# Patient Record
Sex: FEMALE | Race: WHITE | NOT HISPANIC OR LATINO | Employment: OTHER | URBAN - METROPOLITAN AREA
[De-identification: names, ages, dates, MRNs, and addresses within clinical notes are randomized per-mention and may not be internally consistent; named-entity substitution may affect disease eponyms.]

---

## 2017-01-04 ENCOUNTER — CARE COORDINATION (OUTPATIENT)
Dept: ANESTHESIOLOGY | Facility: CLINIC | Age: 47
End: 2017-01-04

## 2017-01-04 NOTE — PROGRESS NOTES
Called to notify pt that her insurance approves the procedure for tomorrow and reminded her of her procedure time of 3:00 and to arrive at 2:00. Pt verbalized understanding of instructions.

## 2017-01-05 ENCOUNTER — HOSPITAL ENCOUNTER (OUTPATIENT)
Facility: AMBULATORY SURGERY CENTER | Age: 47
End: 2017-01-05
Attending: ANESTHESIOLOGY

## 2017-01-05 VITALS
TEMPERATURE: 98 F | DIASTOLIC BLOOD PRESSURE: 97 MMHG | OXYGEN SATURATION: 98 % | RESPIRATION RATE: 16 BRPM | SYSTOLIC BLOOD PRESSURE: 143 MMHG

## 2017-01-05 RX ORDER — TRIAMCINOLONE ACETONIDE 40 MG/ML
INJECTION, SUSPENSION INTRA-ARTICULAR; INTRAMUSCULAR PRN
Status: DISCONTINUED | OUTPATIENT
Start: 2017-01-05 | End: 2017-01-05 | Stop reason: HOSPADM

## 2017-01-05 RX ORDER — IOPAMIDOL 408 MG/ML
INJECTION, SOLUTION INTRATHECAL PRN
Status: DISCONTINUED | OUTPATIENT
Start: 2017-01-05 | End: 2017-01-05 | Stop reason: HOSPADM

## 2017-01-05 RX ORDER — LIDOCAINE HYDROCHLORIDE 10 MG/ML
INJECTION, SOLUTION EPIDURAL; INFILTRATION; INTRACAUDAL; PERINEURAL PRN
Status: DISCONTINUED | OUTPATIENT
Start: 2017-01-05 | End: 2017-01-05 | Stop reason: HOSPADM

## 2017-01-05 RX ORDER — BUPIVACAINE HYDROCHLORIDE AND EPINEPHRINE 2.5; 5 MG/ML; UG/ML
INJECTION, SOLUTION INFILTRATION; PERINEURAL PRN
Status: DISCONTINUED | OUTPATIENT
Start: 2017-01-05 | End: 2017-01-05 | Stop reason: HOSPADM

## 2017-01-05 NOTE — DISCHARGE INSTRUCTIONS
Home Care Instructions after an Epidural Steroid Pain Injection    A epidural steroid injection delivers steroid medication directly into the area that may be causing yourleg pain.     Activity  -Rest today  -Do not work today  -Resume normal activity tomorrow  -DO NOT shower for 24 hours  -DO NOT remove bandaid for 24 hours    Pain  -You may experience soreness at the injection site for one or two days  -You may use an ice pack for 20 minutes every 2 hours for the first 24 hours  -You may use a heating pad after the first 24 hours  -You may use Tylenol (acetaminophen) every 4 hours or other pain medicines as     directed by your physician    You may experience numbness radiating into your legs or arms (depending on the procedure location). This numbness may last several hours. Until sensation returns to normal; please use caution in walking, climbing stairs, and stepping out of your vehicle, etc.    DID YOU RECEIVE SEDATION TODAY?  No    Safety  Sedation medicine, if given, may remain active for many hours. It is important for the next 24 hours that you do not:  -Drive a car  -Operate machines or power tools  -Consume alcohol, including beer  -Sign any important papers or legal documents      Common side effects of steroids:  Not everyone will experience corticosteroid side effects. If side effects are experienced, they will gradually subside in the 7-10 day period following an injection. Most common side effects include:  -Flushed face and/or chest  -Feeling of warmth, particularly in the face but could be an overall feeling of warmth  -Increased blood sugar in diabetic patients  -Menstrual irregularities my occur. If taking hormone-based birth control an alternate method of birth control is recommended  -Sleep disturbances and/or mood swings are possible  -Leg cramps    Please contact us if you have:  -Severe pain  -Fever more than 101.5 degrees Fahrenheit  -Signs of infection at the injection site (redness,  swelling, or drainage)    If you have questions, please contact our office at [phone] between the hours of 7:00 am and 3:00 pm Monday through Friday. After office hours you can contact the on call provider by dialing [phone]. If you need immediate attention, we recommend that you go to a hospital emergency room or dial [phone].

## 2017-01-05 NOTE — IP AVS SNAPSHOT
MRN:[id number]                      After Visit Summary   1/5/2017    Kalyn Rivero    MRN: [id number]           Thank you!     Thank you for choosing [facility] for your care. Our goal is always to provide you with excellent care. Hearing back from our patients is one way we can continue to improve our services. Please take a few minutes to complete the written survey that you may receive in the mail after you visit with us. Thank you!        Patient Information     Date Of Birth          1970        About your hospital stay     You were admitted on:  January 5, 2017 You last received care in the:  [facility]    You were discharged on:  January 5, 2017       Who to Call     For medical emergencies, please call 911.  For non-urgent questions about your medical care, please call your primary care provider or clinic, [phone]  For questions related to your surgery, please call your surgery clinic        Attending Provider     Provider    Fredy Patel DO       Primary Care Provider Office Phone # Fax #    Mika Zamora [phone]       [facility] [address] [facility] [address]        Your next 10 appointments already scheduled     Ozzie 10, 2017  1:00 PM   (Arrive by 12:45 PM)   Return Visit with Aspen Randle,    [facility] ([facility])    [address] [place] [address]   [phone]            Feb 02, 2017 11:10 AM   (Arrive by 10:55 AM)   RETURN HAND with Carrol Gaspar MD   [facility] ([facility])    [address] [place] [address]   [phone]            Feb 15, 2017 11:30 AM   (Arrive by 11:15 AM)   Return Visit with Fredy Patel DO   [facility] ([facility])    [address] [facility]  [address] [facility] [address] [phone]              Further instructions from your care team       Home Care Instructions after an Epidural Steroid Pain Injection    A epidural steroid injection delivers steroid medication directly into the area that may be causing yourleg pain.     Activity  -Rest today  -Do not work today  -Resume normal activity tomorrow  -DO NOT shower for 24 hours  -DO NOT remove bandaid for 24 hours    Pain  -You may experience soreness at the injection site for one or two days  -You may use an ice pack for 20 minutes every 2 hours for the first 24 hours  -You may use a heating pad after the first 24 hours  -You may use Tylenol (acetaminophen) every 4 hours or other pain medicines as     directed by your physician    You may experience numbness radiating into your legs or arms (depending on the procedure location). This numbness may last several hours. Until sensation returns to normal; please use caution in walking, climbing stairs, and stepping out of your vehicle, etc.    DID YOU RECEIVE SEDATION TODAY?  No    Safety  Sedation medicine, if given, may remain active for many hours. It is important for the next 24 hours that you do not:  -Drive a car  -Operate machines or power tools  -Consume alcohol, including beer  -Sign any important papers or legal documents      Common side effects of steroids:  Not everyone will experience corticosteroid side effects. If side effects are experienced, they will gradually subside in the 7-10 day period following an injection. Most common side effects include:  -Flushed face and/or chest  -Feeling of warmth, particularly in the face but could be an overall feeling of warmth  -Increased blood sugar in diabetic patients  -Menstrual irregularities my occur. If taking hormone-based birth control an alternate method of birth control is recommended  -Sleep disturbances and/or mood swings are possible  -Leg  cramps    Please contact us if you have:  -Severe pain  -Fever more than 101.5 degrees Fahrenheit  -Signs of infection at the injection site (redness, swelling, or drainage)    If you have questions, please contact our office at [phone] between the hours of 7:00 am and 3:00 pm Monday through Friday. After office hours you can contact the on call provider by dialing [phone]. If you need immediate attention, we recommend that you go to a hospital emergency room or dial 911.      Pending Results     Date and Time Order Name Status Description    2017 1459 XR SURGERY TAB FLUORO LESS THAN 5 MIN W STILLS In process             Admission Information        Provider Department Dept Phone    2017 Fredy Patel DO  [place] [phone]      Your Vitals Were     Blood Pressure Temperature Respirations Pulse Oximetry          120/91 mmHg 97.8  F (36.6  C) (Oral) 16 95%        MyChart Information     [employer] is an electronic gateway that provides easy, online access to your medical records. With [employer], you can request a clinic appointment, read your test results, renew a prescription or communicate with your care team.     To sign up for Mod[employer] visit the website at [email or web address]   You will be asked to enter the access code listed below, as well as some personal information. Please follow the directions to create your username and password.     Your access code is: [email or web address]  Expires: 2017 12:05 PM     Your access code will  in 90 days. If you need help or a new code, please contact your [facility] or call [phone] for assistance.        Care EveryWhere ID     This is your Care EveryWhere ID. This could be used by other organizations to access your [facility] medical records  [id number]           Review of your medicines      UNREVIEWED medicines. Ask your doctor about these medicines        Dose / Directions    Botulinum Toxin Type A 200  UNITS Solr   Commonly known as:  BOTOX   Used for:  Intractable chronic migraine without aura and without status migrainosus        Dose:  200 Units   Inject 200 Units as directed every 3 months   Quantity:  200 Units   Refills:  3       CELEBREX PO        Dose:  200 mg   Take 200 mg by mouth 3 times daily   Refills:  0       ENBREL 50 MG/ML injection   Generic drug:  etanercept        Dose:  50 mg   Inject 50 mg Subcutaneous twice a week   Refills:  0       ketamine (KETALAR) 5%-gabapentin (NEURONTIN) 8% 5%-8% Gel topical gel   Used for:  Chronic pain of left heel        Dose:  30 g   Apply 30 g topically 3 times daily   Quantity:  30 g   Refills:  3       medroxyPROGESTERone 150 MG/ML injection   Commonly known as:  DEPO-PROVERA   Used for:  Encounter for initial prescription of other contraceptives        Dose:  150 mg   Inject 1 mL (150 mg) into the muscle every 3 months   Quantity:  3 mL   Refills:  3       omeprazole 40 MG capsule   Commonly known as:  priLOSEC   Used for:  PUD (peptic ulcer disease)        Dose:  40 mg   Take 1 capsule (40 mg) by mouth daily Take 30-60 minutes before a meal.   Quantity:  90 capsule   Refills:  2       oxyCODONE-acetaminophen 5-325 MG per tablet   Commonly known as:  PERCOCET        Take by mouth every 4 hours as needed for moderate to severe pain   Refills:  0                Protect others around you: Learn how to safely use, store and throw away your medicines at www.disposemymeds.org.             Medication List: This is a list of all your medications and when to take them. Check marks below indicate your daily home schedule. Keep this list as a reference.      Medications           Morning Afternoon Evening Bedtime As Needed    Botulinum Toxin Type A 200 UNITS Solr   Commonly known as:  BOTOX   Inject 200 Units as directed every 3 months                                CELEBREX PO   Take 200 mg by mouth 3 times daily                                ENBREL 50 MG/ML injection    Inject 50 mg Subcutaneous twice a week   Generic drug:  etanercept                                ketamine (KETALAR) 5%-gabapentin (NEURONTIN) 8% 5%-8% Gel topical gel   Apply 30 g topically 3 times daily                                medroxyPROGESTERone 150 MG/ML injection   Commonly known as:  DEPO-PROVERA   Inject 1 mL (150 mg) into the muscle every 3 months                                omeprazole 40 MG capsule   Commonly known as:  priLOSEC   Take 1 capsule (40 mg) by mouth daily Take 30-60 minutes before a meal.                                oxyCODONE-acetaminophen 5-325 MG per tablet   Commonly known as:  PERCOCET   Take by mouth every 4 hours as needed for moderate to severe pain

## 2017-01-05 NOTE — IP AVS SNAPSHOT
[facility]    [address] [place] [address]    Phone:  [phone]    Fax:  [phone]                                       After Visit Summary   1/5/2017    Kalyn Rivero    MRN: [id number]           After Visit Summary Signature Page     I have received my discharge instructions, and my questions have been answered. I have discussed any challenges I see with this plan with the nurse or doctor.    ..........................................................................................................................................  Patient/Patient Representative Signature      ..........................................................................................................................................  Patient Representative Print Name and Relationship to Patient    ..................................................               ................................................  Date                                            Time    ..........................................................................................................................................  Reviewed by Signature/Title    ...................................................              ..............................................  Date                                                            Time

## 2017-01-05 NOTE — H&P
Abbreviated History and Physical    Patient Name: Kalyn Rivero  YOB: 1970    Reason for Procedure:Left hip pain    History: 47yo female with chronic left hip pain is here today for a left hip intra-articular steroid injection.    Past Medical History   Diagnosis Date     Endometriosis      Renal stone      Rheumatoid arthritis(714.0)      Hypothyroidism      Nephrolithiasis      Chronic pain 3/24/2011     Acetaminophen overdose 3/24/2011     Malignant neoplasm (H)      Scoliosis      Migraine      History of blood transfusion      Stomach ulcer      history     Fibromyalgia      Heart murmur      Gastro-oesophageal reflux disease      Other chronic pain      PONV (postoperative nausea and vomiting)      Anesthesia complication      pt states has a histor of heart stopping during anesthesia,     Anemia      RA (rheumatoid arthritis) (H)      Immune disorder (H)      Learning disability      Neck injuries        NPO status: appropriat    Recent illness or antibiotic use?No    Current use of anticoagulant medication?No    Allergy to contrast, iodine or shellfish? No    History of Diabetes?NO    History of obstructive sleep apnea? no    History of problems with sedation? No    Physical exam:  /78 mmHg  Temp(Src) 97.8  F (36.6  C) (Oral)  Resp 14  SpO2 95%  LMP   Breastfeeding? No  General: in no apparent distress   Neuro: At least antigravity strength noted in all 4 extremities  Respiratory: Clear to ausculation bilaterally   Cardiac: Regular rate and rhythm  Skin: No rashes or lesions on exposed areas of skin    Medications:   Current Outpatient Prescriptions   Medication     etanercept (ENBREL) 50 MG/ML prefilled syringe     Celecoxib (CELEBREX PO)     ketamine, KETALAR, 5%-gabapentin, NEURONTIN, 8% 5%-8% GEL topical gel     Botulinum Toxin Type A (BOTOX) 200 UNITS SOLR     oxyCODONE-acetaminophen (PERCOCET) 5-325 MG per tablet     omeprazole (PRILOSEC) 40 MG capsule      medroxyPROGESTERone (DEPO-PROVERA) 150 MG/ML injection     No current facility-administered medications for this encounter.     Facility-Administered Medications Ordered in Other Encounters   Medication     bupivacaine 0.5 % -  EPINEPHrine 1:200,000 injection     mepivacaine (PF) (CARBOCAINE) 2 % injection       Allergies:     Allergies   Allergen Reactions     Morphine Swelling     Codeine Nausea and Nausea and Vomiting     Swelling, itching. GI Intolerance.      Gabapentin Other (See Comments)     Pins,needles, stabbing aching at once  Numbness and Tingling     Sulfa Drugs      unknown     Erythromycin Nausea     Vomiting      Penicillins Rash     Prednisone Palpitations     Heart racing       ASA Classification: 2    INFORMED CONSENT OBTAINED: I went over the planned procedure with Ms. Klayn Lucas Mat. I went over the possible outcomes. I also went over the risks of the procedure including the risk of bleeding, infection, nerve damage, increased pain, and joint damage. The patient understood the risks and signed the consent form.     Assessment:  1.)Left Hip Pain  2.)Left Hip Arthropathy    Plan:  1.)OK for local anesthetic use and mild to moderate sedation if indicated.  2.)Proceed with planned injection as noted above in HPI.  3.)The patient will be contacted within 1 week by our office to follow up on the results of our procedure.            Fredy Patel DO    [facility]  Pain Management  Department of Anesthesiology

## 2017-01-16 ENCOUNTER — CARE COORDINATION (OUTPATIENT)
Dept: ANESTHESIOLOGY | Facility: CLINIC | Age: 47
End: 2017-01-16

## 2017-01-16 NOTE — PROGRESS NOTES
"Purpose of call: Follow up after Left Hip Joint Injection done on 1/5/17    Overall relief: Pt stated \"I didn't get any relief, it feels the same.\"  Follow up: Pt has a f/u appointment scheduled for next month.    "

## 2017-02-02 ENCOUNTER — OFFICE VISIT (OUTPATIENT)
Dept: ORTHOPEDICS | Facility: CLINIC | Age: 47
End: 2017-02-02

## 2017-02-02 DIAGNOSIS — M24.021 LOOSE BODY IN RIGHT ELBOW: Primary | ICD-10-CM

## 2017-02-02 NOTE — NURSING NOTE
Reason For Visit:   Chief Complaint   Patient presents with     RECHECK     Follow up, right medial elbow pain, hand numbness, EMG results     Date of surgery: 9/30/15 right elbow arthroplasty    Pain Assessment  Patient Currently in Pain: Yes (Small, ring, and thumb fingers)  Primary Pain Location: Hand  Pain Orientation: Right  Pain Descriptors: Numbness, Heaviness    Hand Dominance Evaluation  Hand Dominance: Right      force  R hand  level 2 force: 9.072 kg (20 lb)  L hand  level  2 force: 8.165 kg (18 lb)

## 2017-02-02 NOTE — MR AVS SNAPSHOT
After Visit Summary   2/2/2017    Kalyn Rivero    MRN: [id number]           Patient Information     Date Of Birth          1970        Visit Information        Provider Department      2/2/2017 11:10 AM Carrol Gaspar MD [facility]        Today's Diagnoses     Loose body in right elbow    -  1       Follow-ups after your visit        Your next 10 appointments already scheduled     Mar 09, 2017  2:00 PM CST   (Arrive by 1:45 PM)   Post-Op with KARMA U ORTHO HAND POP   [facility] ([facility])    [address] [place] [address]   [phone]            Mar 09, 2017  2:30 PM CST   (Arrive by 2:15 PM)   ASYA Hand with Blanca Mon OT   [facility] ([facility])    [address] [place] [address] [phone]            Mar 27, 2017 10:30 AM CDT   (Arrive by 10:15 AM)   Return Visit with Fredy Patel DO   [facility] ([facility])    [address] [place] [address]   [phone]            Apr 13, 2017 11:40 AM CDT   (Arrive by 11:25 AM)   POST-OP HAND with Carrol Gaspar MD   [facility])    [address] [place] [address] [phone]              Who to contact     Please call your clinic at [phone] to:    Ask questions about your health    Make or cancel appointments    Discuss your medicines    Learn about your test results    Speak to your doctor   If you have compliments or concerns about an experience at your clinic, or if you wish to file a complaint, please contact [facility] Patient Relations at [phone] or email us at [email or web address]         Additional Information About Your Visit         [employer] Information     [employer] is an electronic gateway that provides easy, online access to your medical records. With [employer], you can request a clinic appointment, read your test results, renew a prescription or communicate with your care team.     To sign up for [employer] visit the website at [email or web address]   You will be asked to enter the access code listed below, as well as some personal information. Please follow the directions to create your username and password.     Your access code is: [email or web address]  Expires: 2017  6:30 AM     Your access code will  in 90 days. If you need help or a new code, please contact your [facility] or call [phone] for assistance.        Care EveryWhere ID     This is your Care EveryWhere ID. This could be used by other organizations to access your [place] medical records  [id number]         Blood Pressure from Last 3 Encounters:   17 (!) 138/93   17 134/90   17 (!) 142/95    Weight from Last 3 Encounters:   17 73.5 kg (162 lb)   17 73.6 kg (162 lb 3.2 oz)   17 73.6 kg (162 lb 3.2 oz)              We Performed the Following     [facility]-Operative Worksheet (Hand)          Today's Medication Changes          These changes are accurate as of: 17 11:59 PM.  If you have any questions, ask your nurse or doctor.               Stop taking these medicines if you haven't already. Please contact your care team if you have questions.     oxyCODONE-acetaminophen 5-325 MG per tablet   Commonly known as:  PERCOCET                    Primary Care Provider Office Phone # Fax #    Mika Zamora [phone]       [facility] [address] [facility] [address]        Thank you!     Thank you for choosing [facility]  for your care. Our goal is always to provide you with excellent care. Hearing back from our patients is one way we can continue to  improve our services. Please take a few minutes to complete the written survey that you may receive in the mail after your visit with us. Thank you!             Your Updated Medication List - Protect others around you: Learn how to safely use, store and throw away your medicines at www.disposemymeds.org.          This list is accurate as of: 2/2/17 11:59 PM.  Always use your most recent med list.                   Brand Name Dispense Instructions for use    Botulinum Toxin Type A 200 UNITS Solr    BOTOX    200 Units    Inject 200 Units as directed every 3 months       CELEBREX PO      Take 200 mg by mouth 3 times daily       ENBREL 50 MG/ML injection   Generic drug:  etanercept      Inject 50 mg Subcutaneous twice a week Reported on 2/28/2017       ketamine (KETALAR) 5%-gabapentin (NEURONTIN) 8% 5%-8% Gel topical PLO cream     30 g    Apply 30 g topically 3 times daily       medroxyPROGESTERone 150 MG/ML injection    DEPO-PROVERA    3 mL    Inject 1 mL (150 mg) into the muscle every 3 months       omeprazole 40 MG capsule    priLOSEC    90 capsule    Take 1 capsule (40 mg) by mouth daily Take 30-60 minutes before a meal.

## 2017-02-02 NOTE — LETTER
2/2/2017       RE: Kalyn Rivero  [address] [facility] [address]     Dear Colleague,    Thank you for referring your patient, Kalyn Rivero, to the [facility] at [facility]. Please see a copy of my visit note below.    HISTORY OF PRESENT ILLNESS:  Kalyn is a 46-year-old female with multiple medical issues including juvenile rheumatoid arthritis, severe frontal headaches, abdominal pain.  She has had a right total elbow arthroplasty and has had other total joint surgeries as well.  She reports her main problem has been mechanical type symptoms in her right hand.  She has had tingling and numbness in the small and ring fingers.      She is here for followup of her EMG nerve conduction velocity testing.  Her EMG nerve conduction testing shows that she has a right C5-C8 nerve root and the C6 and C7 have some recent active denervation.  There is no evidence of an ulnar neuropathy superimposed upon the cervical radiculopathy.      PHYSICAL EXAMINATION:  Today, she does have mechanical symptoms in the right elbow consistent with a creaking crepitus type sound.      IMPRESSION:     1.  Right elbow pain.   2.  Right hand numbness.      I recommended that she see one of our cervical surgeons because the EMG has localized this as radiculopathy.  She reports that she would like to have the bump in her elbow removed because it is clicking and catching.  I discussed with her that I could do this, but I would not recommend an ulnar nerve exploration at the same time.  She wishes to proceed.  A surgery date was recommended for loose body removal of the right elbow.  A consultation to our spine surgeon colleagues was made.   Carrol Gaspar MD

## 2017-02-02 NOTE — NURSING NOTE
Teaching Flowsheet   Relevant Diagnosis: Right elbow loose body    Teaching Topic: Pre-op right elbow loose body excision      Person(s) involved in teaching:   Patient     Motivation Level:  Asks Questions: Yes  Eager to Learn: Yes  Cooperative: Yes  Receptive (willing/able to accept information): Yes  Any cultural factors/Yazdanism beliefs that may influence understanding or compliance? No       Patient demonstrates understanding of the following:  Reason for the appointment, diagnosis and treatment plan: Yes  Knowledge of proper use of medications and conditions for which they are ordered (with special attention to potential side effects or drug interactions): Yes  Which situations necessitate calling provider and whom to contact: Yes       Teaching Concerns Addressed:   Comments: patient has RA she will be discussing medications with rheumatologist, she also see's pain here at [facility]-she has an appointment with them on 2/25 and will discuss pain management following surgery.      Proper use and care of post op dressing (medical equip, care aids, etc.): Yes  Nutritional needs and diet plan: Yes  Pain management techniques: Yes  Wound Care: Yes  How and/when to access community resources: Yes     Instructional Materials Used/Given: pre-op packet, antiseptic soap      Time spent with patient: 12 minutes.

## 2017-02-22 ENCOUNTER — OFFICE VISIT (OUTPATIENT)
Dept: FAMILY MEDICINE | Facility: CLINIC | Age: 47
End: 2017-02-22
Payer: COMMERCIAL

## 2017-02-22 ENCOUNTER — RESULT FOLLOW UP (OUTPATIENT)
Dept: FAMILY MEDICINE | Facility: CLINIC | Age: 47
End: 2017-02-22

## 2017-02-22 VITALS
HEIGHT: 66 IN | SYSTOLIC BLOOD PRESSURE: 120 MMHG | DIASTOLIC BLOOD PRESSURE: 86 MMHG | HEART RATE: 73 BPM | BODY MASS INDEX: 26.1 KG/M2 | WEIGHT: 162.4 LBS | TEMPERATURE: 98.1 F | OXYGEN SATURATION: 98 %

## 2017-02-22 DIAGNOSIS — Z30.9 ENCOUNTER FOR CONTRACEPTIVE MANAGEMENT, UNSPECIFIED CONTRACEPTIVE ENCOUNTER TYPE: ICD-10-CM

## 2017-02-22 DIAGNOSIS — N89.8 VAGINAL DISCHARGE: ICD-10-CM

## 2017-02-22 DIAGNOSIS — Z12.31 ENCOUNTER FOR SCREENING MAMMOGRAM FOR BREAST CANCER: ICD-10-CM

## 2017-02-22 DIAGNOSIS — Z12.4 SCREENING FOR MALIGNANT NEOPLASM OF CERVIX: ICD-10-CM

## 2017-02-22 DIAGNOSIS — Z01.818 PREOP GENERAL PHYSICAL EXAM: Primary | ICD-10-CM

## 2017-02-22 DIAGNOSIS — M05.711 RHEUMATOID ARTHRITIS INVOLVING BOTH SHOULDERS WITH POSITIVE RHEUMATOID FACTOR (H): ICD-10-CM

## 2017-02-22 DIAGNOSIS — M05.712 RHEUMATOID ARTHRITIS INVOLVING BOTH SHOULDERS WITH POSITIVE RHEUMATOID FACTOR (H): ICD-10-CM

## 2017-02-22 DIAGNOSIS — R87.810 CERVICAL HIGH RISK HPV (HUMAN PAPILLOMAVIRUS) TEST POSITIVE: ICD-10-CM

## 2017-02-22 DIAGNOSIS — E03.9 HYPOTHYROIDISM, UNSPECIFIED TYPE: ICD-10-CM

## 2017-02-22 DIAGNOSIS — Z11.3 SCREEN FOR STD (SEXUALLY TRANSMITTED DISEASE): ICD-10-CM

## 2017-02-22 LAB
ANION GAP SERPL CALCULATED.3IONS-SCNC: 11 MMOL/L (ref 3–14)
BASOPHILS # BLD AUTO: 0.1 10E9/L (ref 0–0.2)
BASOPHILS NFR BLD AUTO: 0.6 %
BETA HCG QUAL IFA URINE: NEGATIVE
BUN SERPL-MCNC: 13 MG/DL (ref 7–30)
CALCIUM SERPL-MCNC: 9.3 MG/DL (ref 8.5–10.1)
CHLORIDE SERPL-SCNC: 105 MMOL/L (ref 94–109)
CO2 SERPL-SCNC: 23 MMOL/L (ref 20–32)
CREAT SERPL-MCNC: 0.8 MG/DL (ref 0.52–1.04)
DIFFERENTIAL METHOD BLD: ABNORMAL
EOSINOPHIL # BLD AUTO: 0.3 10E9/L (ref 0–0.7)
EOSINOPHIL NFR BLD AUTO: 3.2 %
ERYTHROCYTE [DISTWIDTH] IN BLOOD BY AUTOMATED COUNT: 15.6 % (ref 10–15)
GFR SERPL CREATININE-BSD FRML MDRD: 77 ML/MIN/1.7M2
GLUCOSE SERPL-MCNC: 88 MG/DL (ref 70–99)
HCT VFR BLD AUTO: 40.9 % (ref 35–47)
HGB BLD-MCNC: 12.9 G/DL (ref 11.7–15.7)
LYMPHOCYTES # BLD AUTO: 1.5 10E9/L (ref 0.8–5.3)
LYMPHOCYTES NFR BLD AUTO: 17.4 %
MCH RBC QN AUTO: 28.4 PG (ref 26.5–33)
MCHC RBC AUTO-ENTMCNC: 31.5 G/DL (ref 31.5–36.5)
MCV RBC AUTO: 90 FL (ref 78–100)
MICRO REPORT STATUS: NORMAL
MONOCYTES # BLD AUTO: 0.8 10E9/L (ref 0–1.3)
MONOCYTES NFR BLD AUTO: 9 %
NEUTROPHILS # BLD AUTO: 6 10E9/L (ref 1.6–8.3)
NEUTROPHILS NFR BLD AUTO: 69.8 %
PLATELET # BLD AUTO: 214 10E9/L (ref 150–450)
POTASSIUM SERPL-SCNC: 4.3 MMOL/L (ref 3.4–5.3)
RBC # BLD AUTO: 4.54 10E12/L (ref 3.8–5.2)
SODIUM SERPL-SCNC: 139 MMOL/L (ref 133–144)
SPECIMEN SOURCE: NORMAL
TSH SERPL DL<=0.005 MIU/L-ACNC: 2.12 MU/L (ref 0.4–4)
WBC # BLD AUTO: 8.7 10E9/L (ref 4–11)
WET PREP SPEC: NORMAL

## 2017-02-22 PROCEDURE — 85025 COMPLETE CBC W/AUTO DIFF WBC: CPT | Performed by: FAMILY MEDICINE

## 2017-02-22 PROCEDURE — 96372 THER/PROPH/DIAG INJ SC/IM: CPT | Performed by: FAMILY MEDICINE

## 2017-02-22 PROCEDURE — 84703 CHORIONIC GONADOTROPIN ASSAY: CPT | Performed by: FAMILY MEDICINE

## 2017-02-22 PROCEDURE — 99214 OFFICE O/P EST MOD 30 MIN: CPT | Mod: 25 | Performed by: FAMILY MEDICINE

## 2017-02-22 PROCEDURE — G0476 HPV COMBO ASSAY CA SCREEN: HCPCS | Performed by: FAMILY MEDICINE

## 2017-02-22 PROCEDURE — 80048 BASIC METABOLIC PNL TOTAL CA: CPT | Performed by: FAMILY MEDICINE

## 2017-02-22 PROCEDURE — 88175 CYTOPATH C/V AUTO FLUID REDO: CPT | Performed by: FAMILY MEDICINE

## 2017-02-22 PROCEDURE — 87210 SMEAR WET MOUNT SALINE/INK: CPT | Performed by: FAMILY MEDICINE

## 2017-02-22 PROCEDURE — 87491 CHLMYD TRACH DNA AMP PROBE: CPT | Performed by: FAMILY MEDICINE

## 2017-02-22 PROCEDURE — 84443 ASSAY THYROID STIM HORMONE: CPT | Performed by: FAMILY MEDICINE

## 2017-02-22 PROCEDURE — 87591 N.GONORRHOEAE DNA AMP PROB: CPT | Performed by: FAMILY MEDICINE

## 2017-02-22 PROCEDURE — 36415 COLL VENOUS BLD VENIPUNCTURE: CPT | Performed by: FAMILY MEDICINE

## 2017-02-22 RX ORDER — MEDROXYPROGESTERONE ACETATE 150 MG/ML
150 INJECTION, SUSPENSION INTRAMUSCULAR
Qty: 1 ML | Refills: 0 | OUTPATIENT
Start: 2017-02-22 | End: 2017-02-28

## 2017-02-22 NOTE — MR AVS SNAPSHOT
After Visit Summary   2/22/2017    Kalyn Rivero    MRN: [id number]           Patient Information     Date Of Birth          1970        Visit Information        Provider Department      2/22/2017 11:30 AM Mika Zamora MD [facility]        Today's Diagnoses     Preop general physical exam    -  1    Rheumatoid arthritis involving both shoulders with positive rheumatoid factor (H)        Hypothyroidism, unspecified type        Screen for STD (sexually transmitted disease)        Vaginal discharge        Screening for malignant neoplasm of cervix        Encounter for screening mammogram for breast cancer        Encounter for contraceptive management, unspecified contraceptive encounter type          Care Instructions      Before Your Surgery      Call your surgeon if there is any change in your health. This includes signs of a cold or flu (such as a sore throat, runny nose, cough, rash or fever).    Do not smoke, drink alcohol or take over the counter medicine (unless your surgeon or primary care doctor tells you to) for the 24 hours before and after surgery.    If you take prescribed drugs: Follow your doctor s orders about which medicines to take and which to stop until after surgery.    Eating and drinking prior to surgery: follow the instructions from your surgeon    Take a shower or bath the night before surgery. Use the soap your surgeon gave you to gently clean your skin. If you do not have soap from your surgeon, use your regular soap. Do not shave or scrub the surgery site.  Wear clean pajamas and have clean sheets on your bed.         Follow-ups after your visit        Your next 10 appointments already scheduled     Feb 28, 2017  8:30 AM CST   (Arrive by 8:15 AM)   Return Visit with Fredy Patel DO   [facility] ([facility])    [address] [place] [id number]    [phone]            Mar 02, 2017   Procedure with Carrol Gaspar MD   [facility] ([facility])    [address] [place] [address]  [address]th [facility] [id number] [phone]           Located in the Clinics and Surgery Center at [address] [place] [address].   parking is very convenient and highly recommended.  is a $6 flat rate fee; no tips accepted.  Both  and self parkers should enter the main arrival plaza from [facility]; parking attendants will direct you based on your parking preference.            Mar 09, 2017  2:00 PM CST   (Arrive by 1:45 PM)   Post-Op with KARMA U ORTHO HAND POP   [facility] ([facility])    [address] [place] [address]  [address] [facility] [id number] [phone]            Mar 09, 2017  2:30 PM CST   (Arrive by 2:15 PM)   ASYA Hand with Blanca Mon OT   [facility] ([facility])    [address] [place] [id number] [phone]              Future tests that were ordered for you today     Open Future Orders        Priority Expected Expires Ordered    MA Screening Digital Bilateral Routine  2/22/2018 2/22/2017            Who to contact     If you have questions or need follow up information about today's clinic visit or your schedule please contact [facility] directly at [phone].  Normal or non-critical lab and imaging results will be communicated to you by MyChart, letter or phone within 4 business days after the clinic has received the results. If you do not hear from us within 7 days, please contact the clinic through MyChart or phone. If you have a critical or abnormal lab result, we will notify you by phone as soon as possible.  Submit refill requests through [employer] or call your pharmacy and they will forward the refill request to us. Please allow 3 business days for  "your refill to be completed.          Additional Information About Your Visit        [employer] Information     MMIT lets you send messages to your doctor, view your test results, renew your prescriptions, schedule appointments and more. To sign up, go to [email or web address] . Click on \"Log in\" on the left side of the screen, which will take you to the Welcome page. Then click on \"Sign up Now\" on the right side of the page.     You will be asked to enter the access code listed below, as well as some personal information. Please follow the directions to create your username and password.     Your access code is: [email or web address]  Expires: 2017  6:30 AM     Your access code will  in 90 days. If you need help or a new code, please call your [facility] clinic or [phone].        Care EveryWhere ID     This is your Care EveryWhere ID. This could be used by other organizations to access your [facility] medical records  [id number]        Your Vitals Were     Pulse Temperature Height Pulse Oximetry BMI (Body Mass Index)       73 98.1  F (36.7  C) (Oral) 5' 6\" (1.676 m) 98% 26.21 kg/m2        Blood Pressure from Last 3 Encounters:   17 120/86   17 (!) 143/97   16 (!) 144/98    Weight from Last 3 Encounters:   17 162 lb 6.4 oz (73.7 kg)   16 160 lb (72.6 kg)   10/11/16 146 lb 9.6 oz (66.5 kg)              We Performed the Following     Basic metabolic panel  (Ca, Cl, CO2, Creat, Gluc, K, Na, BUN)     Beta HCG qual IFA urine     C Medroxyprogesterone inj/1mg     CBC with platelets and differential     Chlamydia trachomatis PCR     HPV High Risk Types DNA Cervical     INJECTION INTRAMUSCULAR OR SUB-Q     Neisseria gonorrhoeae PCR     Pap imaged thin layer diagnostic with HPV (select HPV order below)     TSH with free T4 reflex     Wet prep          Today's Medication Changes          These changes are accurate as of: 17  4:52 PM.  If you have any questions, ask your nurse or " doctor.               These medicines have changed or have updated prescriptions.        Dose/Directions    * medroxyPROGESTERone 150 MG/ML injection   Commonly known as:  DEPO-PROVERA   This may have changed:  Another medication with the same name was added. Make sure you understand how and when to take each.   Used for:  Encounter for initial prescription of other contraceptives   Changed by:  Mika Zamora MD        Dose:  150 mg   Inject 1 mL (150 mg) into the muscle every 3 months   Quantity:  3 mL   Refills:  3       * medroxyPROGESTERone 150 MG/ML injection   Commonly known as:  DEPO-PROVERA   This may have changed:  You were already taking a medication with the same name, and this prescription was added. Make sure you understand how and when to take each.   Used for:  Encounter for contraceptive management, unspecified contraceptive encounter type   Changed by:  Mika Zamora MD        Dose:  150 mg   Inject 1 mL (150 mg) into the muscle every 3 months   Quantity:  1 mL   Refills:  0       * Notice:  This list has 2 medication(s) that are the same as other medications prescribed for you. Read the directions carefully, and ask your doctor or other care provider to review them with you.         Where to get your medicines      Some of these will need a paper prescription and others can be bought over the counter.  Ask your nurse if you have questions.     You don't need a prescription for these medications     medroxyPROGESTERone 150 MG/ML injection                Primary Care Provider Office Phone # Fax #    Mika Zamora [phone]       [facility] [address] [facility] [address]        Thank you!     Thank you for choosing [facility]  for your care. Our goal is always to provide you with excellent care. Hearing back from our patients is one way we can continue to improve our services. Please take a few minutes to complete the  written survey that you may receive in the mail after your visit with us. Thank you!             Your Updated Medication List - Protect others around you: Learn how to safely use, store and throw away your medicines at www.disposemymeds.org.          This list is accurate as of: 2/22/17  4:52 PM.  Always use your most recent med list.                   Brand Name Dispense Instructions for use    Botulinum Toxin Type A 200 UNITS Solr    BOTOX    200 Units    Inject 200 Units as directed every 3 months       CELEBREX PO      Take 200 mg by mouth 3 times daily       ENBREL 50 MG/ML injection   Generic drug:  etanercept      Inject 50 mg Subcutaneous twice a week       ketamine (KETALAR) 5%-gabapentin (NEURONTIN) 8% 5%-8% Gel topical PLO cream     30 g    Apply 30 g topically 3 times daily       * medroxyPROGESTERone 150 MG/ML injection    DEPO-PROVERA    3 mL    Inject 1 mL (150 mg) into the muscle every 3 months       * medroxyPROGESTERone 150 MG/ML injection    DEPO-PROVERA    1 mL    Inject 1 mL (150 mg) into the muscle every 3 months       omeprazole 40 MG capsule    priLOSEC    90 capsule    Take 1 capsule (40 mg) by mouth daily Take 30-60 minutes before a meal.       * Notice:  This list has 2 medication(s) that are the same as other medications prescribed for you. Read the directions carefully, and ask your doctor or other care provider to review them with you.

## 2017-02-22 NOTE — LETTER
March 14, 2018      Kalyn Rivero  [address] [facility] [address]    Dear MsTone,      At [facility], your health and wellness is our primary concern. That is why we are following up on a colposcopy from 3/28/17. Your provider had recommended that you have a Pap smear and HPV test completed by 3/28/18. Our records do not show that this has been scheduled.    It is important to complete the follow up that your provider has suggested for you to ensure that there are no worsening changes which may, over time, develop into cancer.      Please contact our office at  [phone] to schedule an appointment for a Pap smear and HPV test at your earliest convenience. If you have questions or concerns, please call the clinic and we will be happy to assist you.    If you have completed the tests outside of [facility], please have the results forwarded to our office. We will update the chart for your primary Physician to review before your next annual physical.     Thank you for choosing [facility]!    Sincerely,      Mkia Zamora MD/rafael

## 2017-02-22 NOTE — NURSING NOTE
BLOOD PRESSURE: 120/86  DATE OF LAST PAP or ANNUAL EXAM: Lab Results     URINE HCG:negative  The following medication was given:   MEDICATION: Depo Provera 150mg  ROUTE: IM  SITE: Arm - Right  : [employer]  LOT #: [id number]  EXPIRATION:06/2019  NEXT INJECTION DUE: May 10-24  Provider: Dr. Sera Rudd MA  NDC:[address]-9

## 2017-02-22 NOTE — NURSING NOTE
"Chief Complaint   Patient presents with     Pre-Op Exam       Initial /86  Pulse 73  Temp 98.1  F (36.7  C) (Oral)  Ht 5' 6\" (1.676 m)  Wt 162 lb 6.4 oz (73.7 kg)  SpO2 98%  BMI 26.21 kg/m2 Estimated body mass index is 26.21 kg/(m^2) as calculated from the following:    Height as of this encounter: 5' 6\" (1.676 m).    Weight as of this encounter: 162 lb 6.4 oz (73.7 kg).  Medication Reconciliation: complete  Raymond Schulte CMA    "

## 2017-02-22 NOTE — LETTER
[facility]  [address] [facility] [address] [phone]      February 28, 2017    Kalyn Rivero  [address] [place] [phone]    Dear Kalyn,    We are contacting you in writing because we have been unable to reach you by phone.    The PAP smear you had done on 2/22/17 is normal.    An HPV test was also ordered at the time of your pap smear. Your HPV test result showed evidence of high risk type of the HPV virus. HPV is a common virus found in sexually active men and women. Some high risk strains of the HPV virus can be risk factors for later development of cervical cancer. I am recommending that you schedule a Colposcopy which is a test that allows us to take a closer look at the cells of the cervix. During this test, a biopsy of the cervix may be taken for further lab testing. The exam and test will help determine if any other treatment needs to be done at this time.    Please try to schedule this appointment when you will not be menstruating. If you have some light spotting the day of the procedure that's ok. If you have any bleeding more than that you will need to reschedule your appointment. You may take 400 mg Ibuprofen or Tylenol 1 hour before the colposcopy if you are not allergic or have any adverse reactions to these medicines. You may experience some mild cramping if a biopsy is taken. Nothing vaginally 24 hours prior to the exam.    Please call [phone] to schedule a Colposcopy. If you have additional questions regarding this result, please call Marcial Rios RN at [phone].      Sincerely,      Mika Zamora MD/rolanda

## 2017-02-22 NOTE — LETTER
April 18, 2019      Kalyn Lucas Mat  [address] [facility] [address]    Dear ,      At [facility], your health and wellness is our primary concern. That is why we are following up on a colposcopy from 05/03/18. Your provider had recommended that you have a Pap smear and HPV test completed by 05/03/19. Our records do not show that this has been scheduled.    It is important to complete the follow up that your provider has suggested for you to ensure that there are no worsening changes which may, over time, develop into cancer.      Please contact our office at  [phone] to schedule an appointment for a Pap smear and HPV test at your earliest convenience. If you have questions or concerns, please call the clinic and we will be happy to assist you.    If you have completed the tests outside of [facility], please have the results forwarded to our office. We will update the chart for your primary Physician to review before your next annual physical.     Thank you for choosing [facility]!    Sincerely,      Your [facility] Care Team/[facility]

## 2017-02-22 NOTE — LETTER
May 10, 2019      Kalyn Lucas Mat  [address] [place] [phone]    Dear ,      At [facility], your health and wellness is our primary concern. That is why we are following up on a colposcopy from 05/03/18. Your provider had recommended that you have a Pap smear and HPV test completed by 05/03/19. Our records do not show that this has been scheduled.    It is important to complete the follow up that your provider has suggested for you to ensure that there are no worsening changes which may, over time, develop into cancer.      Please contact our office at  [phone] to schedule an appointment for a Pap smear and HPV test at your earliest convenience. If you have questions or concerns, please call the clinic and we will be happy to assist you.    If you have completed the tests outside of [facility], please have the results forwarded to our office. We will update the chart for your primary Physician to review before your next annual physical.     Thank you for choosing [facility]!    Sincerely,      Your [facility] Care Team/rafael

## 2017-02-22 NOTE — PROGRESS NOTES
[facility]  [address] [facility] [address]  [phone]  Dept: [phone]    PRE-OP EVALUATION:  Today's date: 2017    Kalyn Rivero (: 1970) presents for pre-operative evaluation assessment as requested by Dr. Gaspar.  She requires evaluation and anesthesia risk assessment prior to undergoing surgery/procedure for treatment of right elbow .  Proposed procedure: Right elbow loose body excision    Date of Surgery/ Procedure: 3/2/17  Time of Surgery/ Procedure: 2:00  Hospital/Surgical Facility: [facility]   Fax number for surgical facility: [phone]  Primary Physician: Mika Zamora  Type of Anesthesia Anticipated: to be determined    Patient has a Health Care Directive or Living Will:  NO    1. NO - Do you have a history of heart attack, stroke, stent, bypass or surgery on an artery in the head, neck, heart or legs?  2. NO - Do you ever have any pain or discomfort in your chest?  3. NO - Do you have a history of  Heart Failure?  4. NO - Are you troubled by shortness of breath when: walking on the level, up a slight hill or at night?  5. NO - Do you currently have a cold, bronchitis or other respiratory infection?  6. NO - Do you have a cough, shortness of breath or wheezing?  7. NO - Do you sometimes get pains in the calves of your legs when you walk?  8. NO - Do you or anyone in your family have previous history of blood clots?  9. NO - Do you or does anyone in your family have a serious bleeding problem such as prolonged bleeding following surgeries or cuts?  10. NO - Have you ever had problems with anemia or been told to take iron pills?  11. NO - Have you had any abnormal blood loss such as black, tarry or bloody stools, or abnormal vaginal bleeding?  12. YES - HAVE YOU EVER HAD A BLOOD TRANSFUSION?    13. YES - HAVE YOU OR ANY OF YOUR RELATIVES EVER HAD PROBLEMS WITH ANESTHESIA?    14. NO - Do you have sleep apnea, excessive snoring or daytime  drowsiness?  15. NO - Do you have any prosthetic heart valves?  16. NO - Do you have prosthetic joints?  17. NO - Is there any chance that you may be pregnant?      HPI:                                                      Brief HPI related to upcoming procedure: .theumatoid arthritis and elbow pain           MEDICAL HISTORY:                                                      Patient Active Problem List    Diagnosis Date Noted     Health Care Home 05/27/2011     Priority: High     Not in Active Care Coordination                                                                          DX V65.8 REPLACED WITH 88876 HEALTH CARE HOME (04/08/2013)         Pain of toe of left foot 11/02/2016     Priority: Medium     Other drug-induced neutropenia (H) 04/14/2016     Priority: Medium     Rheumatoid arthritis with positive rheumatoid factor, involving unspecified site (H) 04/14/2016     Priority: Medium     Primary osteoarthritis of right elbow 10/08/2015     Priority: Medium     Aftercare following surgery of the musculoskeletal system 10/08/2015     Priority: Medium     Degenerative joint disease of elbow, right 09/30/2015     Priority: Medium     Elbow pain, right 07/06/2015     Priority: Medium     Other postprocedural status(V45.89) 07/06/2015     Priority: Medium     Rheumatoid arthritis of foot (H) 04/13/2015     Priority: Medium     Encounter for long-term opiate analgesic use 07/15/2014     Priority: Medium     TMJ (temporomandibular joint syndrome) 07/10/2014     Priority: Medium     Intractable chronic migraine without aura 07/10/2014     Priority: Medium     Problem list name updated by automated process. Provider to review       Lesion of ulnar nerve 06/03/2014     Priority: Medium     Right arm numbness 05/21/2014     Priority: Medium     Right upper extremity numbness 04/02/2014     Priority: Medium     Esophageal reflux 03/25/2014     Priority: Medium     Postoperative pain 11/20/2013     Priority: Medium      S/P cervical spinal fusion 11/20/2013     Priority: Medium     Drug-seeking behavior 11/20/2013     Priority: Medium     Opioid type dependence (H) 11/20/2013     Priority: Medium     Problem list name updated by automated process. Provider to review       Severe frontal headaches 11/20/2013     Priority: Medium     Abdominal pain, unspecified abdominal location 11/20/2013     Priority: Medium     Problem list name updated by automated process. Provider to review       Atlantoaxial instability 11/18/2013     Priority: Medium     Trochanteric bursitis - left 10/04/2012     Priority: Medium     Knee joint replacement - left 01/26/2012     Priority: Medium     Patient is followed by [facility] for ongoing prescription of narcotic pain medicine.  Med: oxycodone 5 mg.  Maximum use per month: 120  Expected duration: 8 weeks postoperative.  Narcotic agreement on file: YES  Clinic visit recommended: Q 4 weeks    Patient is followed by [facility] for ongoing prescription of narcotic pain medicine.  Med: oxycontin 20 mg.   Maximum use per month: 60  Expected duration: 1 month  Narcotic agreement on file: YES  Clinic visit recommended: Q 4 weeks       TOTAL KNEE ARTHROPLASTY - bilateral 12/12/2011     Priority: Medium     Liver failure, acute 04/22/2011     Priority: Medium     CARDIOVASCULAR SCREENING; LDL GOAL LESS THAN 160 04/22/2011     Priority: Medium     Renal stone      Priority: Medium     Suicide risk 04/17/2011     Priority: Medium     Grief reaction 04/17/2011     Priority: Medium     3       Acetaminophen overdose 03/24/2011     Priority: Medium     Hepatic failure (H) 03/24/2011     Priority: Medium     Pancreatitis 03/24/2011     Priority: Medium     Anemia 03/24/2011     Priority: Medium     Chronic pain 03/24/2011     Priority: Medium     Pneumonia 03/24/2011     Priority: Medium     Advanced directives, counseling/discussion 03/07/2011     Priority: Medium     Planning, information  given.       Rheumatoid arthritis (H)      Priority: Medium     Hypothyroidism      Priority: Medium      Past Medical History   Diagnosis Date     Acetaminophen overdose 3/24/2011     Anemia      Anemia      Anesthesia complication      pt states has a histor of heart stopping during anesthesia,     Chronic pain 3/24/2011     Endometriosis      Fibromyalgia      Gastro-oesophageal reflux disease      Grief reaction      Heart murmur      Hepatic failure (H)      History of blood transfusion      Hypothyroidism      Immune disorder (H)      Learning disability      Liver failure, acute      Malignant neoplasm (H)      Migraine      Neck injuries      Nephrolithiasis      Opioid type dependence (H)      Other chronic pain      Other drug-induced neutropenia (H)      PONV (postoperative nausea and vomiting)      RA (rheumatoid arthritis) (H)      Renal stone      Rheumatoid arthritis(714.0)      Scoliosis      Stomach ulcer      history     Past Surgical History   Procedure Laterality Date     Cystoscopy  02/06/2009     1.cystoscopy.2.bilateral ureteroscopy.3.basketing of stone fragments from the right kidney.4.bilateral double-J ureteral stent placement.5.ann catheter placement.6.fluoroscopy and intraoperative interpretation of images.     C anesth,dx arthroscopic proc knee joint  10/24/2007     right total knee arthroplasty     Cystoscopy  01/08/2007     1. cystoscopy and left stent removal.2.left ureteroscopy     Endoscopy  03/31/2005     upper GI endoscopy-[facility] [place]     Gyn surgery       Fusion cervical posterior one level  11/18/2013     Procedure: FUSION CERVICAL POSTERIOR ONE LEVEL;  Cervical 1-2 Posterior Cervical Fusion ([facility] Procedure);  Surgeon: Carrol Gunn MD;  Location: [facility] OR     Esophagoscopy, gastroscopy, duodenoscopy (egd), combined  3/17/2014     Procedure: COMBINED ESOPHAGOSCOPY, GASTROSCOPY, DUODENOSCOPY (EGD), BIOPSY SINGLE OR MULTIPLE;;  Surgeon: Duane, William  MD Estuardo;  Location: [facility] OR     Decompression cubital tunnel  6/6/2014     Procedure: DECOMPRESSION CUBITAL TUNNEL;  Surgeon: Janelle Coates MD;  Location: [facility] OR     C shldr arthroscop,diagnostic  12/17/2008     left total shoulder arthroplasty by Dr. Law     Rotator cuff repair rt/lt  10/19/2005     1.left distal clavicle excision.2.acromioplasty.3.coracoacromial ligament resection.4.rotator cuff exploration     C total knee arthroplasty  1/18/12     Left     Arthrodesis foot  5/23/2012     Procedure:ARTHRODESIS FOOT; Right Subtalar andTaloNavicular  Fusion  ; Surgeon:BRIGHT HENDRICKS; Location:[facility] OR     Arthroplasty wrist       x2 Lt wrist replacement.     Arthroplasty knee Right      Arthrodesis foot Left 4/22/2015     Procedure: ARTHRODESIS FOOT;  Surgeon: Bright Hendricks MD;  [facility]     Arthrotomy elbow Right 6/18/2015     Procedure: ARTHROTOMY ELBOW;  Surgeon: Carrol Gaspar MD;  [facility]     Inject steroid (location) Left 6/18/2015     Procedure: INJECT STEROID (LOCATION);  Surgeon: Carrol Gaspar MD;  [facility]     Arthroplasty elbow Right 9/30/2015     Procedure: ARTHROPLASTY ELBOW;  Surgeon: Carrol Gaspar MD;  [facility]: [facility]     C shoulder surg proc unlisted       Neck surgery       Inject major joint / bursa Left 1/5/2017     Procedure: INJECT MAJOR JOINT / BURSA;  Surgeon: Fredy Patel DO;  Location: [facility] OR     Current Outpatient Prescriptions   Medication Sig Dispense Refill     ketamine, KETALAR, 5%-gabapentin, NEURONTIN, 8% 5%-8% GEL topical gel Apply 30 g topically 3 times daily 30 g 3     Botulinum Toxin Type A (BOTOX) 200 UNITS SOLR Inject 200 Units as directed every 3 months 200 Units 3     omeprazole (PRILOSEC) 40 MG capsule Take 1 capsule (40 mg) by mouth daily Take 30-60 minutes before a meal. 90 capsule 2     medroxyPROGESTERone (DEPO-PROVERA) 150 MG/ML injection Inject 1 mL (150 mg) into the muscle every  "3 months 3 mL 3     etanercept (ENBREL) 50 MG/ML prefilled syringe Inject 50 mg Subcutaneous twice a week       Celecoxib (CELEBREX PO) Take 200 mg by mouth 3 times daily       OTC products: None, except as noted above    Allergies   Allergen Reactions     Morphine Swelling     Codeine Nausea and Nausea and Vomiting     Swelling, itching. GI Intolerance.      Gabapentin Other (See Comments)     Pins,needles, stabbing aching at once  Numbness and Tingling     Sulfa Drugs      unknown     Erythromycin Nausea     Vomiting      Penicillins Rash     Prednisone Palpitations     Heart racing      Latex Allergy: NO    Social History   Substance Use Topics     Smoking status: Passive Smoke Exposure - Never Smoker     Packs/day: 0.10     Years: 10.00     Types: Cigarettes     Last attempt to quit: 7/31/2013     Smokeless tobacco: Former User      Comment: Quit 9/1/14     Alcohol use No     History   Drug Use No       REVIEW OF SYSTEMS:                                                    C: NEGATIVE for fever, chills, change in weight  I: NEGATIVE for worrisome rashes, moles or lesions  E: NEGATIVE for vision changes or irritation  E/M: NEGATIVE for ear, mouth and throat problems  R: NEGATIVE for significant cough or SOB  CV: NEGATIVE for chest pain, palpitations or peripheral edema  GI: NEGATIVE for nausea, abdominal pain, heartburn, or change in bowel habits  : NEGATIVE for frequency, dysuria, or hematuria  M: NEGATIVE for significant arthralgias or myalgia  N: NEGATIVE for weakness, dizziness or paresthesias  E: NEGATIVE for temperature intolerance, skin/hair changes  H: NEGATIVE for bleeding problems  P: NEGATIVE for changes in mood or affect    EXAM:                                                    /86  Pulse 73  Temp 98.1  F (36.7  C) (Oral)  Ht 5' 6\" (1.676 m)  Wt 162 lb 6.4 oz (73.7 kg)  SpO2 98%  BMI 26.21 kg/m2    GENERAL APPEARANCE: healthy, alert and no distress     EYES: EOMI,- PERRL     HENT: ear canals " and TM's normal and nose and mouth without ulcers or lesions     NECK: no adenopathy, no asymmetry, masses, or scars and thyroid normal to palpation     RESP: lungs clear to auscultation - no rales, rhonchi or wheezes     CV: regular rates and rhythm, normal S1 S2, no S3 or S4 and no murmur, click or rub -     ABDOMEN:  soft, nontender, no HSM or masses and bowel sounds normal     : normal cervix, adnexae, and uterus without masses or discharge and rectal exam normal without masses-guaiac negative stool     MS: extremities normal- no gross deformities noted, no evidence of inflammation in joints, FROM in all extremities.     SKIN: no suspicious lesions or rashes     NEURO: Normal strength and tone, sensory exam grossly normal, mentation intact and speech normal     PSYCH: mentation appears normal. and affect normal/bright     LYMPHATICS: No axillary, cervical, inguinal, or supraclavicular nodes    DIAGNOSTICS:                                                    No labs or EKG required for low risk surgery (cataract, skin procedure, breast biopsy, etc)    Recent Labs   Lab Test  04/14/16   1243  04/11/16   1421   09/08/14   2303   11/19/13   0731   HGB  13.7  13.8   < >  11.8   < >  9.2*   PLT  128*  126*   < >  172   < >  141*   INR   --    --    --   1.00   --   1.28*   NA  143  142   < >  140   < >  135   POTASSIUM  3.8  4.1   < >  4.1   < >  3.6   CR  0.82  0.93   < >  0.90   < >  0.79    < > = values in this interval not displayed.        IMPRESSION:                                                    Reason for surgery/procedure: elbow pain  Diagnosis/reason for consult: Anesthesia    The proposed surgical procedure is considered LOW risk.    REVISED CARDIAC RISK INDEX  The patient has the following serious cardiovascular risks for perioperative complications such as (MI, PE, VFib and 3  AV Block):  No serious cardiac risks  INTERPRETATION: 0 risks: Class I (very low risk - 0.4% complication rate)    The  patient has the following additional risks for perioperative complications:  No identified additional risks      ICD-10-CM    1. Preop general physical exam Z01.818    2. Rheumatoid arthritis involving both shoulders with positive rheumatoid factor (H) M05.711     M05.712    3. Hypothyroidism, unspecified type E03.9 TSH with free T4 reflex   4. Screen for STD (sexually transmitted disease) Z11.3 Chlamydia trachomatis PCR     Neisseria gonorrhoeae PCR   5. Vaginal discharge N89.8 Wet prep   6. Screening for malignant neoplasm of cervix Z12.4 HPV High Risk Types DNA Cervical     Pap imaged thin layer diagnostic with HPV (select HPV order below)   7. Encounter for screening mammogram for breast cancer Z12.31 MA Screening Digital Bilateral       RECOMMENDATIONS:                                                        --Patient is to take all scheduled medications on the day of surgery EXCEPT for modifications listed below.    APPROVAL GIVEN to proceed with proposed procedure, without further diagnostic evaluation       Signed Electronically by: Mika Zamora MD    Copy of this evaluation report is provided to requesting physician.    [place] Preop Guidelines

## 2017-02-23 LAB
C TRACH DNA SPEC QL NAA+PROBE: NORMAL
N GONORRHOEA DNA SPEC QL NAA+PROBE: NORMAL
SPECIMEN SOURCE: NORMAL
SPECIMEN SOURCE: NORMAL

## 2017-02-24 LAB
COPATH REPORT: NORMAL
PAP: NORMAL

## 2017-02-28 ENCOUNTER — OFFICE VISIT (OUTPATIENT)
Dept: ANESTHESIOLOGY | Facility: CLINIC | Age: 47
End: 2017-02-28

## 2017-02-28 VITALS
HEIGHT: 67 IN | WEIGHT: 162.2 LBS | SYSTOLIC BLOOD PRESSURE: 142 MMHG | HEART RATE: 90 BPM | DIASTOLIC BLOOD PRESSURE: 95 MMHG | BODY MASS INDEX: 25.46 KG/M2 | OXYGEN SATURATION: 97 %

## 2017-02-28 DIAGNOSIS — M79.2 NEUROPATHIC PAIN: ICD-10-CM

## 2017-02-28 DIAGNOSIS — M06.9 RHEUMATOID ARTHRITIS INVOLVING MULTIPLE SITES, UNSPECIFIED RHEUMATOID FACTOR PRESENCE: Primary | ICD-10-CM

## 2017-02-28 DIAGNOSIS — M79.7 FIBROMYALGIA: ICD-10-CM

## 2017-02-28 DIAGNOSIS — M25.521 ELBOW PAIN, RIGHT: ICD-10-CM

## 2017-02-28 DIAGNOSIS — G43.719 INTRACTABLE CHRONIC MIGRAINE WITHOUT AURA AND WITHOUT STATUS MIGRAINOSUS: ICD-10-CM

## 2017-02-28 LAB
FINAL DIAGNOSIS: ABNORMAL
HPV HR 12 DNA CVX QL NAA+PROBE: POSITIVE
HPV16 DNA SPEC QL NAA+PROBE: NEGATIVE
HPV18 DNA SPEC QL NAA+PROBE: POSITIVE
SPECIMEN DESCRIPTION: ABNORMAL

## 2017-02-28 RX ORDER — LEFLUNOMIDE 20 MG/1
20 TABLET ORAL EVERY MORNING
COMMUNITY
End: 2023-07-19

## 2017-02-28 ASSESSMENT — PAIN SCALES - GENERAL: PAINLEVEL: EXTREME PAIN (9)

## 2017-02-28 NOTE — PROGRESS NOTES
2/22/17: NIL Pap, + HR HPV 18 & other HR HPV. Plan colp  3/14/17: Pt notified of result and f/u plan of a colposcopy. Pt told to expect a call from her care team to schedule.   3/28/17 [place] [facility] - negative for dysplasia. Plan cotest in 1 year.   4/19/17 Clinic RN informed pt of results.   3/14/18 Cotest reminder letter sent ([facility])  4/5/18 Reminder call placed, scheduled pt for 4/30/18 as it was the first available for the pt and Dr Zamora (rl)  5/3/18 [place] Bx & ECC - Negative. Plan cotest in 1 year.   5/7/18 Pt notified by phone.   04/18/19 Cotest reminder letter sent. ([facility])  5/10/19 Reminder call placed, does not accept calls from this number, additional reminder letter sent (rl)  6/6/19 Patient is lost to follow-up. Routed to provider as ANAHI. (rl)

## 2017-02-28 NOTE — NURSING NOTE
Pt stated that they are having surgery on 3/2/17.  Pt is having to hold certain medications before hand    Blanca Atkinson LPN

## 2017-02-28 NOTE — PATIENT INSTRUCTIONS
1. Pain control plan is in your chart and will be copied to your surgeon.     2. We will get you scheduled for Botox once we know you are approved     3. Follow up at the end of March     To speak with a nurse, schedule/reschedule/cancel a clinic appointment, or request a medication refill call: [phone]     *You can also reach us by [employer]: [email or web address]    For refills, please call on Monday, 1 week before your medication runs out. The doctors are not always in clinic, so this gives us time to get your prescriptions ready.  Please let us know the name of the medication you are requesting a refill of.         Pain Clinic Scheduler: Luba- you can call this number to speak with our  directly. She is not in clinic every day, if you get her voicemail state your full name and date of birth and she will get back to you: [phone]

## 2017-02-28 NOTE — MR AVS SNAPSHOT
After Visit Summary   2/28/2017    Kalyn Rivero    MRN: [id number]           Patient Information     Date Of Birth          1970        Visit Information        Provider Department      2/28/2017 8:30 AM Fredy Patel DO [facility]        Care Instructions    1. Pain control plan is in your chart and will be copied to your surgeon.     2. We will get you scheduled for Botox once we know you are approved     3. Follow up at the end of March     To speak with a nurse, schedule/reschedule/cancel a clinic appointment, or request a medication refill call: [phone]     *You can also reach us by [employer]: [email or web address]    For refills, please call on Monday, 1 week before your medication runs out. The doctors are not always in clinic, so this gives us time to get your prescriptions ready.  Please let us know the name of the medication you are requesting a refill of.         Pain Clinic Scheduler: Luba- you can call this number to speak with our  directly. She is not in clinic every day, if you get her voicemail state your full name and date of birth and she will get back to you: [phone]              Follow-ups after your visit        Your next 10 appointments already scheduled     Mar 02, 2017   Procedure with Carrol Gaspar MD   [facility] ([facility])    [address] [place] [address]   [phone]           Located in the Clinics and Surgery Center at [address] [place] [address].   parking is very convenient and highly recommended.  is a $6 flat rate fee; no tips accepted.  Both  and self parkers should enter the main arrival plaza from [facility]; parking attendants will direct you based on your parking preference.            Mar 09, 2017  2:00 PM CST   (Arrive by 1:45 PM)   Post-Op  "with KARMA U ORTHO HAND POP   [facility] ([facility])    [address]  4th Floor  [facility] [address] [phone]            Mar 09, 2017  2:30 PM CST   (Arrive by 2:15 PM)   ASYA Hand with Blanca Mon OT   [facility] ([facility])    [address]  4th [facility] [address] [phone]              Who to contact     Please call your clinic at [phone] to:    Ask questions about your health    Make or cancel appointments    Discuss your medicines    Learn about your test results    Speak to your doctor   If you have compliments or concerns about an experience at your clinic, or if you wish to file a complaint, please contact [facility] Patient Relations at [phone] or email us at [email or web address]         Additional Information About Your Visit        InMyShow Information     [employer] is an electronic gateway that provides easy, online access to your medical records. With InMyShow, you can request a clinic appointment, read your test results, renew a prescription or communicate with your care team.     To sign up for [employer] visit the website at [email or web address]   You will be asked to enter the access code listed below, as well as some personal information. Please follow the directions to create your username and password.     Your access code is: [email or web address]  Expires: 2017  6:30 AM     Your access code will  in 90 days. If you need help or a new code, please contact your [facility] or call [phone] for assistance.        Care EveryWhere ID     This is your Care EveryWhere ID. This could be used by other organizations to access your [place] medical records  [id number]        Your Vitals Were     Pulse Height Pulse Oximetry BMI (Body Mass Index)          90 1.689 m (5' 6.5\") 97% 25.79 kg/m2         " Blood Pressure from Last 3 Encounters:   02/28/17 (!) 142/95   02/22/17 120/86   01/05/17 (!) 143/97    Weight from Last 3 Encounters:   02/28/17 73.6 kg (162 lb 3.2 oz)   02/22/17 73.7 kg (162 lb 6.4 oz)   12/27/16 72.6 kg (160 lb)              Today, you had the following     No orders found for display       Primary Care Provider Office Phone # Fax #    Mika Zamora [phone]       [facility] [address] [address]        Thank you!     Thank you for choosing [facility]  for your care. Our goal is always to provide you with excellent care. Hearing back from our patients is one way we can continue to improve our services. Please take a few minutes to complete the written survey that you may receive in the mail after your visit with us. Thank you!             Your Updated Medication List - Protect others around you: Learn how to safely use, store and throw away your medicines at www.disposemymeds.org.          This list is accurate as of: 2/28/17  9:29 AM.  Always use your most recent med list.                   Brand Name Dispense Instructions for use    Botulinum Toxin Type A 200 UNITS Solr    BOTOX    200 Units    Inject 200 Units as directed every 3 months       CELEBREX PO      Take 200 mg by mouth 3 times daily       ENBREL 50 MG/ML injection   Generic drug:  etanercept      Inject 50 mg Subcutaneous twice a week Reported on 2/28/2017       ketamine (KETALAR) 5%-gabapentin (NEURONTIN) 8% 5%-8% Gel topical PLO cream     30 g    Apply 30 g topically 3 times daily       leflunomide 20 MG tablet    ARAVA     Take 20 mg by mouth       medroxyPROGESTERone 150 MG/ML injection    DEPO-PROVERA    3 mL    Inject 1 mL (150 mg) into the muscle every 3 months       omeprazole 40 MG capsule    priLOSEC    90 capsule    Take 1 capsule (40 mg) by mouth daily Take 30-60 minutes before a meal.

## 2017-02-28 NOTE — NURSING NOTE
Reviewed AVS with patient includin. Pain control plan is in your chart and will be copied to your surgeon.     2. We will get you scheduled for Botox once we know you are approved     3. Follow up at the end of March     Pt verbalized understanding of instructions.      Follow up was made for 3/27

## 2017-02-28 NOTE — LETTER
2/28/2017       RE: Kalyn Rivero  [address] [facility] [address]     Dear Colleague,    Thank you for referring your patient, Kalyn Rivero, to the [facility] at [facility]. Please see a copy of my visit note below.    Date of visit: 2/28/2017    Chief complaint:   Chief Complaint   Patient presents with     Pain Management     Return visit- Follow up appointment related to Left hip pain.        Interval history:  Kalyn Rivero is a 46 year old female last seen by me on 12/27/17.   She was a no show for a follow up appointment on 2/15/17.    Recommendations/plan at the last visit included:  1. Interventions: Schedule for Left hip IA injection with possible GTB injection at the same time. Given her EMG results she may benefit from a cervical RASHMI after a new MRI is taken to eval the epidural space and survery for worsening disease. Her neck pain in minimal right now and given her resistance to injections, she may not elect to go this route. I will have her see my partner, Dr. Burnham for BOTOX for her chronic migraines until I am credentialed (they have worked well in the past).   2. Medication Management: I will have her wean off her Elavil. She can get the compound cream for her sore joints and elbow. I told her to space out her celebrex to [id number] to give her better coverage at night. She has had side effects to multiple medications in the past and generally does not tolerate medical therapy well.  3. Physical Therapy: Continue HEP   4. Need for Clinical Health Psychologist. She will continue to follow with Dr. Randle  5. Diagnostic Studies: Hip XR prior to injection.  6. Follow up: 2 months - will call to schedule botox injections. We can discuss cervical interventions at her next visit. I encouraged her to follow up with Dr. Gaspar regarding her surgical options.       Since her last visit, Kalyn  Shayy Rivero reports:  The hip injection done on 1/5/17 did not give her any relief.  She has completely stopped the Elavil and felt her weight has stabilized.  She is scheduled for surgery on her right elbow this Thursday at our [facility].  She is primarily here today for recommendations for mauri-operative pain control given her multiple intolerance of medications and history of opioid over-use.   She has had to stop her Enbrel infsions for 6 weeks prior to the surgery and 6 weeks postoperatively.  She is currently only taking celebrex 200mg q8hrs prn for pain.   She would also like to schedule botox injections for he migraines, which she has been approved for.      Pain scores:  Pain intensity on average is 6 on a scale of 0-10.     Current pain treatments:   Celebrex 200mg q8hrs prn - very helpful  Enbrel - twice a week infusions - currently on hold.    Past pain treatments:  Percocet- helpful  Hydrocodone - helpful (hx of opioid dependence however)  Gabapentin - caused paresthesias, poorly tolerated  Topamax  Depakote  Acetaminophen - she reports elevated LFT's while on chronic acetaminophen.   Last labs LFT's on 4/14/16 showed very mildly elevated ALT/AST.    Side Effects: no side effect    Medications:  Current Outpatient Prescriptions   Medication Sig Dispense Refill     leflunomide (ARAVA) 20 MG tablet Take 20 mg by mouth       omeprazole (PRILOSEC) 40 MG capsule Take 1 capsule (40 mg) by mouth daily Take 30-60 minutes before a meal. 90 capsule 2     medroxyPROGESTERone (DEPO-PROVERA) 150 MG/ML injection Inject 1 mL (150 mg) into the muscle every 3 months 3 mL 3     Celecoxib (CELEBREX PO) Take 200 mg by mouth 3 times daily       [DISCONTINUED] medroxyPROGESTERone (DEPO-PROVERA) 150 MG/ML injection Inject 1 mL (150 mg) into the muscle every 3 months 1 mL 0     ketamine, KETALAR, 5%-gabapentin, NEURONTIN, 8% 5%-8% GEL topical gel Apply 30 g topically 3 times daily (Patient not taking: Reported on 2/28/2017) 30 g 3  "    Botulinum Toxin Type A (BOTOX) 200 UNITS SOLR Inject 200 Units as directed every 3 months (Patient not taking: Reported on 2/28/2017) 200 Units 3     etanercept (ENBREL) 50 MG/ML prefilled syringe Inject 50 mg Subcutaneous twice a week Reported on 2/28/2017         Medical History: any changes in medical history since they were last seen? No    Review of Systems:  The 14 system ROS was reviewed from the intake questionnaire, and is positive for: polyarthritic pain, headaches  Any bowel or bladder problems: no  Mood: stable    Physical Exam:  Blood pressure (!) 142/95, pulse 90, height 1.689 m (5' 6.5\"), weight 73.6 kg (162 lb 3.2 oz), SpO2 97 %, not currently breastfeeding.  General: NAD - she has some bruising around her right orbit that she says was from getting hit in the face with a dodgeball.  Gait: Antalgic  MSK exam: no change    Assessment:   1. Polyarticular rheumatoid arthritis  2. Migraine headaches   3. Chronic cervicalgia now with EMG evidence of right polyradiculopathies  4. Chronic Right elbow pain - surgery pending  5. Chronic right shoulder pain 2/2 rotator cuff tear - possible surgery this summer.  6. Chronic left heel pain s/p foot surgery - resolved  7. Fibromyalgia  8. Hx of Opioid dependence  9. Coping with chronic pain    Kalyn Rivero is a 46 year old female who is seen at the pain clinic for the above complaints.   We discussed multiple modalities for pain control and I explained to her that a short course of opioid medications for post operative pain control is appropriate for her surgery and that her chances of becoming dependent again were low provided these medications were not continued on a long term basis.  I also discussed the possibility of regional anesthesia with her but explained that this would depend on the nature of her surgery and the preference of Dr. Gaspar.  I will try to see her back as soon as I can after surgery to re-assess her.    Plan:  1. Interventions: " None at this time - will refer for botox injections with Dr. Burnham after her recovery from surgery - likely April 2. Perioperative Pain Management Recommendations: Limited options given her sensitivities and dependence history.  I would recommend continuing celebrex if possible, consider regional anesthesia (especially a block with exparel) if at all possible to help with post operative pain control.  Otherwise I would recommend a one week course of Oxycodone 5-10mg q4hrs prn for one week and then Oxycodone 5mg q6hrs prn for week two and then off provided adequate pain control has been obtained.   I would avoid tylenol as it has caused liver dysfunction in the past and would avoid gabapentin as it has caused worsening peripheral paresthesias with prior trials.  3. Follow up: next available after her surgery.    Total time spent was 30 minutes, and more than 50% of face to face time was spent in counseling and/or coordination of care regarding the above plan.    Fredy Patel DO    [facility]  Pain Management  Department of Anesthesiology

## 2017-03-01 ENCOUNTER — CARE COORDINATION (OUTPATIENT)
Dept: ANESTHESIOLOGY | Facility: CLINIC | Age: 47
End: 2017-03-01

## 2017-03-01 ENCOUNTER — ANESTHESIA EVENT (OUTPATIENT)
Dept: SURGERY | Facility: AMBULATORY SURGERY CENTER | Age: 47
End: 2017-03-01

## 2017-03-01 NOTE — PROGRESS NOTES
Date of visit: 2/28/2017    Chief complaint:   Chief Complaint   Patient presents with     Pain Management     Return visit- Follow up appointment related to Left hip pain.        Interval history:  Kalyn Rivero is a 46 year old female last seen by me on 12/27/17.   She was a no show for a follow up appointment on 2/15/17.    Recommendations/plan at the last visit included:  1. Interventions: Schedule for Left hip IA injection with possible GTB injection at the same time. Given her EMG results she may benefit from a cervical RASHMI after a new MRI is taken to eval the epidural space and survery for worsening disease. Her neck pain in minimal right now and given her resistance to injections, she may not elect to go this route. I will have her see my partner, Dr. Burnham for BOTOX for her chronic migraines until I am credentialed (they have worked well in the past).   2. Medication Management: I will have her wean off her Elavil. She can get the compound cream for her sore joints and elbow. I told her to space out her celebrex to [id number] to give her better coverage at night. She has had side effects to multiple medications in the past and generally does not tolerate medical therapy well.  3. Physical Therapy: Continue HEP   4. Need for Clinical Health Psychologist. She will continue to follow with Dr. Randle  5. Diagnostic Studies: Hip XR prior to injection.  6. Follow up: 2 months - will call to schedule botox injections. We can discuss cervical interventions at her next visit. I encouraged her to follow up with Dr. Gaspar regarding her surgical options.       Since her last visit, Kalyn Rivero reports:  The hip injection done on 1/5/17 did not give her any relief.  She has completely stopped the Elavil and felt her weight has stabilized.  She is scheduled for surgery on her right elbow this Thursday at our [facility].  She is primarily here today for recommendations for mauri-operative pain control given her  multiple intolerance of medications and history of opioid over-use.   She has had to stop her Enbrel infsions for 6 weeks prior to the surgery and 6 weeks postoperatively.  She is currently only taking celebrex 200mg q8hrs prn for pain.   She would also like to schedule botox injections for he migraines, which she has been approved for.      Pain scores:  Pain intensity on average is 6 on a scale of 0-10.     Current pain treatments:   Celebrex 200mg q8hrs prn - very helpful  Enbrel - twice a week infusions - currently on hold.    Past pain treatments:  Percocet- helpful  Hydrocodone - helpful (hx of opioid dependence however)  Gabapentin - caused paresthesias, poorly tolerated  Topamax  Depakote  Acetaminophen - she reports elevated LFT's while on chronic acetaminophen.   Last labs LFT's on 4/14/16 showed very mildly elevated ALT/AST.    Side Effects: no side effect    Medications:  Current Outpatient Prescriptions   Medication Sig Dispense Refill     leflunomide (ARAVA) 20 MG tablet Take 20 mg by mouth       omeprazole (PRILOSEC) 40 MG capsule Take 1 capsule (40 mg) by mouth daily Take 30-60 minutes before a meal. 90 capsule 2     medroxyPROGESTERone (DEPO-PROVERA) 150 MG/ML injection Inject 1 mL (150 mg) into the muscle every 3 months 3 mL 3     Celecoxib (CELEBREX PO) Take 200 mg by mouth 3 times daily       [DISCONTINUED] medroxyPROGESTERone (DEPO-PROVERA) 150 MG/ML injection Inject 1 mL (150 mg) into the muscle every 3 months 1 mL 0     ketamine, KETALAR, 5%-gabapentin, NEURONTIN, 8% 5%-8% GEL topical gel Apply 30 g topically 3 times daily (Patient not taking: Reported on 2/28/2017) 30 g 3     Botulinum Toxin Type A (BOTOX) 200 UNITS SOLR Inject 200 Units as directed every 3 months (Patient not taking: Reported on 2/28/2017) 200 Units 3     etanercept (ENBREL) 50 MG/ML prefilled syringe Inject 50 mg Subcutaneous twice a week Reported on 2/28/2017         Medical History: any changes in medical history since  "they were last seen? No    Review of Systems:  The 14 system ROS was reviewed from the intake questionnaire, and is positive for: polyarthritic pain, headaches  Any bowel or bladder problems: no  Mood: stable    Physical Exam:  Blood pressure (!) 142/95, pulse 90, height 1.689 m (5' 6.5\"), weight 73.6 kg (162 lb 3.2 oz), SpO2 97 %, not currently breastfeeding.  General: NAD - she has some bruising around her right orbit that she says was from getting hit in the face with a dodgeball.  Gait: Antalgic  MSK exam: no change    Assessment:   1. Polyarticular rheumatoid arthritis  2. Migraine headaches   3. Chronic cervicalgia now with EMG evidence of right polyradiculopathies  4. Chronic Right elbow pain - surgery pending  5. Chronic right shoulder pain 2/2 rotator cuff tear - possible surgery this summer.  6. Chronic left heel pain s/p foot surgery - resolved  7. Fibromyalgia  8. Hx of Opioid dependence  9. Coping with chronic pain    Kalyn Rivero is a 46 year old female who is seen at the pain clinic for the above complaints.   We discussed multiple modalities for pain control and I explained to her that a short course of opioid medications for post operative pain control is appropriate for her surgery and that her chances of becoming dependent again were low provided these medications were not continued on a long term basis.  I also discussed the possibility of regional anesthesia with her but explained that this would depend on the nature of her surgery and the preference of Dr. Gaspar.  I will try to see her back as soon as I can after surgery to re-assess her.    Plan:  1. Interventions: None at this time - will refer for botox injections with Dr. Burnham after her recovery from surgery - likely April  2. Perioperative Pain Management Recommendations: Limited options given her sensitivities and dependence history.  I would recommend continuing celebrex if possible, consider regional anesthesia " (especially a block with exparel) if at all possible to help with post operative pain control.  Otherwise I would recommend a one week course of Oxycodone 5-10mg q4hrs prn for one week and then Oxycodone 5mg q6hrs prn for week two and then off provided adequate pain control has been obtained.   I would avoid tylenol as it has caused liver dysfunction in the past and would avoid gabapentin as it has caused worsening peripheral paresthesias with prior trials.  3. Follow up: next available after her surgery.    Total time spent was 30 minutes, and more than 50% of face to face time was spent in counseling and/or coordination of care regarding the above plan.    Fredy Patel DO    [facility]  Pain Management  Department of Anesthesiology

## 2017-03-01 NOTE — ANESTHESIA PREPROCEDURE EVALUATION
Anesthesia Evaluation     . Pt has had prior anesthetic.     History of anesthetic complications  - PONV    ROS/MED HX    ENT/Pulmonary:  - neg pulmonary ROS     Neurologic:     (+)migraines,     Cardiovascular:  - neg cardiovascular ROS       METS/Exercise Tolerance:     Hematologic:  - neg hematologic  ROS       Musculoskeletal:   (+) , , other musculoskeletal- RA; atlantoaxial instability s/p fusion      GI/Hepatic:     (+) GERD liver disease (h/o acute liver failure in 2012 2/2 acetaminophen OD),       Renal/Genitourinary:     (+) Nephrolithiasis ,       Endo:     (+) thyroid problem hypothyroidism, .      Psychiatric:     (+) psychiatric history depression      Infectious Disease:  - neg infectious disease ROS       Malignancy:         Other:    (+) H/O Chronic Pain,H/O chronic opiod use ,                             Anesthesia Plan      History & Physical Review      ASA Status:  2 .    NPO Status:  > 8 hours    Plan for MAC and Peripheral Nerve Block with Intravenous induction. Maintenance will be TIVA.    PONV prophylaxis:  Ondansetron (or other 5HT-3)       Postoperative Care  Postoperative pain management:  Multi-modal analgesia.      Consents                          .

## 2017-03-01 NOTE — PROGRESS NOTES
I called pt to relay that the Botox has been approved and a nurse will be calling her on Friday to get her scheduled with Dr. Burnham for the beginning of April.

## 2017-03-02 ENCOUNTER — HOSPITAL ENCOUNTER (OUTPATIENT)
Facility: AMBULATORY SURGERY CENTER | Age: 47
End: 2017-03-02
Attending: ORTHOPAEDIC SURGERY

## 2017-03-02 ENCOUNTER — ANESTHESIA (OUTPATIENT)
Dept: SURGERY | Facility: AMBULATORY SURGERY CENTER | Age: 47
End: 2017-03-02

## 2017-03-02 VITALS
WEIGHT: 162.2 LBS | BODY MASS INDEX: 26.07 KG/M2 | DIASTOLIC BLOOD PRESSURE: 90 MMHG | RESPIRATION RATE: 14 BRPM | TEMPERATURE: 98.2 F | SYSTOLIC BLOOD PRESSURE: 134 MMHG | HEIGHT: 66 IN | OXYGEN SATURATION: 98 %

## 2017-03-02 DIAGNOSIS — M24.029: Primary | ICD-10-CM

## 2017-03-02 LAB
APPEARANCE FLD: NORMAL
COLOR FLD: NORMAL
HCG UR QL: NEGATIVE
INTERNAL QC OK POCT: YES
LYMPHOCYTES NFR FLD MANUAL: 35 %
MONOS+MACROS NFR FLD MANUAL: 52 %
NEUTS BAND NFR FLD MANUAL: 13 %
RBC # FLD: NORMAL /UL
SPECIMEN SOURCE FLD: NORMAL
WBC # FLD AUTO: 157 /UL

## 2017-03-02 RX ORDER — KETAMINE HYDROCHLORIDE 10 MG/ML
INJECTION, SOLUTION INTRAMUSCULAR; INTRAVENOUS PRN
Status: DISCONTINUED | OUTPATIENT
Start: 2017-03-02 | End: 2017-03-02

## 2017-03-02 RX ORDER — FENTANYL CITRATE 50 UG/ML
25-50 INJECTION, SOLUTION INTRAMUSCULAR; INTRAVENOUS
Status: DISCONTINUED | OUTPATIENT
Start: 2017-03-02 | End: 2017-03-02 | Stop reason: HOSPADM

## 2017-03-02 RX ORDER — CLINDAMYCIN PHOSPHATE 900 MG/50ML
900 INJECTION, SOLUTION INTRAVENOUS
Status: COMPLETED | OUTPATIENT
Start: 2017-03-02 | End: 2017-03-02

## 2017-03-02 RX ORDER — DEXAMETHASONE SODIUM PHOSPHATE 4 MG/ML
INJECTION, SOLUTION INTRA-ARTICULAR; INTRALESIONAL; INTRAMUSCULAR; INTRAVENOUS; SOFT TISSUE PRN
Status: DISCONTINUED | OUTPATIENT
Start: 2017-03-02 | End: 2017-03-02

## 2017-03-02 RX ORDER — PROPOFOL 10 MG/ML
INJECTION, EMULSION INTRAVENOUS PRN
Status: DISCONTINUED | OUTPATIENT
Start: 2017-03-02 | End: 2017-03-02

## 2017-03-02 RX ORDER — LIDOCAINE HYDROCHLORIDE 20 MG/ML
INJECTION, SOLUTION INFILTRATION; PERINEURAL PRN
Status: DISCONTINUED | OUTPATIENT
Start: 2017-03-02 | End: 2017-03-02

## 2017-03-02 RX ORDER — ONDANSETRON 4 MG/1
4 TABLET, ORALLY DISINTEGRATING ORAL EVERY 30 MIN PRN
Status: DISCONTINUED | OUTPATIENT
Start: 2017-03-02 | End: 2017-03-03 | Stop reason: HOSPADM

## 2017-03-02 RX ORDER — FLUMAZENIL 0.1 MG/ML
0.2 INJECTION, SOLUTION INTRAVENOUS
Status: DISCONTINUED | OUTPATIENT
Start: 2017-03-02 | End: 2017-03-02 | Stop reason: HOSPADM

## 2017-03-02 RX ORDER — SODIUM CHLORIDE, SODIUM LACTATE, POTASSIUM CHLORIDE, CALCIUM CHLORIDE 600; 310; 30; 20 MG/100ML; MG/100ML; MG/100ML; MG/100ML
INJECTION, SOLUTION INTRAVENOUS CONTINUOUS
Status: DISCONTINUED | OUTPATIENT
Start: 2017-03-02 | End: 2017-03-03 | Stop reason: HOSPADM

## 2017-03-02 RX ORDER — OXYCODONE HYDROCHLORIDE 5 MG/1
5-10 TABLET ORAL
Status: DISCONTINUED | OUTPATIENT
Start: 2017-03-02 | End: 2017-03-03 | Stop reason: HOSPADM

## 2017-03-02 RX ORDER — ONDANSETRON 2 MG/ML
INJECTION INTRAMUSCULAR; INTRAVENOUS PRN
Status: DISCONTINUED | OUTPATIENT
Start: 2017-03-02 | End: 2017-03-02

## 2017-03-02 RX ORDER — NALOXONE HYDROCHLORIDE 0.4 MG/ML
.1-.4 INJECTION, SOLUTION INTRAMUSCULAR; INTRAVENOUS; SUBCUTANEOUS
Status: DISCONTINUED | OUTPATIENT
Start: 2017-03-02 | End: 2017-03-02 | Stop reason: HOSPADM

## 2017-03-02 RX ORDER — OXYCODONE HYDROCHLORIDE 5 MG/1
TABLET ORAL
Qty: 100 TABLET | Refills: 0 | Status: SHIPPED | OUTPATIENT
Start: 2017-03-02 | End: 2017-03-16

## 2017-03-02 RX ORDER — BACITRACIN ZINC 500 [USP'U]/G
OINTMENT TOPICAL PRN
Status: DISCONTINUED | OUTPATIENT
Start: 2017-03-02 | End: 2017-03-02 | Stop reason: HOSPADM

## 2017-03-02 RX ORDER — MEPERIDINE HYDROCHLORIDE 25 MG/ML
12.5 INJECTION INTRAMUSCULAR; INTRAVENOUS; SUBCUTANEOUS
Status: DISCONTINUED | OUTPATIENT
Start: 2017-03-02 | End: 2017-03-03 | Stop reason: HOSPADM

## 2017-03-02 RX ORDER — LIDOCAINE 40 MG/G
CREAM TOPICAL
Status: DISCONTINUED | OUTPATIENT
Start: 2017-03-02 | End: 2017-03-02 | Stop reason: HOSPADM

## 2017-03-02 RX ORDER — PROPOFOL 10 MG/ML
INJECTION, EMULSION INTRAVENOUS CONTINUOUS PRN
Status: DISCONTINUED | OUTPATIENT
Start: 2017-03-02 | End: 2017-03-02

## 2017-03-02 RX ORDER — CLINDAMYCIN PHOSPHATE 900 MG/50ML
900 INJECTION, SOLUTION INTRAVENOUS SEE ADMIN INSTRUCTIONS
Status: DISCONTINUED | OUTPATIENT
Start: 2017-03-02 | End: 2017-03-02 | Stop reason: HOSPADM

## 2017-03-02 RX ORDER — ACETAMINOPHEN 325 MG/1
975 TABLET ORAL ONCE
Status: DISCONTINUED | OUTPATIENT
Start: 2017-03-02 | End: 2017-03-02 | Stop reason: HOSPADM

## 2017-03-02 RX ORDER — SODIUM CHLORIDE, SODIUM LACTATE, POTASSIUM CHLORIDE, CALCIUM CHLORIDE 600; 310; 30; 20 MG/100ML; MG/100ML; MG/100ML; MG/100ML
INJECTION, SOLUTION INTRAVENOUS CONTINUOUS
Status: DISCONTINUED | OUTPATIENT
Start: 2017-03-02 | End: 2017-03-02 | Stop reason: HOSPADM

## 2017-03-02 RX ORDER — KETOROLAC TROMETHAMINE 30 MG/ML
30 INJECTION, SOLUTION INTRAMUSCULAR; INTRAVENOUS EVERY 6 HOURS PRN
Status: DISCONTINUED | OUTPATIENT
Start: 2017-03-02 | End: 2017-03-03 | Stop reason: HOSPADM

## 2017-03-02 RX ORDER — NALOXONE HYDROCHLORIDE 0.4 MG/ML
.1-.4 INJECTION, SOLUTION INTRAMUSCULAR; INTRAVENOUS; SUBCUTANEOUS
Status: DISCONTINUED | OUTPATIENT
Start: 2017-03-02 | End: 2017-03-03 | Stop reason: HOSPADM

## 2017-03-02 RX ORDER — ONDANSETRON 2 MG/ML
4 INJECTION INTRAMUSCULAR; INTRAVENOUS EVERY 30 MIN PRN
Status: DISCONTINUED | OUTPATIENT
Start: 2017-03-02 | End: 2017-03-03 | Stop reason: HOSPADM

## 2017-03-02 RX ADMIN — PROPOFOL: 10 INJECTION, EMULSION INTRAVENOUS at 18:14

## 2017-03-02 RX ADMIN — SODIUM CHLORIDE, SODIUM LACTATE, POTASSIUM CHLORIDE, CALCIUM CHLORIDE: 600; 310; 30; 20 INJECTION, SOLUTION INTRAVENOUS at 17:29

## 2017-03-02 RX ADMIN — PROPOFOL 70 MG: 10 INJECTION, EMULSION INTRAVENOUS at 17:49

## 2017-03-02 RX ADMIN — LIDOCAINE HYDROCHLORIDE 100 MG: 20 INJECTION, SOLUTION INFILTRATION; PERINEURAL at 17:34

## 2017-03-02 RX ADMIN — DEXAMETHASONE SODIUM PHOSPHATE 4 MG: 4 INJECTION, SOLUTION INTRA-ARTICULAR; INTRALESIONAL; INTRAMUSCULAR; INTRAVENOUS; SOFT TISSUE at 17:34

## 2017-03-02 RX ADMIN — CLINDAMYCIN PHOSPHATE 900 MG: 900 INJECTION, SOLUTION INTRAVENOUS at 17:36

## 2017-03-02 RX ADMIN — PROPOFOL 230 MG: 10 INJECTION, EMULSION INTRAVENOUS at 17:34

## 2017-03-02 RX ADMIN — PROPOFOL 200 MCG/KG/MIN: 10 INJECTION, EMULSION INTRAVENOUS at 17:34

## 2017-03-02 RX ADMIN — ONDANSETRON 4 MG: 2 INJECTION INTRAMUSCULAR; INTRAVENOUS at 17:34

## 2017-03-02 RX ADMIN — KETAMINE HYDROCHLORIDE 20 MG: 10 INJECTION, SOLUTION INTRAMUSCULAR; INTRAVENOUS at 17:51

## 2017-03-02 RX ADMIN — FENTANYL CITRATE 50 MCG: 50 INJECTION, SOLUTION INTRAMUSCULAR; INTRAVENOUS at 15:26

## 2017-03-02 RX ADMIN — BUPIVACAINE HYDROCHLORIDE AND EPINEPHRINE BITARTRATE 10 ML: 5; .005 INJECTION, SOLUTION PERINEURAL at 15:30

## 2017-03-02 NOTE — IP AVS SNAPSHOT
[facility]    [address] [place] [address]    Phone:  [phone]    Fax:  [phone]                                       After Visit Summary   3/2/2017    Kalyn Rivero    MRN: [id number]           After Visit Summary Signature Page     I have received my discharge instructions, and my questions have been answered. I have discussed any challenges I see with this plan with the nurse or doctor.    ..........................................................................................................................................  Patient/Patient Representative Signature      ..........................................................................................................................................  Patient Representative Print Name and Relationship to Patient    ..................................................               ................................................  Date                                            Time    ..........................................................................................................................................  Reviewed by Signature/Title    ...................................................              ..............................................  Date                                                            Time

## 2017-03-02 NOTE — IP AVS SNAPSHOT
MRN:[id number]                      After Visit Summary   3/2/2017    Kalyn Rivero    MRN: [id number]           Thank you!     Thank you for choosing [facility] for your care. Our goal is always to provide you with excellent care. Hearing back from our patients is one way we can continue to improve our services. Please take a few minutes to complete the written survey that you may receive in the mail after you visit with us. Thank you!        Patient Information     Date Of Birth          1970        About your hospital stay     You were admitted on:  March 2, 2017 [facility]    You were discharged on:  March 2, 2017       Who to Call     For medical emergencies, please call 911.  For non-urgent questions about your medical care, please call your primary care provider or clinic, [phone]  For questions related to your surgery, please call your surgery clinic        Attending Provider     Provider Specialty    Carrol Gaspar MD Orthopedics       Primary Care Provider Office Phone # Fax #    Mika Yunior Zamora [phone]       [facility] [address] [facility] [address]        After Care Instructions      Diet as Tolerated       Return to diet before surgery, unless instructed otherwise.            Discharge Instructions       Review outpatient procedure discharge instructions with patient as directed by Provider            Ice to affected area       Ice pack to surgical site every 15 minutes per hour for 24 hours            No Dressing Change       No dressing change until follow up appointment.    Keep your splint clean and dry.            No weight bearing           Notify Provider       For signs and symptoms of infection: Fever greater than 101, redness, swelling, heat at site, drainage, pus.            Return to clinic       Return to clinic in 1 week            Shower      "   Cover dressing if dressing is not going to be changed today                  Your next 10 appointments already scheduled     Mar 09, 2017  2:00 PM CST   (Arrive by 1:45 PM)   Post-Op with [facility] ORTHO HAND POP   [facility] Orthopaedic [facility] ([facility])    [address] [place] [id number]   [phone]            Mar 09, 2017  2:30 PM CST   (Arrive by 2:15 PM)   ASYA Hand with Blanca Mon OT   [facility] Hand Therapy ([facility])    [address] [place] [address]   [phone]            Mar 27, 2017 10:30 AM CDT   (Arrive by 10:15 AM)   Return Visit with Fredy Patel DO   [facility] ([facility])    [address] [place] [address]   [phone]            Apr 13, 2017 11:40 AM CDT   (Arrive by 11:25 AM)   POST-OP HAND with Carrol Gaspar MD   [facility]opa[facility] ([facility])    [address] [place] [address]   [phone]              Further instructions from your care team       Information about liposomal bupivacaine (Exparel)    What is Liposomal Bupivacaine?    Liposomal Bupivacaine is a numbing medication that can help you manage your pain after surgery.  This medication is similar to \"novacaine,\" which is often used by the dentist.  Liposomal bupivacaine is released slowly and can help control pain for up to 72 hours.    What is the purpose of Liposomal Bupivacaine?    To manage your pain after surgery    To help you sleep better, take deep breaths, walk more comfortable, and feel up to visiting with others    How is the procedure done?    Liposomal bupivacaine is a medication given by an injection.    It is usually given right before your surgery.  If this is the case, you will be awake or sedated, but you should experience " minimal pain during the procedure.    For some people, the injection may be given at the very end of your surgery.  It all depends on the type of surgery and your situation.    The procedure usually takes about 5-15 minutes.  An ultrasound machine will help the anesthesiologist insert it in the right place or the surgeon will inject it under direct vision.     A needle is used to place the numbing medication under your skin.  It provides pain relief by numbing the tissue in the area where your surgeon will make the incision.    What can I expect?    You may experience numbness, tingling, or a feeling of heaviness around the area that was injected.    If you experience any of the follow symptoms IMMEDIATELY CALL THE REGIONAL ANESTHESIA PAIN SERVICE:    Numbness or tingling occurs in areas other than around the injection site    Blurry vision    Ringing in your ears    A metallic taste in your mouth    PAGE: Dial [phone].  When prompted, enter the following 4-digit ID number:  [id number].  You will be prompted to enter your phone number; and then enter the # sign.  The clinician on call will call you back.    OR    CALL: Dial [phone].  Let the hospital  know that you are having a problem with a nerve block and that you would like to speak to the regional anesthesia pain service right away.    You should not receive any other type of numbing medication within 4 days after receiving liposomal bupivacaine unless your anesthesiologist approves.      Post Operative Instructions: Regional Anesthetic for Upper Extremity with Liposomal Bupivacaine  General Information:   Regional anesthesia is when local anesthetic or  numbing  medication is injected around the nerves to anesthetize or  numb  the area supplied by that set of nerves. It is a type of analgesia used to control pain and decreases the need for narcotics following surgery.    Types of Regional Blocks:  Interscalene: A block injected into the neck on the  operative shoulder/arm of a patient having shoulder surgery  Supraclavicular: A block injected near the clavicle on the operative shoulder of a patient having elbow, forearm, or hand surgery    Procedure:  The type of anesthesia your doctor used to numb your shoulder or arm will usually not start to wear off for 24-48 hours, but may last as long as 72 hours. You should be careful during that period, since it is possible to injure your arm without being aware of the injury. While your arm is numb, you should:    Avoid striking or bumping your arm    Avoid extreme hot or cold    Diet:  There are no restrictions on your diet. You should drink plenty of fluids.     Discomfort:  You will have a tingling and prickly sensation in your arm as the feeling begins to return. You can also expect some discomfort. The amount of discomfort is unpredictable, but if you have more pain than can be controlled with pain medication you should notify your physician.     Pain Medications:  Begin taking your oral pain pills before bedtime and during the night to avoid a sudden onset of pain as part of the block wears off.  Do not engage in drinking, driving, or hazardous occupations while taking pain medication.     Stitches:   You may have stitches or special skin closures. You doctor will inform you when to return to the office to have them removed.     Activity:  On the day of surgery you should try to stay in bed with your hand elevated on pillows. You may resume your normal activity after that, wearing a sling for comfort. Contact your physician if you have any of the problems:     Continued numbness or tingling in the arm or hand after 72 hours    Swelling of the fingers or fingers that are cold to the touch    Excessive bleeding or drainage    Severe pain          [facility]  Home Care Following Anesthesia  For 24 hours after surgery:  1. Get plenty of rest.  A responsible adult must stay with  "you for at least 24 hours after you leave the surgery center.  2. Do not drive or use heavy equipment.  If you have weakness or tingling, don't drive or use heavy equipment until this feeling goes away.   3. Do not drink alcohol.   4. Avoid strenuous or risky activities.  Ask for help when climbing stairs.  5. You may feel lightheaded.  IF so, sit for a few minutes before standing.  Have someone help you get up.   6. If you have nausea (feel sick to your stomach): Drink only clear liquids such as apple juice, ginger ale, broth or 7-Up.  Rest may also help.  Be sure to drink enough fluids.  Move to a regular diet as you feel able.   7. You may have a slight fever.  Call the doctor if your fever is over 100 F (37.7 C) (taken under the tongue) or lasts longer than 24 hours.  8. You may have a dry mouth, a sore throat, muscle aches or trouble sleeping. These should go away after 24 hours.  9. Do not make important or legal decisions.        Today you received an Exparel block to numb the nerves near your surgery site.  This is a block using local anesthetic or \"numbing\" medication injected around the nerves to anesthetize or \"numb\" the area supplied by those nerves.  This block is injected into the muscle layer near your surgical site.  This medication may numb the location where you had surgery up to 72 hours.  If your surgical site is an arm or leg you should be careful with your affected limb, since it is possible to injure your limb without being aware of it due to the numbing.  Until full feeling returns, you should guard against bumping or hitting your limb, and avoid extreme hot or cold temperatures on the skin.  As the block wears off, the feeling will return as a tingling or prickly sensation near your surgical site.  You will experince more discomfort from your incision as the feeling returns.  You may want to take a pain pill (a narcotic or Tylenol if this was prescribed by your surgeon) when you start to " experience mild pain before the pain beomes more severe.  If your pain medications do not control your pain, you should notify your surgeon.    Tips for taking pain medications  To get the best pain relief possible, remember these points:    Take pain medications as directed, before pain becomes severe.    Pain medication can upset your stomach: taking it with food may help.    Constipation is a common side effect of pain medication. Drink plenty of  fluids.    Eat foods high in fiber. Take a stool softener if recommended by your doctor or pharmacist.    Do not drink alcohol, drive or operate machinery while taking pain medications.    Ask about other ways to control pain, such as with heat, ice or relaxation.    Call a doctor for any of the followin. Signs of infection (fever, growing tenderness at the surgery site, a large amount of drainage or bleeding, severe pain, foul-smelling drainage, redness, swelling).  2. It has been over 8 to 10 hours since surgery and you are still not able to urinate (pass water).  3. Headache for over 24 hours.  4. Numbness, tingling or weakness the day after surgery (if you had spinal anesthesia).  Your doctor is:       Dr. Carrol Gaspar, Orthopaedics: [phone]               Or dial [phone] and ask for the resident on call for:  Orthopaedics  For emergency care, call the:  [facility] Emergency Department: [phone] (TTY for hearing impaired: [phone])        Pending Results     Date and Time Order Name Status Description    3/2/2017 1813 Cell count with differential fluid In process     3/2/2017 1811 Tissue Culture Aerobic Bacterial In process     3/2/2017 1811 Anaerobic bacterial culture In process     3/2/2017 1804 Wound Culture Aerobic Bacterial In process     3/2/2017 1804 Fungus Culture, non-blood In process     3/2/2017 1804 Anaerobic bacterial culture In process     3/2/2017 1804 AFB Culture Non Blood In process     3/2/2017 1550 XR SURGERY TAB FLUORO LESS  "THAN 5 MIN W STILLS In process             Admission Information     Date & Time Provider Department Dept. Phone    3/2/2017 Carrol Gaspar MD [facility] [phone]      Your Vitals Were     Blood Pressure Temperature Respirations Height Weight Pulse Oximetry    132/93 97.7  F (36.5  C) (Oral) 18 1.689 m (5' 6.5\") 73.6 kg (162 lb 3.2 oz) 97%    BMI (Body Mass Index)                   25.79 kg/m2           MyChart Information     [employer] is an electronic gateway that provides easy, online access to your medical records. With [employer], you can request a clinic appointment, read your test results, renew a prescription or communicate with your care team.     To sign up for [employer] visit the website at [email or web address]   You will be asked to enter the access code listed below, as well as some personal information. Please follow the directions to create your username and password.     Your access code is: [email or web address]  Expires: 2017  6:30 AM     Your access code will  in 90 days. If you need help or a new code, please contact your [facility] or call [phone] for assistance.        Care EveryWhere ID     This is your Care EveryWhere ID. This could be used by other organizations to access your [facility] medical records  [id number]           Review of your medicines      START taking        Dose / Directions    oxyCODONE 5 MG IR tablet   Commonly known as:  ROXICODONE   Used for:  Loose body of elbow, unspecified laterality        Take 5-10mg by mouth every 4 hours as needed for pain for the first week after surgery. Then switch to 5mg by mouth every 6 hours as needed for pain for the second week after surgery.   Quantity:  100 tablet   Refills:  0         CONTINUE these medicines which have NOT CHANGED        Dose / Directions    Botulinum Toxin Type A 200 UNITS Solr   Commonly known as:  BOTOX   Used for:  Intractable chronic " migraine without aura and without status migrainosus        Dose:  200 Units   Inject 200 Units as directed every 3 months   Quantity:  200 Units   Refills:  3       CELEBREX PO        Dose:  200 mg   Take 200 mg by mouth 3 times daily   Refills:  0       ENBREL 50 MG/ML injection   Generic drug:  etanercept        Dose:  50 mg   Inject 50 mg Subcutaneous twice a week Reported on 2/28/2017   Refills:  0       ketamine (KETALAR) 5%-gabapentin (NEURONTIN) 8% 5%-8% Gel topical PLO cream   Used for:  Chronic pain of left heel        Dose:  30 g   Apply 30 g topically 3 times daily   Quantity:  30 g   Refills:  3       leflunomide 20 MG tablet   Commonly known as:  ARAVA        Dose:  20 mg   Take 20 mg by mouth   Refills:  0       medroxyPROGESTERone 150 MG/ML injection   Commonly known as:  DEPO-PROVERA   Used for:  Encounter for initial prescription of other contraceptives        Dose:  150 mg   Inject 1 mL (150 mg) into the muscle every 3 months   Quantity:  3 mL   Refills:  3       omeprazole 40 MG capsule   Commonly known as:  priLOSEC   Used for:  PUD (peptic ulcer disease)        Dose:  40 mg   Take 1 capsule (40 mg) by mouth daily Take 30-60 minutes before a meal.   Quantity:  90 capsule   Refills:  2            Where to get your medicines      Some of these will need a paper prescription and others can be bought over the counter. Ask your nurse if you have questions.     Bring a paper prescription for each of these medications     oxyCODONE 5 MG IR tablet                Protect others around you: Learn how to safely use, store and throw away your medicines at www.disposemymeds.org.             Medication List: This is a list of all your medications and when to take them. Check marks below indicate your daily home schedule. Keep this list as a reference.      Medications           Morning Afternoon Evening Bedtime As Needed    Botulinum Toxin Type A 200 UNITS Solr   Commonly known as:  BOTOX   Inject 200 Units  as directed every 3 months                                CELEBREX PO   Take 200 mg by mouth 3 times daily                                ENBREL 50 MG/ML injection   Inject 50 mg Subcutaneous twice a week Reported on 2/28/2017   Generic drug:  etanercept                                ketamine (KETALAR) 5%-gabapentin (NEURONTIN) 8% 5%-8% Gel topical PLO cream   Apply 30 g topically 3 times daily                                leflunomide 20 MG tablet   Commonly known as:  ARAVA   Take 20 mg by mouth                                medroxyPROGESTERone 150 MG/ML injection   Commonly known as:  DEPO-PROVERA   Inject 1 mL (150 mg) into the muscle every 3 months                                omeprazole 40 MG capsule   Commonly known as:  priLOSEC   Take 1 capsule (40 mg) by mouth daily Take 30-60 minutes before a meal.                                oxyCODONE 5 MG IR tablet   Commonly known as:  ROXICODONE   Take 5-10mg by mouth every 4 hours as needed for pain for the first week after surgery. Then switch to 5mg by mouth every 6 hours as needed for pain for the second week after surgery.

## 2017-03-03 ENCOUNTER — APPOINTMENT (OUTPATIENT)
Dept: MRI IMAGING | Facility: CLINIC | Age: 47
End: 2017-03-03
Attending: EMERGENCY MEDICINE
Payer: COMMERCIAL

## 2017-03-03 ENCOUNTER — HOSPITAL ENCOUNTER (EMERGENCY)
Facility: CLINIC | Age: 47
Discharge: HOME | End: 2017-03-03
Attending: EMERGENCY MEDICINE | Admitting: EMERGENCY MEDICINE
Payer: COMMERCIAL

## 2017-03-03 VITALS
OXYGEN SATURATION: 100 % | DIASTOLIC BLOOD PRESSURE: 93 MMHG | WEIGHT: 162 LBS | HEART RATE: 89 BPM | BODY MASS INDEX: 26.03 KG/M2 | RESPIRATION RATE: 16 BRPM | SYSTOLIC BLOOD PRESSURE: 138 MMHG | TEMPERATURE: 98.1 F | HEIGHT: 66 IN

## 2017-03-03 DIAGNOSIS — Z98.890 S/P SHOULDER SURGERY: Primary | ICD-10-CM

## 2017-03-03 DIAGNOSIS — R20.0 NUMBNESS: ICD-10-CM

## 2017-03-03 LAB
ALBUMIN SERPL-MCNC: 3.6 G/DL (ref 3.4–5)
ALBUMIN UR-MCNC: NEGATIVE MG/DL
ALP SERPL-CCNC: 102 U/L (ref 40–150)
ALT SERPL W P-5'-P-CCNC: 35 U/L (ref 0–50)
ANION GAP SERPL CALCULATED.3IONS-SCNC: 12 MMOL/L (ref 3–14)
APPEARANCE UR: CLEAR
AST SERPL W P-5'-P-CCNC: 22 U/L (ref 0–45)
BASOPHILS # BLD AUTO: 0 10E9/L (ref 0–0.2)
BASOPHILS # BLD AUTO: 0 10E9/L (ref 0–0.2)
BASOPHILS NFR BLD AUTO: 0 %
BASOPHILS NFR BLD AUTO: 0.7 %
BILIRUB SERPL-MCNC: 0.3 MG/DL (ref 0.2–1.3)
BILIRUB UR QL STRIP: NEGATIVE
BUN SERPL-MCNC: 10 MG/DL (ref 7–30)
CALCIUM SERPL-MCNC: 9 MG/DL (ref 8.5–10.1)
CHLORIDE SERPL-SCNC: 106 MMOL/L (ref 94–109)
CO2 SERPL-SCNC: 23 MMOL/L (ref 20–32)
COLOR UR AUTO: YELLOW
CREAT SERPL-MCNC: 0.8 MG/DL (ref 0.52–1.04)
DIFFERENTIAL METHOD BLD: ABNORMAL
DIFFERENTIAL METHOD BLD: ABNORMAL
EOSINOPHIL # BLD AUTO: 0 10E9/L (ref 0–0.7)
EOSINOPHIL # BLD AUTO: 0.2 10E9/L (ref 0–0.7)
EOSINOPHIL NFR BLD AUTO: 0.1 %
EOSINOPHIL NFR BLD AUTO: 3.1 %
ERYTHROCYTE [DISTWIDTH] IN BLOOD BY AUTOMATED COUNT: 15.3 % (ref 10–15)
ERYTHROCYTE [DISTWIDTH] IN BLOOD BY AUTOMATED COUNT: 15.4 % (ref 10–15)
ERYTHROCYTE [SEDIMENTATION RATE] IN BLOOD BY WESTERGREN METHOD: NORMAL MM/H (ref 0–20)
GFR SERPL CREATININE-BSD FRML MDRD: 78 ML/MIN/1.7M2
GLUCOSE SERPL-MCNC: 118 MG/DL (ref 70–99)
GLUCOSE UR STRIP-MCNC: NEGATIVE MG/DL
HCG SERPL QL: NEGATIVE
HCT VFR BLD AUTO: 30.7 % (ref 35–47)
HCT VFR BLD AUTO: 37.3 % (ref 35–47)
HGB BLD-MCNC: 10.8 G/DL (ref 11.7–15.7)
HGB BLD-MCNC: 12.1 G/DL (ref 11.7–15.7)
HGB UR QL STRIP: NEGATIVE
IMM GRANULOCYTES # BLD: 0 10E9/L (ref 0–0.4)
IMM GRANULOCYTES # BLD: 0 10E9/L (ref 0–0.4)
IMM GRANULOCYTES NFR BLD: 0.3 %
IMM GRANULOCYTES NFR BLD: 0.4 %
INTERPRETATION ECG - MUSE: NORMAL
KETONES UR STRIP-MCNC: NEGATIVE MG/DL
LEUKOCYTE ESTERASE UR QL STRIP: ABNORMAL
LYMPHOCYTES # BLD AUTO: 1.4 10E9/L (ref 0.8–5.3)
LYMPHOCYTES # BLD AUTO: 1.4 10E9/L (ref 0.8–5.3)
LYMPHOCYTES NFR BLD AUTO: 13.7 %
LYMPHOCYTES NFR BLD AUTO: 26.2 %
MAGNESIUM SERPL-MCNC: 2.1 MG/DL (ref 1.6–2.3)
MCH RBC QN AUTO: 28.5 PG (ref 26.5–33)
MCH RBC QN AUTO: 31.6 PG (ref 26.5–33)
MCHC RBC AUTO-ENTMCNC: 32.4 G/DL (ref 31.5–36.5)
MCHC RBC AUTO-ENTMCNC: 35.2 G/DL (ref 31.5–36.5)
MCV RBC AUTO: 88 FL (ref 78–100)
MCV RBC AUTO: 90 FL (ref 78–100)
MONOCYTES # BLD AUTO: 0.3 10E9/L (ref 0–1.3)
MONOCYTES # BLD AUTO: 0.4 10E9/L (ref 0–1.3)
MONOCYTES NFR BLD AUTO: 3.4 %
MONOCYTES NFR BLD AUTO: 5.7 %
MUCOUS THREADS #/AREA URNS LPF: PRESENT /LPF
NEUTROPHILS # BLD AUTO: 3.5 10E9/L (ref 1.6–8.3)
NEUTROPHILS # BLD AUTO: 8.6 10E9/L (ref 1.6–8.3)
NEUTROPHILS NFR BLD AUTO: 63.9 %
NEUTROPHILS NFR BLD AUTO: 82.5 %
NITRATE UR QL: NEGATIVE
NRBC # BLD AUTO: 0 10*3/UL
NRBC # BLD AUTO: 0 10*3/UL
NRBC BLD AUTO-RTO: 0 /100
NRBC BLD AUTO-RTO: 0 /100
PH UR STRIP: 6.5 PH (ref 5–7)
PLATELET # BLD AUTO: 225 10E9/L (ref 150–450)
PLATELET # BLD AUTO: 254 10E9/L (ref 150–450)
POTASSIUM SERPL-SCNC: 4.1 MMOL/L (ref 3.4–5.3)
PROT SERPL-MCNC: 8.2 G/DL (ref 6.8–8.8)
RBC # BLD AUTO: 3.42 10E12/L (ref 3.8–5.2)
RBC # BLD AUTO: 4.25 10E12/L (ref 3.8–5.2)
RBC #/AREA URNS AUTO: <1 /HPF (ref 0–2)
SODIUM SERPL-SCNC: 141 MMOL/L (ref 133–144)
SP GR UR STRIP: 1.01 (ref 1–1.03)
SQUAMOUS #/AREA URNS AUTO: 2 /HPF (ref 0–1)
T4 FREE SERPL-MCNC: 0.94 NG/DL (ref 0.76–1.46)
TSH SERPL DL<=0.005 MIU/L-ACNC: 0.28 MU/L (ref 0.4–4)
URN SPEC COLLECT METH UR: ABNORMAL
UROBILINOGEN UR STRIP-MCNC: NORMAL MG/DL (ref 0–2)
WBC # BLD AUTO: 10.4 10E9/L (ref 4–11)
WBC # BLD AUTO: 5.4 10E9/L (ref 4–11)
WBC #/AREA URNS AUTO: 1 /HPF (ref 0–2)

## 2017-03-03 PROCEDURE — 84443 ASSAY THYROID STIM HORMONE: CPT | Performed by: EMERGENCY MEDICINE

## 2017-03-03 PROCEDURE — 36415 COLL VENOUS BLD VENIPUNCTURE: CPT

## 2017-03-03 PROCEDURE — 40000141 ZZH STATISTIC PERIPHERAL IV START W/O US GUIDANCE

## 2017-03-03 PROCEDURE — 36415 COLL VENOUS BLD VENIPUNCTURE: CPT | Performed by: EMERGENCY MEDICINE

## 2017-03-03 PROCEDURE — 70551 MRI BRAIN STEM W/O DYE: CPT

## 2017-03-03 PROCEDURE — 93010 ELECTROCARDIOGRAM REPORT: CPT | Mod: Z6 | Performed by: EMERGENCY MEDICINE

## 2017-03-03 PROCEDURE — 81001 URINALYSIS AUTO W/SCOPE: CPT | Performed by: EMERGENCY MEDICINE

## 2017-03-03 PROCEDURE — 99283 EMERGENCY DEPT VISIT LOW MDM: CPT | Mod: Z6 | Performed by: EMERGENCY MEDICINE

## 2017-03-03 PROCEDURE — 25000132 ZZH RX MED GY IP 250 OP 250 PS 637: Performed by: EMERGENCY MEDICINE

## 2017-03-03 PROCEDURE — 85025 COMPLETE CBC W/AUTO DIFF WBC: CPT | Performed by: EMERGENCY MEDICINE

## 2017-03-03 PROCEDURE — 84703 CHORIONIC GONADOTROPIN ASSAY: CPT | Performed by: EMERGENCY MEDICINE

## 2017-03-03 PROCEDURE — 93005 ELECTROCARDIOGRAM TRACING: CPT

## 2017-03-03 PROCEDURE — 83735 ASSAY OF MAGNESIUM: CPT | Performed by: EMERGENCY MEDICINE

## 2017-03-03 PROCEDURE — 80053 COMPREHEN METABOLIC PANEL: CPT | Performed by: EMERGENCY MEDICINE

## 2017-03-03 PROCEDURE — 99285 EMERGENCY DEPT VISIT HI MDM: CPT | Mod: 25

## 2017-03-03 PROCEDURE — 84439 ASSAY OF FREE THYROXINE: CPT | Performed by: EMERGENCY MEDICINE

## 2017-03-03 RX ORDER — HYDROXYZINE HYDROCHLORIDE 25 MG/1
25 TABLET, FILM COATED ORAL 3 TIMES DAILY PRN
Qty: 80 TABLET | Refills: 0 | Status: SHIPPED | OUTPATIENT
Start: 2017-03-03 | End: 2017-03-03

## 2017-03-03 RX ORDER — PROPARACAINE HYDROCHLORIDE 5 MG/ML
1 SOLUTION/ DROPS OPHTHALMIC ONCE
Status: COMPLETED | OUTPATIENT
Start: 2017-03-03 | End: 2017-03-03

## 2017-03-03 RX ADMIN — PROPARACAINE HYDROCHLORIDE 1 DROP: 5 SOLUTION/ DROPS OPHTHALMIC at 12:59

## 2017-03-03 ASSESSMENT — ENCOUNTER SYMPTOMS
NUMBNESS: 1
FEVER: 0
EYE REDNESS: 0
ABDOMINAL PAIN: 0
DIZZINESS: 1
WEAKNESS: 0
COLOR CHANGE: 0
SHORTNESS OF BREATH: 0
ARTHRALGIAS: 0
DIFFICULTY URINATING: 0
CONFUSION: 0
HEADACHES: 0
NECK STIFFNESS: 0

## 2017-03-03 ASSESSMENT — VISUAL ACUITY
OD: 20/50
OS: 20/30

## 2017-03-03 NOTE — ED AVS SNAPSHOT
[facility], Emergency Department    [address] [facility] [address]    Phone:  [phone]                                       Kalyn Rivero   MRN: [id number]    Department:  [facility], Emergency Department   Date of Visit:  3/3/2017           Patient Information     Date Of Birth          1970        Your diagnoses for this visit were:     Numbness        You were seen by David Gutiérrez MD.        Discharge Instructions       Follow-up with your providers as planned.    Return if fever, weakness, wound drainage, or other concerns.    Future Appointments        Provider Department Dept Phone Center    3/9/2017 2:00 PM Ortho Hand Post Op [facility] Orthopaedic [facility] [phone] [facility]    3/9/2017 2:30 PM Blanca Mon OT [facility] Hand Therapy [phone] [facility]    3/27/2017 10:30 AM Fredy Patel DO [facility] [phone] [facility]    4/13/2017 11:40 AM Carrol Gaspar MD [facility] Orthopa[facility] [phone] [facility]      24 Hour Appointment Hotline       To make an appointment at any [facility], call [phone] ([phone]). If you don't have a family doctor or clinic, we will help you find one. [facility] clinics are conveniently located to serve the needs of you and your family.             Review of your medicines      Our records show that you are taking the medicines listed below. If these are incorrect, please call your family doctor or clinic.        Dose / Directions Last dose taken    Botulinum Toxin Type A 200 UNITS Solr   Commonly known as:  BOTOX   Dose:  200 Units   Quantity:  200 Units        Inject 200 Units as directed every 3 months   Refills:  3        CELEBREX PO   Dose:  200 mg        Take 200 mg by mouth 3 times daily   Refills:  0        ENBREL 50 MG/ML injection   Dose:  50 mg   Generic drug:  etanercept        Inject 50 mg Subcutaneous twice a week Reported on 2/28/2017   Refills:  0         ketamine (KETALAR) 5%-gabapentin (NEURONTIN) 8% 5%-8% Gel topical PLO cream   Dose:  30 g   Quantity:  30 g        Apply 30 g topically 3 times daily   Refills:  3        leflunomide 20 MG tablet   Commonly known as:  ARAVA   Dose:  20 mg        Take 20 mg by mouth   Refills:  0        medroxyPROGESTERone 150 MG/ML injection   Commonly known as:  DEPO-PROVERA   Dose:  150 mg   Quantity:  3 mL        Inject 1 mL (150 mg) into the muscle every 3 months   Refills:  3        omeprazole 40 MG capsule   Commonly known as:  priLOSEC   Dose:  40 mg   Quantity:  90 capsule        Take 1 capsule (40 mg) by mouth daily Take 30-60 minutes before a meal.   Refills:  2        oxyCODONE 5 MG IR tablet   Commonly known as:  ROXICODONE   Quantity:  100 tablet        Take 5-10mg by mouth every 4 hours as needed for pain for the first week after surgery. Then switch to 5mg by mouth every 6 hours as needed for pain for the second week after surgery.   Refills:  0                Procedures and tests performed during your visit     CBC with platelets differential    Comprehensive metabolic panel    EKG 12 lead    HCG qualitative pregnancy (blood)    MR Brain for Stroke Limited    Magnesium    Peripheral IV catheter    T4 free    TSH with free T4 reflex    UA with Microscopic reflex to Culture    Vascular Access Care Adult IP Consult      Orders Needing Specimen Collection     None      Pending Results     Date and Time Order Name Status Description    3/3/2017 1245 EKG 12 lead Preliminary     3/2/2017 1813 AFB Stain Non Blood In process     3/2/2017 1811 Tissue Culture Aerobic Bacterial Preliminary     3/2/2017 1811 Anaerobic bacterial culture Preliminary     3/2/2017 1804 Surgical pathology exam In process     3/2/2017 1804 Wound Culture Aerobic Bacterial Preliminary     3/2/2017 1804 Fungus Culture, non-blood Preliminary     3/2/2017 1804 Anaerobic bacterial culture Preliminary     3/2/2017 1804 AFB Culture Non Blood Preliminary         "     Pending Culture Results     Date and Time Order Name Status Description    3/2/2017 1813 AFB Stain Non Blood In process     3/2/2017 1811 Tissue Culture Aerobic Bacterial Preliminary     3/2/2017 1811 Anaerobic bacterial culture Preliminary     3/2/2017 1804 Surgical pathology exam In process     3/2/2017 1804 Wound Culture Aerobic Bacterial Preliminary     3/2/2017 1804 Fungus Culture, non-blood Preliminary     3/2/2017 1804 Anaerobic bacterial culture Preliminary     3/2/2017 1804 AFB Culture Non Blood Preliminary             Thank you for choosing [facility]       Thank you for choosing [facility] for your care. Our goal is always to provide you with excellent care. Hearing back from our patients is one way we can continue to improve our services. Please take a few minutes to complete the written survey that you may receive in the mail after you visit with us. Thank you!        [employer] Information     [employer] lets you send messages to your doctor, view your test results, renew your prescriptions, schedule appointments and more. To sign up, go to [email or web address] . Click on \"Log in\" on the left side of the screen, which will take you to the Welcome page. Then click on \"Sign up Now\" on the right side of the page.     You will be asked to enter the access code listed below, as well as some personal information. Please follow the directions to create your username and password.     Your access code is: [email or web address]  Expires: 2017  6:30 AM     Your access code will  in 90 days. If you need help or a new code, please call your [facility] clinic or [phone].        Care EveryWhere ID     This is your Care EveryWhere ID. This could be used by other organizations to access your [facility] medical records  [id number]        After Visit Summary       This is your record. Keep this with you and show to your community pharmacist(s) and doctor(s) at your next visit.                  "

## 2017-03-03 NOTE — ANESTHESIA PROCEDURE NOTES
Peripheral Nerve Block Procedure Note    Staff:     Anesthesiologist:  NA ROMERO    Resident/CRNA:  FREDRICK PEREZ    Block performed by resident/CRNA in the presence of a teaching physician    Location: Pre-op  Procedure Start/Stop TImes:      3/3/2017 3:20 PM     3/3/2017 3:30 PM    patient identified, IV checked, site marked, risks and benefits discussed, informed consent, monitors and equipment checked, pre-op evaluation, at physician/surgeon's request and post-op pain management      Correct Patient: Yes      Correct Position: Yes      Correct Site: Yes      Correct Procedure: Yes      Correct Laterality:  Yes    Site Marked:  Yes  Procedure details:     Procedure:  Supraclavicular    ASA:  2    Laterality:  Right    Position:  Sitting and Supine    Sterile Prep: chloraprep, mask and sterile gloves      Needle:  Insulated    Needle gauge:  21    Ultrasound: Yes      Ultrasound used to identify targeted nerve, plexus, or vascular structure and placed a needle adjacent to it      Permanent Image entered into patiient's record      Abnormal pain on injection: No      Blood Aspirated: No      Paresthesias:  No    Bleeding at site: No      Bolus via:  Needle    Infusion Method:  Single Shot    Complications:  None  Assessment/Narrative:      10mL 0.5% bupivacaine with 1:200k epi and 10mL Exparel injected into site

## 2017-03-03 NOTE — PROGRESS NOTES
HISTORY OF PRESENT ILLNESS:  Kalyn is a 46-year-old female with multiple medical issues including juvenile rheumatoid arthritis, severe frontal headaches, abdominal pain.  She has had a right total elbow arthroplasty and has had other total joint surgeries as well.  She reports her main problem has been mechanical type symptoms in her right hand.  She has had tingling and numbness in the small and ring fingers.      She is here for followup of her EMG nerve conduction velocity testing.  Her EMG nerve conduction testing shows that she has a right C5-C8 nerve root and the C6 and C7 have some recent active denervation.  There is no evidence of an ulnar neuropathy superimposed upon the cervical radiculopathy.      PHYSICAL EXAMINATION:  Today, she does have mechanical symptoms in the right elbow consistent with a creaking crepitus type sound.      IMPRESSION:     1.  Right elbow pain.   2.  Right hand numbness.      I recommended that she see one of our cervical surgeons because the EMG has localized this as radiculopathy.  She reports that she would like to have the bump in her elbow removed because it is clicking and catching.  I discussed with her that I could do this, but I would not recommend an ulnar nerve exploration at the same time.  She wishes to proceed.  A surgery date was recommended for loose body removal of the right elbow.  A consultation to our spine surgeon colleagues was made.

## 2017-03-03 NOTE — OP NOTE
PREOPERATIVE DIAGNOSIS:  Mechanical symptoms, right elbow, status post total elbow arthroplasty.      POSTOPERATIVE DIAGNOSIS:  Mechanical symptoms, right elbow, status post total elbow arthroplasty.      PROCEDURE:  Right total elbow loose body excision arthrotomy with irrigation drainage cultures.      SURGEON:  Carrol Gaspar MD      FIRST ASSISTANT:  Dr. Tessie Soria and Dr. Salud Plummer.      ANESTHESIA:  General.      ESTIMATED BLOOD LOSS:  Minimal.      INDICATIONS:  This patient presents with mechanical symptoms in her right elbow.  She also has intermittent ulnar nerve symptoms.  Her EMG nerve conduction study was normal.  She had almost creaking type sounds on the medial side of the elbow which she said were painful and intermittently would be more painful.  Risks, benefits and alternatives of loose body excision were discussed with the patient, questions answered and consent obtained.  Site and side were signed and verified.      DESCRIPTION OF PROCEDURE:  The patient was brought to the operating room suite after regional anesthesia had been administered.  The right arm was sterilely prepped and draped in the usual manner.  After timeout verifying site and side, the limb was elevated, exsanguinated and the tourniquet inflated to 250 mmHg.  A radiograph was taken and there was evidence of a medial-sided calcification.  There was medial deficiency secondary to the previous total elbow arthroplasty.  Dissection was carried down through the top half of the previous incision.  The patient had had a previous anterior transposition and the nerve was left in the front.  Dissection was carried down posteriorly onto the triceps.  Just anterior to the triceps on the medial side, there was a large pocket of tissue.  This was opened and it was a large seroma.  Inside the seroma was 3-4 large chunks of bone cement.  The bone cement was then removed.  The seroma was removed, cultures were taken and cell count was  taken.  Tissue was removed for culture, tissue was removed for pathology.  The metal was directly visualized.  The Pulsavac was used with 3 liters of antibiotic solution.  The patient had already been given Cleocin.  There was no evidence of mara infection, but it was a thick murky-type fluid without mara pus.  It was decided that it may be an aseptic seroma secondary to mechanical symptoms on the medial side of the elbow.  The elbow joint itself had full range of motion.  There was no gross loosening of the prostheses.  It was decided to leave the ulnar nerve anteriorly and this was not explored.  The wound was thoroughly irrigated and closed using 2-0 Vicryl and 4-0 PDS.  The capsular layer was specifically closed so there was no exposed joint or communication.  The patient was then placed in a posterior splint and taken to PACU in satisfactory condition with no apparent intraoperative complications.      Sed rate, CBC, C-reactive protein, intraoperative cultures and intraoperative pathology were all taken and postoperative treatment will depend on these results.         SOFYA BRIDGES MD             D: 2017 18:25   T: 2017 18:53   MT: marilou      Name:     SANDEE ALLEN   MRN:      [id number]        Account:        [id number]   :      1970           Procedure Date: 2017      Document: [id number]

## 2017-03-03 NOTE — ED AVS SNAPSHOT
[facility], [place], Emergency Department    [address] [place] [id number]    Phone:  [phone]                                       Kalyn Rivero   MRN: [id number]    Department:  [facility], Emergency Department   Date of Visit:  3/3/2017           After Visit Summary Signature Page     I have received my discharge instructions, and my questions have been answered. I have discussed any challenges I see with this plan with the nurse or doctor.    ..........................................................................................................................................  Patient/Patient Representative Signature      ..........................................................................................................................................  Patient Representative Print Name and Relationship to Patient    ..................................................               ................................................  Date                                            Time    ..........................................................................................................................................  Reviewed by Signature/Title    ...................................................              ..............................................  Date                                                            Time

## 2017-03-03 NOTE — ANESTHESIA CARE TRANSFER NOTE
Patient: Kalyn Rivero    Procedure(s):  Right Elbow Loose Body Excision  (Regional Block) - Wound Class: I-Clean    Diagnosis: Right Elbow Loose Body  Diagnosis Additional Information: No value filed.    Anesthesia Type:   Peripheral Nerve Block, General     Note:  Airway :Room Air  Patient transferred to:PACU  Comments: Patient to PACU on RA/native airway and awakes to voice commands. Report to RN and transfer of care. BP:113/89 HR: 99 SpO2:  97% in RA RR: 16 Temp: 98.6      Vitals: (Last set prior to Anesthesia Care Transfer)    CRNA VITALS  3/2/2017 1818 - 3/2/2017 1848      3/2/2017             Resp Rate (observed): (!)  1    Resp Rate (set): 10                Electronically Signed By: JULIETTE Mendes CRNA  March 2, 2017  6:48 PM
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
## 2017-03-03 NOTE — ED NOTES
Helped pt into gown.  Pt able to stand and put gown on with minimal assistance.     Gave ice pack to pt    Vision screening done: L 20/30 and R 20/50. Pt reported off and on blurred vision on Right eye during screening.

## 2017-03-03 NOTE — LETTER
[facility], [place], EMERGENCY DEPARTMENT  [address] [address]  [phone]      March 3, 2017      To Whom it may concern:    _______David Hernandez_______ was in our Emergency Department today, March 3, 2017, with a patient who needed their assistance.  Please excuse them from work/school.      Sincerely,        ESTHER ALCARAZ MD, MD

## 2017-03-03 NOTE — ED NOTES
"Triage Assessment & Note:    BP (!) 148/102  Pulse 105  Temp 98.1  F (36.7  C) (Oral)  Resp 16  Ht 1.676 m (5' 6\")  Wt 73.5 kg (162 lb)  SpO2 98%  Breastfeeding? No  BMI 26.15 kg/m2      Patient presents with: Pt with extensive ortho hx, comes to triage after having surgery on your right elbow 3/2/17.  Pt reports having some degree of numbness in all extremities.  Pt reports getting a nerve block yesterday in her neck.  In addition, pt reports some double vision and metallic taste. Pt was instructed to come to ED for evaluation. No reports of fever or cough.     Home Treatments/Remedies: None    Febrile / Afebrile: Afebrile    Duration of C/o: < 24 hours    Tahira Stokes RN  March 3, 2017        "

## 2017-03-03 NOTE — ANESTHESIA POSTPROCEDURE EVALUATION
Patient: Kalyn Rivero    Procedure(s):  Right Elbow Loose Body Excision  (Regional Block) - Wound Class: I-Clean    Diagnosis:Right Elbow Loose Body  Diagnosis Additional Information: No value filed.    Anesthesia Type:  Peripheral Nerve Block, General    Note:  Anesthesia Post Evaluation    Patient location during evaluation: bedside  Patient participation: Able to participate in evaluation but full recovery from regional anesthesia has not yet occurred and is not anticipated to occur within 48 hours  Level of consciousness: awake  Pain management: adequate  Airway patency: patent  Cardiovascular status: acceptable  Respiratory status: acceptable  Hydration status: acceptable  PONV: controlled     Anesthetic complications: None    Comments:           Last vitals:  Vitals:    03/02/17 1848 03/02/17 1854 03/02/17 1900   BP: (!) 107/94 (!) 107/94 95/83   Resp: 16 16    Temp:      SpO2:            Electronically Signed By: Chavo Carlson MD, MD  March 2, 2017  7:21 PM

## 2017-03-03 NOTE — DISCHARGE INSTRUCTIONS
Follow-up with your providers as planned.    Return if fever, weakness, wound drainage, or other concerns.

## 2017-03-03 NOTE — DISCHARGE INSTRUCTIONS
"Information about liposomal bupivacaine (Exparel)    What is Liposomal Bupivacaine?    Liposomal Bupivacaine is a numbing medication that can help you manage your pain after surgery.  This medication is similar to \"novacaine,\" which is often used by the dentist.  Liposomal bupivacaine is released slowly and can help control pain for up to 72 hours.    What is the purpose of Liposomal Bupivacaine?    To manage your pain after surgery    To help you sleep better, take deep breaths, walk more comfortable, and feel up to visiting with others    How is the procedure done?    Liposomal bupivacaine is a medication given by an injection.    It is usually given right before your surgery.  If this is the case, you will be awake or sedated, but you should experience minimal pain during the procedure.    For some people, the injection may be given at the very end of your surgery.  It all depends on the type of surgery and your situation.    The procedure usually takes about 5-15 minutes.  An ultrasound machine will help the anesthesiologist insert it in the right place or the surgeon will inject it under direct vision.     A needle is used to place the numbing medication under your skin.  It provides pain relief by numbing the tissue in the area where your surgeon will make the incision.    What can I expect?    You may experience numbness, tingling, or a feeling of heaviness around the area that was injected.    If you experience any of the follow symptoms IMMEDIATELY CALL THE REGIONAL ANESTHESIA PAIN SERVICE:    Numbness or tingling occurs in areas other than around the injection site    Blurry vision    Ringing in your ears    A metallic taste in your mouth    PAGE: Dial [phone].  When prompted, enter the following 4-digit ID number:  [id number].  You will be prompted to enter your phone number; and then enter the # sign.  The clinician on call will call you back.    OR    CALL: Dial [phone].  Let the hospital  " know that you are having a problem with a nerve block and that you would like to speak to the regional anesthesia pain service right away.    You should not receive any other type of numbing medication within 4 days after receiving liposomal bupivacaine unless your anesthesiologist approves.      Post Operative Instructions: Regional Anesthetic for Upper Extremity with Liposomal Bupivacaine  General Information:   Regional anesthesia is when local anesthetic or  numbing  medication is injected around the nerves to anesthetize or  numb  the area supplied by that set of nerves. It is a type of analgesia used to control pain and decreases the need for narcotics following surgery.    Types of Regional Blocks:  Interscalene: A block injected into the neck on the operative shoulder/arm of a patient having shoulder surgery  Supraclavicular: A block injected near the clavicle on the operative shoulder of a patient having elbow, forearm, or hand surgery    Procedure:  The type of anesthesia your doctor used to numb your shoulder or arm will usually not start to wear off for 24-48 hours, but may last as long as 72 hours. You should be careful during that period, since it is possible to injure your arm without being aware of the injury. While your arm is numb, you should:    Avoid striking or bumping your arm    Avoid extreme hot or cold    Diet:  There are no restrictions on your diet. You should drink plenty of fluids.     Discomfort:  You will have a tingling and prickly sensation in your arm as the feeling begins to return. You can also expect some discomfort. The amount of discomfort is unpredictable, but if you have more pain than can be controlled with pain medication you should notify your physician.     Pain Medications:  Begin taking your oral pain pills before bedtime and during the night to avoid a sudden onset of pain as part of the block wears off.  Do not engage in drinking, driving, or hazardous occupations  "while taking pain medication.     Stitches:   You may have stitches or special skin closures. You doctor will inform you when to return to the office to have them removed.     Activity:  On the day of surgery you should try to stay in bed with your hand elevated on pillows. You may resume your normal activity after that, wearing a sling for comfort. Contact your physician if you have any of the problems:     Continued numbness or tingling in the arm or hand after 72 hours    Swelling of the fingers or fingers that are cold to the touch    Excessive bleeding or drainage    Severe pain          [facility]  Home Care Following Anesthesia  For 24 hours after surgery:  1. Get plenty of rest.  A responsible adult must stay with you for at least 24 hours after you leave the surgery center.  2. Do not drive or use heavy equipment.  If you have weakness or tingling, don't drive or use heavy equipment until this feeling goes away.   3. Do not drink alcohol.   4. Avoid strenuous or risky activities.  Ask for help when climbing stairs.  5. You may feel lightheaded.  IF so, sit for a few minutes before standing.  Have someone help you get up.   6. If you have nausea (feel sick to your stomach): Drink only clear liquids such as apple juice, ginger ale, broth or 7-Up.  Rest may also help.  Be sure to drink enough fluids.  Move to a regular diet as you feel able.   7. You may have a slight fever.  Call the doctor if your fever is over 100 F (37.7 C) (taken under the tongue) or lasts longer than 24 hours.  8. You may have a dry mouth, a sore throat, muscle aches or trouble sleeping. These should go away after 24 hours.  9. Do not make important or legal decisions.        Today you received an Exparel block to numb the nerves near your surgery site.  This is a block using local anesthetic or \"numbing\" medication injected around the nerves to anesthetize or \"numb\" the area supplied by those " nerves.  This block is injected into the muscle layer near your surgical site.  This medication may numb the location where you had surgery up to 72 hours.  If your surgical site is an arm or leg you should be careful with your affected limb, since it is possible to injure your limb without being aware of it due to the numbing.  Until full feeling returns, you should guard against bumping or hitting your limb, and avoid extreme hot or cold temperatures on the skin.  As the block wears off, the feeling will return as a tingling or prickly sensation near your surgical site.  You will experince more discomfort from your incision as the feeling returns.  You may want to take a pain pill (a narcotic or Tylenol if this was prescribed by your surgeon) when you start to experience mild pain before the pain beomes more severe.  If your pain medications do not control your pain, you should notify your surgeon.    Tips for taking pain medications  To get the best pain relief possible, remember these points:    Take pain medications as directed, before pain becomes severe.    Pain medication can upset your stomach: taking it with food may help.    Constipation is a common side effect of pain medication. Drink plenty of  fluids.    Eat foods high in fiber. Take a stool softener if recommended by your doctor or pharmacist.    Do not drink alcohol, drive or operate machinery while taking pain medications.    Ask about other ways to control pain, such as with heat, ice or relaxation.    Call a doctor for any of the followin. Signs of infection (fever, growing tenderness at the surgery site, a large amount of drainage or bleeding, severe pain, foul-smelling drainage, redness, swelling).  2. It has been over 8 to 10 hours since surgery and you are still not able to urinate (pass water).  3. Headache for over 24 hours.  4. Numbness, tingling or weakness the day after surgery (if you had spinal anesthesia).  Your doctor is:        Dr. Carrol Gaspar, Orthopaedics: [phone]               Or dial [phone] and ask for the resident on call for:  Orthopaedics  For emergency care, call the:  [facility] Emergency Department: [phone] (TTY for hearing impaired: [phone])

## 2017-03-04 LAB
BACTERIA SPEC CULT: NO GROWTH
MICRO REPORT STATUS: NORMAL
SPECIMEN SOURCE: NORMAL

## 2017-03-05 LAB
ACID FAST STN SPEC QL: NORMAL
MICRO REPORT STATUS: NORMAL
SPECIMEN SOURCE: NORMAL

## 2017-03-07 LAB
BACTERIA SPEC CULT: NO GROWTH
MICRO REPORT STATUS: NORMAL
SPECIMEN SOURCE: NORMAL

## 2017-03-08 ENCOUNTER — TELEPHONE (OUTPATIENT)
Dept: ORTHOPEDICS | Facility: CLINIC | Age: 47
End: 2017-03-08

## 2017-03-08 LAB — COPATH REPORT: NORMAL

## 2017-03-08 NOTE — TELEPHONE ENCOUNTER
Patient notified that her cultures from surgery came back negative. She does not have an infection.  Patient verbalized understanding.

## 2017-03-09 LAB
BACTERIA SPEC CULT: NORMAL
BACTERIA SPEC CULT: NORMAL
Lab: NORMAL
Lab: NORMAL
MICRO REPORT STATUS: NORMAL
MICRO REPORT STATUS: NORMAL
SPECIMEN SOURCE: NORMAL
SPECIMEN SOURCE: NORMAL

## 2017-03-10 ENCOUNTER — TELEPHONE (OUTPATIENT)
Dept: ORTHOPEDICS | Facility: CLINIC | Age: 47
End: 2017-03-10

## 2017-03-10 NOTE — TELEPHONE ENCOUNTER
Called patient in regards to her not showing for her scheduled post op appointment yesterday. She stated that she had car trouble and was going to call today.  She was rescheduled for next week and will have hand therapy to follow. Patient verbalized understanding of appointment date and time.

## 2017-03-15 ENCOUNTER — OFFICE VISIT (OUTPATIENT)
Dept: ORTHOPEDICS | Facility: CLINIC | Age: 47
End: 2017-03-15

## 2017-03-15 DIAGNOSIS — Z09 SURGICAL FOLLOW-UP CARE: Primary | ICD-10-CM

## 2017-03-15 ASSESSMENT — ENCOUNTER SYMPTOMS
ARTHRALGIAS: 1
SINUS PAIN: 0
BRUISES/BLEEDS EASILY: 1
SMELL DISTURBANCE: 0
TROUBLE SWALLOWING: 0
DIZZINESS: 1
NIGHT SWEATS: 0
HALLUCINATIONS: 0
MEMORY LOSS: 1
DISTURBANCES IN COORDINATION: 1
ALTERED TEMPERATURE REGULATION: 0
JOINT SWELLING: 1
TREMORS: 0
MUSCLE CRAMPS: 1
POLYPHAGIA: 0
DOUBLE VISION: 1
MUSCLE WEAKNESS: 1
CHILLS: 0
SORE THROAT: 0
TINGLING: 0
EYE IRRITATION: 1
EYE REDNESS: 1
POLYDIPSIA: 0
HEADACHES: 1
STIFFNESS: 1
NECK MASS: 0
NUMBNESS: 0
TASTE DISTURBANCE: 0
MYALGIAS: 1
SPEECH CHANGE: 0
BACK PAIN: 1
SWOLLEN GLANDS: 0
WEIGHT GAIN: 1
HOARSE VOICE: 0
INCREASED ENERGY: 1
WEIGHT LOSS: 0
LOSS OF CONSCIOUSNESS: 0
EYE PAIN: 0
EYE WATERING: 0
WEAKNESS: 1
DECREASED APPETITE: 0
NECK PAIN: 1
FEVER: 0
SEIZURES: 0
FATIGUE: 1
SINUS CONGESTION: 0
PARALYSIS: 0

## 2017-03-15 NOTE — PROGRESS NOTES
Reason for visit:    Kalyn Rivero came in to the clinic for a two week post op check.    Her surgery was done 3/2/17 by Dr Gaspar.  She had Right elbow loose body excision.      Assessment:    Kalyn came into the clinic in without surgical dressing Non-WB    The Surgical wounds were exposed and found to be well-healed and without evidence of infection; so the suture ends were clipped.    Plan:     She was given tubi-.  She was told to keep her incision clean and to not submerge in any water until incision is completely healed. She had an appointment for hand therapy today but declined stating she felt good and was not stiff. Dr. Gaspar is recommending that Kalyn follow up with spine regarding her tingling in her hand and positive EMG. She would like her to get an MRI for C8 radiculopathy and be referred to spine.  Patient is agreeable with this but stated she would like to get her left elbow replacement done prior to being referred.  Patient has already discussed a surgical plan for her elbow with Dr. Gaspar and does not feel she needs a clinic appointment to further discuss. Our surgery scheduler Lacy was notified of this.         She has an appointment to see Dr. Gaspar at that time Dr. Gaspar will determine further restrictions.    She has our phone number and will call with questions or problems.        Answers for HPI/ROS submitted by the patient on 3/15/2017   General Symptoms: Yes  Skin Symptoms: No  HENT Symptoms: Yes  EYE SYMPTOMS: Yes  HEART SYMPTOMS: No  LUNG SYMPTOMS: No  INTESTINAL SYMPTOMS: No  URINARY SYMPTOMS: No  GYNECOLOGIC SYMPTOMS: No  BREAST SYMPTOMS: No  SKELETAL SYMPTOMS: Yes  BLOOD SYMPTOMS: Yes  NERVOUS SYSTEM SYMPTOMS: Yes  MENTAL HEALTH SYMPTOMS: No  Fever: No  Loss of appetite: No  Weight loss: No  Weight gain: Yes  Fatigue: Yes  Night sweats: No  Chills: No  Increased stress: No  Excessive hunger: No  Excessive thirst: No  Feeling hot or cold when others  believe the temperature is normal: No  Loss of height: No  Post-operative complications: No  Surgical site pain: No  Hallucinations: No  Change in or Loss of Energy: Yes  Hyperactivity: No  Confusion: No  Ear pain: No  Ear discharge: No  Hearing loss: No  Tinnitus: No  Nosebleeds: No  Congestion: No  Sinus pain: No  Trouble swallowing: No   Voice hoarseness: No  Mouth sores: No  Sore throat: No  Tooth pain: Yes  Gum tenderness: Yes  Bleeding gums: No  Change in taste: No  Change in sense of smell: No  Dry mouth: Yes  Hearing aid used: No  Neck lump: No  Eye pain: No  Vision loss: No  Dry eyes: Yes  Watery eyes: No  Eye bulging: No  Double vision: Yes  Flashing of lights: No  Spots: Yes  Floaters: Yes  Redness: Yes  Crossed eyes: Yes  Tunnel Vision: No  Yellowing of eyes: No  Eye irritation: Yes  Back pain: Yes  Muscle aches: Yes  Neck pain: Yes  Swollen joints: Yes  Joint pain: Yes  Bone pain: Yes  Muscle cramps: Yes  Muscle weakness: Yes  Joint stiffness: Yes  Bone fracture: No  Anemia: Yes  Swollen glands: No  Easy bleeding or bruising: Yes  Trouble with coordination: Yes  Dizziness or trouble with balance: Yes  Fainting or black-out spells: No  Memory loss: Yes  Headache: Yes  Seizures: No  Speech problems: No  Tingling: No  Tremor: No  Weakness: Yes  Difficulty walking: No  Paralysis: No  Numbness: No

## 2017-03-15 NOTE — MR AVS SNAPSHOT
After Visit Summary   3/15/2017    Kalyn Rivero    MRN: [id number]           Patient Information     Date Of Birth          1970        Visit Information        Provider Department      3/15/2017 1:30 PM [facility]        Today's Diagnoses     Surgical follow-up care    -  1       Follow-ups after your visit        Additional Services     HAND THERAPY       Hand Therapy Referral                  Your next 10 appointments already scheduled     Mar 15, 2017  1:30 PM CDT   (Arrive by 1:15 PM)   Post-Op with  U ORTHO HAND [facility] ([facility])    [address] [place] [id number]   [phone]            Mar 15, 2017  2:30 PM CDT   (Arrive by 2:15 PM)   ASYA Hand with Chary Nunn OT   [facility] ([facility])    [address] [place] [address]   [phone]            Mar 27, 2017 10:30 AM CDT   (Arrive by 10:15 AM)   Return Visit with Fredy Patel DO   [facility] ([facility])    [address] [place] [id number] [phone]            Apr 13, 2017 11:40 AM CDT   (Arrive by 11:25 AM)   POST-OP HAND with Carrol Gaspar MD   [facility] ([facility])    [address] [place] [id number] [phone]            Apr 17, 2017 12:30 PM CDT   (Arrive by 12:15 PM)   Return Visit with Nick Burnham MD   [facility] ([facility])    [address] [place] [id number] [phone]              Who to contact     Please call your clinic at [phone] to:    Ask questions about your health    Make or cancel appointments    Discuss your medicines    Learn  about your test results    Speak to your doctor   If you have compliments or concerns about an experience at your clinic, or if you wish to file a complaint, please contact [facility] Patient Relations at [phone] or email us at [email or web address]         Additional Information About Your Visit        [employer] Information     [employer] is an electronic gateway that provides easy, online access to your medical records. With [employer], you can request a clinic appointment, read your test results, renew a prescription or communicate with your care team.     To sign up for [employer] visit the website at [email or web address]   You will be asked to enter the access code listed below, as well as some personal information. Please follow the directions to create your username and password.     Your access code is: [email or web address]  Expires: 2017  7:30 AM     Your access code will  in 90 days. If you need help or a new code, please contact your [facility] or call [phone] for assistance.        Care EveryWhere ID     This is your Care EveryWhere ID. This could be used by other organizations to access your [place] medical records  [id number]         Blood Pressure from Last 3 Encounters:   17 (!) 138/93   17 134/90   17 (!) 142/95    Weight from Last 3 Encounters:   17 73.5 kg (162 lb)   17 73.6 kg (162 lb 3.2 oz)   17 73.6 kg (162 lb 3.2 oz)              We Performed the Following     HAND THERAPY        Primary Care Provider Office Phone # Fax #    Mika Zamora [phone]       [facility] [address] [facility] [address]        Thank you!     Thank you for choosing [facility]  for your care. Our goal is always to provide you with excellent care. Hearing back from our patients is one way we can continue to improve our services. Please take a  few minutes to complete the written survey that you may receive in the mail after your visit with us. Thank you!             Your Updated Medication List - Protect others around you: Learn how to safely use, store and throw away your medicines at www.disposemymeds.org.          This list is accurate as of: 3/15/17 12:11 PM.  Always use your most recent med list.                   Brand Name Dispense Instructions for use    Botulinum Toxin Type A 200 UNITS Solr    BOTOX    200 Units    Inject 200 Units as directed every 3 months       CELEBREX PO      Take 200 mg by mouth 3 times daily       ENBREL 50 MG/ML injection   Generic drug:  etanercept      Inject 50 mg Subcutaneous twice a week Reported on 2/28/2017       ketamine (KETALAR) 5%-gabapentin (NEURONTIN) 8% 5%-8% Gel topical PLO cream     30 g    Apply 30 g topically 3 times daily       leflunomide 20 MG tablet    ARAVA     Take 20 mg by mouth       medroxyPROGESTERone 150 MG/ML injection    DEPO-PROVERA    3 mL    Inject 1 mL (150 mg) into the muscle every 3 months       omeprazole 40 MG capsule    priLOSEC    90 capsule    Take 1 capsule (40 mg) by mouth daily Take 30-60 minutes before a meal.       oxyCODONE 5 MG IR tablet    ROXICODONE    100 tablet    Take 5-10mg by mouth every 4 hours as needed for pain for the first week after surgery. Then switch to 5mg by mouth every 6 hours as needed for pain for the second week after surgery.

## 2017-03-16 DIAGNOSIS — M24.029: ICD-10-CM

## 2017-03-16 RX ORDER — OXYCODONE HYDROCHLORIDE 5 MG/1
TABLET ORAL
Qty: 50 TABLET | Refills: 0 | Status: SHIPPED | OUTPATIENT
Start: 2017-03-16 | End: 2017-03-30

## 2017-03-23 ENCOUNTER — THERAPY VISIT (OUTPATIENT)
Dept: OCCUPATIONAL THERAPY | Facility: CLINIC | Age: 47
End: 2017-03-23
Payer: COMMERCIAL

## 2017-03-23 DIAGNOSIS — M08.00 JUVENILE RHEUMATOID ARTHRITIS (H): ICD-10-CM

## 2017-03-23 DIAGNOSIS — M25.521 RIGHT ELBOW PAIN: Primary | ICD-10-CM

## 2017-03-23 PROCEDURE — 97140 MANUAL THERAPY 1/> REGIONS: CPT | Mod: GO | Performed by: OCCUPATIONAL THERAPIST

## 2017-03-23 PROCEDURE — 97165 OT EVAL LOW COMPLEX 30 MIN: CPT | Mod: GO | Performed by: OCCUPATIONAL THERAPIST

## 2017-03-23 PROCEDURE — 97110 THERAPEUTIC EXERCISES: CPT | Mod: GO | Performed by: OCCUPATIONAL THERAPIST

## 2017-03-23 NOTE — MR AVS SNAPSHOT
After Visit Summary   3/23/2017    Kalyn Rivero    MRN: [id number]           Patient Information     Date Of Birth          1970        Visit Information        Provider Department      3/23/2017 1:00 PM [facility]        Today's Diagnoses     Right elbow pain    -  1    Juvenile rheumatoid arthritis (H)           Follow-ups after your visit        Your next 10 appointments already scheduled     Mar 27, 2017 10:30 AM CDT   (Arrive by 10:15 AM)   Return Visit with Fredy Patel DO   [facility] ([facility])    [address] [address]   [phone]            Mar 28, 2017 10:05 AM CDT   PROCEDURE with Mika Zamora MD, [place] PROCEDURE ROOM 2   [facility]    [address] [facility] [id number] [phone]            Apr 13, 2017 11:40 AM CDT   (Arrive by 11:25 AM)   POST-OP HAND with Carrol Gaspar MD   [facility] ([facility])    [address] [address]   [phone]            Apr 17, 2017 12:30 PM CDT   (Arrive by 12:15 PM)   Return Visit with Nick Burnham MD   [facility] ([facility])    [address] [place] [address] [phone]              Who to contact     If you have questions or need follow up information about today's clinic visit or your schedule please contact [facility] directly at [phone].  Normal or non-critical lab and imaging results will be communicated to you by MyChart, letter or phone within 4 business days after the clinic has received the results. If you do not hear from us within 7 days, please contact the clinic  "through [employer] or phone. If you have a critical or abnormal lab result, we will notify you by phone as soon as possible.  Submit refill requests through [employer] or call your pharmacy and they will forward the refill request to us. Please allow 3 business days for your refill to be completed.          Additional Information About Your Visit        [employer] Information     [employer] lets you send messages to your doctor, view your test results, renew your prescriptions, schedule appointments and more. To sign up, go to [email or web address] . Click on \"Log in\" on the left side of the screen, which will take you to the Welcome page. Then click on \"Sign up Now\" on the right side of the page.     You will be asked to enter the access code listed below, as well as some personal information. Please follow the directions to create your username and password.     Your access code is: [email or web address]  Expires: 2017  7:30 AM     Your access code will  in 90 days. If you need help or a new code, please call your [place] clinic or [phone].        Care EveryWhere ID     This is your Care EveryWhere ID. This could be used by other organizations to access your [place] medical records  [id number]         Blood Pressure from Last 3 Encounters:   17 (!) 138/93   17 134/90   17 (!) 142/95    Weight from Last 3 Encounters:   17 73.5 kg (162 lb)   17 73.6 kg (162 lb 3.2 oz)   17 73.6 kg (162 lb 3.2 oz)              We Performed the Following     HC OT EVAL, LOW COMPLEXITY     ASYA INITIAL EVAL REPORT     MANUAL THER TECH,1+REGIONS,EA 15 MIN     THERAPEUTIC EXERCISES        Primary Care Provider Office Phone # Fax #    Mika Zamora [phone]       [facility] [address] [address] [address]        Thank you!     Thank you for choosing [facility]  for your care. Our goal is always to provide you " with excellent care. Hearing back from our patients is one way we can continue to improve our services. Please take a few minutes to complete the written survey that you may receive in the mail after your visit with us. Thank you!             Your Updated Medication List - Protect others around you: Learn how to safely use, store and throw away your medicines at www.disposemymeds.org.          This list is accurate as of: 3/23/17  1:51 PM.  Always use your most recent med list.                   Brand Name Dispense Instructions for use    Botulinum Toxin Type A 200 UNITS Solr    BOTOX    200 Units    Inject 200 Units as directed every 3 months       CELEBREX PO      Take 200 mg by mouth 3 times daily       ENBREL 50 MG/ML injection   Generic drug:  etanercept      Inject 50 mg Subcutaneous twice a week Reported on 2/28/2017       ketamine (KETALAR) 5%-gabapentin (NEURONTIN) 8% 5%-8% Gel topical PLO cream     30 g    Apply 30 g topically 3 times daily       leflunomide 20 MG tablet    ARAVA     Take 20 mg by mouth       medroxyPROGESTERone 150 MG/ML injection    DEPO-PROVERA    3 mL    Inject 1 mL (150 mg) into the muscle every 3 months       omeprazole 40 MG capsule    priLOSEC    90 capsule    Take 1 capsule (40 mg) by mouth daily Take 30-60 minutes before a meal.       oxyCODONE 5 MG IR tablet    ROXICODONE    50 tablet    Take 1 tablet by mouth every 6 hours prn pain

## 2017-03-23 NOTE — PROGRESS NOTES
Hand Therapy Initial Evaluation    Current Date:  3/23/2017    Subjective:  Kalyn Rivero is a 46 year old ambidextrous hand dominant female.    Diagnosis:   Right elbow pain  DOS:  3/2/17  Procedure:  Loose body removal  Post:  3w 0d    Patient reports symptoms of pain, stiffness/loss of motion, weakness/loss of strength, edema and tingling of the right elbow, hand and shoulder which occurred due to JRA diagnosed at age 21. Since onset symptoms are gradually getting better.  Special tests:  x-ray.  Previous treatment: 6/2015 right radial head resection, 9/2015 TEA and ulnar nerve transposition.  General health as reported by patient is poor.  Pertinent medical history includes:rheumatoid arthritis, fibromyalgia, thyroid problems, anemia, sleep disorder/apnea, pain at rest/night, dizziness/concussions, weakness, calf pain, swelling, warmth  Medical allergies:See EMR.  Surgical history: 21 joints replacements throughout body; feet, knees, left wrist, left shoulder and right elbow.  Medication history: anti-inflammatory, pain.    Current occupation is Disabled   Barriers include:requires assistance with dressing, personal hygiene, transfers, mobility  Prior functional level:  personal care attendant    Additional Occupational Profile Information (patterns of daily living, interests, values and needs): See info above    Functional Outcome Measure:  Upper Extremity Functional Index  SCORE:   Column Totals: 3/80  (A lower score indicates greater disability.)    ROM:  Elbow  3/23/17   AROM(PROM) Left Right   Extension -18 -5   Flexion 150 155   Supination 80 70   Pronation 85 85     Strength:   (Measured in pounds)    3/23/17   Trials Left Right   1  2  3 18  16  15 20  22  20   Average: 16 21     Edema: Minimal of elbow, moderate local swelling dorsal hand along extensor tendons which patient reports occurred after last surgery due to tight cast    Scar:  Mild to moderately adherent, mildly sensitive      Sensation:  Decreased slightly in Ulnar Nerve distribution since surgery per patient report    Pain Report:  VAS(0-10) 3/23/17   With Use: 9/10   Location:  Right shoulder, minimal elbow pain  Pain Quality:  Aching, Shooting and Throbbing  Frequency: constant    Pain is worst:  nighttime  Exacerbated by:  Unable to get comfortable sleeping  Relieved by:  Heat, ice and NSAID's/pain meds  Progression:  Gradually worsening    Assessment:  Patient presents with symptoms consistent with diagnosis of elbow loose bodies, with surgical  intervention.     Patient's limitations or Problem List includes:  Pain, Decreased ROM/motion, Increased edema, Weakness, Sensory disturbance, Adherent scarring and Decreased  of the right elbow and hand which interferes with the patient's ability to perform Self Care Tasks (dressing, eating, bathing, hygiene/toileting), Work Tasks, Sleep Patterns, Recreational Activities, Household Chores and Driving  as compared to previous level of function.    Rehab Potential:  Fair to Good - Return to light activity, some limitations    Patient will benefit from skilled Occupational Therapy to increase ROM, flexibility, overall strength,  strength and sensation and decrease pain and edema to return to previous activity level and resume normal daily tasks and to reach their rehab potential.    Barriers to Learning:  No barrier    Communication Issues:  Patient appears to be able to clearly communicate and understand verbal and written communication and follow directions correctly.  Family member present for session to facilitate follow through of home program.    Assessment of Occupational Performance:  5 or more Performance Deficits  Identified Performance Deficits: bathing/showering, toileting, dressing, hygiene and grooming, communication management, driving and community mobility, home establishment and management, meal preparation and cleanup, shopping, sleep and leisure activities       Clinical Decision Making (Complexity): low complexity    Treatment Explanation:  The following has been discussed with the patient:  RX ordered/plan of care  Anticipated outcomes  Possible risks and side effects    Plan:  Frequency:  1 X week, once daily  Duration:  for 1 week (NA, one time visit)  Treatment Plan:    Therapeutic Exercise:  AROM and Isometrics  Manual Techniques:  Scar mobilization    Discharge Plan:  Achieve all LTG.  Independent in home treatment program.  Reach maximal therapeutic benefit.    Home Exercise Program:  Scar mobs 3 vector  Scar pad with tubigrip sleeve sleeping  Tubigrip sleeve to hand for local edema as needed  AROM elbow E/F  Elbow E/F isometrics

## 2017-03-27 ENCOUNTER — OFFICE VISIT (OUTPATIENT)
Dept: ANESTHESIOLOGY | Facility: CLINIC | Age: 47
End: 2017-03-27

## 2017-03-27 VITALS
HEIGHT: 67 IN | SYSTOLIC BLOOD PRESSURE: 144 MMHG | HEART RATE: 81 BPM | BODY MASS INDEX: 25.91 KG/M2 | DIASTOLIC BLOOD PRESSURE: 100 MMHG | WEIGHT: 165.1 LBS | OXYGEN SATURATION: 99 %

## 2017-03-27 DIAGNOSIS — M06.9 RHEUMATOID ARTHRITIS INVOLVING MULTIPLE SITES, UNSPECIFIED RHEUMATOID FACTOR PRESENCE: Primary | ICD-10-CM

## 2017-03-27 DIAGNOSIS — M54.12 CERVICAL RADICULOPATHY: ICD-10-CM

## 2017-03-27 DIAGNOSIS — G89.29 COPING WITH CHRONIC PAIN: ICD-10-CM

## 2017-03-27 DIAGNOSIS — M25.521 ELBOW PAIN, RIGHT: ICD-10-CM

## 2017-03-27 DIAGNOSIS — M79.7 FIBROMYALGIA: ICD-10-CM

## 2017-03-27 DIAGNOSIS — F11.21 OPIOID DEPENDENCE IN REMISSION (H): ICD-10-CM

## 2017-03-27 DIAGNOSIS — G43.719 INTRACTABLE CHRONIC MIGRAINE WITHOUT AURA AND WITHOUT STATUS MIGRAINOSUS: ICD-10-CM

## 2017-03-27 ASSESSMENT — PAIN SCALES - GENERAL: PAINLEVEL: EXTREME PAIN (9)

## 2017-03-27 NOTE — MR AVS SNAPSHOT
After Visit Summary   3/27/2017    Kalyn Rivero    MRN: [id number]           Patient Information     Date Of Birth          1970        Visit Information        Provider Department      3/27/2017 10:30 AM Fredy Patel DO [facility]        Care Instructions    1. Return to Clinic in 2 months to discuss Low dose Naltrexone    2. Follow up with your referral to the Spine Center regarding your Neck and Back pain.     3. Please call Clinic 2 days before you are out of your Oxycodone.     4. Dr. Patel will reach out to Dr.Van Webber for coordination on pain management regarding your Alesia Operative care.      5. The plan is to wean opioids when taking Enbrel again.     Follow up: 2 months.       To speak with a nurse, schedule/reschedule/cancel a clinic appointment, or request a medication refill call: [phone]     You can also reach us by C[employer]il[employer]: [email or web address]    For refills, please call on Monday, 1 week before your medication runs out. The doctors are not always in clinic, so this gives us time to get your prescriptions ready.  Please let us know the name of the medication you are requesting a refill of.                                   Follow-ups after your visit        Your next 10 appointments already scheduled     Mar 28, 2017 10:05 AM CDT   PROCEDURE with Mika Zamora MD, [place] [facility]   [facility]    [address] [address] [phone]            Apr 13, 2017 11:40 AM CDT   (Arrive by 11:25 AM)   POST-OP HAND with Carrol Gaspar MD   [facility] ([facility])    [address]  [address] [place] [facility] [address] [phone]            Apr 17, 2017 12:30 PM CDT   (Arrive by 12:15 PM)   Return Visit with Nick Burnham MD   [facility]  "Pain Management ([facility])    [address]  [address] [facility] [id number] [phone]              Who to contact     Please call your clinic at [phone] to:    Ask questions about your health    Make or cancel appointments    Discuss your medicines    Learn about your test results    Speak to your doctor   If you have compliments or concerns about an experience at your clinic, or if you wish to file a complaint, please contact [facility] Patient Relations at [phone] or email us at [email or web address]         Additional Information About Your Visit        [employer] Information     Endymedt is an electronic gateway that provides easy, online access to your medical records. With [employer], you can request a clinic appointment, read your test results, renew a prescription or communicate with your care team.     To sign up for [employer] visit the website at [email or web address]   You will be asked to enter the access code listed below, as well as some personal information. Please follow the directions to create your username and password.     Your access code is: [email or web address]  Expires: 2017  7:30 AM     Your access code will  in 90 days. If you need help or a new code, please contact your [facility] or call [phone] for assistance.        Care EveryWhere ID     This is your Care EveryWhere ID. This could be used by other organizations to access your [place] medical records  [id number]        Your Vitals Were     Pulse Height Pulse Oximetry BMI (Body Mass Index)          81 1.689 m (5' 6.5\") 99% 26.25 kg/m2         Blood Pressure from Last 3 Encounters:   17 (!) 144/100   17 (!) 138/93   17 134/90    Weight from Last 3 Encounters:   17 74.9 kg (165 lb 1.6 oz)   17 73.5 kg (162 lb)   17 73.6 kg (162 lb 3.2 oz)              Today, you had the " following     No orders found for display       Primary Care Provider Office Phone # Fax #    Mika Zamora [phone]       [facility] [address] [address] [address]        Thank you!     Thank you for choosing [facility]  for your care. Our goal is always to provide you with excellent care. Hearing back from our patients is one way we can continue to improve our services. Please take a few minutes to complete the written survey that you may receive in the mail after your visit with us. Thank you!             Your Updated Medication List - Protect others around you: Learn how to safely use, store and throw away your medicines at www.disposemymeds.org.          This list is accurate as of: 3/27/17 11:30 AM.  Always use your most recent med list.                   Brand Name Dispense Instructions for use    Botulinum Toxin Type A 200 UNITS Solr    BOTOX    200 Units    Inject 200 Units as directed every 3 months       CELEBREX PO      Take 200 mg by mouth 3 times daily       ENBREL 50 MG/ML injection   Generic drug:  etanercept      Inject 50 mg Subcutaneous twice a week Reported on 3/27/2017       ketamine (KETALAR) 5%-gabapentin (NEURONTIN) 8% 5%-8% Gel topical PLO cream     30 g    Apply 30 g topically 3 times daily       leflunomide 20 MG tablet    ARAVA     Take 20 mg by mouth Reported on 3/27/2017       medroxyPROGESTERone 150 MG/ML injection    DEPO-PROVERA    3 mL    Inject 1 mL (150 mg) into the muscle every 3 months       omeprazole 40 MG capsule    priLOSEC    90 capsule    Take 1 capsule (40 mg) by mouth daily Take 30-60 minutes before a meal.       oxyCODONE 5 MG IR tablet    ROXICODONE    50 tablet    Take 1 tablet by mouth every 6 hours prn pain

## 2017-03-27 NOTE — NURSING NOTE
LPN reviewed AVS with Pt includin. Return to Clinic in 2 months to discuss Low dose Naltrexone    2. Follow up with your referral to the Spine Center regarding your Neck and Back pain.     3. Please call Clinic 2 days before you are out of your Oxycodone.     4. Dr. Patel will reach out to Dr.Van Webber for coordination on pain management regarding your Alesia Operative care.      5. The plan is to wean opioids when taking Enbrel again.     Follow up: 2 months.     Pt verbalized an understanding of information, and was asked to contact clinic with questions.    Pt assisted to schedule an follow up appointment on 17 at 11 am.     Blanca Atkinson LPN

## 2017-03-27 NOTE — PROGRESS NOTES
Date of visit: 3/27/2017    Chief complaint:   Chief Complaint   Patient presents with     Pain Management     Return visit- Follow up appointment related to bilateral arms        Interval history:  Kalyn Rivero is a 46 year old female last seen by me on 2/28/17.      Recommendations/plan at the last visit included:  1. Interventions: None at this time - will refer for botox injections with Dr. Burnham after her recovery from surgery - likely April  2. Perioperative Pain Management Recommendations: Limited options given her sensitivities and dependence history. I would recommend continuing celebrex if possible, consider regional anesthesia (especially a block with exparel) if at all possible to help with post operative pain control. Otherwise I would recommend a one week course of Oxycodone 5-10mg q4hrs prn for one week and then Oxycodone 5mg q6hrs prn for week two and then off provided adequate pain control has been obtained. I would avoid tylenol as it has caused liver dysfunction in the past and would avoid gabapentin as it has caused worsening peripheral paresthesias with prior trials.  3. Follow up: next available after her surgery.    Since her last visit, Kalyn Rivero reports:  Her acute elbow pain in the post operative period is well controlled on oxycodone.  However her RA pain has worsened since she has been off Enbrel.  She also missed a labwork appointment and consequently her Arava was not refilled.   She is taking 4-5 Oxycodone 5mg a day, mostly for her chronic pain.   She is very anxious about stopping the oxycodone prior to starting her anti RA medications again.  She is following up with Dr. Gaspar again next month and may be having surgery on her other elbow and then her shoulder.   She states she has to hold her Enbrel for 6 weeks prior and 6 weeks following her surgeries.    She says she was referred to an outside spine center for her neck and back pain.  She is adamantly  against any injections for her neck and back.   She is schedule with Dr. Burnham early next month for botox.    Pain scores:  Pain intensity on average is 8 on a scale of 0-10.     Current pain treatments:   Celebrex 200mg q8hrs prn - very helpful  Enbrel - twice a week infusions - currently on hold.  Arava - on hold 2/2 noncompliance with labwork     Past pain treatments:  Percocet- helpful  Hydrocodone - helpful (hx of opioid dependence however)  Gabapentin - caused paresthesias, poorly tolerated  Topamax  Depakote  Acetaminophen - she reports elevated LFT's while on chronic acetaminophen. Last labs LFT's on 4/14/16 showed very mildly elevated ALT/AST.  Left hip IA injection 1/5/17 - no relief.    Side Effects: no side effect    Medications:  Current Outpatient Prescriptions   Medication Sig Dispense Refill     oxyCODONE (ROXICODONE) 5 MG IR tablet Take 1 tablet by mouth every 6 hours prn pain 50 tablet 0     omeprazole (PRILOSEC) 40 MG capsule Take 1 capsule (40 mg) by mouth daily Take 30-60 minutes before a meal. 90 capsule 2     medroxyPROGESTERone (DEPO-PROVERA) 150 MG/ML injection Inject 1 mL (150 mg) into the muscle every 3 months 3 mL 3     Celecoxib (CELEBREX PO) Take 200 mg by mouth 3 times daily       leflunomide (ARAVA) 20 MG tablet Take 20 mg by mouth Reported on 3/27/2017       ketamine, KETALAR, 5%-gabapentin, NEURONTIN, 8% 5%-8% GEL topical gel Apply 30 g topically 3 times daily (Patient not taking: Reported on 3/27/2017) 30 g 3     Botulinum Toxin Type A (BOTOX) 200 UNITS SOLR Inject 200 Units as directed every 3 months 200 Units 3     etanercept (ENBREL) 50 MG/ML prefilled syringe Inject 50 mg Subcutaneous twice a week Reported on 3/27/2017         Medical History: any changes in medical history since they were last seen? Yes - post op from right elbow surgery.    Review of Systems:  The 14 system ROS was reviewed from the intake questionnaire, and is positive for: joint pain, myofascial  "pain.  Any bowel or bladder problems: None  Mood: Tearful    Physical Exam:  Blood pressure (!) 144/100, pulse 81, height 1.689 m (5' 6.5\"), weight 74.9 kg (165 lb 1.6 oz), SpO2 99 %, not currently breastfeeding.  General: NAD  Gait: Antalgic    Assessment:   1. Polyarticular rheumatoid arthritis  2. Migraine headaches   3. Chronic cervicalgia now with EMG evidence of right polyradiculopathies  4. Chronic Right elbow pain - s/p surgery 3/2017  5. Chronic right shoulder pain 2/2 rotator cuff tear - possible surgery this summer.  7. Fibromyalgia  8. Hx of Opioid dependence  9. Coping with chronic pain    Kalyn Rivero is a 46 year old female who is seen at the pain clinic for the above noted complaints.  She is having a hard time accepting that our goal after her RA medications are started again is to wean off of her opioids/DC them.  I explained to her that she is not a great candidate for long term opioid therapies given her past history and that the evidence for long term efficacy in this condition is small.       I spent a significant amount of time outlining the limited role of opioid therapy for chronic non-caner pain.   The key points I expressed are summarized below.    1.) The concept of tolerance and dependence were reviewed and I highlighted the risks of dose escalation based on these phenomenon given that there is no defined end point to chronic non-cancer pain.  2.) The risks of chronic opioid administration including respiratory depression, constipation, osteoporosis, immunomodulation and hyperalgesia.  3.) The increased risk of an adverse respiratory event with co-administration of benzodiazepines and opioid medications (as well as with other medications that induce sleep).  4.) The difficulty of controlling acute pain (in surgery, for example) in a patient on chronic opioid therapy.      Plan:  1. Interventions: none at this time -she is adamantly opposed to any injections.  2. Medication " Management: Continue oxycodone at this time until she is restarted on Enbrel.  She is taking her dosages appropriately.   Continue celebrex.   Will consider low dose naltrexone for her Fibromyalgia once her opioids are DC'd after the perioperative period.  3. Physical Therapy:  Continue HEP  4. Need for Clinical Health Psychologist.she is to continue her behavioral therapy with Dr. Randle  5. Diagnostic Studies:  Recommend cervical MRI given EMG results.  She states that she is going to keep her spine center appointment and that they will likely order a full spine MRI.  6. Follow up: 2 months, she will report for botox injections for her headaches with Dr. Burnham.    Total time spent was 45 minutes, and more than 50% of face to face time was spent in counseling and/or coordination of care regarding the above plan.    Fredy Patel DO    [facility]  Pain Management  Department of Anesthesiology

## 2017-03-27 NOTE — LETTER
3/27/2017       RE: Kalyn Rivero  [address] [place] [address]     Dear Colleague,    Thank you for referring your patient, Kalyn Rivero, to the [facility] FOR [facility]HENSIVE [facility]IN MANAGEMENT at [facility]. Please see a copy of my visit note below.    Date of visit: 3/27/2017    Chief complaint:   Chief Complaint   Patient presents with     Pain Management     Return visit- Follow up appointment related to bilateral arms        Interval history:  Kalyn Rivero is a 46 year old female last seen by me on 2/28/17.      Recommendations/plan at the last visit included:  1. Interventions: None at this time - will refer for botox injections with Dr. Burnham after her recovery from surgery - likely April  2. Perioperative Pain Management Recommendations: Limited options given her sensitivities and dependence history. I would recommend continuing celebrex if possible, consider regional anesthesia (especially a block with exparel) if at all possible to help with post operative pain control. Otherwise I would recommend a one week course of Oxycodone 5-10mg q4hrs prn for one week and then Oxycodone 5mg q6hrs prn for week two and then off provided adequate pain control has been obtained. I would avoid tylenol as it has caused liver dysfunction in the past and would avoid gabapentin as it has caused worsening peripheral paresthesias with prior trials.  3. Follow up: next available after her surgery.    Since her last visit, Kalyn Rivero reports:  Her acute elbow pain in the post operative period is well controlled on oxycodone.  However her RA pain has worsened since she has been off Enbrel.  She also missed a labwork appointment and consequently her Arava was not refilled.   She is taking 4-5 Oxycodone 5mg a day, mostly for her chronic pain.   She is very anxious about stopping the oxycodone prior to starting her anti RA medications again.  She  is following up with Dr. Gaspar again next month and may be having surgery on her other elbow and then her shoulder.   She states she has to hold her Enbrel for 6 weeks prior and 6 weeks following her surgeries.    She says she was referred to an outside spine center for her neck and back pain.  She is adamantly against any injections for her neck and back.   She is schedule with Dr. Burnham early next month for botox.    Pain scores:  Pain intensity on average is 8 on a scale of 0-10.     Current pain treatments:   Celebrex 200mg q8hrs prn - very helpful  Enbrel - twice a week infusions - currently on hold.  Arava - on hold 2/2 noncompliance with labwork     Past pain treatments:  Percocet- helpful  Hydrocodone - helpful (hx of opioid dependence however)  Gabapentin - caused paresthesias, poorly tolerated  Topamax  Depakote  Acetaminophen - she reports elevated LFT's while on chronic acetaminophen. Last labs LFT's on 4/14/16 showed very mildly elevated ALT/AST.  Left hip IA injection 1/5/17 - no relief.    Side Effects: no side effect    Medications:  Current Outpatient Prescriptions   Medication Sig Dispense Refill     oxyCODONE (ROXICODONE) 5 MG IR tablet Take 1 tablet by mouth every 6 hours prn pain 50 tablet 0     omeprazole (PRILOSEC) 40 MG capsule Take 1 capsule (40 mg) by mouth daily Take 30-60 minutes before a meal. 90 capsule 2     medroxyPROGESTERone (DEPO-PROVERA) 150 MG/ML injection Inject 1 mL (150 mg) into the muscle every 3 months 3 mL 3     Celecoxib (CELEBREX PO) Take 200 mg by mouth 3 times daily       leflunomide (ARAVA) 20 MG tablet Take 20 mg by mouth Reported on 3/27/2017       ketamine, KETALAR, 5%-gabapentin, NEURONTIN, 8% 5%-8% GEL topical gel Apply 30 g topically 3 times daily (Patient not taking: Reported on 3/27/2017) 30 g 3     Botulinum Toxin Type A (BOTOX) 200 UNITS SOLR Inject 200 Units as directed every 3 months 200 Units 3     etanercept (ENBREL) 50 MG/ML prefilled syringe  "Inject 50 mg Subcutaneous twice a week Reported on 3/27/2017         Medical History: any changes in medical history since they were last seen? Yes - post op from right elbow surgery.    Review of Systems:  The 14 system ROS was reviewed from the intake questionnaire, and is positive for: joint pain, myofascial pain.  Any bowel or bladder problems: None  Mood: Tearful    Physical Exam:  Blood pressure (!) 144/100, pulse 81, height 1.689 m (5' 6.5\"), weight 74.9 kg (165 lb 1.6 oz), SpO2 99 %, not currently breastfeeding.  General: NAD  Gait: Antalgic    Assessment:   1. Polyarticular rheumatoid arthritis  2. Migraine headaches   3. Chronic cervicalgia now with EMG evidence of right polyradiculopathies  4. Chronic Right elbow pain - s/p surgery 3/2017  5. Chronic right shoulder pain 2/2 rotator cuff tear - possible surgery this summer.  7. Fibromyalgia  8. Hx of Opioid dependence  9. Coping with chronic pain    Kalyn Rivero is a 46 year old female who is seen at the pain clinic for the above noted complaints.  She is having a hard time accepting that our goal after her RA medications are started again is to wean off of her opioids/DC them.  I explained to her that she is not a great candidate for long term opioid therapies given her past history and that the evidence for long term efficacy in this condition is small.       I spent a significant amount of time outlining the limited role of opioid therapy for chronic non-caner pain.   The key points I expressed are summarized below.    1.) The concept of tolerance and dependence were reviewed and I highlighted the risks of dose escalation based on these phenomenon given that there is no defined end point to chronic non-cancer pain.  2.) The risks of chronic opioid administration including respiratory depression, constipation, osteoporosis, immunomodulation and hyperalgesia.  3.) The increased risk of an adverse respiratory event with co-administration of " benzodiazepines and opioid medications (as well as with other medications that induce sleep).  4.) The difficulty of controlling acute pain (in surgery, for example) in a patient on chronic opioid therapy.      Plan:  1. Interventions: none at this time -she is adamantly opposed to any injections.  2. Medication Management: Continue oxycodone at this time until she is restarted on Enbrel.  She is taking her dosages appropriately.   Continue celebrex.   Will consider low dose naltrexone for her Fibromyalgia once her opioids are DC'd after the perioperative period.  3. Physical Therapy:  Continue HEP  4. Need for Clinical Health Psychologist.she is to continue her behavioral therapy with Dr. Randle  5. Diagnostic Studies:  Recommend cervical MRI given EMG results.  She states that she is going to keep her spine center appointment and that they will likely order a full spine MRI.  6. Follow up: 2 months, she will report for botox injections for her headaches with Dr. Burnham.    Total time spent was 45 minutes, and more than 50% of face to face time was spent in counseling and/or coordination of care regarding the above plan.    Fredy Patel DO    [facility]  Pain Management  Department of Anesthesiology

## 2017-03-28 ENCOUNTER — OFFICE VISIT (OUTPATIENT)
Dept: FAMILY MEDICINE | Facility: CLINIC | Age: 47
End: 2017-03-28
Payer: COMMERCIAL

## 2017-03-28 VITALS
TEMPERATURE: 98.2 F | DIASTOLIC BLOOD PRESSURE: 91 MMHG | BODY MASS INDEX: 25.88 KG/M2 | HEART RATE: 94 BPM | WEIGHT: 162.8 LBS | SYSTOLIC BLOOD PRESSURE: 129 MMHG

## 2017-03-28 DIAGNOSIS — B97.7 HIGH RISK HPV INFECTION: Primary | ICD-10-CM

## 2017-03-28 PROCEDURE — 57456 ENDOCERV CURETTAGE W/SCOPE: CPT | Performed by: FAMILY MEDICINE

## 2017-03-28 PROCEDURE — 88305 TISSUE EXAM BY PATHOLOGIST: CPT | Performed by: FAMILY MEDICINE

## 2017-03-28 PROCEDURE — 99207 ZZC NO CHARGE LOS: CPT | Performed by: FAMILY MEDICINE

## 2017-03-28 NOTE — PROGRESS NOTES
46 year old female presents for colposcopy,  Pap smear   1 months ago showed: high risk hpv. The prior pap showed Normal.   No LMP recorded. Patient has had an injection.  Allergies: Morphine; Codeine; Gabapentin; Sulfa drugs; Erythromycin; Penicillins; and Prednisone  Reviewed health maintenance   Patient Active Problem List   Diagnosis     Rheumatoid arthritis (H)     Hypothyroidism     Advanced directives, counseling/discussion     Acetaminophen overdose     Hepatic failure (H)     Pancreatitis     Anemia     Chronic pain     Pneumonia     Suicide risk     Grief reaction     Liver failure, acute     CARDIOVASCULAR SCREENING; LDL GOAL LESS THAN 160     Renal stone     Health Care Home     TOTAL KNEE ARTHROPLASTY - bilateral     Knee joint replacement - left     Trochanteric bursitis - left     Atlantoaxial instability     Postoperative pain     S/P cervical spinal fusion     Drug-seeking behavior     Opioid type dependence (H)     Severe frontal headaches     Abdominal pain, unspecified abdominal location     Esophageal reflux     Right upper extremity numbness     Right arm numbness     Lesion of ulnar nerve     TMJ (temporomandibular joint syndrome)     Intractable chronic migraine without aura     Encounter for long-term opiate analgesic use     Rheumatoid arthritis of foot (H)     Elbow pain, right     Other postprocedural status(V45.89)     Degenerative joint disease of elbow, right     Primary osteoarthritis of right elbow     Aftercare following surgery of the musculoskeletal system     Other drug-induced neutropenia (H)     Rheumatoid arthritis with positive rheumatoid factor, involving unspecified site (H)     Pain of toe of left foot     Cervical high risk HPV (human papillomavirus) test positive         Prior cervical/vaginal disease: Normal exam without visible pathology.  Prior cervical treatment: cryosurgery.    Procedure for colposcopy and biopsy has been explained to the   patient and consent  obtained.    PROCEDURE:  Speculum placed in vagina and excellent visualization of cervix   acheived, cervix swabbed x 3 with acetic acid solution.    FINDINGS:  Cervix: no visible lesions; no biopsies taken. endometrial  currettings               Procedure Summary: Patient tolerated procedure well.    ASSESSMENT: Normal exam without visible pathology.    PLAN: Specimens labelled and sent to pathology..

## 2017-03-28 NOTE — NURSING NOTE
"Chief Complaint   Patient presents with     Colposcopy       Initial BP (!) 129/91  Pulse 94  Temp 98.2  F (36.8  C) (Oral)  Wt 162 lb 12.8 oz (73.8 kg)  BMI 25.88 kg/m2 Estimated body mass index is 25.88 kg/(m^2) as calculated from the following:    Height as of 3/27/17: 5' 6.5\" (1.689 m).    Weight as of this encounter: 162 lb 12.8 oz (73.8 kg).  Medication Reconciliation: complete  Raymond Schulte CMA    "

## 2017-03-28 NOTE — LETTER
"                                                                          Affirmation of Informed Consent for Surgery or Invasive Procedure    1.  I, (print patient's name) Kalyn Rivero,  1970,   a.  Agree that I will have (include both the medical term and patient words):           Chief Complaint   Patient presents with     Colposcopy      b.  At [facility].     c.  The reason for this procedure is (medical condition):  Colposcopy.   d.  This will be done or supervised by:  Mika Zamora MD.   e.  My doctor may have help from others.  Help could include opening or closing         the wound. Help might also include taking grafts, cutting out tissue,                           implanting devices.  I have been told who will help, if known.     2.  I have talked to my doctor or health care team about:   a.  What the procedure is and what will happen.   b   How it may help me (the benefits).   c.  How it may harm me (the most likely and most serious risks).   d.  The long-term effects the procedure might have.    e.  My other choices for treatment.  The risks and benefits of those choices.    f.   What will likely happen if I say \"no\" to this procedure.    g.  How I might feel right after and how quickly I might be expected to recover.      h.  What medicines will be used to manage pain or sedate me.     3.  I agree that:  (If I do not agree with a statement, I have crossed it out and              initialed next to it.)       a.  I will ask questions.     b.  No one has promised me definite results.    c.  If serious problems are found during the procedure, the treatment may                    change.   d.  If I have \"do not resuscitate\" (DNR) wishes, I have discussed this with my                              doctor and they will be put on hold during the procedure.   e. Students and other appropriate people, approved by the facility, may watch                      the procedure and help with " tasks they are qualified for.                                                    f.  Pictures or videos may be taken. They may be used for medical or                  educational reasons only.       g. Tissues or organs removed from my body as part of the normal course of the                    procedure may be used for research or teaching purposes. They will be                  disposed of with respect.                    h.  If a staff person is exposed to my blood or body fluids, my blood will be drawn                   and tested for HIV and hepatitis.  I understand that by law, the test results will                    go:         -  In my medical record.                         -  To the Employee Occupational Health Service and/or Infection Control                                  at this facility.    -  To the [place] [facility] officials.                 i.  I may have a blood transfusion: I have talked to my doctor or care team about:    -  Why I may need a blood transfusion.     - The risks, benefits, and side effects of transfusion - and the risks of not        Having one.     -  Blood safety and other options for treatment.        Consent for blood transfusion obtained during a hospital admission is valid for the entire hospital stay.  Consent  for blood transfusion obtained in the clinic setting is valid for a year from the signature date.                                4.  I understand that:   a.  I can change my mind.  If I do, I must tell my doctor or team as soon as                           possible.              b.  The team members may change during the procedure.                c.   The team will double-check who I am.  They will ask me what I am having                         done and the site of the site of the procedure.  This is done for my safety.    My questions have been answered.  I agree to the procedure.  I have made my special needs and instructions known.      Kalyn Lucas  Mat      3/28/2017 10:16 AM  Patient (or representative)/Relationship to patient             Date  Time             Date  Time  Print name of patient (or representative)/relationship to patient    Witness:  I have verified that the signature is that of the patient or patient's representative and that this has been signed before the procedure:    NAME:        3/28/2017  10:16 AM         Date  Time  Person verifying patient's name or patient representative's signature     Provider:  I have discussed the procedure and the information stated above with the patient (or the patient's representative) and answered their questions. The patient or their representative consented to the procedure:      Mika Zamora MD    3/28/2017  10:16 AM  Physician or Provider's Signature(s)   Date  Time       Intepreter (if used):       3/28/2017  10:16 AM                                   Name       Language/Organization Date  Time    Consent for procedure valid for 30 days after patient signature date     [facility]  AFFIRMATION OF INFORMED CONSENT FOR SURGERY OR INVASIVE PROCEDURE               Original - Medical Records

## 2017-03-28 NOTE — MR AVS SNAPSHOT
After Visit Summary   3/28/2017    Kalyn Rivero    MRN: [id number]           Patient Information     Date Of Birth          1970        Visit Information        Provider Department      3/28/2017 10:05 AM [facility]        Today's Diagnoses     High risk HPV infection    -  1       Follow-ups after your visit        Your next 10 appointments already scheduled     Apr 13, 2017 11:40 AM CDT   (Arrive by 11:25 AM)   POST-OP HAND with Carrol Gaspar MD   [facility] ([facility])    [address] [place] [address]   [phone]            Apr 17, 2017 12:30 PM CDT   (Arrive by 12:15 PM)   Return Visit with Nick Burnham MD   [facility] ([facility])    [address] [place] [address]   [phone]            May 23, 2017 11:00 AM CDT   (Arrive by 10:45 AM)   Return Visit with Fredy Patel DO   [facility] ([facility])    [address] [place] [address]   [phone]              Who to contact     If you have questions or need follow up information about today's clinic visit or your schedule please contact [facility] directly at [phone].  Normal or non-critical lab and imaging results will be communicated to you by MyChart, letter or phone within 4 business days after the clinic has received the results. If you do not hear from us within 7 days, please contact the clinic through MyChart or phone. If you have a critical or abnormal lab result, we will notify you by phone as soon as possible.  Submit refill requests through [employer] or call your pharmacy and they will forward the refill request to us. Please allow 3 business days for your  "refill to be completed.          Additional Information About Your Visit        [employer] Information     [employer] lets you send messages to your doctor, view your test results, renew your prescriptions, schedule appointments and more. To sign up, go to [email or web address] . Click on \"Log in\" on the left side of the screen, which will take you to the Welcome page. Then click on \"Sign up Now\" on the right side of the page.     You will be asked to enter the access code listed below, as well as some personal information. Please follow the directions to create your username and password.     Your access code is: [email or web address]  Expires: 2017  7:30 AM     Your access code will  in 90 days. If you need help or a new code, please call your [place] clinic or [phone].        Care EveryWhere ID     This is your Care EveryWhere ID. This could be used by other organizations to access your [place] medical records  [id number]        Your Vitals Were     Pulse Temperature BMI (Body Mass Index)             94 98.2  F (36.8  C) (Oral) 25.88 kg/m2          Blood Pressure from Last 3 Encounters:   17 (!) 129/91   17 (!) 144/100   17 (!) 138/93    Weight from Last 3 Encounters:   17 162 lb 12.8 oz (73.8 kg)   17 165 lb 1.6 oz (74.9 kg)   17 162 lb (73.5 kg)              We Performed the Following     COLP CERVIX/UPPER VAGINA W ENDOCERV CURETT     Surgical pathology exam        Primary Care Provider Office Phone # Fax #    Mika Zamora [phone]       [facility] [address] [facility] [address]        Thank you!     Thank you for choosing [facility]  for your care. Our goal is always to provide you with excellent care. Hearing back from our patients is one way we can continue to improve our services. Please take a few minutes to complete the written survey that you may receive in the mail after your visit with us. Thank " you!             Your Updated Medication List - Protect others around you: Learn how to safely use, store and throw away your medicines at www.disposemymeds.org.          This list is accurate as of: 3/28/17 11:07 AM.  Always use your most recent med list.                   Brand Name Dispense Instructions for use    Botulinum Toxin Type A 200 UNITS Solr    BOTOX    200 Units    Inject 200 Units as directed every 3 months       CELEBREX PO      Take 200 mg by mouth 3 times daily       ENBREL 50 MG/ML injection   Generic drug:  etanercept      Inject 50 mg Subcutaneous twice a week Reported on 3/28/2017       ketamine (KETALAR) 5%-gabapentin (NEURONTIN) 8% 5%-8% Gel topical PLO cream     30 g    Apply 30 g topically 3 times daily       leflunomide 20 MG tablet    ARAVA     Take 20 mg by mouth Reported on 3/28/2017       medroxyPROGESTERone 150 MG/ML injection    DEPO-PROVERA    3 mL    Inject 1 mL (150 mg) into the muscle every 3 months       omeprazole 40 MG capsule    priLOSEC    90 capsule    Take 1 capsule (40 mg) by mouth daily Take 30-60 minutes before a meal.       oxyCODONE 5 MG IR tablet    ROXICODONE    50 tablet    Take 1 tablet by mouth every 6 hours prn pain

## 2017-03-29 ENCOUNTER — TRANSFERRED RECORDS (OUTPATIENT)
Dept: HEALTH INFORMATION MANAGEMENT | Facility: CLINIC | Age: 47
End: 2017-03-29

## 2017-03-29 ENCOUNTER — TELEPHONE (OUTPATIENT)
Dept: ANESTHESIOLOGY | Facility: CLINIC | Age: 47
End: 2017-03-29

## 2017-03-29 LAB
ALT SERPL-CCNC: 12 IU/L (ref 5–35)
AST SERPL-CCNC: 17 U/L (ref 5–34)
CREAT SERPL-MCNC: 0.8 MG/DL (ref 0.5–1.3)
GFR SERPL CREATININE-BSD FRML MDRD: 82.4 ML/MIN/1.73M2

## 2017-03-29 NOTE — TELEPHONE ENCOUNTER
----- Message from Fredy Patel, DO sent at 3/29/2017  9:26 AM CDT -----  Sorry to bug you guys,    Can you talk to Kalyn and find out who her Rheumatologist is?  I don't see any recent visits or records.  She asked that I coordinate her care with all of her providers.    Cris Daniel

## 2017-03-30 DIAGNOSIS — M24.029: ICD-10-CM

## 2017-03-30 LAB
COPATH REPORT: NORMAL
FUNGUS SPEC CULT: NORMAL
Lab: NORMAL
MICRO REPORT STATUS: NORMAL
SPECIMEN SOURCE: NORMAL

## 2017-03-30 RX ORDER — OXYCODONE HYDROCHLORIDE 5 MG/1
TABLET ORAL
Qty: 120 TABLET | Refills: 0 | Status: SHIPPED | OUTPATIENT
Start: 2017-03-30 | End: 2017-04-27

## 2017-03-30 NOTE — TELEPHONE ENCOUNTER
Refill request    Medication: oxyCODONE (ROXICODONE) 5 MG IR tablet-   Take 1 tablet by mouth every 6 hours prn pain. MAX OF 4 PER DAY    MNPMP Checked:     Oxycodone - Last refilled 3/17/17 for 50 tablets (Usha Posadas)      Refilled: YES, dated to be refilled 30 days since last refill (3/30/17)    Last clinic appointment: 3/27/17  Next clinic appointment: -0- scheduled yet, it was recommended that she follow up in 2 months.     Patient requested to:      Prescription placed in locked box at the [facility].     Blanca Atkinson LPN

## 2017-03-31 NOTE — TELEPHONE ENCOUNTER
LPN called pt and was able to collect the information about their Rheumatologist and forwarded it on to  Dr. Patel as requested.   Blanca Atkinson LPN

## 2017-04-13 ENCOUNTER — OFFICE VISIT (OUTPATIENT)
Dept: ORTHOPEDICS | Facility: CLINIC | Age: 47
End: 2017-04-13

## 2017-04-13 DIAGNOSIS — M25.522 LEFT ELBOW PAIN: Primary | ICD-10-CM

## 2017-04-13 NOTE — NURSING NOTE
Reason For Visit:   Chief Complaint   Patient presents with     RECHECK     6 week post-op, right elbow loose body excision, DOS 3/2/17     Musculoskeletal Problem     Surgical discussion for left total elbow replacement     Age: 46 year old    Date of surgery: 3/2/17 right elbow    Pain Assessment  Patient Currently in Pain: No     force  R hand  level 2 force: 13.6 kg (30 lb)  L hand  level  2 force: 6.804 kg (15 lb)

## 2017-04-13 NOTE — LETTER
4/13/2017       RE: Kalyn Rivero  [address] [facility] [address]     Dear Colleague,    Thank you for referring your patient, Kalyn Rivero, to the [facility]IC at [facility]. Please see a copy of my visit note below.    CLINIC PROGRESS NOTE      CHIEF COMPLAINT:  Postop followup status post excision of loose body, right elbow.       DATE OF SURGERY:  03/02/2017.        HISTORY OF PRESENT ILLNESS:  Ms. Rivero is a 46-year-old right-hand-dominant female who is now 6 weeks status post excision of a loose body in her right elbow with arthrotomy and I&D performed on 03/02/2017 by Dr. Gaspar.  She has a history significant for total elbow replacement and multiple elbow surgeries.  The loose body excision was evaluated to be related to an arthroplasty cementation.  Since the surgery, she states that her symptoms have improved remarkably.  She also states that her prior complaints of numbness and tingling in her small and ring finger have now resolved.  She continues to have excellent range of motion in the elbow without complaints of pain.  She is doing so well, that she wishes to discuss surgical treatment for her left elbow.  She has a history of rheumatoid arthritis, and evaluation 1 year ago with x-ray demonstrated degenerative disease in the left elbow.  At that time, there was discussion for possible total elbow replacement on her left, but this was deferred since her right elbow continued to be symptomatic.  Currently, she denies any numbness and tingling in either of her upper extremities.  No other complaints.  She has personal care assistants at home to help her with activities of daily living, and she states she continues to comply with lifting restrictions for her right total elbow arthroplasty.      REVIEW OF SYSTEMS:  No fevers, chills, chest pain, shortness of breath, nausea, vomiting, diarrhea or constipation.      PHYSICAL  EXAMINATION:   GENERAL:  She is awake, alert, in no acute distress.  Respirations are nonlabored.   MUSCULOSKELETAL:  Examination of the right elbow shows a surgical incision that is clean, dry and intact.  No signs of infection or drainage.  She has good range of motion from approximately 5 degrees of extension to 130 degrees of active flexion.  No issues with pronation and supination, which are approximately 70 and 70 degrees, respectively.  Motor and sensory exams are normal distally in the wrist and hand.  She has good cap refill in all digits.  All compartments are soft and compressible, and there is minimal swelling about the elbow.  Examination of her left elbow shows symmetric range of motion in elbow flexion, extension, supination and pronation.  There is some pain at extremes, and some milder tenderness to palpation along the lateral elbow and radiocapitellar joint.  Mild crepitus is noted.  No locking.  Negative cubital tunnel, Tinel's and flexion compression test.  Neurovascularly, she is intact, and both motor and sensory exams are grossly normal in the hand distally on the left side.      IMAGING:  Review of imaging for her left elbow demonstrates joint space narrowing and degenerative change in the left radiocapitellar joint.      ASSESSMENT AND PLAN:  A 46-year-old right-hand-dominant female, 6 weeks status post removal of loose bodies for right total elbow arthroplasty.  Doing very well.  She has a left radiocapitellar arthritis and degenerative changes.  The patient discussed her wishes to proceed with a total elbow replacement.  After review of her x-rays, we feel that she may benefit symptomatically with a surgical left radial head excision.  We do not recommend a total elbow arthroplasty at this time.  The risks and benefits of the left radial head excision were discussed with the patient, and all questions were answered.  She will schedule surgery at a date at her convenience.  With regards to  her right elbow, she may follow up on an as-needed basis.   Dictated by Adi Mendes MD, Resident         SOFYA BRIDGES MD       As dictated by ADI MENDES MD         D: 2017 21:25   T: 2017 10:47   MT: RULA      Name:     SANDEE ALLEN   MRN:      [id number]        Account:      [id number]   :      1970           Service Date: 2017      Document: [id number]      I have personally examined this patient and have reviewed the clinical presentation and progress note with the resident. I agree with the treatment plan as outlined. The plan was formulated with the resident on the day of the resident's dictation.

## 2017-04-13 NOTE — MR AVS SNAPSHOT
After Visit Summary   4/13/2017    Kalyn Rivero    MRN: [id number]           Patient Information     Date Of Birth          1970        Visit Information        Provider Department      4/13/2017 11:40 AM Carrol Gaspar MD [facility]        Today's Diagnoses     Left elbow pain    -  1       Follow-ups after your visit        Your next 10 appointments already scheduled     Apr 27, 2017   Procedure with Carrol Gaspar MD   [facility] ([facility])    [address] [place] [address]  [address] [facility] [address]   [phone]           Located in the Clinics and Surgery Center at [address] [place] [address].   parking is very convenient and highly recommended.  is a $6 flat rate fee.  Both  and self parkers should enter the main arrival plaza from [facility]; parking attendants will direct you based on your parking preference.            May 04, 2017  3:00 PM CDT   (Arrive by 2:45 PM)   Post-Op with KARMA U ORTHO HAND POP   [facility] ([facility])    [address] [place] [address]  [address] [facility] [address] [phone]            May 04, 2017  3:30 PM CDT   (Arrive by 3:15 PM)   ASYA Hand with Teddy Ryder OT   [facility] ([facility])    [address] [place] [id number] [phone]            May 23, 2017 11:00 AM CDT   (Arrive by 10:45 AM)   Return Visit with Fredy Patel DO   [facility] ([facility])    [address] [place] [address]   [phone]            May 23, 2017 12:15 PM CDT   (Arrive by 12:00 PM)   MR UPPER EXTREMITY JOINT RIGHT W/O CONTRAST with [id number]   [facility] ([facility])    [address] [place] [address]  [facility] [address] [phone]           Take your medicines as usual, unless your doctor tells you not to. Bring a list of your current medicines to your exam (including vitamins, minerals and over-the-counter drugs). Also bring the results of similar scans you may have had.  Please remove any body piercings and hair extensions before you arrive.  Follow your doctor s orders. If you do not, we may have to postpone your exam.  You will not have contrast for this exam. You do not need to do anything special to prepare.  The MRI machine uses a strong magnet. Please wear clothes without metal (snaps, zippers). A sweatsuit works well, or we may give you a hospital gown.   **IMPORTANT** THE INSTRUCTIONS BELOW ARE ONLY FOR THOSE PATIENTS WHO HAVE BEEN TOLD THEY WILL RECEIVE SEDATION OR GENERAL ANESTHESIA DURING THEIR MRI PROCEDURE:  IF YOU WILL RECEIVE SEDATION (take medicine to help you relax during your exam):   You must get the medicine from your doctor before you arrive. Bring the medicine to the exam. Do not take it at home.   Arrive one hour early. Bring someone who can take you home after the test. Your medicine will make you sleepy. After the exam, you may not drive, take a bus or take a taxi by yourself.   No eating 8 hours before your exam. You may have clear liquids up until 4 hours before your exam. (Clear liquids include water, clear tea, black coffee and fruit juice without pulp.)  IF YOU WILL RECEIVE ANESTHESIA (be asleep for your exam):   Arrive 1 1/2 hours early. Bring someone who can take you home after the test. You may not drive, take a bus or take a taxi by yourself.   No eating 8 hours before your exam. You may have clear liquids up until 4 hours before your exam. (Clear liquids include water, clear tea, black coffee and fruit juice without pulp.)   You will spend four to five hours in the recovery room.  Please call the Imaging Department at your exam site with any questions.             Jun 01, 2017 12:00 PM CDT   (Arrive by 11:45 AM)   POST-OP HAND with Carrol Gaspar MD   [facility] ([facility])    [address] [place] [id number] [phone]            Jun 12, 2017 11:30 AM CDT   (Arrive by 11:15 AM)   PAC EVALUATION with Uc Pac Santiago 7   [facility] Preoperative [facility] [place] ([facility])    [address] [place] [address]   [phone]            Jun 12, 2017 12:20 PM CDT   (Arrive by 12:05 PM)   PAC Anesthesia Consult with  Pac Anesthesiologist   [facility] [place] ([facility])    [address] [place] [address]   [phone]            Jun 12, 2017 12:30 PM CDT   (Arrive by 12:15 PM)   PAC RN ASSESSMENT with Uc Pac Rn   [facility] [place] ([facility])    [address] [place] [address] [phone]            Jun 12, 2017  1:50 PM CDT   (Arrive by 1:35 PM)   RETURN SHOULDER with River Law MD   [facility] ([facility])    [address] [place] [address] [phone]              Who to contact     Please call your clinic at [phone] to:    Ask questions about your health    Make or cancel appointments    Discuss your medicines    Learn about your test results    Speak to your doctor   If you have compliments or concerns about an experience at your clinic, or if you wish to file a complaint, please contact [facility] Patient Relations at [phone] or email us at [email or web address]         Additional Information About Your Visit        "[employer]hart Information     [employer] is an electronic gateway that provides easy, online access to your medical records. With [employer], you can request a  clinic appointment, read your test results, renew a prescription or communicate with your care team.     To sign up for [employer] visit the website at [email or web address]   You will be asked to enter the access code listed below, as well as some personal information. Please follow the directions to create your username and password.     Your access code is: [email or web address]  Expires: 2017  6:31 AM     Your access code will  in 90 days. If you need help or a new code, please contact your [facility] or call [phone] for assistance.        Care EveryWhere ID     This is your Care EveryWhere ID. This could be used by other organizations to access your [facility] medical records  [id number]         Blood Pressure from Last 3 Encounters:   17 129/87   17 (!) 132/92   17 (!) 129/91    Weight from Last 3 Encounters:   17 75.2 kg (165 lb 12.8 oz)   17 74.8 kg (165 lb)   17 75 kg (165 lb 4.8 oz)              We Performed the Following     Alesia-Operative Worksheet          Today's Medication Changes          These changes are accurate as of: 17 11:59 PM.  If you have any questions, ask your nurse or doctor.               Stop taking these medicines if you haven't already. Please contact your care team if you have questions.     ENBREL 50 MG/ML injection   Generic drug:  etanercept   Stopped by:  Carrol Gaspar MD           ketamine (KETALAR) 5%-gabapentin (NEURONTIN) 8% 5%-8% Gel topical PLO cream   Stopped by:  Carrol Gaspar MD                    Primary Care Provider Office Phone # Fax #    Mika Zamora [phone]       [facility] [address] [facility] [address]        Thank you!     Thank you for choosing [facility]  for your care. Our goal is always to provide you with excellent care. Hearing back from our patients is one way we can continue to  improve our services. Please take a few minutes to complete the written survey that you may receive in the mail after your visit with us. Thank you!             Your Updated Medication List - Protect others around you: Learn how to safely use, store and throw away your medicines at www.disposemymeds.org.          This list is accurate as of: 4/13/17 11:59 PM.  Always use your most recent med list.                   Brand Name Dispense Instructions for use    Botulinum Toxin Type A 200 UNITS Solr    BOTOX    200 Units    Inject 200 Units as directed every 3 months       CELEBREX PO      Take 200 mg by mouth 3 times daily       leflunomide 20 MG tablet    ARAVA     Take 20 mg by mouth Reported on 3/28/2017       medroxyPROGESTERone 150 MG/ML injection    DEPO-PROVERA    3 mL    Inject 1 mL (150 mg) into the muscle every 3 months       omeprazole 40 MG capsule    priLOSEC    90 capsule    Take 1 capsule (40 mg) by mouth daily Take 30-60 minutes before a meal.       oxyCODONE 5 MG IR tablet    ROXICODONE    120 tablet    Take 1 tablet by mouth every 6 hours prn pain. MAX OF 4 PER DAY

## 2017-04-13 NOTE — NURSING NOTE
Teaching Flowsheet   Relevant Diagnosis: Left elbow arthritis   Teaching Topic: Pre-op left radial head resection      Person(s) involved in teaching:   Patient     Motivation Level:  Asks Questions: Yes  Eager to Learn: Yes  Cooperative: Yes  Receptive (willing/able to accept information): Yes  Any cultural factors/[employer]ar[employer] beliefs that may influence understanding or compliance? No       Patient demonstrates understanding of the following:  Reason for the appointment, diagnosis and treatment plan: Yes  Knowledge of proper use of medications and conditions for which they are ordered (with special attention to potential side effects or drug interactions): Yes  Which situations necessitate calling provider and whom to contact: Yes       Teaching Concerns Addressed:   Comments: Patient is aware that she needs melissa off of enbral for 4 weeks prior to surgery. She states that she has been off of it already and by 4/27 it will have been 4 weeks.  She sees Dr. Ramos for pain management, she would like us to discuss post op pain management with him prior to her surgery. She has her pre-op physical scheduled for 4/18. She see's her rheumatologist 4/26.        Proper use and care of  (medical equip, care aids, etc.): Yes  Nutritional needs and diet plan: Yes  Pain management techniques: Yes  Wound Care: Yes  How and/when to access community resources: NA     Instructional Materials Used/Given: pre-op packet, antiseptic soap      Time spent with patient: 15 minutes.

## 2017-04-14 NOTE — PROGRESS NOTES
CLINIC PROGRESS NOTE      CHIEF COMPLAINT:  Postop followup status post excision of loose body, right elbow.       DATE OF SURGERY:  03/02/2017.        HISTORY OF PRESENT ILLNESS:  Ms. Rivero is a 46-year-old right-hand-dominant female who is now 6 weeks status post excision of a loose body in her right elbow with arthrotomy and I&D performed on 03/02/2017 by Dr. Gaspar.  She has a history significant for total elbow replacement and multiple elbow surgeries.  The loose body excision was evaluated to be related to an arthroplasty cementation.  Since the surgery, she states that her symptoms have improved remarkably.  She also states that her prior complaints of numbness and tingling in her small and ring finger have now resolved.  She continues to have excellent range of motion in the elbow without complaints of pain.  She is doing so well, that she wishes to discuss surgical treatment for her left elbow.  She has a history of rheumatoid arthritis, and evaluation 1 year ago with x-ray demonstrated degenerative disease in the left elbow.  At that time, there was discussion for possible total elbow replacement on her left, but this was deferred since her right elbow continued to be symptomatic.  Currently, she denies any numbness and tingling in either of her upper extremities.  No other complaints.  She has personal care assistants at home to help her with activities of daily living, and she states she continues to comply with lifting restrictions for her right total elbow arthroplasty.      REVIEW OF SYSTEMS:  No fevers, chills, chest pain, shortness of breath, nausea, vomiting, diarrhea or constipation.      PHYSICAL EXAMINATION:   GENERAL:  She is awake, alert, in no acute distress.  Respirations are nonlabored.   MUSCULOSKELETAL:  Examination of the right elbow shows a surgical incision that is clean, dry and intact.  No signs of infection or drainage.  She has good range of motion from approximately 5 degrees of  extension to 130 degrees of active flexion.  No issues with pronation and supination, which are approximately 70 and 70 degrees, respectively.  Motor and sensory exams are normal distally in the wrist and hand.  She has good cap refill in all digits.  All compartments are soft and compressible, and there is minimal swelling about the elbow.  Examination of her left elbow shows symmetric range of motion in elbow flexion, extension, supination and pronation.  There is some pain at extremes, and some milder tenderness to palpation along the lateral elbow and radiocapitellar joint.  Mild crepitus is noted.  No locking.  Negative cubital tunnel, Tinel's and flexion compression test.  Neurovascularly, she is intact, and both motor and sensory exams are grossly normal in the hand distally on the left side.      IMAGING:  Review of imaging for her left elbow demonstrates joint space narrowing and degenerative change in the left radiocapitellar joint.      ASSESSMENT AND PLAN:  A 46-year-old right-hand-dominant female, 6 weeks status post removal of loose bodies for right total elbow arthroplasty.  Doing very well.  She has a left radiocapitellar arthritis and degenerative changes.  The patient discussed her wishes to proceed with a total elbow replacement.  After review of her x-rays, we feel that she may benefit symptomatically with a surgical left radial head excision.  We do not recommend a total elbow arthroplasty at this time.  The risks and benefits of the left radial head excision were discussed with the patient, and all questions were answered.  She will schedule surgery at a date at her convenience.  With regards to her right elbow, she may follow up on an as-needed basis.   Dictated by Adi Mendes MD, Resident         SOFYA BRIDGES MD       As dictated by ADI MENDES MD            D: 04/13/2017 21:25   T: 04/14/2017 10:47   MT: RULA      Name:     SANDEE ALLEN   MRN:      [id number]        Account:       [id number]   :      1970           Service Date: 2017      Document: [id number]      I have personally examined this patient and have reviewed the clinical presentation and progress note with the resident. I agree with the treatment plan as outlined. The plan was formulated with the resident on the day of the resident's dictation.

## 2017-04-17 ENCOUNTER — OFFICE VISIT (OUTPATIENT)
Dept: ANESTHESIOLOGY | Facility: CLINIC | Age: 47
End: 2017-04-17

## 2017-04-17 ENCOUNTER — OFFICE VISIT (OUTPATIENT)
Dept: ORTHOPEDICS | Facility: CLINIC | Age: 47
End: 2017-04-17

## 2017-04-17 VITALS — WEIGHT: 165.3 LBS | HEIGHT: 66 IN | BODY MASS INDEX: 26.57 KG/M2

## 2017-04-17 VITALS
WEIGHT: 165 LBS | SYSTOLIC BLOOD PRESSURE: 132 MMHG | BODY MASS INDEX: 26.63 KG/M2 | DIASTOLIC BLOOD PRESSURE: 92 MMHG | OXYGEN SATURATION: 100 %

## 2017-04-17 DIAGNOSIS — M05.711 RHEUMATOID ARTHRITIS INVOLVING BOTH SHOULDERS WITH POSITIVE RHEUMATOID FACTOR (H): Primary | ICD-10-CM

## 2017-04-17 DIAGNOSIS — G43.719 INTRACTABLE CHRONIC MIGRAINE WITHOUT AURA AND WITHOUT STATUS MIGRAINOSUS: Primary | ICD-10-CM

## 2017-04-17 DIAGNOSIS — M05.712 RHEUMATOID ARTHRITIS INVOLVING BOTH SHOULDERS WITH POSITIVE RHEUMATOID FACTOR (H): Primary | ICD-10-CM

## 2017-04-17 RX ORDER — MEDROXYPROGESTERONE ACETATE 150 MG/ML
INJECTION, SUSPENSION INTRAMUSCULAR
COMMUNITY
Start: 2017-03-29 | End: 2017-07-28

## 2017-04-17 ASSESSMENT — PAIN SCALES - GENERAL: PAINLEVEL: WORST PAIN (10)

## 2017-04-17 NOTE — MR AVS SNAPSHOT
After Visit Summary   4/17/2017    Kalyn Rivero    MRN: [id number]           Patient Information     Date Of Birth          1970        Visit Information        Provider Department      4/17/2017 9:50 AM River Law MD [facility]        Today's Diagnoses     Rheumatoid arthritis involving both shoulders with positive rheumatoid factor (H)    -  1       Follow-ups after your visit        Your next 10 appointments already scheduled     Apr 27, 2017   Procedure with Carrol Gaspar MD   [facility] ([facility])    [address] [place] [address]  5th [facility] [address]   [phone]           Located in the Clinics and Surgery Center at [address] [place] [address].   parking is very convenient and highly recommended.  is a $6 flat rate fee.  Both  and self parkers should enter the main arrival plaza from [facility]; parking attendants will direct you based on your parking preference.            May 04, 2017  3:00 PM CDT   (Arrive by 2:45 PM)   Post-Op with KARMA BENOIT ORTHO HAND POP   [facility] ([facility])    [address] [place] [address]  4th [facility] [address]   [phone]            May 04, 2017  3:30 PM CDT   (Arrive by 3:15 PM)   ASYA Hand with Teddy Ryder OT   [facility] ([facility])    [address] [place] [address]   [phone]            May 23, 2017 11:00 AM CDT   (Arrive by 10:45 AM)   Return Visit with Fredy Patel DO   [facility] ([facility])    [address] [place] [address]   [phone]            Jun 01, 2017 12:00 PM CDT   (Arrive by 11:45 AM)   POST-OP HAND with Carrol Gaspar MD   [facility] ([facility]  "[facility])    [address] [facility]  4th [facility] [id number] [phone]              Who to contact     Please call your clinic at [phone] to:    Ask questions about your health    Make or cancel appointments    Discuss your medicines    Learn about your test results    Speak to your doctor   If you have compliments or concerns about an experience at your clinic, or if you wish to file a complaint, please contact [facility] Patient Relations at [phone] or email us at [email or web address]         Additional Information About Your Visit        tracxharAutonet Mobile Information     TheTakes is an electronic gateway that provides easy, online access to your medical records. With TheTakes, you can request a clinic appointment, read your test results, renew a prescription or communicate with your care team.     To sign up for TheTakes visit the website at [email or web address]   You will be asked to enter the access code listed below, as well as some personal information. Please follow the directions to create your username and password.     Your access code is: [email or web address]  Expires: 2017  7:30 AM     Your access code will  in 90 days. If you need help or a new code, please contact your [facility] or call [phone] for assistance.        Care EveryWhere ID     This is your Care EveryWhere ID. This could be used by other organizations to access your [place] medical records  [id number]        Your Vitals Were     Height BMI (Body Mass Index)                1.676 m (5' 6\") 26.68 kg/m2           Blood Pressure from Last 3 Encounters:   17 129/87   17 (!) 132/92   17 (!) 129/91    Weight from Last 3 Encounters:   17 75.2 kg (165 lb 12.8 oz)   17 74.8 kg (165 lb)   17 75 kg (165 lb 4.8 oz)              We Performed the Following     [place]lesia-Operative Worksheet (Shoulder)        Primary Care " Provider Office Phone # Fax #    Mika Zamora [phone]       [facility] [address] [facility] [address]        Thank you!     Thank you for choosing [facility]  for your care. Our goal is always to provide you with excellent care. Hearing back from our patients is one way we can continue to improve our services. Please take a few minutes to complete the written survey that you may receive in the mail after your visit with us. Thank you!             Your Updated Medication List - Protect others around you: Learn how to safely use, store and throw away your medicines at www.disposemymeds.org.          This list is accurate as of: 4/17/17 11:59 PM.  Always use your most recent med list.                   Brand Name Dispense Instructions for use    Botulinum Toxin Type A 200 UNITS Solr    BOTOX    200 Units    Inject 200 Units as directed every 3 months       CELEBREX PO      Take 200 mg by mouth 3 times daily       ENBREL SURECLICK 50 MG/ML autoinjector   Generic drug:  Etanercept      Reported on 4/18/2017       leflunomide 20 MG tablet    ARAVA     Take 20 mg by mouth Reported on 3/28/2017       medroxyPROGESTERone 150 MG/ML injection    DEPO-PROVERA    3 mL    Inject 1 mL (150 mg) into the muscle every 3 months       omeprazole 40 MG capsule    priLOSEC    90 capsule    Take 1 capsule (40 mg) by mouth daily Take 30-60 minutes before a meal.       oxyCODONE 5 MG IR tablet    ROXICODONE    120 tablet    Take 1 tablet by mouth every 6 hours prn pain. MAX OF 4 PER DAY       VITAMIN B-12 PO

## 2017-04-17 NOTE — PROGRESS NOTES
Procedure:    Botulinum Toxin A injections    Diagnosis:    Chronic Intractable Migraine Headache    Consent:    Risks and benefits were discussed with the pt who agreed to the above procedure. The pt gave informed consent.    Subjective:    Patient sent by Dr Patel. She reports continued daily headaches. Previous injection with botox was very helpful. She would like to proceed with injection today.     Procedure Description:    Pause for the cause was done. Pt was placed in the supine position and the frontal region of the head, nasal region of the face, temporal region of the head was cleaned with alcohol. Using a 30 gauge needle, 155 units of Botulinum toxin A mixed with 0.9% preservative free normal saline in a 1:1 ratio was injected in the following regions:    Procerus- 5 units    - right 5 units    - left 5 units    Frontalis- right 10 units at 2 sites    Frontalis- left 10 units at 2 sites    Temporalis- right 20 units at 4 sites    Temporalis- left 20 units at 4 sites    Pt then was in sitting position and posterior neck cleaned with alcohol.    Trapezius- right 15 units at 3 sites    Trapezius- left 15 units at 3 sites    Semispinalis- right 5 units    Semispinalis- left 5 units    Splenius capitus- right 5 units    Splenius capitus- left 5 units    Occipitalis- right 15 units at 3 sites    Occipitalis- left 15 units at 3 sites    Total Botox A in 1:1 dilution- 155 units; there were 45 units of unavoidable medical waste  Lot No.: [id number]   Exp Date: Nov 2019    The pt tolerated the procedure well without complications. The pt was instructed not to compress the injected areas. The pt was instructed to call if there are any concerns including: increased head pain, fever, chills, sweats, facial weakness, swallowing difficulty, or breathing difficulty.    Nick Burnham MD  Pain Medicine  Physical Medicine and Rehabilitation  [facility] Department of  Anesthesia  4/17/2017, 3:49 PM

## 2017-04-17 NOTE — LETTER
4/17/2017       RE: Kalyn Rivero  [address] [place] [address]     Dear Colleague,    Thank you for referring your patient, Kalyn Rivero, to the [facility] FOR [facility]PREHENSIVE PAIN MANAGEMENT at [facility]. Please see a copy of my visit note below.    Procedure:    Botulinum Toxin A injections    Diagnosis:    Chronic Intractable Migraine Headache    Consent:    Risks and benefits were discussed with the pt who agreed to the above procedure. The pt gave informed consent.    Subjective:    Patient sent by Dr Patel. She reports continued daily headaches. Previous injection with botox was very helpful. She would like to proceed with injection today.     Procedure Description:    Pause for the cause was done. Pt was placed in the supine position and the frontal region of the head, nasal region of the face, temporal region of the head was cleaned with alcohol. Using a 30 gauge needle, 155 units of Botulinum toxin A mixed with 0.9% preservative free normal saline in a 1:1 ratio was injected in the following regions:    Procerus- 5 units    - right 5 units    - left 5 units    Frontalis- right 10 units at 2 sites    Frontalis- left 10 units at 2 sites    Temporalis- right 20 units at 4 sites    Temporalis- left 20 units at 4 sites    Pt then was in sitting position and posterior neck cleaned with alcohol.    Trapezius- right 15 units at 3 sites    Trapezius- left 15 units at 3 sites    Semispinalis- right 5 units    Semispinalis- left 5 units    Splenius capitus- right 5 units    Splenius capitus- left 5 units    Occipitalis- right 15 units at 3 sites    Occipitalis- left 15 units at 3 sites    Total Botox A in 1:1 dilution- 155 units; there were 45 units of unavoidable medical waste  Lot No.: [id number]   Exp Date: Nov 2019    The pt tolerated the procedure well without complications. The pt was instructed not to compress the  injected areas. The pt was instructed to call if there are any concerns including: increased head pain, fever, chills, sweats, facial weakness, swallowing difficulty, or breathing difficulty.    Nick Burnham MD  Pain Medicine  Physical Medicine and Rehabilitation  [facility] Department of Anesthesia  4/17/2017, 3:49 PM

## 2017-04-17 NOTE — PATIENT INSTRUCTIONS
1. Do not to compress the injected areas within the first 24 hours. Call if there are any concerns including: increased head pain, fever, chills, sweats, facial weakness, swallowing difficulty, or breathing difficulty    2. We will call you in 1 week to see if your headaches are improved        Follow up:  With Dr. Patel    To speak with a nurse, schedule/reschedule/cancel a clinic appointment, or request a medication refill call: [phone]     You can also reach us by [employer] Logic: [email or web address]    For refills, please call on Monday, 1 week before your medication runs out. The doctors are not always in clinic, so this gives us time to get your prescriptions ready.  Please let us know the name of the medication you are requesting a refill of.

## 2017-04-17 NOTE — MR AVS SNAPSHOT
After Visit Summary   4/17/2017    Kalyn Rivero    MRN: [id number]           Patient Information     Date Of Birth          1970        Visit Information        Provider Department      4/17/2017 12:30 PM Nick Burnham MD [facility]        Care Instructions    1. Do not to compress the injected areas within the first 24 hours. Call if there are any concerns including: increased head pain, fever, chills, sweats, facial weakness, swallowing difficulty, or breathing difficulty    2. We will call you in 1 week to see if your headaches are improved        Follow up:  With Dr. Patel    To speak with a nurse, schedule/reschedule/cancel a clinic appointment, or request a medication refill call: [phone]     You can also reach us by InComm: [email or web address]    For refills, please call on Monday, 1 week before your medication runs out. The doctors are not always in clinic, so this gives us time to get your prescriptions ready.  Please let us know the name of the medication you are requesting a refill of.                                   Follow-ups after your visit        Your next 10 appointments already scheduled     Apr 18, 2017 11:00 AM CDT   Pre-Op physical with Mika Zamora MD   [facility]    [address] [facility] [address] [phone]            Apr 27, 2017   Procedure with Carrol Gaspar MD   [facility] ([facility])    [address] [place] [address]  5th [facility] [address] [phone]           Located in the Clinics and Surgery Center at [address] [place] [address].   parking is very convenient and highly recommended.  is a $6 flat rate fee.  Both  and self parkers should enter the main arrival plaza from [facility]; parking attendants will direct you  based on your parking preference.            May 23, 2017 11:00 AM CDT   (Arrive by 10:45 AM)   Return Visit with Fredy Patel DO   [facility] ([facility])    [address] [address] [phone]              Who to contact     Please call your clinic at [phone] to:    Ask questions about your health    Make or cancel appointments    Discuss your medicines    Learn about your test results    Speak to your doctor   If you have compliments or concerns about an experience at your clinic, or if you wish to file a complaint, please contact [facility] Patient Relations at [phone] or email us at [email or web address]         Additional Information About Your Visit        GuideWallharP[employer]mont [employer]aceuticals Information     The Loose Leaf Tea is an electronic gateway that provides easy, online access to your medical records. With The Loose Leaf Tea, you can request a clinic appointment, read your test results, renew a prescription or communicate with your care team.     To sign up for The Loose Leaf Tea visit the website at [email or web address]   You will be asked to enter the access code listed below, as well as some personal information. Please follow the directions to create your username and password.     Your access code is: [email or web address]  Expires: 2017  7:30 AM     Your access code will  in 90 days. If you need help or a new code, please contact your [facility] or call [phone] for assistance.        Care EveryWhere ID     This is your Care EveryWhere ID. This could be used by other organizations to access your [place] medical records  [id number]        Your Vitals Were     Pulse Oximetry BMI (Body Mass Index)                100% 26.63 kg/m2           Blood Pressure from Last 3 Encounters:   17 (!) 132/92   17 (!) 129/91   17 (!) 144/100    Weight  from Last 3 Encounters:   04/17/17 74.8 kg (165 lb)   04/17/17 75 kg (165 lb 4.8 oz)   03/28/17 73.8 kg (162 lb 12.8 oz)              Today, you had the following     No orders found for display       Primary Care Provider Office Phone # Fax #    Mika Zamora [phone]       [facility] [address] [facility] [address]        Thank you!     Thank you for choosing [facility]  for your care. Our goal is always to provide you with excellent care. Hearing back from our patients is one way we can continue to improve our services. Please take a few minutes to complete the written survey that you may receive in the mail after your visit with us. Thank you!             Your Updated Medication List - Protect others around you: Learn how to safely use, store and throw away your medicines at www.disposemymeds.org.          This list is accurate as of: 4/17/17  1:17 PM.  Always use your most recent med list.                   Brand Name Dispense Instructions for use    Botulinum Toxin Type A 200 UNITS Solr    BOTOX    200 Units    Inject 200 Units as directed every 3 months       CELEBREX PO      Take 200 mg by mouth 3 times daily       ENBREL SURECLICK 50 MG/ML autoinjector   Generic drug:  Etanercept      Reported on 4/17/2017       leflunomide 20 MG tablet    ARAVA     Take 20 mg by mouth Reported on 3/28/2017       medroxyPROGESTERone 150 MG/ML injection    DEPO-PROVERA    3 mL    Inject 1 mL (150 mg) into the muscle every 3 months       omeprazole 40 MG capsule    priLOSEC    90 capsule    Take 1 capsule (40 mg) by mouth daily Take 30-60 minutes before a meal.       oxyCODONE 5 MG IR tablet    ROXICODONE    120 tablet    Take 1 tablet by mouth every 6 hours prn pain. MAX OF 4 PER DAY       VITAMIN B-12 PO

## 2017-04-17 NOTE — PROGRESS NOTES
"CHIEF COMPLAINT:  Right shoulder rheumatoid arthritis.      HISTORY OF PRESENT ILLNESS:  Ms. Rivero returns today for followup.  She has done pretty well on her left side.  She is very happy with it.  She did well with her elbow surgery by Dr. Gaspar and she is continuing to recover from her ankle surgery.      She had a re-episode a couple of weeks ago where she was reaching out and she \"heard it tear a little bit\" in her shoulder.      It hurts more now.  It bothers her at rest and at night.  She is seen today with her son who also has him as a home health aide, but she has also got another home health aide as well for about 10 hours a day.      PHYSICAL EXAMINATION:  On exam today, she has 125 degrees of forward elevation, 120 degrees of lateral elevation, 35 degrees of external rotation at the side and internal rotation of the back to L-3 on the right.  She has 3/5 forward elevator and external rotator strength.      ASSESSMENT:  Right shoulder pain with known rheumatoid arthritis and partial thickness rotator cuff tearing.      PLAN:  I had a nice talk with Ms. Rivero today.  We talked at length about surgical and nonsurgical treatment of her shoulder.  I told her there were no guarantees with surgery, she could be no better or even worse, she could get stiff, infected, need further surgery, have injury to the nerves and arteries that power the hand and arm, or reaction to anesthetic with damage to heart, lungs, brain and even death.      I spent a total of 15 minutes of face to face time, 12 of which was spent on coordination of care and counseling.      I told her that we should get an MRI scan before her surgery to determine whether or not she would be a candidate for a regular shoulder replacement with or without rotator cuff repair or whether she would need reverse total shoulder arthroplasty.  We talked about the limitations of reverse total shoulder arthroplasty especially in somebody as young as she is.    "   Consequently, I will see her back in June with a repeat MRI scan of her shoulder and repeat radiographs at that visit and then we will plan for surgical planning afterwards.  She demonstrated a good understanding of this and will see me back on the date of her surgery for total shoulder arthroplasty with or without rotator cuff repair versus reverse total shoulder arthroplasty.

## 2017-04-17 NOTE — NURSING NOTE
"Reason For Visit:   Chief Complaint   Patient presents with     RECHECK     F/U Right shoulder pain. Would like to discuss surgery.      PCP: Mika Zamora  Ref: Dr. Hendricks        Occupation none.  Currently working? No.  Work status? On disability.  Date of injury: none     Date of surgery: none on the right     Smoker: Yes, rarely        ambidextrous hand dominant      SANE score  Affected shoulder: Right   Right shoulder SANE: 5  Left shoulder SANE: 100    Ht 1.676 m (5' 6\")  Wt 75 kg (165 lb 4.8 oz)  BMI 26.68 kg/m2      Pain Assessment  Patient Currently in Pain: Yes  0-10 Pain Scale: 10  Primary Pain Location: Shoulder  Pain Orientation: Right  Pain Descriptors: Aching, Heaviness, Sharp  Aggravating Factors: Lying, Movement        "

## 2017-04-17 NOTE — LETTER
"4/17/2017       RE: Kalyn Rivero  [address] [facility] [address]     Dear Colleague,    Thank you for referring your patient, Kalyn Rivero, to the [facility] at [facility]. Please see a copy of my visit note below.    CHIEF COMPLAINT:  Right shoulder rheumatoid arthritis.      HISTORY OF PRESENT ILLNESS:  Ms. Rivero returns today for followup.  She has done pretty well on her left side.  She is very happy with it.  She did well with her elbow surgery by Dr. Gaspar and she is continuing to recover from her ankle surgery.      She had a re-episode a couple of weeks ago where she was reaching out and she \"heard it tear a little bit\" in her shoulder.      It hurts more now.  It bothers her at rest and at night.  She is seen today with her son who also has him as a home health aide, but she has also got another home health aide as well for about 10 hours a day.      PHYSICAL EXAMINATION:  On exam today, she has 125 degrees of forward elevation, 120 degrees of lateral elevation, 35 degrees of external rotation at the side and internal rotation of the back to L-3 on the right.  She has 3/5 forward elevator and external rotator strength.      ASSESSMENT:  Right shoulder pain with known rheumatoid arthritis and partial thickness rotator cuff tearing.      PLAN:  I had a nice talk with Ms. Rivero today.  We talked at length about surgical and nonsurgical treatment of her shoulder.  I told her there were no guarantees with surgery, she could be no better or even worse, she could get stiff, infected, need further surgery, have injury to the nerves and arteries that power the hand and arm, or reaction to anesthetic with damage to heart, lungs, brain and even death.      I spent a total of 15 minutes of face to face time, 12 of which was spent on coordination of care and counseling.      I told her that we should get an MRI scan before her surgery to " determine whether or not she would be a candidate for a regular shoulder replacement with or without rotator cuff repair or whether she would need reverse total shoulder arthroplasty.  We talked about the limitations of reverse total shoulder arthroplasty especially in somebody as young as she is.      Consequently, I will see her back in June with a repeat MRI scan of her shoulder and repeat radiographs at that visit and then we will plan for surgical planning afterwards.  She demonstrated a good understanding of this and will see me back on the date of her surgery for total shoulder arthroplasty with or without rotator cuff repair versus reverse total shoulder arthroplasty.         Again, thank you for allowing me to participate in the care of your patient.      Sincerely,    River Law MD

## 2017-04-18 ENCOUNTER — OFFICE VISIT (OUTPATIENT)
Dept: FAMILY MEDICINE | Facility: CLINIC | Age: 47
End: 2017-04-18
Payer: COMMERCIAL

## 2017-04-18 VITALS
BODY MASS INDEX: 26.76 KG/M2 | HEART RATE: 86 BPM | SYSTOLIC BLOOD PRESSURE: 129 MMHG | WEIGHT: 165.8 LBS | TEMPERATURE: 97.1 F | DIASTOLIC BLOOD PRESSURE: 87 MMHG

## 2017-04-18 DIAGNOSIS — M05.9 RHEUMATOID ARTHRITIS WITH POSITIVE RHEUMATOID FACTOR, INVOLVING UNSPECIFIED SITE (H): ICD-10-CM

## 2017-04-18 DIAGNOSIS — Z01.818 PREOP GENERAL PHYSICAL EXAM: Primary | ICD-10-CM

## 2017-04-18 LAB
ALBUMIN UR-MCNC: NEGATIVE MG/DL
APPEARANCE UR: CLEAR
BASOPHILS # BLD AUTO: 0.1 10E9/L (ref 0–0.2)
BASOPHILS NFR BLD AUTO: 1.6 %
BILIRUB UR QL STRIP: NEGATIVE
COLOR UR AUTO: YELLOW
DIFFERENTIAL METHOD BLD: NORMAL
EOSINOPHIL # BLD AUTO: 0.5 10E9/L (ref 0–0.7)
EOSINOPHIL NFR BLD AUTO: 8.5 %
ERYTHROCYTE [DISTWIDTH] IN BLOOD BY AUTOMATED COUNT: 14.6 % (ref 10–15)
GLUCOSE UR STRIP-MCNC: NEGATIVE MG/DL
HCT VFR BLD AUTO: 39.2 % (ref 35–47)
HGB BLD-MCNC: 12.4 G/DL (ref 11.7–15.7)
HGB UR QL STRIP: NEGATIVE
KETONES UR STRIP-MCNC: NEGATIVE MG/DL
LEUKOCYTE ESTERASE UR QL STRIP: ABNORMAL
LYMPHOCYTES # BLD AUTO: 2 10E9/L (ref 0.8–5.3)
LYMPHOCYTES NFR BLD AUTO: 32.7 %
MCH RBC QN AUTO: 27.4 PG (ref 26.5–33)
MCHC RBC AUTO-ENTMCNC: 31.6 G/DL (ref 31.5–36.5)
MCV RBC AUTO: 87 FL (ref 78–100)
MONOCYTES # BLD AUTO: 0.7 10E9/L (ref 0–1.3)
MONOCYTES NFR BLD AUTO: 11.9 %
MRSA DNA SPEC QL NAA+PROBE: ABNORMAL
NEUTROPHILS # BLD AUTO: 2.8 10E9/L (ref 1.6–8.3)
NEUTROPHILS NFR BLD AUTO: 45.3 %
NITRATE UR QL: NEGATIVE
NON-SQ EPI CELLS #/AREA URNS LPF: ABNORMAL /LPF
PH UR STRIP: 6 PH (ref 5–7)
PLATELET # BLD AUTO: 165 10E9/L (ref 150–450)
RBC # BLD AUTO: 4.53 10E12/L (ref 3.8–5.2)
RBC #/AREA URNS AUTO: ABNORMAL /HPF (ref 0–2)
SP GR UR STRIP: 1.02 (ref 1–1.03)
SPECIMEN SOURCE: ABNORMAL
URN SPEC COLLECT METH UR: ABNORMAL
UROBILINOGEN UR STRIP-ACNC: 0.2 EU/DL (ref 0.2–1)
WBC # BLD AUTO: 6.1 10E9/L (ref 4–11)
WBC #/AREA URNS AUTO: ABNORMAL /HPF (ref 0–2)

## 2017-04-18 PROCEDURE — 87640 STAPH A DNA AMP PROBE: CPT | Mod: 59 | Performed by: FAMILY MEDICINE

## 2017-04-18 PROCEDURE — 99214 OFFICE O/P EST MOD 30 MIN: CPT | Performed by: FAMILY MEDICINE

## 2017-04-18 PROCEDURE — 36415 COLL VENOUS BLD VENIPUNCTURE: CPT | Performed by: FAMILY MEDICINE

## 2017-04-18 PROCEDURE — 87641 MR-STAPH DNA AMP PROBE: CPT | Performed by: FAMILY MEDICINE

## 2017-04-18 PROCEDURE — 85025 COMPLETE CBC W/AUTO DIFF WBC: CPT | Performed by: FAMILY MEDICINE

## 2017-04-18 PROCEDURE — 81001 URINALYSIS AUTO W/SCOPE: CPT | Performed by: FAMILY MEDICINE

## 2017-04-18 RX ORDER — SENNOSIDES 8.6 MG
2 TABLET ORAL 2 TIMES DAILY
Qty: 120 TABLET | Refills: 1 | Status: SHIPPED | OUTPATIENT
Start: 2017-04-18 | End: 2017-07-28

## 2017-04-18 NOTE — NURSING NOTE
Teaching Flowsheet   Relevant Diagnosis: Rheumatoid arthritis, rotator cuff tear  Teaching Topic: Pre-op for right TSA versus reverse TSA with or without rotator cuff repair - surgery coordinator will call to schedule     Person(s) involved in teaching:   Patient  LINK - her sonÁngel     Motivation Level:  Asks Questions: Yes  Cooperative: Yes  Receptive (willing/able to accept information): Yes  Any cultural factors/Zoroastrian beliefs that may influence understanding or compliance? No     Patient demonstrates understanding of the following:  Reason for the appointment, diagnosis and treatment plan: Yes  Knowledge of proper use of medications and conditions for which they are ordered (with special attention to potential side effects or drug interactions): Yes  Which situations necessitate calling provider and whom to contact: Yes    MRI and follow-up appointment with Dr Law 2 weeks before surgery.  PAC appointment for post-op pain recommendations.  Currently sees Dr Patel in the [facility]  Clearance from her rheumatologist, Dr Brando Light.  Appointment 04/26/2017     Proper use and care of sling x 6 weeks (medical equip, care aids, etc.): Yes  Nutritional needs and diet plan: Yes  Pain management techniques: Yes  Wound Care: Yes  How and/when to access community resources: Yes     Instructional Materials Used/Given: Pre-op packet, surgical soap, guidelines for care after shoulder replacement     Time spent with patient: 15.

## 2017-04-18 NOTE — NURSING NOTE
"Chief Complaint   Patient presents with     Pre-Op Exam       Initial /87  Pulse 86  Temp 97.1  F (36.2  C) (Oral)  Wt 165 lb 12.8 oz (75.2 kg)  BMI 26.76 kg/m2 Estimated body mass index is 26.76 kg/(m^2) as calculated from the following:    Height as of 4/17/17: 5' 6\" (1.676 m).    Weight as of this encounter: 165 lb 12.8 oz (75.2 kg).  Medication Reconciliation: complete  Raymond Schulte CMA    "

## 2017-04-18 NOTE — MR AVS SNAPSHOT
After Visit Summary   4/18/2017    Kalyn Rivero    MRN: [id number]           Patient Information     Date Of Birth          1970        Visit Information        Provider Department      4/18/2017 11:00 AM Mika Zamora MD [facility]        Today's Diagnoses     Preop general physical exam    -  1    Rheumatoid arthritis with positive rheumatoid factor, involving unspecified site (H)          Care Instructions      Before Your Surgery      Call your surgeon if there is any change in your health. This includes signs of a cold or flu (such as a sore throat, runny nose, cough, rash or fever).    Do not smoke, drink alcohol or take over the counter medicine (unless your surgeon or primary care doctor tells you to) for the 24 hours before and after surgery.    If you take prescribed drugs: Follow your doctor s orders about which medicines to take and which to stop until after surgery.    Eating and drinking prior to surgery: follow the instructions from your surgeon    Take a shower or bath the night before surgery. Use the soap your surgeon gave you to gently clean your skin. If you do not have soap from your surgeon, use your regular soap. Do not shave or scrub the surgery site.  Wear clean pajamas and have clean sheets on your bed.         Follow-ups after your visit        Your next 10 appointments already scheduled     Apr 27, 2017   Procedure with Carrol Gaspar MD   [facility] ([facility])    [address] [place], [place] [id number] [phone]           Located in the Clinics and Surgery Center at [address] [place] [address].   parking is very convenient and highly recommended.  is a $6 flat rate fee.  Both  and self parkers should enter the main arrival plaza from [facility]; parking attendants will direct you based on your parking preference.             May 04, 2017  3:00 PM CDT   (Arrive by 2:45 PM)   Post-Op with KARMA BENOIT ORTHO HAND POP   [facility] ([facility])    [address] [place] [id number]   [phone]            May 04, 2017  3:30 PM CDT   (Arrive by 3:15 PM)   ASYA Hand with Teddy Ryder OT   [facility] ([facility])    [address] [place] [address]   [phone]            May 23, 2017 11:00 AM CDT   (Arrive by 10:45 AM)   Return Visit with Fredy Patel DO   [facility] ([facility])    [address] [place] [id number] [phone]            Jun 01, 2017 12:00 PM CDT   (Arrive by 11:45 AM)   POST-OP HAND with Carrol Gaspar MD   [facility] ([facility])    [address] [place] [id number] [phone]              Who to contact     If you have questions or need follow up information about today's clinic visit or your schedule please contact [facility] directly at [phone].  Normal or non-critical lab and imaging results will be communicated to you by MyChart, letter or phone within 4 business days after the clinic has received the results. If you do not hear from us within 7 days, please contact the clinic through MyChart or phone. If you have a critical or abnormal lab result, we will notify you by phone as soon as possible.  Submit refill requests through [employer] or call your pharmacy and they will forward the refill request to us. Please allow 3 business days for your refill to be completed.          Additional Information About Your Visit        [employer] Information     [employer] lets you send messages to your doctor, view your test results, renew your prescriptions, schedule appointments and more. To sign up, go to  [email or web address] . Click on \"Log in\" on the left side of the screen, which will take you to the Welcome page. Then click on \"Sign up Now\" on the right side of the page.     You will be asked to enter the access code listed below, as well as some personal information. Please follow the directions to create your username and password.     Your access code is: [email or web address]  Expires: 2017  7:30 AM     Your access code will  in 90 days. If you need help or a new code, please call your [facility] clinic or [phone].        Care EveryWhere ID     This is your Care EveryWhere ID. This could be used by other organizations to access your [facility] medical records  [id number]        Your Vitals Were     Pulse Temperature BMI (Body Mass Index)             86 97.1  F (36.2  C) (Oral) 26.76 kg/m2          Blood Pressure from Last 3 Encounters:   17 129/87   17 (!) 132/92   17 (!) 129/91    Weight from Last 3 Encounters:   17 165 lb 12.8 oz (75.2 kg)   17 165 lb (74.8 kg)   17 165 lb 4.8 oz (75 kg)              We Performed the Following     CBC with platelets and differential     Methicillin Resistant Staph Aureus PCR     UA with Microscopic          Today's Medication Changes          These changes are accurate as of: 17  5:48 PM.  If you have any questions, ask your nurse or doctor.               Start taking these medicines.        Dose/Directions    sennosides 8.6 MG tablet   Commonly known as:  SENOKOT   Used for:  Rheumatoid arthritis with positive rheumatoid factor, involving unspecified site (H)   Started by:  Mika Zamora MD        Dose:  2 tablet   Take 2 tablets by mouth 2 times daily   Quantity:  120 tablet   Refills:  1            Where to get your medicines      These medications were sent to [facility]pharm[facility] #[id number]  [place], [place] - [address] [address]     Phone:  [phone]     sennosides 8.6 MG tablet "                Primary Care Provider Office Phone # Fax #    Mika Zamora [phone]       [facility] [address] [facility] [address]        Thank you!     Thank you for choosing [facility]  for your care. Our goal is always to provide you with excellent care. Hearing back from our patients is one way we can continue to improve our services. Please take a few minutes to complete the written survey that you may receive in the mail after your visit with us. Thank you!             Your Updated Medication List - Protect others around you: Learn how to safely use, store and throw away your medicines at www.disposemymeds.org.          This list is accurate as of: 4/18/17  5:48 PM.  Always use your most recent med list.                   Brand Name Dispense Instructions for use    Botulinum Toxin Type A 200 UNITS Solr    BOTOX    200 Units    Inject 200 Units as directed every 3 months       CELEBREX PO      Take 200 mg by mouth 3 times daily       ENBREL SURECLICK 50 MG/ML autoinjector   Generic drug:  Etanercept      Reported on 4/18/2017       leflunomide 20 MG tablet    ARAVA     Take 20 mg by mouth Reported on 3/28/2017       medroxyPROGESTERone 150 MG/ML injection    DEPO-PROVERA    3 mL    Inject 1 mL (150 mg) into the muscle every 3 months       omeprazole 40 MG capsule    priLOSEC    90 capsule    Take 1 capsule (40 mg) by mouth daily Take 30-60 minutes before a meal.       oxyCODONE 5 MG IR tablet    ROXICODONE    120 tablet    Take 1 tablet by mouth every 6 hours prn pain. MAX OF 4 PER DAY       sennosides 8.6 MG tablet    SENOKOT    120 tablet    Take 2 tablets by mouth 2 times daily       VITAMIN B-12 PO

## 2017-04-20 DIAGNOSIS — M75.100 ROTATOR CUFF TEAR: Primary | ICD-10-CM

## 2017-04-20 LAB
BACTERIA SPEC CULT: NORMAL
MICRO REPORT STATUS: NORMAL
SPECIMEN SOURCE: NORMAL

## 2017-04-21 ENCOUNTER — TELEPHONE (OUTPATIENT)
Dept: ANESTHESIOLOGY | Facility: CLINIC | Age: 47
End: 2017-04-21

## 2017-04-21 NOTE — TELEPHONE ENCOUNTER
LPN updated pt that Dr. Patel and Dr. Gaspar have been in communication regarding upcoming surgery.     Blanca Atkinson LPN

## 2017-04-21 NOTE — TELEPHONE ENCOUNTER
"----- Message from Fredy Patel DO sent at 4/19/2017  7:03 AM CDT -----  Regarding: RE: Dr. Patel Pt - Surgery with Ortho  Contact: [phone]  Fernando Branham,    I see that Kalyn is going to have surgery after all.   I would keep my recommendations the same as from her previous procedure if you need them:    \"Perioperative Pain Management Recommendations: Limited options given her sensitivities and dependence history.  I would recommend continuing celebrex if possible, consider regional anesthesia (especially a block with exparel) if at all possible to help with post operative pain control.  Otherwise I would recommend a 1-2 week course of Oxycodone 5-10mg q4hrs prn for one week and then Oxycodone 5mg q6hrs prn for week two and then off provided adequate pain control has been obtained.   I would avoid tylenol as it has caused liver dysfunction in the past and would avoid gabapentin as it has caused worsening peripheral paresthesias with prior trials\".    Overall my goal is to wean her off opioids again once she has finished surgery and have her return to her RA treatments, which were working well in the past (i have contacted her rheumatologist who has not responded).  I gave her a one month script for oxy at our last visit which can serve to bridge her to surgery while she is off these meds.  I can see her again a few weeks after surgery to assess her if you like.    Jackie, can you let Kalyn know I have communicated with Dr. aGspar regarding her upcoming surgery?    Thanks again  Fredy Patel DO      ----- Message -----     From: Blanca Atkinson LPN     Sent: 4/14/2017   3:30 PM       To: Fredy Patel DO  Subject: FW: Dr. Patel Pt - Surgery with Ortho      FYI    Please let me know if you want me to relay anything to the patient.   ----- Message -----     From: Deandre Diego     Sent: 4/14/2017   3:20 PM       To: [facility]  Subject: Dr. Patel Pt " - Surgery with Ortho          Pt stated that she would like to let Dr. Patel know that she is having surgery on 4/27 with Dr. Gaspar in ortho for partial elbow joint removal.  Pt stated that she would like to let him know to be aware of her pain management after surgery.  Please call this Pt back for further Q's.    Thank you,  CV  Call Cntdiane

## 2017-04-24 ENCOUNTER — ANESTHESIA EVENT (OUTPATIENT)
Dept: SURGERY | Facility: AMBULATORY SURGERY CENTER | Age: 47
End: 2017-04-24

## 2017-04-25 NOTE — OR NURSING
Patient has had Enbrel on hold since January.  Patient may continue celebrex and Arava per Joleen Angulo RN, Dr. Pabon is aware.

## 2017-04-26 ENCOUNTER — TRANSFERRED RECORDS (OUTPATIENT)
Dept: HEALTH INFORMATION MANAGEMENT | Facility: CLINIC | Age: 47
End: 2017-04-26

## 2017-04-27 ENCOUNTER — ANESTHESIA (OUTPATIENT)
Dept: SURGERY | Facility: AMBULATORY SURGERY CENTER | Age: 47
End: 2017-04-27

## 2017-04-27 ENCOUNTER — HOSPITAL ENCOUNTER (OUTPATIENT)
Facility: AMBULATORY SURGERY CENTER | Age: 47
End: 2017-04-27
Attending: ORTHOPAEDIC SURGERY

## 2017-04-27 VITALS
OXYGEN SATURATION: 98 % | SYSTOLIC BLOOD PRESSURE: 125 MMHG | WEIGHT: 165 LBS | BODY MASS INDEX: 26.52 KG/M2 | RESPIRATION RATE: 16 BRPM | HEIGHT: 66 IN | DIASTOLIC BLOOD PRESSURE: 78 MMHG | TEMPERATURE: 99.8 F

## 2017-04-27 DIAGNOSIS — M24.029: ICD-10-CM

## 2017-04-27 DIAGNOSIS — M19.029 ELBOW ARTHRITIS: Primary | ICD-10-CM

## 2017-04-27 RX ORDER — KETOROLAC TROMETHAMINE 30 MG/ML
30 INJECTION, SOLUTION INTRAMUSCULAR; INTRAVENOUS EVERY 6 HOURS PRN
Status: DISCONTINUED | OUTPATIENT
Start: 2017-04-27 | End: 2017-04-28 | Stop reason: HOSPADM

## 2017-04-27 RX ORDER — OXYCODONE HYDROCHLORIDE 5 MG/1
5-10 TABLET ORAL
Status: COMPLETED | OUTPATIENT
Start: 2017-04-27 | End: 2017-04-27

## 2017-04-27 RX ORDER — ONDANSETRON 2 MG/ML
4 INJECTION INTRAMUSCULAR; INTRAVENOUS EVERY 30 MIN PRN
Status: DISCONTINUED | OUTPATIENT
Start: 2017-04-27 | End: 2017-04-28 | Stop reason: HOSPADM

## 2017-04-27 RX ORDER — FENTANYL CITRATE 50 UG/ML
25-50 INJECTION, SOLUTION INTRAMUSCULAR; INTRAVENOUS
Status: DISCONTINUED | OUTPATIENT
Start: 2017-04-27 | End: 2017-04-27 | Stop reason: HOSPADM

## 2017-04-27 RX ORDER — ONDANSETRON 2 MG/ML
INJECTION INTRAMUSCULAR; INTRAVENOUS PRN
Status: DISCONTINUED | OUTPATIENT
Start: 2017-04-27 | End: 2017-04-27

## 2017-04-27 RX ORDER — KETOROLAC TROMETHAMINE 30 MG/ML
INJECTION, SOLUTION INTRAMUSCULAR; INTRAVENOUS PRN
Status: DISCONTINUED | OUTPATIENT
Start: 2017-04-27 | End: 2017-04-27

## 2017-04-27 RX ORDER — ACETAMINOPHEN 325 MG/1
650 TABLET ORAL
Status: DISCONTINUED | OUTPATIENT
Start: 2017-04-27 | End: 2017-04-28 | Stop reason: HOSPADM

## 2017-04-27 RX ORDER — ONDANSETRON 4 MG/1
4 TABLET, ORALLY DISINTEGRATING ORAL EVERY 30 MIN PRN
Status: DISCONTINUED | OUTPATIENT
Start: 2017-04-27 | End: 2017-04-28 | Stop reason: HOSPADM

## 2017-04-27 RX ORDER — FLUMAZENIL 0.1 MG/ML
0.2 INJECTION, SOLUTION INTRAVENOUS
Status: DISCONTINUED | OUTPATIENT
Start: 2017-04-27 | End: 2017-04-27 | Stop reason: HOSPADM

## 2017-04-27 RX ORDER — SODIUM CHLORIDE, SODIUM LACTATE, POTASSIUM CHLORIDE, CALCIUM CHLORIDE 600; 310; 30; 20 MG/100ML; MG/100ML; MG/100ML; MG/100ML
INJECTION, SOLUTION INTRAVENOUS CONTINUOUS
Status: DISCONTINUED | OUTPATIENT
Start: 2017-04-27 | End: 2017-04-28 | Stop reason: HOSPADM

## 2017-04-27 RX ORDER — MEPERIDINE HYDROCHLORIDE 25 MG/ML
12.5 INJECTION INTRAMUSCULAR; INTRAVENOUS; SUBCUTANEOUS
Status: DISCONTINUED | OUTPATIENT
Start: 2017-04-27 | End: 2017-04-28 | Stop reason: HOSPADM

## 2017-04-27 RX ORDER — PROPOFOL 10 MG/ML
INJECTION, EMULSION INTRAVENOUS CONTINUOUS PRN
Status: DISCONTINUED | OUTPATIENT
Start: 2017-04-27 | End: 2017-04-27

## 2017-04-27 RX ORDER — FENTANYL CITRATE 50 UG/ML
INJECTION, SOLUTION INTRAMUSCULAR; INTRAVENOUS PRN
Status: DISCONTINUED | OUTPATIENT
Start: 2017-04-27 | End: 2017-04-27

## 2017-04-27 RX ORDER — LIDOCAINE 40 MG/G
CREAM TOPICAL
Status: DISCONTINUED | OUTPATIENT
Start: 2017-04-27 | End: 2017-04-27 | Stop reason: HOSPADM

## 2017-04-27 RX ORDER — DEXAMETHASONE SODIUM PHOSPHATE 4 MG/ML
INJECTION, SOLUTION INTRA-ARTICULAR; INTRALESIONAL; INTRAMUSCULAR; INTRAVENOUS; SOFT TISSUE PRN
Status: DISCONTINUED | OUTPATIENT
Start: 2017-04-27 | End: 2017-04-27

## 2017-04-27 RX ORDER — CLINDAMYCIN PHOSPHATE 900 MG/50ML
900 INJECTION, SOLUTION INTRAVENOUS
Status: DISCONTINUED | OUTPATIENT
Start: 2017-04-27 | End: 2017-04-27 | Stop reason: HOSPADM

## 2017-04-27 RX ORDER — OXYCODONE HYDROCHLORIDE 5 MG/1
5-10 TABLET ORAL EVERY 4 HOURS PRN
Qty: 60 TABLET | Refills: 0 | Status: SHIPPED | OUTPATIENT
Start: 2017-04-27 | End: 2017-05-04

## 2017-04-27 RX ORDER — NALOXONE HYDROCHLORIDE 0.4 MG/ML
.1-.4 INJECTION, SOLUTION INTRAMUSCULAR; INTRAVENOUS; SUBCUTANEOUS
Status: DISCONTINUED | OUTPATIENT
Start: 2017-04-27 | End: 2017-04-27 | Stop reason: HOSPADM

## 2017-04-27 RX ORDER — HYDROXYZINE PAMOATE 25 MG/1
25 CAPSULE ORAL 3 TIMES DAILY PRN
Qty: 30 CAPSULE | Refills: 0 | Status: SHIPPED | OUTPATIENT
Start: 2017-04-27 | End: 2017-05-18

## 2017-04-27 RX ORDER — CLINDAMYCIN PHOSPHATE 900 MG/50ML
900 INJECTION, SOLUTION INTRAVENOUS SEE ADMIN INSTRUCTIONS
Status: DISCONTINUED | OUTPATIENT
Start: 2017-04-27 | End: 2017-04-27 | Stop reason: HOSPADM

## 2017-04-27 RX ORDER — PROPOFOL 10 MG/ML
INJECTION, EMULSION INTRAVENOUS PRN
Status: DISCONTINUED | OUTPATIENT
Start: 2017-04-27 | End: 2017-04-27

## 2017-04-27 RX ORDER — SCOLOPAMINE TRANSDERMAL SYSTEM 1 MG/1
1 PATCH, EXTENDED RELEASE TRANSDERMAL
Status: DISCONTINUED | OUTPATIENT
Start: 2017-04-27 | End: 2017-04-27 | Stop reason: HOSPADM

## 2017-04-27 RX ORDER — SODIUM CHLORIDE, SODIUM LACTATE, POTASSIUM CHLORIDE, CALCIUM CHLORIDE 600; 310; 30; 20 MG/100ML; MG/100ML; MG/100ML; MG/100ML
INJECTION, SOLUTION INTRAVENOUS CONTINUOUS
Status: DISCONTINUED | OUTPATIENT
Start: 2017-04-27 | End: 2017-04-27 | Stop reason: HOSPADM

## 2017-04-27 RX ORDER — ACETAMINOPHEN 325 MG/1
975 TABLET ORAL ONCE
Status: DISCONTINUED | OUTPATIENT
Start: 2017-04-27 | End: 2017-04-27 | Stop reason: HOSPADM

## 2017-04-27 RX ORDER — NALOXONE HYDROCHLORIDE 0.4 MG/ML
.1-.4 INJECTION, SOLUTION INTRAMUSCULAR; INTRAVENOUS; SUBCUTANEOUS
Status: DISCONTINUED | OUTPATIENT
Start: 2017-04-27 | End: 2017-04-28 | Stop reason: HOSPADM

## 2017-04-27 RX ADMIN — PROPOFOL 200 MG: 10 INJECTION, EMULSION INTRAVENOUS at 15:22

## 2017-04-27 RX ADMIN — FENTANYL CITRATE 50 MCG: 50 INJECTION, SOLUTION INTRAMUSCULAR; INTRAVENOUS at 15:02

## 2017-04-27 RX ADMIN — SODIUM CHLORIDE, SODIUM LACTATE, POTASSIUM CHLORIDE, CALCIUM CHLORIDE: 600; 310; 30; 20 INJECTION, SOLUTION INTRAVENOUS at 14:38

## 2017-04-27 RX ADMIN — FENTANYL CITRATE 50 MCG: 50 INJECTION, SOLUTION INTRAMUSCULAR; INTRAVENOUS at 15:22

## 2017-04-27 RX ADMIN — Medication 200 MCG: at 15:38

## 2017-04-27 RX ADMIN — CLINDAMYCIN PHOSPHATE 900 MG: 900 INJECTION, SOLUTION INTRAVENOUS at 14:38

## 2017-04-27 RX ADMIN — DEXAMETHASONE SODIUM PHOSPHATE 4 MG: 4 INJECTION, SOLUTION INTRA-ARTICULAR; INTRALESIONAL; INTRAMUSCULAR; INTRAVENOUS; SOFT TISSUE at 15:34

## 2017-04-27 RX ADMIN — OXYCODONE HYDROCHLORIDE 10 MG: 5 TABLET ORAL at 17:19

## 2017-04-27 RX ADMIN — PROPOFOL 200 MCG/KG/MIN: 10 INJECTION, EMULSION INTRAVENOUS at 15:22

## 2017-04-27 RX ADMIN — BUPIVACAINE HYDROCHLORIDE AND EPINEPHRINE BITARTRATE 20 ML: 5; .005 INJECTION, SOLUTION PERINEURAL at 15:05

## 2017-04-27 RX ADMIN — Medication 200 MCG: at 15:33

## 2017-04-27 RX ADMIN — SCOLOPAMINE TRANSDERMAL SYSTEM 1 PATCH: 1 PATCH, EXTENDED RELEASE TRANSDERMAL at 14:49

## 2017-04-27 RX ADMIN — ONDANSETRON 4 MG: 2 INJECTION INTRAMUSCULAR; INTRAVENOUS at 15:34

## 2017-04-27 RX ADMIN — KETOROLAC TROMETHAMINE 30 MG: 30 INJECTION, SOLUTION INTRAMUSCULAR; INTRAVENOUS at 16:06

## 2017-04-27 RX ADMIN — ONDANSETRON 4 MG: 2 INJECTION INTRAMUSCULAR; INTRAVENOUS at 17:21

## 2017-04-27 NOTE — IP AVS SNAPSHOT
[facility]    [address] [place] [address]    Phone:  [phone]    Fax:  [phone]                                       After Visit Summary   4/27/2017    Kalyn Rivero    MRN: [id number]           After Visit Summary Signature Page     I have received my discharge instructions, and my questions have been answered. I have discussed any challenges I see with this plan with the nurse or doctor.    ..........................................................................................................................................  Patient/Patient Representative Signature      ..........................................................................................................................................  Patient Representative Print Name and Relationship to Patient    ..................................................               ................................................  Date                                            Time    ..........................................................................................................................................  Reviewed by Signature/Title    ...................................................              ..............................................  Date                                                            Time

## 2017-04-27 NOTE — OR NURSING
Left supraclavicular block with Exparel for postoperative pain control.  Tolerated procedure well.

## 2017-04-27 NOTE — ANESTHESIA PROCEDURE NOTES
Peripheral Nerve Block Procedure Note    Staff:     Anesthesiologist:  JOCELYN PAIZ  Location: Pre-op  Procedure Start/Stop TImes:      4/27/2017 3:00 PM     4/27/2017 3:05 PM    patient identified, IV checked, site marked, risks and benefits discussed, informed consent, monitors and equipment checked, pre-op evaluation, at physician/surgeon's request and post-op pain management      Correct Patient: Yes      Correct Position: Yes      Correct Site: Yes      Correct Procedure: Yes      Correct Laterality:  Yes    Site Marked:  Yes  Procedure details:     Procedure:  Supraclavicular    ASA:  3    Diagnosis:  RA    Laterality:  Left    Position:  Supine and Sitting    Sterile Prep: chloraprep, mask and sterile gloves      Local skin infiltration:  1% lidocaine    amount (mL):  2    Needle:  Short bevel    Needle length (mm):  110    Ultrasound: Yes      Ultrasound used to identify targeted nerve, plexus, or vascular structure and placed a needle adjacent to it      Permanent Image entered into patiient's record      Abnormal pain on injection: No      Blood Aspirated: No      Paresthesias:  No    Bleeding at site: No      Infusion Method:  Single Shot    Complications:  None  Assessment/Narrative:     Injection made incrementally with aspirations every (mL):  3     Discussed with Patient Off-Label use of Liposomal Bupivacaine (Exparel) for Nerve Block.    Relevant risks & benefits were discussed with patient.    All questions were answered and there was agreement to proceed.    Patient signed Off-Label Use of Exparel Consent Form.

## 2017-04-27 NOTE — ANESTHESIA CARE TRANSFER NOTE
Patient: Kalyn Rivero    Procedure(s):  Left Radial Head Resection - Wound Class: I-Clean    Diagnosis: Left Elbow Arthritis  Diagnosis Additional Information: No value filed.    Anesthesia Type:   General     Note:  Airway :Room Air  Patient transferred to:PACU  Comments: Arrive PACU, Stable, Airway Intact  92/75, 100,16,94,98  All questions answered.      Vitals: (Last set prior to Anesthesia Care Transfer)    CRNA VITALS  4/27/2017 1601 - 4/27/2017 1638      4/27/2017             Pulse: 83    SpO2: 98 %    Resp Rate (observed): 18                Electronically Signed By: JULIETTE Ferrara CRNA  April 27, 2017  4:38 PM

## 2017-04-27 NOTE — DISCHARGE INSTRUCTIONS
"[facility]  Home Care Following Anesthesia  For 24 hours after surgery:  1. Get plenty of rest.  A responsible adult must stay with you for at least 24 hours after you leave the surgery center.  2. Do not drive or use heavy equipment.  If you have weakness or tingling, don't drive or use heavy equipment until this feeling goes away.   3. Do not drink alcohol.   4. Avoid strenuous or risky activities.  Ask for help when climbing stairs.  5. You may feel lightheaded.  IF so, sit for a few minutes before standing.  Have someone help you get up.   6. If you have nausea (feel sick to your stomach): Drink only clear liquids such as apple juice, ginger ale, broth or 7-Up.  Rest may also help.  Be sure to drink enough fluids.  Move to a regular diet as you feel able.   7. You may have a slight fever.  Call the doctor if your fever is over 100 F (37.7 C) (taken under the tongue) or lasts longer than 24 hours.  8. You may have a dry mouth, a sore throat, muscle aches or trouble sleeping. These should go away after 24 hours.  9. Do not make important or legal decisions.        Today you received an Exparel block to numb the nerves near your surgery site.  This is a block using local anesthetic or \"numbing\" medication injected around the nerves to anesthetize or \"numb\" the area supplied by those nerves.  This block is injected into the muscle layer near your surgical site.  This medication may numb the location where you had surgery up to 72 hours.  If your surgical site is an arm or leg you should be careful with your affected limb, since it is possible to injure your limb without being aware of it due to the numbing.  Until full feeling returns, you should guard against bumping or hitting your limb, and avoid extreme hot or cold temperatures on the skin.  As the block wears off, the feeling will return as a tingling or prickly sensation near your surgical site.  You will experince more " "discomfort from your incision as the feeling returns.  You may want to take a pain pill (a narcotic or Tylenol if this was prescribed by your surgeon) when you start to experience mild pain before the pain beomes more severe.  If your pain medications do not control your pain, you should notify your surgeon.    Tips for taking pain medications  To get the best pain relief possible, remember these points:    Take pain medications as directed, before pain becomes severe.    Pain medication can upset your stomach: taking it with food may help.    Constipation is a common side effect of pain medication. Drink plenty of  fluids.    Eat foods high in fiber. Take a stool softener if recommended by your doctor or pharmacist.    Do not drink alcohol, drive or operate machinery while taking pain medications.    Ask about other ways to control pain, such as with heat, ice or relaxation.    Call a doctor for any of the followin. Signs of infection (fever, growing tenderness at the surgery site, a large amount of drainage or bleeding, severe pain, foul-smelling drainage, redness, swelling).  2. It has been over 8 to 10 hours since surgery and you are still not able to urinate (pass water).  3. Headache for over 24 hours.  4. Numbness, tingling or weakness the day after surgery (if you had spinal anesthesia).  Your doctor is:       Dr. Carrol Gaspar, Orthopaedics: [phone]               Or dial [phone] and ask for the resident on call for:  Orthopaedics  For emergency care, call the:  [facility] Emergency Department: [phone] (TTY for hearing impaired: [phone])      Information about liposomal bupivacaine (Exparel)    What is Liposomal Bupivacaine?    Liposomal Bupivacaine is a numbing medication that can help you manage your pain after surgery.  This medication is similar to \"novacaine,\" which is often used by the dentist.  Liposomal bupivacaine is released slowly and can help control pain for up to 72 " hours.    What is the purpose of Liposomal Bupivacaine?    To manage your pain after surgery    To help you sleep better, take deep breaths, walk more comfortable, and feel up to visiting with others    How is the procedure done?    Liposomal bupivacaine is a medication given by an injection.    It is usually given right before your surgery.  If this is the case, you will be awake or sedated, but you should experience minimal pain during the procedure.    For some people, the injection may be given at the very end of your surgery.  It all depends on the type of surgery and your situation.    The procedure usually takes about 5-15 minutes.  An ultrasound machine will help the anesthesiologist insert it in the right place or the surgeon will inject it under direct vision.     A needle is used to place the numbing medication under your skin.  It provides pain relief by numbing the tissue in the area where your surgeon will make the incision.    What can I expect?    You may experience numbness, tingling, or a feeling of heaviness around the area that was injected.    If you experience any of the follow symptoms IMMEDIATELY CALL THE REGIONAL ANESTHESIA PAIN SERVICE:    Numbness or tingling occurs in areas other than around the injection site    Blurry vision    Ringing in your ears    A metallic taste in your mouth    PAGE: Dial [phone].  When prompted, enter the following 4-digit ID number:  [id number].  You will be prompted to enter your phone number; and then enter the # sign.  The clinician on call will call you back.    OR    CALL: Dial [phone].  Let the hospital  know that you are having a problem with a nerve block and that you would like to speak to the regional anesthesia pain service right away.    You should not receive any other type of numbing medication within 4 days after receiving liposomal bupivacaine unless your anesthesiologist approves.      Post Operative Instructions: Regional  Anesthetic for Upper Extremity with Liposomal Bupivacaine  General Information:   Regional anesthesia is when local anesthetic or  numbing  medication is injected around the nerves to anesthetize or  numb  the area supplied by that set of nerves. It is a type of analgesia used to control pain and decreases the need for narcotics following surgery.    Types of Regional Blocks:  Interscalene: A block injected into the neck on the operative shoulder/arm of a patient having shoulder surgery  Supraclavicular: A block injected near the clavicle on the operative shoulder of a patient having elbow, forearm, or hand surgery    Procedure:  The type of anesthesia your doctor used to numb your shoulder or arm will usually not start to wear off for 24-48 hours, but may last as long as 72 hours. You should be careful during that period, since it is possible to injure your arm without being aware of the injury. While your arm is numb, you should:    Avoid striking or bumping your arm    Avoid extreme hot or cold    Diet:  There are no restrictions on your diet. You should drink plenty of fluids.     Discomfort:  You will have a tingling and prickly sensation in your arm as the feeling begins to return. You can also expect some discomfort. The amount of discomfort is unpredictable, but if you have more pain than can be controlled with pain medication you should notify your physician.     Pain Medications:  Begin taking your oral pain pills before bedtime and during the night to avoid a sudden onset of pain as part of the block wears off.  Do not engage in drinking, driving, or hazardous occupations while taking pain medication.     Stitches:   You may have stitches or special skin closures. You doctor will inform you when to return to the office to have them removed.     Activity:  On the day of surgery you should try to stay in bed with your hand elevated on pillows. You may resume your normal activity after that, wearing a sling  for comfort. Contact your physician if you have any of the problems:     Continued numbness or tingling in the arm or hand after 72 hours    Swelling of the fingers or fingers that are cold to the touch    Excessive bleeding or drainage    Severe pain        Scopolamine Patch- (Absorbed through the skin)    This medicine prevents nausea and vomiting caused by motion sickness or anesthesia.  The medicine is in a patch worn behind the ear.      Do NOT use the Scopolamine Patch if you have glaucoma or are allergic to scopolamine.    How to Use This Medicine:    The patch is applied behind the ear.    Keep the patch dry to prevent it from falling off.  Limit contact with water (no bathing or swimming).      If the patch is loose or falls off throw it away.  You do not need to apply a new patch.    After you take off the patch or if it falls off, wash your hands and the area behind your ear with soap and water.      You can remove the patch tomorrow, or leave on for up to 3 days.    Only one patch should be used at any time.    How to Dispose of This Medicine:    Fold the used patch in half with the sticky sides together. Throw any used patch away so that children or pets cannot get to it. You will also need to throw away old patches after the expiration date has passed.    Keep all medicine away from children and never share your medicine with anyone.    Warnings While Using This Medicine:    This medicine can make you sleepy.  Avoid taking sleeping pills and other medicines that can make you sleepy while the patch is on.    Do not drink alcohol while the patch is on.    This medicine can cause temporary blurring and other vision problems if it comes in contact with the eyes.  This is not serious unless accompanied by eye pain and redness.     This medicine may cause problems with urination. If you have problems with urinating, remove the patch.  If you are unable to urinate, call your doctor.      This medicine may  make you dizzy or drowsy. Avoid driving, using machines, or doing anything else that could be dangerous if the patch is on.    This medicine may make you sweat less and cause your body to get too hot. Be careful in hot weather or if you are exercising.    Make sure any doctor or dentist who treats you knows that you have the patch on. This medicine may affect the results of certain medical tests.    Skin burns have been reported at the patch site in several patients wearing an aluminized transdermal system during a magnetic resonance imaging scan (MRI).  Since this patch contains aluminum, it is recommended to remove the patch if you are having an MRI.    Possible Side Effects While Using This Medicine:    Dry mouth    Drowsiness    Temporary blurring of vision and widening of the pupils    Call your doctor right away if you notice any of these side effects:    Allergic reaction: Itching or hives, swelling in your face or hands, swelling or tingling in your mouth or throat, chest tightness, trouble breathing.    Blurred vision that does not go away after the patch is removed    Confusion or memory loss    Fast,slow, or uneven heartbeat    Lightheadedness, dizziness, drowsiness, or fainting    Seeing, hearing, or feeling things that are not there    Restlessness    Severe eye pain    Trouble urinating    If you notice other side effects that you think are caused by this medicine, call your doctor immediately.

## 2017-04-27 NOTE — OR NURSING
Patient expressed concern about pain control after her regional block wears off. States the Rx that was ordered is the same as she has been taking for her chronic baseline pain. Dr. Gaspar contacted. Per verbal order, Vistaril 25-50 mg, one to two capsules prn pain or muscle spasms, quantity 60 called to [facility] pharmacy in [place].  Addendum: Directions for current Vistaril order (take one capsule three times a day) were changed to: one to two every 4-6 hours prn. Patient was discharged with 30 capsules of 25 mg Vistaril from [facility].

## 2017-04-27 NOTE — IP AVS SNAPSHOT
MRN:[id number]                      After Visit Summary   4/27/2017    Kalyn Rivero    MRN: [id number]           Thank you!     Thank you for choosing [place] [facility] for your care. Our goal is always to provide you with excellent care. Hearing back from our patients is one way we can continue to improve our services. Please take a few minutes to complete the written survey that you may receive in the mail after you visit with us. Thank you!        Patient Information     Date Of Birth          1970        About your hospital stay     You were admitted on:  April 27, 2017 You last received care in the:  [facility]    You were discharged on:  April 27, 2017       Who to Call     For medical emergencies, please call 911.  For non-urgent questions about your medical care, please call your primary care provider or clinic, [phone]  For questions related to your surgery, please call your surgery clinic        Attending Provider     Provider Specialty    Carrol Gaspar MD Orthopedics       Primary Care Provider Office Phone # Fax #    Mika Yunior Zamora [phone]       [facility] [address] [address] [facility] [address]        After Care Instructions     Discharge Instructions       Review outpatient procedure discharge instructions with patient as directed by Provider            No Dressing Change       No dressing change until follow up appointment.            Return to clinic       Return to clinic in 1 weeks            Wound care       Do not immerse wound in water until sutures removed                  Your next 10 appointments already scheduled     May 04, 2017  3:00 PM CDT   (Arrive by 2:45 PM)   Post-Op with KARMA U ORTHO HAND [facility] ([facility])    [address]  4th [facility] [address] [phone]            May 04, 2017  3:30 PM CDT   (Arrive by  3:15 PM)   ASYA Hand with Teddy Ryder OT   [facility] ([facility])    [address]  4th Floor  [facility] [id number] [phone]            May 23, 2017 11:00 AM CDT   (Arrive by 10:45 AM)   Return Visit with Fredy Patel DO   [facility] ([facility])    [address]  4th [facility] [id number] [phone]            May 23, 2017 12:15 PM CDT   (Arrive by 12:00 PM)   MR UPPER EXTREMITY JOINT RIGHT W/O CONTRAST with [id number]   [facility] MRI ([facility])    [address]  1st Floor  [facility] [id number] [phone]           Take your medicines as usual, unless your doctor tells you not to. Bring a list of your current medicines to your exam (including vitamins, minerals and over-the-counter drugs). Also bring the results of similar scans you may have had.  Please remove any body piercings and hair extensions before you arrive.  Follow your doctor s orders. If you do not, we may have to postpone your exam.  You will not have contrast for this exam. You do not need to do anything special to prepare.  The MRI machine uses a strong magnet. Please wear clothes without metal (snaps, zippers). A sweatsuit works well, or we may give you a hospital gown.   **IMPORTANT** THE INSTRUCTIONS BELOW ARE ONLY FOR THOSE PATIENTS WHO HAVE BEEN TOLD THEY WILL RECEIVE SEDATION OR GENERAL ANESTHESIA DURING THEIR MRI PROCEDURE:  IF YOU WILL RECEIVE SEDATION (take medicine to help you relax during your exam):   You must get the medicine from your doctor before you arrive. Bring the medicine to the exam. Do not take it at home.   Arrive one hour early. Bring someone who can take you home after the test. Your medicine will make you sleepy. After the exam, you may not drive, take a bus or take a taxi by yourself.   No eating 8 hours before your  exam. You may have clear liquids up until 4 hours before your exam. (Clear liquids include water, clear tea, black coffee and fruit juice without pulp.)  IF YOU WILL RECEIVE ANESTHESIA (be asleep for your exam):   Arrive 1 1/2 hours early. Bring someone who can take you home after the test. You may not drive, take a bus or take a taxi by yourself.   No eating 8 hours before your exam. You may have clear liquids up until 4 hours before your exam. (Clear liquids include water, clear tea, black coffee and fruit juice without pulp.)   You will spend four to five hours in the recovery room.  Please call the Imaging Department at your exam site with any questions.            Jun 01, 2017 12:00 PM CDT   (Arrive by 11:45 AM)   POST-OP HAND with Carrol Gaspar MD   [facility] ([facility])    [address] [place] [address]   [phone]            Jun 12, 2017 11:30 AM CDT   (Arrive by 11:15 AM)   PAC EVALUATION with  Pac Santiago 7   [facility] [place] ([facility])    [address] [place] [address]   [phone]            Jun 12, 2017 12:20 PM CDT   (Arrive by 12:05 PM)   PAC Anesthesia Consult with  Pac Anesthesiologist   [facility] ([facility])    [address] [place] [address]   [phone]            Jun 12, 2017 12:30 PM CDT   (Arrive by 12:15 PM)   PAC RN ASSESSMENT with  Pac Rn   [facility] ([facility])    [address] [place] [address]   [phone]            Jun 12, 2017  1:50 PM CDT   (Arrive by 1:35 PM)   RETURN SHOULDER with River Law MD   [facility] ([facility])    [address] [place] [address]   [phone]  "           Jun 29, 2017   Procedure with River Law MD   [facility] [place], Same Day Surgery (--)    [address]  [place] [address] [phone]              Further instructions from your care team       [facility]  Home Care Following Anesthesia  For 24 hours after surgery:  1. Get plenty of rest.  A responsible adult must stay with you for at least 24 hours after you leave the surgery center.  2. Do not drive or use heavy equipment.  If you have weakness or tingling, don't drive or use heavy equipment until this feeling goes away.   3. Do not drink alcohol.   4. Avoid strenuous or risky activities.  Ask for help when climbing stairs.  5. You may feel lightheaded.  IF so, sit for a few minutes before standing.  Have someone help you get up.   6. If you have nausea (feel sick to your stomach): Drink only clear liquids such as apple juice, ginger ale, broth or 7-Up.  Rest may also help.  Be sure to drink enough fluids.  Move to a regular diet as you feel able.   7. You may have a slight fever.  Call the doctor if your fever is over 100 F (37.7 C) (taken under the tongue) or lasts longer than 24 hours.  8. You may have a dry mouth, a sore throat, muscle aches or trouble sleeping. These should go away after 24 hours.  9. Do not make important or legal decisions.        Today you received an Exparel block to numb the nerves near your surgery site.  This is a block using local anesthetic or \"numbing\" medication injected around the nerves to anesthetize or \"numb\" the area supplied by those nerves.  This block is injected into the muscle layer near your surgical site.  This medication may numb the location where you had surgery up to 72 hours.  If your surgical site is an arm or leg you should be careful with your affected limb, since it is possible to injure your limb without being aware of it due to the numbing.  Until full feeling returns, you should guard " against bumping or hitting your limb, and avoid extreme hot or cold temperatures on the skin.  As the block wears off, the feeling will return as a tingling or prickly sensation near your surgical site.  You will experince more discomfort from your incision as the feeling returns.  You may want to take a pain pill (a narcotic or Tylenol if this was prescribed by your surgeon) when you start to experience mild pain before the pain beomes more severe.  If your pain medications do not control your pain, you should notify your surgeon.    Tips for taking pain medications  To get the best pain relief possible, remember these points:    Take pain medications as directed, before pain becomes severe.    Pain medication can upset your stomach: taking it with food may help.    Constipation is a common side effect of pain medication. Drink plenty of  fluids.    Eat foods high in fiber. Take a stool softener if recommended by your doctor or pharmacist.    Do not drink alcohol, drive or operate machinery while taking pain medications.    Ask about other ways to control pain, such as with heat, ice or relaxation.    Call a doctor for any of the followin. Signs of infection (fever, growing tenderness at the surgery site, a large amount of drainage or bleeding, severe pain, foul-smelling drainage, redness, swelling).  2. It has been over 8 to 10 hours since surgery and you are still not able to urinate (pass water).  3. Headache for over 24 hours.  4. Numbness, tingling or weakness the day after surgery (if you had spinal anesthesia).  Your doctor is:       Dr. aCrrol Gaspar, Orthopaedics: [phone]               Or dial [phone] and ask for the resident on call for:  Orthopaedics  For emergency care, call the:  [facility] Emergency Department: [phone] (TTY for hearing impaired: [phone])      Information about liposomal bupivacaine (Exparel)    What is Liposomal Bupivacaine?    Liposomal Bupivacaine is a  "numbing medication that can help you manage your pain after surgery.  This medication is similar to \"novacaine,\" which is often used by the dentist.  Liposomal bupivacaine is released slowly and can help control pain for up to 72 hours.    What is the purpose of Liposomal Bupivacaine?    To manage your pain after surgery    To help you sleep better, take deep breaths, walk more comfortable, and feel up to visiting with others    How is the procedure done?    Liposomal bupivacaine is a medication given by an injection.    It is usually given right before your surgery.  If this is the case, you will be awake or sedated, but you should experience minimal pain during the procedure.    For some people, the injection may be given at the very end of your surgery.  It all depends on the type of surgery and your situation.    The procedure usually takes about 5-15 minutes.  An ultrasound machine will help the anesthesiologist insert it in the right place or the surgeon will inject it under direct vision.     A needle is used to place the numbing medication under your skin.  It provides pain relief by numbing the tissue in the area where your surgeon will make the incision.    What can I expect?    You may experience numbness, tingling, or a feeling of heaviness around the area that was injected.    If you experience any of the follow symptoms IMMEDIATELY CALL THE REGIONAL ANESTHESIA PAIN SERVICE:    Numbness or tingling occurs in areas other than around the injection site    Blurry vision    Ringing in your ears    A metallic taste in your mouth    PAGE: Dial [phone].  When prompted, enter the following 4-digit ID number:  [id number].  You will be prompted to enter your phone number; and then enter the # sign.  The clinician on call will call you back.    OR    CALL: Dial [phone].  Let the hospital  know that you are having a problem with a nerve block and that you would like to speak to the regional anesthesia " pain service right away.    You should not receive any other type of numbing medication within 4 days after receiving liposomal bupivacaine unless your anesthesiologist approves.      Post Operative Instructions: Regional Anesthetic for Upper Extremity with Liposomal Bupivacaine  General Information:   Regional anesthesia is when local anesthetic or  numbing  medication is injected around the nerves to anesthetize or  numb  the area supplied by that set of nerves. It is a type of analgesia used to control pain and decreases the need for narcotics following surgery.    Types of Regional Blocks:  Interscalene: A block injected into the neck on the operative shoulder/arm of a patient having shoulder surgery  Supraclavicular: A block injected near the clavicle on the operative shoulder of a patient having elbow, forearm, or hand surgery    Procedure:  The type of anesthesia your doctor used to numb your shoulder or arm will usually not start to wear off for 24-48 hours, but may last as long as 72 hours. You should be careful during that period, since it is possible to injure your arm without being aware of the injury. While your arm is numb, you should:    Avoid striking or bumping your arm    Avoid extreme hot or cold    Diet:  There are no restrictions on your diet. You should drink plenty of fluids.     Discomfort:  You will have a tingling and prickly sensation in your arm as the feeling begins to return. You can also expect some discomfort. The amount of discomfort is unpredictable, but if you have more pain than can be controlled with pain medication you should notify your physician.     Pain Medications:  Begin taking your oral pain pills before bedtime and during the night to avoid a sudden onset of pain as part of the block wears off.  Do not engage in drinking, driving, or hazardous occupations while taking pain medication.     Stitches:   You may have stitches or special skin closures. You doctor will inform  you when to return to the office to have them removed.     Activity:  On the day of surgery you should try to stay in bed with your hand elevated on pillows. You may resume your normal activity after that, wearing a sling for comfort. Contact your physician if you have any of the problems:     Continued numbness or tingling in the arm or hand after 72 hours    Swelling of the fingers or fingers that are cold to the touch    Excessive bleeding or drainage    Severe pain        Scopolamine Patch- (Absorbed through the skin)    This medicine prevents nausea and vomiting caused by motion sickness or anesthesia.  The medicine is in a patch worn behind the ear.      Do NOT use the Scopolamine Patch if you have glaucoma or are allergic to scopolamine.    How to Use This Medicine:    The patch is applied behind the ear.    Keep the patch dry to prevent it from falling off.  Limit contact with water (no bathing or swimming).      If the patch is loose or falls off throw it away.  You do not need to apply a new patch.    After you take off the patch or if it falls off, wash your hands and the area behind your ear with soap and water.      You can remove the patch tomorrow, or leave on for up to 3 days.    Only one patch should be used at any time.    How to Dispose of This Medicine:    Fold the used patch in half with the sticky sides together. Throw any used patch away so that children or pets cannot get to it. You will also need to throw away old patches after the expiration date has passed.    Keep all medicine away from children and never share your medicine with anyone.    Warnings While Using This Medicine:    This medicine can make you sleepy.  Avoid taking sleeping pills and other medicines that can make you sleepy while the patch is on.    Do not drink alcohol while the patch is on.    This medicine can cause temporary blurring and other vision problems if it comes in contact with the eyes.  This is not serious  unless accompanied by eye pain and redness.     This medicine may cause problems with urination. If you have problems with urinating, remove the patch.  If you are unable to urinate, call your doctor.      This medicine may make you dizzy or drowsy. Avoid driving, using machines, or doing anything else that could be dangerous if the patch is on.    This medicine may make you sweat less and cause your body to get too hot. Be careful in hot weather or if you are exercising.    Make sure any doctor or dentist who treats you knows that you have the patch on. This medicine may affect the results of certain medical tests.    Skin burns have been reported at the patch site in several patients wearing an aluminized transdermal system during a magnetic resonance imaging scan (MRI).  Since this patch contains aluminum, it is recommended to remove the patch if you are having an MRI.    Possible Side Effects While Using This Medicine:    Dry mouth    Drowsiness    Temporary blurring of vision and widening of the pupils    Call your doctor right away if you notice any of these side effects:    Allergic reaction: Itching or hives, swelling in your face or hands, swelling or tingling in your mouth or throat, chest tightness, trouble breathing.    Blurred vision that does not go away after the patch is removed    Confusion or memory loss    Fast,slow, or uneven heartbeat    Lightheadedness, dizziness, drowsiness, or fainting    Seeing, hearing, or feeling things that are not there    Restlessness    Severe eye pain    Trouble urinating    If you notice other side effects that you think are caused by this medicine, call your doctor immediately.                  Pending Results     Date and Time Order Name Status Description    4/27/2017 1500 XR SURGERY TAB FLUORO LESS THAN 5 MIN W STILLS In process             Admission Information     Date & Time Provider Department Dept. Phone    4/27/2017 Carrol Gaspar MD [facility]  "Surgery [facility] Procedure Center [phone]      Your Vitals Were     Blood Pressure Temperature Respirations Height Weight Pulse Oximetry    118/88 98.2  F (36.8  C) (Temporal) 16 1.676 m (5' 6\") 74.8 kg (165 lb) 96%    BMI (Body Mass Index)                   26.63 kg/m2           [email or web address] Information     [email or web address] is an electronic gateway that provides easy, online access to your medical records. With [email or web address], you can request a clinic appointment, read your test results, renew a prescription or communicate with your care team.     To sign up for [email or web address] visit the website at [email or web address]   You will be asked to enter the access code listed below, as well as some personal information. Please follow the directions to create your username and password.     Your access code is: [email or web address]  Expires: 2017  6:31 AM     Your access code will  in 90 days. If you need help or a new code, please contact your [facility] or call [phone] for assistance.        Care EveryWhere ID     This is your Care EveryWhere ID. This could be used by other organizations to access your [facility] medical records  [id number]           Review of your medicines      START taking        Dose / Directions    hydrOXYzine 25 MG capsule   Commonly known as:  VISTARIL   Used for:  Elbow arthritis        Dose:  25 mg   Take 1 capsule (25 mg) by mouth 3 times daily as needed for itching (adjuvant pain control)   Quantity:  30 capsule   Refills:  0         CONTINUE these medicines which may have CHANGED, or have new prescriptions. If we are uncertain of the size of tablets/capsules you have at home, strength may be listed as something that might have changed.        Dose / Directions    oxyCODONE 5 MG IR tablet   Commonly known as:  ROXICODONE   This may have changed:    - how much to take  - how to take this  - when to take this  - reasons to take this  - additional instructions   Used for:  Elbow " arthritis        Dose:  5-10 mg   Take 1-2 tablets (5-10 mg) by mouth every 4 hours as needed for breakthrough pain or severe pain (Moderate to Severe)   Quantity:  60 tablet   Refills:  0         CONTINUE these medicines which have NOT CHANGED        Dose / Directions    Botulinum Toxin Type A 200 UNITS Solr   Commonly known as:  BOTOX   Used for:  Intractable chronic migraine without aura and without status migrainosus        Dose:  200 Units   Inject 200 Units as directed every 3 months   Quantity:  200 Units   Refills:  3       CELEBREX PO        Dose:  200 mg   Take 200 mg by mouth 3 times daily   Refills:  0       ENBREL SURECLICK 50 MG/ML autoinjector   Generic drug:  Etanercept        Reported on 4/18/2017   Refills:  0       leflunomide 20 MG tablet   Commonly known as:  ARAVA        Dose:  20 mg   Take 20 mg by mouth every morning Reported on 3/28/2017   Refills:  0       medroxyPROGESTERone 150 MG/ML injection   Commonly known as:  DEPO-PROVERA   Used for:  Encounter for initial prescription of other contraceptives        Dose:  150 mg   Inject 1 mL (150 mg) into the muscle every 3 months   Quantity:  3 mL   Refills:  3       omeprazole 40 MG capsule   Commonly known as:  priLOSEC   Used for:  PUD (peptic ulcer disease)        Dose:  40 mg   Take 1 capsule (40 mg) by mouth daily Take 30-60 minutes before a meal.   Quantity:  90 capsule   Refills:  2       sennosides 8.6 MG tablet   Commonly known as:  SENOKOT   Used for:  Rheumatoid arthritis with positive rheumatoid factor, involving unspecified site (H)        Dose:  2 tablet   Take 2 tablets by mouth 2 times daily   Quantity:  120 tablet   Refills:  1       VITAMIN B-12 PO        Dose:  1 tablet   Take 1 tablet by mouth daily   Refills:  0            Where to get your medicines      These medications were sent to [facility] [place], [place] - [phone] [phone], [facility] [address]     Hours:  TRANSPLANT PHONE NUMBER [phone] Phone:  [phone]     hydrOXYzine 25 MG capsule         Some of these will need a paper prescription and others can be bought over the counter. Ask your nurse if you have questions.     Bring a paper prescription for each of these medications     oxyCODONE 5 MG IR tablet                Protect others around you: Learn how to safely use, store and throw away your medicines at www.disposemymeds.org.             Medication List: This is a list of all your medications and when to take them. Check marks below indicate your daily home schedule. Keep this list as a reference.      Medications           Morning Afternoon Evening Bedtime As Needed    Botulinum Toxin Type A 200 UNITS Solr   Commonly known as:  BOTOX   Inject 200 Units as directed every 3 months                                CELEBREX PO   Take 200 mg by mouth 3 times daily                                ENBREL SURECLICK 50 MG/ML autoinjector   Reported on 4/18/2017   Generic drug:  Etanercept                                hydrOXYzine 25 MG capsule   Commonly known as:  VISTARIL   Take 1 capsule (25 mg) by mouth 3 times daily as needed for itching (adjuvant pain control)                                leflunomide 20 MG tablet   Commonly known as:  ARAVA   Take 20 mg by mouth every morning Reported on 3/28/2017                                medroxyPROGESTERone 150 MG/ML injection   Commonly known as:  DEPO-PROVERA   Inject 1 mL (150 mg) into the muscle every 3 months                                omeprazole 40 MG capsule   Commonly known as:  priLOSEC   Take 1 capsule (40 mg) by mouth daily Take 30-60 minutes before a meal.                                oxyCODONE 5 MG IR tablet   Commonly known as:  ROXICODONE   Take 1-2 tablets (5-10 mg) by mouth every 4 hours as needed for breakthrough pain or severe pain (Moderate to Severe)   Last time this was given:  10 mg on 4/27/2017  5:19 PM                                 sennosides 8.6 MG tablet   Commonly known as:  SENOKOT   Take 2 tablets by mouth 2 times daily                                VITAMIN B-12 PO   Take 1 tablet by mouth daily

## 2017-04-27 NOTE — OR NURSING
Attempted to obtain Hcg from urine sample. Patient tried to urinate in a hat on the toilet, but was unable to catch any urine in the hat. Notified Dr. Echeverria who obtained a history and determined her to be at very low risk for pregnancy. Patient is on Depo-Provera birth control and denies recent sexual activity. She declined need for Hcg blood test.

## 2017-04-27 NOTE — ANESTHESIA PREPROCEDURE EVALUATION
Physical Exam  Normal systems: cardiovascular and pulmonary    Airway   Mallampati: II  TM distance: >3 FB  Neck ROM: full    Dental     Cardiovascular   Rhythm and rate: regular and normal      Pulmonary    breath sounds clear to auscultation                    Anesthesia Plan      History & Physical Review  History and physical reviewed and following examination; no interval change.    ASA Status:  3 .    NPO Status:  > 6 hours    Plan for General, LMA and Peripheral Nerve Block with Propofol and Intravenous induction. Maintenance will be Balanced.    PONV prophylaxis:  Ondansetron (or other 5HT-3), Dexamethasone or Solumedrol and Scopolamine patch  Supraclavicular block with Exparel.      Postoperative Care  Postoperative pain management:  IV analgesics, Oral pain medications and Multi-modal analgesia.      Consents  Anesthetic plan, risks, benefits and alternatives discussed with:  Patient..            ANESTHESIA PREOP EVALUATION    PROCEDURE: Procedure(s):  Left Radial Head Resection - Wound Class: I-Clean    HPI: Kalyn Rivero is a 46 year old female who presents for above procedure.    PAST MEDICAL HISTORY:    Past Medical History:   Diagnosis Date     Acetaminophen overdose 3/24/2011     Anemia      Anemia      Anesthesia complication     pt states has a histor of heart stopping during anesthesia,     Cervical high risk HPV (human papillomavirus) test positive 02/22/2017    type 18 & other HR HPV     Chronic infection     MRSA     Chronic pain 3/24/2011     Endometriosis      Fibromyalgia      Gastro-oesophageal reflux disease      Grief reaction      Heart murmur      Hepatic failure (H)      History of blood transfusion      Hypothyroidism      Immune disorder (H)      Learning disability      Liver failure, acute      Malignant neoplasm (H)      Migraine      Neck injuries      Nephrolithiasis      Opioid type dependence (H)      Other chronic pain      Other drug-induced neutropenia (H)       PONV (postoperative nausea and vomiting)      RA (rheumatoid arthritis) (H)      Renal stone      Rheumatoid arthritis(714.0)      Scoliosis      Stomach ulcer     history       PAST SURGICAL HISTORY:    Past Surgical History:   Procedure Laterality Date     ARTHRODESIS FOOT  5/23/2012    Procedure:ARTHRODESIS FOOT; Right Subtalar andTaloNavicular  Fusion  ; Surgeon:BRIGHT HENDRICKS; [facility]     ARTHRODESIS FOOT Left 4/22/2015    Procedure: ARTHRODESIS FOOT;  Surgeon: Bright Hendricks MD;  [facility]     ARTHROPLASTY ELBOW Right 9/30/2015    Procedure: ARTHROPLASTY ELBOW;  Surgeon: Carrol Gaspar MD;  [facility]     ARTHROPLASTY KNEE Right      ARTHROPLASTY WRIST      x2 Lt wrist replacement.     ARTHROTOMY ELBOW Right 6/18/2015    Procedure: ARTHROTOMY ELBOW;  Surgeon: Carrol Gaspar MD;  [facility]     C ANESTH,DX ARTHROSCOPIC PROC KNEE JOINT  10/24/2007    right total knee arthroplasty     C SHLDR ARTHROSCOP,DIAGNOSTIC  12/17/2008    left total shoulder arthroplasty by Dr. Law     C SHOULDER SURG PROC UNLISTED       C TOTAL KNEE ARTHROPLASTY  1/18/12    Left     CYSTOSCOPY  02/06/2009    1.cystoscopy.2.bilateral ureteroscopy.3.basketing of stone fragments from the right kidney.4.bilateral double-J ureteral stent placement.5.ann catheter placement.6.fluoroscopy and intraoperative interpretation of images.     CYSTOSCOPY  01/08/2007    1. cystoscopy and left stent removal.2.left ureteroscopy     DECOMPRESSION CUBITAL TUNNEL  6/6/2014    Procedure: DECOMPRESSION CUBITAL TUNNEL;  Surgeon: Janelle Coates MD;  [facility]     ENDOSCOPY  03/31/2005    [facility]     ESOPHAGOSCOPY, GASTROSCOPY, DUODENOSCOPY (EGD), COMBINED  3/17/2014    Procedure: COMBINED ESOPHAGOSCOPY, GASTROSCOPY, DUODENOSCOPY (EGD), BIOPSY SINGLE OR MULTIPLE;;  Surgeon: Duane, William Charles, MD;  Location:  OR     FUSION CERVICAL POSTERIOR ONE  LEVEL  11/18/2013    Procedure: FUSION CERVICAL POSTERIOR ONE LEVEL;  Cervical 1-2 Posterior Cervical Fusion (Harms Procedure);  Surgeon: Carrol Gunn MD;  [facility]     GYN SURGERY       INJECT MAJOR JOINT / BURSA Left 1/5/2017    Procedure: INJECT MAJOR JOINT / BURSA;  Surgeon: Fredy Patel DO;  [facility]     INJECT STEROID (LOCATION) Left 6/18/2015    Procedure: INJECT STEROID (LOCATION);  Surgeon: Carrol Gaspar MD;  [facility]     NECK SURGERY       REMOVE FOREIGN BODY UPPER EXTREMITY Right 3/2/2017    Procedure: REMOVE FOREIGN BODY UPPER EXTREMITY;  Surgeon: Carrol Gaspar MD;  [facility]     ROTATOR CUFF REPAIR RT/LT  10/19/2005    1.left distal clavicle excision.2.acromioplasty.3.coracoacromial ligament resection.4.rotator cuff exploration       PAST ANESTHESIA HISTORY:     No personal or family h/o anesthesia problems    SOCIAL HISTORY:       Social History   Substance Use Topics     Smoking status: Passive Smoke Exposure - Never Smoker     Packs/day: 0.10     Years: 10.00     Types: Cigarettes     Last attempt to quit: 7/31/2013     Smokeless tobacco: Former User      Comment: Quit 9/1/14     Alcohol use No       ALLERGIES:     Allergies   Allergen Reactions     Morphine Swelling     Codeine Nausea and Nausea and Vomiting     Swelling, itching. GI Intolerance.      Gabapentin Other (See Comments)     Pins,needles, stabbing aching at once  Numbness and Tingling     Sulfa Drugs      unknown     Erythromycin Nausea     Vomiting      Penicillins Rash     Prednisone Palpitations     Heart racing       MEDICATIONS:       (Not in a hospital admission)    Current Outpatient Prescriptions   Medication Sig Dispense Refill     oxyCODONE (ROXICODONE) 5 MG IR tablet Take 1-2 tablets (5-10 mg) by mouth every 4 hours as needed for breakthrough pain or severe pain (Moderate to Severe) 60 tablet 0     hydrOXYzine (VISTARIL) 25 MG capsule Take 1 capsule (25 mg)  by mouth 3 times daily as needed for itching (adjuvant pain control) 30 capsule 0     ENBREL SURECLICK 50 MG/ML autoinjector Reported on 4/18/2017       Cyanocobalamin (VITAMIN B-12 PO) Take 1 tablet by mouth daily        leflunomide (ARAVA) 20 MG tablet Take 20 mg by mouth every morning Reported on 3/28/2017       Botulinum Toxin Type A (BOTOX) 200 UNITS SOLR Inject 200 Units as directed every 3 months 200 Units 3     omeprazole (PRILOSEC) 40 MG capsule Take 1 capsule (40 mg) by mouth daily Take 30-60 minutes before a meal. 90 capsule 2     Celecoxib (CELEBREX PO) Take 200 mg by mouth 3 times daily       sennosides (SENOKOT) 8.6 MG tablet Take 2 tablets by mouth 2 times daily 120 tablet 1     medroxyPROGESTERone (DEPO-PROVERA) 150 MG/ML injection Inject 1 mL (150 mg) into the muscle every 3 months 3 mL 3       Current Outpatient Prescriptions Ordered in [facility]   Medication Sig Dispense Refill     oxyCODONE (ROXICODONE) 5 MG IR tablet Take 1-2 tablets (5-10 mg) by mouth every 4 hours as needed for breakthrough pain or severe pain (Moderate to Severe) 60 tablet 0     hydrOXYzine (VISTARIL) 25 MG capsule Take 1 capsule (25 mg) by mouth 3 times daily as needed for itching (adjuvant pain control) 30 capsule 0     ENBREL SURECLICK 50 MG/ML autoinjector Reported on 4/18/2017       Cyanocobalamin (VITAMIN B-12 PO) Take 1 tablet by mouth daily        leflunomide (ARAVA) 20 MG tablet Take 20 mg by mouth every morning Reported on 3/28/2017       Botulinum Toxin Type A (BOTOX) 200 UNITS SOLR Inject 200 Units as directed every 3 months 200 Units 3     omeprazole (PRILOSEC) 40 MG capsule Take 1 capsule (40 mg) by mouth daily Take 30-60 minutes before a meal. 90 capsule 2     Celecoxib (CELEBREX PO) Take 200 mg by mouth 3 times daily       sennosides (SENOKOT) 8.6 MG tablet Take 2 tablets by mouth 2 times daily 120 tablet 1     medroxyPROGESTERone (DEPO-PROVERA) 150 MG/ML injection Inject 1 mL (150 mg) into the muscle every 3  months 3 mL 3     Current Facility-Administered Medications Ordered in Epic   Medication Dose Route Frequency Provider Last Rate Last Dose     clindamycin (CLEOCIN) infusion 900 mg  900 mg Intravenous Pre-Op/Pre-procedure x 1 dose Carrol Gaspar MD         clindamycin (CLEOCIN) infusion 900 mg  900 mg Intravenous See Admin Instructions Carrol Gaspar MD 50 mL/hr at 04/27/17 1438 900 mg at 04/27/17 1438     acetaminophen (TYLENOL) tablet 975 mg  975 mg Oral Once Chavo Carlson MD         fentaNYL Citrate (PF) (SUBLIMAZE) injection 25-50 mcg  25-50 mcg Intravenous Q2 Min PRN Chavo Carlson MD   50 mcg at 04/27/17 1502     midazolam (VERSED) injection 1-2 mg  1-2 mg Intravenous Q4 Min PRN Chavo Carlson MD   2 mg at 04/27/17 1502     naloxone (NARCAN) injection 0.1-0.4 mg  0.1-0.4 mg Intravenous Q2 Min PRN Chavo Carlson MD         flumazenil (ROMAZICON) injection 0.2 mg  0.2 mg Intravenous q1 min prn Chavo Carlson MD         bupivacaine liposome (EXPAREL) 1.3 % LA inj susp 20 mL  20 mL Infiltration DURING SURGERY Francisco Javier Echeverria MD         lidocaine 1 % 1 mL  1 mL Other Q1H PRN Francisco Javier Echeverria MD         lidocaine (LMX4) kit   Topical Q1H PRN Francisco Javier Echeverria MD         sodium chloride (PF) 0.9% PF flush 3 mL  3 mL Intracatheter Q1H PRN Francisco Javier Echeverria MD         sodium chloride (PF) 0.9% PF flush 3 mL  3 mL Intracatheter Q8H Francisco Javier Echeverria MD         lactated ringers infusion   Intravenous Continuous Francisco Javier Echeverria MD 25 mL/hr at 04/27/17 1438       scopolamine (TRANSDERM) 1 MG/3 DAYS 72 hr patch 1 patch  1 patch Transdermal Q72H Francisco Javier Echeverria MD   1 patch at 04/27/17 1449    And     scopolamine (TRANSDERM-SCOP) Patch in Place   Transdermal Q8H Francisco Javier Echeverria MD        And     [START ON 4/30/2017] scopolamine (TRANSDERM-SCOP) patch REMOVAL   Transdermal Q72H Francisco Javier Echeverria MD         fentaNYL Citrate (PF) (SUBLIMAZE) injection   Intravenous PRN Yunior Abernathy, APRN  CRNA   50 mcg at 17 1522     propofol (DIPRIVAN) injection 10 mg/mL vial   Intravenous PRN Yunior Abernathy APRN CRNA   200 mg at 17 1522     dexamethasone (DECADRON) injection   Intravenous PRN Yunior Abernathy APRN CRNA   4 mg at 17 1534     ondansetron (ZOFRAN) injection   Intravenous PRN Yunior Abernathy APRN CRNA   4 mg at 17 1534     phenylephrine (JADEN-SYNEPHRINE) injection 1 mg  1 mg Intravenous Continuous PRN Yunior Abernathy APRN CRNA   200 mcg at 17 1538     propofol (DIPRIVAN) infusion   Intravenous Continuous PRN Yunior Abernathy APRN CRNA   Stopped at 17 1617     ketorolac (TORADOL) injection    PRN Yunior Abernathy APRN CRNA   30 mg at 17 1606     bupivacaine 0.5 % -  EPINEPHrine 1:200,000 injection    PRN Yvon Negrete MD   10 mL at 17 1530     mepivacaine (PF) (CARBOCAINE) 2 % injection    PRN Yvon Negrete MD   10 mL at 06/18/15 1250       PHYSICAL EXAM:    Vitals: T 97.2, P Data Unavailable, /76, R 16, SpO2 96%, Weight   Wt Readings from Last 2 Encounters:   17 74.8 kg (165 lb)   17 75.2 kg (165 lb 12.8 oz)       See doc flowsheet    Temp: 36.2  C (97.2  F) Temp  Min: 36.2  C (97.2  F)  Max: 36.2  C (97.2  F)  Resp: 16 Resp  Min: 16  Max: 16  SpO2: 96 % SpO2  Min: 94 %  Max: 98 %    No Data Recorded  Heart Rate: 98 Heart Rate  Min: 78  Max: 98  BP: 115/76 Systolic (24hrs), Av , Min:113 , Max:128   Diastolic (24hrs), Av, Min:76, Max:88      NPO STATUS: see doc flowsheet    LABS:    BMP:  Recent Labs   Lab Test 17   1351   NA   --   141   POTASSIUM   --   4.1   CHLORIDE   --   106   CO2   --   23   BUN   --   10   CR  0.800  0.80   GLC   --   118*   JERRI   --   9.0       LFTs:   Recent Labs   Lab Test 17   1351   PROTTOTAL   --   8.2   ALBUMIN   --   3.6   BILITOTAL   --   0.3   ALKPHOS   --   102   AST  17  22   ALT  12  35       CBC:   Recent Labs   Lab Test   04/18/17   1131   WBC  6.1   RBC  4.53   HGB  12.4   HCT  39.2   MCV  87   MCH  27.4   MCHC  31.6   RDW  14.6   PLT  165       Coags:  Recent Labs   Lab Test  09/08/14   2303  11/19/13   0731   03/20/11   0335   INR  1.00  1.28*   < >  4.53*   PTT   --   28   < >  38*   FIBR   --    --    --   225    < > = values in this interval not displayed.       Imaging:  US Bedside Non Radiology    (Results Pending)       Francisco Javier Echeverria MD  Anesthesiology Staff  Pager [phone]    4/27/2017  4:43 PM

## 2017-04-27 NOTE — BRIEF OP NOTE
[facility] Brief Operative Note    Pre-operative diagnosis: Left Elbow Arthritis   Post-operative diagnosis * No post-op diagnosis entered *   Procedure: Procedure(s):  Left Radial Head Resection - Wound Class: I-Clean   Surgeon: Oleg Webber   Assistants(s): Luis Miguel   Estimated blood loss: Minimal    Specimens: Radial head   Findings: Eburnation of radial head and capitellum. Degenerative chagnes in ulnohumeral joint but grossly preserved joint space     Pain management per pain team recommendations. Oxycodone 5-10mg Q4h prn for 1-2 weeks then q6h thereafter. Splint x1 week until f/u in clinic for wound check

## 2017-04-27 NOTE — ANESTHESIA POSTPROCEDURE EVALUATION
Patient: Kalyn Rivreo    Procedure(s):  Left Radial Head Resection - Wound Class: I-Clean    Diagnosis:Left Elbow Arthritis  Diagnosis Additional Information: No value filed.    Anesthesia Type:  General    Note:  Anesthesia Post Evaluation    Patient location during evaluation: PACU  Patient participation: Able to participate in evaluation but full recovery from regional anesthesia has not yet ocurrred but is anticipated to occur within 48 hours  Level of consciousness: awake and alert  Pain management: adequate  Cardiovascular status: acceptable  Respiratory status: acceptable  Hydration status: acceptable  PONV: none     Anesthetic complications: None    Comments: Excellent block. Complaining of pain in other arm due to increased use. She is trying to negotiate increased amount of pain meds from Dr. Gaspar and Pain clinic.         Last vitals:  Vitals:    04/27/17 1635 04/27/17 1650 04/27/17 1705   BP: 92/75 114/82 118/88   Resp: 20 16 16   Temp: 36.7  C (98  F) 36.9  C (98.4  F) 36.8  C (98.2  F)   SpO2: 94% 96% 96%         Electronically Signed By: Francisco Javier Echeverria MD  April 27, 2017

## 2017-04-28 ENCOUNTER — HOSPITAL ENCOUNTER (EMERGENCY)
Facility: CLINIC | Age: 47
Discharge: HOME | End: 2017-04-28
Attending: EMERGENCY MEDICINE | Admitting: EMERGENCY MEDICINE
Payer: COMMERCIAL

## 2017-04-28 ENCOUNTER — TELEPHONE (OUTPATIENT)
Dept: ORTHOPEDICS | Facility: CLINIC | Age: 47
End: 2017-04-28

## 2017-04-28 ENCOUNTER — TELEPHONE (OUTPATIENT)
Dept: ANESTHESIOLOGY | Facility: CLINIC | Age: 47
End: 2017-04-28

## 2017-04-28 VITALS
WEIGHT: 162 LBS | HEIGHT: 66 IN | RESPIRATION RATE: 14 BRPM | BODY MASS INDEX: 26.03 KG/M2 | HEART RATE: 97 BPM | SYSTOLIC BLOOD PRESSURE: 135 MMHG | DIASTOLIC BLOOD PRESSURE: 94 MMHG | TEMPERATURE: 99.6 F | OXYGEN SATURATION: 98 %

## 2017-04-28 DIAGNOSIS — G89.18 ACUTE POST-OPERATIVE PAIN: ICD-10-CM

## 2017-04-28 LAB
MICRO REPORT STATUS: NORMAL
MYCOBACTERIUM SPEC CULT: NORMAL
SPECIMEN SOURCE: NORMAL

## 2017-04-28 PROCEDURE — 25000128 H RX IP 250 OP 636: Performed by: EMERGENCY MEDICINE

## 2017-04-28 PROCEDURE — 25000132 ZZH RX MED GY IP 250 OP 250 PS 637: Performed by: EMERGENCY MEDICINE

## 2017-04-28 PROCEDURE — 96372 THER/PROPH/DIAG INJ SC/IM: CPT

## 2017-04-28 PROCEDURE — 99284 EMERGENCY DEPT VISIT MOD MDM: CPT | Mod: Z6 | Performed by: EMERGENCY MEDICINE

## 2017-04-28 PROCEDURE — 25000125 ZZHC RX 250: Performed by: EMERGENCY MEDICINE

## 2017-04-28 PROCEDURE — 99284 EMERGENCY DEPT VISIT MOD MDM: CPT | Mod: 25

## 2017-04-28 RX ORDER — ONDANSETRON 4 MG/1
8 TABLET, ORALLY DISINTEGRATING ORAL ONCE
Status: COMPLETED | OUTPATIENT
Start: 2017-04-28 | End: 2017-04-28

## 2017-04-28 RX ORDER — LORAZEPAM 0.5 MG/1
1 TABLET ORAL ONCE
Status: COMPLETED | OUTPATIENT
Start: 2017-04-28 | End: 2017-04-28

## 2017-04-28 RX ORDER — HYDROMORPHONE HYDROCHLORIDE 2 MG/1
4 TABLET ORAL
Qty: 30 TABLET | Refills: 0 | Status: SHIPPED | OUTPATIENT
Start: 2017-04-28 | End: 2017-05-04

## 2017-04-28 RX ORDER — OXYCODONE HYDROCHLORIDE 5 MG/1
10 TABLET ORAL ONCE
Status: COMPLETED | OUTPATIENT
Start: 2017-04-28 | End: 2017-04-28

## 2017-04-28 RX ORDER — HYDROXYZINE PAMOATE 50 MG/1
50 CAPSULE ORAL ONCE
Status: DISCONTINUED | OUTPATIENT
Start: 2017-04-28 | End: 2017-04-28 | Stop reason: CLARIF

## 2017-04-28 RX ORDER — HYDROMORPHONE HYDROCHLORIDE 2 MG/1
4 TABLET ORAL ONCE
Status: COMPLETED | OUTPATIENT
Start: 2017-04-28 | End: 2017-04-28

## 2017-04-28 RX ORDER — KETOROLAC TROMETHAMINE 30 MG/ML
30 INJECTION, SOLUTION INTRAMUSCULAR; INTRAVENOUS ONCE
Status: COMPLETED | OUTPATIENT
Start: 2017-04-28 | End: 2017-04-28

## 2017-04-28 RX ORDER — HYDROXYZINE HYDROCHLORIDE 25 MG/1
50 TABLET, FILM COATED ORAL ONCE
Status: COMPLETED | OUTPATIENT
Start: 2017-04-28 | End: 2017-04-28

## 2017-04-28 RX ORDER — HYDROMORPHONE HYDROCHLORIDE 2 MG/1
6 TABLET ORAL ONCE
Status: COMPLETED | OUTPATIENT
Start: 2017-04-28 | End: 2017-04-28

## 2017-04-28 RX ADMIN — HYDROMORPHONE HYDROCHLORIDE 4 MG: 2 TABLET ORAL at 21:10

## 2017-04-28 RX ADMIN — ONDANSETRON 8 MG: 4 TABLET, ORALLY DISINTEGRATING ORAL at 17:32

## 2017-04-28 RX ADMIN — LORAZEPAM 1 MG: 0.5 TABLET ORAL at 20:37

## 2017-04-28 RX ADMIN — OXYCODONE HYDROCHLORIDE 10 MG: 5 TABLET ORAL at 17:31

## 2017-04-28 RX ADMIN — KETOROLAC TROMETHAMINE 30 MG: 30 INJECTION, SOLUTION INTRAMUSCULAR at 22:36

## 2017-04-28 RX ADMIN — HYDROXYZINE HYDROCHLORIDE 50 MG: 25 TABLET ORAL at 19:12

## 2017-04-28 RX ADMIN — HYDROMORPHONE HYDROCHLORIDE 4 MG: 2 TABLET ORAL at 19:35

## 2017-04-28 RX ADMIN — HYDROMORPHONE HYDROCHLORIDE 6 MG: 2 TABLET ORAL at 22:37

## 2017-04-28 ASSESSMENT — ENCOUNTER SYMPTOMS
VOMITING: 0
SHORTNESS OF BREATH: 0
COUGH: 0
WEAKNESS: 0
FEVER: 0
ABDOMINAL PAIN: 0
NUMBNESS: 0
NAUSEA: 0

## 2017-04-28 NOTE — ED AVS SNAPSHOT
[facility], Emergency Department    [address] [address]    Phone:  [phone]                                       Kalyn Rivero   MRN: [id number]    Department:  [facility], Emergency Department   Date of Visit:  4/28/2017           Patient Information     Date Of Birth          1970        Your diagnoses for this visit were:     Acute post-operative pain        You were seen by Linda Figueroa MD.        Discharge Instructions         Pain Management After Surgery  Once you re home after surgery, you may have some pain, since even minor surgery causes swelling and breakdown of tissue. When it comes to effective pain management, the tips you may have learned in the hospital also work at home. To get the best pain relief possible, remember these points:  Special note: Be sure to follow any specific post-operative instructions from your surgeon or nurse.     Use your medicine only as directed    If your pain is not relieved or if it gets worse, call your health care provider.    If pain lessens, try taking your medicine less often or in smaller doses.  Remember that medicines need time to work    Most pain relievers taken by mouth need at least 20 to 30 minutes to take effect. They may not reach their maximum effect for close to an hour.     Take pain medicine at regular times as directed. Don t wait until the pain gets bad to take it.  Time your medicine    Try to time your medicine so that you take it before beginning an activity, such as dressing or sitting at the table for dinner.    Taking your medicine at night may help you get a good night s rest.  Eat lots of fruit and vegetables    Constipation is a common side effect with some pain medicines. Eating fruit, vegetables, and other foods high in fiber can help.    Drink lots of fluids.    Talk to your health care provider about taking a preventive bowel regimen.  Avoid drinking alcohol while taking pain  medicine    Mixing alcohol and pain medicine can cause dizziness and slow your respiratory system. It can even be fatal.  Avoid driving or operating machinery while taking pain medicines that can cause drowsiness.    2819-6379 [employer]. [address] [place] [address], [place] [address]. All rights reserved. This information is not intended as a substitute for professional medical care. Always follow your healthcare professional's instructions.      Please make an appointment to follow up with Orthopedics (phone: [phone]) in 2-3 days if not improving.      Future Appointments        Provider Department Dept Phone Center    5/4/2017 3:00 PM Ortho Hand Post Op [facility] [phone] [facility]    5/4/2017 3:30 PM Teddy Ryder OT [facility] Hand Therapy [phone] [facility]    5/23/2017 11:00 AM Fredy Patel DO [facility] [phone] [facility]    5/23/2017 12:15 PM [facility] MRI 3T ROOM 1 [facility] [phone] [facility]    6/1/2017 12:00 PM Carrol Gaspar MD [facility] Orthopaedic [facility] [phone] [facility]    6/12/2017 11:00 AM PAC Advanced Practice Provider [facility] [phone] [facility]    6/12/2017 12:00 PM PAC Nurse [facility] [phone] [facility]    6/12/2017 12:30 PM PAC Anesthesiologist [facility] [phone] [facility]    6/12/2017 1:50 PM River Law MD [facility] Orthopaedic [facility] [phone] [facility]    7/17/2017 12:30 PM River Law MD [facility] Orthopa[facility] [phone] [facility]    8/14/2017 10:10 AM River Law MD [facility]opa[facility] [phone] [facility]      24 Hour Appointment Hotline       To make an appointment at any [facility], call [phone] ([phone]). If you don't have a family doctor or clinic, we will help you find one. [facility] clinics are  conveniently located to serve the needs of you and your family.             Review of your medicines      START taking        Dose / Directions Last dose taken    HYDROmorphone 2 MG tablet   Commonly known as:  DILAUDID   Dose:  4 mg   Quantity:  30 tablet        Take 2 tablets (4 mg) by mouth every 3 hours as needed for moderate to severe pain (May take up to 3 tablets at once if needed to control pain)   Refills:  0          Our records show that you are taking the medicines listed below. If these are incorrect, please call your family doctor or clinic.        Dose / Directions Last dose taken    Botulinum Toxin Type A 200 UNITS Solr   Commonly known as:  BOTOX   Dose:  200 Units   Quantity:  200 Units        Inject 200 Units as directed every 3 months   Refills:  3        CELEBREX PO   Dose:  200 mg        Take 200 mg by mouth 3 times daily   Refills:  0        ENBREL SURECLICK 50 MG/ML autoinjector   Generic drug:  Etanercept        Reported on 4/18/2017   Refills:  0        hydrOXYzine 25 MG capsule   Commonly known as:  VISTARIL   Dose:  25 mg   Quantity:  30 capsule        Take 1 capsule (25 mg) by mouth 3 times daily as needed for itching (adjuvant pain control)   Refills:  0        leflunomide 20 MG tablet   Commonly known as:  ARAVA   Dose:  20 mg        Take 20 mg by mouth every morning Reported on 3/28/2017   Refills:  0        medroxyPROGESTERone 150 MG/ML injection   Commonly known as:  DEPO-PROVERA   Dose:  150 mg   Quantity:  3 mL        Inject 1 mL (150 mg) into the muscle every 3 months   Refills:  3        omeprazole 40 MG capsule   Commonly known as:  priLOSEC   Dose:  40 mg   Quantity:  90 capsule        Take 1 capsule (40 mg) by mouth daily Take 30-60 minutes before a meal.   Refills:  2        oxyCODONE 5 MG IR tablet   Commonly known as:  ROXICODONE   Dose:  5-10 mg   Quantity:  60 tablet        Take 1-2 tablets (5-10 mg) by mouth every 4 hours as needed for breakthrough pain or severe pain  "(Moderate to Severe)   Refills:  0        sennosides 8.6 MG tablet   Commonly known as:  SENOKOT   Dose:  2 tablet   Quantity:  120 tablet        Take 2 tablets by mouth 2 times daily   Refills:  1        VITAMIN B-12 PO   Dose:  1 tablet        Take 1 tablet by mouth daily   Refills:  0                Prescriptions were sent or printed at these locations (1 Prescription)                   Other Prescriptions                Printed at Department/Unit printer (1 of 1)         HYDROmorphone (DILAUDID) 2 MG tablet                Orders Needing Specimen Collection     None      Pending Results     Date and Time Order Name Status Description    2017 1500 XR SURGERY TAB FLUORO LESS THAN 5 MIN W STILLS In process             Pending Culture Results     No orders found from 2017 to 2017.            Thank you for choosing [facility]       Thank you for choosing [facility] for your care. Our goal is always to provide you with excellent care. Hearing back from our patients is one way we can continue to improve our services. Please take a few minutes to complete the written survey that you may receive in the mail after you visit with us. Thank you!        [employer] Information     [employer] lets you send messages to your doctor, view your test results, renew your prescriptions, schedule appointments and more. To sign up, go to [email or web address] . Click on \"Log in\" on the left side of the screen, which will take you to the Welcome page. Then click on \"Sign up Now\" on the right side of the page.     You will be asked to enter the access code listed below, as well as some personal information. Please follow the directions to create your username and password.     Your access code is: [email or web address]  Expires: 2017  6:31 AM     Your access code will  in 90 days. If you need help or a new code, please call your [facility] clinic or [phone].        Care EveryWhere ID     This is your Care EveryWhere ID. This " could be used by other organizations to access your [facility] medical records  [id number]        After Visit Summary       This is your record. Keep this with you and show to your community pharmacist(s) and doctor(s) at your next visit.

## 2017-04-28 NOTE — TELEPHONE ENCOUNTER
RAY spoke with Dr. Patel regarding pt's increased pain after surgery.   Dr. Patel and Dr. Gaspar had coordinated a perioperative pain plan regarding her surgery that was performed yesterday 4/27/17.    Dr. Patel gave the following recommendations.  -Pt should restart Celebrex if they haven't already done so.   -Pt could take Tylenol 1,000 mg every 8 hours for 1 week.   -Dr. Patel reviewed her recent labs and determined they were normal.    And was agreeable to write orders to re-check them in a 1 week if pt prefers.   - Advised patient to go to ER if they were having increased sever pain.    -Pt would be treated on her current physical presentation.    Dr. Patel and LPN do not recall pt having a pain contract with Oleg Wilson.   He was agreeable to the ER paging him if they are to have questions for him.    LPN informed pt of all of Dr. Stovall's recommendations.   Pt was not wanting to take Tylenol, and stated they had already restarted celebrex.  Pt stated they were using Ice and elevating extremity but the pain was so unbearable they couldn't do anything. Pt was advised to go to the ED.     Blanca Atkinson LPN

## 2017-04-28 NOTE — TELEPHONE ENCOUNTER
Patient called and is having pain rated 10/10.  She was told to go to the ER for evaluation.  Patient agreed with this plan.

## 2017-04-28 NOTE — ED NOTES
Pt reports had surgery to L elbow at [facility] yesterday, this am nerve block wore off and has been crying in pain since. Pt reports she attempted to follow up w/ surgeon, told to come to the ED. Pt accompanied by her PCA. Pt reports taking some of prescribed medications, but still having pain.

## 2017-04-28 NOTE — TELEPHONE ENCOUNTER
Patient called stating that she is having a lot of pain. She is s/p left elbow radial head excision yesterday 4/27/17. She stated that her block has worn off now and she is having increased pain. The oxycodone she was given yesterday is not helping. She stated that her non surgical arm/shoulder is giving her a lot of pain as well which she thinks might be from the positioning. She is scheduled for a shoulder replacement in June with Dr. Law. She stated that she has been off of her Enbrel as well which she thinks is contributing to her pain. Pain med plan was directed by Dr. Patel as she see's him in the pain clinic, for this reason his office was contacted regarding patients increased pain. This writer spoke with Jackie Mcelroy LPN who will page Dr. Patel for his recommendations and she will call the patient. Patient is aware of this, verbalized understanding and will await her call.

## 2017-04-28 NOTE — ED PROVIDER NOTES
[place] EMERGENCY DEPARTMENT ([facility])  April 28, 2017  [address]   History     Chief Complaint   Patient presents with     Post-op Problem     Pt reports had surgery to L elbow at [facility] yesterday, this am nerve block wore off and has been crying in pain since. Pt reports she attempted to follow up w/ surgeon, told to come to the ED. Pt accompanied by her PCA.     The history is provided by the patient and medical records.     Kalyn Rivero is a 46-year-old female who presents with left elbow pain.  Patient had surgery on her left elbow yesterday.  It was a left radial head resection done for chronic pain and arthritis. When she went home after the procedure she still had numbness from her peripheral nerve block but this morning at 7 AM the block had worn off and she had severe pain.  She was advised to take 5-10 mg of oxycodone for the pain however she was taking that prior to the surgery. She is stating that it is not helping with her pain now and she is having severe pain in the left elbow.  She has a splint on that is covered with an Ace wrap and she has not removed that.  She is using an ice pack on the elbow. She has no associated numbness or weakness in the left arm or hand. She denies other symptoms.  Specifically denies fever, cough, chest pain, shortness of breath, abdominal pain. She does have some nausea but denies vomiting. No dysuria. Denies new injury.     I have reviewed the Medications, Allergies, Past Medical and Surgical History, and Social History in the [employer] system.    Past Medical History:   Diagnosis Date     Acetaminophen overdose 3/24/2011     Anemia      Anemia      Anesthesia complication     pt states has a histor of heart stopping during anesthesia,     Cervical high risk HPV (human papillomavirus) test positive 02/22/2017    type 18 & other HR HPV     Chronic infection     MRSA     Chronic pain 3/24/2011     Endometriosis      Fibromyalgia      Gastro-oesophageal  reflux disease      Grief reaction      Heart murmur      Hepatic failure (H)      History of blood transfusion      Hypothyroidism      Immune disorder (H)      Learning disability      Liver failure, acute      Malignant neoplasm (H)      Migraine      Neck injuries      Nephrolithiasis      Opioid type dependence (H)      Other chronic pain      Other drug-induced neutropenia (H)      PONV (postoperative nausea and vomiting)      RA (rheumatoid arthritis) (H)      Renal stone      Rheumatoid arthritis(714.0)      Scoliosis      Stomach ulcer     history       Past Surgical History:   Procedure Laterality Date     ARTHRODESIS FOOT  5/23/2012    Procedure:ARTHRODESIS FOOT; Right Subtalar andTaloNavicular  Fusion  ; Surgeon:CHRIS HENDRICKS; [facility]     ARTHRODESIS FOOT Left 4/22/2015    Procedure: ARTHRODESIS FOOT;  Surgeon: Chris Hendricks MD;  [facility]     ARTHROPLASTY ELBOW Right 9/30/2015    Procedure: ARTHROPLASTY ELBOW;  Surgeon: Carrol Gaspar MD;  [facility]     ARTHROPLASTY KNEE Right      ARTHROPLASTY WRIST      x2 Lt wrist replacement.     ARTHROTOMY ELBOW Right 6/18/2015    Procedure: ARTHROTOMY ELBOW;  Surgeon: Carrol Gaspar MD;  [facility] OR     C ANESTH,DX ARTHROSCOPIC PROC KNEE JOINT  10/24/2007    right total knee arthroplasty     C SHLDR ARTHROSCOP,DIAGNOSTIC  12/17/2008    left total shoulder arthroplasty by Dr. Law     C SHOULDER SURG PROC UNLISTED       C TOTAL KNEE ARTHROPLASTY  1/18/12    Left     CYSTOSCOPY  02/06/2009    1.cystoscopy.2.bilateral ureteroscopy.3.basketing of stone fragments from the right kidney.4.bilateral double-J ureteral stent placement.5.ann catheter placement.6.fluoroscopy and intraoperative interpretation of images.     CYSTOSCOPY  01/08/2007    1. cystoscopy and left stent removal.2.left ureteroscopy     DECOMPRESSION CUBITAL TUNNEL  6/6/2014    Procedure: DECOMPRESSION CUBITAL TUNNEL;  Surgeon: Jaxon  Janelle LICONA MD;  [facility]     ENDOSCOPY  03/31/2005    [facility]oscopy[facility]     ESOPHAGOSCOPY, GASTROSCOPY, DUODENOSCOPY (EGD), COMBINED  3/17/2014    Procedure: COMBINED ESOPHAGOSCOPY, GASTROSCOPY, DUODENOSCOPY (EGD), BIOPSY SINGLE OR MULTIPLE;;  Surgeon: Duane, William Charles, MD;  Location: [facility] OR     FUSION CERVICAL POSTERIOR ONE LEVEL  11/18/2013    Procedure: FUSION CERVICAL POSTERIOR ONE LEVEL;  Cervical 1-2 Posterior Cervical Fusion (Harms Procedure);  Surgeon: Carrol Gunn MD;  [facility] OR     GYN SURGERY       INJECT MAJOR JOINT / BURSA Left 1/5/2017    Procedure: INJECT MAJOR JOINT / BURSA;  Surgeon: Fredy Patel DO;  [facility]     INJECT STEROID (LOCATION) Left 6/18/2015    Procedure: INJECT STEROID (LOCATION);  Surgeon: Carrol Gaspar MD;  [facility]     NECK SURGERY       REMOVE FOREIGN BODY UPPER EXTREMITY Right 3/2/2017    Procedure: REMOVE FOREIGN BODY UPPER EXTREMITY;  Surgeon: Carrol Gaspar MD;  [facility]     RESECT BONE UPPER EXTREMITY Left 4/27/2017    Procedure: RESECT BONE UPPER EXTREMITY;  Left Radial Head Resection;  Surgeon: Carrol Gaspar MD;  [facility]     ROTATOR CUFF REPAIR RT/LT  10/19/2005    1.left distal clavicle excision.2.acromioplasty.3.coracoacromial ligament resection.4.rotator cuff exploration       Family History   Problem Relation Age of Onset     DIABETES Mother      Hypertension Mother      Allergies Mother      Alcohol/Drug Mother      LIVER CIRROSIS     Blood Disease Mother      B12 DEF     Neurologic Disorder Mother      Epilepsy     OSTEOPOROSIS Mother      CEREBROVASCULAR DISEASE Maternal Grandmother      Arthritis Maternal Grandmother      RHEUMATOID     CANCER Maternal Grandmother      Thyroid Disease Maternal Grandmother      OSTEOPOROSIS Maternal Grandmother      HEART DISEASE Maternal Grandmother      Depression Maternal Grandmother      Neurologic  "Disorder Maternal Grandmother      MIGRAINES     CEREBROVASCULAR DISEASE Maternal Grandfather      CANCER Maternal Grandfather      CEREBROVASCULAR DISEASE Paternal Grandmother      Arthritis Paternal Grandmother      RHEUMATOID     CANCER Paternal Grandmother      OSTEOPOROSIS Paternal Grandmother      HEART DISEASE Paternal Grandmother      Depression Paternal Grandmother      Neurologic Disorder Paternal Grandmother      MIGRAINES     Thyroid Disease Paternal Grandmother      CEREBROVASCULAR DISEASE Paternal Grandfather      CANCER Paternal Grandfather      HEART DISEASE Brother      MURMUR     Asthma Son      Endocrine Disease Son      Neurologic Disorder Father      epilepsy     Seizure Disorder Father      Hypertension Father      Neurologic Disorder Daughter      MIGRAINES     Arthritis Daughter      JR     Hypertension Son      Glaucoma No family hx of      Macular Degeneration No family hx of        Social History   Substance Use Topics     Smoking status: Passive Smoke Exposure - Never Smoker     Packs/day: 0.10     Years: 10.00     Types: Cigarettes     Last attempt to quit: 7/31/2013     Smokeless tobacco: Former User      Comment: Quit 9/1/14     Alcohol use No         Review of Systems   Constitutional: Negative for fever.   Respiratory: Negative for cough and shortness of breath.    Cardiovascular: Negative for chest pain.   Gastrointestinal: Negative for abdominal pain, nausea and vomiting.   Musculoskeletal:        Left elbow pain, see HPI   Neurological: Negative for weakness and numbness.       Physical Exam   BP: (!) 133/92  Pulse: 115  Temp: 99.6  F (37.6  C)  Resp: 16  Height: 167.6 cm (5' 6\")  Weight: 73.5 kg (162 lb)  SpO2: 96 %  Physical Exam    GEN:  Alert, well developed, is crying with pain  HEENT:  PERRL, EOMI, Mucous membranes are moist.   Musculoskeletal:  There is a long arm splint on the left arm. The fingers of the left hand are normal color and temperature with normal range of " motion, sensation and strength.   Neuro:  Alert and oriented X3, Follows commands, moving all extremities spontaneously   Skin:  Warm, dry   ED Course     ED Course     Procedures         Patient was given oxycodone for pain as well as oral dissolving Zofran for her nausea.   This did not help with the pain. She was later given oral Vistaril and     Orthopedics did not feel that the splint needs to be removed as it is too early for an infection or abscess.   Orthopedics did advise that I could make a small window in the plaster splint so that the incision would be visible for an exam.  The cast cutter was used to make a square opening in the plaster on the lateral aspect of the elbow.  I was able to see gauze dressing that was covering the wound. This was pulled back slightly so that I could see part of the wound and this gauze dressing had no drainage on it, no blood. The small part of the wound that I could see does not appear swollen or to have any erythema or drainage.  This patient had a difficult time with pain control in the Emergency Department.  She was initially given 10 mg oxycodone and this did not help. She was later given oral Vistaril and this again was not unsuccessful and then given oral Dilaudid 4 mg followed by a milligram of oral Ativan.  She continued to have pain and was given a 2nd dose of 4 mg Dilaudid.  She continued to have severe pain and after this was given 30 mg of IM Toradol with 6 mg of PO Dilaudid and this was finally successful in controlling her pain. Patient was in the Emergency department for 5 hours while we were gradually escalating doses of pain medicine in order to get her pain under control.     This part of the document was transcribed by Nazanin Alamo Scribpat.      Critical Care time:  none       Labs Ordered and Resulted from Time of ED Arrival Up to the Time of Departure from the ED - No data to display    Assessments & Plan (with Medical Decision Making)     This  is a patient with left radial head resection done yesterday. She developed severe pain after the peripheral nerve block wore off. Patient had chronic pain prior to the surgery and the pain medicine doses that she was taking prior to surgery were not increased after the procedure and she is finding that her pain is not controlled at all.  Her pain has finally been controlled in the Emergency Department after the above medications.  The patient did not show any signs of CNS depression, no sleepiness, no decrease in her breathing.  Since her pain is now under control, patient was discharged to home.  There is no sign of acute hematoma or infection of the wound based on the partial view of the wound that I could see.  The patient was given a prescription for Dilaudid 2 mg tablets. She was advised to 4 mg every 3 hours as needed for pain, but can take up to 3 tablets at once if needed to control her pain.  She is on NSAIDs at home already as well as Vistaril.  She was advised to follow up with Orthopedics within the next 2 to 3 days if her pain is not controlled.  Also advised to return to the Emergency Department if she has new or worsening symptoms.      This part of the document was transcribed by Milena Moraes Medical Scribe.      I have reviewed the nursing notes.    I have reviewed the findings, diagnosis, plan and need for follow up with the patient.    Discharge Medication List as of 4/28/2017 11:44 PM      START taking these medications    Details   HYDROmorphone (DILAUDID) 2 MG tablet Take 2 tablets (4 mg) by mouth every 3 hours as needed for moderate to severe pain (May take up to 3 tablets at once if needed to control pain), Disp-30 tablet, R-0, Local Print             Final diagnoses:   Acute post-operative pain       4/28/2017   [facility] [place], EMERGENCY DEPARTMENT     Linda Figueroa MD  04/29/17 [id number]

## 2017-04-28 NOTE — ED AVS SNAPSHOT
[facility], [place], Emergency Department    [address] [place] [id number]    Phone:  [phone]                                       Kalyn Rivero   MRN: [id number]    Department:  [facility], Emergency Department   Date of Visit:  4/28/2017           After Visit Summary Signature Page     I have received my discharge instructions, and my questions have been answered. I have discussed any challenges I see with this plan with the nurse or doctor.    ..........................................................................................................................................  Patient/Patient Representative Signature      ..........................................................................................................................................  Patient Representative Print Name and Relationship to Patient    ..................................................               ................................................  Date                                            Time    ..........................................................................................................................................  Reviewed by Signature/Title    ...................................................              ..............................................  Date                                                            Time

## 2017-04-28 NOTE — OP NOTE
PREOPERATIVE DIAGNOSES:     1.  Rheumatoid arthritis.   2.  End-stage left radiocapitellar degenerative joint disease.      POSTOPERATIVE DIAGNOSES:   1.  Rheumatoid arthritis.   2.  End-stage left radiocapitellar degenerative joint disease.      PROCEDURE PERFORMED:  Left elbow radial head excision.      INDICATIONS FOR PROCEDURE:  Ms. Rivero is a pleasant, 46-year-old, right-hand-dominant female who is status post multiple procedures for severe arthritis in her upper extremities.  Of note, she has undergone a right total elbow replacement.  She has degenerative changes on the left side.  She has previously undergone a left total wrist replacement.  She has significant pain over the lateral aspect of her elbow with radiocapitellar arthritis degenerative changes.  After discussing total elbow replacement versus radial head excision, it was felt that since her ulnohumeral joint was relatively well preserved and bilateral total elbows would be quite limiting, especially for somebody of her age, that a radial head excision on the left side would be more appropriate, to which the patient agreed.      ESTIMATED BLOOD LOSS:  5 mL       COMPLICATIONS:  None.      FINDINGS:  Stage 4 osteoarthritis of the radiocapitellar joint of the left elbow.  Early degenerative changes in the ulnohumeral joint with relatively well-preserved joint space.      COMPLICATIONS:  None.      ANESTHESIA:  General with a supraclavicular block.      TOURNIQUET:  57 minutes at 250 mmHg      DESCRIPTION OF PROCEDURE:  The patient was met in the preoperative holding area, where the operative extremity was marked.  A written consent for the above-stated procedure was obtained.  The patient was then transported to the operating theater.  She was induced under general endotracheal anesthesia.  The left upper extremity was prepped and draped in the usual sterile fashion.  Tourniquet was inflated prior to incision.  A multidisciplinary time-out was conducted  in accordance with hospital policy, and preoperative antibiotics were infused.  An incision over the lateral radiocapitellar joint was made through a standard Kocher approach.  This was carried through skin and subcutaneous tissue to the level of the fascia.  The interval between the anconeus and ECU was identified and sharply incised.  The capsule over the radiocapitellar joint was identified and incised, making an arthrotomy into the joint.  There was synovitis in the joint and marked deformity of the radial head and capitellum with complete loss of cartilage surface.  The annular ligament was identified, and a posterior cuff was maintained for later repair.  This was incised to expose the radial neck.  The arm was fully pronated throughout the procedure to protect the PIN.  Cobra retractors were placed circumferentially around the radial neck to protect the surrounding structures.  These were placed subperiosteally.  Approximately 1.5 cm of the radial head and neck was removed with the reciprocating saw perpendicular to the radial shaft.  The ulnohumeral joint was then inspected.  The canal at the neck of the radius was filled with bone wax with tamponade bleeding.  There was smooth motion with pronation and supination without impingement or crepitus in the sigmoid notch.  There was no identifiable posterior rotary instability.  The wound was then copiously irrigated.  The annular ligament was closed with Ethibond, and the confluence of the capsule and lateral collateral ligament was reapproximated with 0 Vicryl suture.  Fascia was closed with 0 Vicryl suture, and the wound was closed with 3-0 Vicryl and 4-0 Monocryl.  The wound was covered with Steri-Strips.  The elbow was stable to varus and valgus stress at the end of the case, and the resection was confirmed on fluoroscopy.  A sterile dressing was applied, and the patient was placed in a splint with the arm in 90 degrees of flexion and neutral pronation and  supination.  She was awoken from anesthesia and transferred to the PACU in stable condition.  All counts were correct.      POSTOPERATIVE PLAN:  Splint x1 week, then remove at the time of return to clinic and wound check.  Script for oxycodone was provided in accordance with her pain clinic recommendation as well as a prescription for Vistaril.      Dr. Gaspar was present and scrubbed for the entirety of the case.      Dictated by Oh Bolanos MD   Resident         SOFYA GASPAR MD       As dictated by OH BOLANOS MD       Physician Attestation   I was present for the entire procedure between opening and closing.    Sofya Gaspar  Date of Service (when I saw the patient): 2017       D: 2017 17:07   T: 2017 08:55   MT: [email or web address]      Name:     SANDEE ALLEN   MRN:      [id number]        Account:        [id number]   :      1970           Procedure Date: 2017      Document: [id number]

## 2017-04-29 NOTE — DISCHARGE INSTRUCTIONS
Pain Management After Surgery  Once you re home after surgery, you may have some pain, since even minor surgery causes swelling and breakdown of tissue. When it comes to effective pain management, the tips you may have learned in the hospital also work at home. To get the best pain relief possible, remember these points:  Special note: Be sure to follow any specific post-operative instructions from your surgeon or nurse.     Use your medicine only as directed    If your pain is not relieved or if it gets worse, call your health care provider.    If pain lessens, try taking your medicine less often or in smaller doses.  Remember that medicines need time to work    Most pain relievers taken by mouth need at least 20 to 30 minutes to take effect. They may not reach their maximum effect for close to an hour.     Take pain medicine at regular times as directed. Don t wait until the pain gets bad to take it.  Time your medicine    Try to time your medicine so that you take it before beginning an activity, such as dressing or sitting at the table for dinner.    Taking your medicine at night may help you get a good night s rest.  Eat lots of fruit and vegetables    Constipation is a common side effect with some pain medicines. Eating fruit, vegetables, and other foods high in fiber can help.    Drink lots of fluids.    Talk to your health care provider about taking a preventive bowel regimen.  Avoid drinking alcohol while taking pain medicine    Mixing alcohol and pain medicine can cause dizziness and slow your respiratory system. It can even be fatal.  Avoid driving or operating machinery while taking pain medicines that can cause drowsiness.    2454-9354 [employer]. [address] [place] [address]. All rights reserved. This information is not intended as a substitute for professional medical care. Always follow your healthcare professional's instructions.      Please make an appointment to  follow up with Orthopedics (phone: [phone]) in 2-3 days if not improving.

## 2017-05-01 ENCOUNTER — TELEPHONE (OUTPATIENT)
Dept: ANESTHESIOLOGY | Facility: CLINIC | Age: 47
End: 2017-05-01

## 2017-05-01 NOTE — TELEPHONE ENCOUNTER
LPN called pt to follow up with them regarding their increased pain on Friday.   Pt stated that they were able to control their pain in the ED, and were doing better.   Pt appeared to be in better sprits.   Pt has the clinic's contact information if they have further questions for Dr. Barnes.    Blanca Atkinson LPN

## 2017-05-03 DIAGNOSIS — Z09 SURGICAL FOLLOW-UP CARE: Primary | ICD-10-CM

## 2017-05-04 ENCOUNTER — OFFICE VISIT (OUTPATIENT)
Dept: ORTHOPEDICS | Facility: CLINIC | Age: 47
End: 2017-05-04

## 2017-05-04 ENCOUNTER — THERAPY VISIT (OUTPATIENT)
Dept: OCCUPATIONAL THERAPY | Facility: CLINIC | Age: 47
End: 2017-05-04
Payer: COMMERCIAL

## 2017-05-04 DIAGNOSIS — M25.522 ELBOW PAIN, LEFT: Primary | ICD-10-CM

## 2017-05-04 DIAGNOSIS — Z47.89 AFTERCARE FOLLOWING SURGERY OF THE MUSCULOSKELETAL SYSTEM: ICD-10-CM

## 2017-05-04 DIAGNOSIS — Z09 SURGICAL FOLLOW-UP CARE: Primary | ICD-10-CM

## 2017-05-04 DIAGNOSIS — M25.622 STIFFNESS OF LEFT ELBOW JOINT: ICD-10-CM

## 2017-05-04 DIAGNOSIS — M19.029 ELBOW ARTHRITIS: ICD-10-CM

## 2017-05-04 PROCEDURE — 97110 THERAPEUTIC EXERCISES: CPT | Mod: GO | Performed by: OCCUPATIONAL THERAPIST

## 2017-05-04 PROCEDURE — 97165 OT EVAL LOW COMPLEX 30 MIN: CPT | Mod: GO | Performed by: OCCUPATIONAL THERAPIST

## 2017-05-04 PROCEDURE — 97140 MANUAL THERAPY 1/> REGIONS: CPT | Mod: GO | Performed by: OCCUPATIONAL THERAPIST

## 2017-05-04 RX ORDER — OXYCODONE HYDROCHLORIDE 5 MG/1
5-10 TABLET ORAL EVERY 4 HOURS PRN
Qty: 60 TABLET | Refills: 0 | Status: SHIPPED | OUTPATIENT
Start: 2017-05-04 | End: 2017-05-17

## 2017-05-04 RX ORDER — HYDROMORPHONE HYDROCHLORIDE 2 MG/1
2 TABLET ORAL
Qty: 40 TABLET | Refills: 0 | Status: SHIPPED | OUTPATIENT
Start: 2017-05-04 | End: 2017-06-07

## 2017-05-04 NOTE — TELEPHONE ENCOUNTER
Pain meds refilled per Dr. Welch direction. Patient is s/p left elbow radial head excision DOS 4/27/17.  Patient will be given prescriptions at her post op appointment today.

## 2017-05-04 NOTE — PROGRESS NOTES
Hand Therapy Initial Evaluation    Current Date:  5/4/2017       Diagnosis: RA, end-stage radiocapitellar DJD  Procedure: Left radial head excision  DOS: 4/27/17  MD: Oleg Cummings  Kalyn Rivero is a 46 year old Left hand dominant female.    Patient reports symptoms of pain, stiffness/loss of motion, weakness/loss of strength and edema of the left elbow and wrist which occurred due to surgery. Since onset symptoms are Gradually getting better.  Special tests:  x-ray.  Previous treatment: none.    General health as reported by patient is fair.  Pertinent medical history includes:rheumatoid arthritis, cancer-cervical, heart problems-murmur, fibromyalgia, thyroid problems, kidney disease, anemia, migraines/headaches, smoking, numbness/tingling, pain at rest/night, weakness  Medical allergies:medications.  Surgical history: cancer, orthopedic: B TKA, L shoulder replacement, L wrist replacement, R elbow replacement, B foot/ankle.  Medication history: anti-inflammatory, pain, celebrex.  Pt is scheduled to have R shoulder surgery 6/29.      Pt is on disability  Avocation: family  Barriers include:transportation, requires assistance with dressing, personal hygiene, transfers, mobility.  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  Red flags:  none    Occupational Performance Deficits as reported by patient: toileting, dressing, feeding, driving and community mobility, health management and maintenance and sleep    Additional Occupational Profile Information (patterns of daily living, interests, values and needs): ipad, family, dog     Functional Outcome Measure:   Upper Extremity Functional Index Score:  SCORE:   Column Totals: 2/80   (A lower score indicates greater disability.)    Objective  Pain Level Report  VAS(0-10) 5/4/2017   At Rest: 7/10   At Worst: 10     Report of Pain:  Location:  elbow and wrist  Pain Quality:   Aching and Dull  Frequency: constant    Pain is worst:  daytime or nighttime  Exacerbated by:  Moving arem  Relieved by:  cold, rest and pain meds  Progression:  improving  Edema:  MODERATE of elbow  Sensation:  WNL throughout all nerve distributions; per patient report    ROM:   Pt with active motion  degrees of L elbow in [place] brace.  Able to make a full fist and wrist WFL    Strength: Contraindicated    Assessment  Patient presents with symptoms consistent with above diagnosis,  with surgical  intervention.     Patient's limitations or Problem List includes:  Pain, Decreased ROM/motion, Increased edema, Weakness, Decreased , Decreased pinch and Tightness in musculature of the right elbow which interferes with the patient's ability to perform Self Care Tasks, Recreational Activities and Household Chores as compared to previous level of function.    Rehab Potential:  Good - Return to full activity, some limitations    Patient will benefit from skilled Occupational Therapy to increase ROM, flexibility, motion, overall strength, coordination and dexterity and decrease pain, edema and adherence of scarring to return to previous activity level and resume normal daily tasks and to reach their rehab potential.    Assessment of Occupational Performance:  5 or more Performance Deficits  Identified Performance Deficits: toileting, dressing, feeding, driving and community mobility, health management and maintenance and sleep      Clinical Decision Making (Complexity): Low complexity    Barriers to Learning:  No barrier    Communication Issues:  Patient appears to be able to clearly communicate and understand verbal and written communication and follow directions correctly. Son present during session.    Treatment Explanation:  The following has been discussed with the patient:  RX ordered/plan of care  Anticipated outcomes  Possible risks and side effects    Plan  Frequency:  1 X week, once daily every other  week  Duration:  for 6 weeks    Treatment Plan:  Modalities:  Fluidotherapy  Therapeutic Exercise:  AROM, AAROM, PROM, Isotonics, Isometrics and Stabilization  Neuromuscular re-education:  Sensory re-education, Desensitization, Posture and Kinesiotaping  Manual Techniques:  Joint mobilization, Scar mobilization, Friction massage, Myofascial release and Manual edema mobilization  Orthotic Fabrication:  Static orthosis and Static progressive orthosis  Self Care:  Self Care Tasks, Ergonomic Considerations and Edcuation    Discharge Plan:  Achieve all LTG.  Independent in home treatment program.  Reach maximal therapeutic benefit.    Home Exercise Program:  AROM of wrist and elbow ( in [place])  Gentle gripping  Scar massage    Next Visit:  Progress ROM/splint  Scar  MFR

## 2017-05-04 NOTE — MR AVS SNAPSHOT
After Visit Summary   5/4/2017    Kalyn Rivero    MRN: [id number]           Patient Information     Date Of Birth          1970        Visit Information        Provider Department      5/4/2017 3:00 PM  [facility] HAND [facility]        Today's Diagnoses     Surgical follow-up care    -  1       Follow-ups after your visit        Additional Services     HAND THERAPY       Hand Therapy Referral                  Your next 10 appointments already scheduled     May 04, 2017  3:30 PM CDT   (Arrive by 3:15 PM)   ASYA Hand with Teddy Ryder OT   [facility] ([facility])    [address] [place] [address]  4th [facility] [address]   [phone]            May 23, 2017 11:00 AM CDT   (Arrive by 10:45 AM)   Return Visit with Frdey Patel DO   [facility] ([facility])    [address] [place] [address]  4th [facility] [address]   [phone]            May 23, 2017 12:15 PM CDT   (Arrive by 12:00 PM)   MR UPPER EXTREMITY JOINT RIGHT W/O CONTRAST with [id number]   [facility] ([facility])    [address] [place] [address]  1st [facility] [address]   [phone]           Take your medicines as usual, unless your doctor tells you not to. Bring a list of your current medicines to your exam (including vitamins, minerals and over-the-counter drugs). Also bring the results of similar scans you may have had.  Please remove any body piercings and hair extensions before you arrive.  Follow your doctor s orders. If you do not, we may have to postpone your exam.  You will not have contrast for this exam. You do not need to do anything special to prepare.  The MRI machine uses a strong magnet. Please wear clothes without metal (snaps, zippers). A sweatsuit works well, or we may give you a hospital gown.   **IMPORTANT** THE INSTRUCTIONS BELOW  ARE ONLY FOR THOSE PATIENTS WHO HAVE BEEN TOLD THEY WILL RECEIVE SEDATION OR GENERAL ANESTHESIA DURING THEIR MRI PROCEDURE:  IF YOU WILL RECEIVE SEDATION (take medicine to help you relax during your exam):   You must get the medicine from your doctor before you arrive. Bring the medicine to the exam. Do not take it at home.   Arrive one hour early. Bring someone who can take you home after the test. Your medicine will make you sleepy. After the exam, you may not drive, take a bus or take a taxi by yourself.   No eating 8 hours before your exam. You may have clear liquids up until 4 hours before your exam. (Clear liquids include water, clear tea, black coffee and fruit juice without pulp.)  IF YOU WILL RECEIVE ANESTHESIA (be asleep for your exam):   Arrive 1 1/2 hours early. Bring someone who can take you home after the test. You may not drive, take a bus or take a taxi by yourself.   No eating 8 hours before your exam. You may have clear liquids up until 4 hours before your exam. (Clear liquids include water, clear tea, black coffee and fruit juice without pulp.)   You will spend four to five hours in the recovery room.  Please call the Imaging Department at your exam site with any questions.            Jun 01, 2017 12:00 PM CDT   (Arrive by 11:45 AM)   POST-OP HAND with Carrol Gaspar MD   [facility] ([place])    [address] [place] [address]   [phone]            Jun 12, 2017 12:00 PM CDT   (Arrive by 11:45 AM)   PAC EVALUATION with Nancy Pac Santiago [address] [place] [address] [facility] ([facility])    [address] [place] [address]   [phone]            Jun 12, 2017  1:00 PM CDT   (Arrive by 12:45 PM)   PAC RN ASSESSMENT with Nancy Pac Rn   [facility] ([place])    [address] [place] [address]  [address]   [phone]            Jun 12, 2017  1:30 PM CDT   (Arrive by 1:15 PM)   PAC Anesthesia Consult with  Pac Anesthesiologist   [facility] ([facility])    [address] [place] [address]   [phone]            Jun 12, 2017  1:50 PM CDT   (Arrive by 1:35 PM)   RETURN SHOULDER with River Law MD   [facility] ([facility])    [address] [place] [address]   [phone]            Jun 29, 2017   Procedure with River Law MD   [facility] [place] (--)    [address] [facility] [address] [phone]            Jul 17, 2017 12:30 PM CDT   (Arrive by 12:15 PM)   RETURN SHOULDER with River Law MD   [facility] ([facility])    [address] [place] [address] [phone]              Who to contact     Please call your clinic at [phone] to:    Ask questions about your health    Make or cancel appointments    Discuss your medicines    Learn about your test results    Speak to your doctor   If you have compliments or concerns about an experience at your clinic, or if you wish to file a complaint, please contact [facility] Patient Relations at [phone] or email us at [email or web address]         Additional Information About Your Visit        Youbetme Information     Youbetme is an electronic gateway that provides easy, online access to your medical records. With Youbetme, you can request a clinic appointment, read your test results, renew a prescription or communicate with your care team.     To sign up for Youbetme visit the website at [email or web address]   You will be asked to enter the access code listed below, as well as some personal information. Please follow the directions  to create your username and password.     Your access code is: [email or web address]  Expires: 2017  6:31 AM     Your access code will  in 90 days. If you need help or a new code, please contact your [facility] or call [phone] for assistance.        Care EveryWhere ID     This is your Care EveryWhere ID. This could be used by other organizations to access your [facility] medical records  [id number]         Blood Pressure from Last 3 Encounters:   17 (!) 135/94   17 125/78   17 129/87    Weight from Last 3 Encounters:   17 73.5 kg (162 lb)   17 74.8 kg (165 lb)   17 75.2 kg (165 lb 12.8 oz)              We Performed the Following     HAND THERAPY          Today's Medication Changes          These changes are accurate as of: 17  3:22 PM.  If you have any questions, ask your nurse or doctor.               These medicines have changed or have updated prescriptions.        Dose/Directions    HYDROmorphone 2 MG tablet   Commonly known as:  DILAUDID   This may have changed:    - how much to take  - reasons to take this   Used for:  Elbow arthritis   Changed by:  Carrol Gaspar MD        Dose:  2 mg   Take 1 tablet (2 mg) by mouth every 3 hours as needed for moderate to severe pain (May take up to 2 tablets at once if needed to control pain)   Quantity:  40 tablet   Refills:  0            Where to get your medicines      Some of these will need a paper prescription and others can be bought over the counter.  Ask your nurse if you have questions.     Bring a paper prescription for each of these medications     HYDROmorphone 2 MG tablet    oxyCODONE 5 MG IR tablet                Primary Care Provider Office Phone # Fax #    Mika Zamora [phone]       [facility] [address] [facility] [address]        Thank you!     Thank you for choosing [facility]  for your care. Our goal  is always to provide you with excellent care. Hearing back from our patients is one way we can continue to improve our services. Please take a few minutes to complete the written survey that you may receive in the mail after your visit with us. Thank you!             Your Updated Medication List - Protect others around you: Learn how to safely use, store and throw away your medicines at www.disposemymeds.org.          This list is accurate as of: 5/4/17  3:22 PM.  Always use your most recent med list.                   Brand Name Dispense Instructions for use    Botulinum Toxin Type A 200 UNITS Solr    BOTOX    200 Units    Inject 200 Units as directed every 3 months       CELEBREX PO      Take 200 mg by mouth 3 times daily       ENBREL SURECLICK 50 MG/ML autoinjector   Generic drug:  Etanercept      Reported on 4/18/2017       HYDROmorphone 2 MG tablet    DILAUDID    40 tablet    Take 1 tablet (2 mg) by mouth every 3 hours as needed for moderate to severe pain (May take up to 2 tablets at once if needed to control pain)       hydrOXYzine 25 MG capsule    VISTARIL    30 capsule    Take 1 capsule (25 mg) by mouth 3 times daily as needed for itching (adjuvant pain control)       leflunomide 20 MG tablet    ARAVA     Take 20 mg by mouth every morning Reported on 3/28/2017       medroxyPROGESTERone 150 MG/ML injection    DEPO-PROVERA    3 mL    Inject 1 mL (150 mg) into the muscle every 3 months       omeprazole 40 MG capsule    priLOSEC    90 capsule    Take 1 capsule (40 mg) by mouth daily Take 30-60 minutes before a meal.       oxyCODONE 5 MG IR tablet    ROXICODONE    60 tablet    Take 1-2 tablets (5-10 mg) by mouth every 4 hours as needed for breakthrough pain or severe pain (Moderate to Severe)       sennosides 8.6 MG tablet    SENOKOT    120 tablet    Take 2 tablets by mouth 2 times daily       VITAMIN B-12 PO      Take 1 tablet by mouth daily

## 2017-05-04 NOTE — MR AVS SNAPSHOT
After Visit Summary   5/4/2017    Kalyn Rivero    MRN: [id number]           Patient Information     Date Of Birth          1970        Visit Information        Provider Department      5/4/2017 3:30 PM Teddy Ryder OT M [facility]        Today's Diagnoses     Elbow pain, left    -  1    Stiffness of left elbow joint        Aftercare following surgery of the musculoskeletal system           Follow-ups after your visit        Your next 10 appointments already scheduled     May 18, 2017  1:00 PM CDT   ASYA Hand with ROSELIA Pantoja [facility] ([facility])    [address] [place] [address]  4th [facility] [address]   [phone]            May 23, 2017 11:00 AM CDT   (Arrive by 10:45 AM)   Return Visit with Fredy Patel DO   [facility] ([facility])    [address] [place] [address]   [phone]            May 23, 2017 12:15 PM CDT   (Arrive by 12:00 PM)   MR UPPER EXTREMITY JOINT RIGHT W/O CONTRAST with [id number]   [facility] MRI ([facility])    [address] [place] [address]   [phone]           Take your medicines as usual, unless your doctor tells you not to. Bring a list of your current medicines to your exam (including vitamins, minerals and over-the-counter drugs). Also bring the results of similar scans you may have had.  Please remove any body piercings and hair extensions before you arrive.  Follow your doctor s orders. If you do not, we may have to postpone your exam.  You will not have contrast for this exam. You do not need to do anything special to prepare.  The MRI machine uses a strong magnet. Please wear clothes without metal (snaps, zippers). A sweatsuit works well, or we may give you a hospital gown.   **IMPORTANT** THE INSTRUCTIONS BELOW ARE ONLY FOR  THOSE PATIENTS WHO HAVE BEEN TOLD THEY WILL RECEIVE SEDATION OR GENERAL ANESTHESIA DURING THEIR MRI PROCEDURE:  IF YOU WILL RECEIVE SEDATION (take medicine to help you relax during your exam):   You must get the medicine from your doctor before you arrive. Bring the medicine to the exam. Do not take it at home.   Arrive one hour early. Bring someone who can take you home after the test. Your medicine will make you sleepy. After the exam, you may not drive, take a bus or take a taxi by yourself.   No eating 8 hours before your exam. You may have clear liquids up until 4 hours before your exam. (Clear liquids include water, clear tea, black coffee and fruit juice without pulp.)  IF YOU WILL RECEIVE ANESTHESIA (be asleep for your exam):   Arrive 1 1/2 hours early. Bring someone who can take you home after the test. You may not drive, take a bus or take a taxi by yourself.   No eating 8 hours before your exam. You may have clear liquids up until 4 hours before your exam. (Clear liquids include water, clear tea, black coffee and fruit juice without pulp.)   You will spend four to five hours in the recovery room.  Please call the Imaging Department at your exam site with any questions.            Jun 01, 2017 12:00 PM CDT   (Arrive by 11:45 AM)   POST-OP HAND with Carrol Gaspar MD   [facility] ([facility])    [address] [place] [address]   [phone]            Jun 12, 2017 12:00 PM CDT   (Arrive by 11:45 AM)   PAC EVALUATION with Nnacy Pac Santiago [address] [place] [address] Pre[facility] [place] ([facility])    [address] [place] [address]   [phone]            Jun 12, 2017  1:00 PM CDT   (Arrive by 12:45 PM)   PAC RN ASSESSMENT with Nancy Pac Rn   [facility] [place] ([facility])    [address] [place] [address]    [phone]            Jun 12, 2017  1:30 PM CDT   (Arrive by 1:15 PM)   PAC Anesthesia Consult with  Pac Anesthesiologist   [facility] Preoperative Assessment Center ([facility])    [address] [place] [address] [phone]            Jun 12, 2017  1:50 PM CDT   (Arrive by 1:35 PM)   RETURN SHOULDER with River Law MD   [facility] ([facility])    [address] [place] [id number] [phone]            Jun 29, 2017   Procedure with River Law MD   [facility] [place], Same Day Surgery (--)    [address]  [facility] [address] [phone]            Jul 17, 2017 12:30 PM CDT   (Arrive by 12:15 PM)   RETURN SHOULDER with River Law MD   [facility] Orthopaedic [facility] ([facility])    [address] [place] [id number] [phone]              Who to contact     If you have questions or need follow up information about today's clinic visit or your schedule please contact [facility] directly at [phone].  Normal or non-critical lab and imaging results will be communicated to you by MyChart, letter or phone within 4 business days after the clinic has received the results. If you do not hear from us within 7 days, please contact the clinic through Knowledgestreemhart or phone. If you have a critical or abnormal lab result, we will notify you by phone as soon as possible.  Submit refill requests through BlisMedia or call your pharmacy and they will forward the refill request to us. Please allow 3 business days for your refill to be completed.          Additional Information About Your Visit        BlisMedia Information     [employer]is[employer] lets you send messages to your doctor, view your test results, renew your prescriptions, schedule appointments and more. To sign up, go to [email or web address] . Click on  "\"Log in\" on the left side of the screen, which will take you to the Welcome page. Then click on \"Sign up Now\" on the right side of the page.     You will be asked to enter the access code listed below, as well as some personal information. Please follow the directions to create your username and password.     Your access code is: [email or web address]  Expires: 2017  6:31 AM     Your access code will  in 90 days. If you need help or a new code, please call your [place] clinic or [phone].        Care EveryWhere ID     This is your Care EveryWhere ID. This could be used by other organizations to access your [place] medical records  [id number]         Blood Pressure from Last 3 Encounters:   17 (!) 135/94   17 125/78   17 129/87    Weight from Last 3 Encounters:   17 73.5 kg (162 lb)   17 74.8 kg (165 lb)   17 75.2 kg (165 lb 12.8 oz)              We Performed the Following     HC OT EVAL, LOW COMPLEXITY     MANUAL THER TECH,1+REGIONS,EA 15 MIN     THERAPEUTIC EXERCISES          Today's Medication Changes          These changes are accurate as of: 17  4:19 PM.  If you have any questions, ask your nurse or doctor.               These medicines have changed or have updated prescriptions.        Dose/Directions    HYDROmorphone 2 MG tablet   Commonly known as:  DILAUDID   This may have changed:    - how much to take  - reasons to take this   Used for:  Elbow arthritis   Changed by:  Carrol Gaspar MD        Dose:  2 mg   Take 1 tablet (2 mg) by mouth every 3 hours as needed for moderate to severe pain (May take up to 2 tablets at once if needed to control pain)   Quantity:  40 tablet   Refills:  0            Where to get your medicines      Some of these will need a paper prescription and others can be bought over the counter.  Ask your nurse if you have questions.     Bring a paper prescription for each of these medications     HYDROmorphone 2 MG tablet    " oxyCODONE 5 MG IR tablet                Primary Care Provider Office Phone # Fax #    Mika Yunior Zamora [phone]       [facility] [address] [facility] [address]        Thank you!     Thank you for choosing [facility]  for your care. Our goal is always to provide you with excellent care. Hearing back from our patients is one way we can continue to improve our services. Please take a few minutes to complete the written survey that you may receive in the mail after your visit with us. Thank you!             Your Updated Medication List - Protect others around you: Learn how to safely use, store and throw away your medicines at www.disposemymeds.org.          This list is accurate as of: 5/4/17  4:19 PM.  Always use your most recent med list.                   Brand Name Dispense Instructions for use    Botulinum Toxin Type A 200 UNITS Solr    BOTOX    200 Units    Inject 200 Units as directed every 3 months       CELEBREX PO      Take 200 mg by mouth 3 times daily       ENBREL SURECLICK 50 MG/ML autoinjector   Generic drug:  Etanercept      Reported on 4/18/2017       HYDROmorphone 2 MG tablet    DILAUDID    40 tablet    Take 1 tablet (2 mg) by mouth every 3 hours as needed for moderate to severe pain (May take up to 2 tablets at once if needed to control pain)       hydrOXYzine 25 MG capsule    VISTARIL    30 capsule    Take 1 capsule (25 mg) by mouth 3 times daily as needed for itching (adjuvant pain control)       leflunomide 20 MG tablet    ARAVA     Take 20 mg by mouth every morning Reported on 3/28/2017       medroxyPROGESTERone 150 MG/ML injection    DEPO-PROVERA    3 mL    Inject 1 mL (150 mg) into the muscle every 3 months       omeprazole 40 MG capsule    priLOSEC    90 capsule    Take 1 capsule (40 mg) by mouth daily Take 30-60 minutes before a meal.       oxyCODONE 5 MG IR tablet    ROXICODONE    60 tablet    Take 1-2 tablets (5-10 mg) by mouth every 4  hours as needed for breakthrough pain or severe pain (Moderate to Severe)       sennosides 8.6 MG tablet    SENOKOT    120 tablet    Take 2 tablets by mouth 2 times daily       VITAMIN B-12 PO      Take 1 tablet by mouth daily

## 2017-05-04 NOTE — PROGRESS NOTES
Reason for visit:    Kalyn Rivero came in to the clinic for a one week post op check.    Her surgery was done 4/17/17 by Dr Gaspar.  She had Left radial head excision.      Assessment:    Kalyn came into the clinic in post operative splint Non-WB    The Surgical wounds were exposed and found to be well-healed and without evidence of infection; Her sutures are dissolvable, so the suture ends were clipped. Moderate amount of swelling, patient rates her pain at a 7/10. She states that her pain is better but than last Friday but still very painful.  She is taking Dilaudid and Oxycodone, she stated she is now out of these. Dr. Gaspar was agreeable to re-filling her Dilaudid for 1 week and also giving her a refill of her oxycodone.  Patient aware that she needs to wean from her pain meds as able.      Plan:     She was placed in a rogers brace from orthotics.  She was told to keep it locked at  degrees and to increase as pain allows. She has an appointment with hand therapy to work on edema control.      She has an appointment to see Dr. Gaspar at that time Dr. Gaspar will determine further restrictions.    She has our phone number and will call with questions or problems.

## 2017-05-16 ENCOUNTER — TELEPHONE (OUTPATIENT)
Dept: ANESTHESIOLOGY | Facility: CLINIC | Age: 47
End: 2017-05-16

## 2017-05-16 NOTE — TELEPHONE ENCOUNTER
LPN called pt in response to her staff message.  Pt had called the clinic to notify Dr. Patel that their surgeon is not prescribing pain medication for them anymore.   RN forwarded the message to Dr. Patel.   Pt stated that she was instructed to update Dr. Patel before she ran out of medication.   Pt states they have 8 Oxycodone left. Pt states they take 4 oxycodone a day.    LPN informed pt that they would update the pt with the doctors response.     Pt was agreeable.  Blanca Atkinson LPN

## 2017-05-16 NOTE — NURSING NOTE
BLOOD PRESSURE: 126/86    DATE OF LAST PAP or ANNUAL EXAM:   Lab Results   Component Value Date    PAP NIL 02/22/2017     URINE HCG:not indicated    The following medication was given:     MEDICATION: Depo Provera 150mg  ROUTE: IM  SITE: Deltoid - Left  : viaCycle  LOT #: [id number]  EXPIRATION:11/30/19  NEXT INJECTION DUE: 8/2-16/17  Provider: DR. AMBER Mejia MA

## 2017-05-17 ENCOUNTER — ALLIED HEALTH/NURSE VISIT (OUTPATIENT)
Dept: NURSING | Facility: CLINIC | Age: 47
End: 2017-05-17
Payer: COMMERCIAL

## 2017-05-17 VITALS
BODY MASS INDEX: 25.7 KG/M2 | HEART RATE: 103 BPM | DIASTOLIC BLOOD PRESSURE: 86 MMHG | WEIGHT: 159.2 LBS | SYSTOLIC BLOOD PRESSURE: 126 MMHG

## 2017-05-17 DIAGNOSIS — M19.029 ELBOW ARTHRITIS: ICD-10-CM

## 2017-05-17 DIAGNOSIS — Z30.018 ENCOUNTER FOR INITIAL PRESCRIPTION OF OTHER CONTRACEPTIVES: ICD-10-CM

## 2017-05-17 PROCEDURE — 96372 THER/PROPH/DIAG INJ SC/IM: CPT

## 2017-05-17 PROCEDURE — 99207 ZZC NO CHARGE NURSE ONLY: CPT

## 2017-05-17 RX ORDER — MEDROXYPROGESTERONE ACETATE 150 MG/ML
150 INJECTION, SUSPENSION INTRAMUSCULAR
Qty: 1 ML | Refills: 3 | OUTPATIENT
Start: 2017-05-17 | End: 2017-08-29

## 2017-05-17 RX ORDER — OXYCODONE HYDROCHLORIDE 5 MG/1
5 TABLET ORAL EVERY 4 HOURS PRN
Qty: 60 TABLET | Refills: 0 | Status: SHIPPED | OUTPATIENT
Start: 2017-05-17 | End: 2017-06-12

## 2017-05-17 NOTE — MR AVS SNAPSHOT
After Visit Summary   5/17/2017    Kalyn Rivero    MRN: [id number]           Patient Information     Date Of Birth          1970        Visit Information        Provider Department      5/17/2017 11:00 AM CP ANCILLARY [facility]        Today's Diagnoses     Encounter for initial prescription of other contraceptives           Follow-ups after your visit        Your next 10 appointments already scheduled     May 17, 2017 11:00 AM CDT   Nurse Only with CP ANCILLARY   [facility] ([facility])    [address] [facility] [address]   [phone]            May 18, 2017  1:00 PM CDT   ASYA Hand with Teddy Ryder OT   [facility] ([facility])    [address] [place] [address] [phone]            May 23, 2017 11:00 AM CDT   (Arrive by 10:45 AM)   Return Visit with Fredy Patel DO   [facility] ([facility])    [address] [place] [address] [phone]            May 23, 2017 12:15 PM CDT   (Arrive by 12:00 PM)   MR UPPER EXTREMITY JOINT RIGHT W/O CONTRAST with [id number]   [facility] MRI ([facility])    [address] [place] [address] [phone]           Take your medicines as usual, unless your doctor tells you not to. Bring a list of your current medicines to your exam (including vitamins, minerals and over-the-counter drugs). Also bring the results of similar scans you may have had.  Please remove any body piercings and hair extensions before you arrive.  Follow your doctor s orders. If you do not, we may have to postpone your exam.  You will not have contrast for this exam. You do not need to do anything special to prepare.  The MRI machine uses a strong magnet.  Please wear clothes without metal (snaps, zippers). A sweatsuit works well, or we may give you a hospital gown.   **IMPORTANT** THE INSTRUCTIONS BELOW ARE ONLY FOR THOSE PATIENTS WHO HAVE BEEN TOLD THEY WILL RECEIVE SEDATION OR GENERAL ANESTHESIA DURING THEIR MRI PROCEDURE:  IF YOU WILL RECEIVE SEDATION (take medicine to help you relax during your exam):   You must get the medicine from your doctor before you arrive. Bring the medicine to the exam. Do not take it at home.   Arrive one hour early. Bring someone who can take you home after the test. Your medicine will make you sleepy. After the exam, you may not drive, take a bus or take a taxi by yourself.   No eating 8 hours before your exam. You may have clear liquids up until 4 hours before your exam. (Clear liquids include water, clear tea, black coffee and fruit juice without pulp.)  IF YOU WILL RECEIVE ANESTHESIA (be asleep for your exam):   Arrive 1 1/2 hours early. Bring someone who can take you home after the test. You may not drive, take a bus or take a taxi by yourself.   No eating 8 hours before your exam. You may have clear liquids up until 4 hours before your exam. (Clear liquids include water, clear tea, black coffee and fruit juice without pulp.)   You will spend four to five hours in the recovery room.  Please call the Imaging Department at your exam site with any questions.            Jun 01, 2017 12:00 PM CDT   (Arrive by 11:45 AM)   POST-OP HAND with Carrol Gaspar MD   [facility] ([facility])    [address] [place] [address]   [phone]            Jun 12, 2017 12:00 PM CDT   (Arrive by 11:45 AM)   PAC EVALUATION with  Pac Santiago [address] [place] [address] Preoperative Assessment Center ([facility])    [address] [place] [address]   [phone]            Jun 12, 2017  1:00 PM CDT   (Arrive by 12:45 PM)   PAC RN ASSESSMENT  "with Nancy Pac Rn   [facility] Pre[facility] ([facility])    [address] [facility]  4th Floor  [facility] [address]   [phone]            Jun 12, 2017  1:30 PM CDT   (Arrive by 1:15 PM)   PAC Anesthesia Consult with Nancy Pac Anesthesiologist   [facility] Preoperative Assessment [place] ([facility])    [address]  4th [facility] [address]   [phone]            Jun 12, 2017  1:50 PM CDT   (Arrive by 1:35 PM)   RETURN SHOULDER with River Law MD   [facility] Orthopaedic [facility] ([facility])    [address]  4th [facility] [address] [phone]            Jun 29, 2017   Procedure with River Law MD   [facility] [place], Same Day Surgery (--)    [address] [facility] [address] [phone]              Who to contact     If you have questions or need follow up information about today's clinic visit or your schedule please contact [facility] directly at [phone].  Normal or non-critical lab and imaging results will be communicated to you by Local.comhart, letter or phone within 4 business days after the clinic has received the results. If you do not hear from us within 7 days, please contact the clinic through Local.comhart or phone. If you have a critical or abnormal lab result, we will notify you by phone as soon as possible.  Submit refill requests through kwiry or call your pharmacy and they will forward the refill request to us. Please allow 3 business days for your refill to be completed.          Additional Information About Your Visit        kwiry Information     kwiry lets you send messages to your doctor, view your test results, renew your prescriptions, schedule appointments and more. To sign up, go to [email or web address] . Click on \"Log in\" on the left side of the screen, which will take you to the Welcome page. Then " "click on \"Sign up Now\" on the right side of the page.     You will be asked to enter the access code listed below, as well as some personal information. Please follow the directions to create your username and password.     Your access code is: [email or web address]  Expires: 2017  6:31 AM     Your access code will  in 90 days. If you need help or a new code, please call your [place] clinic or [phone].        Care EveryWhere ID     This is your Care EveryWhere ID. This could be used by other organizations to access your [place] medical records  [id number]         Blood Pressure from Last 3 Encounters:   17 (!) 135/94   17 125/78   17 129/87    Weight from Last 3 Encounters:   17 162 lb (73.5 kg)   17 165 lb (74.8 kg)   17 165 lb 12.8 oz (75.2 kg)              We Performed the Following     Medroxyprogesterone inj/1mg (Depo Provera J-Code)          Where to get your medicines      Some of these will need a paper prescription and others can be bought over the counter.  Ask your nurse if you have questions.     You don't need a prescription for these medications     medroxyPROGESTERone 150 MG/ML injection          Primary Care Provider Office Phone # Fax #    Mika Zamora [phone]       [facility] [address] [facility] [address]        Thank you!     Thank you for choosing [facility]  for your care. Our goal is always to provide you with excellent care. Hearing back from our patients is one way we can continue to improve our services. Please take a few minutes to complete the written survey that you may receive in the mail after your visit with us. Thank you!             Your Updated Medication List - Protect others around you: Learn how to safely use, store and throw away your medicines at www.disposemymeds.org.          This list is accurate as of: 17 10:55 AM.  Always use your most recent med " list.                   Brand Name Dispense Instructions for use    Botulinum Toxin Type A 200 UNITS Solr    BOTOX    200 Units    Inject 200 Units as directed every 3 months       CELEBREX PO      Take 200 mg by mouth 3 times daily       ENBREL SURECLICK 50 MG/ML autoinjector   Generic drug:  Etanercept      Reported on 4/18/2017       HYDROmorphone 2 MG tablet    DILAUDID    40 tablet    Take 1 tablet (2 mg) by mouth every 3 hours as needed for moderate to severe pain (May take up to 2 tablets at once if needed to control pain)       hydrOXYzine 25 MG capsule    VISTARIL    30 capsule    Take 1 capsule (25 mg) by mouth 3 times daily as needed for itching (adjuvant pain control)       leflunomide 20 MG tablet    ARAVA     Take 20 mg by mouth every morning Reported on 3/28/2017       medroxyPROGESTERone 150 MG/ML injection    DEPO-PROVERA    1 mL    Inject 1 mL (150 mg) into the muscle every 3 months       omeprazole 40 MG capsule    priLOSEC    90 capsule    Take 1 capsule (40 mg) by mouth daily Take 30-60 minutes before a meal.       oxyCODONE 5 MG IR tablet    ROXICODONE    60 tablet    Take 1-2 tablets (5-10 mg) by mouth every 4 hours as needed for breakthrough pain or severe pain (Moderate to Severe)       sennosides 8.6 MG tablet    SENOKOT    120 tablet    Take 2 tablets by mouth 2 times daily       VITAMIN B-12 PO      Take 1 tablet by mouth daily

## 2017-05-18 ENCOUNTER — THERAPY VISIT (OUTPATIENT)
Dept: OCCUPATIONAL THERAPY | Facility: CLINIC | Age: 47
End: 2017-05-18
Payer: COMMERCIAL

## 2017-05-18 DIAGNOSIS — M25.522 ELBOW PAIN, LEFT: Primary | ICD-10-CM

## 2017-05-18 DIAGNOSIS — Z47.89 AFTERCARE FOLLOWING SURGERY OF THE MUSCULOSKELETAL SYSTEM: ICD-10-CM

## 2017-05-18 DIAGNOSIS — M25.622 STIFFNESS OF LEFT ELBOW JOINT: ICD-10-CM

## 2017-05-18 DIAGNOSIS — M19.029 ELBOW ARTHRITIS: ICD-10-CM

## 2017-05-18 PROCEDURE — 97140 MANUAL THERAPY 1/> REGIONS: CPT | Mod: GO | Performed by: OCCUPATIONAL THERAPIST

## 2017-05-18 PROCEDURE — 97110 THERAPEUTIC EXERCISES: CPT | Mod: GO | Performed by: OCCUPATIONAL THERAPIST

## 2017-05-18 RX ORDER — HYDROXYZINE PAMOATE 25 MG/1
25 CAPSULE ORAL 3 TIMES DAILY PRN
Qty: 30 CAPSULE | Refills: 0 | Status: SHIPPED | OUTPATIENT
Start: 2017-05-18 | End: 2017-06-07

## 2017-05-18 NOTE — MR AVS SNAPSHOT
After Visit Summary   5/18/2017    Kalyn Rivero    MRN: [id number]           Patient Information     Date Of Birth          1970        Visit Information        Provider Department      5/18/2017 1:00 PM Teddy Ryder OT [facility]        Today's Diagnoses     Elbow pain, left    -  1    Aftercare following surgery of the musculoskeletal system        Stiffness of left elbow joint           Follow-ups after your visit        Your next 10 appointments already scheduled     May 23, 2017 11:00 AM CDT   (Arrive by 10:45 AM)   Return Visit with Fredy Patel DO   [facility] [place] [facility] Comprehensive [facility] ([facility])    [address] [place] [address]  4th [facility] [id number] [phone]            May 23, 2017 12:15 PM CDT   (Arrive by 12:00 PM)   MR UPPER EXTREMITY JOINT RIGHT W/O CONTRAST with [id number]   [facility] MRI ([facility])    [address] [place] [address]  1st [facility] [id number] [phone]           Take your medicines as usual, unless your doctor tells you not to. Bring a list of your current medicines to your exam (including vitamins, minerals and over-the-counter drugs). Also bring the results of similar scans you may have had.  Please remove any body piercings and hair extensions before you arrive.  Follow your doctor s orders. If you do not, we may have to postpone your exam.  You will not have contrast for this exam. You do not need to do anything special to prepare.  The MRI machine uses a strong magnet. Please wear clothes without metal (snaps, zippers). A sweatsuit works well, or we may give you a hospital gown.   **IMPORTANT** THE INSTRUCTIONS BELOW ARE ONLY FOR THOSE PATIENTS WHO HAVE BEEN TOLD THEY WILL RECEIVE SEDATION OR GENERAL ANESTHESIA DURING THEIR MRI PROCEDURE:  IF YOU WILL RECEIVE SEDATION (take medicine to help you relax during your exam):   You must get the  medicine from your doctor before you arrive. Bring the medicine to the exam. Do not take it at home.   Arrive one hour early. Bring someone who can take you home after the test. Your medicine will make you sleepy. After the exam, you may not drive, take a bus or take a taxi by yourself.   No eating 8 hours before your exam. You may have clear liquids up until 4 hours before your exam. (Clear liquids include water, clear tea, black coffee and fruit juice without pulp.)  IF YOU WILL RECEIVE ANESTHESIA (be asleep for your exam):   Arrive 1 1/2 hours early. Bring someone who can take you home after the test. You may not drive, take a bus or take a taxi by yourself.   No eating 8 hours before your exam. You may have clear liquids up until 4 hours before your exam. (Clear liquids include water, clear tea, black coffee and fruit juice without pulp.)   You will spend four to five hours in the recovery room.  Please call the Imaging Department at your exam site with any questions.            Jun 01, 2017 12:00 PM CDT   (Arrive by 11:45 AM)   POST-OP HAND with Carrol Gaspar MD   [facility] ([facility])    [address] [place] [address]   [phone]            Jun 12, 2017 12:00 PM CDT   (Arrive by 11:45 AM)   PAC EVALUATION with  Pac Santiago [address] [place] [address] Preoperative Assessment [place] ([facility])    [address] [place] [address]   [phone]            Jun 12, 2017  1:00 PM CDT   (Arrive by 12:45 PM)   PAC RN ASSESSMENT with Nancy Pac Rn   [facility] Preoperative Assessment [place] ([facility])    [address] [place] [address]   [phone]            Jun 12, 2017  1:30 PM CDT   (Arrive by 1:15 PM)   PAC Anesthesia Consult with Nancy Pac Anesthesiologist   [facility] Preoperative Assessment [place] ([facility])    [facility]  [address] [address]   [phone]            Jun 12, 2017  1:50 PM CDT   (Arrive by 1:35 PM)   RETURN SHOULDER with River Law MD   [facility] [place] ([facility] [place])    [address] [place] [address]   [phone]            Jun 29, 2017   Procedure with River Law MD   [facility] [place], Same Day Surgery (--)    [address]  [place] [address]   [phone]            Jul 17, 2017 12:30 PM CDT   (Arrive by 12:15 PM)   RETURN SHOULDER with River Law MD   [facility] [place] ([facility] [place])    [address] [place] [address]   [phone]            Aug 14, 2017 10:10 AM CDT   (Arrive by 9:55 AM)   RETURN SHOULDER with River Law MD   [facility] [place] ([facility] [place])    [address] [place] [id number] [phone]              Who to contact     If you have questions or need follow up information about today's clinic visit or your schedule please contact [facility] directly at [phone].  Normal or non-critical lab and imaging results will be communicated to you by [employer], letter or phone within 4 business days after the clinic has received the results. If you do not hear from us within 7 days, please contact the clinic through [employer] or phone. If you have a critical or abnormal lab result, we will notify you by phone as soon as possible.  Submit refill requests through [employer] or call your pharmacy and they will forward the refill request to us. Please allow 3 business days for your refill to be completed.          Additional Information About Your Visit        [employer] Information     [employer] lets you send messages to your doctor, view your test results, renew your prescriptions, schedule appointments and more. To sign up, go  "to [email or web address] . Click on \"Log in\" on the left side of the screen, which will take you to the Welcome page. Then click on \"Sign up Now\" on the right side of the page.     You will be asked to enter the access code listed below, as well as some personal information. Please follow the directions to create your username and password.     Your access code is: [email or web address]  Expires: 2017  6:31 AM     Your access code will  in 90 days. If you need help or a new code, please call your [facility] clinic or [phone].        Care EveryWhere ID     This is your Care EveryWhere ID. This could be used by other organizations to access your [facility] medical records  [id number]         Blood Pressure from Last 3 Encounters:   17 126/86   17 (!) 135/94   17 125/78    Weight from Last 3 Encounters:   17 72.2 kg (159 lb 3.2 oz)   17 73.5 kg (162 lb)   17 74.8 kg (165 lb)              We Performed the Following     MANUAL THER TECH,1+REGIONS,EA 15 MIN     THERAPEUTIC EXERCISES          Where to get your medicines      These medications were sent to [facility]/pharmacy #[id number] - [place], [place] - [address] [address]     Phone:  [phone]     hydrOXYzine 25 MG capsule          Primary Care Provider Office Phone # Fax #    Mika Zamora [phone]       [facility] [address] [facility] [address]        Thank you!     Thank you for choosing [facility]  for your care. Our goal is always to provide you with excellent care. Hearing back from our patients is one way we can continue to improve our services. Please take a few minutes to complete the written survey that you may receive in the mail after your visit with us. Thank you!             Your Updated Medication List - Protect others around you: Learn how to safely use, store and throw away your medicines at www.disposemymeds.org.        "   This list is accurate as of: 5/18/17  3:55 PM.  Always use your most recent med list.                   Brand Name Dispense Instructions for use    Botulinum Toxin Type A 200 UNITS Solr    BOTOX    200 Units    Inject 200 Units as directed every 3 months       CELEBREX PO      Take 200 mg by mouth 3 times daily       ENBREL SURECLICK 50 MG/ML autoinjector   Generic drug:  Etanercept      Reported on 4/18/2017       HYDROmorphone 2 MG tablet    DILAUDID    40 tablet    Take 1 tablet (2 mg) by mouth every 3 hours as needed for moderate to severe pain (May take up to 2 tablets at once if needed to control pain)       hydrOXYzine 25 MG capsule    VISTARIL    30 capsule    Take 1 capsule (25 mg) by mouth 3 times daily as needed for itching (adjuvant pain control)       leflunomide 20 MG tablet    ARAVA     Take 20 mg by mouth every morning Reported on 3/28/2017       medroxyPROGESTERone 150 MG/ML injection    DEPO-PROVERA    1 mL    Inject 1 mL (150 mg) into the muscle every 3 months       omeprazole 40 MG capsule    priLOSEC    90 capsule    Take 1 capsule (40 mg) by mouth daily Take 30-60 minutes before a meal.       oxyCODONE 5 MG IR tablet    ROXICODONE    60 tablet    Take 1 tablet (5 mg) by mouth every 4 hours as needed for breakthrough pain or severe pain (Moderate to Severe (MAX OF 2 PER DAY))       sennosides 8.6 MG tablet    SENOKOT    120 tablet    Take 2 tablets by mouth 2 times daily       VITAMIN B-12 PO      Take 1 tablet by mouth daily

## 2017-06-01 ENCOUNTER — OFFICE VISIT (OUTPATIENT)
Dept: ORTHOPEDICS | Facility: CLINIC | Age: 47
End: 2017-06-01

## 2017-06-01 DIAGNOSIS — Z09 SURGICAL FOLLOW-UP CARE: Primary | ICD-10-CM

## 2017-06-01 NOTE — LETTER
6/1/2017       RE: Kalyn Rivero  [address] [facility] [address]     Dear Colleague,    Thank you for referring your patient, Kalyn Rivero, to the [facility] at [facility]. Please see a copy of my visit note below.    HISTORY OF PRESENT ILLNESS:  Kalyn is a 46-year-old right-hand dominant female who had a left radial head resection on 04/24/2017.  She has been in a hinged elbow brace.  She has been working on range of motion.  She has been favoring her left arm quite a bit so her right arm symptoms have been increased with a combination of a total elbow on the right, right shoulder pathology and right neck pathology.      She reports that her left arm actually feels pretty good.  It hurts a lot less than the right arm hurts.  Everything hurts, though.  She reports that her left arm hurts less than it did prior to the surgery.  She is more concerned because she cannot straighten the left elbow anymore and she would like to use her wax machine to help loosen it up.  She does a lot of her therapy with her PCAs, who are with her here today.  She has only gone to see the therapist once formally.      PHYSICAL EXAMINATION:  Today, she has her hinged elbow brace with her that is locked at 30.  This is opened to zero and she is told she only has to wear it for protection.  Otherwise, she will be brace free.  We will start her on passive range of motion.  Her range of motion is from 20 to full flexion.  She has full pronation and full supination, neither of them are painful, but she does have a distal clunk in her ulna with rotation.      IMAGING:  X-rays are taken today and show excellent position of her radial head resection with the proximal radial neck sitting in the proximal radioulnar joint.      IMPRESSION:  Satisfactory postoperative course.      PLAN:  At this time, she will advance to passive range of motion, strengthening and  discontinuing her brace.  I will see her back should further questions or problems arise.  No further surgery is scheduled at this time for her upper extremities.   Carrol Gaspar MD

## 2017-06-01 NOTE — PROGRESS NOTES
HISTORY OF PRESENT ILLNESS:  Kalyn is a 46-year-old right-hand dominant female who had a left radial head resection on 04/24/2017.  She has been in a hinged elbow brace.  She has been working on range of motion.  She has been favoring her left arm quite a bit so her right arm symptoms have been increased with a combination of a total elbow on the right, right shoulder pathology and right neck pathology.      She reports that her left arm actually feels pretty good.  It hurts a lot less than the right arm hurts.  Everything hurts, though.  She reports that her left arm hurts less than it did prior to the surgery.  She is more concerned because she cannot straighten the left elbow anymore and she would like to use her wax machine to help loosen it up.  She does a lot of her therapy with her PCAs, who are with her here today.  She has only gone to see the therapist once formally.      PHYSICAL EXAMINATION:  Today, she has her hinged elbow brace with her that is locked at 30.  This is opened to zero and she is told she only has to wear it for protection.  Otherwise, she will be brace free.  We will start her on passive range of motion.  Her range of motion is from 20 to full flexion.  She has full pronation and full supination, neither of them are painful, but she does have a distal clunk in her ulna with rotation.      IMAGING:  X-rays are taken today and show excellent position of her radial head resection with the proximal radial neck sitting in the proximal radioulnar joint.      IMPRESSION:  Satisfactory postoperative course.      PLAN:  At this time, she will advance to passive range of motion, strengthening and discontinuing her brace.  I will see her back should further questions or problems arise.  No further surgery is scheduled at this time for her upper extremities.

## 2017-06-01 NOTE — NURSING NOTE
Reason For Visit:   Chief Complaint   Patient presents with     Surgical Followup     Post-op, left elbow radial head excision, DOS 4/24/17     Age: 46 year old    Date of surgery: 4/27/17    Pain Assessment  Patient Currently in Pain: Yes  Primary Pain Location: Elbow  Pain Orientation: Left  Pain Descriptors: Sore, Discomfort, Constant - has improved slightly since surgery  Alleviating Factors: Pain medication (ran out today)  Aggravating Factors: Bending, Movement    Current Outpatient Prescriptions   Medication     hydrOXYzine (VISTARIL) 25 MG capsule     medroxyPROGESTERone (DEPO-PROVERA) 150 MG/ML injection     oxyCODONE (ROXICODONE) 5 MG IR tablet     HYDROmorphone (DILAUDID) 2 MG tablet     sennosides (SENOKOT) 8.6 MG tablet     ENBREL SURECLICK 50 MG/ML autoinjector     Cyanocobalamin (VITAMIN B-12 PO)     leflunomide (ARAVA) 20 MG tablet     Botulinum Toxin Type A (BOTOX) 200 UNITS SOLR     omeprazole (PRILOSEC) 40 MG capsule     Celecoxib (CELEBREX PO)     No current facility-administered medications for this visit.      Facility-Administered Medications Ordered in Other Visits   Medication     bupivacaine 0.5 % -  EPINEPHrine 1:200,000 injection     mepivacaine (PF) (CARBOCAINE) 2 % injection

## 2017-06-06 ENCOUNTER — TELEPHONE (OUTPATIENT)
Dept: ANESTHESIOLOGY | Facility: CLINIC | Age: 47
End: 2017-06-06

## 2017-06-06 NOTE — TELEPHONE ENCOUNTER
Reminder call placed to pt.   Pt reminded of Provider, date and time of the appointment.   Pt was asked to arrive 15 minutes early to fill out the questionnaires.   Clinic phone number provided if pt needed to reschedule.     Paz Walton, CMA

## 2017-06-07 ENCOUNTER — OFFICE VISIT (OUTPATIENT)
Dept: ANESTHESIOLOGY | Facility: CLINIC | Age: 47
End: 2017-06-07

## 2017-06-07 VITALS
BODY MASS INDEX: 26.39 KG/M2 | DIASTOLIC BLOOD PRESSURE: 102 MMHG | HEIGHT: 66 IN | HEART RATE: 89 BPM | SYSTOLIC BLOOD PRESSURE: 147 MMHG | WEIGHT: 164.2 LBS | OXYGEN SATURATION: 98 %

## 2017-06-07 DIAGNOSIS — R51.9 SEVERE FRONTAL HEADACHES: ICD-10-CM

## 2017-06-07 DIAGNOSIS — F11.21 OPIOID DEPENDENCE IN REMISSION (H): ICD-10-CM

## 2017-06-07 DIAGNOSIS — G47.9 SLEEP DISTURBANCE: ICD-10-CM

## 2017-06-07 DIAGNOSIS — M79.7 FIBROMYALGIA: ICD-10-CM

## 2017-06-07 DIAGNOSIS — M75.101 TEAR OF RIGHT ROTATOR CUFF, UNSPECIFIED TEAR EXTENT: ICD-10-CM

## 2017-06-07 DIAGNOSIS — G89.29 COPING WITH CHRONIC PAIN: ICD-10-CM

## 2017-06-07 DIAGNOSIS — M06.9 RHEUMATOID ARTHRITIS INVOLVING MULTIPLE SITES, UNSPECIFIED RHEUMATOID FACTOR PRESENCE: Primary | ICD-10-CM

## 2017-06-07 RX ORDER — ACETAMINOPHEN 500 MG
500-1000 TABLET ORAL EVERY 8 HOURS PRN
COMMUNITY
Start: 2017-06-07 | End: 2017-06-07

## 2017-06-07 RX ORDER — OXYCODONE HYDROCHLORIDE 5 MG/1
5 TABLET ORAL EVERY 6 HOURS PRN
Qty: 120 TABLET | Refills: 0 | Status: SHIPPED | OUTPATIENT
Start: 2017-06-07 | End: 2017-06-29

## 2017-06-07 RX ORDER — ACETAMINOPHEN 500 MG
500-1000 TABLET ORAL EVERY 8 HOURS PRN
Qty: 60 TABLET | Refills: 3 | Status: SHIPPED | OUTPATIENT
Start: 2017-06-07 | End: 2017-08-29

## 2017-06-07 RX ORDER — PREGABALIN 25 MG/1
25 CAPSULE ORAL 2 TIMES DAILY
Qty: 60 CAPSULE | Refills: 1 | Status: SHIPPED | OUTPATIENT
Start: 2017-06-07 | End: 2017-07-05

## 2017-06-07 ASSESSMENT — PAIN SCALES - GENERAL: PAINLEVEL: WORST PAIN (10)

## 2017-06-07 NOTE — LETTER
6/7/2017       RE: Kalyn Rivero  [address] [facility] [address]     Dear Colleague,    Thank you for referring your patient, Kalyn Rivero, to the [facility] FOR COMPREHENSIVE [facility]IN MANAGEMENT at [facility]. Please see a copy of my visit note below.    Date of visit: 6/7/2017    Chief complaint: No chief complaint on file.      Interval history:  Kalyn Rivero is a 46 year old female last seen by me on 3/27/17.   She no-showed her May appointment.    Recommendations/plan at the last visit included:  1. Interventions: none at this time -she is adamantly opposed to any injections.  2. Medication Management: Continue oxycodone at this time until she is restarted on Enbrel.  She is taking her dosages appropriately.   Continue celebrex.   Will consider low dose naltrexone for her Fibromyalgia once her opioids are DC'd after the perioperative period.  3. Physical Therapy:  Continue HEP  4. Need for Clinical Health Psychologist.she is to continue her behavioral therapy with Dr. Randle  5. Diagnostic Studies:  Recommend cervical MRI given EMG results.  She states that she is going to keep her spine center appointment and that they will likely order a full spine MRI.  6. Follow up: 2 months, she will report for     Since her last visit, Kalyn Rivero reports:  She is now s/p a left radial head resection on 4/24/17.  This is following an excision of a loose body in her right elbow in march. Per Dr. Sawyer's note she has had a satisfactory postoperative course and has participated in PT and there are no further surgeries scheduled for her upper extremities at this time.  However, upon chart review she is scheduled for a right shoulder arthroplasty on 6/29/17 with Dr. Law.   The downside to all of her procedures is that she has been unable to resume her RA therapy with Dr. Light (Enbrel) which she can resume 6 weeks after her last surgery.    She has been following with his clinic for almost 20 years - a recent in-basket message sent to him had no response to confirm the plan for her RA meds.  In the meantime her RA pain has flared greatly, causing loss of sleep, depressed mood and finger/joint swelling to the point she cannot remove her rings.   I have allowed a temporary bridge with opiate therapy until she can resume her enbrel and Arava- the end point being 6 weeks after her last surgery.   This has consisted of oxycodone 5mg q6hrs prn + non opioid adjuvants for her chronic pain.   The surgery team has then managed her acute postoperative pain control.    Despite this she has had severe pain after both her previous surgeries requiring ED admission - which she says is normal after every surgery she has had - even with regional anesthesia in the perioperative period.     This time she does have a PAC clinic appointment in the effort to assist in her mauri-operative plan.    Her barriers to successful long term therapy with opioid medications include previous history of dependence on high doses and depression with previous SA years ago and significant psychosocial stressors.   She has a history of tylenol overdose and fatty liver but her enzymes have returned to normal a while back.   A review of her  shows a recent prescription from Dr. Light for 60 Oxycodone 5mg tablets on the 17th that was filled on the 17th.   A review of my records show that Dr. López wrote the same prescription for her at the same time.  She is adamant she filled Dr. López's prescription and that she never received a script from Dr. Light.   I am confused as to what exactly happened but either way she states she is completely out of Oxycodone at this time.        She did undergoe botox injections with Dr. Burnham for her migraines which has helped quite a bit and would like to repeat them in July.  She will need a referral to another provider as Dr. Burnham is no longer  with the clinic.    Pain scores:  Pain intensity on average is 10 on a scale of 0-10.     Current pain treatments:   Celebrex 200mg q8hrs prn - very helpful  Enbrel - twice a week infusions - currently on hold.  Arava - on hold 2/2 noncompliance with labwork      Past pain treatments:  Percocet- helpful  Hydrocodone - helpful (hx of opioid dependence however)  Gabapentin - caused paresthesias, poorly tolerated  Topamax  Depakote  Acetaminophen - she reports elevated LFT's while on chronic acetaminophen. Last labs LFT's on 4/14/16 showed very mildly elevated ALT/AST.  Left hip IA injection 1/5/17 - no relief.  Melatonin - ho help      Side Effects: no side effect    Medications:  Current Outpatient Prescriptions   Medication Sig Dispense Refill     hydrOXYzine (VISTARIL) 25 MG capsule Take 1 capsule (25 mg) by mouth 3 times daily as needed for itching (adjuvant pain control) 30 capsule 0     medroxyPROGESTERone (DEPO-PROVERA) 150 MG/ML injection Inject 1 mL (150 mg) into the muscle every 3 months 1 mL 3     oxyCODONE (ROXICODONE) 5 MG IR tablet Take 1 tablet (5 mg) by mouth every 4 hours as needed for breakthrough pain or severe pain (Moderate to Severe (MAX OF 2 PER DAY)) 60 tablet 0     HYDROmorphone (DILAUDID) 2 MG tablet Take 1 tablet (2 mg) by mouth every 3 hours as needed for moderate to severe pain (May take up to 2 tablets at once if needed to control pain) 40 tablet 0     sennosides (SENOKOT) 8.6 MG tablet Take 2 tablets by mouth 2 times daily 120 tablet 1     ENBREL SURECLICK 50 MG/ML autoinjector Reported on 4/18/2017       Cyanocobalamin (VITAMIN B-12 PO) Take 1 tablet by mouth daily        leflunomide (ARAVA) 20 MG tablet Take 20 mg by mouth every morning Reported on 3/28/2017       Botulinum Toxin Type A (BOTOX) 200 UNITS SOLR Inject 200 Units as directed every 3 months 200 Units 3     omeprazole (PRILOSEC) 40 MG capsule Take 1 capsule (40 mg) by mouth daily Take 30-60 minutes before a meal. 90 capsule  "2     Celecoxib (CELEBREX PO) Take 200 mg by mouth 3 times daily         Medical History: any changes in medical history since they were last seen? as in interval history    Review of Systems:  The 14 system ROS was reviewed from the intake questionnaire, and is positive for: swollen fingers and joints, back and neck pain, depression.  Any bowel or bladder problems: no  Mood: tearful, in pain    Physical Exam:  not currently breastfeeding.  General: Mild distress due to pain  Gait: Antalgic  MSK exam: +TTP in almost all joints with discrete swelling of all digits in both hands.  limted ROM of motion in extension at both elbows.  Limited ROM in right shoulder with extension and abduction, +imingement signs for rotator cuff involvement.      Assessment:   1. Polyarticular rheumatoid arthritis - treatement \"suspended\" for surgery currently  2. Migraine headaches - responding to Botox  3. Chronic cervicalgia now with EMG evidence of right polyradiculopathies  4. Chronic bilateral elbow pain - s/p surgery 3/2017 and 4/2017  5. Chronic right shoulder pain 2/2 rotator cuff tear - surgery scheduled for 6/29/17  7. Fibromyalgia  8. Hx of Opioid dependence  9. Coping with chronic pain    Kalyn Rivero is a 46 year old female who is seen at the pain clinic for the above noted complaints.    Plan:  1. Interventions: Referal to PM&R placed for continued botox injections.  2. Medication Management: After another long discussion about therapies I explained that the oxycodone I prescribed for her chronic pain has a discrete end point 6 weeks after her surgery, and that we are using it temporarily to replace her normal DMARD therapy.  She voiced understanding.  I also emphasized the importance of non-opioid adjuvans which she reluctantly agreed to try.  1. Oxycodone 5mg q6hrs prn sever pain - 1 mo supply given  2. Continue Celebrex at current dose  3. Tyelnol 1000mg q8hrs - recent LFT's are normal -confirmatory tests for hep C " were negative  4. Lyrica 25mg BID - will increase as tolerated  3. Physical Therapy:  She is to engage in recommended post op PT for her shoulder.  4. Need for Clinical Health Psychologist.SHe is following with Dr. Randle - I asked Kalyn to contact her for another appointment before her surgery as she is dealing with a lot of stress lately and her pain is increasing.  5. Diagnostic Studies:  none  6. Follow up: 6 weeks after surgery - she is to keep her PAC appoointment.    Total time spent was 40 minutes, and more than 50% of face to face time was spent in counseling and/or coordination of care regarding the above plan.    Fredy Patel DO    [facility]  Pain Management  Department of Anesthesiology

## 2017-06-07 NOTE — PROGRESS NOTES
Date of visit: 6/7/2017    Chief complaint: No chief complaint on file.      Interval history:  Kalyn Rivero is a 46 year old female last seen by me on 3/27/17.   She no-showed her May appointment.    Recommendations/plan at the last visit included:  1. Interventions: none at this time -she is adamantly opposed to any injections.  2. Medication Management: Continue oxycodone at this time until she is restarted on Enbrel.  She is taking her dosages appropriately.   Continue celebrex.   Will consider low dose naltrexone for her Fibromyalgia once her opioids are DC'd after the perioperative period.  3. Physical Therapy:  Continue HEP  4. Need for Clinical Health Psychologist.she is to continue her behavioral therapy with Dr. Randle  5. Diagnostic Studies:  Recommend cervical MRI given EMG results.  She states that she is going to keep her spine center appointment and that they will likely order a full spine MRI.  6. Follow up: 2 months, she will report for     Since her last visit, Kalyn Rivero reports:  She is now s/p a left radial head resection on 4/24/17.  This is following an excision of a loose body in her right elbow in march. Per Dr. Sawyer's note she has had a satisfactory postoperative course and has participated in PT and there are no further surgeries scheduled for her upper extremities at this time.  However, upon chart review she is scheduled for a right shoulder arthroplasty on 6/29/17 with Dr. Law.   The downside to all of her procedures is that she has been unable to resume her RA therapy with Dr. Light (Enbrel) which she can resume 6 weeks after her last surgery.   She has been following with his clinic for almost 20 years - a recent in-basket message sent to him had no response to confirm the plan for her RA meds.  In the meantime her RA pain has flared greatly, causing loss of sleep, depressed mood and finger/joint swelling to the point she cannot remove her rings.   I have  allowed a temporary bridge with opiate therapy until she can resume her enbrel and Arava- the end point being 6 weeks after her last surgery.   This has consisted of oxycodone 5mg q6hrs prn + non opioid adjuvants for her chronic pain.   The surgery team has then managed her acute postoperative pain control.    Despite this she has had severe pain after both her previous surgeries requiring ED admission - which she says is normal after every surgery she has had - even with regional anesthesia in the perioperative period.     This time she does have a PAC clinic appointment in the effort to assist in her mauri-operative plan.    Her barriers to successful long term therapy with opioid medications include previous history of dependence on high doses and depression with previous SA years ago and significant psychosocial stressors.   She has a history of tylenol overdose and fatty liver but her enzymes have returned to normal a while back.   A review of her  shows a recent prescription from Dr. Light for 60 Oxycodone 5mg tablets on the 17th that was filled on the 17th.   A review of my records show that Dr. López wrote the same prescription for her at the same time.  She is adamant she filled Dr. López's prescription and that she never received a script from Dr. Light.   I am confused as to what exactly happened but either way she states she is completely out of Oxycodone at this time.        She did undergoe botox injections with Dr. Burnham for her migraines which has helped quite a bit and would like to repeat them in July.  She will need a referral to another provider as Dr. Burnham is no longer with the clinic.    Pain scores:  Pain intensity on average is 10 on a scale of 0-10.     Current pain treatments:   Celebrex 200mg q8hrs prn - very helpful  Enbrel - twice a week infusions - currently on hold.  Arava - on hold 2/2 noncompliance with labwork      Past pain treatments:  Percocet- helpful  Hydrocodone  - helpful (hx of opioid dependence however)  Gabapentin - caused paresthesias, poorly tolerated  Topamax  Depakote  Acetaminophen - she reports elevated LFT's while on chronic acetaminophen. Last labs LFT's on 4/14/16 showed very mildly elevated ALT/AST.  Left hip IA injection 1/5/17 - no relief.  Melatonin - ho help      Side Effects: no side effect    Medications:  Current Outpatient Prescriptions   Medication Sig Dispense Refill     hydrOXYzine (VISTARIL) 25 MG capsule Take 1 capsule (25 mg) by mouth 3 times daily as needed for itching (adjuvant pain control) 30 capsule 0     medroxyPROGESTERone (DEPO-PROVERA) 150 MG/ML injection Inject 1 mL (150 mg) into the muscle every 3 months 1 mL 3     oxyCODONE (ROXICODONE) 5 MG IR tablet Take 1 tablet (5 mg) by mouth every 4 hours as needed for breakthrough pain or severe pain (Moderate to Severe (MAX OF 2 PER DAY)) 60 tablet 0     HYDROmorphone (DILAUDID) 2 MG tablet Take 1 tablet (2 mg) by mouth every 3 hours as needed for moderate to severe pain (May take up to 2 tablets at once if needed to control pain) 40 tablet 0     sennosides (SENOKOT) 8.6 MG tablet Take 2 tablets by mouth 2 times daily 120 tablet 1     ENBREL SURECLICK 50 MG/ML autoinjector Reported on 4/18/2017       Cyanocobalamin (VITAMIN B-12 PO) Take 1 tablet by mouth daily        leflunomide (ARAVA) 20 MG tablet Take 20 mg by mouth every morning Reported on 3/28/2017       Botulinum Toxin Type A (BOTOX) 200 UNITS SOLR Inject 200 Units as directed every 3 months 200 Units 3     omeprazole (PRILOSEC) 40 MG capsule Take 1 capsule (40 mg) by mouth daily Take 30-60 minutes before a meal. 90 capsule 2     Celecoxib (CELEBREX PO) Take 200 mg by mouth 3 times daily         Medical History: any changes in medical history since they were last seen? as in interval history    Review of Systems:  The 14 system ROS was reviewed from the intake questionnaire, and is positive for: swollen fingers and joints, back and neck  "pain, depression.  Any bowel or bladder problems: no  Mood: tearful, in pain    Physical Exam:  not currently breastfeeding.  General: Mild distress due to pain  Gait: Antalgic  MSK exam: +TTP in almost all joints with discrete swelling of all digits in both hands.  limted ROM of motion in extension at both elbows.  Limited ROM in right shoulder with extension and abduction, +imingement signs for rotator cuff involvement.      Assessment:   1. Polyarticular rheumatoid arthritis - treatement \"suspended\" for surgery currently  2. Migraine headaches - responding to Botox  3. Chronic cervicalgia now with EMG evidence of right polyradiculopathies  4. Chronic bilateral elbow pain - s/p surgery 3/2017 and 4/2017  5. Chronic right shoulder pain 2/2 rotator cuff tear - surgery scheduled for 6/29/17  7. Fibromyalgia  8. Hx of Opioid dependence  9. Coping with chronic pain    Kalyn Rivero is a 46 year old female who is seen at the pain clinic for the above noted complaints.    Plan:  1. Interventions: Referal to PM&R placed for continued botox injections.  2. Medication Management: After another long discussion about therapies I explained that the oxycodone I prescribed for her chronic pain has a discrete end point 6 weeks after her surgery, and that we are using it temporarily to replace her normal DMARD therapy.  She voiced understanding.  I also emphasized the importance of non-opioid adjuvans which she reluctantly agreed to try.  1. Oxycodone 5mg q6hrs prn sever pain - 1 mo supply given  2. Continue Celebrex at current dose  3. Tyelnol 1000mg q8hrs - recent LFT's are normal -confirmatory tests for hep C were negative  4. Lyrica 25mg BID - will increase as tolerated  3. Physical Therapy:  She is to engage in recommended post op PT for her shoulder.  4. Need for Clinical Health Psychologist.SHe is following with Dr. Randle - I asked Kalyn to contact her for another appointment before her surgery as she is dealing " with a lot of stress lately and her pain is increasing.  5. Diagnostic Studies:  none  6. Follow up: 6 weeks after surgery - she is to keep her PAC appoointment.    Total time spent was 40 minutes, and more than 50% of face to face time was spent in counseling and/or coordination of care regarding the above plan.    Fredy Patel DO    [facility]  Pain Management  Department of Anesthesiology

## 2017-06-07 NOTE — PATIENT INSTRUCTIONS
1.Start taking Lyrica.  Lyrica 1 tab= 25mg   The prescription will not give the full details as outlined below.  Lyrica 1 tab=25mg  Day Morning Afternoon Bedtime   1-3 0 0 1   4-6 1 0 1   7-9 1 1 1     Don't drive on this medication until you know how it effects you.  Common side effects are drowsiness and dizziness  You can go slower if any side effects  Once on higher doses, you cannot stop this medication abruptly.  Tapering instructions would need to be provided by a medical professional.  Please call the clinic if your insurance denies the prescription and a prior authorization will be place      2. Start taking extra strenth tylenol  1000 mg every 8 hours for pain or discomfort    3. Continue Celebrex    4. Continue oxycodone    5. Referral to PMR physicians      Follow up:    In clinic in August      To speak with a nurse, schedule/reschedule/cancel a clinic appointment, or request a medication refill call: [phone]     You can also reach us by cookdinner: [email or web address]    For refills, please call on Monday, 1 week before your medication runs out. The doctors are not always in clinic, so this gives us time to get your prescriptions ready.  Please let us know the name of the medication you are requesting a refill of.

## 2017-06-07 NOTE — MR AVS SNAPSHOT
After Visit Summary   6/7/2017    Kalyn Rivero    MRN: [id number]           Patient Information     Date Of Birth          1970        Visit Information        Provider Department      6/7/2017 8:00 AM Fredy Patel DO [facility]        Today's Diagnoses     Rheumatoid arthritis involving multiple sites, unspecified rheumatoid factor presence (H)    -  1    Fibromyalgia        Coping with chronic pain        Opioid dependence in remission (H)        Sleep disturbance        Tear of right rotator cuff, unspecified tear extent        Severe frontal headaches          Care Instructions    1.Start taking Lyrica.  Lyrica 1 tab= 25mg   The prescription will not give the full details as outlined below.  Lyrica 1 tab=25mg  Day Morning Afternoon Bedtime   1-3 0 0 1   4-6 1 0 1   7-9 1 1 1     Don't drive on this medication until you know how it effects you.  Common side effects are drowsiness and dizziness  You can go slower if any side effects  Once on higher doses, you cannot stop this medication abruptly.  Tapering instructions would need to be provided by a medical professional.  Please call the clinic if your insurance denies the prescription and a prior authorization will be place      2. Start taking extra strenth tylenol  1000 mg every 8 hours for pain or discomfort    3. Continue Celebrex    4. Continue oxycodone    5. Referral to PMR physicians      Follow up:    In clinic in August      To speak with a nurse, schedule/reschedule/cancel a clinic appointment, or request a medication refill call: [phone]     You can also reach us by Shogether: [email or web address]    For refills, please call on Monday, 1 week before your medication runs out. The doctors are not always in clinic, so this gives us time to get your prescriptions ready.  Please let us know the name of the medication you are requesting a refill of.                                    Follow-ups after your visit        Additional Services     PHYSIATRY REFERRAL       Your provider has referred you to: [facility]: Physical Medicine and Rehabilitation Clinic [facility] [phone]   [email or web address]  - DR. HERNÁNDEZ - BOTOX INJECTIONS FOR MIGRAINES    Please be aware that coverage of these services is subject to the terms and limitations of your health insurance plan.  Call member services at your health plan with any benefit or coverage questions.      Please bring the following to your appointment:  >>   Any x-rays, CTs or MRIs which have been performed.  Contact the facility where they were done to arrange for  prior to your scheduled appointment.    >>   List of current medications   >>   This referral request   >>   Any documents/labs given to you for this referral                  Your next 10 appointments already scheduled     Jun 12, 2017 12:00 PM CDT   (Arrive by 11:45 AM)   PAC EVALUATION with  Pac Santiago 8   [facility] ([facility] [place])    [address] [place] [id number] [phone]            Jun 12, 2017  1:00 PM CDT   (Arrive by 12:45 PM)   PAC RN ASSESSMENT with  Pac Rn   [facility] ([facility] [place])    [address] [id number] [phone]            Jun 12, 2017  1:30 PM CDT   (Arrive by 1:15 PM)   PAC Anesthesia Consult with  Pac Anesthesiologist   [facility] ([facility])    [address] [place] [id number] [phone]            Jun 12, 2017  1:50 PM CDT   (Arrive by 1:35 PM)   RETURN SHOULDER with River Law MD   [facility] ([facility] [place])    [address] [place] [address] [id number] [phone]            Jun 29, 2017    Procedure with River Law MD   [facility] [place], Same Day Surgery (--)    [address]  [facility] [place] [address]   [phone]            2017 12:30 PM CDT   (Arrive by 12:15 PM)   RETURN SHOULDER with River Law MD   [facility] ([facility] [place])    [address]  [address] [facility] [id number] [phone]            Aug 14, 2017 10:10 AM CDT   (Arrive by 9:55 AM)   RETURN SHOULDER with River Law MD   [facility] ([facility])    [address]  [address] [facility] [id number] [phone]              Who to contact     Please call your clinic at [phone] to:    Ask questions about your health    Make or cancel appointments    Discuss your medicines    Learn about your test results    Speak to your doctor   If you have compliments or concerns about an experience at your clinic, or if you wish to file a complaint, please contact [facility] Patient Relations at [phone] or email us at [email or web address]         Additional Information About Your Visit        Railpod Information     [employer] is an electronic gateway that provides easy, online access to your medical records. With Railpod, you can request a clinic appointment, read your test results, renew a prescription or communicate with your care team.     To sign up for [employer] visit the website at [email or web address]   You will be asked to enter the access code listed below, as well as some personal information. Please follow the directions to create your username and password.     Your access code is: [email or web address]  Expires: 2017  6:31 AM     Your access code will  in 90 days. If you need help or a new code, please contact your [facility] or call [phone] for assistance.        Care EveryWhere ID     This is  "your Care EveryWhere ID. This could be used by other organizations to access your [facility] medical records  [id number]        Your Vitals Were     Pulse Height Pulse Oximetry BMI (Body Mass Index)          89 1.676 m (5' 6\") 98% 26.5 kg/m2         Blood Pressure from Last 3 Encounters:   06/07/17 (!) 147/102   05/17/17 126/86   04/28/17 (!) 135/94    Weight from Last 3 Encounters:   06/07/17 74.5 kg (164 lb 3.2 oz)   05/17/17 72.2 kg (159 lb 3.2 oz)   04/28/17 73.5 kg (162 lb)              We Performed the Following     PHYSIATRY REFERRAL          Today's Medication Changes          These changes are accurate as of: 6/7/17  8:43 AM.  If you have any questions, ask your nurse or doctor.               Start taking these medicines.        Dose/Directions    acetaminophen 500 MG tablet   Commonly known as:  TYLENOL   Used for:  Rheumatoid arthritis involving multiple sites, unspecified rheumatoid factor presence (H), Fibromyalgia, Tear of right rotator cuff, unspecified tear extent        Dose:  500-1000 mg   Take 1-2 tablets (500-1,000 mg) by mouth every 8 hours as needed for mild pain   Refills:  0       pregabalin 25 MG capsule   Commonly known as:  LYRICA   Used for:  Fibromyalgia        Dose:  25 mg   Take 1 capsule (25 mg) by mouth 2 times daily   Quantity:  60 capsule   Refills:  1         These medicines have changed or have updated prescriptions.        Dose/Directions    * oxyCODONE 5 MG IR tablet   Commonly known as:  ROXICODONE   This may have changed:  Another medication with the same name was added. Make sure you understand how and when to take each.   Used for:  Elbow arthritis        Dose:  5 mg   Take 1 tablet (5 mg) by mouth every 4 hours as needed for breakthrough pain or severe pain (Moderate to Severe (MAX OF 2 PER DAY))   Quantity:  60 tablet   Refills:  0       * oxyCODONE 5 MG IR tablet   Commonly known as:  ROXICODONE   This may have changed:  You were already taking a medication with the same " name, and this prescription was added. Make sure you understand how and when to take each.   Used for:  Rheumatoid arthritis involving multiple sites, unspecified rheumatoid factor presence (H), Coping with chronic pain, Tear of right rotator cuff, unspecified tear extent        Dose:  5 mg   Take 1 tablet (5 mg) by mouth every 6 hours as needed for pain maximum 4 tablet(s) per day   Quantity:  120 tablet   Refills:  0       * Notice:  This list has 2 medication(s) that are the same as other medications prescribed for you. Read the directions carefully, and ask your doctor or other care provider to review them with you.      Stop taking these medicines if you haven't already. Please contact your care team if you have questions.     hydrOXYzine 25 MG capsule   Commonly known as:  VISTARIL                Where to get your medicines      Some of these will need a paper prescription and others can be bought over the counter.  Ask your nurse if you have questions.     Bring a paper prescription for each of these medications     oxyCODONE 5 MG IR tablet    pregabalin 25 MG capsule       You don't need a prescription for these medications     acetaminophen 500 MG tablet                Primary Care Provider Office Phone # Fax #    Mika Zamora [phone]       [facility] [address] [facility] [address]        Thank you!     Thank you for choosing [facility]  for your care. Our goal is always to provide you with excellent care. Hearing back from our patients is one way we can continue to improve our services. Please take a few minutes to complete the written survey that you may receive in the mail after your visit with us. Thank you!             Your Updated Medication List - Protect others around you: Learn how to safely use, store and throw away your medicines at www.disposemymeds.org.          This list is accurate as of: 6/7/17  8:43  AM.  Always use your most recent med list.                   Brand Name Dispense Instructions for use    acetaminophen 500 MG tablet    TYLENOL     Take 1-2 tablets (500-1,000 mg) by mouth every 8 hours as needed for mild pain       Botulinum Toxin Type A 200 UNITS injection    BOTOX    200 Units    Inject 200 Units as directed every 3 months       CELEBREX PO      Take 200 mg by mouth 3 times daily       ENBREL SURECLICK 50 MG/ML autoinjector   Generic drug:  Etanercept      Reported on 4/18/2017       leflunomide 20 MG tablet    ARAVA     Take 20 mg by mouth every morning Reported on 3/28/2017       medroxyPROGESTERone 150 MG/ML injection    DEPO-PROVERA    1 mL    Inject 1 mL (150 mg) into the muscle every 3 months       omeprazole 40 MG capsule    priLOSEC    90 capsule    Take 1 capsule (40 mg) by mouth daily Take 30-60 minutes before a meal.       * oxyCODONE 5 MG IR tablet    ROXICODONE    60 tablet    Take 1 tablet (5 mg) by mouth every 4 hours as needed for breakthrough pain or severe pain (Moderate to Severe (MAX OF 2 PER DAY))       * oxyCODONE 5 MG IR tablet    ROXICODONE    120 tablet    Take 1 tablet (5 mg) by mouth every 6 hours as needed for pain maximum 4 tablet(s) per day       pregabalin 25 MG capsule    LYRICA    60 capsule    Take 1 capsule (25 mg) by mouth 2 times daily       sennosides 8.6 MG tablet    SENOKOT    120 tablet    Take 2 tablets by mouth 2 times daily       VITAMIN B-12 PO      Take 1 tablet by mouth daily       * Notice:  This list has 2 medication(s) that are the same as other medications prescribed for you. Read the directions carefully, and ask your doctor or other care provider to review them with you.

## 2017-06-08 PROBLEM — M25.622 STIFFNESS OF LEFT ELBOW JOINT: Status: RESOLVED | Noted: 2017-05-04 | Resolved: 2017-06-08

## 2017-06-08 PROBLEM — Z47.89 AFTERCARE FOLLOWING SURGERY OF THE MUSCULOSKELETAL SYSTEM: Status: RESOLVED | Noted: 2017-05-04 | Resolved: 2017-06-08

## 2017-06-08 PROBLEM — M25.522 ELBOW PAIN, LEFT: Status: RESOLVED | Noted: 2017-05-04 | Resolved: 2017-06-08

## 2017-06-12 ENCOUNTER — ANESTHESIA EVENT (OUTPATIENT)
Dept: SURGERY | Facility: CLINIC | Age: 47
End: 2017-06-12

## 2017-06-12 ENCOUNTER — OFFICE VISIT (OUTPATIENT)
Dept: SURGERY | Facility: CLINIC | Age: 47
End: 2017-06-12

## 2017-06-12 ENCOUNTER — ALLIED HEALTH/NURSE VISIT (OUTPATIENT)
Dept: SURGERY | Facility: CLINIC | Age: 47
End: 2017-06-12

## 2017-06-12 DIAGNOSIS — Z01.818 PREOP EXAMINATION: Primary | ICD-10-CM

## 2017-06-12 LAB
ABO + RH BLD: NORMAL
ABO + RH BLD: NORMAL
ALBUMIN SERPL-MCNC: 3.4 G/DL (ref 3.4–5)
ALBUMIN UR-MCNC: NEGATIVE MG/DL
ALP SERPL-CCNC: 97 U/L (ref 40–150)
ALT SERPL W P-5'-P-CCNC: 18 U/L (ref 0–50)
ANION GAP SERPL CALCULATED.3IONS-SCNC: 9 MMOL/L (ref 3–14)
APPEARANCE UR: CLEAR
AST SERPL W P-5'-P-CCNC: 19 U/L (ref 0–45)
BILIRUB SERPL-MCNC: 0.6 MG/DL (ref 0.2–1.3)
BILIRUB UR QL STRIP: NEGATIVE
BLD GP AB SCN SERPL QL: NORMAL
BLOOD BANK CMNT PATIENT-IMP: NORMAL
BLOOD BANK CMNT PATIENT-IMP: NORMAL
BUN SERPL-MCNC: 11 MG/DL (ref 7–30)
CALCIUM SERPL-MCNC: 8.9 MG/DL (ref 8.5–10.1)
CHLORIDE SERPL-SCNC: 109 MMOL/L (ref 94–109)
CO2 SERPL-SCNC: 23 MMOL/L (ref 20–32)
COLOR UR AUTO: YELLOW
CREAT SERPL-MCNC: 0.91 MG/DL (ref 0.52–1.04)
GFR SERPL CREATININE-BSD FRML MDRD: 67 ML/MIN/1.7M2
GLUCOSE SERPL-MCNC: 97 MG/DL (ref 70–99)
GLUCOSE UR STRIP-MCNC: NEGATIVE MG/DL
HGB UR QL STRIP: NEGATIVE
KETONES UR STRIP-MCNC: NEGATIVE MG/DL
LEUKOCYTE ESTERASE UR QL STRIP: NEGATIVE
MUCOUS THREADS #/AREA URNS LPF: PRESENT /LPF
NITRATE UR QL: NEGATIVE
NT-PROBNP SERPL-MCNC: 204 PG/ML (ref 0–125)
PH UR STRIP: 6 PH (ref 5–7)
POTASSIUM SERPL-SCNC: 4.1 MMOL/L (ref 3.4–5.3)
PROT SERPL-MCNC: 8.2 G/DL (ref 6.8–8.8)
RBC #/AREA URNS AUTO: 1 /HPF (ref 0–2)
SODIUM SERPL-SCNC: 141 MMOL/L (ref 133–144)
SP GR UR STRIP: 1.03 (ref 1–1.03)
SPECIMEN EXP DATE BLD: NORMAL
SQUAMOUS #/AREA URNS AUTO: 3 /HPF (ref 0–1)
URN SPEC COLLECT METH UR: ABNORMAL
UROBILINOGEN UR STRIP-MCNC: 0 MG/DL (ref 0–2)
WBC #/AREA URNS AUTO: 2 /HPF (ref 0–2)

## 2017-06-12 NOTE — MR AVS SNAPSHOT
After Visit Summary   6/12/2017    Kalyn Rivero    MRN: [id number]           Patient Information     Date Of Birth          1970        Visit Information        Provider Department      6/12/2017 11:30 AM Pharmacist, Nancy August [facility]        Today's Diagnoses     Preop examination    -  1       Follow-ups after your visit        Your next 10 appointments already scheduled     Jun 29, 2017   Procedure with River Law MD   [facility], [place], Same Day Surgery (--)    [address] [facility] [address] [phone]            Jul 17, 2017 12:30 PM CDT   (Arrive by 12:15 PM)   RETURN SHOULDER with River Law MD   [facility] ([facility])    [address] [place] [id number] [phone]            Aug 07, 2017  4:20 PM CDT   (Arrive by 4:05 PM)   New Patient Visit with James López MD   [facility] ([facility])    [address] [place] [id number] [phone]            Aug 14, 2017 10:10 AM CDT   (Arrive by 9:55 AM)   RETURN SHOULDER with River Law MD   [facility] ([facility])    [address] [place] [id number] [phone]              Who to contact     Please call your clinic at [phone] to:    Ask questions about your health    Make or cancel appointments    Discuss your medicines    Learn about your test results    Speak to your doctor   If you have compliments or concerns about an experience at your clinic, or if you wish to file a complaint, please contact [facility] Patient Relations at [phone] or email us at [email or web address]         Additional Information About Your Visit        MyChart Information     [employer] is an electronic gateway  that provides easy, online access to your medical records. With Pro [employer] [facility], you can request a clinic appointment, read your test results, renew a prescription or communicate with your care team.     To sign up for Pro [employer]ath MD visit the website at [email or web address]   You will be asked to enter the access code listed below, as well as some personal information. Please follow the directions to create your username and password.     Your access code is: [email or web address]  Expires: 2017  6:31 AM     Your access code will  in 90 days. If you need help or a new code, please contact your [facility] or call [phone] for assistance.        Care EveryWhere ID     This is your Care EveryWhere ID. This could be used by other organizations to access your [facility] medical records  [id number]         Blood Pressure from Last 3 Encounters:   17 (!) 147/102   17 126/86   17 (!) 135/94    Weight from Last 3 Encounters:   17 74.5 kg (164 lb 3.2 oz)   17 72.2 kg (159 lb 3.2 oz)   17 73.5 kg (162 lb)              Today, you had the following     No orders found for display         Today's Medication Changes          These changes are accurate as of: 17  3:48 PM.  If you have any questions, ask your nurse or doctor.               These medicines have changed or have updated prescriptions.        Dose/Directions    omeprazole 40 MG capsule   Commonly known as:  priLOSEC   This may have changed:    - when to take this  - additional instructions   Used for:  PUD (peptic ulcer disease)        Dose:  40 mg   Take 1 capsule (40 mg) by mouth daily Take 30-60 minutes before a meal.   Quantity:  90 capsule   Refills:  2       oxyCODONE 5 MG IR tablet   Commonly known as:  ROXICODONE   This may have changed:  Another medication with the same name was removed. Continue taking this medication, and follow the directions you see here.   Used for:  Rheumatoid  arthritis involving multiple sites, unspecified rheumatoid factor presence (H), Coping with chronic pain, Tear of right rotator cuff, unspecified tear extent   Changed by:  Fredy Patel DO        Dose:  5 mg   Take 1 tablet (5 mg) by mouth every 6 hours as needed for pain maximum 4 tablet(s) per day   Quantity:  120 tablet   Refills:  0                Primary Care Provider Office Phone # Fax #    Mika Yunior Zamora [phone]       [facility] [address] [facility] [address]        Thank you!     Thank you for choosing [facility]OPERAT[facility] [place]  for your care. Our goal is always to provide you with excellent care. Hearing back from our patients is one way we can continue to improve our services. Please take a few minutes to complete the written survey that you may receive in the mail after your visit with us. Thank you!             Your Updated Medication List - Protect others around you: Learn how to safely use, store and throw away your medicines at www.disposemymeds.org.          This list is accurate as of: 6/12/17  3:48 PM.  Always use your most recent med list.                   Brand Name Dispense Instructions for use    acetaminophen 500 MG tablet    TYLENOL    60 tablet    Take 1-2 tablets (500-1,000 mg) by mouth every 8 hours as needed for mild pain       Botulinum Toxin Type A 200 UNITS injection    BOTOX    200 Units    Inject 200 Units as directed every 3 months       CELEBREX PO      Take 200 mg by mouth 3 times daily       ENBREL SURECLICK 50 MG/ML autoinjector   Generic drug:  Etanercept      Reported on 4/18/2017       leflunomide 20 MG tablet    ARAVA     Take 20 mg by mouth every morning Reported on 3/28/2017       medroxyPROGESTERone 150 MG/ML injection    DEPO-PROVERA    1 mL    Inject 1 mL (150 mg) into the muscle every 3 months       omeprazole 40 MG capsule    priLOSEC    90 capsule    Take 1 capsule (40 mg) by mouth daily  Take 30-60 minutes before a meal.       oxyCODONE 5 MG IR tablet    ROXICODONE    120 tablet    Take 1 tablet (5 mg) by mouth every 6 hours as needed for pain maximum 4 tablet(s) per day       pregabalin 25 MG capsule    LYRICA    60 capsule    Take 1 capsule (25 mg) by mouth 2 times daily       sennosides 8.6 MG tablet    SENOKOT    120 tablet    Take 2 tablets by mouth 2 times daily       VITAMIN B-12 PO      Take 4 tablets by mouth daily

## 2017-06-12 NOTE — PROGRESS NOTES
Preoperative Assessment Center medication history for June 12, 2017 is complete.    See Epic admission navigator for allergy information, pharmacy and prior to admission medications.    Operating room staff will still need to confirm medications and last dose information on day of surgery.     Medication history interview sources:  patient,     Changes made to PTA medication list (reason)  Added: artificial tears   Deleted: duplicate of oxycodone  Changed: sig/dose on cyanocobalamin, Enbrel (sig added, note this is currently on hold), omeprazole sig    Note: has not yet started lyrica (PA pending), does not like taking apap, not due for depo provera til august and botox in July.   Oxycodone 5 tablets/day (25 mg/day OME)  Prior to Admission medications    Medication Sig Last Dose Taking? Auth Provider   oxyCODONE (ROXICODONE) 5 MG IR tablet Take 1 tablet (5 mg) by mouth every 6 hours as needed for pain maximum 4 tablet(s) per day Taking Yes Fredy Patel, DO   acetaminophen (TYLENOL) 500 MG tablet Take 1-2 tablets (500-1,000 mg) by mouth every 8 hours as needed for mild pain Taking Yes Fredy Patel DO   medroxyPROGESTERone (DEPO-PROVERA) 150 MG/ML injection Inject 1 mL (150 mg) into the muscle every 3 months Taking Yes Naveen Romero MD   sennosides (SENOKOT) 8.6 MG tablet Take 2 tablets by mouth 2 times daily Taking Yes Mika Zamora MD   Cyanocobalamin (VITAMIN B-12 PO) Take 4 tablets by mouth daily  Taking Yes Reported, Patient   leflunomide (ARAVA) 20 MG tablet Take 20 mg by mouth every morning Reported on 3/28/2017 Taking Yes Reported, Patient   Botulinum Toxin Type A (BOTOX) 200 UNITS SOLR Inject 200 Units as directed every 3 months Taking Yes Fredy Patel, DO   omeprazole (PRILOSEC) 40 MG capsule Take 1 capsule (40 mg) by mouth daily Take 30-60 minutes before a meal.  Patient taking differently: Take 40 mg by mouth 2 times daily Take 30-60 minutes before a  meal. Taking Yes Mika Zamora MD   pregabalin (LYRICA) 25 MG capsule Take 1 capsule (25 mg) by mouth 2 times daily  Patient not taking: Reported on 6/12/2017 Not Taking  Fredy Patel DO   ENBREL SURECLICK 50 MG/ML autoinjector Reported on 4/18/2017 Not Taking  Reported, Patient   Celecoxib (CELEBREX PO) Take 200 mg by mouth 3 times daily   Reported, Patient          Medication history completed by: Valeriy Clifton [facility]

## 2017-06-12 NOTE — ANESTHESIA PREPROCEDURE EVALUATION
Anesthesia Evaluation     . Pt has had prior anesthetic.     History of anesthetic complications   - PONV  reported h/o cardiac arrest during anesthesia      ROS/MED HX    ENT/Pulmonary:  - neg pulmonary ROS     Neurologic:     (+)migraines,     Cardiovascular:  - neg cardiovascular ROS       METS/Exercise Tolerance:     Hematologic:  - neg hematologic  ROS       Musculoskeletal:   (+) , , other musculoskeletal- RA; atlantoaxial instability s/p fusion      GI/Hepatic:     (+) GERD liver disease (h/o acute liver failure in 2012/ acetaminophen OD),       Renal/Genitourinary:     (+) Nephrolithiasis ,       Endo:     (+) thyroid problem hypothyroidism, .      Psychiatric:     (+) psychiatric history depression      Infectious Disease:  - neg infectious disease ROS       Malignancy:         Other:    (+) H/O Chronic Pain,H/O chronic opiod use ,                              PAC Discussion and Assessment    ASA Classification:   Case is suitable for:   Anesthetic techniques and relevant risks discussed:   Invasive monitoring and risk discussed:   Types:   Possibility and Risk of blood transfusion discussed:   NPO instructions given:   Additional anesthetic preparation and risks discussed:   Needs early admission to pre-op area:   Other:     PAC Resident/NP Anesthesia Assessment:        Mid-Level Provider/Resident:   Date:   Time:     Attending Anesthesiologist Anesthesia Assessment:  46 year old for shoulder arthroplasty in the setting of RA. Chart reviewed, patient seen and evaluated; agree with above assessment. Patient is rather proud of the fact that she has  5 times during anesthesia, but can provide little detail. She does note that they didn't have to do anything about it. She has had 8 orthopedic surgeries here without difficulty, so not sure what these episodes were, but it seems to have resolved. She will be a pain issue postop, as she is already anticipating severe pain, and has made multiple trips to  ER after prior surgeries for uncontrolled pain. She will likely benefit from an interscalene block, and should consider catheter for prolonged relief so as to prevent uncontrollable pain.     Patient is appropriate for the planned procedure without further workup or medical management change. The final anesthesia plan will be determined by the physician anesthesiologist caring for the patient on the day of surgery.      Reviewed and Signed by PAC Anesthesiologist  Anesthesiologist: [email or web address]  Date: 6/12/2017  Time:   Pass/Fail: Pass  Disposition:     PAC Pharmacist Assessment:  PREOPERATIVE PAIN CONSULT FOR POSTOPERATIVE PAIN MANAGEMENT  Kalyn Rivero was seen and interviewed during time of PAC Clinic appointment June 12, 2017. The following recommendations ONLY apply for the planned surgical procedure with Dr. Law on 6/29/17 at the [facility] for total shoulder arthroplasty.  These postoperative recommendations are intended for patients admitted to the hospital after surgery. These recommendations are only valid for 30 days from the date of service. If there are significant changes in opioid dosing between today and day of surgery the below recommendations may have to be adjusted.   Verbal consent was given by patient to access pharmacy records and [place] Prescription Monitoring Profile: Yes     Based on [place] Prescription Monitoring Profile and patient interview:      - OUTPATIENT MEDICATIONS (related to pain management):  -- Long-acting opioid: NONE  -- Short-acting opioid: oxycodone 5 mg PO q6 hr PRN (patient taking differently - reports taking 10 mg in AM, 10 mg in PM and 5 mg PRN at night (reports using 5 tablets of 5 mg/day).    -- Oral adjuvant(s): allergy to gabapentin listed, was started on lyrica at last visit with Dr. Patel, but has not started it yet because there is a prior authorization at her pharmacy pending. She plans to start this if she can get the medication.  "  Acetaminophen 500-1000 mg PO q8 hr PRN (uses about 1 dose/day)  -- Topicals: none, has tried and none work  -- Bowel Regimen: senna 2 tab BID     ASSESSMENT:   Kalyn Rivero has a history of polyarticular arthritis, migraines, cervicalgia, chronic bilateral elbow pain, Right shoulder pain with known rheumatoid arthritis and partial thickness rotator cuff tearing, fibromyalgia with \"all over body pain\" and history of opioid dependence.  She is seen for perioperative pain recommendations today because she has reported poorly controlled postoperative pain in the past.    She is now s/p a left radial head resection on 4/24/17 and is scheduled for right shoulder arthroplasty on 6/29/17 with Dr. Law.  Kalyn has not been able to be on her Enbrel for RA during these procedures and has been bridging with some oxycodone PRN.  She reports her RA pain has flared greatly, causing loss of sleep, depressed mood and finger/joint swelling.  She has previously had difficulty with postoperative pain control (per her report) and eventually required ED admission after procedures in the past.  She has a history of acetaminophen overdose (LFTs now normal) but is resistant to using acetaminophen for this reason.  She has been seen in our [facility] as recently as 6/7/17 with Dr Patel.  She has also previously had botox injections which are due to repeat in July.  She denies alcohol use, current substance abuse, marijuana use and caffeine use. She endorses cigarette smoking (about 3 cigarettes/day).   Today she reports her pain is all over and that she cannot localize it to any one area or state that any one specific area hurts more than another.  This is chronic pain she has been dealing with since age 20. It is worse with rain and cold and varies widely day to day.  She was supposed to start pregabalin last week but is awaiting insurance prior authorization to start this.  I encouraged " her to follow up with her pharmacy on this and to start it as prescribed.  She finds that the oxycodone helps some, she has tried many different approaches to pain management including a wax machine, TENS unit, ice, heat and compression. She reports issues with constipation and has BM every 2-3 days, senna helps.  She reports the oxycodone and celecoxib help for her pain and she has done well with fentanyl and hydromorphone in the past.  She endorses ongoing anxiety and self-reports as a high-strung individual, but does not have any medication therapy for anxiety at this time.       Outpatient opioid oral morphine equivalent (OME): ~38mg OME/day        RECOMMENDATIONS:   The following pain management recommendations are made based on information from today's visit and should not replace medical decision-making based on patient condition at the time of surgery or postoperatively.       - PREOPERATIVE:  -- Continue short acting opioid - oxycodone 5 mg PO q6 hr PRN - minimize use as able and avoid dose escalation leading into surgery  -- Continue adjuvants/nonopioid analgesics per pain specialist.   -- Before surgery consider acetaminophen 1000 mg PO in pre-op (Defer to surgery - will need to be written on day of surgery in pre-op area)  -- Before surgery consider celecoxib 200 mg PO in pre-op (Defer to surgery - will need to be written on day of surgery in pre-op area)  -- Before surgery - pregabalin 75 mg PO x1 in pre-op area      - INTRAOPERATIVE (Anesthesiologist/CRNA to consider):   -- Regional anesthesia: Patient would benefit from interventional approach and is open to this.  Defer to RAPS team and surgery team for final plan.   -- Avoid remifentanil - to reduce risk of developing hyperalgesia     - POSTOPERATIVE MANAGEMENT:  Place pain management consult to assist with acute postoperative pain management.    Opioid analgesic:  If able to take PO mediations (preferred):   + Start oxycodone 10-15 mg PO q3 hr PRN  (start with 10 mg dose)  + Start hydromorphone 0.3-0.5 mg IV every 2 hr PRN BREAKTHROUGH pain only.      If Unable to take PO medications (or team elects to do PCA for POD #0):   + Hydromorphone PCA 0.2-0.4 mg q10 min lockout with NO continuous rate.  No other oral or IV opioids while on PCA.      Nonopioid analgesics:   + Continue home dose of celecoxib 200 mg PO q8 hr  (if OK with surgery)  + Start acetaminophen 1,000 mg PO every 8 hr scheduled, Check LFTs  + Start pregabalin 25 mg PO BID   + Postoperative interventional pain management per RAPS team.   + Ice/Heat as appropriate, TENS unit (pt to bring from home)      Muscle Relaxant:   + Consider methocarbamol 500 mg PO q6hr PRN muscle spasms.        Stool softeners/Laxatives:   + When appropriate start senna-docusate 2 tabs PO BID and Miralax 17 g daily PRN to prevent opioid induced constipation.      Other:  + Recommend close monitoring of respiratory status postoperatively with capnography and SpO2 monitoring.         The inpatient pain service will follow up on patient after consult is placed and offer further recommendations for pain management.  If immediate assistance is needed please contact the pain service at the number below.      Valeriy Clifton, [facility]  June 12, 2017     If questions or concerns, please contact the Inpatient Pain Management Service:  Call [phone] after hours, weekends and holidays.   Page [phone] from 8 AM - 3 PM Mon - Fri.    Reviewed and Signed by PAC Pharmacist  Pharmacist: Valeriy Clifton  Date: 6/12/17  Time:1600                           .

## 2017-06-12 NOTE — H&P
Pre-Operative H & P     CC:  Preoperative exam to assess for increased cardiopulmonary risk while undergoing surgery and anesthesia.    Date of Encounter: 6/12/2017  Primary Care Physician:  Mika Zamora      **EPIC downtime today.  Please see scanned H&P.**    JULIETTE Walker CNP  Preoperative Assessment Center  [facility]  Clinic and Surgery Center  Phone: [phone]  Fax: [phone]

## 2017-06-13 LAB
BASOPHILS # BLD AUTO: 0.1 10E9/L (ref 0–0.2)
BASOPHILS NFR BLD AUTO: 1 %
DIFFERENTIAL METHOD BLD: ABNORMAL
EOSINOPHIL # BLD AUTO: 0.3 10E9/L (ref 0–0.7)
EOSINOPHIL NFR BLD AUTO: 4.9 %
ERYTHROCYTE [DISTWIDTH] IN BLOOD BY AUTOMATED COUNT: 16.4 % (ref 10–15)
HCT VFR BLD AUTO: 35.7 % (ref 35–47)
HGB BLD-MCNC: 11.2 G/DL (ref 11.7–15.7)
IMM GRANULOCYTES # BLD: 0 10E9/L (ref 0–0.4)
IMM GRANULOCYTES NFR BLD: 0.3 %
LYMPHOCYTES # BLD AUTO: 1.7 10E9/L (ref 0.8–5.3)
LYMPHOCYTES NFR BLD AUTO: 26.2 %
MCH RBC QN AUTO: 27.1 PG (ref 26.5–33)
MCHC RBC AUTO-ENTMCNC: 31.4 G/DL (ref 31.5–36.5)
MCV RBC AUTO: 86 FL (ref 78–100)
MONOCYTES # BLD AUTO: 0.6 10E9/L (ref 0–1.3)
MONOCYTES NFR BLD AUTO: 10.2 %
NEUTROPHILS # BLD AUTO: 3.6 10E9/L (ref 1.6–8.3)
NEUTROPHILS NFR BLD AUTO: 57.4 %
NRBC # BLD AUTO: 0 10*3/UL
NRBC BLD AUTO-RTO: 0 /100
PLATELET # BLD AUTO: 227 10E9/L (ref 150–450)
RBC # BLD AUTO: 4.14 10E12/L (ref 3.8–5.2)
WBC # BLD AUTO: 6.3 10E9/L (ref 4–11)

## 2017-06-25 ENCOUNTER — HOSPITAL ENCOUNTER (EMERGENCY)
Facility: CLINIC | Age: 47
Discharge: HOME | End: 2017-06-25
Attending: EMERGENCY MEDICINE | Admitting: EMERGENCY MEDICINE
Payer: COMMERCIAL

## 2017-06-25 ENCOUNTER — APPOINTMENT (OUTPATIENT)
Dept: GENERAL RADIOLOGY | Facility: CLINIC | Age: 47
End: 2017-06-25
Attending: EMERGENCY MEDICINE
Payer: COMMERCIAL

## 2017-06-25 ENCOUNTER — APPOINTMENT (OUTPATIENT)
Dept: CT IMAGING | Facility: CLINIC | Age: 47
End: 2017-06-25
Attending: EMERGENCY MEDICINE
Payer: COMMERCIAL

## 2017-06-25 VITALS
DIASTOLIC BLOOD PRESSURE: 98 MMHG | HEART RATE: 91 BPM | WEIGHT: 162 LBS | HEIGHT: 66 IN | SYSTOLIC BLOOD PRESSURE: 133 MMHG | OXYGEN SATURATION: 99 % | TEMPERATURE: 98 F | BODY MASS INDEX: 26.03 KG/M2

## 2017-06-25 DIAGNOSIS — M25.521 RIGHT ELBOW PAIN: ICD-10-CM

## 2017-06-25 PROCEDURE — 99284 EMERGENCY DEPT VISIT MOD MDM: CPT | Mod: Z6 | Performed by: EMERGENCY MEDICINE

## 2017-06-25 PROCEDURE — 73080 X-RAY EXAM OF ELBOW: CPT | Mod: RT

## 2017-06-25 PROCEDURE — 25000132 ZZH RX MED GY IP 250 OP 250 PS 637: Performed by: EMERGENCY MEDICINE

## 2017-06-25 PROCEDURE — 73200 CT UPPER EXTREMITY W/O DYE: CPT | Mod: RT

## 2017-06-25 PROCEDURE — 99284 EMERGENCY DEPT VISIT MOD MDM: CPT | Mod: 25 | Performed by: EMERGENCY MEDICINE

## 2017-06-25 RX ORDER — OXYCODONE HYDROCHLORIDE 5 MG/1
5 TABLET ORAL ONCE
Status: COMPLETED | OUTPATIENT
Start: 2017-06-25 | End: 2017-06-25

## 2017-06-25 RX ADMIN — OXYCODONE HYDROCHLORIDE 5 MG: 5 TABLET ORAL at 03:16

## 2017-06-25 RX ADMIN — OXYCODONE HYDROCHLORIDE 5 MG: 5 TABLET ORAL at 02:32

## 2017-06-25 NOTE — ED AVS SNAPSHOT
[facility], [place], Emergency Department    [address] [place] [address]    Phone:  [phone]                                       Kalyn Rivero   MRN: [id number]    Department:  [facility], Emergency Department   Date of Visit:  6/25/2017           After Visit Summary Signature Page     I have received my discharge instructions, and my questions have been answered. I have discussed any challenges I see with this plan with the nurse or doctor.    ..........................................................................................................................................  Patient/Patient Representative Signature      ..........................................................................................................................................  Patient Representative Print Name and Relationship to Patient    ..................................................               ................................................  Date                                            Time    ..........................................................................................................................................  Reviewed by Signature/Title    ...................................................              ..............................................  Date                                                            Time

## 2017-06-25 NOTE — DISCHARGE INSTRUCTIONS
Please make an appointment to follow up with Orthopedics (phone: [phone]) as soon as possible.  You should have oxicodone at home until July 7th. Continue taking that medication and follow up in the pain clinic to reestablish care

## 2017-06-25 NOTE — ED PROVIDER NOTES
"  History     Chief Complaint   Patient presents with     Elbow Pain     HPI  Kalyn Rivero is a 46 year old female who presents to emergency prompt with complaints of right elbow pain that she noticed 2 days ago.  She has a history of a elbow replacement that's approximately 2 years old.  2 days ago as she was reaching for something she heard a snap in her arm and since then has been having discomfort.  She admits to some sensation changes that are transient.  Currently she is asymptomatic other than the right elbow discomfort.    I have reviewed the Medications, Allergies, Past Medical and Surgical History, and Social History in the Epic system.    Review of Systems   All other systems reviewed and are negative.      Physical Exam   BP: (!) 133/98  Pulse: 91  Temp: 98  F (36.7  C)  Height: 167.6 cm (5' 6\")  Weight: 73.5 kg (162 lb)  SpO2: 99 %  Physical Exam   Constitutional: She is oriented to person, place, and time. She appears well-developed and well-nourished. No distress.   HENT:   Head: Normocephalic and atraumatic.   Eyes: No scleral icterus.   Neck: Normal range of motion. Neck supple.   Musculoskeletal:        Right elbow: She exhibits decreased range of motion, swelling and deformity. She exhibits no laceration. Tenderness (throughout the entire elbow) found.        Arms:  Neurological: She is alert and oriented to person, place, and time.   Skin: Skin is warm and dry. No rash noted. She is not diaphoretic. No erythema. No pallor.       ED Course     ED Course     Procedures             Critical Care time:  none               Labs Ordered and Resulted from Time of ED Arrival Up to the Time of Departure from the ED - No data to display        Results for orders placed or performed during the hospital encounter of 06/25/17   Elbow XR, G/E 3 views, right    Narrative    XR ELBOW RT G/E 3 VW 6/25/2017 2:40 AM    History: previous elbow surgery, pain to right elbow    Comparison: " 10/29/2015    Findings: Lateral, AP, and oblique views of the elbow. There is a  elbow arthroplasty with acute appearing fracture deformity of the  diaphysis of the distal humerus, significant perihardware lucency,  which is substantially increased compared to the prior exam extensive  heterotopic ossification. Questionable displacement of the hardware  from the lateral condyle.      Impression    Impression:  1. Perihardware fracture of the distal humerus with mild lateral and  anterior displacement of bony fragments..  2. Extensive there hardware lucency concerning for chronic loosening.  Questionable distraction of the lateral epicondyles from the  arthroplasty components.     Discussed with Dr. Buchanan.   CT Elbow Right w/o Contrast    Narrative    CT upper extremity without contrast 6/25/2017 3:58 AM    History: Right elbow    Comparison: Radiograph from the same day    Technique: Dual-energy CT of the right elbow without contrast.    Findings: There is significant perihardware lucency throughout the  majority of the hardware components, more prominent in the humeral  component compared to the ulnar component. There is an oblique  fracture beginning at the mid humerus, with minimal displacement. The  proximal end of the hardware appears to be chronically dislocated with  a thin rim of bone formation beyond the anatomic cortex. There is a  small bony fragment that is displaced anteriorly suggestive of an  acute on chronic fracture dislocation. There is metaphyseal periosteal  reaction, and significant lucency surrounding the hinged portion.  Postoperative changes of a radial head resection.  Moderate joint  effusion. Heterotopic ossification of the distal humerus.      Impression    Impression:   1. Significant perihardware lucencies suggestive of chronic loosening,  with an oblique fracture of the humeral diaphysis, and a smaller and  minimally displaced fracture laterally near the proximal end of the  humeral  component.  2. Chronic displacement of the proximal humeral component with a thin  bony rim extending beyond the anatomic cortex.    3. Significant loosening surrounding the hinged component the distal  humerus without fracture.   4. Moderate joint effusion.     *Note: Due to a large number of results and/or encounters for the requested time period, some results have not been displayed. A complete set of results can be found in Results Review.         Assessments & Plan (with Medical Decision Making)   This is a 46-year-old female patient coming into concern with complaints of right elbow pain for the past 2 days.  She has a prosthetic right elbow she states while she was attempting to reach for something she heard a snap in her arm 2 days ago and has been having some discomfort in that arm.  She presents to emergency room with discomfort in the right arm with some decreased range of motion.  X-ray confirms that there is a fracture around the hardware in the right elbow.  She has good pulse motor and sensory distally.  I discussed the case with orthopedic surgery and they would like a CT for surgical planning and for the patient replaced and a posterior splint.  I did review the patient's medical chart as well as her [place] prescription monitoring.  She received a large quantity of oxycodone that should have lasted her until July 7.  She still has these pain medications at home and can be discharged with those medications and can also follow-up with her pain management clinic as well as orthopedic surgery which showed he has appointment with on Monday.  The patient is very upset that I was not writing her prescription for additional pain medications however I explained to her that she has appropriate opiate pain management already prescribed.  I do see that she has a history of opiate abuse which further makes me believe that the opiates that she started prescribed should be sufficient at this time.  I do not  believe that it is safe to add on additional opiate prescriptions.    I have reviewed the nursing notes.    I have reviewed the findings, diagnosis, plan and need for follow up with the patient.    New Prescriptions    No medications on file       Final diagnoses:   Right elbow pain       6/25/2017   [facility], [place], EMERGENCY DEPARTMENT     Dakota Buchanan MD  06/25/17 0520       Dakota Buchanan MD  06/25/17 0521

## 2017-06-25 NOTE — ED AVS SNAPSHOT
[facility], Emergency Department    [address] [place] [address]    Phone:  [phone]                                       Kalyn Rivero   MRN: [id number]    Department:  [facility], Emergency Department   Date of Visit:  6/25/2017           Patient Information     Date Of Birth          1970        Your diagnoses for this visit were:     Right elbow pain        You were seen by Dakota Buchanan MD.        Discharge Instructions       Please make an appointment to follow up with Orthopedics (phone: [phone]) as soon as possible.  You should have oxicodone at home until July 7th. Continue taking that medication and follow up in the pain clinic to reestablish care         Future Appointments        Provider Department Dept Phone Center    6/26/2017 4:00 PM River Law MD [facility] [phone] [facility]    7/17/2017 12:30 PM River Law MD [facility] [phone] [facility]    8/7/2017 4:20 PM James López MD [facility] [phone] [facility]    8/14/2017 10:10 AM River Law MD [facility] [phone] [facility]      24 Hour Appointment Hotline       To make an appointment at any [facility], call [phone] ([phone]). If you don't have a family doctor or clinic, we will help you find one. [facility] are conveniently located to serve the needs of you and your family.             Review of your medicines      Our records show that you are taking the medicines listed below. If these are incorrect, please call your family doctor or clinic.        Dose / Directions Last dose taken    acetaminophen 500 MG tablet   Commonly known as:  TYLENOL   Dose:  500-1000 mg   Quantity:  60 tablet        Take 1-2 tablets (500-1,000 mg) by mouth every 8 hours as needed for mild pain   Refills:  3        Botulinum Toxin Type A 200 UNITS injection   Commonly  known as:  BOTOX   Dose:  200 Units   Quantity:  200 Units        Inject 200 Units as directed every 3 months   Refills:  3        CELEBREX PO   Dose:  200 mg        Take 200 mg by mouth 3 times daily   Refills:  0        ENBREL SURECLICK 50 MG/ML autoinjector   Generic drug:  Etanercept        Reported on 4/18/2017   Refills:  0        leflunomide 20 MG tablet   Commonly known as:  ARAVA   Dose:  20 mg        Take 20 mg by mouth every morning Reported on 3/28/2017   Refills:  0        medroxyPROGESTERone 150 MG/ML injection   Commonly known as:  DEPO-PROVERA   Dose:  150 mg   Quantity:  1 mL        Inject 1 mL (150 mg) into the muscle every 3 months   Refills:  3        omeprazole 40 MG capsule   Commonly known as:  priLOSEC   Dose:  40 mg   Quantity:  90 capsule        Take 1 capsule (40 mg) by mouth daily Take 30-60 minutes before a meal.   Refills:  2        oxyCODONE 5 MG IR tablet   Commonly known as:  ROXICODONE   Dose:  5 mg   Quantity:  120 tablet        Take 1 tablet (5 mg) by mouth every 6 hours as needed for pain maximum 4 tablet(s) per day   Refills:  0        pregabalin 25 MG capsule   Commonly known as:  LYRICA   Dose:  25 mg   Quantity:  60 capsule        Take 1 capsule (25 mg) by mouth 2 times daily   Refills:  1        sennosides 8.6 MG tablet   Commonly known as:  SENOKOT   Dose:  2 tablet   Quantity:  120 tablet        Take 2 tablets by mouth 2 times daily   Refills:  1        VITAMIN B-12 PO   Dose:  4 tablet        Take 4 tablets by mouth daily   Refills:  0                Procedures and tests performed during your visit     CT Elbow Right w/o Contrast    Elbow XR, G/E 3 views, right      Orders Needing Specimen Collection     None      Pending Results     Date and Time Order Name Status Description    6/25/2017 0244 CT Elbow Right w/o Contrast Preliminary     6/25/2017 0158 Elbow XR, G/E 3 views, right Preliminary             Pending Culture Results     No orders found from 6/23/2017 to  "2017.            Pending Results Instructions     If you had any lab results that were not finalized at the time of your Discharge, you can call the ED Lab Result RN at [phone]. You will be contacted by this team for any positive Lab results or changes in treatment. The nurses are available 7 days a week from 10A to 6:30P.  You can leave a message 24 hours per day and they will return your call.        Thank you for choosing [facility]       Thank you for choosing [facility] for your care. Our goal is always to provide you with excellent care. Hearing back from our patients is one way we can continue to improve our services. Please take a few minutes to complete the written survey that you may receive in the mail after you visit with us. Thank you!        [employer] Information     [employer] lets you send messages to your doctor, view your test results, renew your prescriptions, schedule appointments and more. To sign up, go to [email or web address] . Click on \"Log in\" on the left side of the screen, which will take you to the Welcome page. Then click on \"Sign up Now\" on the right side of the page.     You will be asked to enter the access code listed below, as well as some personal information. Please follow the directions to create your username and password.     Your access code is: [email or web address]  Expires: 2017  6:31 AM     Your access code will  in 90 days. If you need help or a new code, please call your [facility] clinic or [phone].        Care EveryWhere ID     This is your Care EveryWhere ID. This could be used by other organizations to access your [facility] medical records  [id number]        Equal Access to Services     [facility] : Hadcodie Morrell, francine gilbert, alexis whiteside. So [facility] [phone].    ATENCIÓN: Si habla español, tiene a holcomb disposición servicios gratuitos de asistencia lingüística. Llame al " [phone].    We comply with applicable federal civil rights laws and [place] laws. We do not discriminate on the basis of race, color, national origin, age, disability sex, sexual orientation or gender identity.            After Visit Summary       This is your record. Keep this with you and show to your community pharmacist(s) and doctor(s) at your next visit.

## 2017-06-25 NOTE — ED NOTES
Patient arrived for right elbow pain she has been experiencing for two days. She had a recent surgery to the area and heard something snap two days ago. She is alert, oriented, ambulatory. She reports occasional sensation loss and temperature loss in her right hand.

## 2017-06-26 ENCOUNTER — TELEPHONE (OUTPATIENT)
Dept: ORTHOPEDICS | Facility: CLINIC | Age: 47
End: 2017-06-26

## 2017-06-26 ENCOUNTER — TELEPHONE (OUTPATIENT)
Dept: ANESTHESIOLOGY | Facility: CLINIC | Age: 47
End: 2017-06-26

## 2017-06-26 ENCOUNTER — PRE VISIT (OUTPATIENT)
Dept: ORTHOPEDICS | Facility: CLINIC | Age: 47
End: 2017-06-26

## 2017-06-26 NOTE — TELEPHONE ENCOUNTER
1.  Date/reason for appt: 6/29/17 7:50AM A periprosthetic R elbow fracture.  2.  Referring provider: ED F/u   3.  Call to patient (Yes / No - short description): No, referred to f/u.  4.  Previous care at / records requested from:  [facility] ED Dewayne SCHMIDT 6/25/17  Arthroplasty of Rt shoulder [facility] 6/29/17

## 2017-06-26 NOTE — TELEPHONE ENCOUNTER
Call back to pt regarding oxycodone refill request.  Pt stated that she broke right elbow over the weekend and will be out of oxycodone tomorrow.  Clinic notes reviewed from last visit on 6/7, at which time pt given prescription for oxycodone 5mg 1 tab q6h prn, max of 4 tabs per day.  Pt advised that prescription given in clinic was for chronic pain and not due for refill until 7/6.  Pt instructed to contact PCP for medication to treat acute pain episode.     Rachel Pelaez, PONCHO, BSN

## 2017-06-26 NOTE — TELEPHONE ENCOUNTER
Right shoulder MRI reviewed by Dr Law.   Patient informed surgery plan for shoulder replacement is unchanged.   She verbalized understanding.  Appointment today with Dr Law cancelled.  She will have elbow surgery with Dr Gaspar before the shoulder replacement.

## 2017-06-27 DIAGNOSIS — M25.521 RIGHT ELBOW PAIN: Primary | ICD-10-CM

## 2017-06-27 NOTE — PROGRESS NOTES
Patient notified that Dr. Gaspar would like her to get a CRP, ESR and CBC drawn prior to her appointment on Thursday morning with us. She verbalized understanding.

## 2017-06-29 ENCOUNTER — OFFICE VISIT (OUTPATIENT)
Dept: ORTHOPEDICS | Facility: CLINIC | Age: 47
End: 2017-06-29

## 2017-06-29 ENCOUNTER — PRE VISIT (OUTPATIENT)
Dept: ANESTHESIOLOGY | Facility: CLINIC | Age: 47
End: 2017-06-29

## 2017-06-29 ENCOUNTER — THERAPY VISIT (OUTPATIENT)
Dept: OCCUPATIONAL THERAPY | Facility: CLINIC | Age: 47
End: 2017-06-29
Payer: COMMERCIAL

## 2017-06-29 DIAGNOSIS — S42.401A ELBOW FRACTURE, RIGHT, CLOSED, INITIAL ENCOUNTER: Primary | ICD-10-CM

## 2017-06-29 DIAGNOSIS — M06.9 RHEUMATOID ARTHRITIS INVOLVING MULTIPLE SITES, UNSPECIFIED RHEUMATOID FACTOR PRESENCE: ICD-10-CM

## 2017-06-29 DIAGNOSIS — S42.409A ELBOW FRACTURE: Primary | ICD-10-CM

## 2017-06-29 DIAGNOSIS — M75.101 TEAR OF RIGHT ROTATOR CUFF, UNSPECIFIED TEAR EXTENT: ICD-10-CM

## 2017-06-29 DIAGNOSIS — G89.29 COPING WITH CHRONIC PAIN: ICD-10-CM

## 2017-06-29 DIAGNOSIS — M25.521 RIGHT ELBOW PAIN: ICD-10-CM

## 2017-06-29 LAB
APPEARANCE FLD: NORMAL
BACTERIA SPEC CULT: NORMAL
BASOPHILS # BLD AUTO: 0.1 10E9/L (ref 0–0.2)
BASOPHILS NFR BLD AUTO: 1.1 %
COLOR FLD: NORMAL
CRP SERPL-MCNC: 3.1 MG/L (ref 0–8)
DIFFERENTIAL METHOD BLD: ABNORMAL
EOSINOPHIL # BLD AUTO: 0.3 10E9/L (ref 0–0.7)
EOSINOPHIL NFR BLD AUTO: 6.8 %
EOSINOPHIL NFR FLD MANUAL: 1 %
ERYTHROCYTE [DISTWIDTH] IN BLOOD BY AUTOMATED COUNT: 15.7 % (ref 10–15)
ERYTHROCYTE [SEDIMENTATION RATE] IN BLOOD BY WESTERGREN METHOD: 40 MM/H (ref 0–20)
HCT VFR BLD AUTO: 38.1 % (ref 35–47)
HGB BLD-MCNC: 11.7 G/DL (ref 11.7–15.7)
IMM GRANULOCYTES # BLD: 0 10E9/L (ref 0–0.4)
IMM GRANULOCYTES NFR BLD: 0.2 %
LYMPHOCYTES # BLD AUTO: 1.8 10E9/L (ref 0.8–5.3)
LYMPHOCYTES NFR BLD AUTO: 39.5 %
LYMPHOCYTES NFR FLD MANUAL: 6 %
MCH RBC QN AUTO: 26.2 PG (ref 26.5–33)
MCHC RBC AUTO-ENTMCNC: 30.7 G/DL (ref 31.5–36.5)
MCV RBC AUTO: 85 FL (ref 78–100)
MICRO REPORT STATUS: NORMAL
MONOCYTES # BLD AUTO: 0.5 10E9/L (ref 0–1.3)
MONOCYTES NFR BLD AUTO: 10.4 %
MONOS+MACROS NFR FLD MANUAL: 44 %
NEUTROPHILS # BLD AUTO: 1.9 10E9/L (ref 1.6–8.3)
NEUTROPHILS NFR BLD AUTO: 42 %
NEUTS BAND NFR FLD MANUAL: 49 %
NRBC # BLD AUTO: 0 10*3/UL
NRBC BLD AUTO-RTO: 0 /100
PLATELET # BLD AUTO: 223 10E9/L (ref 150–450)
RBC # BLD AUTO: 4.47 10E12/L (ref 3.8–5.2)
RBC # FLD: NORMAL /UL
SPECIMEN SOURCE FLD: NORMAL
SPECIMEN SOURCE: NORMAL
WBC # BLD AUTO: 4.4 10E9/L (ref 4–11)
WBC # FLD AUTO: 1668 /UL

## 2017-06-29 PROCEDURE — 97165 OT EVAL LOW COMPLEX 30 MIN: CPT | Mod: GO | Performed by: OCCUPATIONAL THERAPIST

## 2017-06-29 PROCEDURE — 29105 APPLICATION LONG ARM SPLINT: CPT | Mod: GO | Performed by: OCCUPATIONAL THERAPIST

## 2017-06-29 RX ORDER — LIDOCAINE HYDROCHLORIDE 10 MG/ML
5 INJECTION, SOLUTION INFILTRATION; PERINEURAL ONCE
Status: DISCONTINUED | OUTPATIENT
Start: 2017-06-29 | End: 2018-10-23

## 2017-06-29 RX ORDER — OXYCODONE HYDROCHLORIDE 5 MG/1
5-10 TABLET ORAL EVERY 4 HOURS PRN
Qty: 90 TABLET | Refills: 0 | Status: SHIPPED | OUTPATIENT
Start: 2017-06-29 | End: 2017-08-29

## 2017-06-29 NOTE — LETTER
6/29/2017       RE: Kalyn Rivero  [address] [place] [facility] [address]     Dear Colleague,    Thank you for referring your patient, Kalyn Rivero, to the [facility] at [facility]. Please see a copy of my visit note below.    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     She  was last seen 6/1/17. In the interim she had an episode 6/23 where she was reaching for something and had a snap and increased pain. Seen in [facility]. Noted to have periprosthetic humeral shaft fracture, non-displaced. Placed in splint and is here for follow up.     She had inflammatory markers drawn. She self removed her splint as it was uncomfortable. Is having increased pain in elbow. No numbness, tingling. No issues on the left. No F/C.         PHYSICAL EXAMINATION:   NAD, Aox3  Resp: breathing w/o difficulty on RA.  CV: Radial pulse palp  RUE: swelling about elbow. Tenderness throughout. Skin intact. No erythema. Fires EPL, FPL, [employer]ins[employer]. SILT in R/M/U nerves.       IMAGING:  X-rays  of right elbow show significant Osteolysis about humeral stem. There is lateral breach of the humeral cortex with tip of stem. There is a non-diplaced spiral fracture about humeral stem. There is significant medial bone loss which is unchanged.     CT of elbow reviewed more clearly delineating osteolysis and fracture.       Procedure:  After written consent verifying site and side, an aspiration of the right elbow through a lateral portal was performed.  Fluid was bloody in appearance, and sent for aerobic and anearobic culture.    Labs:  ESR: 40  CRP: 3.1  Synovial fluid elbow cell count: 1668  Cultures pending    IMPRESSION:    46F with right total elbow loosening and  periprosthetic fracture.        PLAN:    Elbow brace for support and protection.   F/u labs/cultures  Plan for revision right total elbow pending cultures.       Seen and discussed with Dr. Gaspar.       I have personally examined this patient and have reviewed the clinical presentation and progress note with the resident. I agree with the treatment plan as outlined. The plan was formulated with the resident on the day of the resident's dictation.       Again, thank you for allowing me to participate in the care of your patient.      Sincerely,    Carrol Gaspar MD

## 2017-06-29 NOTE — MR AVS SNAPSHOT
"              After Visit Summary   6/29/2017    Kalyn Rivero    MRN: [id number]           Patient Information     Date Of Birth          1970        Visit Information        Provider Department      6/29/2017 9:00 AM Marisel Greer OT [facility]        Today's Diagnoses     Elbow fracture    -  1       Follow-ups after your visit        Your next 10 appointments already scheduled     Jun 30, 2017  7:00 AM CDT   (Arrive by 6:45 AM)   New Patient Visit with Karl Kang MD   [facility] ([facility])    [address] [place] [address] [phone]              Who to contact     If you have questions or need follow up information about today's clinic visit or your schedule please contact [facility] directly at [phone].  Normal or non-critical lab and imaging results will be communicated to you by [employer], letter or phone within 4 business days after the clinic has received the results. If you do not hear from us within 7 days, please contact the clinic through MyChart or phone. If you have a critical or abnormal lab result, we will notify you by phone as soon as possible.  Submit refill requests through [email or web address] or call your pharmacy and they will forward the refill request to us. Please allow 3 business days for your refill to be completed.          Additional Information About Your Visit        MyChart Information     [email or web address] lets you send messages to your doctor, view your test results, renew your prescriptions, schedule appointments and more. To sign up, go to [email or web address] . Click on \"Log in\" on the left side of the screen, which will take you to the Welcome page. Then click on \"Sign up Now\" on the right side of the page.     You will be asked to enter the access code listed below, as well as some personal information. Please follow the directions to create your " username and password.     Your access code is: [email or web address]  Expires: 2017  6:31 AM     Your access code will  in 90 days. If you need help or a new code, please call your [facility] or [phone].        Care EveryWhere ID     This is your Care EveryWhere ID. This could be used by other organizations to access your [place] medical records  [id number]         Blood Pressure from Last 3 Encounters:   17 (!) 133/98   17 (!) 147/102   17 126/86    Weight from Last 3 Encounters:   17 73.5 kg (162 lb)   17 74.5 kg (164 lb 3.2 oz)   17 72.2 kg (159 lb 3.2 oz)              We Performed the Following     APPLY LONG ARM SPLINT     HC OT EVAL, LOW COMPLEXITY          Today's Medication Changes          These changes are accurate as of: 17 11:52 AM.  If you have any questions, ask your nurse or doctor.               These medicines have changed or have updated prescriptions.        Dose/Directions    omeprazole 40 MG capsule   Commonly known as:  priLOSEC   This may have changed:    - when to take this  - additional instructions   Used for:  PUD (peptic ulcer disease)        Dose:  40 mg   Take 1 capsule (40 mg) by mouth daily Take 30-60 minutes before a meal.   Quantity:  90 capsule   Refills:  2       oxyCODONE 5 MG IR tablet   Commonly known as:  ROXICODONE   This may have changed:    - how much to take  - when to take this   Used for:  Elbow fracture   Changed by:  Carrol Gaspar MD        Dose:  5-10 mg   Take 1-2 tablets (5-10 mg) by mouth every 4 hours as needed for pain maximum 4 tablet(s) per day   Quantity:  90 tablet   Refills:  0            Where to get your medicines      Some of these will need a paper prescription and others can be bought over the counter.  Ask your nurse if you have questions.     Bring a paper prescription for each of these medications     oxyCODONE 5 MG IR tablet                Primary Care Provider Office Phone # Fax #     Mika Zamora [phone]       [facility] [address] [facility] [address]        Equal Access to Services     MARY BURTON : Makeda shelton dave Morrell, wasarahda luqadaha, qaybta kaalmada rachid, alexis gaston ranvladislav gillespie laKeishasandra hollins. So [facility] [phone].    ATENCIÓN: Si habla español, tiene a holcomb disposición servicios gratuitos de asistencia lingüística. Llame al [phone].    We comply with applicable federal civil rights laws and [place] laws. We do not discriminate on the basis of race, color, national origin, age, disability sex, sexual orientation or gender identity.            Thank you!     Thank you for choosing [facility]  for your care. Our goal is always to provide you with excellent care. Hearing back from our patients is one way we can continue to improve our services. Please take a few minutes to complete the written survey that you may receive in the mail after your visit with us. Thank you!             Your Updated Medication List - Protect others around you: Learn how to safely use, store and throw away your medicines at www.disposemymeds.org.          This list is accurate as of: 6/29/17 11:52 AM.  Always use your most recent med list.                   Brand Name Dispense Instructions for use Diagnosis    acetaminophen 500 MG tablet    TYLENOL    60 tablet    Take 1-2 tablets (500-1,000 mg) by mouth every 8 hours as needed for mild pain    Rheumatoid arthritis involving multiple sites, unspecified rheumatoid factor presence (H), Fibromyalgia, Tear of right rotator cuff, unspecified tear extent       Botulinum Toxin Type A 200 UNITS injection    BOTOX    200 Units    Inject 200 Units as directed every 3 months    Intractable chronic migraine without aura and without status migrainosus       CELEBREX PO      Take 200 mg by mouth 3 times daily        ENBREL SURECLICK 50 MG/ML autoinjector   Generic drug:  Etanercept       Reported on 4/18/2017        leflunomide 20 MG tablet    ARAVA     Take 20 mg by mouth every morning Reported on 3/28/2017        medroxyPROGESTERone 150 MG/ML injection    DEPO-PROVERA    1 mL    Inject 1 mL (150 mg) into the muscle every 3 months    Encounter for initial prescription of other contraceptives       omeprazole 40 MG capsule    priLOSEC    90 capsule    Take 1 capsule (40 mg) by mouth daily Take 30-60 minutes before a meal.    PUD (peptic ulcer disease)       oxyCODONE 5 MG IR tablet    ROXICODONE    90 tablet    Take 1-2 tablets (5-10 mg) by mouth every 4 hours as needed for pain maximum 4 tablet(s) per day    Elbow fracture       pregabalin 25 MG capsule    LYRICA    60 capsule    Take 1 capsule (25 mg) by mouth 2 times daily    Fibromyalgia       sennosides 8.6 MG tablet    SENOKOT    120 tablet    Take 2 tablets by mouth 2 times daily    Rheumatoid arthritis with positive rheumatoid factor, involving unspecified site (H)       VITAMIN B-12 PO      Take 4 tablets by mouth daily

## 2017-06-29 NOTE — PROGRESS NOTES
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     She  was last seen 6/1/17. In the interim she had an episode 6/23 where she was reaching for something and had a snap and increased pain. Seen in [facility] ED. Noted to have periprosthetic humeral shaft fracture, non-displaced. Placed in splint and is here for follow up.     She had inflammatory markers drawn. She self removed her splint as it was uncomfortable. Is having increased pain in elbow. No numbness, tingling. No issues on the left. No F/C.         PHYSICAL EXAMINATION:   NAD, Aox3  Resp: breathing w/o difficulty on RA.  CV: Radial pulse palp  RUE: swelling about elbow. Tenderness throughout. Skin intact. No erythema. Fires EPL, FPL, Intrinsics. SILT in R/M/U nerves.       IMAGING:  X-rays of right elbow show significant Osteolysis about humeral stem. There is lateral breach of the humeral cortex with tip of stem. There is a non-diplaced spiral fracture about humeral stem. There is significant medial bone loss which is unchanged.     CT of elbow reviewed more clearly delineating osteolysis and fracture.       Procedure:  After written consent verifying site and side, an aspiration of the right elbow through a lateral portal was performed.  Fluid was bloody in appearance, and sent for aerobic and anearobic culture.    Labs:  ESR: 40  CRP: 3.1  Synovial fluid elbow cell count: 1668  Cultures pending    IMPRESSION:    46F with right total elbow loosening and periprosthetic fracture.        PLAN:    Elbow brace for support and protection.   F/u labs/cultures  Plan for revision right total elbow pending cultures.       Seen and discussed with Dr. Gaspar.       I have personally examined this patient and have reviewed the clinical presentation and progress note  with the resident. I agree with the treatment plan as outlined. The plan was formulated with the resident on the day of the resident's dictation.

## 2017-06-29 NOTE — NURSING NOTE
[address] [address]  Dept: [phone]  ______________________________________________________________________________    Patient: Kalyn Rivero   : 1970   MRN: [id number]   2017    INVASIVE PROCEDURE SAFETY CHECKLIST    Date: 2017   Procedure: Right elbow lidocaine injection, aspiration  Patient Name: Kalyn Rivero  MRN: [id number]  YOB: 1970    Action: Complete sections as appropriate. Any discrepancy results in a HARD COPY until resolved.     PRE PROCEDURE:  Patient ID verified with 2 identifiers (name and  or MRN): Yes  Procedure and site verified with patient/designee (when able): Yes  Accurate consent documentation in medical record: Yes  H&P (or appropriate assessment) documented in medical record: Yes  H&P must be up to 20 days prior to procedure and updates within 24 hours of procedure as applicable: Yes  Relevant diagnostic and radiology test results appropriately labeled and displayed as applicable: Yes  Procedure site(s) marked with provider initials: NA    TIMEOUT:  Time-Out performed immediately prior to starting procedure, including verbal and active participation of all team members addressing the following:Yes  * Correct patient identify  * Confirmed that the correct side and site are marked  * An accurate procedure consent form  * Agreement on the procedure to be done  * Correct patient position  * Relevant images and results are properly labeled and appropriately displayed  * The need to administer antibiotics or fluids for irrigation purposes during the procedure as applicable   * Safety precautions based on patient history or medication use    DURING PROCEDURE: Verification of correct person, site, and procedures any time the responsibility for care of the patient is transferred to another member of the care team.     The following medication was given:     MEDICATION:  Lidocaine  without epinephrine  ROUTE: IA  SITE: Right elbow  DOSE: 5 mL   LOT #: [id number]  : [email or web address]  EXPIRATION DATE: 3/2021  NDC#: 44271-121-00   Was there drug waste? Yes  Amount of drug waste (mL): 15.  Reason for waste:  Single use vial      Yolanda Villagomez ATC  June 29, 2017

## 2017-06-29 NOTE — NURSING NOTE
Teaching Flowsheet   Relevant Diagnosis: Right elbow mauri-prosthetic fracture   Teaching Topic: Surgery for right elbow fracture, surgery type to be determined.     Person(s) involved in teaching:   Patient     Motivation Level:  Asks Questions: Yes  Eager to Learn: No, explain: Patient distracted by pain.  Cooperative: Yes  Receptive (willing/able to accept information): Yes  Any cultural factors/[employer] beliefs that may influence understanding or compliance? No  Comments:      Patient demonstrates understanding of the following:  Reason for the appointment, diagnosis and treatment plan: Yes  Knowledge of proper use of medications and conditions for which they are ordered (with special attention to potential side effects or drug interactions): Yes  Which situations necessitate calling provider and whom to contact: Yes       Teaching Concerns Addressed:   Comments: Patient has an appointment at the pain clinic on 6/30/17 at 0700 for a post-op pain management plan. Patient was given prescription for oxycodone today (90 tabs). OT fabricated a long arm posterior splint today. Patient had pre-op physical done for a previously planned surgery with Dr. Law. This will be sufficient for upcoming surgery. Patient has been off the RA medication due to planned shoulder surgery.      Proper use and care of  (medical equip, care aids, etc.): NA  Nutritional needs and diet plan: Yes  Pain management techniques: Yes  Wound Care: Yes  How and/when to access community resources: Yes     Instructional Materials Used/Given: Surgery packet, antiseptic soap.     Time spent with patient: 15 minutes.

## 2017-06-29 NOTE — NURSING NOTE
Reason For Visit:   Chief Complaint   Patient presents with     RECHECK     Right elbow fracture, DOI: 6/23/17, labs done today     Age: 46 year old    Date of injury: 6/23/17, reaching for outlet, felt a pop    Date of surgery: radial head resection, 4/24/17    Pain Assessment  Patient Currently in Pain: Yes  Primary Pain Location: Elbow  Pain Orientation: Right  Pain Descriptors: Constant  Alleviating Factors:  (Sling)  Aggravating Factors: Bending, Movement    Hand Dominance Evaluation  Hand Dominance: Right    CT and XR in PACs

## 2017-06-29 NOTE — MR AVS SNAPSHOT
After Visit Summary   6/29/2017    Kalyn Rivero    MRN: [id number]           Patient Information     Date Of Birth          1970        Visit Information        Provider Department      6/29/2017 7:50 AM Carrol Gaspar MD [facility]        Today's Diagnoses     Elbow fracture, right, closed, initial encounter    -  1    Rheumatoid arthritis involving multiple sites, unspecified rheumatoid factor presence (H)        Coping with chronic pain        Tear of right rotator cuff, unspecified tear extent           Follow-ups after your visit        Additional Services     HAND THERAPY       Hand Therapy Referral                  Your next 10 appointments already scheduled     Jul 06, 2017  8:30 AM CDT   ASYA Hand with Marisel Greer OT   [facility] ([facility])    [address] [place] [id number] [phone]            Jul 10, 2017   Procedure with Carrol Gaspar MD   [facility] [place], [facility] Day Surgery (--)    [address] [facility] [address] [phone]            Jul 20, 2017  1:00 PM CDT   (Arrive by 12:45 PM)   Post-Op with  U ORTHO HAND POP   [facility] ([facility])    [address] [place] [id number] [phone]            Aug 10, 2017  8:40 AM CDT   (Arrive by 8:25 AM)   POST-OP HAND with Carrol Gaspar MD   [facility] ([facility])    [address] [place] [id number] [phone]              Who to contact     Please call your clinic at [phone] to:    Ask questions about your health    Make or cancel appointments    Discuss your medicines    Learn about your test results    Speak to your doctor   If you have compliments or concerns about an experience at your clinic, or if you wish to file a complaint, please contact  [facility] Patient Relations at [phone] or email us at [email or web address]         Additional Information About Your Visit        [employer] Information     Site Lock is an electronic gateway that provides easy, online access to your medical records. With Site Lock, you can request a clinic appointment, read your test results, renew a prescription or communicate with your care team.     To sign up for [employer] Lock visit the website at [email or web address]   You will be asked to enter the access code listed below, as well as some personal information. Please follow the directions to create your username and password.     Your access code is: [email or web address]  Expires: 2017  6:31 AM     Your access code will  in 90 days. If you need help or a new code, please contact your [facility] or call [phone] for assistance.        Care EveryWhere ID     This is your Care EveryWhere ID. This could be used by other organizations to access your [place] medical records  [id number]         Blood Pressure from Last 3 Encounters:   17 (!) 134/94   17 (!) 133/98   17 (!) 147/102    Weight from Last 3 Encounters:   17 74 kg (163 lb 3.2 oz)   17 73.5 kg (162 lb)   17 74.5 kg (164 lb 3.2 oz)              We Performed the Following     Cell count with differential fluid     HAND THERAPY     Medium Joint/Bursa injection and/or drainage - Unilateral (Elbow, TM, Wrist, Ankle) [[address]]          Today's Medication Changes          These changes are accurate as of: 17 11:59 PM.  If you have any questions, ask your nurse or doctor.               These medicines have changed or have updated prescriptions.        Dose/Directions    omeprazole 40 MG capsule   Commonly known as:  priLOSEC   This may have changed:    - when to take this  - additional instructions   Used for:  PUD (peptic ulcer disease)        Dose:  40 mg   Take  1 capsule (40 mg) by mouth daily Take 30-60 minutes before a meal.   Quantity:  90 capsule   Refills:  2       oxyCODONE 5 MG IR tablet   Commonly known as:  ROXICODONE   This may have changed:    - how much to take  - when to take this   Used for:  Elbow fracture, right, closed, initial encounter   Changed by:  Carrol Gaspar MD        Dose:  5-10 mg   Take 1-2 tablets (5-10 mg) by mouth every 4 hours as needed for pain maximum 4 tablet(s) per day   Quantity:  90 tablet   Refills:  0            Where to get your medicines      Some of these will need a paper prescription and others can be bought over the counter.  Ask your nurse if you have questions.     Bring a paper prescription for each of these medications     oxyCODONE 5 MG IR tablet                Primary Care Provider Office Phone # Fax #    Mika Yunior Zamora [phone]       [facility] [address] [place] [address]        Equal Access to Services     MARY BURTON : Hadii ashlee ku hadasho Soomaali, waaxda luqadaha, qaybta kaalmada adeegyada, waxay lynnin hayherneston uriel sosa . So [facility] [phone].    ATENCIÓN: Si habla español, tiene a holcomb disposición servicios gratuitos de asistencia lingüística. Ll[facility] [phone].    We comply with applicable federal civil rights laws and [place] laws. We do not discriminate on the basis of race, color, national origin, age, disability sex, sexual orientation or gender identity.            Thank you!     Thank you for choosing [facility]  for your care. Our goal is always to provide you with excellent care. Hearing back from our patients is one way we can continue to improve our services. Please take a few minutes to complete the written survey that you may receive in the mail after your visit with us. Thank you!             Your Updated Medication List - Protect others around you: Learn how to safely use, store and throw away your  medicines at www.disposemymeds.org.          This list is accurate as of: 6/29/17 11:59 PM.  Always use your most recent med list.                   Brand Name Dispense Instructions for use Diagnosis    acetaminophen 500 MG tablet    TYLENOL    60 tablet    Take 1-2 tablets (500-1,000 mg) by mouth every 8 hours as needed for mild pain    Rheumatoid arthritis involving multiple sites, unspecified rheumatoid factor presence (H), Fibromyalgia, Tear of right rotator cuff, unspecified tear extent       Botulinum Toxin Type A 200 UNITS injection    BOTOX    200 Units    Inject 200 Units as directed every 3 months    Intractable chronic migraine without aura and without status migrainosus       CELEBREX PO      Take 200 mg by mouth 3 times daily        ENBREL SURECLICK 50 MG/ML autoinjector   Generic drug:  Etanercept      Reported on 4/18/2017        leflunomide 20 MG tablet    ARAVA     Take 20 mg by mouth every morning Reported on 3/28/2017        medroxyPROGESTERone 150 MG/ML injection    DEPO-PROVERA    1 mL    Inject 1 mL (150 mg) into the muscle every 3 months    Encounter for initial prescription of other contraceptives       omeprazole 40 MG capsule    priLOSEC    90 capsule    Take 1 capsule (40 mg) by mouth daily Take 30-60 minutes before a meal.    PUD (peptic ulcer disease)       oxyCODONE 5 MG IR tablet    ROXICODONE    90 tablet    Take 1-2 tablets (5-10 mg) by mouth every 4 hours as needed for pain maximum 4 tablet(s) per day    Elbow fracture, right, closed, initial encounter       pregabalin 25 MG capsule    LYRICA    60 capsule    Take 1 capsule (25 mg) by mouth 2 times daily    Fibromyalgia       sennosides 8.6 MG tablet    SENOKOT    120 tablet    Take 2 tablets by mouth 2 times daily    Rheumatoid arthritis with positive rheumatoid factor, involving unspecified site (H)       VITAMIN B-12 PO      Take 4 tablets by mouth daily

## 2017-06-30 ENCOUNTER — OFFICE VISIT (OUTPATIENT)
Dept: ANESTHESIOLOGY | Facility: CLINIC | Age: 47
End: 2017-06-30

## 2017-06-30 VITALS
WEIGHT: 163.2 LBS | HEIGHT: 67 IN | BODY MASS INDEX: 25.62 KG/M2 | DIASTOLIC BLOOD PRESSURE: 94 MMHG | OXYGEN SATURATION: 98 % | SYSTOLIC BLOOD PRESSURE: 134 MMHG | HEART RATE: 95 BPM

## 2017-06-30 DIAGNOSIS — M25.512 LEFT SHOULDER PAIN, UNSPECIFIED CHRONICITY: Primary | ICD-10-CM

## 2017-06-30 ASSESSMENT — PAIN SCALES - GENERAL: PAINLEVEL: WORST PAIN (10)

## 2017-06-30 NOTE — MR AVS SNAPSHOT
After Visit Summary   6/30/2017    Kalyn Rivero    MRN: [id number]           Patient Information     Date Of Birth          1970        Visit Information        Provider Department      6/30/2017 7:00 AM Karl Kang MD [facility]        Care Instructions    1. Follow PACs recommendations for perioperative pain.     Follow up: As needed      To speak with a nurse, schedule/reschedule/cancel a clinic appointment, or request a medication refill call: [phone]     You can also reach us by [employer]: [email or web address]    For refills, please call on Monday, 1 week before your medication runs out. The doctors are not always in clinic, so this gives us time to get your prescriptions ready.  Please let us know the name of the medication you are requesting a refill of.                                     Follow-ups after your visit        Who to contact     Please call your clinic at [phone] to:    Ask questions about your health    Make or cancel appointments    Discuss your medicines    Learn about your test results    Speak to your doctor   If you have compliments or concerns about an experience at your clinic, or if you wish to file a complaint, please contact [facility] Patient Relations at [phone] or email us at [email or web address]         Additional Information About Your Visit        [employer] Information     [employer] is an electronic gateway that provides easy, online access to your medical records. With [employer], you can request a clinic appointment, read your test results, renew a prescription or communicate with your care team.     To sign up for [employer] visit the website at [email or web address]   You will be asked to enter the access code listed below, as well as some personal information. Please follow the directions to create your username and password.     Your  "access code is: [email or web address]  Expires: 2017  6:31 AM     Your access code will  in 90 days. If you need help or a new code, please contact your [facility] or call [phone] for assistance.        Care EveryWhere ID     This is your Care EveryWhere ID. This could be used by other organizations to access your [facility] medical records  [id number]        Your Vitals Were     Pulse Height Pulse Oximetry BMI (Body Mass Index)          95 1.689 m (5' 6.5\") 98% 25.95 kg/m2         Blood Pressure from Last 3 Encounters:   17 (!) 134/94   17 (!) 133/98   17 (!) 147/102    Weight from Last 3 Encounters:   17 74 kg (163 lb 3.2 oz)   17 73.5 kg (162 lb)   17 74.5 kg (164 lb 3.2 oz)              Today, you had the following     No orders found for display         Today's Medication Changes          These changes are accurate as of: 17  8:03 AM.  If you have any questions, ask your nurse or doctor.               These medicines have changed or have updated prescriptions.        Dose/Directions    omeprazole 40 MG capsule   Commonly known as:  priLOSEC   This may have changed:    - when to take this  - additional instructions   Used for:  PUD (peptic ulcer disease)        Dose:  40 mg   Take 1 capsule (40 mg) by mouth daily Take 30-60 minutes before a meal.   Quantity:  90 capsule   Refills:  2                Primary Care Provider Office Phone # Fax #    Mika Zamora [phone]       [facility] [address] [facility] [address]        Equal Access to Services     MARY BURTON AH: Makeda Morrell, wadanielle gilbert, lila kaalmada rachid, alexis hollins. So [facility] [phone].    ATENCIÓN: Si habla español, tiene a holcomb disposición servicios gratuitos de asistencia lingüística. Llame al [phone].    We comply with applicable federal civil rights laws and " [place] laws. We do not discriminate on the basis of race, color, national origin, age, disability sex, sexual orientation or gender identity.            Thank you!     Thank you for choosing [facility]  for your care. Our goal is always to provide you with excellent care. Hearing back from our patients is one way we can continue to improve our services. Please take a few minutes to complete the written survey that you may receive in the mail after your visit with us. Thank you!             Your Updated Medication List - Protect others around you: Learn how to safely use, store and throw away your medicines at www.disposemymeds.org.          This list is accurate as of: 6/30/17  8:03 AM.  Always use your most recent med list.                   Brand Name Dispense Instructions for use Diagnosis    acetaminophen 500 MG tablet    TYLENOL    60 tablet    Take 1-2 tablets (500-1,000 mg) by mouth every 8 hours as needed for mild pain    Rheumatoid arthritis involving multiple sites, unspecified rheumatoid factor presence (H), Fibromyalgia, Tear of right rotator cuff, unspecified tear extent       Botulinum Toxin Type A 200 UNITS injection    BOTOX    200 Units    Inject 200 Units as directed every 3 months    Intractable chronic migraine without aura and without status migrainosus       CELEBREX PO      Take 200 mg by mouth 3 times daily        ENBREL SURECLICK 50 MG/ML autoinjector   Generic drug:  Etanercept      Reported on 4/18/2017        leflunomide 20 MG tablet    ARAVA     Take 20 mg by mouth every morning Reported on 3/28/2017        medroxyPROGESTERone 150 MG/ML injection    DEPO-PROVERA    1 mL    Inject 1 mL (150 mg) into the muscle every 3 months    Encounter for initial prescription of other contraceptives       omeprazole 40 MG capsule    priLOSEC    90 capsule    Take 1 capsule (40 mg) by mouth daily Take 30-60 minutes before a meal.    PUD (peptic ulcer disease)        oxyCODONE 5 MG IR tablet    ROXICODONE    90 tablet    Take 1-2 tablets (5-10 mg) by mouth every 4 hours as needed for pain maximum 4 tablet(s) per day    Elbow fracture       pregabalin 25 MG capsule    LYRICA    60 capsule    Take 1 capsule (25 mg) by mouth 2 times daily    Fibromyalgia       sennosides 8.6 MG tablet    SENOKOT    120 tablet    Take 2 tablets by mouth 2 times daily    Rheumatoid arthritis with positive rheumatoid factor, involving unspecified site (H)       VITAMIN B-12 PO      Take 4 tablets by mouth daily

## 2017-06-30 NOTE — NURSING NOTE
AVS given and reviewed with pt.  Pt verbalized understanding and declined any questions.     Rachel Pelaez, RN, BSN

## 2017-06-30 NOTE — LETTER
6/30/2017     RE: Kalyn Rivero  [address] [facility] [address]     Dear Colleague,    Thank you for referring your patient, Kalyn Rivero, to the [facility] at [facility]. Please see a copy of my visit note below.    Kalyn Rivero has a history of polyarticular arthritis, migraines, cervicalgia, chronic bilateral elbow pain, Right shoulder pain with known rheumatoid arthritis and partial thickness rotator cuff tearing, fibromyalgia with  all over body pain  and history of opioid dependence.  She is seen for perioperative pain recommendations today because she has reported poorly controlled postoperative pain in the past.    She is now s/p a left radial head resection on 4/24/17.  This is following an excision of a loose body in her right elbow in march. Per Dr. Sawyer's note she has had a satisfactory postoperative course and has participated in PT and there are no further surgeries scheduled for her upper extremities at this time.  However, upon chart review she is scheduled for a right shoulder arthroplasty on 6/29/17 with Dr. Law.  She is referred today for post operative pain regimen.  She has been seen in PAC.  Current Outpatient Prescriptions   Medication     Cyanocobalamin (VITAMIN B-12 PO)     leflunomide (ARAVA) 20 MG tablet     Botulinum Toxin Type A (BOTOX) 200 UNITS SOLR     Celecoxib (CELEBREX PO)     DOXYCYCLINE HYCLATE PO     ENBREL SURECLICK 50 MG/ML autoinjector     cephALEXin (KEFLEX) 500 MG capsule     omeprazole (PRILOSEC) 40 MG capsule     Current Facility-Administered Medications   Medication     lidocaine 1 % injection 5 mL     Facility-Administered Medications Ordered in Other Visits   Medication     bupivacaine 0.5 % -  EPINEPHrine 1:200,000 injection     mepivacaine (PF) (CARBOCAINE) 2 % injection     Allergies   Allergen Reactions     Morphine Swelling     Codeine Nausea and Nausea and  "Vomiting     Swelling, itching. GI Intolerance.      Gabapentin Other (See Comments)     Pins,needles, stabbing aching at once  Numbness and Tingling     Sulfa Drugs      unknown     Erythromycin Nausea     Vomiting      Penicillins Rash     Prednisone Palpitations     Heart racing     Past Medical History:   Diagnosis Date     Acetaminophen overdose 3/24/2011     Anemia      Anesthesia complication     pt states has a histor of heart stopping during anesthesia,     Cervical high risk HPV (human papillomavirus) test positive 02/22/2017    type 18 & other HR HPV     Chronic infection     MRSA     Chronic pain 3/24/2011     Endometriosis      Fibromyalgia      Gastro-oesophageal reflux disease      Heart murmur      History of blood transfusion      Hypothyroidism      Immune disorder (H)      Learning disability      Malignant neoplasm (H)      Migraine      MRSA (methicillin resistant staph aureus) culture positive      Neck injuries      Opioid type dependence (H)      Other chronic pain      PONV (postoperative nausea and vomiting)     states \"flatlines\"during anesthesia     RA (rheumatoid arthritis) (H)      Renal stone      Scoliosis      Stomach ulcer     history     Past Surgical History:   Procedure Laterality Date     ARTHRODESIS FOOT  5/23/2012    Procedure:ARTHRODESIS FOOT; Right Subtalar andTaloNavicular  Fusion  ; Surgeon:CHRIS ZHOU; [facility]     ARTHRODESIS FOOT Left 4/22/2015    Procedure: ARTHRODESIS FOOT;  Surgeon: Chris Zhou MD;  [facility]     ARTHROPLASTY ELBOW Right 9/30/2015    Procedure: ARTHROPLASTY ELBOW;  Surgeon: Carrol Gaspar MD;  [facility]     ARTHROPLASTY KNEE Right      ARTHROPLASTY WRIST      x2 Lt wrist replacement.     ARTHROTOMY ELBOW Right 6/18/2015    Procedure: ARTHROTOMY ELBOW;  Surgeon: Carrol Gaspar MD;  [facility]     C ANESTH,DX ARTHROSCOPIC PROC KNEE JOINT  10/24/2007    right total knee arthroplasty     " DIONNE JACKSON ARTHROSCOP,DIAGNOSTIC  12/17/2008    left total shoulder arthroplasty by Dr. Thanh TRUONG SHOULDER SURG PROC UNLISTED       C TOTAL KNEE ARTHROPLASTY  1/18/12    Left     CYSTOSCOPY  02/06/2009    1.cystoscopy.2.bilateral ureteroscopy.3.basketing of stone fragments from the right kidney.4.bilateral double-J ureteral stent placement.5.ann catheter placement.6.fluoroscopy and intraoperative interpretation of images.     CYSTOSCOPY  01/08/2007    1. cystoscopy and left stent removal.2.left ureteroscopy     DECOMPRESSION CUBITAL TUNNEL  6/6/2014    Procedure: DECOMPRESSION CUBITAL TUNNEL;  Surgeon: Janelle Coates MD;  [facility]     ENDOSCOPY  03/31/2005    upper GI endoscopy-[facility]     ESOPHAGOSCOPY, GASTROSCOPY, DUODENOSCOPY (EGD), COMBINED  3/17/2014    Procedure: COMBINED ESOPHAGOSCOPY, GASTROSCOPY, DUODENOSCOPY (EGD), BIOPSY SINGLE OR MULTIPLE;;  Surgeon: Duane, William Charles, MD;  Location: [facility]     FUSION CERVICAL POSTERIOR ONE LEVEL  11/18/2013    Procedure: FUSION CERVICAL POSTERIOR ONE LEVEL;  Cervical 1-2 Posterior Cervical Fusion (Harms Procedure);  Surgeon: Carrol Gunn MD;  [facility]: [facility]     GYN SURGERY       INJECT MAJOR JOINT / BURSA Left 1/5/2017    Procedure: INJECT MAJOR JOINT / BURSA;  Surgeon: Fredy Patel DO;  Location:  OR     INJECT STEROID (LOCATION) Left 6/18/2015    Procedure: INJECT STEROID (LOCATION);  Surgeon: Carrol Gaspar MD;  Location:  OR     NECK SURGERY       REMOVE FOREIGN BODY UPPER EXTREMITY Right 3/2/2017    Procedure: REMOVE FOREIGN BODY UPPER EXTREMITY;  Surgeon: Carrol Gaspar MD;  Location:  OR     RESECT BONE UPPER EXTREMITY Left 4/27/2017    Procedure: RESECT BONE UPPER EXTREMITY;  Left Radial Head Resection;  Surgeon: Carrol Gaspar MD;  Location:  OR     ROTATOR CUFF REPAIR RT/LT  10/19/2005    1.left distal clavicle excision.2.acromioplasty.3.coracoacromial ligament  resection.4.rotator cuff exploration     Social History     Social History     Marital status: Single     Spouse name: N/A     Number of children: N/A     Years of education: N/A     Occupational History     Not on file.     Social History Main Topics     Smoking status: Current Every Day Smoker     Packs/day: 0.10     Years: 10.00     Types: Cigarettes     Last attempt to quit: 7/31/2013     Smokeless tobacco: Former User      Comment: Quit 9/1/14     Alcohol use No     Drug use: No     Sexual activity: No     Other Topics Concern     Not on file     Social History Narrative      ROS: 10 point ROS neg other than the symptoms noted above in the HPI.  Physical Exam:    General: Mild distress due to pain  Gait:               Antalgic  MSK exam: +TTP in almost all joints with discrete swelling of all digits in both hands.  limted ROM of motion in extension at both elbows.  Limited ROM in right shoulder with extension and abduction, +imingement signs for rotator cuff involvement.    A/P:  Patient with shoulder pain referred to our clinic for postoperative pain regimen.  She has been seen in PAC.  Those recommendations were reviewd and we agree, namely,   RECOMMENDATIONS:   The following pain management recommendations are made based on information from today's visit and should not replace medical decision-making based on patient condition at the time of surgery or postoperatively.       - PREOPERATIVE:  -- Continue short acting opioid - oxycodone 5 mg PO q6 hr PRN - minimize use as able and avoid dose escalation leading into surgery  -- Continue adjuvants/nonopioid analgesics per pain specialist.   -- Before surgery consider acetaminophen 1000 mg PO in pre-op (Defer to surgery - will need to be written on day of surgery in pre-op area)  -- Before surgery consider celecoxib 200 mg PO in pre-op (Defer to surgery - will need to be written on day of surgery in pre-op area)  -- Before surgery - pregabalin 75 mg PO x1 in  pre-op area      - INTRAOPERATIVE (Anesthesiologist/CRNA to consider):   -- Regional anesthesia: Patient would benefit from interventional approach and is open to this.  Defer to RAPS team and surgery team for final plan.   -- Avoid remifentanil - to reduce risk of developing hyperalgesia     - POSTOPERATIVE MANAGEMENT:  Place pain management consult to assist with acute postoperative pain management.    Opioid analgesic:  If able to take PO mediations (preferred):   + Start oxycodone 10-15 mg PO q3 hr PRN (start with 10 mg dose)  + Start hydromorphone 0.3-0.5 mg IV every 2 hr PRN BREAKTHROUGH pain only.      If Unable to take PO medications (or team elects to do PCA for POD #0):   + Hydromorphone PCA 0.2-0.4 mg q10 min lockout with NO continuous rate.  No other oral or IV opioids while on PCA.      Nonopioid analgesics:   + Continue home dose of celecoxib 200 mg PO q8 hr  (if OK with surgery)  + Start acetaminophen 1,000 mg PO every 8 hr scheduled, Check LFTs  + Start pregabalin 25 mg PO BID   + Postoperative interventional pain management per RAPS team.   + Ice/Heat as appropriate, TENS unit (pt to bring from home)      Muscle Relaxant:   + Consider methocarbamol 500 mg PO q6hr PRN muscle spasms.        Stool softeners/Laxatives:   + When appropriate start senna-docusate 2 tabs PO BID and Miralax 17 g daily PRN to prevent opioid induced constipation.      Other:  + Recommend close monitoring of respiratory status postoperatively with capnography and SpO2 monitoring.     Again, thank you for allowing me to participate in the care of your patient.      Sincerely,    Karl Kang MD

## 2017-06-30 NOTE — PATIENT INSTRUCTIONS
1. Follow PACs recommendations for perioperative pain.     Follow up: As needed      To speak with a nurse, schedule/reschedule/cancel a clinic appointment, or request a medication refill call: [phone]     You can also reach us by [employer]: [email or web address]    For refills, please call on Monday, 1 week before your medication runs out. The doctors are not always in clinic, so this gives us time to get your prescriptions ready.  Please let us know the name of the medication you are requesting a refill of.

## 2017-07-04 LAB
BACTERIA SPEC CULT: NO GROWTH
MICRO REPORT STATUS: NORMAL
SPECIMEN SOURCE: NORMAL

## 2017-07-07 ENCOUNTER — TELEPHONE (OUTPATIENT)
Dept: ORTHOPEDICS | Facility: CLINIC | Age: 47
End: 2017-07-07

## 2017-07-07 NOTE — TELEPHONE ENCOUNTER
Clinic received call from call center stating that Kalyn called to cancel her surgery with Dr. Becerra and Dr. Gaspar scheduled for 7/10/17. The Ortho clinic was also notified by PAC of the same message, that Kalyn wishes to cancel surgery. Per phone call from call center, patient is seeking treatment elsewhere.

## 2017-07-07 NOTE — TELEPHONE ENCOUNTER
Per Dr. Gaspar patient will not be given any more pain meds from our clinic as she is seeking treatment elsewhere.

## 2017-07-13 LAB
BACTERIA SPEC CULT: NORMAL
Lab: NORMAL
MICRO REPORT STATUS: NORMAL
SPECIMEN SOURCE: NORMAL

## 2017-07-17 ENCOUNTER — OFFICE VISIT (OUTPATIENT)
Dept: FAMILY MEDICINE | Facility: CLINIC | Age: 47
End: 2017-07-17
Payer: COMMERCIAL

## 2017-07-17 VITALS
DIASTOLIC BLOOD PRESSURE: 83 MMHG | HEIGHT: 66 IN | WEIGHT: 163 LBS | RESPIRATION RATE: 15 BRPM | TEMPERATURE: 97.1 F | OXYGEN SATURATION: 99 % | BODY MASS INDEX: 26.2 KG/M2 | SYSTOLIC BLOOD PRESSURE: 134 MMHG | HEART RATE: 82 BPM

## 2017-07-17 DIAGNOSIS — M06.9 RHEUMATOID ARTHRITIS OF FOOT, UNSPECIFIED LATERALITY, UNSPECIFIED RHEUMATOID FACTOR PRESENCE: ICD-10-CM

## 2017-07-17 DIAGNOSIS — D70.2 OTHER DRUG-INDUCED NEUTROPENIA (H): ICD-10-CM

## 2017-07-17 DIAGNOSIS — F11.21 OPIOID DEPENDENCE IN REMISSION (H): ICD-10-CM

## 2017-07-17 DIAGNOSIS — K72.90 LIVER FAILURE WITHOUT HEPATIC COMA, UNSPECIFIED CHRONICITY (H): ICD-10-CM

## 2017-07-17 DIAGNOSIS — M05.721 RHEUMATOID ARTHRITIS INVOLVING RIGHT ELBOW WITH POSITIVE RHEUMATOID FACTOR (H): Primary | ICD-10-CM

## 2017-07-17 DIAGNOSIS — Z01.818 PREOP GENERAL PHYSICAL EXAM: ICD-10-CM

## 2017-07-17 DIAGNOSIS — M05.9 RHEUMATOID ARTHRITIS WITH POSITIVE RHEUMATOID FACTOR, INVOLVING UNSPECIFIED SITE (H): ICD-10-CM

## 2017-07-17 LAB
ALBUMIN UR-MCNC: NEGATIVE MG/DL
APPEARANCE UR: CLEAR
BASOPHILS # BLD AUTO: 0 10E9/L (ref 0–0.2)
BASOPHILS NFR BLD AUTO: 0.6 %
BILIRUB UR QL STRIP: NEGATIVE
COLOR UR AUTO: YELLOW
DIFFERENTIAL METHOD BLD: ABNORMAL
EOSINOPHIL # BLD AUTO: 0.4 10E9/L (ref 0–0.7)
EOSINOPHIL NFR BLD AUTO: 5.3 %
ERYTHROCYTE [DISTWIDTH] IN BLOOD BY AUTOMATED COUNT: 15.9 % (ref 10–15)
GLUCOSE UR STRIP-MCNC: NEGATIVE MG/DL
HCT VFR BLD AUTO: 36 % (ref 35–47)
HGB BLD-MCNC: 11.5 G/DL (ref 11.7–15.7)
HGB UR QL STRIP: NEGATIVE
KETONES UR STRIP-MCNC: NEGATIVE MG/DL
LEUKOCYTE ESTERASE UR QL STRIP: ABNORMAL
LYMPHOCYTES # BLD AUTO: 1.6 10E9/L (ref 0.8–5.3)
LYMPHOCYTES NFR BLD AUTO: 22.2 %
MCH RBC QN AUTO: 27.4 PG (ref 26.5–33)
MCHC RBC AUTO-ENTMCNC: 31.9 G/DL (ref 31.5–36.5)
MCV RBC AUTO: 86 FL (ref 78–100)
MONOCYTES # BLD AUTO: 0.7 10E9/L (ref 0–1.3)
MONOCYTES NFR BLD AUTO: 10.5 %
NEUTROPHILS # BLD AUTO: 4.3 10E9/L (ref 1.6–8.3)
NEUTROPHILS NFR BLD AUTO: 61.4 %
NITRATE UR QL: NEGATIVE
NON-SQ EPI CELLS #/AREA URNS LPF: ABNORMAL /LPF
PH UR STRIP: 5.5 PH (ref 5–7)
PLATELET # BLD AUTO: 212 10E9/L (ref 150–450)
RBC # BLD AUTO: 4.2 10E12/L (ref 3.8–5.2)
RBC #/AREA URNS AUTO: ABNORMAL /HPF (ref 0–2)
SP GR UR STRIP: 1.01 (ref 1–1.03)
URN SPEC COLLECT METH UR: ABNORMAL
UROBILINOGEN UR STRIP-ACNC: 0.2 EU/DL (ref 0.2–1)
WBC # BLD AUTO: 7 10E9/L (ref 4–11)
WBC #/AREA URNS AUTO: ABNORMAL /HPF (ref 0–2)

## 2017-07-17 PROCEDURE — 36415 COLL VENOUS BLD VENIPUNCTURE: CPT | Performed by: FAMILY MEDICINE

## 2017-07-17 PROCEDURE — 99214 OFFICE O/P EST MOD 30 MIN: CPT | Performed by: FAMILY MEDICINE

## 2017-07-17 PROCEDURE — 81001 URINALYSIS AUTO W/SCOPE: CPT | Performed by: FAMILY MEDICINE

## 2017-07-17 PROCEDURE — 85025 COMPLETE CBC W/AUTO DIFF WBC: CPT | Performed by: FAMILY MEDICINE

## 2017-07-17 PROCEDURE — 87086 URINE CULTURE/COLONY COUNT: CPT | Performed by: FAMILY MEDICINE

## 2017-07-17 NOTE — NURSING NOTE
"Chief Complaint   Patient presents with     Pre-Op Exam       Initial /83  Pulse 82  Temp 97.1  F (36.2  C) (Oral)  Resp 15  Ht 5' 5.75\" (1.67 m)  Wt 163 lb (73.9 kg)  LMP  (LMP Unknown)  SpO2 99%  Breastfeeding? No  BMI 26.51 kg/m2 Estimated body mass index is 26.51 kg/(m^2) as calculated from the following:    Height as of this encounter: 5' 5.75\" (1.67 m).    Weight as of this encounter: 163 lb (73.9 kg).  Medication Reconciliation: complete   Marry Guthrie MA      "

## 2017-07-17 NOTE — PROGRESS NOTES
[facility]  [address] [facility] [address]  [phone]  Dept: [phone]    PRE-OP EVALUATION:  Today's date: 2017    Kalyn Rivero (: 1970) presents for pre-operative evaluation assessment as requested by Dr. De Luna.  She requires evaluation and anesthesia risk assessment prior to undergoing surgery/procedure for treatment of right elbow .  Proposed procedure: Revise or Possible Removal of Total Elbow    Date of Surgery/ Procedure: 17  Time of Surgery/ Procedure:    [place] [facility] [place] for surgical facility: [phone]  Primary Physician: Mika Zamora  Type of Anesthesia Anticipated: General    Patient has a Health Care Directive or Living Will:  NO    1. NO - Do you have a history of heart attack, stroke, stent, bypass or surgery on an artery in the head, neck, heart or legs?  2. NO - Do you ever have any pain or discomfort in your chest?  3. NO - Do you have a history of  Heart Failure?  4. NO - Are you troubled by shortness of breath when: walking on the level, up a slight hill or at night?  5. NO - Do you currently have a cold, bronchitis or other respiratory infection?  6. NO - Do you have a cough, shortness of breath or wheezing?  7. YES - DO YOU SOMETIMES GET PAINS IN THE CALVES OF YOUR LEGS WHEN YOU WALK? no  8. YES - DO YOU OR ANYONE IN YOUR FAMILY HAVE PREVIOUS HISTORY OF BLOOD CLOTS? arm  9. NO - Do you or does anyone in your family have a serious bleeding problem such as prolonged bleeding following surgeries or cuts?  10. YES - HAVE YOU EVER HAD PROBLEMS WITH ANEMIA OR BEEN TOLD TO TAKE IRON PILLS? Iron anemia  11. NO - Have you had any abnormal blood loss such as black, tarry or bloody stools, or abnormal vaginal bleeding?  12. YES - HAVE YOU EVER HAD A BLOOD TRANSFUSION? Shoulder surgery  13. NO - Have you or any of your relatives ever had problems with anesthesia?  14. NO - Do you have sleep apnea, excessive  snoring or daytime drowsiness?  15. NO - Do you have any prosthetic heart valves?  16. YES - DO YOU HAVE PROSTHETIC JOINTS? Ankle knees shoulder and wrist  17. NO - Is there any chance that you may be pregnant?      HPI:                                                      Brief HPI related to upcoming procedure: elbow replacement came loose and right humerus           MEDICAL HISTORY:                                                      Patient Active Problem List    Diagnosis Date Noted     Health Care Home 05/27/2011     Priority: High     Not in Active Care Coordination                                                                          DX V65.8 REPLACED WITH 13071 [facility]ALTH CARE HOME (04/08/2013)         Elbow fracture 06/29/2017     Priority: Medium     Cervical high risk HPV (human papillomavirus) test positive 02/22/2017     Priority: Medium     2/22/17: NIL Pap, + HR HPV 18 & other HR HPV. Plan colp       Other drug-induced neutropenia (H) 04/14/2016     Priority: Medium     Rheumatoid arthritis with positive rheumatoid factor, involving unspecified site (H) 04/14/2016     Priority: Medium     Rheumatoid arthritis of foot (H) 04/13/2015     Priority: Medium     Encounter for long-term opiate analgesic use 07/15/2014     Priority: Medium     TMJ (temporomandibular joint syndrome) 07/10/2014     Priority: Medium     Intractable chronic migraine without aura 07/10/2014     Priority: Medium     Problem list name updated by automated process. Provider to review       Lesion of ulnar nerve 06/03/2014     Priority: Medium     Right arm numbness 05/21/2014     Priority: Medium     Right upper extremity numbness 04/02/2014     Priority: Medium     Esophageal reflux 03/25/2014     Priority: Medium     Postoperative pain 11/20/2013     Priority: Medium     S/P cervical spinal fusion 11/20/2013     Priority: Medium     Drug-seeking behavior 11/20/2013     Priority: Medium     Opioid type dependence (H) 11/20/2013      Priority: Medium     Problem list name updated by automated process. Provider to review       Severe frontal headaches 11/20/2013     Priority: Medium     Abdominal pain, unspecified abdominal location 11/20/2013     Priority: Medium     Problem list name updated by automated process. Provider to review       Atlantoaxial instability 11/18/2013     Priority: Medium     Trochanteric bursitis - left 10/04/2012     Priority: Medium     Knee joint replacement - left 01/26/2012     Priority: Medium     Patient is followed by KIZZ[facility] LUA for ongoing prescription of narcotic pain medicine.  Med: oxycodone 5 mg.  Maximum use per month: 120  Expected duration: 8 weeks postoperative.  Narcotic agreement on file: YES  Clinic visit recommended: Q 4 weeks    Patient is followed by [facility]IZ[facility] LUA for ongoing prescription of narcotic pain medicine.  Med: oxycontin 20 mg.   Maximum use per month: 60  Expected duration: 1 month  Narcotic agreement on file: YES  Clinic visit recommended: Q 4 weeks       TOTAL KNEE ARTHROPLASTY - bilateral 12/12/2011     Priority: Medium     Liver failure, acute 04/22/2011     Priority: Medium     CARDIOVASCULAR SCREENING; LDL GOAL LESS THAN 160 04/22/2011     Priority: Medium     Renal stone      Priority: Medium     Suicide risk 04/17/2011     Priority: Medium     Grief reaction 04/17/2011     Priority: Medium     3       Acetaminophen overdose 03/24/2011     Priority: Medium     Hepatic failure (H) 03/24/2011     Priority: Medium     Pancreatitis 03/24/2011     Priority: Medium     Anemia 03/24/2011     Priority: Medium     Chronic pain 03/24/2011     Priority: Medium     Pneumonia 03/24/2011     Priority: Medium     Advanced directives, counseling/discussion 03/07/2011     Priority: Medium     Planning, information given.       Hypothyroidism      Priority: Medium      Past Medical History:   Diagnosis Date     Acetaminophen overdose 3/24/2011     Anemia      Anesthesia complication  "    pt states has a histor of heart stopping during anesthesia,     Cervical high risk HPV (human papillomavirus) test positive 02/22/2017    type 18 & other HR HPV     Chronic infection     MRSA     Chronic pain 3/24/2011     Endometriosis      Fibromyalgia      Gastro-oesophageal reflux disease      Heart murmur      History of blood transfusion      Hypothyroidism      Immune disorder (H)      Learning disability      Malignant neoplasm (H)      Migraine      MRSA (methicillin resistant staph aureus) culture positive      Neck injuries      Opioid type dependence (H)      Other chronic pain      PONV (postoperative nausea and vomiting)     states \"flatlines\"during anesthesia     RA (rheumatoid arthritis) (H)      Renal stone      Scoliosis      Stomach ulcer     history     Past Surgical History:   Procedure Laterality Date     ARTHRODESIS FOOT  5/23/2012    Procedure:ARTHRODESIS FOOT; Right Subtalar andTaloNavicular  Fusion  ; Surgeon:CHRIS HENDRICKS; [facility]     ARTHRODESIS FOOT Left 4/22/2015    Procedure: ARTHRODESIS FOOT;  Surgeon: Chris Hendricks MD;  [facility]     ARTHROPLASTY ELBOW Right 9/30/2015    Procedure: ARTHROPLASTY ELBOW;  Surgeon: Carrlo Gaspar MD;  [facility]     ARTHROPLASTY KNEE Right      ARTHROPLASTY WRIST      x2 Lt wrist replacement.     ARTHROTOMY ELBOW Right 6/18/2015    Procedure: ARTHROTOMY ELBOW;  Surgeon: Carrol Gaspar MD;  [facility]     C ANESTH,DX ARTHROSCOPIC PROC KNEE JOINT  10/24/2007    right total knee arthroplasty     C SHLDR ARTHROSCOP,DIAGNOSTIC  12/17/2008    left total shoulder arthroplasty by Dr. Thanh TRUONG SHOULDER SURG PROC UNLISTED       C TOTAL KNEE ARTHROPLASTY  1/18/12    Left     CYSTOSCOPY  02/06/2009    1.cystoscopy.2.bilateral ureteroscopy.3.basketing of stone fragments from the right kidney.4.bilateral double-J ureteral stent placement.5.ann catheter placement.6.fluoroscopy and intraoperative " interpretation of images.     CYSTOSCOPY  01/08/2007    1. cystoscopy and left stent removal.2.left ureteroscopy     DECOMPRESSION CUBITAL TUNNEL  6/6/2014    Procedure: DECOMPRESSION CUBITAL TUNNEL;  Surgeon: Janelle Coates MD;  [facility]     ENDOSCOPY  03/31/2005    [facility] endoscopy[facility]     ESOPHAGOSCOPY, GASTROSCOPY, DUODENOSCOPY (EGD), COMBINED  3/17/2014    Procedure: COMBINED ESOPHAGOSCOPY, GASTROSCOPY, DUODENOSCOPY (EGD), BIOPSY SINGLE OR MULTIPLE;;  Surgeon: Duane, William Charles, MD;  Location: [facility] OR     FUSION CERVICAL POSTERIOR ONE LEVEL  11/18/2013    Procedure: FUSION CERVICAL POSTERIOR ONE LEVEL;  Cervical 1-2 Posterior Cervical Fusion (Harms Procedure);  Surgeon: aCrrol Gunn MD;  [facility]     GYN SURGERY       INJECT MAJOR JOINT / BURSA Left 1/5/2017    Procedure: INJECT MAJOR JOINT / BURSA;  Surgeon: Fredy Patel DO;  [facility]     INJECT STEROID (LOCATION) Left 6/18/2015    Procedure: INJECT STEROID (LOCATION);  Surgeon: Carrol Gaspar MD;  [facility]     NECK SURGERY       REMOVE FOREIGN BODY UPPER EXTREMITY Right 3/2/2017    Procedure: REMOVE FOREIGN BODY UPPER EXTREMITY;  Surgeon: Carrol Gaspar MD;  [facility]     RESECT BONE UPPER EXTREMITY Left 4/27/2017    Procedure: RESECT BONE UPPER EXTREMITY;  Left Radial Head Resection;  Surgeon: Carrol Gaspar MD;  [facility]     ROTATOR CUFF REPAIR RT/LT  10/19/2005    1.left distal clavicle excision.2.acromioplasty.3.coracoacromial ligament resection.4.rotator cuff exploration     Current Outpatient Prescriptions   Medication Sig Dispense Refill     oxyCODONE (ROXICODONE) 5 MG IR tablet Take 1-2 tablets (5-10 mg) by mouth every 4 hours as needed for pain maximum 4 tablet(s) per day 90 tablet 0     acetaminophen (TYLENOL) 500 MG tablet Take 1-2 tablets (500-1,000 mg) by mouth every 8 hours as needed for mild pain 60 tablet 3      medroxyPROGESTERone (DEPO-PROVERA) 150 MG/ML injection Inject 1 mL (150 mg) into the muscle every 3 months 1 mL 3     sennosides (SENOKOT) 8.6 MG tablet Take 2 tablets by mouth 2 times daily 120 tablet 1     Cyanocobalamin (VITAMIN B-12 PO) Take 4 tablets by mouth daily        leflunomide (ARAVA) 20 MG tablet Take 20 mg by mouth every morning Reported on 3/28/2017       Botulinum Toxin Type A (BOTOX) 200 UNITS SOLR Inject 200 Units as directed every 3 months 200 Units 3     omeprazole (PRILOSEC) 40 MG capsule Take 1 capsule (40 mg) by mouth daily Take 30-60 minutes before a meal. (Patient taking differently: Take 40 mg by mouth 2 times daily Take 30-60 minutes before a meal.) 90 capsule 2     Celecoxib (CELEBREX PO) Take 200 mg by mouth 3 times daily       ENBREL SURECLICK 50 MG/ML autoinjector Reported on 4/18/2017       OTC products: Aspirin    Allergies   Allergen Reactions     Morphine Swelling     Codeine Nausea and Nausea and Vomiting     Swelling, itching. GI Intolerance.      Gabapentin Other (See Comments)     Pins,needles, stabbing aching at once  Numbness and Tingling     Sulfa Drugs      unknown     Erythromycin Nausea     Vomiting      Penicillins Rash     Prednisone Palpitations     Heart racing      Latex Allergy: NO    Social History   Substance Use Topics     Smoking status: Current Every Day Smoker     Packs/day: 0.10     Years: 10.00     Types: Cigarettes     Last attempt to quit: 7/31/2013     Smokeless tobacco: Former User      Comment: Quit 9/1/14     Alcohol use No     History   Drug Use No       REVIEW OF SYSTEMS:                                                    C: NEGATIVE for fever, chills, change in weight  I: NEGATIVE for worrisome rashes, moles or lesions  E: NEGATIVE for vision changes or irritation  E/M: NEGATIVE for ear, mouth and throat problems  R: NEGATIVE for significant cough or SOB  B: NEGATIVE for masses, tenderness or discharge  CV: NEGATIVE for chest pain, palpitations or  "peripheral edema  GI: NEGATIVE for nausea, abdominal pain, heartburn, or change in bowel habits  : NEGATIVE for frequency, dysuria, or hematuria  M: NEGATIVE for significant arthralgias or myalgia  N: NEGATIVE for weakness, dizziness or paresthesias  E: NEGATIVE for temperature intolerance, skin/hair changes  H: NEGATIVE for bleeding problems  P: NEGATIVE for changes in mood or affect    EXAM:                                                    /83  Pulse 82  Temp 97.1  F (36.2  C) (Oral)  Resp 15  Ht 5' 5.75\" (1.67 m)  Wt 163 lb (73.9 kg)  LMP  (LMP Unknown)  SpO2 99%  Breastfeeding? No  BMI 26.51 kg/m2    GENERAL APPEARANCE: healthy, alert and no distress     EYES: EOMI, PERRL     HENT: ear canals and TM's normal and nose and mouth without ulcers or lesions     NECK: no adenopathy, no asymmetry, masses, or scars and thyroid normal to palpation     RESP: lungs clear to auscultation - no rales, rhonchi or wheezes     CV: regular rates and rhythm, normal S1 S2, no S3 or S4 and no murmur, click or rub     ABDOMEN:  soft, nontender, no HSM or masses and bowel sounds normal     MS: extremities normal- no gross deformities noted, no evidence of inflammation in joints, FROM in all extremities.     SKIN: no suspicious lesions or rashes     NEURO: Normal strength and tone, sensory exam grossly normal, mentation intact and speech normal     PSYCH: mentation appears normal. and affect normal/bright     LYMPHATICS: No axillary, cervical, or supraclavicular nodes    DIAGNOSTICS:                                                    No labs or EKG required for low risk surgery (cataract, skin procedure, breast biopsy, etc)  cbc    Recent Labs   Lab Test  06/29/17   0810  06/12/17   1310  03/29/17 03/03/17   1351   09/08/14   2303   11/19/13   0731   HGB  11.7  11.2*   < >   --   12.1   < >  11.8   < >  9.2*   PLT  223  227   < >   --   254   < >  172   < >  141*   INR   --    --    --    --    --    --   1.00   --  "  1.28*   NA   --   141   --    --   141   < >  140   < >  135   POTASSIUM   --   4.1   --    --   4.1   < >  4.1   < >  3.6   CR   --   0.91   --   0.800  0.80   < >  0.90   < >  0.79    < > = values in this interval not displayed.        IMPRESSION:                                                    Reason for surgery/procedure: humerus fracture  Diagnosis/reason for consult: Anesthesia     The proposed surgical procedure is considered INTERMEDIATE risk.    REVISED CARDIAC RISK INDEX  The patient has the following serious cardiovascular risks for perioperative complications such as (MI, PE, VFib and 3  AV Block):  No serious cardiac risks  INTERPRETATION: 0 risks: Class I (very low risk - 0.4% complication rate)    The patient has the following additional risks for perioperative complications:  No identified additional risks      ICD-10-CM    1. Rheumatoid arthritis involving right elbow with positive rheumatoid factor (H) M05.721    2. Rheumatoid arthritis with positive rheumatoid factor, involving unspecified site (H) M05.9    3. Rheumatoid arthritis of foot, unspecified laterality, unspecified rheumatoid factor presence (H) M06.9    4. Liver failure without hepatic coma, unspecified chronicity (H) K72.90    5. Opioid dependence in remission (H) F11.21    6. Other drug-induced neutropenia (H) D70.2    7. Preop general physical exam Z01.818        RECOMMENDATIONS:                                                      --Consult hospital rounder / IM to assist post-op medical management    --Patient is to take all scheduled medications on the day of surgery EXCEPT for modifications listed below.    APPROVAL GIVEN to proceed with proposed procedure, without further diagnostic evaluation       Signed Electronically by: Mika Zamora MD    Copy of this evaluation report is provided to requesting physician.    [facility] Preop Guidelines

## 2017-07-17 NOTE — MR AVS SNAPSHOT
After Visit Summary   7/17/2017    Kalyn Rivero    MRN: [id number]           Patient Information     Date Of Birth          1970        Visit Information        Provider Department      7/17/2017 11:15 AM Mika Zamora MD [facility]        Today's Diagnoses     Rheumatoid arthritis involving right elbow with positive rheumatoid factor (H)    -  1    Rheumatoid arthritis with positive rheumatoid factor, involving unspecified site (H)        Rheumatoid arthritis of foot, unspecified laterality, unspecified rheumatoid factor presence (H)        Liver failure without hepatic coma, unspecified chronicity (H)        Opioid dependence in remission (H)        Other drug-induced neutropenia (H)        Preop general physical exam          Care Instructions      Before Your Surgery      Call your surgeon if there is any change in your health. This includes signs of a cold or flu (such as a sore throat, runny nose, cough, rash or fever).    Do not smoke, drink alcohol or take over the counter medicine (unless your surgeon or primary care doctor tells you to) for the 24 hours before and after surgery.    If you take prescribed drugs: Follow your doctor s orders about which medicines to take and which to stop until after surgery.    Eating and drinking prior to surgery: follow the instructions from your surgeon    Take a shower or bath the night before surgery. Use the soap your surgeon gave you to gently clean your skin. If you do not have soap from your surgeon, use your regular soap. Do not shave or scrub the surgery site.  Wear clean pajamas and have clean sheets on your bed.           Follow-ups after your visit        Your next 10 appointments already scheduled     Jul 17, 2017 11:15 AM CDT   Pre-Op physical with Mika Zamora MD   [facility] ([facility])    [address] [facility] [address]   [phone]            Jul 28, 2017   "1:30 PM CDT   (Arrive by 1:15 PM)   New Botox with Ema Rodgers MD   [facility] ([facility])    [address] [facility] [address] [phone]              Future tests that were ordered for you today     Open Future Orders        Priority Expected Expires Ordered    Basic metabolic panel Routine  2018            Who to contact     If you have questions or need follow up information about today's clinic visit or your schedule please contact [facility] directly at [phone].  Normal or non-critical lab and imaging results will be communicated to you by Venustechhart, letter or phone within 4 business days after the clinic has received the results. If you do not hear from us within 7 days, please contact the clinic through Venustechhart or phone. If you have a critical or abnormal lab result, we will notify you by phone as soon as possible.  Submit refill requests through Eterniam or call your pharmacy and they will forward the refill request to us. Please allow 3 business days for your refill to be completed.          Additional Information About Your Visit        Venustechhart Information     Eterniam lets you send messages to your doctor, view your test results, renew your prescriptions, schedule appointments and more. To sign up, go to [email or web address] . Click on \"Log in\" on the left side of the screen, which will take you to the Welcome page. Then click on \"Sign up Now\" on the right side of the page.     You will be asked to enter the access code listed below, as well as some personal information. Please follow the directions to create your username and password.     Your access code is: [email or web address]  Expires: 2017  6:31 AM     Your access code will  in 90 days. If you need help or a new code, please call your [facility] or [phone].        Care EveryWhere ID     This is your Care " "EveryWhere ID. This could be used by other organizations to access your [facility] medical records  [id number]        Your Vitals Were     Pulse Temperature Respirations Height Last Period Pulse Oximetry    82 97.1  F (36.2  C) (Oral) 15 5' 5.75\" (1.67 m) (LMP Unknown) 99%    Breastfeeding? BMI (Body Mass Index)                No 26.51 kg/m2           Blood Pressure from Last 3 Encounters:   07/17/17 134/83   06/30/17 (!) 134/94   06/25/17 (!) 133/98    Weight from Last 3 Encounters:   07/17/17 163 lb (73.9 kg)   06/30/17 163 lb 3.2 oz (74 kg)   06/25/17 162 lb (73.5 kg)              We Performed the Following     CBC with platelets and differential     UA with Microscopic     Urine Culture Aerobic Bacterial          Today's Medication Changes          These changes are accurate as of: 7/17/17 10:26 AM.  If you have any questions, ask your nurse or doctor.               These medicines have changed or have updated prescriptions.        Dose/Directions    omeprazole 40 MG capsule   Commonly known as:  priLOSEC   This may have changed:    - when to take this  - additional instructions   Used for:  PUD (peptic ulcer disease)        Dose:  40 mg   Take 1 capsule (40 mg) by mouth daily Take 30-60 minutes before a meal.   Quantity:  90 capsule   Refills:  2                Primary Care Provider Office Phone # Fax #    Mika Yunior Zamora [phone]       [address] [address]        Equal Access to Services     MARY BURTON AH: Hadii ashlee lucioo Soarielleali, waaxda luqadaha, qaybta kaalmada urielyacooper, alexis hollins. So [facility] [phone].    ATENCIÓN: Si habla español, tiene a holcomb disposición servicios gratuitos de asistencia lingüística. Llame al [phone].    We comply with applicable federal civil rights laws and [place] laws. We do not discriminate on the basis of race, color, national origin, age, disability sex, sexual " orientation or gender identity.            Thank you!     Thank you for choosing [facility]  for your care. Our goal is always to provide you with excellent care. Hearing back from our patients is one way we can continue to improve our services. Please take a few minutes to complete the written survey that you may receive in the mail after your visit with us. Thank you!             Your Updated Medication List - Protect others around you: Learn how to safely use, store and throw away your medicines at www.disposemymeds.org.          This list is accurate as of: 7/17/17 10:26 AM.  Always use your most recent med list.                   Brand Name Dispense Instructions for use Diagnosis    acetaminophen 500 MG tablet    TYLENOL    60 tablet    Take 1-2 tablets (500-1,000 mg) by mouth every 8 hours as needed for mild pain    Rheumatoid arthritis involving multiple sites, unspecified rheumatoid factor presence (H), Fibromyalgia, Tear of right rotator cuff, unspecified tear extent       Botulinum Toxin Type A 200 UNITS injection    BOTOX    200 Units    Inject 200 Units as directed every 3 months    Intractable chronic migraine without aura and without status migrainosus       CELEBREX PO      Take 200 mg by mouth 3 times daily        ENBREL SURECLICK 50 MG/ML autoinjector   Generic drug:  Etanercept      Reported on 4/18/2017        leflunomide 20 MG tablet    ARAVA     Take 20 mg by mouth every morning Reported on 3/28/2017        medroxyPROGESTERone 150 MG/ML injection    DEPO-PROVERA    1 mL    Inject 1 mL (150 mg) into the muscle every 3 months    Encounter for initial prescription of other contraceptives       omeprazole 40 MG capsule    priLOSEC    90 capsule    Take 1 capsule (40 mg) by mouth daily Take 30-60 minutes before a meal.    PUD (peptic ulcer disease)       oxyCODONE 5 MG IR tablet    ROXICODONE    90 tablet    Take 1-2 tablets (5-10 mg) by mouth every 4 hours as needed for pain maximum 4  tablet(s) per day    Elbow fracture, right, closed, initial encounter       sennosides 8.6 MG tablet    SENOKOT    120 tablet    Take 2 tablets by mouth 2 times daily    Rheumatoid arthritis with positive rheumatoid factor, involving unspecified site (H)       VITAMIN B-12 PO      Take 4 tablets by mouth daily

## 2017-07-18 ENCOUNTER — TELEPHONE (OUTPATIENT)
Dept: FAMILY MEDICINE | Facility: CLINIC | Age: 47
End: 2017-07-18

## 2017-07-18 LAB
BACTERIA SPEC CULT: NORMAL
MICRO REPORT STATUS: NORMAL
SPECIMEN SOURCE: NORMAL

## 2017-07-18 NOTE — TELEPHONE ENCOUNTER
Reason for Call:  Other pre op physical with labs EKG    Detailed comments: please fax to [phone]    Phone Number Patient can be reached at: Other phone number:      Best Time:     Can we leave a detailed message on this number? NO    Call taken on 7/18/2017 at 9:40 AM by Vonda Ibrahim

## 2017-07-18 NOTE — TELEPHONE ENCOUNTER
Re-faxed pre-op notes. Had faxed over the Pre-op yesterday thought they would have it.    Iwona Mederos,

## 2017-07-18 NOTE — TELEPHONE ENCOUNTER
Patient is scheduled for surgery early tomorrow, requested a copy of patients pre op received EKG and lab but missing pre op. Please fax pre op to [phone] attn Tish.

## 2017-07-19 ENCOUNTER — TRANSFERRED RECORDS (OUTPATIENT)
Dept: HEALTH INFORMATION MANAGEMENT | Facility: CLINIC | Age: 47
End: 2017-07-19

## 2017-07-28 ENCOUNTER — OFFICE VISIT (OUTPATIENT)
Dept: PHYSICAL MEDICINE AND REHAB | Facility: CLINIC | Age: 47
End: 2017-07-28

## 2017-07-28 ENCOUNTER — TELEPHONE (OUTPATIENT)
Dept: FAMILY MEDICINE | Facility: CLINIC | Age: 47
End: 2017-07-28

## 2017-07-28 VITALS
HEIGHT: 66 IN | TEMPERATURE: 97.7 F | DIASTOLIC BLOOD PRESSURE: 90 MMHG | WEIGHT: 162.92 LBS | BODY MASS INDEX: 26.18 KG/M2 | SYSTOLIC BLOOD PRESSURE: 136 MMHG | HEART RATE: 98 BPM

## 2017-07-28 DIAGNOSIS — G43.719 INTRACTABLE CHRONIC MIGRAINE WITHOUT AURA AND WITHOUT STATUS MIGRAINOSUS: Primary | ICD-10-CM

## 2017-07-28 ASSESSMENT — PAIN SCALES - GENERAL: PAINLEVEL: WORST PAIN (10)

## 2017-07-28 NOTE — TELEPHONE ENCOUNTER
Dr. Zamora,  Pt had a colp 3/28/17 with just an ECC performed. What f/u do you recommend? Thanks.  Blanca Pozo  Pap Tracking RN    2/22/17: NIL Pap, + HR HPV 18 & other HR HPV. Plan colp  3/28/17 [facility] E[facility] - negative for dysplasia.     FINAL DIAGNOSIS:   Endocervix, curettings:   - Multiple fragments of normal endocervical glandular epithelium.   - Several fragments of normal mature squamous epithelium.

## 2017-07-28 NOTE — NURSING NOTE
Chief Complaint   Patient presents with     Consult     [facility] NEW - BOTOX Chronic Migraine Headache     Emma Arenas MA

## 2017-07-28 NOTE — MR AVS SNAPSHOT
After Visit Summary   2017    Kalyn Rivero    MRN: [id number]           Patient Information     Date Of Birth          1970        Visit Information        Provider Department      2017 1:30 PM Ema Rodgers MD [facility] Physical Medicine and Rehabilitation         Follow-ups after your visit        Your next 10 appointments already scheduled     Oct 19, 2017 11:00 AM CDT   (Arrive by 10:45 AM)   New Patient Visit with MD EFRAIN Hahn [facility] Physical Medicine and Rehabilitation ([facility])    [address] [place] [id number] [phone]              Who to contact     Please call your clinic at [phone] to:    Ask questions about your health    Make or cancel appointments    Discuss your medicines    Learn about your test results    Speak to your doctor   If you have compliments or concerns about an experience at your clinic, or if you wish to file a complaint, please contact [facility] Patient Relations at [phone] or email us at [email or web address]         Additional Information About Your Visit        MyChart Information     [employer] is an electronic gateway that provides easy, online access to your medical records. With [employer], you can request a clinic appointment, read your test results, renew a prescription or communicate with your care team.     To sign up for [employer] visit the website at [email or web address]   You will be asked to enter the access code listed below, as well as some personal information. Please follow the directions to create your username and password.     Your access code is: [email or web address]  Expires: 10/26/2017  2:00 PM     Your access code will  in 90 days. If you need help or a new code, please contact your [facility] or call [phone] for assistance.        Care EveryWhere ID     This is your  "Care EveryWhere ID. This could be used by other organizations to access your [place] medical records  [id number]        Your Vitals Were     Pulse Temperature Height Last Period BMI (Body Mass Index)       98 97.7  F (36.5  C) 1.67 m (5' 5.75\") (LMP Unknown) 26.5 kg/m2        Blood Pressure from Last 3 Encounters:   07/28/17 136/90   07/17/17 134/83   06/30/17 (!) 134/94    Weight from Last 3 Encounters:   07/28/17 73.9 kg (162 lb 14.7 oz)   07/17/17 73.9 kg (163 lb)   06/30/17 74 kg (163 lb 3.2 oz)              Today, you had the following     No orders found for display         Today's Medication Changes          These changes are accurate as of: 7/28/17  2:00 PM.  If you have any questions, ask your nurse or doctor.               These medicines have changed or have updated prescriptions.        Dose/Directions    omeprazole 40 MG capsule   Commonly known as:  priLOSEC   This may have changed:    - when to take this  - additional instructions   Used for:  PUD (peptic ulcer disease)        Dose:  40 mg   Take 1 capsule (40 mg) by mouth daily Take 30-60 minutes before a meal.   Quantity:  90 capsule   Refills:  2                Primary Care Provider Office Phone # Fax #    Mika Zamora [phone]       [facility] [id number] [place] [address]        Equal Access to Services     MARY BURTON AH: Hadii ashlee lucioo Sokayla, waaxda luqadaha, qaybta kaalmada ranegyada, alexis hollins. So [facility] [phone].    ATENCIÓN: Si habla español, tiene a holcomb disposición servicios gratuitos de asistencia lingüística. Llame al [phone].    We comply with applicable federal civil rights laws and [place] laws. We do not discriminate on the basis of race, color, national origin, age, disability sex, sexual orientation or gender identity.            Thank you!     Thank you for choosing [facility] PHYSICAL MEDICINE AND REHABILITATION  for your care. Our " goal is always to provide you with excellent care. Hearing back from our patients is one way we can continue to improve our services. Please take a few minutes to complete the written survey that you may receive in the mail after your visit with us. Thank you!             Your Updated Medication List - Protect others around you: Learn how to safely use, store and throw away your medicines at www.disposemymeds.org.          This list is accurate as of: 7/28/17  2:00 PM.  Always use your most recent med list.                   Brand Name Dispense Instructions for use Diagnosis    acetaminophen 500 MG tablet    TYLENOL    60 tablet    Take 1-2 tablets (500-1,000 mg) by mouth every 8 hours as needed for mild pain    Rheumatoid arthritis involving multiple sites, unspecified rheumatoid factor presence (H), Fibromyalgia, Tear of right rotator cuff, unspecified tear extent       Botulinum Toxin Type A 200 UNITS injection    BOTOX    200 Units    Inject 200 Units as directed every 3 months    Intractable chronic migraine without aura and without status migrainosus       CELEBREX PO      Take 200 mg by mouth 3 times daily        leflunomide 20 MG tablet    ARAVA     Take 20 mg by mouth every morning Reported on 3/28/2017        medroxyPROGESTERone 150 MG/ML injection    DEPO-PROVERA    1 mL    Inject 1 mL (150 mg) into the muscle every 3 months    Encounter for initial prescription of other contraceptives       omeprazole 40 MG capsule    priLOSEC    90 capsule    Take 1 capsule (40 mg) by mouth daily Take 30-60 minutes before a meal.    PUD (peptic ulcer disease)       oxyCODONE 5 MG IR tablet    ROXICODONE    90 tablet    Take 1-2 tablets (5-10 mg) by mouth every 4 hours as needed for pain maximum 4 tablet(s) per day    Elbow fracture, right, closed, initial encounter       VITAMIN B-12 PO      Take 4 tablets by mouth daily

## 2017-07-28 NOTE — LETTER
"7/28/2017       RE: Kalyn Rivero  [address] [facility] [address]     Dear Colleague,    Thank you for referring your patient, Kalyn Rivero, to the [facility] PHYSICAL MEDICINE AND REHABILITATION at [facility]. Please see a copy of my visit note below.    Chief Complaint   Patient presents with     Consult     [facility] NEW - BOTOX Chronic Migraine Headache     /90 (BP Location: Left arm, Patient Position: Sitting, Cuff Size: Adult Regular)  Pulse 98  Temp 97.7  F (36.5  C)  Ht 1.67 m (5' 5.75\")  Wt 73.9 kg (162 lb 14.7 oz)  LMP  (LMP Unknown)  BMI 26.5 kg/m2     Current Outpatient Prescriptions:      oxyCODONE (ROXICODONE) 5 MG IR tablet, Take 1-2 tablets (5-10 mg) by mouth every 4 hours as needed for pain maximum 4 tablet(s) per day, Disp: 90 tablet, Rfl: 0     acetaminophen (TYLENOL) 500 MG tablet, Take 1-2 tablets (500-1,000 mg) by mouth every 8 hours as needed for mild pain, Disp: 60 tablet, Rfl: 3     medroxyPROGESTERone (DEPO-PROVERA) 150 MG/ML injection, Inject 1 mL (150 mg) into the muscle every 3 months, Disp: 1 mL, Rfl: 3     Cyanocobalamin (VITAMIN B-12 PO), Take 4 tablets by mouth daily , Disp: , Rfl:      leflunomide (ARAVA) 20 MG tablet, Take 20 mg by mouth every morning Reported on 3/28/2017, Disp: , Rfl:      Botulinum Toxin Type A (BOTOX) 200 UNITS SOLR, Inject 200 Units as directed every 3 months, Disp: 200 Units, Rfl: 3     omeprazole (PRILOSEC) 40 MG capsule, Take 1 capsule (40 mg) by mouth daily Take 30-60 minutes before a meal. (Patient taking differently: Take 40 mg by mouth 2 times daily Take 30-60 minutes before a meal.), Disp: 90 capsule, Rfl: 2     Celecoxib (CELEBREX PO), Take 200 mg by mouth 3 times daily, Disp: , Rfl:   Current Facility-Administered Medications:      lidocaine 1 % injection 5 mL, 5 mL, INTRA-ARTICULAR, Once, Carrol Gaspar MD    Facility-Administered Medications Ordered in Other Visits:      " bupivacaine 0.5 % -  EPINEPHrine 1:200,000 injection, , , PRN, Yvon Negrete MD, 20 mL at 04/27/17 1505     mepivacaine (PF) (CARBOCAINE) 2 % injection, , , PRN, Yvon eNgrete MD, 10 mL at 06/18/15 1250        Allergies   Allergen Reactions     Morphine Swelling     Codeine Nausea and Nausea and Vomiting     Swelling, itching. GI Intolerance.      Gabapentin Other (See Comments)     Pins,needles, stabbing aching at once  Numbness and Tingling     Sulfa Drugs      unknown     Erythromycin Nausea     Vomiting      Penicillins Rash     Prednisone Palpitations     Heart racing      PHYSICAL EXAM  Pt states headache today rates 10/10 started 1 week ago.  Taking only medication for right arm surgery which helps headache somewhat.  Takes minimal Tylenol for headache pain.    HPI  Patient reports the following new medical problems since last visit: Right arm surgery July 19, 2017 at [facility] [place] [facility].    RESPONSE TO PREVIOUS TREATMENT:  Patient Kalyn Rivero received 155 units of Botox on 4/17/2017.    Problems following the previous series of neurotoxin injections included:  No problems reported    Benefits by Patient Report:  Change in headache pattern following last series of injections with 155 units of  Botox on 4/17/2017.     1.  Headache Frequency During this Injection Cycle:  0 headache since last injections on 4/17/2017 then has had one continuous headache for the last 2 weeks.     2.  Headache Duration During this Injection Cycle:  See above     3.  Headache Intensity During this Injection Cycle:  Headache intensity during this injection cycle:      A.  10/10  =  Typical pain level.  Headache rapidly accelerates to 10/10 when starts    B.  10/10  =  Worst pain level.    C.  0/10  =  Lowest pain level.     4.  Change in headache medication usage during this injection cycle:  (For Example:  Able to decrease use of oral pain medications.)  Able to decrease use of medication.       5.  ER Visits  During This Injection Cycle:  0       6.  Functional Performance:  Change in ADL's, social interaction, days lost from work, etc. Has had to cancel some activities due to headache.     BOTULINUM NEUROTOXIN INJECTION PROCEDURES:    VERIFICATION OF PATIENT IDENTIFICATION  Responsible Person:  joel  : verified  Full Name: verified    INDICATIONS FOR PROCEDURES:  Kalyn Rivero is a 46 year old patient with headache affecting the head, neck and shoulder girdle musculature secondary to a diagnosis of Chronic Migraine Headache with associated pain.    Her baseline symptoms have been recalcitrant to oral medications and conservative therapy.  She is here today for reinjection with Botox.    GOAL OF PROCEDURE:  The goal of this procedure is to improve decrease pain  associated with headache.    TOTAL DOSE ADMINISTERED:  Dose Administered:  150 units Botox (Botulinum Toxin Type A)       2:1 Dilution   Diluent Used:  Preservative Free Normal Saline  Total Volume of Diluent Used:  3.0ml  Lot # /C3 with Expiration Date:  2020    CONSENT:  The risks, benefits, and treatment options were discussed with Kalyn Rivero and she agreed to proceed.    EQUIPMENT USED:  Needle-25mm stimulating/recording  Needle-37mm stimulating/recording  EMG/NCS Machine    SKIN PREPARATION:  Skin preparation was performed using an alcohol wipe.    GUIDANCE DESCRIPTION:  Guidance was not utilized for this procedure     AREA/MUSCLE INJECTED:    HEAD & SCALP MUSCLES: 150 units Botox = Total Dose, 2:1 Dilution   Right occipitalis - 20 units of Botox at 4 site/s.    Right Frontalis - 25 units of Botox at 5 site/s.  Left Frontalis - 20 units of Botox at 4 site/s.    Right Temporalis - 35 units of Botox at 7 site/s.  Left Temporalis - 35 units of Botox at 7 site/s.    Right  - 5 units of Botox at 1 site/s.   Left  - 5 units of Botox at 1 site/s.    Procerus - 5 units of Botox at 1 site/s.    RESPONSE TO PROCEDURE:   Kalyn Rivero tolerated the procedure well and there were no immediate complications.   She was allowed to recover for an appropriate period of time and was discharged home in stable condition.     Follow Up  Kalyn Rivero was asked to follow up by phone in 7-14 days with Annamaria Lamb RN, Care Coordinator, to report her response to this series of injections.  Based on the patient's previous response to this therapy, Kalyn Rivero was rescheduled for the next series of injections in 12 weeks.    PLAN:   --patient refused any injections in her neck area  --Botox dose and pattern of injections were adjusted as tabulate above.     Again, thank you for allowing me to participate in the care of your patient.      Sincerely,  Ema Rodgers MD

## 2017-07-28 NOTE — PROGRESS NOTES
"Chief Complaint   Patient presents with     Consult     [facility] NEW - BOTOX Chronic Migraine Headache       /90 (BP Location: Left arm, Patient Position: Sitting, Cuff Size: Adult Regular)  Pulse 98  Temp 97.7  F (36.5  C)  Ht 1.67 m (5' 5.75\")  Wt 73.9 kg (162 lb 14.7 oz)  LMP  (LMP Unknown)  BMI 26.5 kg/m2       Current Outpatient Prescriptions:      oxyCODONE (ROXICODONE) 5 MG IR tablet, Take 1-2 tablets (5-10 mg) by mouth every 4 hours as needed for pain maximum 4 tablet(s) per day, Disp: 90 tablet, Rfl: 0     acetaminophen (TYLENOL) 500 MG tablet, Take 1-2 tablets (500-1,000 mg) by mouth every 8 hours as needed for mild pain, Disp: 60 tablet, Rfl: 3     medroxyPROGESTERone (DEPO-PROVERA) 150 MG/ML injection, Inject 1 mL (150 mg) into the muscle every 3 months, Disp: 1 mL, Rfl: 3     Cyanocobalamin (VITAMIN B-12 PO), Take 4 tablets by mouth daily , Disp: , Rfl:      leflunomide (ARAVA) 20 MG tablet, Take 20 mg by mouth every morning Reported on 3/28/2017, Disp: , Rfl:      Botulinum Toxin Type A (BOTOX) 200 UNITS SOLR, Inject 200 Units as directed every 3 months, Disp: 200 Units, Rfl: 3     omeprazole (PRILOSEC) 40 MG capsule, Take 1 capsule (40 mg) by mouth daily Take 30-60 minutes before a meal. (Patient taking differently: Take 40 mg by mouth 2 times daily Take 30-60 minutes before a meal.), Disp: 90 capsule, Rfl: 2     Celecoxib (CELEBREX PO), Take 200 mg by mouth 3 times daily, Disp: , Rfl:     Current Facility-Administered Medications:      lidocaine 1 % injection 5 mL, 5 mL, INTRA-ARTICULAR, Once, Carrol Gaspar MD    Facility-Administered Medications Ordered in Other Visits:      bupivacaine 0.5 % -  EPINEPHrine 1:200,000 injection, , , PRN, Yvon Negrete MD, 20 mL at 04/27/17 1505     mepivacaine (PF) (CARBOCAINE) 2 % injection, , , PRN, Yvon Negrete MD, 10 mL at 06/18/15 1250        Allergies   Allergen Reactions     Morphine Swelling     Codeine Nausea and " Nausea and Vomiting     Swelling, itching. GI Intolerance.      Gabapentin Other (See Comments)     Pins,needles, stabbing aching at once  Numbness and Tingling     Sulfa Drugs      unknown     Erythromycin Nausea     Vomiting      Penicillins Rash     Prednisone Palpitations     Heart racing        PHYSICAL EXAM  Pt states headache today rates 10/10 started 1 week ago.  Taking only medication for right arm surgery which helps headache somewhat.  Takes minimal Tylenol for headache pain.    HPI    Patient reports the following new medical problems since last visit: [place] [facility] [place]    RESPONSE TO PREVIOUS TREATMENT:    Patient Kalyn Rivero received 155 units of Botox on 4/17/2017.    Problems following the previous series of neurotoxin injections included:  No problems reported    Benefits by Patient Report:    Change in headache pattern following last series of injections with 155 units of  Botox on 4/17/2017.     1.  Headache Frequency During this Injection Cycle:  0 headache since last injections on 4/17/2017 then has had one continuous headache for the last 2 weeks.       2.  Headache Duration During this Injection Cycle:  See above       3.  Headache Intensity During this Injection Cycle:  Headache intensity during this injection cycle:      A.  10/10  =  Typical pain level.  Headache rapidly accelerates to 10/10 when starts    B.  10/10  =  Worst pain level.    C.  0/10  =  Lowest pain level.       4.  Change in headache medication usage during this injection cycle:  (For Example:  Able to decrease use of oral pain medications.)  Able to decrease use of medication.       5.  ER Visits During This Injection Cycle:  0       6.  Functional Performance:  Change in ADL's, social interaction, days lost from work, etc. Has had to cancel some activities due to headache.       BOTULINUM NEUROTOXIN INJECTION PROCEDURES:    VERIFICATION OF PATIENT IDENTIFICATION  Responsible Person:   lmd  : verified  Full Name: verified    INDICATIONS FOR PROCEDURES:  Kalyn Rivero is a 46 year old patient with headache affecting the head, neck and shoulder girdle musculature secondary to a diagnosis of Chronic Migraine Headache with associated pain.    Her baseline symptoms have been recalcitrant to oral medications and conservative therapy.  She is here today for reinjection with Botox.    GOAL OF PROCEDURE:  The goal of this procedure is to improve decrease pain  associated with headache.    TOTAL DOSE ADMINISTERED:  Dose Administered:  150 units Botox (Botulinum Toxin Type A)       2:1 Dilution   Diluent Used:  Preservative Free Normal Saline  Total Volume of Diluent Used:  3.0ml  Lot # /C3 with Expiration Date:  2020    CONSENT:  The risks, benefits, and treatment options were discussed with Kalyn Rivero and she agreed to proceed.    EQUIPMENT USED:  Needle-25mm stimulating/recording  Needle-37mm stimulating/recording  EMG/NCS Machine    SKIN PREPARATION:  Skin preparation was performed using an alcohol wipe.      GUIDANCE DESCRIPTION:  Guidance was not utilized for this procedure     AREA/MUSCLE INJECTED:    HEAD & SCALP MUSCLES: 150 units Botox = Total Dose, 2:1 Dilution   Right occipitalis - 20 units of Botox at 4 site/s.    Right Frontalis - 25 units of Botox at 5 site/s.  Left Frontalis - 20 units of Botox at 4 site/s.    Right Temporalis - 35 units of Botox at 7 site/s.  Left Temporalis - 35 units of Botox at 7 site/s.    Right  - 5 units of Botox at 1 site/s.   Left  - 5 units of Botox at 1 site/s.    Procerus - 5 units of Botox at 1 site/s.      RESPONSE TO PROCEDURE:  Kalyn Rivero tolerated the procedure well and there were no immediate complications.   She was allowed to recover for an appropriate period of time and was discharged home in stable condition.     Follow Up  Kalyn Rivero was asked to follow up by phone in 7-14 days with Annamaria  Adonis RN, Care Coordinator, to report her response to this series of injections.  Based on the patient's previous response to this therapy, Kalyn Rivero was rescheduled for the next series of injections in 12 weeks.    PLAN:   --patient refused any injections in her neck area  --Botox dose and pattern of injections were adjusted as tabulate above.       Ema Rodgers MD  Physical Medicine & Rehabilitation

## 2017-08-17 ENCOUNTER — TRANSFERRED RECORDS (OUTPATIENT)
Dept: HEALTH INFORMATION MANAGEMENT | Facility: CLINIC | Age: 47
End: 2017-08-17

## 2017-08-17 ENCOUNTER — MEDICAL CORRESPONDENCE (OUTPATIENT)
Dept: HEALTH INFORMATION MANAGEMENT | Facility: CLINIC | Age: 47
End: 2017-08-17

## 2017-08-24 DIAGNOSIS — K27.9 PUD (PEPTIC ULCER DISEASE): ICD-10-CM

## 2017-08-24 RX ORDER — OMEPRAZOLE 40 MG/1
CAPSULE, DELAYED RELEASE ORAL
Qty: 90 CAPSULE | Refills: 1 | Status: SHIPPED | OUTPATIENT
Start: 2017-08-24 | End: 2018-03-10

## 2017-08-29 ENCOUNTER — OFFICE VISIT (OUTPATIENT)
Dept: URGENT CARE | Facility: URGENT CARE | Age: 47
End: 2017-08-29
Payer: COMMERCIAL

## 2017-08-29 VITALS
WEIGHT: 154 LBS | HEART RATE: 97 BPM | DIASTOLIC BLOOD PRESSURE: 82 MMHG | TEMPERATURE: 98.7 F | BODY MASS INDEX: 25.05 KG/M2 | OXYGEN SATURATION: 99 % | SYSTOLIC BLOOD PRESSURE: 139 MMHG

## 2017-08-29 DIAGNOSIS — T14.8XXA WOUND DRAINAGE: Primary | ICD-10-CM

## 2017-08-29 LAB
BASOPHILS # BLD AUTO: 0 10E9/L (ref 0–0.2)
BASOPHILS NFR BLD AUTO: 0.7 %
DIFFERENTIAL METHOD BLD: ABNORMAL
EOSINOPHIL # BLD AUTO: 0.1 10E9/L (ref 0–0.7)
EOSINOPHIL NFR BLD AUTO: 2.4 %
ERYTHROCYTE [DISTWIDTH] IN BLOOD BY AUTOMATED COUNT: 16.7 % (ref 10–15)
HCT VFR BLD AUTO: 37.8 % (ref 35–47)
HGB BLD-MCNC: 11.9 G/DL (ref 11.7–15.7)
LYMPHOCYTES # BLD AUTO: 2.1 10E9/L (ref 0.8–5.3)
LYMPHOCYTES NFR BLD AUTO: 36.7 %
MCH RBC QN AUTO: 26.6 PG (ref 26.5–33)
MCHC RBC AUTO-ENTMCNC: 31.5 G/DL (ref 31.5–36.5)
MCV RBC AUTO: 85 FL (ref 78–100)
MONOCYTES # BLD AUTO: 0.5 10E9/L (ref 0–1.3)
MONOCYTES NFR BLD AUTO: 8.3 %
NEUTROPHILS # BLD AUTO: 3 10E9/L (ref 1.6–8.3)
NEUTROPHILS NFR BLD AUTO: 51.9 %
PLATELET # BLD AUTO: 181 10E9/L (ref 150–450)
RBC # BLD AUTO: 4.47 10E12/L (ref 3.8–5.2)
WBC # BLD AUTO: 5.8 10E9/L (ref 4–11)

## 2017-08-29 PROCEDURE — 99214 OFFICE O/P EST MOD 30 MIN: CPT | Performed by: PHYSICIAN ASSISTANT

## 2017-08-29 PROCEDURE — 87186 SC STD MICRODIL/AGAR DIL: CPT | Performed by: PHYSICIAN ASSISTANT

## 2017-08-29 PROCEDURE — 85025 COMPLETE CBC W/AUTO DIFF WBC: CPT | Performed by: PHYSICIAN ASSISTANT

## 2017-08-29 PROCEDURE — 87070 CULTURE OTHR SPECIMN AEROBIC: CPT | Performed by: PHYSICIAN ASSISTANT

## 2017-08-29 PROCEDURE — 36415 COLL VENOUS BLD VENIPUNCTURE: CPT | Performed by: PHYSICIAN ASSISTANT

## 2017-08-29 PROCEDURE — 87077 CULTURE AEROBIC IDENTIFY: CPT | Performed by: PHYSICIAN ASSISTANT

## 2017-08-29 RX ORDER — CEPHALEXIN 500 MG/1
500 CAPSULE ORAL 4 TIMES DAILY
Qty: 40 CAPSULE | Refills: 0 | Status: SHIPPED | OUTPATIENT
Start: 2017-08-29 | End: 2018-01-11

## 2017-08-29 RX ORDER — MEDROXYPROGESTERONE ACETATE 150 MG/ML
50 INJECTION, SUSPENSION INTRAMUSCULAR
Status: ON HOLD | COMMUNITY
Start: 2017-03-29 | End: 2018-11-30

## 2017-08-29 NOTE — PROGRESS NOTES
"    SUBJECTIVE:                                                    Kalyn Rivero is a 47 year old female who presents to clinic today for the following health issues:      Pt here for her right elbow, pt had surgery July 19th. Incision site is open. Pt has pain all around her elbow. Pt worried about a possible infection     [place] [facility]ver [place], plate also inserted. Yest saw surgeon. Xrays were normal per pt. Huge scab, said looked good. Last night took dressing off and scab came with it. Very tender. Worried about infection.            Allergies   Allergen Reactions     Morphine Swelling     Codeine Nausea and Nausea and Vomiting     Swelling, itching. GI Intolerance.      Gabapentin Other (See Comments)     Pins,needles, stabbing aching at once  Numbness and Tingling     Sulfa Drugs      unknown     Erythromycin Nausea     Vomiting      Penicillins Rash     Prednisone Palpitations     Heart racing       Past Medical History:   Diagnosis Date     Acetaminophen overdose 3/24/2011     Anemia      Anesthesia complication     pt states has a histor of heart stopping during anesthesia,     Cervical high risk HPV (human papillomavirus) test positive 02/22/2017    type 18 & other HR HPV     Chronic infection     MRSA     Chronic pain 3/24/2011     Endometriosis      Fibromyalgia      Gastro-oesophageal reflux disease      Heart murmur      History of blood transfusion      Hypothyroidism      Immune disorder (H)      Learning disability      Malignant neoplasm (H)      Migraine      MRSA (methicillin resistant staph aureus) culture positive      Neck injuries      Opioid type dependence (H)      Other chronic pain      PONV (postoperative nausea and vomiting)     states \"flatlines\"during anesthesia     RA (rheumatoid arthritis) (H)      Renal stone      Scoliosis      Stomach ulcer     history         Current Outpatient Prescriptions on File Prior to Visit:  omeprazole (PRILOSEC) 40 MG " capsule TAKE 1 CAPSULE (40 MG) BY MOUTH DAILY TAKE 30-60 MINUTES BEFORE A MEAL.   Cyanocobalamin (VITAMIN B-12 PO) Take 4 tablets by mouth daily    leflunomide (ARAVA) 20 MG tablet Take 20 mg by mouth every morning Reported on 3/28/2017   Botulinum Toxin Type A (BOTOX) 200 UNITS SOLR Inject 200 Units as directed every 3 months   Celecoxib (CELEBREX PO) Take 200 mg by mouth 3 times daily     Current Facility-Administered Medications on File Prior to Visit:  lidocaine 1 % injection 5 mL   bupivacaine 0.5 % -  EPINEPHrine 1:200,000 injection   mepivacaine (PF) (CARBOCAINE) 2 % injection       Social History   Substance Use Topics     Smoking status: Current Every Day Smoker     Packs/day: 0.10     Years: 10.00     Types: Cigarettes     Last attempt to quit: 7/31/2013     Smokeless tobacco: Former User      Comment: Quit 9/1/14     Alcohol use No       ROS:  General: negative for fever  SKIN: + as above    Physcial Exam:  /82  Pulse 97  Temp 98.7  F (37.1  C) (Oral)  Wt 154 lb (69.9 kg)  SpO2 99%  BMI 25.05 kg/m2    GENERAL: alert, no acute distress  EYES: conjunctival clear  RESP: Regular breathing rate  NEURO: awake .  SKIN: R posterior elbow with 1cm x 2 cm divot where scab came off. It is about 1/2 cm deep. Some serosanguinous drainage. Mild surrounding redness adjacent and above the open area. Wound cx obtained.    Results for orders placed or performed in visit on 08/29/17   CBC with platelets differential   Result Value Ref Range    WBC 5.8 4.0 - 11.0 10e9/L    RBC Count 4.47 3.8 - 5.2 10e12/L    Hemoglobin 11.9 11.7 - 15.7 g/dL    Hematocrit 37.8 35.0 - 47.0 %    MCV 85 78 - 100 fl    MCH 26.6 26.5 - 33.0 pg    MCHC 31.5 31.5 - 36.5 g/dL    RDW 16.7 (H) 10.0 - 15.0 %    Platelet Count 181 150 - 450 10e9/L    Diff Method Automated Method     % Neutrophils 51.9 %    % Lymphocytes 36.7 %    % Monocytes 8.3 %    % Eosinophils 2.4 %    % Basophils 0.7 %    Absolute Neutrophil 3.0 1.6 - 8.3 10e9/L     Absolute Lymphocytes 2.1 0.8 - 5.3 10e9/L    Absolute Monocytes 0.5 0.0 - 1.3 10e9/L    Absolute Eosinophils 0.1 0.0 - 0.7 10e9/L    Absolute Basophils 0.0 0.0 - 0.2 10e9/L     *Note: Due to a large number of results and/or encounters for the requested time period, some results have not been displayed. A complete set of results can be found in Results Review.       ASSESSMENT:    ICD-10-CM    1. Wound drainage T14.8 CBC with platelets differential     Wound Culture Aerobic Bacterial     cephALEXin (KEFLEX) 500 MG capsule       PLAN: She is to see her surgeon tomorrow. Start the antibiotic. Copies of labs given  See today's orders.  Follow-up with primary clinic if not improving.  Advised about symptoms which might herald more serious problems.    Ashley Haynes PA-C

## 2017-08-29 NOTE — NURSING NOTE
"Chief Complaint   Patient presents with     Wound Check       Initial /82  Pulse 97  Temp 98.7  F (37.1  C) (Oral)  Wt 154 lb (69.9 kg)  SpO2 99%  BMI 25.05 kg/m2 Estimated body mass index is 25.05 kg/(m^2) as calculated from the following:    Height as of 7/28/17: 5' 5.75\" (1.67 m).    Weight as of this encounter: 154 lb (69.9 kg).  Medication Reconciliation: complete  Betty Dye CMA    "

## 2017-08-29 NOTE — MR AVS SNAPSHOT
After Visit Summary   8/29/2017    Kalyn Rivero    MRN: [id number]           Patient Information     Date Of Birth          1970        Visit Information        Provider Department      8/29/2017 5:00 PM Ashley Haynes PA-C [facility]        Today's Diagnoses     Wound drainage    -  1       Follow-ups after your visit        Your next 10 appointments already scheduled     Oct 19, 2017 11:00 AM CDT   (Arrive by 10:45 AM)   New Patient Visit with Rosina Quinones MD   [facility] ([facility])    [address] [place] [address]  3rd [facility] [id number] [phone]            Nov 24, 2017  8:20 AM CST   (Arrive by 8:05 AM)   New Patient Visit with Ángel Killian MD   [facility] ([facility])    [address] [place] [address]  4th [facility] [id number] [phone]              Who to contact     If you have questions or need follow up information about today's clinic visit or your schedule please contact [facility] directly at [phone].  Normal or non-critical lab and imaging results will be communicated to you by MyChart, letter or phone within 4 business days after the clinic has received the results. If you do not hear from us within 7 days, please contact the clinic through MyChart or phone. If you have a critical or abnormal lab result, we will notify you by phone as soon as possible.  Submit refill requests through [employer] or call your pharmacy and they will forward the refill request to us. Please allow 3 business days for your refill to be completed.          Additional Information About Your Visit        MyChart Information     [employer] lets you send messages to your doctor, view your test results, renew your prescriptions, schedule appointments and more. To sign up, go to [email or web address]  [email or web address] Click on \"Log in\" on the left side of the screen, which will take you to the Welcome page. Then click on \"Sign up Now\" on the right side of the page.     You will be asked to enter the access code listed below, as well as some personal information. Please follow the directions to create your username and password.     Your access code is: [email or web address]  Expires: 10/26/2017  2:00 PM     Your access code will  in 90 days. If you need help or a new code, please call your [facility] or [phone].        Care EveryWhere ID     This is your Care EveryWhere ID. This could be used by other organizations to access your [place] medical records  [id number]        Your Vitals Were     Pulse Temperature Pulse Oximetry BMI (Body Mass Index)          97 98.7  F (37.1  C) (Oral) 99% 25.05 kg/m2         Blood Pressure from Last 3 Encounters:   17 139/82   17 136/90   17 134/83    Weight from Last 3 Encounters:   17 154 lb (69.9 kg)   17 162 lb 14.7 oz (73.9 kg)   17 163 lb (73.9 kg)              We Performed the Following     CBC with platelets differential     Wound Culture Aerobic Bacterial          Today's Medication Changes          These changes are accurate as of: 17  5:48 PM.  If you have any questions, ask your nurse or doctor.               Start taking these medicines.        Dose/Directions    cephALEXin 500 MG capsule   Commonly known as:  KEFLEX   Used for:  Wound drainage        Dose:  500 mg   Take 1 capsule (500 mg) by mouth 4 times daily   Quantity:  40 capsule   Refills:  0            Where to get your medicines      These medications were sent to [facility]/pharmacy #[id number] - [place] - [address] [address]     Phone:  [phone]     cephALEXin 500 MG capsule                Primary Care Provider Office Phone # Fax #    Mika Zamora [phone] [address] [facility] [address]      "   Equal Access to Services     [facility]: Hadii aad ku hadchalothomas Yungali, waaxda luqadaha, qaybta kamaria teresacooper rashid, alexis hollins. So [facility] [phone].    ATENCIÓN: Si habla español, tiene a holcomb disposición servicios gratuitos de asistencia lingüística. Llame al [phone].    We comply with applicable federal civil rights laws and [place] laws. We do not discriminate on the basis of race, color, national origin, age, disability sex, sexual orientation or gender identity.            Thank you!     Thank you for choosing [facility]  [facility] your [facility]. Our goal is always to provide you with excellent care. Hearing back from our patients is one way we can continue to improve our services. Please take a few minutes to complete the written survey that you may receive in the mail after your visit with us. Thank you!             Your Updated Medication List - Protect others around you: Learn how to safely use, store and throw away your medicines at www.disposemymeds.org.          This list is accurate as of: 8/29/17  5:48 PM.  Always use your most recent med list.                   Brand Name Dispense Instructions for use Diagnosis    Botulinum Toxin Type A 200 UNITS injection    BOTOX    200 Units    Inject 200 Units as directed every 3 months    Intractable chronic migraine without aura and without status migrainosus       CELEBREX PO      Take 200 mg by mouth 3 times daily        cephALEXin 500 MG capsule    KEFLEX    40 capsule    Take 1 capsule (500 mg) by mouth 4 times daily    Wound drainage       ENBREL SURECLICK 50 MG/ML autoinjector   Generic drug:  Etanercept           leflunomide 20 MG tablet    ARAVA     Take 20 mg by mouth every morning Reported on 3/28/2017        omeprazole 40 MG capsule    priLOSEC    90 capsule    TAKE 1 CAPSULE (40 MG) BY MOUTH DAILY TAKE 30-60 MINUTES BEFORE A MEAL.    PUD (peptic ulcer disease)       VITAMIN B-12 PO      Take 4 tablets by  mouth daily

## 2017-09-01 ENCOUNTER — TELEPHONE (OUTPATIENT)
Dept: FAMILY MEDICINE | Facility: CLINIC | Age: 47
End: 2017-09-01

## 2017-09-01 LAB
BACTERIA SPEC CULT: ABNORMAL
Lab: ABNORMAL
SPECIMEN SOURCE: ABNORMAL

## 2017-09-01 NOTE — TELEPHONE ENCOUNTER
[facility]  they need  Culture results faxed immediatly : [phone] so they can get her medication before the weekend and  is leaving soon.

## 2017-09-01 NOTE — TELEPHONE ENCOUNTER
See wound culture result note.      Pt asking for directions for dressing changes.  Advised to contact surgeon's office for instructions.  Pt agrees to this.    Pt has upcoming appointment on 9-11-17  With Dr. Zamora.  She is requesting home care order for wound care.      Pt advised to contact surgeon about this.  Pt states urgent care provider advised her Dr. Zamora could do this.    Can you order home care for wound.  Tahira Abbasi RN    I agree with the above plan  Mika Zamora MD

## 2017-09-01 NOTE — TELEPHONE ENCOUNTER
Faxed wound culture results to [facility] to the fax number in the message.Sharmin Gomez MA/[place]

## 2017-09-06 ENCOUNTER — OFFICE VISIT (OUTPATIENT)
Dept: FAMILY MEDICINE | Facility: CLINIC | Age: 47
End: 2017-09-06
Payer: MEDICAID

## 2017-09-06 VITALS
WEIGHT: 156.8 LBS | OXYGEN SATURATION: 99 % | DIASTOLIC BLOOD PRESSURE: 91 MMHG | HEART RATE: 120 BPM | BODY MASS INDEX: 25.5 KG/M2 | SYSTOLIC BLOOD PRESSURE: 126 MMHG | TEMPERATURE: 97.5 F

## 2017-09-06 DIAGNOSIS — Z79.899 ENCOUNTER FOR LONG-TERM (CURRENT) USE OF MEDICATIONS: ICD-10-CM

## 2017-09-06 DIAGNOSIS — M05.79 SEROPOSITIVE RHEUMATOID ARTHRITIS OF MULTIPLE SITES (H): Primary | ICD-10-CM

## 2017-09-06 DIAGNOSIS — T81.30XA WOUND DEHISCENCE: Primary | ICD-10-CM

## 2017-09-06 DIAGNOSIS — M05.79 SEROPOSITIVE RHEUMATOID ARTHRITIS OF MULTIPLE SITES (H): ICD-10-CM

## 2017-09-06 LAB
ALBUMIN SERPL-MCNC: 3.6 G/DL (ref 3.4–5)
ALT SERPL W P-5'-P-CCNC: 19 U/L (ref 0–50)
AST SERPL W P-5'-P-CCNC: 12 U/L (ref 0–45)
BASOPHILS # BLD AUTO: 0 10E9/L (ref 0–0.2)
BASOPHILS NFR BLD AUTO: 0.3 %
CREAT SERPL-MCNC: 0.89 MG/DL (ref 0.52–1.04)
DIFFERENTIAL METHOD BLD: ABNORMAL
EOSINOPHIL # BLD AUTO: 0.1 10E9/L (ref 0–0.7)
EOSINOPHIL NFR BLD AUTO: 1.8 %
ERYTHROCYTE [DISTWIDTH] IN BLOOD BY AUTOMATED COUNT: 16.5 % (ref 10–15)
GFR SERPL CREATININE-BSD FRML MDRD: 68 ML/MIN/1.7M2
HCT VFR BLD AUTO: 38.4 % (ref 35–47)
HGB BLD-MCNC: 12.2 G/DL (ref 11.7–15.7)
LYMPHOCYTES # BLD AUTO: 1.7 10E9/L (ref 0.8–5.3)
LYMPHOCYTES NFR BLD AUTO: 24.7 %
MCH RBC QN AUTO: 27 PG (ref 26.5–33)
MCHC RBC AUTO-ENTMCNC: 31.8 G/DL (ref 31.5–36.5)
MCV RBC AUTO: 85 FL (ref 78–100)
MONOCYTES # BLD AUTO: 0.5 10E9/L (ref 0–1.3)
MONOCYTES NFR BLD AUTO: 7.6 %
NEUTROPHILS # BLD AUTO: 4.5 10E9/L (ref 1.6–8.3)
NEUTROPHILS NFR BLD AUTO: 65.6 %
PLATELET # BLD AUTO: 211 10E9/L (ref 150–450)
RBC # BLD AUTO: 4.52 10E12/L (ref 3.8–5.2)
WBC # BLD AUTO: 6.8 10E9/L (ref 4–11)

## 2017-09-06 PROCEDURE — 82565 ASSAY OF CREATININE: CPT | Performed by: INTERNAL MEDICINE

## 2017-09-06 PROCEDURE — 82040 ASSAY OF SERUM ALBUMIN: CPT | Performed by: INTERNAL MEDICINE

## 2017-09-06 PROCEDURE — 85025 COMPLETE CBC W/AUTO DIFF WBC: CPT | Performed by: INTERNAL MEDICINE

## 2017-09-06 PROCEDURE — 84460 ALANINE AMINO (ALT) (SGPT): CPT | Performed by: INTERNAL MEDICINE

## 2017-09-06 PROCEDURE — 99212 OFFICE O/P EST SF 10 MIN: CPT | Performed by: FAMILY MEDICINE

## 2017-09-06 PROCEDURE — 84450 TRANSFERASE (AST) (SGOT): CPT | Performed by: INTERNAL MEDICINE

## 2017-09-06 PROCEDURE — 36415 COLL VENOUS BLD VENIPUNCTURE: CPT | Performed by: INTERNAL MEDICINE

## 2017-09-06 NOTE — MR AVS SNAPSHOT
After Visit Summary   9/6/2017    Kalyn Rivero    MRN: [id number]           Patient Information     Date Of Birth          1970        Visit Information        Provider Department      9/6/2017 3:00 PM Mika Zamora MD [facility]        Today's Diagnoses     Wound dehiscence    -  1       Follow-ups after your visit        Your next 10 appointments already scheduled     Oct 19, 2017 11:00 AM CDT   (Arrive by 10:45 AM)   New Patient Visit with Rosina Quinones MD   [facility] ([facility])    [address] [place] [address]  3rd [facility] [id number] [phone]            Nov 24, 2017  8:20 AM CST   (Arrive by 8:05 AM)   New Patient Visit with Ángel Killian MD   [facility] ([facility])    [address] [place] [address]  4th [facility] [id number] [phone]              Future tests that were ordered for you today     Open Standing Orders        Priority Remaining Interval Expires Ordered    CBC with platelets and differential Routine 3/4 EVERY 3 MONTHS 9/6/2018 9/6/2017    Albumin level Routine 3/4 EVERY 3 MONTHS 9/6/2018 9/6/2017    Creatinine Routine 3/4 EVERY 3 MONTHS 9/6/2018 9/6/2017    AST Routine 3/4 EVERY 3 MONTHS 9/6/2018 9/6/2017    ALT Routine 3/4 EVERY 3 MONTHS 9/6/2018 9/6/2017            Who to contact     If you have questions or need follow up information about today's clinic visit or your schedule please contact [facility] directly at [phone].  Normal or non-critical lab and imaging results will be communicated to you by MyChart, letter or phone within 4 business days after the clinic has received the results. If you do not hear from us within 7 days, please contact the clinic through MyChart or phone. If you have a critical or abnormal lab result, we will notify you by phone as  "soon as possible.  Submit refill requests through [employer] or call your pharmacy and they will forward the refill request to us. Please allow 3 business days for your refill to be completed.          Additional Information About Your Visit        [employer] Thing[employer] Information     [employer] lets you send messages to your doctor, view your test results, renew your prescriptions, schedule appointments and more. To sign up, go to [email or web address] . Click on \"Log in\" on the left side of the screen, which will take you to the Welcome page. Then click on \"Sign up Now\" on the right side of the page.     You will be asked to enter the access code listed below, as well as some personal information. Please follow the directions to create your username and password.     Your access code is: [email or web address]  Expires: 10/26/2017  2:00 PM     Your access code will  in 90 days. If you need help or a new code, please call your [place] clinic or [phone].        Care EveryWhere ID     This is your Care EveryWhere ID. This could be used by other organizations to access your [place] medical records  [id number]        Your Vitals Were     Pulse Temperature Pulse Oximetry BMI (Body Mass Index)          120 97.5  F (36.4  C) (Oral) 99% 25.5 kg/m2         Blood Pressure from Last 3 Encounters:   17 (!) 126/91   17 139/82   17 136/90    Weight from Last 3 Encounters:   17 156 lb 12.8 oz (71.1 kg)   17 154 lb (69.9 kg)   17 162 lb 14.7 oz (73.9 kg)              Today, you had the following     No orders found for display       Primary Care Provider Office Phone # Fax #    Mika Zamora [phone] [address] [facility] [address]        Equal Access to Services     [facility] [place] : Makeda acosta Sokayla, waaxda luqadaha, qaybta kaalmaalexis wood. [facility] [phone].    ATENCIÓN: Si laura villalobos " disposición servicios gratuitos de asistencia lingüística. Lis morse [phone].    We comply with applicable federal civil rights laws and [place] laws. We do not discriminate on the basis of race, color, national origin, age, disability sex, sexual orientation or gender identity.            Thank you!     Thank you for choosing [facility]  for your care. Our goal is always to provide you with excellent care. Hearing back from our patients is one way we can continue to improve our services. Please take a few minutes to complete the written survey that you may receive in the mail after your visit with us. Thank you!             Your Updated Medication List - Protect others around you: Learn how to safely use, store and throw away your medicines at www.disposemymeds.org.          This list is accurate as of: 9/6/17 11:59 PM.  Always use your most recent med list.                   Brand Name Dispense Instructions for use Diagnosis    Botulinum Toxin Type A 200 UNITS injection    BOTOX    200 Units    Inject 200 Units as directed every 3 months    Intractable chronic migraine without aura and without status migrainosus       CELEBREX PO      Take 200 mg by mouth 3 times daily        cephALEXin 500 MG capsule    KEFLEX    40 capsule    Take 1 capsule (500 mg) by mouth 4 times daily    Wound drainage       DOXYCYCLINE HYCLATE PO           ENBREL SURECLICK 50 MG/ML autoinjector   Generic drug:  Etanercept           leflunomide 20 MG tablet    ARAVA     Take 20 mg by mouth every morning Reported on 3/28/2017        omeprazole 40 MG capsule    priLOSEC    90 capsule    TAKE 1 CAPSULE (40 MG) BY MOUTH DAILY TAKE 30-60 MINUTES BEFORE A MEAL.    PUD (peptic ulcer disease)       VITAMIN B-12 PO      Take 4 tablets by mouth daily

## 2017-09-06 NOTE — PROGRESS NOTES
Patient at clinic for appointment to have her right elbow wound dressing changed.  She reports that she has an appointment with her surgeon at [facility] on Monday.  She states that she has been changing her dressing every other day by herself but wanting her doctor to look at it.  Patient reports that she has seen it in the mirror with dressing changes.  She says that the site looks much better than when she was seen at our urgent care last week.  Elbow site open incision area approx 8mm x 2cm open area.  approx  5mm in depth.  No appearance of active drainage/oozing.  Tissue at site appears healthy pink on edges inside with greenish color to center.  She states was told to cover area with 4x4 with bacitracin.  I did her dressing change.  Used sterile technique with 4x4's.  Wrapped with Kerlix to protect area.  Kalyn will change it again on Friday.  She states that the nurse from the [facility] that's in charge of her PCA hours did give her more hours for nursing services for her wound care management but her PCA agency does not provide SNV.  She states the [facility] nurse said would be able to find her an agency to do the wound care but need an order from her physician telling her how often she needs to be seen to have her dressing changes, and how these dressings are to be done.  I informed Kalyn (and her male PCA) that she needs to call her surgeon's office and get these orders and instructions through him.  He has to make the determination as to how often and what type of dressing.  She states will do this when she sees him on Monday.  I advised her to call him now to get the process started.  Sent home with left over kerlix and 4x4's.  Isamar Steven RN  SUBJECTIVE:  47 year old.The patient has a history of surgery with wound dihisence.  This started one week ago. Location right elbow  Associated symptoms are was infected and now on keflex and doxycyline.   .  Better with antibiotixs. ROS  No fever or chills      Reviewed  health maintenance  Patient Active Problem List   Diagnosis     Hypothyroidism     Advanced directives, counseling/discussion     Acetaminophen overdose     Hepatic failure (H)     Pancreatitis     Anemia     Chronic pain     Pneumonia     Suicide risk     Grief reaction     Liver failure, acute     CARDIOVASCULAR SCREENING; LDL GOAL LESS THAN 160     Renal stone     Health Care Home     TOTAL KNEE ARTHROPLASTY - bilateral     Knee joint replacement - left     Trochanteric bursitis - left     Atlantoaxial instability     Postoperative pain     S/P cervical spinal fusion     Drug-seeking behavior     Opioid type dependence (H)     Severe frontal headaches     Abdominal pain, unspecified abdominal location     Esophageal reflux     Right upper extremity numbness     Right arm numbness     Lesion of ulnar nerve     TMJ (temporomandibular joint syndrome)     Intractable chronic migraine without aura     Encounter for long-term opiate analgesic use     Rheumatoid arthritis of foot (H)     Other drug-induced neutropenia (H)     Rheumatoid arthritis with positive rheumatoid factor, involving unspecified site (H)     Cervical high risk HPV (human papillomavirus) test positive     Elbow fracture     Past Medical History:   Diagnosis Date     Acetaminophen overdose 3/24/2011     Anemia      Anesthesia complication     pt states has a histor of heart stopping during anesthesia,     Cervical high risk HPV (human papillomavirus) test positive 02/22/2017    type 18 & other HR HPV     Chronic infection     MRSA     Chronic pain 3/24/2011     Endometriosis      Fibromyalgia      Gastro-oesophageal reflux disease      Heart murmur      History of blood transfusion      Hypothyroidism      Immune disorder (H)      Learning disability      Malignant neoplasm (H)      Migraine      MRSA (methicillin resistant staph aureus) culture positive      Neck injuries      Opioid type dependence (H)      Other chronic pain      PONV  "(postoperative nausea and vomiting)     states \"flatlines\"during anesthesia     RA (rheumatoid arthritis) (H)      Renal stone      Scoliosis      Stomach ulcer     history       OBJECTIVE:  no apparent distress  BP (!) 126/91  Pulse 120  Temp 97.5  F (36.4  C) (Oral)  Wt 156 lb 12.8 oz (71.1 kg)  SpO2 99%  BMI 25.5 kg/m2  right elbow with wound dehisence as above      ICD-10-CM    1. Wound dehiscence T81.30XA     PLAN: daily dressing changes with follow up surgery in 5 dqys      "

## 2017-09-06 NOTE — NURSING NOTE
"Chief Complaint   Patient presents with     RECHECK     staph infection on elbow, needing note for nurse visit for wound care        Initial BP (!) 126/91  Pulse 120  Temp 97.5  F (36.4  C) (Oral)  Wt 156 lb 12.8 oz (71.1 kg)  SpO2 99%  BMI 25.5 kg/m2 Estimated body mass index is 25.5 kg/(m^2) as calculated from the following:    Height as of 7/28/17: 5' 5.75\" (1.67 m).    Weight as of this encounter: 156 lb 12.8 oz (71.1 kg).  Medication Reconciliation: complete  Raymond Schulte CMA    "

## 2017-09-07 NOTE — PROGRESS NOTES
Kalyn Rivero has a history of polyarticular arthritis, migraines, cervicalgia, chronic bilateral elbow pain, Right shoulder pain with known rheumatoid arthritis and partial thickness rotator cuff tearing, fibromyalgia with  all over body pain  and history of opioid dependence.  She is seen for perioperative pain recommendations today because she has reported poorly controlled postoperative pain in the past.    She is now s/p a left radial head resection on 4/24/17.  This is following an excision of a loose body in her right elbow in march. Per Dr. Sawyer's note she has had a satisfactory postoperative course and has participated in PT and there are no further surgeries scheduled for her upper extremities at this time.  However, upon chart review she is scheduled for a right shoulder arthroplasty on 6/29/17 with Dr. Law.  She is referred today for post operative pain regimen.  She has been seen in PAC.  Current Outpatient Prescriptions   Medication     Cyanocobalamin (VITAMIN B-12 PO)     leflunomide (ARAVA) 20 MG tablet     Botulinum Toxin Type A (BOTOX) 200 UNITS SOLR     Celecoxib (CELEBREX PO)     DOXYCYCLINE HYCLATE PO     ENBREL SURECLICK 50 MG/ML autoinjector     cephALEXin (KEFLEX) 500 MG capsule     omeprazole (PRILOSEC) 40 MG capsule     Current Facility-Administered Medications   Medication     lidocaine 1 % injection 5 mL     Facility-Administered Medications Ordered in Other Visits   Medication     bupivacaine 0.5 % -  EPINEPHrine 1:200,000 injection     mepivacaine (PF) (CARBOCAINE) 2 % injection     Allergies   Allergen Reactions     Morphine Swelling     Codeine Nausea and Nausea and Vomiting     Swelling, itching. GI Intolerance.      Gabapentin Other (See Comments)     Pins,needles, stabbing aching at once  Numbness and Tingling     Sulfa Drugs      unknown     Erythromycin Nausea     Vomiting      Penicillins Rash     Prednisone Palpitations     Heart racing     Past Medical History:  "  Diagnosis Date     Acetaminophen overdose 3/24/2011     Anemia      Anesthesia complication     pt states has a histor of heart stopping during anesthesia,     Cervical high risk HPV (human papillomavirus) test positive 02/22/2017    type 18 & other HR HPV     Chronic infection     MRSA     Chronic pain 3/24/2011     Endometriosis      Fibromyalgia      Gastro-oesophageal reflux disease      Heart murmur      History of blood transfusion      Hypothyroidism      Immune disorder (H)      Learning disability      Malignant neoplasm (H)      Migraine      MRSA (methicillin resistant staph aureus) culture positive      Neck injuries      Opioid type dependence (H)      Other chronic pain      PONV (postoperative nausea and vomiting)     states \"flatlines\"during anesthesia     RA (rheumatoid arthritis) (H)      Renal stone      Scoliosis      Stomach ulcer     history     Past Surgical History:   Procedure Laterality Date     ARTHRODESIS FOOT  5/23/2012    Procedure:ARTHRODESIS FOOT; Right Subtalar andTaloNavicular  Fusion  ; Surgeon:CHRIS ZHOU; [facility]     ARTHRODESIS FOOT Left 4/22/2015    Procedure: ARTHRODESIS FOOT;  Surgeon: Chris Zhou MD;  [facility]     ARTHROPLASTY ELBOW Right 9/30/2015    Procedure: ARTHROPLASTY ELBOW;  Surgeon: Carrol Gaspar MD;  [facility]     ARTHROPLASTY KNEE Right      ARTHROPLASTY WRIST      x2 Lt wrist replacement.     ARTHROTOMY ELBOW Right 6/18/2015    Procedure: ARTHROTOMY ELBOW;  Surgeon: Carrol Gaspar MD;  [facility]     C ANESTH,DX ARTHROSCOPIC PROC KNEE JOINT  10/24/2007    right total knee arthroplasty     C SHLDR ARTHROSCOP,DIAGNOSTIC  12/17/2008    left total shoulder arthroplasty by Dr. Law     C SHOULDER SURG PROC UNLISTED       C TOTAL KNEE ARTHROPLASTY  1/18/12    Left     CYSTOSCOPY  02/06/2009    1.cystoscopy.2.bilateral ureteroscopy.3.basketing of stone fragments from the right kidney.4.bilateral " double-J ureteral stent placement.5.ann catheter placement.6.fluoroscopy and intraoperative interpretation of images.     CYSTOSCOPY  01/08/2007    1. cystoscopy and left stent removal.2.left ureteroscopy     DECOMPRESSION CUBITAL TUNNEL  6/6/2014    Procedure: DECOMPRESSION CUBITAL TUNNEL;  Surgeon: Janelle Coates MD;  [facility]     ENDOSCOPY  03/31/2005    [facility]     ESOPHAGOSCOPY, GASTROSCOPY, DUODENOSCOPY (EGD), COMBINED  3/17/2014    Procedure: COMBINED ESOPHAGOSCOPY, GASTROSCOPY, DUODENOSCOPY (EGD), BIOPSY SINGLE OR MULTIPLE;;  Surgeon: Duane, William Charles, MD;  Location: [facility]     FUSION CERVICAL POSTERIOR ONE LEVEL  11/18/2013    Procedure: FUSION CERVICAL POSTERIOR ONE LEVEL;  Cervical 1-2 Posterior Cervical Fusion (Harms Procedure);  Surgeon: Carrol Gunn MD;  [facility]     GYN SURGERY       INJECT MAJOR JOINT / BURSA Left 1/5/2017    Procedure: INJECT MAJOR JOINT / BURSA;  Surgeon: Fredy Patel DO;  [facility]     INJECT STEROID (LOCATION) Left 6/18/2015    Procedure: INJECT STEROID (LOCATION);  Surgeon: Carrol Gaspar MD;  [facility]     NECK SURGERY       REMOVE FOREIGN BODY UPPER EXTREMITY Right 3/2/2017    Procedure: REMOVE FOREIGN BODY UPPER EXTREMITY;  Surgeon: Carrol Gaspar MD;  [facility]     RESECT BONE UPPER EXTREMITY Left 4/27/2017    Procedure: RESECT BONE UPPER EXTREMITY;  Left Radial Head Resection;  Surgeon: Carrol Gaspar MD;  [facility]     ROTATOR CUFF REPAIR RT/LT  10/19/2005    1.left distal clavicle excision.2.acromioplasty.3.coracoacromial ligament resection.4.rotator cuff exploration     Social History     Social History     Marital status: Single     Spouse name: N/A     Number of children: N/A     Years of education: N/A     Occupational History     Not on file.     Social History Main Topics     Smoking status: Current Every Day Smoker      Packs/day: 0.10     Years: 10.00     Types: Cigarettes     Last attempt to quit: 7/31/2013     Smokeless tobacco: Former User      Comment: Quit 9/1/14     Alcohol use No     Drug use: No     Sexual activity: No     Other Topics Concern     Not on file     Social History Narrative      ROS: 10 point ROS neg other than the symptoms noted above in the HPI.  Physical Exam:    General: Mild distress due to pain  Gait:               Antalgic  MSK exam: +TTP in almost all joints with discrete swelling of all digits in both hands.  limted ROM of motion in extension at both elbows.  Limited ROM in right shoulder with extension and abduction, +imingement signs for rotator cuff involvement.    A/P:  Patient with shoulder pain referred to our clinic for postoperative pain regimen.  She has been seen in PAC.  Those recommendations were reviewd and we agree, namely,   RECOMMENDATIONS:   The following pain management recommendations are made based on information from today's visit and should not replace medical decision-making based on patient condition at the time of surgery or postoperatively.       - PREOPERATIVE:  -- Continue short acting opioid - oxycodone 5 mg PO q6 hr PRN - minimize use as able and avoid dose escalation leading into surgery  -- Continue adjuvants/nonopioid analgesics per pain specialist.   -- Before surgery consider acetaminophen 1000 mg PO in pre-op (Defer to surgery - will need to be written on day of surgery in pre-op area)  -- Before surgery consider celecoxib 200 mg PO in pre-op (Defer to surgery - will need to be written on day of surgery in pre-op area)  -- Before surgery - pregabalin 75 mg PO x1 in pre-op area      - INTRAOPERATIVE (Anesthesiologist/CRNA to consider):   -- Regional anesthesia: Patient would benefit from interventional approach and is open to this.  Defer to RAPS team and surgery team for final plan.   -- Avoid remifentanil - to reduce risk of developing hyperalgesia     -  POSTOPERATIVE MANAGEMENT:  Place pain management consult to assist with acute postoperative pain management.    Opioid analgesic:  If able to take PO mediations (preferred):   + Start oxycodone 10-15 mg PO q3 hr PRN (start with 10 mg dose)  + Start hydromorphone 0.3-0.5 mg IV every 2 hr PRN BREAKTHROUGH pain only.      If Unable to take PO medications (or team elects to do PCA for POD #0):   + Hydromorphone PCA 0.2-0.4 mg q10 min lockout with NO continuous rate.  No other oral or IV opioids while on PCA.      Nonopioid analgesics:   + Continue home dose of celecoxib 200 mg PO q8 hr  (if OK with surgery)  + Start acetaminophen 1,000 mg PO every 8 hr scheduled, Check LFTs  + Start pregabalin 25 mg PO BID   + Postoperative interventional pain management per RAPS team.   + Ice/Heat as appropriate, TENS unit (pt to bring from home)      Muscle Relaxant:   + Consider methocarbamol 500 mg PO q6hr PRN muscle spasms.        Stool softeners/Laxatives:   + When appropriate start senna-docusate 2 tabs PO BID and Miralax 17 g daily PRN to prevent opioid induced constipation.      Other:  + Recommend close monitoring of respiratory status postoperatively with capnography and SpO2 monitoring.

## 2017-10-19 ENCOUNTER — OFFICE VISIT (OUTPATIENT)
Dept: PHYSICAL MEDICINE AND REHAB | Facility: CLINIC | Age: 47
End: 2017-10-19

## 2017-10-19 VITALS
WEIGHT: 155 LBS | SYSTOLIC BLOOD PRESSURE: 120 MMHG | HEIGHT: 66 IN | HEART RATE: 115 BPM | DIASTOLIC BLOOD PRESSURE: 80 MMHG | BODY MASS INDEX: 24.91 KG/M2

## 2017-10-19 DIAGNOSIS — G43.009 MIGRAINE WITHOUT AURA AND WITHOUT STATUS MIGRAINOSUS, NOT INTRACTABLE: Primary | ICD-10-CM

## 2017-10-19 ASSESSMENT — PAIN SCALES - GENERAL: PAINLEVEL: EXTREME PAIN (8)

## 2017-10-19 NOTE — NURSING NOTE
Chief Complaint   Patient presents with     Consult     Return botox for chronic migraines    Sydni Saravia CMA

## 2017-10-19 NOTE — MR AVS SNAPSHOT
After Visit Summary   10/19/2017    Kalyn Rivero    MRN: [id number]           Patient Information     Date Of Birth          1970        Visit Information        Provider Department      10/19/2017 11:00 AM Rosina Quinones MD [facility]         Follow-ups after your visit        Your next 10 appointments already scheduled     Nov 24, 2017  8:20 AM CST   (Arrive by 8:05 AM)   New Patient Visit with Ángel Killian MD   [facility] ([facility])    [address] [place] [id number]   [phone]            Jan 11, 2018 11:20 AM CST   (Arrive by 11:05 AM)   New Patient Visit with Rosina Quinones MD   [facility] ([facility])    [address] [place] [id number] [phone]            Apr 05, 2018 11:30 AM CDT   (Arrive by 11:15 AM)   New Patient Visit with Rosina Quinones MD   [facility] ([facility])    [address] [place] [id number] [phone]              Who to contact     Please call your clinic at [phone] to:    Ask questions about your health    Make or cancel appointments    Discuss your medicines    Learn about your test results    Speak to your doctor   If you have compliments or concerns about an experience at your clinic, or if you wish to file a complaint, please contact [facility] Patient Relations at [phone] or email us at [email or web address]         Additional Information About Your Visit        [facility] Information     Recorrido is an electronic gateway that provides easy, online access to your medical records. With Recorrido, you can request a clinic appointment, read your test results, renew a  "prescription or communicate with your care team.     To sign up for MyChart visit the website at [email or web address]   You will be asked to enter the access code listed below, as well as some personal information. Please follow the directions to create your username and password.     Your access code is: [email or web address]  Expires: 10/26/2017  2:00 PM     Your access code will  in 90 days. If you need help or a new code, please contact your [facility] or call [phone] for assistance.        Care EveryWhere ID     This is your Care EveryWhere ID. This could be used by other organizations to access your [place] medical records  [id number]        Your Vitals Were     Pulse Height BMI (Body Mass Index)             115 1.67 m (5' 5.75\") 25.21 kg/m2          Blood Pressure from Last 3 Encounters:   10/19/17 120/80   17 (!) 126/91   17 139/82    Weight from Last 3 Encounters:   10/19/17 70.3 kg (155 lb)   17 71.1 kg (156 lb 12.8 oz)   17 69.9 kg (154 lb)              Today, you had the following     No orders found for display       Primary Care Provider Office Phone # Fax #    Mika Zamora [phone] [address] [facility] [address]        Equal Access to Services     [facility] : Hadii aad ku hadasho Soomaali, waaxda luqadaha, qaybta kaalmada adeegyada, alexis hollins. So [facility] [phone].    ATENCIÓN: Si habla español, tiene a holcomb disposición servicios gratuitos de asistencia lingüística. Llame al [phone].    We comply with applicable federal civil rights laws and [place] laws. We do not discriminate on the basis of race, color, national origin, age, disability, sex, sexual orientation, or gender identity.            Thank you!     Thank you for choosing [facility]  for your care. Our goal is always to provide you with excellent care. Hearing " back from our patients is one way we can continue to improve our services. Please take a few minutes to complete the written survey that you may receive in the mail after your visit with us. Thank you!             Your Updated Medication List - Protect others around you: Learn how to safely use, store and throw away your medicines at www.disposemymeds.org.          This list is accurate as of: 10/19/17 12:21 PM.  Always use your most recent med list.                   Brand Name Dispense Instructions for use Diagnosis    Botulinum Toxin Type A 200 UNITS injection    BOTOX    200 Units    Inject 200 Units as directed every 3 months    Intractable chronic migraine without aura and without status migrainosus       CELEBREX PO      Take 200 mg by mouth 3 times daily        cephALEXin 500 MG capsule    KEFLEX    40 capsule    Take 1 capsule (500 mg) by mouth 4 times daily    Wound drainage       ENBREL SURECLICK 50 MG/ML autoinjector   Generic drug:  Etanercept           KLONOPIN PO           leflunomide 20 MG tablet    ARAVA     Take 20 mg by mouth every morning Reported on 3/28/2017        omeprazole 40 MG capsule    priLOSEC    90 capsule    TAKE 1 CAPSULE (40 MG) BY MOUTH DAILY TAKE 30-60 MINUTES BEFORE A MEAL.    PUD (peptic ulcer disease)       VITAMIN B-12 PO      Take 4 tablets by mouth daily

## 2017-10-19 NOTE — PROGRESS NOTES
"MIGRAINE BOTOX HEADACHE PROCEDURE NOTE     Ht 5' 5.75\" (1.67 m)       Current Outpatient Prescriptions:      DOXYCYCLINE HYCLATE PO, , Disp: , Rfl:      ENBREL SURECLICK 50 MG/ML autoinjector, , Disp: , Rfl:      cephALEXin (KEFLEX) 500 MG capsule, Take 1 capsule (500 mg) by mouth 4 times daily, Disp: 40 capsule, Rfl: 0     omeprazole (PRILOSEC) 40 MG capsule, TAKE 1 CAPSULE (40 MG) BY MOUTH DAILY TAKE 30-60 MINUTES BEFORE A MEAL., Disp: 90 capsule, Rfl: 1     Cyanocobalamin (VITAMIN B-12 PO), Take 4 tablets by mouth daily , Disp: , Rfl:      leflunomide (ARAVA) 20 MG tablet, Take 20 mg by mouth every morning Reported on 3/28/2017, Disp: , Rfl:      Botulinum Toxin Type A (BOTOX) 200 UNITS SOLR, Inject 200 Units as directed every 3 months, Disp: 200 Units, Rfl: 3     Celecoxib (CELEBREX PO), Take 200 mg by mouth 3 times daily, Disp: , Rfl:     Current Facility-Administered Medications:      lidocaine 1 % injection 5 mL, 5 mL, INTRA-ARTICULAR, Once, Carrol Gaspar MD    Facility-Administered Medications Ordered in Other Visits:      bupivacaine 0.5 % -  EPINEPHrine 1:200,000 injection, , , PRN, Yvon Negrete MD, 20 mL at 04/27/17 1505     mepivacaine (PF) (CARBOCAINE) 2 % injection, , , PRN, Yvon Negrete MD, 10 mL at 06/18/15 1250        Allergies   Allergen Reactions     Morphine Swelling     Codeine Nausea and Nausea and Vomiting     Swelling, itching. GI Intolerance.      Gabapentin Other (See Comments)     Pins,needles, stabbing aching at once  Numbness and Tingling     Sulfa Drugs      unknown     Erythromycin Nausea     Vomiting      Penicillins Rash     Prednisone Palpitations     Heart racing        PHYSICAL EXAM  Pt states headache today rates 10/10 started 1 week ago.  Taking only medication for right arm surgery which helps headache somewhat.   Takes minimal Tylenol for headache pain. Notes that her headaches came back in the last 2 weeks.     HPI  Shayy was diagnosed " with rheumatoid arthritis since age 21 years, and migraine. She was getting Botox since age 21 years, she was getting them at [facility] but the physician left the state.  She has now received injections here for the last 2 times.     She notes her headaches are in the temporal areas, and around the right eye.     Patient reports the following new medical problems since last visit[place] [facility] [place]    RESPONSE TO PREVIOUS TREATMENT:    Patient Kalyn Rivero received 150 units of Botox on 2017.    Problems following the previous series of neurotoxin injections included:  No problems reported    Benefits by Patient Report:    Change in headache pattern following last series of injections with 150 units of  Botox on 2017.     1.  Headache Frequency During this Injection Cycle:  0 headache since last injections on 2017 then has had one continuous headache for the last 2 weeks. She has increased pain around the right eye and right occipital area.   Her PCA is with her today.        2.  Headache Duration During this Injection Cycle:  See above       3.  Headache Intensity During this Injection Cycle:  Headache intensity during this injection cycle:      A.  10/10  =  Typical pain level.  Headache rapidly accelerates to 7/10 when starts    B.  10/10  =  Worst pain level.    C.  0/10  =  Lowest pain level.       4.  Change in headache medication usage during this injection cycle:  (For Example:  Able to decrease use of oral pain medications.)  Able to decrease use of medication.       5.  ER Visits During This Injection Cycle:  0       6.  Functional Performance:  Change in ADL's, social interaction, days lost from work, etc. Has had to cancel some activities due to headache.       BOTULINUM NEUROTOXIN INJECTION PROCEDURES:    VERIFICATION OF PATIENT IDENTIFICATION  Responsible Person:  wes KAISERB: verified  Full Name: verified    INDICATIONS FOR PROCEDURES:  Kalyn Rivero  is a 47 year old patient with headache affecting the head, neck and shoulder girdle musculature secondary to a diagnosis of Chronic Migraine Headache with associated pain.    Her baseline symptoms have been recalcitrant to oral medications and conservative therapy.  She is here today for reinjection with Botox.    GOAL OF PROCEDURE:  The goal of this procedure is to improve decrease pain  associated with headache.    TOTAL DOSE ADMINISTERED:  Dose Administered:  170 units Botox (Botulinum Toxin Type A)       2:1 Dilution   Diluent Used:  Preservative Free Normal Saline  Total Volume of Diluent Used:  3.4 ml  Lot # /C3 with Expiration Date:  05/2020    CONSENT:  The risks, benefits, and treatment options were discussed with Kalyn Lucas Mat and she agreed to proceed.    EQUIPMENT USED:  Needle-25mm stimulating/recording  Needle-37mm stimulating/recording  EMG/NCS Machine    SKIN PREPARATION:  Skin preparation was performed using an alcohol wipe.      GUIDANCE DESCRIPTION:  Guidance was not utilized for this procedure     AREA/MUSCLE INJECTED:    HEAD & SCALP MUSCLES:  units Botox = Total Dose, 2:1 Dilution  Right Trapezius - 15 units in 3 sites   Left Trapezius - 10 units in 3 sites     Right occipitalis - 20 units of Botox at 4 site/s.  Left Occipitalis - 15 units at 3 sites     Right Paracervical  15 units in 3 sites   Left paracervical 5 units in 2 sites       Right Frontalis - 15 units of Botox at 3 site/s.  Left Frontalis - 10 units of Botox at 2 site/s.    Right Temporalis - 25 units of Botox at 5 site/s.  Left Temporalis - 20 units of Botox at 4 site/s.    Mastication 1 site 5 units     Right  - 5 units of Botox at 1 site/s.   Left  - 5 units of Botox at 1 site/s.    Procerus - 5 units of Botox at 1 site/s.      RESPONSE TO PROCEDURE:  Kalyn Rivero tolerated the procedure well and there were no immediate complications.   She was allowed to recover for an appropriate period of  time and was discharged home in stable condition.     Follow Up  Kalyn Rivero was asked to follow up by phone in 7-14 days with Annamaria Lamb RN, Care Coordinator, to report her response to this series of injections.  Based on the patient's previous response to this therapy, Kalyn Rivero was rescheduled for the next series of injections in 12 weeks.    Reviewed with the patient her pattern of pain, she felt that her response to the l;ast injections could be improved. She had Botox injection by Dr Romero in April, and Dr Rodgers in July.   Changed the pattern to tailor to her needs.   Good relief of headaches as per patient.     Rosina Quinones MD, MHA   Physical Medicine & Rehabilitation

## 2017-10-19 NOTE — LETTER
"10/19/2017       RE: Kalyn Rivero  [address] [facility] [address]     Dear Colleague,    Thank you for referring your patient, Kalyn Rivero, to the [facility] PHYSICAL MEDICINE AND REHABILITATION at [facility]. Please see a copy of my visit note below.    MIGRAINE BOTOX HEADACHE PROCEDURE NOTE     Ht 5' 5.75\" (1.67 m)       Current Outpatient Prescriptions:      DOXYCYCLINE HYCLATE PO, , Disp: , Rfl:      ENBREL SURECLICK 50 MG/ML autoinjector, , Disp: , Rfl:      cephALEXin (KEFLEX) 500 MG capsule, Take 1 capsule (500 mg) by mouth 4 times daily, Disp: 40 capsule, Rfl: 0     omeprazole (PRILOSEC) 40 MG capsule, TAKE 1 CAPSULE (40 MG) BY MOUTH DAILY TAKE 30-60 MINUTES BEFORE A MEAL., Disp: 90 capsule, Rfl: 1     Cyanocobalamin (VITAMIN B-12 PO), Take 4 tablets by mouth daily , Disp: , Rfl:      leflunomide (ARAVA) 20 MG tablet, Take 20 mg by mouth every morning Reported on 3/28/2017, Disp: , Rfl:      Botulinum Toxin Type A (BOTOX) 200 UNITS SOLR, Inject 200 Units as directed every 3 months, Disp: 200 Units, Rfl: 3     Celecoxib (CELEBREX PO), Take 200 mg by mouth 3 times daily, Disp: , Rfl:     Current Facility-Administered Medications:      lidocaine 1 % injection 5 mL, 5 mL, INTRA-ARTICULAR, Once, Carrol Gaspar MD    Facility-Administered Medications Ordered in Other Visits:      bupivacaine 0.5 % -  EPINEPHrine 1:200,000 injection, , , PRN, Yvon Negrete MD, 20 mL at 04/27/17 1505     mepivacaine (PF) (CARBOCAINE) 2 % injection, , , PRN, Yvon Negrete MD, 10 mL at 06/18/15 1250        Allergies   Allergen Reactions     Morphine Swelling     Codeine Nausea and Nausea and Vomiting     Swelling, itching. GI Intolerance.      Gabapentin Other (See Comments)     Pins,needles, stabbing aching at once  Numbness and Tingling     Sulfa Drugs      unknown     Erythromycin Nausea     Vomiting      Penicillins Rash     " Prednisone Palpitations     Heart racing        PHYSICAL EXAM  Pt states headache today rates 10/10 started 1 week ago.  Taking only medication for right arm surgery which helps headache somewhat.   Takes minimal Tylenol for headache pain. Notes that her headaches came back in the last 2 weeks.     JESSICA Lucas was diagnosed with rheumatoid arthritis since age 21 years, and migraine. She was getting Botox since age 21 years, she was getting them at [facility] but the physician left the state.  She has now received injections here for the last 2 times.     She notes her headaches are in the temporal areas, and around the right eye.     Patient reports the following new medical problems since last visit: Right arm surgery July 19, 2017 at [facility].    RESPONSE TO PREVIOUS TREATMENT:    Patient Kalyn Lucas Mat received 150 units of Botox on 7/28/2017.    Problems following the previous series of neurotoxin injections included:  No problems reported    Benefits by Patient Report:    Change in headache pattern following last series of injections with 150 units of  Botox on 7/28/2017.     1.  Headache Frequency During this Injection Cycle:  0 headache since last injections on 7/28/2017 then has had one continuous headache for the last 2 weeks. She has increased pain around the right eye and right occipital area.   Her PCA is with her today.        2.  Headache Duration During this Injection Cycle:  See above       3.  Headache Intensity During this Injection Cycle:  Headache intensity during this injection cycle:      A.  10/10  =  Typical pain level.  Headache rapidly accelerates to 7/10 when starts    B.  10/10  =  Worst pain level.    C.  0/10  =  Lowest pain level.       4.  Change in headache medication usage during this injection cycle:  (For Example:  Able to decrease use of oral pain medications.)  Able to decrease use of medication.       5.  ER Visits During This Injection Cycle:  0       6.  Functional  Performance:  Change in ADL's, social interaction, days lost from work, etc. Has had to cancel some activities due to headache.       BOTULINUM NEUROTOXIN INJECTION PROCEDURES:    VERIFICATION OF PATIENT IDENTIFICATION  Responsible Person:  wes GRAHAM: verified  Full Name: verified    INDICATIONS FOR PROCEDURES:  Kalyn Rivero is a 47 year old patient with headache affecting the head, neck and shoulder girdle musculature secondary to a diagnosis of Chronic Migraine Headache with associated pain.    Her baseline symptoms have been recalcitrant to oral medications and conservative therapy.  She is here today for reinjection with Botox.    GOAL OF PROCEDURE:  The goal of this procedure is to improve decrease pain  associated with headache.    TOTAL DOSE ADMINISTERED:  Dose Administered:  170 units Botox (Botulinum Toxin Type A)       2:1 Dilution   Diluent Used:  Preservative Free Normal Saline  Total Volume of Diluent Used:  3.4 ml  Lot # /C3 with Expiration Date:  2020    CONSENT:  The risks, benefits, and treatment options were discussed with Kalyn Rivero and she agreed to proceed.    EQUIPMENT USED:  Needle-25mm stimulating/recording  Needle-37mm stimulating/recording  EMG/NCS Machine    SKIN PREPARATION:  Skin preparation was performed using an alcohol wipe.      GUIDANCE DESCRIPTION:  Guidance was not utilized for this procedure     AREA/MUSCLE INJECTED:    HEAD & SCALP MUSCLES:  units Botox = Total Dose, 2:1 Dilution  Right Trapezius - 15 units in 3 sites   Left Trapezius - 10 units in 3 sites     Right occipitalis - 20 units of Botox at 4 site/s.  Left Occipitalis - 15 units at 3 sites     Right Paracervical  15 units in 3 sites   Left paracervical 5 units in 2 sites       Right Frontalis - 15 units of Botox at 3 site/s.  Left Frontalis - 10 units of Botox at 2 site/s.    Right Temporalis - 25 units of Botox at 5 site/s.  Left Temporalis - 20 units of Botox at 4 site/s.    Mastication 1 site 5  units     Right  - 5 units of Botox at 1 site/s.   Left  - 5 units of Botox at 1 site/s.    Procerus - 5 units of Botox at 1 site/s.      RESPONSE TO PROCEDURE:  Kalyn Rivero tolerated the procedure well and there were no immediate complications.   She was allowed to recover for an appropriate period of time and was discharged home in stable condition.     Follow Up  Kalyn Rivero was asked to follow up by phone in 7-14 days with Annamaria Lamb RN, Care Coordinator, to report her response to this series of injections.  Based on the patient's previous response to this therapy, Kalyn Rivero was rescheduled for the next series of injections in 12 weeks.    Reviewed with the patient her pattern of pain, she felt that her response to the l;ast injections could be improved. She had Botox injection by Dr Romero in April, and Dr Rodgers in July.   Changed the pattern to tailor to her needs.   Good relief of headaches as per patient.     Again, thank you for allowing me to participate in the care of your patient.      Sincerely,    Rosina Quinones MD

## 2017-11-17 DIAGNOSIS — M05.9 RHEUMATOID ARTHRITIS WITH POSITIVE RHEUMATOID FACTOR, INVOLVING UNSPECIFIED SITE (H): ICD-10-CM

## 2017-11-20 RX ORDER — SENNOSIDES 8.6 MG
TABLET ORAL
Qty: 120 TABLET | Refills: 1 | Status: SHIPPED | OUTPATIENT
Start: 2017-11-20 | End: 2018-12-02

## 2017-12-08 PROBLEM — S42.409A ELBOW FRACTURE: Status: RESOLVED | Noted: 2017-06-29 | Resolved: 2017-12-08

## 2017-12-13 ENCOUNTER — TELEPHONE (OUTPATIENT)
Dept: FAMILY MEDICINE | Facility: CLINIC | Age: 47
End: 2017-12-13
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
## 2018-01-11 ENCOUNTER — OFFICE VISIT (OUTPATIENT)
Dept: PHYSICAL MEDICINE AND REHAB | Facility: CLINIC | Age: 48
End: 2018-01-11
Payer: COMMERCIAL

## 2018-01-11 VITALS
WEIGHT: 162 LBS | DIASTOLIC BLOOD PRESSURE: 86 MMHG | BODY MASS INDEX: 26.03 KG/M2 | HEART RATE: 64 BPM | SYSTOLIC BLOOD PRESSURE: 150 MMHG | HEIGHT: 66 IN

## 2018-01-11 DIAGNOSIS — G43.719 INTRACTABLE CHRONIC MIGRAINE WITHOUT AURA AND WITHOUT STATUS MIGRAINOSUS: Primary | ICD-10-CM

## 2018-01-11 ASSESSMENT — PAIN SCALES - GENERAL: PAINLEVEL: EXTREME PAIN (8)

## 2018-01-11 NOTE — LETTER
"1/11/2018       RE: Kalyn Rivero  [address] [facility] [address]     Dear Colleague,    Thank you for referring your patient, Kalyn Rivero, to the [facility] PHYSICAL MEDICINE AND REHABILITATION at [facility]. Please see a copy of my visit note below.    MIGRAINE BOTOX HEADACHE PROCEDURE NOTE     /86 (BP Location: Right arm, Patient Position: Sitting, Cuff Size: Adult Regular)  Pulse 64  Ht 5' 5.75\" (1.67 m)  Wt 162 lb (73.5 kg)  BMI 26.35 kg/m2       Current Outpatient Prescriptions:      [employer] SENNA 8.6 MG tablet, TAKE 2 TABLETS BY MOUTH 2 TIMES DAILY, Disp: 120 tablet, Rfl: 1     ClonazePAM (KLONOPIN PO), , Disp: , Rfl:      OnabotulinumtoxinA (BOTOX IJ), Inject 180 Units as directed Total Volume of Diluent Used:  3.6 ml Lot # /C3 with Expiration Date:  05/2020, Disp: , Rfl:      OnabotulinumtoxinA (BOTOX IJ), Inject 170 Units as directed Total Volume of Diluent Used:  3.6 ml Lot # /C3 with Expiration Date:  05/2020, Disp: , Rfl:      ENBREL SURECLICK 50 MG/ML autoinjector, , Disp: , Rfl:      omeprazole (PRILOSEC) 40 MG capsule, TAKE 1 CAPSULE (40 MG) BY MOUTH DAILY TAKE 30-60 MINUTES BEFORE A MEAL., Disp: 90 capsule, Rfl: 1     Cyanocobalamin (VITAMIN B-12 PO), Take 4 tablets by mouth daily , Disp: , Rfl:      leflunomide (ARAVA) 20 MG tablet, Take 20 mg by mouth every morning Reported on 3/28/2017, Disp: , Rfl:      Botulinum Toxin Type A (BOTOX) 200 UNITS SOLR, Inject 200 Units as directed every 3 months, Disp: 200 Units, Rfl: 3     Celecoxib (CELEBREX PO), Take 200 mg by mouth 3 times daily, Disp: , Rfl:     Current Facility-Administered Medications:      lidocaine 1 % injection 5 mL, 5 mL, INTRA-ARTICULAR, Once, Carrol Gaspar MD    Facility-Administered Medications Ordered in Other Visits:      bupivacaine 0.5 % -  EPINEPHrine 1:200,000 injection, , , PRN, Yvon Negrete MD, 20 mL at 04/27/17 1505     " mepivacaine (PF) (CARBOCAINE) 2 % injection, , , PRN, Yvon Negrete MD, 10 mL at 06/18/15 1250        Allergies   Allergen Reactions     Morphine Swelling     Codeine Nausea and Nausea and Vomiting     Swelling, itching. GI Intolerance.      Gabapentin Other (See Comments)     Pins,needles, stabbing aching at once  Numbness and Tingling     Sulfa Drugs      unknown     Erythromycin Nausea     Vomiting      Penicillins Rash     Prednisone Palpitations     Heart racing        PHYSICAL EXAM  Pt states headache today rates 10/10 started 3 weeks ago.  Taking only medication for right arm surgery which helps headache somewhat.   Takes minimal Tylenol for headache pain. Notes that her headaches came back in the last 2 weeks.     JESSICA Lucas was diagnosed with rheumatoid arthritis since age 21 years, and migraine. She was getting Botox since age 21 years, she was getting them at [facility] but the physician left the state.  She has now received injections here for the last 2 times.   Notes right neck pain.   Had very good response to last Botox injections on 10/19/2017.  Approx 3 weeks ago was seen by Neurology at [place] where neck adjustment ( right rotation) was done and Pt immediately experienced neck and head pain along with intermittent dizziness. This has persisted since.  Was seen at [place] ER to callie.     She notes her headaches are in the temporal areas, and around the right eye.     Patient reports the following new medical problems since last visit: Right arm surgery July 19, 2017 at [place].    RESPONSE TO PREVIOUS TREATMENT:    Patient Kalyn Rivero received 170 units of Botox on 10/19/2017.    Problems following the previous series of neurotoxin injections included:  No problems reported    Benefits by Patient Report:    Change in headache pattern following last series of injections with 170 units of  Botox on 10/19/2017.     1.  Headache Frequency During this Injection Cycle:  0 headache since  last injections up until 3 weeks ago when [place] [facility]  See above. Has had one continuous headache for the last 3 weeks. She has increased pain around right neck.  Pt states Botox injections help dizziness along with headaches.  Her PCA is with her today.        2.  Headache Duration During this Injection Cycle:  See above       3.  Headache Intensity During this Injection Cycle:  Headache intensity during this injection cycle:      A.  10/10  =  Typical pain level.  Headache rapidly accelerates to 7/10 when starts    B.  10/10  =  Worst pain level.    C.  0/10  =  Lowest pain level.       4.  Change in headache medication usage during this injection cycle:  (For Example:  Able to decrease use of oral pain medications.)  Needed to increase use of medication after neck adjustment.       5.  ER Visits During This Injection Cycle:  1 secondary to head and neck pain after neck adjustment.       6.  Functional Performance:  Change in ADL's, social interaction, days lost from work, etc. Has had to cancel some activities due to headache.       BOTULINUM NEUROTOXIN INJECTION PROCEDURES:    VERIFICATION OF PATIENT IDENTIFICATION  Responsible Person:  joel  : verified  Full Name: verified    INDICATIONS FOR PROCEDURES:  Kalyn Rivero is a 47 year old patient with headache affecting the head, neck and shoulder girdle musculature secondary to a diagnosis of Chronic Migraine Headache with associated pain.    Her baseline symptoms have been recalcitrant to oral medications and conservative therapy.  She is here today for reinjection with Botox.    GOAL OF PROCEDURE:  The goal of this procedure is to improve decrease pain  associated with headache.    TOTAL DOSE ADMINISTERED:  Dose Administered:  170 units Botox (Botulinum Toxin Type A)       2:1 Dilution   Diluent Used:  Preservative Free Normal Saline  Total Volume of Diluent Used:  3.4 ml  Lot # /C3 with Expiration Date:  2020    CONSENT:  The  risks, benefits, and treatment options were discussed with Kalyn Rivero and she agreed to proceed.    EQUIPMENT USED:  Needle-25mm stimulating/recording  Needle-37mm stimulating/recording  EMG/NCS Machine    SKIN PREPARATION:  Skin preparation was performed using an alcohol wipe.      GUIDANCE DESCRIPTION:  Electro-myographic guidance was necessary throughout the posterior neck portion to accurately identify all areas of spastic muscles while avoiding injection of non-spastic muscles, neighboring nerves and nearby vascular structures.     AREA/MUSCLE INJECTED:    HEAD & SCALP MUSCLES:  units Botox = Total Dose, 2:1 Dilution  Right Trapezius - 15 units in 3 sites   Left Trapezius - 10 units in 3 sites     Right occipitalis - 20 units of Botox at 4 site/s.  Left Occipitalis - 15 units at 3 sites     Right Paracervical  15 units in 3 sites   Left paracervical 5 units in 2 sites       Right Frontalis - 15 units of Botox at 3 site/s.  Left Frontalis - 10 units of Botox at 2 site/s.    Right Temporalis - 25 units of Botox at 5 site/s.  Left Temporalis - 20 units of Botox at 4 site/s.    Mastication 1 site 5 units     Right  - 5 units of Botox at 1 site/s.   Left  - 5 units of Botox at 1 site/s.    Procerus - 5 units of Botox at 1 site/s.      RESPONSE TO PROCEDURE:  Kalyn Rivero tolerated the procedure well and there were no immediate complications.   She was allowed to recover for an appropriate period of time and was discharged home in stable condition.     Follow Up  Kalyn Rivero was asked to follow up by phone in 7-14 days with Annamaria Lamb RN, Care Coordinator, to report her response to this series of injections.  Based on the patient's previous response to this therapy, Kalyn Rivero was rescheduled for the next series of injections in 12 weeks.      Again, thank you for allowing me to participate in the care of your patient.      Sincerely,    Rosina Quinones,  MD

## 2018-01-11 NOTE — MR AVS SNAPSHOT
After Visit Summary   1/11/2018    Kalyn Rivero    MRN: [id number]           Patient Information     Date Of Birth          1970        Visit Information        Provider Department      1/11/2018 11:20 AM Rosina Quinones MD [facility] Physical Medicine and Rehabilitation        Today's Diagnoses     Intractable chronic migraine without aura and without status migrainosus    -  1       Follow-ups after your visit        Follow-up notes from your care team     Return in about 12 weeks (around 4/5/2018).      Your next 10 appointments already scheduled     Apr 05, 2018 11:30 AM CDT   (Arrive by 11:15 AM)   New Patient Visit with Rosina Quinones MD   [facility] Physical Medicine and Rehabilitation ([facility])    [address] [place] [address] [phone]            Jun 28, 2018  9:40 AM CDT   (Arrive by 9:25 AM)   Return Botox with Rosina Quinones MD   [facility] Physical Medicine and Rehabilitation ([facility])    [address] [place] [address] [phone]              Who to contact     Please call your clinic at [phone] to:    Ask questions about your health    Make or cancel appointments    Discuss your medicines    Learn about your test results    Speak to your doctor   If you have compliments or concerns about an experience at your clinic, or if you wish to file a complaint, please contact [facility] Patient Relations at [phone] or email us at [email or web address]         Additional Information About Your Visit        MyChart Information     [employer] is an electronic gateway that provides easy, online access to your medical records. With [employer], you can request a clinic appointment, read your test results, renew a prescription or communicate with your care team.     To sign up for [employer] visit the website at  [email or web address]   You will be asked to enter the access code listed below, as well as some personal information. Please follow the directions to create your username and password.     Your access code is: [email or web address]  Expires: 2018  6:30 AM     Your access code will  in 90 days. If you need help or a new code, please contact your [facility] or call [phone] for assistance.        Care EveryWhere ID     This is your Care EveryWhere ID. This could be used by other organizations to access your [place] medical records  [id number]        Your Vitals Were     Pulse Height BMI (Body Mass Index)             64 5' 5.75\" (1.67 m) 26.35 kg/m2          Blood Pressure from Last 3 Encounters:   18 150/86   10/19/17 120/80   17 (!) 126/91    Weight from Last 3 Encounters:   18 162 lb (73.5 kg)   10/19/17 155 lb (70.3 kg)   17 156 lb 12.8 oz (71.1 kg)              We Performed the Following     HC CHEMODENERVATE FACIAL,TRIGERM,NERV MIGRAINE     NEEDLE EMG GUIDE W CHEMODENERVATION        Primary Care Provider Office Phone # Fax #    Mika Zamora [phone] [address] [facility] [address]        Equal Access to Services     [facility]: Hadii aad ku hadasho Soomaali, waaxda luqadaha, qaybta kaalmada adeegyada, alexis sosa . So [facility] [phone].    ATENCIÓN: Si habla español, tiene a holcomb disposición servicios gratuitos de asistencia lingüística. Llame al [phone].    We comply with applicable federal civil rights laws and [place] laws. We do not discriminate on the basis of race, color, national origin, age, disability, sex, sexual orientation, or gender identity.            Thank you!     Thank you for choosing [facility] PHYSICAL MEDICINE AND REHABILITATION  for your care. Our goal is always to provide you with excellent care. Hearing back from our patients is one way we can " continue to improve our services. Please take a few minutes to complete the written survey that you may receive in the mail after your visit with us. Thank you!             Your Updated Medication List - Protect others around you: Learn how to safely use, store and throw away your medicines at www.disposemymeds.org.          This list is accurate as of: 1/11/18 12:38 PM.  Always use your most recent med list.                   Brand Name Dispense Instructions for use Diagnosis    * BOTOX IJ      Inject 180 Units as directed Total Volume of Diluent Used:  3.6 ml Lot # /C3 with Expiration Date:  05/2020        * BOTOX IJ      Inject 170 Units as directed Total Volume of Diluent Used:  3.6 ml Lot # /C3 with Expiration Date:  05/2020        * BOTOX IJ      Inject 170 Units as directed  C3 EXT DATE 08/2020        * Botulinum Toxin Type A 200 UNITS injection    BOTOX    200 Units    Inject 200 Units as directed every 3 months    Intractable chronic migraine without aura and without status migrainosus       CELEBREX PO      Take 200 mg by mouth 3 times daily        CVS SENNA 8.6 MG tablet   Generic drug:  senna     120 tablet    TAKE 2 TABLETS BY MOUTH 2 TIMES DAILY    Rheumatoid arthritis with positive rheumatoid factor, involving unspecified site (H)       ENBREL SURECLICK 50 MG/ML autoinjector   Generic drug:  Etanercept           KLONOPIN PO           leflunomide 20 MG tablet    ARAVA     Take 20 mg by mouth every morning Reported on 3/28/2017        omeprazole 40 MG capsule    priLOSEC    90 capsule    TAKE 1 CAPSULE (40 MG) BY MOUTH DAILY TAKE 30-60 MINUTES BEFORE A MEAL.    PUD (peptic ulcer disease)       VITAMIN B-12 PO      Take 4 tablets by mouth daily        * Notice:  This list has 4 medication(s) that are the same as other medications prescribed for you. Read the directions carefully, and ask your doctor or other care provider to review them with you.

## 2018-01-11 NOTE — PROGRESS NOTES
"MIGRAINE BOTOX HEADACHE PROCEDURE NOTE     /86 (BP Location: Right arm, Patient Position: Sitting, Cuff Size: Adult Regular)  Pulse 64  Ht 5' 5.75\" (1.67 m)  Wt 162 lb (73.5 kg)  BMI 26.35 kg/m2       Current Outpatient Prescriptions:      [facility] SENNA 8.6 MG tablet, TAKE 2 TABLETS BY MOUTH 2 TIMES DAILY, Disp: 120 tablet, Rfl: 1     ClonazePAM (KLONOPIN PO), , Disp: , Rfl:      OnabotulinumtoxinA (BOTOX IJ), Inject 180 Units as directed Total Volume of Diluent Used:  3.6 ml Lot # /C3 with Expiration Date:  05/2020, Disp: , Rfl:      OnabotulinumtoxinA (BOTOX IJ), Inject 170 Units as directed Total Volume of Diluent Used:  3.6 ml Lot # /C3 with Expiration Date:  05/2020, Disp: , Rfl:      ENBREL SURECLICK 50 MG/ML autoinjector, , Disp: , Rfl:      omeprazole (PRILOSEC) 40 MG capsule, TAKE 1 CAPSULE (40 MG) BY MOUTH DAILY TAKE 30-60 MINUTES BEFORE A MEAL., Disp: 90 capsule, Rfl: 1     Cyanocobalamin (VITAMIN B-12 PO), Take 4 tablets by mouth daily , Disp: , Rfl:      leflunomide (ARAVA) 20 MG tablet, Take 20 mg by mouth every morning Reported on 3/28/2017, Disp: , Rfl:      Botulinum Toxin Type A (BOTOX) 200 UNITS SOLR, Inject 200 Units as directed every 3 months, Disp: 200 Units, Rfl: 3     Celecoxib (CELEBREX PO), Take 200 mg by mouth 3 times daily, Disp: , Rfl:     Current Facility-Administered Medications:      lidocaine 1 % injection 5 mL, 5 mL, INTRA-ARTICULAR, Once, Carrol Gsapar MD    Facility-Administered Medications Ordered in Other Visits:      bupivacaine 0.5 % -  EPINEPHrine 1:200,000 injection, , , PRN, Yvon Negrete MD, 20 mL at 04/27/17 1505     mepivacaine (PF) (CARBOCAINE) 2 % injection, , , PRN, Yvon Negrete MD, 10 mL at 06/18/15 1250        Allergies   Allergen Reactions     Morphine Swelling     Codeine Nausea and Nausea and Vomiting     Swelling, itching. GI Intolerance.      Gabapentin Other (See Comments)     Pins,needles, stabbing aching " at once  Numbness and Tingling     Sulfa Drugs      unknown     Erythromycin Nausea     Vomiting      Penicillins Rash     Prednisone Palpitations     Heart racing        PHYSICAL EXAM  Pt states headache today rates 10/10 started 3 weeks ago.  Taking only medication for right arm surgery which helps headache somewhat.   Takes minimal Tylenol for headache pain. Notes that her headaches came back in the last 2 weeks.     JESSICA Lucas was diagnosed with rheumatoid arthritis since age 21 years, and migraine. She was getting Botox since age 21 years, she was getting them at [facility] but the physician left the state.  She has now received injections here for the last 2 times.   Notes right neck pain.   Had very good response to last Botox injections on 10/19/2017.  Approx 3 weeks ago was seen by Neurology at [facility] where neck adjustment ( right rotation) was done and Pt immediately experienced neck and head pain along with intermittent dizziness. This has persisted since.  Was seen at [facility] ER to callie.     She notes her headaches are in the temporal areas, and around the right eye.     Patient reports the following new medical problems since last visit: Right arm surgery July 19, 2017 at [facility].    RESPONSE TO PREVIOUS TREATMENT:    Patient Kalyn Rivero received 170 units of Botox on 10/19/2017.    Problems following the previous series of neurotoxin injections included:  No problems reported    Benefits by Patient Report:    Change in headache pattern following last series of injections with 170 units of  Botox on 10/19/2017.     1.  Headache Frequency During this Injection Cycle:  0 headache since last injections up until 3 weeks ago when neck was adjusted at [facility].  See above. Has had one continuous headache for the last 3 weeks. She has increased pain around right neck.  Pt states Botox injections help dizziness along with headaches.  Her PCA is with her today.        2.  Headache Duration During this Injection  Cycle:  See above       3.  Headache Intensity During this Injection Cycle:  Headache intensity during this injection cycle:      A.  10/10  =  Typical pain level.  Headache rapidly accelerates to 7/10 when starts    B.  10/10  =  Worst pain level.    C.  0/10  =  Lowest pain level.       4.  Change in headache medication usage during this injection cycle:  (For Example:  Able to decrease use of oral pain medications.)  Needed to increase use of medication after neck adjustment.       5.  ER Visits During This Injection Cycle:  1 secondary to head and neck pain after neck adjustment.       6.  Functional Performance:  Change in ADL's, social interaction, days lost from work, etc. Has had to cancel some activities due to headache.       BOTULINUM NEUROTOXIN INJECTION PROCEDURES:    VERIFICATION OF PATIENT IDENTIFICATION  Responsible Person:  joel  : verified  Full Name: verified    INDICATIONS FOR PROCEDURES:  Kalyn Rivero is a 47 year old patient with headache affecting the head, neck and shoulder girdle musculature secondary to a diagnosis of Chronic Migraine Headache with associated pain.    Her baseline symptoms have been recalcitrant to oral medications and conservative therapy.  She is here today for reinjection with Botox.    GOAL OF PROCEDURE:  The goal of this procedure is to improve decrease pain  associated with headache.    TOTAL DOSE ADMINISTERED:  Dose Administered:  170 units Botox (Botulinum Toxin Type A)       2:1 Dilution   Diluent Used:  Preservative Free Normal Saline  Total Volume of Diluent Used:  3.4 ml  Lot # /C3 with Expiration Date:  2020    CONSENT:  The risks, benefits, and treatment options were discussed with Kalyn Rivero and she agreed to proceed.    EQUIPMENT USED:  Needle-25mm stimulating/recording  Needle-37mm stimulating/recording  EMG/NCS Machine    SKIN PREPARATION:  Skin preparation was performed using an alcohol wipe.      GUIDANCE  DESCRIPTION:  Electro-myographic guidance was necessary throughout the posterior neck portion to accurately identify all areas of spastic muscles while avoiding injection of non-spastic muscles, neighboring nerves and nearby vascular structures.     AREA/MUSCLE INJECTED:    HEAD & SCALP MUSCLES:  units Botox = Total Dose, 2:1 Dilution  Right Trapezius - 15 units in 3 sites   Left Trapezius - 10 units in 3 sites     Right occipitalis - 20 units of Botox at 4 site/s.  Left Occipitalis - 15 units at 3 sites     Right Paracervical  15 units in 3 sites   Left paracervical 5 units in 2 sites       Right Frontalis - 15 units of Botox at 3 site/s.  Left Frontalis - 10 units of Botox at 2 site/s.    Right Temporalis - 25 units of Botox at 5 site/s.  Left Temporalis - 20 units of Botox at 4 site/s.    Mastication 1 site 5 units     Right  - 5 units of Botox at 1 site/s.   Left  - 5 units of Botox at 1 site/s.    Procerus - 5 units of Botox at 1 site/s.      RESPONSE TO PROCEDURE:  Kalyn Rivero tolerated the procedure well and there were no immediate complications.   She was allowed to recover for an appropriate period of time and was discharged home in stable condition.     Follow Up  Kalyn Rivero was asked to follow up by phone in 7-14 days with Annamaria Lamb RN, Care Coordinator, to report her response to this series of injections.  Based on the patient's previous response to this therapy, Kalyn Rivero was rescheduled for the next series of injections in 12 weeks.      Rosina Quinones MD, A   Physical Medicine & Rehabilitation

## 2018-01-15 ENCOUNTER — OFFICE VISIT (OUTPATIENT)
Dept: FAMILY MEDICINE | Facility: CLINIC | Age: 48
End: 2018-01-15
Payer: COMMERCIAL

## 2018-01-15 VITALS
DIASTOLIC BLOOD PRESSURE: 80 MMHG | SYSTOLIC BLOOD PRESSURE: 120 MMHG | OXYGEN SATURATION: 97 % | BODY MASS INDEX: 26.25 KG/M2 | TEMPERATURE: 97 F | WEIGHT: 161.4 LBS | HEART RATE: 86 BPM

## 2018-01-15 DIAGNOSIS — J38.3 VOCAL CORD CYST: ICD-10-CM

## 2018-01-15 DIAGNOSIS — D50.9 IRON DEFICIENCY ANEMIA, UNSPECIFIED IRON DEFICIENCY ANEMIA TYPE: ICD-10-CM

## 2018-01-15 DIAGNOSIS — Z01.818 PREOP GENERAL PHYSICAL EXAM: Primary | ICD-10-CM

## 2018-01-15 DIAGNOSIS — Z84.2: ICD-10-CM

## 2018-01-15 DIAGNOSIS — R41.840 CONCENTRATION DEFICIT: ICD-10-CM

## 2018-01-15 LAB
ANION GAP SERPL CALCULATED.3IONS-SCNC: 7 MMOL/L (ref 3–14)
BASOPHILS # BLD AUTO: 0 10E9/L (ref 0–0.2)
BASOPHILS NFR BLD AUTO: 0.9 %
BUN SERPL-MCNC: 19 MG/DL (ref 7–30)
CALCIUM SERPL-MCNC: 9.1 MG/DL (ref 8.5–10.1)
CHLORIDE SERPL-SCNC: 107 MMOL/L (ref 94–109)
CO2 SERPL-SCNC: 26 MMOL/L (ref 20–32)
CREAT SERPL-MCNC: 0.87 MG/DL (ref 0.52–1.04)
DIFFERENTIAL METHOD BLD: ABNORMAL
EOSINOPHIL # BLD AUTO: 0.2 10E9/L (ref 0–0.7)
EOSINOPHIL NFR BLD AUTO: 5.6 %
ERYTHROCYTE [DISTWIDTH] IN BLOOD BY AUTOMATED COUNT: 16.1 % (ref 10–15)
GFR SERPL CREATININE-BSD FRML MDRD: 70 ML/MIN/1.7M2
GLUCOSE SERPL-MCNC: 96 MG/DL (ref 70–99)
HCT VFR BLD AUTO: 37.7 % (ref 35–47)
HGB BLD-MCNC: 11.8 G/DL (ref 11.7–15.7)
LYMPHOCYTES # BLD AUTO: 1.1 10E9/L (ref 0.8–5.3)
LYMPHOCYTES NFR BLD AUTO: 24.4 %
MCH RBC QN AUTO: 26.5 PG (ref 26.5–33)
MCHC RBC AUTO-ENTMCNC: 31.3 G/DL (ref 31.5–36.5)
MCV RBC AUTO: 85 FL (ref 78–100)
MONOCYTES # BLD AUTO: 0.5 10E9/L (ref 0–1.3)
MONOCYTES NFR BLD AUTO: 11.9 %
NEUTROPHILS # BLD AUTO: 2.5 10E9/L (ref 1.6–8.3)
NEUTROPHILS NFR BLD AUTO: 57.2 %
PLATELET # BLD AUTO: 146 10E9/L (ref 150–450)
POTASSIUM SERPL-SCNC: 4.6 MMOL/L (ref 3.4–5.3)
RBC # BLD AUTO: 4.46 10E12/L (ref 3.8–5.2)
SODIUM SERPL-SCNC: 140 MMOL/L (ref 133–144)
WBC # BLD AUTO: 4.3 10E9/L (ref 4–11)

## 2018-01-15 PROCEDURE — 99214 OFFICE O/P EST MOD 30 MIN: CPT | Performed by: FAMILY MEDICINE

## 2018-01-15 PROCEDURE — 80048 BASIC METABOLIC PNL TOTAL CA: CPT | Performed by: FAMILY MEDICINE

## 2018-01-15 PROCEDURE — 85025 COMPLETE CBC W/AUTO DIFF WBC: CPT | Performed by: FAMILY MEDICINE

## 2018-01-15 PROCEDURE — 36415 COLL VENOUS BLD VENIPUNCTURE: CPT | Performed by: FAMILY MEDICINE

## 2018-01-15 ASSESSMENT — ANXIETY QUESTIONNAIRES
7. FEELING AFRAID AS IF SOMETHING AWFUL MIGHT HAPPEN: NOT AT ALL
2. NOT BEING ABLE TO STOP OR CONTROL WORRYING: NOT AT ALL
GAD7 TOTAL SCORE: 6
5. BEING SO RESTLESS THAT IT IS HARD TO SIT STILL: MORE THAN HALF THE DAYS
3. WORRYING TOO MUCH ABOUT DIFFERENT THINGS: NOT AT ALL
6. BECOMING EASILY ANNOYED OR IRRITABLE: MORE THAN HALF THE DAYS
1. FEELING NERVOUS, ANXIOUS, OR ON EDGE: NOT AT ALL

## 2018-01-15 ASSESSMENT — PATIENT HEALTH QUESTIONNAIRE - PHQ9
SUM OF ALL RESPONSES TO PHQ QUESTIONS 1-9: 11
5. POOR APPETITE OR OVEREATING: MORE THAN HALF THE DAYS

## 2018-01-15 NOTE — MR AVS SNAPSHOT
After Visit Summary   1/15/2018    Kalyn Rivero    MRN: [id number]           Patient Information     Date Of Birth          1970        Visit Information        Provider Department      1/15/2018 9:00 AM Mika Zamora MD [facility]        Today's Diagnoses     Preop general physical exam    -  1    Vocal cord cyst        Family history of cyst of breast        Iron deficiency anemia, unspecified iron deficiency anemia type          Care Instructions      Before Your Surgery      Call your surgeon if there is any change in your health. This includes signs of a cold or flu (such as a sore throat, runny nose, cough, rash or fever).    Do not smoke, drink alcohol or take over the counter medicine (unless your surgeon or primary care doctor tells you to) for the 24 hours before and after surgery.    If you take prescribed drugs: Follow your doctor s orders about which medicines to take and which to stop until after surgery.    Eating and drinking prior to surgery: follow the instructions from your surgeon    Take a shower or bath the night before surgery. Use the soap your surgeon gave you to gently clean your skin. If you do not have soap from your surgeon, use your regular soap. Do not shave or scrub the surgery site.  Wear clean pajamas and have clean sheets on your bed.           Follow-ups after your visit        Additional Services     BREAST CENTER REFERRAL       Your provider has referred you to: [facility]: [facility] [place] [phone]   [email or web address]    Please be aware that coverage of these services is subject to the terms and limitations of your health insurance plan.  Call member services at your health plan with any benefit or coverage questions.      Please bring the following with you to your appointment:    (1) Any X-Rays, CTs or MRIs which have been performed.  Contact the  "facility where they were done to arrange for  prior to your scheduled appointment.    (2) List of current medications   (3) This referral request   (4) Any documents/labs given to you for this referral                  Your next 10 appointments already scheduled     Apr 05, 2018 11:30 AM CDT   (Arrive by 11:15 AM)   New Patient Visit with Rosina Quinones MD   [facility] ([facility])    [address] [place] [id number] [phone]            Jun 28, 2018  9:40 AM CDT   (Arrive by 9:25 AM)   Return Botox with Rosina Quinones MD   [facility] ([facility])    [address] [place] [id number] [phone]              Who to contact     If you have questions or need follow up information about today's clinic visit or your schedule please contact [facility] directly at [phone].  Normal or non-critical lab and imaging results will be communicated to you by MyChart, letter or phone within 4 business days after the clinic has received the results. If you do not hear from us within 7 days, please contact the clinic through MyChart or phone. If you have a critical or abnormal lab result, we will notify you by phone as soon as possible.  Submit refill requests through [email or web address] or call your pharmacy and they will forward the refill request to us. Please allow 3 business days for your refill to be completed.          Additional Information About Your Visit        [employer] Information     [email or web address] lets you send messages to your doctor, view your test results, renew your prescriptions, schedule appointments and more. To sign up, go to [email or web address] . Click on \"Log in\" on the left side of the screen, which will take you to the Welcome page. Then click on \"Sign up Now\" on the right side of the page.     You " will be asked to enter the access code listed below, as well as some personal information. Please follow the directions to create your username and password.     Your access code is: [email or web address]  Expires: 2018  6:30 AM     Your access code will  in 90 days. If you need help or a new code, please call your [facility] clinic or [phone].        Care EveryWhere ID     This is your Care EveryWhere ID. This could be used by other organizations to access your [facility] medical records  [id number]        Your Vitals Were     Pulse Temperature Pulse Oximetry BMI (Body Mass Index)          86 97  F (36.1  C) (Oral) 97% 26.25 kg/m2         Blood Pressure from Last 3 Encounters:   01/15/18 120/80   18 150/86   10/19/17 120/80    Weight from Last 3 Encounters:   01/15/18 161 lb 6.4 oz (73.2 kg)   18 162 lb (73.5 kg)   10/19/17 155 lb (70.3 kg)              We Performed the Following     Basic metabolic panel  (Ca, Cl, CO2, Creat, Gluc, K, Na, BUN)     BREAST CENTER REFERRAL     CBC with platelets and differential        Primary Care Provider Office Phone # Fax #    Mika Zamora [phone] [address] [facility] [address]        Equal Access to Services     MARY BURTON : Hadii ashlee ku hadasho Soomaali, waaxda luqadaha, qaybta kaalmada rachid, alexis hollins. So [facility] [phone].    ATENCIÓN: Si habla español, tiene a holcomb disposición servicios gratuitos de asistencia lingüística. Lis al [phone].    We comply with applicable federal civil rights laws and [place] laws. We do not discriminate on the basis of race, color, national origin, age, disability, sex, sexual orientation, or gender identity.            Thank you!     Thank you for choosing [facility]  for your care. Our goal is always to provide you with excellent care. Hearing back from our patients is one way we can continue to improve our services. Please  take a few minutes to complete the written survey that you may receive in the mail after your visit with us. Thank you!             Your Updated Medication List - Protect others around you: Learn how to safely use, store and throw away your medicines at www.disposemymeds.org.          This list is accurate as of: 1/15/18  9:16 AM.  Always use your most recent med list.                   Brand Name Dispense Instructions for use Diagnosis    Botulinum Toxin Type A 200 UNITS injection    BOTOX    200 Units    Inject 200 Units as directed every 3 months    Intractable chronic migraine without aura and without status migrainosus       CELEBREX PO      Take 200 mg by mouth 3 times daily        [facility] SENNA 8.6 MG tablet   Generic drug:  senna     120 tablet    TAKE 2 TABLETS BY MOUTH 2 TIMES DAILY    Rheumatoid arthritis with positive rheumatoid factor, involving unspecified site (H)       ENBREL SURECLICK 50 MG/ML autoinjector   Generic drug:  Etanercept           KLONOPIN PO      Take 0.5 mg by mouth 2 tablets per day        leflunomide 20 MG tablet    ARAVA     Take 20 mg by mouth every morning Reported on 3/28/2017        omeprazole 40 MG capsule    priLOSEC    90 capsule    TAKE 1 CAPSULE (40 MG) BY MOUTH DAILY TAKE 30-60 MINUTES BEFORE A MEAL.    PUD (peptic ulcer disease)       VITAMIN B-12 PO      Take 4 tablets by mouth daily

## 2018-01-15 NOTE — PROGRESS NOTES
[facility]  [address] [facility] [address] [phone]  Dept: [phone]    PRE-OP EVALUATION:  Today's date: 1/15/2018    Kalyn Rivero (: 1970) presents for pre-operative evaluation assessment as requested by Dr. Rush.  She requires evaluation and anesthesia risk assessment prior to undergoing surgery/procedure for treatment of lesion of vocal cord .  Proposed procedure: excision of left vocal cord cyst    Date of Surgery/ Procedure: 18  Time of Surgery/ Procedure: 6:00  Hospital/Surgical Facility: [facility]  Fax number for surgical facility: [phone]  Primary Physician: Mika Zamora  Type of Anesthesia Anticipated: to be determined    Patient has a Health Care Directive or Living Will:  NO    Preop Questions 1/15/2018   1.  Do you have a history of heart attack, stroke, stent, bypass or surgery on an artery in the head, neck, heart or legs? No   2.  Do you ever have any pain or discomfort in your chest? No   3.  Do you have a history of  Heart Failure? No   4.   Are you troubled by shortness of breath when:  walking on a level surface, or up a slight hill, or at night? No   5.  Do you currently have a cold, bronchitis or other respiratory infection? No   6.  Do you have a cough, shortness of breath, or wheezing? No   7.  Do you sometimes get pains in the calves of your legs when you walk? YES - arthitis   8. Do you or anyone in your family have previous history of blood clots? YES - patient has phlebitis of arm   9.  Do you or does anyone in your family have a serious bleeding problem such as prolonged bleeding following surgeries or cuts? No   10. Have you ever had problems with anemia or been told to take iron pills? YES - iron defiency   11. Have you had any abnormal blood loss such as black, tarry or bloody stools, or abnormal vaginal bleeding? No   12. Have you ever had a blood transfusion? YES -     13. Have you or any of your relatives ever had  problems with anesthesia? YES - cardiac arrest   14. Do you have sleep apnea, excessive snoring or daytime drowsiness? No   15. Do you have any prosthetic heart valves? No   16. Do you have prosthetic joints? YES -     17. Is there any chance that you may be pregnant? No           HPI:                                                      Brief HPI related to upcoming procedure: cyst on vocal cords      ANEMIA - Patient has a recent history of moderate severity anemia, which has not been symptomatic. Work up to date has revealed pending. Treatment has beeniron supplement.                                                                                                                   .    MEDICAL HISTORY:                                                    Patient Active Problem List    Diagnosis Date Noted     Health Care Home 05/27/2011     Priority: High     Not in Active Care Coordination                                                                          DX V65.8 REPLACED WITH [facility] (04/08/2013)         Cervical high risk HPV (human papillomavirus) test positive 02/22/2017     Priority: Medium     2/22/17: NIL Pap, + HR HPV 18 & other HR HPV. Plan colp  3/28/17 [facility] - negative for dysplasia. Plan cotest in 1 year.        Other drug-induced neutropenia (H) 04/14/2016     Priority: Medium     Rheumatoid arthritis with positive rheumatoid factor, involving unspecified site (H) 04/14/2016     Priority: Medium     Rheumatoid arthritis of foot (H) 04/13/2015     Priority: Medium     Encounter for long-term opiate analgesic use 07/15/2014     Priority: Medium     TMJ (temporomandibular joint syndrome) 07/10/2014     Priority: Medium     Intractable chronic migraine without aura 07/10/2014     Priority: Medium     Problem list name updated by automated process. Provider to review       Lesion of ulnar nerve 06/03/2014     Priority: Medium     Right arm numbness 05/21/2014     Priority: Medium      Right upper extremity numbness 04/02/2014     Priority: Medium     Esophageal reflux 03/25/2014     Priority: Medium     Postoperative pain 11/20/2013     Priority: Medium     S/P cervical spinal fusion 11/20/2013     Priority: Medium     Drug-seeking behavior 11/20/2013     Priority: Medium     Opioid type dependence (H) 11/20/2013     Priority: Medium     Problem list name updated by automated process. Provider to review       Severe frontal headaches 11/20/2013     Priority: Medium     Abdominal pain, unspecified abdominal location 11/20/2013     Priority: Medium     Problem list name updated by automated process. Provider to review       Atlantoaxial instability 11/18/2013     Priority: Medium     Trochanteric bursitis - left 10/04/2012     Priority: Medium     Knee joint replacement - left 01/26/2012     Priority: Medium     Patient is followed by KIZZY LUA for ongoing prescription of narcotic pain medicine.  Med: oxycodone 5 mg.  Maximum use per month: 120  Expected duration: 8 weeks postoperative.  Narcotic agreement on file: YES  Clinic visit recommended: Q 4 weeks    Patient is followed by KIZZ[facility] LUA for ongoing prescription of narcotic pain medicine.  Med: oxycontin 20 mg.   Maximum use per month: 60  Expected duration: 1 month  Narcotic agreement on file: YES  Clinic visit recommended: Q 4 weeks       TOTAL KNEE ARTHROPLASTY - bilateral 12/12/2011     Priority: Medium     Liver failure, acute 04/22/2011     Priority: Medium     CARDIOVASCULAR SCREENING; LDL GOAL LESS THAN 160 04/22/2011     Priority: Medium     Renal stone      Priority: Medium     Suicide risk 04/17/2011     Priority: Medium     Grief reaction 04/17/2011     Priority: Medium     3       Acetaminophen overdose 03/24/2011     Priority: Medium     Hepatic failure (H) 03/24/2011     Priority: Medium     Pancreatitis 03/24/2011     Priority: Medium     Anemia 03/24/2011     Priority: Medium     Chronic pain 03/24/2011  "    Priority: Medium     Pneumonia 03/24/2011     Priority: Medium     Advanced directives, counseling/discussion 03/07/2011     Priority: Medium     Planning, information given.       Hypothyroidism      Priority: Medium      Past Medical History:   Diagnosis Date     Acetaminophen overdose 3/24/2011     Anemia      Anesthesia complication     pt states has a histor of heart stopping during anesthesia,     Cervical high risk HPV (human papillomavirus) test positive 02/22/2017    type 18 & other HR HPV     Chronic infection     MRSA     Chronic pain 3/24/2011     Endometriosis      Fibromyalgia      Gastro-oesophageal reflux disease      Heart murmur      History of blood transfusion      Hypothyroidism      Immune disorder (H)      Learning disability      Malignant neoplasm (H)      Migraine      MRSA (methicillin resistant staph aureus) culture positive      Neck injuries      Opioid type dependence (H)      Other chronic pain      PONV (postoperative nausea and vomiting)     states \"flatlines\"during anesthesia     RA (rheumatoid arthritis) (H)      Renal stone      Scoliosis      Stomach ulcer     history     Past Surgical History:   Procedure Laterality Date     ARTHRODESIS FOOT  5/23/2012    Procedure:ARTHRODESIS FOOT; Right Subtalar andTaloNavicular  Fusion  ; Surgeon:CHRIS ZHOU; [facility]     ARTHRODESIS FOOT Left 4/22/2015    Procedure: ARTHRODESIS FOOT;  Surgeon: Chris Zhou MD;  [facility]     ARTHROPLASTY ELBOW Right 9/30/2015    Procedure: ARTHROPLASTY ELBOW;  Surgeon: Carrol Gaspar MD;  [facility]     ARTHROPLASTY KNEE Right      ARTHROPLASTY WRIST      x2 Lt wrist replacement.     ARTHROTOMY ELBOW Right 6/18/2015    Procedure: ARTHROTOMY ELBOW;  Surgeon: Carrol Gaspar MD;  [facility]     C ANESTH,DX ARTHROSCOPIC PROC KNEE JOINT  10/24/2007    right total knee arthroplasty     C SHLDR ARTHROSCOP,DIAGNOSTIC  12/17/2008    left total " shoulder arthroplasty by Dr. Law     C SHOULDER SURG PROC UNLISTED       C TOTAL KNEE ARTHROPLASTY  1/18/12    Left     CYSTOSCOPY  02/06/2009    1.cystoscopy.2.bilateral ureteroscopy.3.basketing of stone fragments from the right kidney.4.bilateral double-J ureteral stent placement.5.ann catheter placement.6.fluoroscopy and intraoperative interpretation of images.     CYSTOSCOPY  01/08/2007    1. cystoscopy and left stent removal.2.left ureteroscopy     DECOMPRESSION CUBITAL TUNNEL  6/6/2014    Procedure: DECOMPRESSION CUBITAL TUNNEL;  Surgeon: Janelle Coates MD;  [facility]     ENDOSCOPY  03/31/2005    [facility]oscopy[facility]     ESOPHAGOSCOPY, GASTROSCOPY, DUODENOSCOPY (EGD), COMBINED  3/17/2014    Procedure: COMBINED ESOPHAGOSCOPY, GASTROSCOPY, DUODENOSCOPY (EGD), BIOPSY SINGLE OR MULTIPLE;;  Surgeon: Duane, William Charles, MD;  Location: [facility] OR     FUSION CERVICAL POSTERIOR ONE LEVEL  11/18/2013    Procedure: FUSION CERVICAL POSTERIOR ONE LEVEL;  Cervical 1-2 Posterior Cervical Fusion (Harms Procedure);  Surgeon: Carrol Gunn MD;  [facility]: [facility] OR     GYN SURGERY       INJECT MAJOR JOINT / BURSA Left 1/5/2017    Procedure: INJECT MAJOR JOINT / BURSA;  Surgeon: Fredy Patel DO;  [facility]     INJECT STEROID (LOCATION) Left 6/18/2015    Procedure: INJECT STEROID (LOCATION);  Surgeon: Carrol Gaspar MD;  Location:  OR     NECK SURGERY       REMOVE FOREIGN BODY UPPER EXTREMITY Right 3/2/2017    Procedure: REMOVE FOREIGN BODY UPPER EXTREMITY;  Surgeon: Carrol Gaspar MD;  Location:  OR     RESECT BONE UPPER EXTREMITY Left 4/27/2017    Procedure: RESECT BONE UPPER EXTREMITY;  Left Radial Head Resection;  Surgeon: Carrol Gaspar MD;  Location:  OR     ROTATOR CUFF REPAIR RT/LT  10/19/2005    1.left distal clavicle excision.2.acromioplasty.3.coracoacromial ligament resection.4.rotator cuff exploration     Current  Outpatient Prescriptions   Medication Sig Dispense Refill     [employer] SENNA 8.6 MG tablet TAKE 2 TABLETS BY MOUTH 2 TIMES DAILY 120 tablet 1     ClonazePAM (KLONOPIN PO) Take 0.5 mg by mouth 2 tablets per day       ENBREL SURECLICK 50 MG/ML autoinjector        omeprazole (PRILOSEC) 40 MG capsule TAKE 1 CAPSULE (40 MG) BY MOUTH DAILY TAKE 30-60 MINUTES BEFORE A MEAL. 90 capsule 1     Cyanocobalamin (VITAMIN B-12 PO) Take 4 tablets by mouth daily        leflunomide (ARAVA) 20 MG tablet Take 20 mg by mouth every morning Reported on 3/28/2017       Botulinum Toxin Type A (BOTOX) 200 UNITS SOLR Inject 200 Units as directed every 3 months 200 Units 3     Celecoxib (CELEBREX PO) Take 200 mg by mouth 3 times daily       OTC products: no recent use of OTC ASA, NSAIDS or Steroids    Allergies   Allergen Reactions     Morphine Swelling     Codeine Nausea and Nausea and Vomiting     Swelling, itching. GI Intolerance.      Gabapentin Other (See Comments)     Pins,needles, stabbing aching at once  Numbness and Tingling     Sulfa Drugs      unknown     Erythromycin Nausea     Vomiting      Penicillins Rash     Prednisone Palpitations     Heart racing      Latex Allergy: NO    Social History   Substance Use Topics     Smoking status: Current Every Day Smoker     Packs/day: 0.10     Years: 10.00     Types: Cigarettes     Last attempt to quit: 7/31/2013     Smokeless tobacco: Former User      Comment: Quit 9/1/14     Alcohol use No     History   Drug Use No       REVIEW OF SYSTEMS:                                                    C: NEGATIVE for fever, chills, change in weight  I: NEGATIVE for worrisome rashes, moles or lesions  E: NEGATIVE for vision changes or irritation  E/M: NEGATIVE for ear, mouth and throat problems  R: NEGATIVE for significant cough or SOB  B: NEGATIVE for masses, tenderness or discharge  CV: NEGATIVE for chest pain, palpitations or peripheral edema  GI: NEGATIVE for nausea, abdominal pain, heartburn, or change  in bowel habits  : NEGATIVE for frequency, dysuria, or hematuria  M: NEGATIVE for significant arthralgias or myalgia  N: NEGATIVE for weakness, dizziness or paresthesias  E: NEGATIVE for temperature intolerance, skin/hair changes  H: NEGATIVE for bleeding problems  P: NEGATIVE for changes in mood or affect    EXAM:                                                    /80  Pulse 86  Temp 97  F (36.1  C) (Oral)  Wt 161 lb 6.4 oz (73.2 kg)  SpO2 97%  BMI 26.25 kg/m2    GENERAL APPEARANCE: healthy, alert and no distress     EYES: EOMI, PERRL     HENT: ear canals and TM's normal and nose and mouth without ulcers or lesions     NECK: no adenopathy, no asymmetry, masses, or scars and thyroid normal to palpation     RESP: lungs clear to auscultation - no rales, rhonchi or wheezes     CV: regular rates and rhythm, normal S1 S2, no S3 or S4 and no murmur, click or rub     ABDOMEN:  soft, nontender, no HSM or masses and bowel sounds normal     MS: extremities normal- no gross deformities noted, no evidence of inflammation in joints, FROM in all extremities.     SKIN: no suspicious lesions or rashes     NEURO: Normal strength and tone, sensory exam grossly normal, mentation intact and speech normal     PSYCH: mentation appears normal. and affect normal/bright     LYMPHATICS: No axillary, cervical, or supraclavicular nodes    DIAGNOSTICS:                                                    hemoglobin    Recent Labs   Lab Test  09/06/17   1621  08/29/17   1728   06/12/17   1310   03/03/17   1351   09/08/14   2303   11/19/13   0731   HGB  12.2  11.9   < >  11.2*   < >  12.1   < >  11.8   < >  9.2*   PLT  211  181   < >  227   < >  254   < >  172   < >  141*   INR   --    --    --    --    --    --    --   1.00   --   1.28*   NA   --    --    --   141   --   141   < >  140   < >  135   POTASSIUM   --    --    --   4.1   --   4.1   < >  4.1   < >  3.6   CR  0.89   --    --   0.91   < >  0.80   < >  0.90   < >  0.79    < >  = values in this interval not displayed.        IMPRESSION:                                                    Reason for surgery/procedure: cyst on vocal cords  Diagnosis/reason for consult: Anesthesia     The proposed surgical procedure is considered LOW risk.    REVISED CARDIAC RISK INDEX  The patient has the following serious cardiovascular risks for perioperative complications such as (MI, PE, VFib and 3  AV Block):  No serious cardiac risks  INTERPRETATION: 0 risks: Class I (very low risk - 0.4% complication rate)    The patient has the following additional risks for perioperative complications:  No identified additional risks      ICD-10-CM    1. Preop general physical exam Z01.818    2. 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    APPROVAL GIVEN to proceed with proposed procedure, without further diagnostic evaluation       Signed Electronically by: Mika Zamora MD    Copy of this evaluation report is provided to requesting physician.    [facility] Preop Guidelines

## 2018-01-16 ASSESSMENT — ANXIETY QUESTIONNAIRES: GAD7 TOTAL SCORE: 6

## 2018-03-10 DIAGNOSIS — K27.9 PUD (PEPTIC ULCER DISEASE): ICD-10-CM

## 2018-03-12 RX ORDER — OMEPRAZOLE 40 MG/1
CAPSULE, DELAYED RELEASE ORAL
Qty: 90 CAPSULE | Refills: 1 | Status: SHIPPED | OUTPATIENT
Start: 2018-03-12 | End: 2018-09-12

## 2018-03-12 NOTE — TELEPHONE ENCOUNTER
Prescription approved per [facility] [place].  Emilia Conti RN

## 2018-04-05 ENCOUNTER — TELEPHONE (OUTPATIENT)
Dept: FAMILY MEDICINE | Facility: CLINIC | Age: 48
End: 2018-04-05

## 2018-04-05 ENCOUNTER — OFFICE VISIT (OUTPATIENT)
Dept: PHYSICAL MEDICINE AND REHAB | Facility: CLINIC | Age: 48
End: 2018-04-05
Payer: COMMERCIAL

## 2018-04-05 ENCOUNTER — PRE VISIT (OUTPATIENT)
Dept: ORTHOPEDICS | Facility: CLINIC | Age: 48
End: 2018-04-05

## 2018-04-05 VITALS
DIASTOLIC BLOOD PRESSURE: 93 MMHG | HEART RATE: 94 BPM | WEIGHT: 155.5 LBS | HEIGHT: 65 IN | SYSTOLIC BLOOD PRESSURE: 134 MMHG | BODY MASS INDEX: 25.91 KG/M2

## 2018-04-05 DIAGNOSIS — M25.511 RIGHT SHOULDER PAIN: Primary | ICD-10-CM

## 2018-04-05 DIAGNOSIS — G43.719 INTRACTABLE CHRONIC MIGRAINE WITHOUT AURA AND WITHOUT STATUS MIGRAINOSUS: Primary | ICD-10-CM

## 2018-04-05 NOTE — TELEPHONE ENCOUNTER
Patient has hx of Right shoulder rheumatoid arthritis. .    Patient was last seen on 04/17/17 by .    Patient is coming to clinic for further discussion regarding plan of care for Surgical options; Right regular shoulder replacement with or without rotator cuff repair or whether she would need reverse total shoulder arthroplasty. Pt's Surgery was canceled for 06/29/17 due to Left elbow Surgery with Dr. Gaspar.    Per the last clinic note, plan is for f/u after elbow surgery with new imaging. Radiographs have been ordered and scheduled.    Patient visit type and questionnaires have been reviewed and are correct for this appointment? Yes    Eric TRINIDAD, CMA

## 2018-04-05 NOTE — LETTER
"4/5/2018       RE: Kalyn Rivero  [address] [facility] [address]     Dear Colleague,    Thank you for referring your patient, Kalyn Rivero, to the [facility] PHYSICAL MEDICINE AND REHABILITATION at [facility]. Please see a copy of my visit note below.    MIGRAINE BOTOX HEADACHE PROCEDURE NOTE     BP (!) 134/93  Pulse 94  Ht 5' 5\" (1.651 m)  Wt 155 lb 8 oz (70.5 kg)  BMI 25.88 kg/m2       Current Outpatient Prescriptions:      omeprazole (PRILOSEC) 40 MG capsule, TAKE 1 CAPSULE (40 MG) BY MOUTH DAILY TAKE 30-60 MINUTES BEFORE A MEAL., Disp: 90 capsule, Rfl: 1     CVS SENNA 8.6 MG tablet, TAKE 2 TABLETS BY MOUTH 2 TIMES DAILY, Disp: 120 tablet, Rfl: 1     ClonazePAM (KLONOPIN PO), Take 0.5 mg by mouth 2 tablets per day, Disp: , Rfl:      ENBREL SURECLICK 50 MG/ML autoinjector, , Disp: , Rfl:      Cyanocobalamin (VITAMIN B-12 PO), Take 4 tablets by mouth daily , Disp: , Rfl:      leflunomide (ARAVA) 20 MG tablet, Take 20 mg by mouth every morning Reported on 3/28/2017, Disp: , Rfl:      Botulinum Toxin Type A (BOTOX) 200 UNITS SOLR, Inject 200 Units as directed every 3 months, Disp: 200 Units, Rfl: 3     Celecoxib (CELEBREX PO), Take 200 mg by mouth 3 times daily, Disp: , Rfl:     Current Facility-Administered Medications:      lidocaine 1 % injection 5 mL, 5 mL, INTRA-ARTICULAR, Once, Carrol Gaspar MD    Facility-Administered Medications Ordered in Other Visits:      bupivacaine 0.5 % -  EPINEPHrine 1:200,000 injection, , , PRN, Yvon Negrete MD, 20 mL at 04/27/17 1505     mepivacaine (PF) (CARBOCAINE) 2 % injection, , , PRN, Yvon Negrete MD, 10 mL at 06/18/15 1250        Allergies   Allergen Reactions     Morphine Swelling     Codeine Nausea and Nausea and Vomiting     Swelling, itching. GI Intolerance.      Gabapentin Other (See Comments)     Pins,needles, stabbing aching at once  Numbness and Tingling     Sulfa " Drugs      unknown     Erythromycin Nausea     Vomiting      Penicillins Rash     Prednisone Palpitations     Heart racing        PHYSICAL EXAM  Pt states headache today rates 10/10 started 3 weeks ago.  Taking only medication for right arm surgery which helps headache somewhat.   Takes minimal Tylenol for headache pain. Notes that her headaches came back in the last 2 weeks.     JESSICA Lucas was diagnosed with rheumatoid arthritis since age 21 years, and migraine. She was getting Botox since age 21 years, she was getting them at [facility] but the physician left the state.    She has a history of Right elbow replacement, and developed a fracture of the humerus, progressed rotator cuff tear. She is about to see her surgeon on the 16th of April. She reports that the surgeon at [facility] for a amputation. She notes that she has a lot of stress due to this.   She does all hr activity with her right UE. She has had difficulty sleeping   She is using a brace for the right UE.     All the stress and insomnia has resulted in headaches.     Patient reports the following new medical problems since last visit: Right arm surgery July 19, 2017 at [facility].    RESPONSE TO PREVIOUS TREATMENT:    Patient Kalyn Rivero received 170 units of Botox on 1/11/2018.    Problems following the previous series of neurotoxin injections included:  No problems reported    Benefits by Patient Report:    Change in headache pattern following last series of injections with 170 units of  Botox on 1/11/2018.  She notes the response starts immediately, and lasts all 12 weeks, however with the poor sleep and stress, she feels the last 5 weeks have been difficult.      1.  Headache Frequency During this Injection Cycle:  0 headache since last injections up until 5 weeks ago   However, in the last 5 weeks, she had headaches everyday. .        2.  Headache Duration During this Injection Cycle: she states that the headaches are daily and last 24 hrs.         3.  Headache Intensity During this Injection Cycle:  Headache intensity during this injection cycle:      A.  2/10  =  Typical pain level.    B.  10/10  =  Worst pain level in the last 5 weeks    C.  0/10  =  Lowest pain level.       4.  Change in headache medication usage during this injection cycle:  (For Example:  Able to decrease use of oral pain medications.)  In the last 5 weeks, she had trigger point injections for TMJ.      5.  ER Visits During This Injection Cycle:  None      6.  Functional Performance:  Change in ADL's, social interaction, days lost from work, etc. Has had to cancel some activities due to headache.       BOTULINUM NEUROTOXIN INJECTION PROCEDURES:    VERIFICATION OF PATIENT IDENTIFICATION  Responsible Person:  wes  : verified  Full Name: verified    INDICATIONS FOR PROCEDURES:  Kalyn Rivero is a 47 year old patient with headache affecting the head, neck and shoulder girdle musculature secondary to a diagnosis of Chronic Migraine Headache with associated pain.    Her baseline symptoms have been recalcitrant to oral medications and conservative therapy.  She is here today for reinjection with Botox.    GOAL OF PROCEDURE:  The goal of this procedure is to improve decrease pain  associated with headache.    TOTAL DOSE ADMINISTERED:  Dose Administered:  170 units Botox (Botulinum Toxin Type A)       2:1 Dilution   Diluent Used:  Preservative Free Normal Saline  Total Volume of Diluent Used:  3.4 ml  Lot # /[id number] with Expiration Date:  10/2020     CONSENT:  The risks, benefits, and treatment options were discussed with Kalyn Rivero and she agreed to proceed.    EQUIPMENT USED:  Needle-25mm stimulating/recording  Needle-37mm stimulating/recording  EMG/NCS Machine    SKIN PREPARATION:  Skin preparation was performed using an alcohol wipe.      GUIDANCE DESCRIPTION:  Electro-myographic guidance was necessary throughout the posterior neck portion to accurately identify all  areas of spastic muscles while avoiding injection of non-spastic muscles, neighboring nerves and nearby vascular structures.     AREA/MUSCLE INJECTED:    HEAD & SCALP MUSCLES:  units Botox = Total Dose, 2:1 Dilution  Right Trapezius - 15 units in 3 sites   Left Trapezius - 10 units in 3 sites     Right occipitalis - 20 units of Botox at 4 site/s.  Left Occipitalis - 15 units at 3 sites     Right Paracervical  15 units in 3 sites   Left paracervical 5 units in 2 sites       Right Frontalis - 15 units of Botox at 3 site/s.  Left Frontalis - 10 units of Botox at 2 site/s.    Right Temporalis - 25 units of Botox at 5 site/s.  Left Temporalis - 20 units of Botox at 4 site/s.    Mastication 1 site 5 units     Right  - 5 units of Botox at 1 site/s.   Left  - 5 units of Botox at 1 site/s.    Procerus - 5 units of Botox at 1 site/s.    Wastage 5 units       RESPONSE TO PROCEDURE:  Kalyn Rivero tolerated the procedure well and there were no immediate complications.   She was allowed to recover for an appropriate period of time and was discharged home in stable condition.     Follow Up  Kalyn Rivero was asked to follow up by phone in 7-14 days with Annamaria Lamb RN, Care Coordinator, to report her response to this series of injections.  Based on the patient's previous response to this therapy, Kalyn Rivero was rescheduled for the next series of injections in 12 weeks.      Again, thank you for allowing me to participate in the care of your patient.      Sincerely,    Rosina Quinones MD

## 2018-04-05 NOTE — MR AVS SNAPSHOT
After Visit Summary   4/5/2018    Kalyn Rivero    MRN: [id number]           Patient Information     Date Of Birth          1970        Visit Information        Provider Department      4/5/2018 11:30 AM Rosina Quinones MD [facility] Physical Medicine and Rehabilitation        Today's Diagnoses     Intractable chronic migraine without aura and without status migrainosus    -  1       Follow-ups after your visit        Follow-up notes from your care team     Return in about 12 weeks (around 6/28/2018).      Your next 10 appointments already scheduled     Apr 16, 2018 11:20 AM CDT   (Arrive by 11:05 AM)   RETURN SHOULDER with River Law MD   [facility] ([facility])    [address]  4th Floor  [facility] [id number] [phone]            Apr 18, 2018 11:45 AM CDT   [facility]    [address] [facility] [id number] [phone]            Jun 28, 2018  9:40 AM CDT   (Arrive by 9:25 AM)   Return Botox with Rosina Quinones MD   [facility] ([facility])    [address]  3rd Floor  [facility] [id number] [phone]              Who to contact     Please call your clinic at [phone] to:    Ask questions about your health    Make or cancel appointments    Discuss your medicines    Learn about your test results    Speak to your doctor            Additional Information About Your Visit        MyChart Information     iCrumz is an electronic gateway that provides easy, online access to your medical records. With iCrumz, you can request a clinic appointment, read your test results, renew a prescription or communicate with your care team.     To sign up for Nimbulat visit the website at [email or web address]   You will be asked to enter the access  "code listed below, as well as some personal information. Please follow the directions to create your username and password.     Your access code is: [email or web address]  Expires: 2018  6:30 AM     Your access code will  in 90 days. If you need help or a new code, please contact your [facility] or call [phone] for assistance.        Care EveryWhere ID     This is your Care EveryWhere ID. This could be used by other organizations to access your [place] medical records  [id number]        Your Vitals Were     Pulse Height BMI (Body Mass Index)             94 5' 5\" (1.651 m) 25.88 kg/m2          Blood Pressure from Last 3 Encounters:   18 (!) 134/93   01/15/18 120/80   18 150/86    Weight from Last 3 Encounters:   18 155 lb 8 oz (70.5 kg)   01/15/18 161 lb 6.4 oz (73.2 kg)   18 162 lb (73.5 kg)              We Performed the Following     HC CHEMODENERVATE FACIAL,TRIGERM,NERV MIGRAINE     NEEDLE EMG GUIDE W CHEMODENERVATION        Primary Care Provider Office Phone # Fax #    Mika Zamora [phone] [address] [facility] [address]        Equal Access to Services     [facility]: Hadii aad ku hadasho Soomaali, waaxda luqadaha, qaybta kaalmada adeegyada, waxay tawnya haysandra sosa . So [facility] [phone].    ATENCIÓN: Si habla español, tiene a holcomb disposición servicios gratuitos de asistencia lingüística. Llame al [phone].    We comply with applicable federal civil rights laws and [place] laws. We do not discriminate on the basis of race, color, national origin, age, disability, sex, sexual orientation, or gender identity.            Thank you!     Thank you for choosing [facility] PHYSICAL MEDICINE AND REHABILITATION  for your care. Our goal is always to provide you with excellent care. Hearing back from our patients is one way we can continue to improve our services. Please take a few minutes to " complete the written survey that you may receive in the mail after your visit with us. Thank you!             Your Updated Medication List - Protect others around you: Learn how to safely use, store and throw away your medicines at www.disposemymeds.org.          This list is accurate as of 4/5/18 11:50 AM.  Always use your most recent med list.                   Brand Name Dispense Instructions for use Diagnosis    * BOTOX IJ      Inject 170 Units as directed Lot # [id number] with Expiration Date:  10/2020        * Botulinum Toxin Type A 200 UNITS injection    BOTOX    200 Units    Inject 200 Units as directed every 3 months    Intractable chronic migraine without aura and without status migrainosus       CELEBREX PO      Take 200 mg by mouth 3 times daily        [facility] SENNA 8.6 MG tablet   Generic drug:  senna     120 tablet    TAKE 2 TABLETS BY MOUTH 2 TIMES DAILY    Rheumatoid arthritis with positive rheumatoid factor, involving unspecified site (H)       ENBREL SURECLICK 50 MG/ML autoinjector   Generic drug:  Etanercept           KLONOPIN PO      Take 0.5 mg by mouth 2 tablets per day        leflunomide 20 MG tablet    ARAVA     Take 20 mg by mouth every morning Reported on 3/28/2017        omeprazole 40 MG capsule    priLOSEC    90 capsule    TAKE 1 CAPSULE (40 MG) BY MOUTH DAILY TAKE 30-60 MINUTES BEFORE A MEAL.    PUD (peptic ulcer disease)       VITAMIN B-12 PO      Take 4 tablets by mouth daily        * Notice:  This list has 2 medication(s) that are the same as other medications prescribed for you. Read the directions carefully, and ask your doctor or other care provider to review them with you.

## 2018-04-05 NOTE — PROGRESS NOTES
"MIGRAINE BOTOX HEADACHE PROCEDURE NOTE     BP (!) 134/93  Pulse 94  Ht 5' 5\" (1.651 m)  Wt 155 lb 8 oz (70.5 kg)  BMI 25.88 kg/m2       Current Outpatient Prescriptions:      omeprazole (PRILOSEC) 40 MG capsule, TAKE 1 CAPSULE (40 MG) BY MOUTH DAILY TAKE 30-60 MINUTES BEFORE A MEAL., Disp: 90 capsule, Rfl: 1     CVS SENNA 8.6 MG tablet, TAKE 2 TABLETS BY MOUTH 2 TIMES DAILY, Disp: 120 tablet, Rfl: 1     ClonazePAM (KLONOPIN PO), Take 0.5 mg by mouth 2 tablets per day, Disp: , Rfl:      ENBREL SURECLICK 50 MG/ML autoinjector, , Disp: , Rfl:      Cyanocobalamin (VITAMIN B-12 PO), Take 4 tablets by mouth daily , Disp: , Rfl:      leflunomide (ARAVA) 20 MG tablet, Take 20 mg by mouth every morning Reported on 3/28/2017, Disp: , Rfl:      Botulinum Toxin Type A (BOTOX) 200 UNITS SOLR, Inject 200 Units as directed every 3 months, Disp: 200 Units, Rfl: 3     Celecoxib (CELEBREX PO), Take 200 mg by mouth 3 times daily, Disp: , Rfl:     Current Facility-Administered Medications:      lidocaine 1 % injection 5 mL, 5 mL, INTRA-ARTICULAR, Once, Carrol Gaspar MD    Facility-Administered Medications Ordered in Other Visits:      bupivacaine 0.5 % -  EPINEPHrine 1:200,000 injection, , , PRN, Yvon Negrete MD, 20 mL at 04/27/17 1505     mepivacaine (PF) (CARBOCAINE) 2 % injection, , , PRN, Yvon Negrete MD, 10 mL at 06/18/15 1250        Allergies   Allergen Reactions     Morphine Swelling     Codeine Nausea and Nausea and Vomiting     Swelling, itching. GI Intolerance.      Gabapentin Other (See Comments)     Pins,needles, stabbing aching at once  Numbness and Tingling     Sulfa Drugs      unknown     Erythromycin Nausea     Vomiting      Penicillins Rash     Prednisone Palpitations     Heart racing        PHYSICAL EXAM  Pt states headache today rates 10/10 started 3 weeks ago.  Taking only medication for right arm surgery which helps headache somewhat.   Takes minimal Tylenol for headache " pain. Notes that her headaches came back in the last 2 weeks.     JESSICA Lucas was diagnosed with rheumatoid arthritis since age 21 years, and migraine. She was getting Botox since age 21 years, she was getting them at [facility] but the physician left the state.    She has a history of Right elbow replacement, and developed a fracture of the humerus, progressed rotator cuff tear. She is about to see her surgeon on the 16th of April. She [place] [facility] [place]. She notes that she has a lot of stress due to this.   She does all hr activity with her right UE. She has had difficulty sleeping   She is using a brace for the right UE.     All the stress and insomnia has resulted in headaches.     Patient reports the following new medical problems since last visit: [place] [facility]    RESPONSE TO PREVIOUS TREATMENT:    Patient Kalyn Rivero received 170 units of Botox on 1/11/2018.    Problems following the previous series of neurotoxin injections included:  No problems reported    Benefits by Patient Report:    Change in headache pattern following last series of injections with 170 units of  Botox on 1/11/2018.  She notes the response starts immediately, and lasts all 12 weeks, however with the poor sleep and stress, she feels the last 5 weeks have been difficult.      1.  Headache Frequency During this Injection Cycle:  0 headache since last injections up until 5 weeks ago   However, in the last 5 weeks, she had headaches everyday. .        2.  Headache Duration During this Injection Cycle: she states that the headaches are daily and last 24 hrs.        3.  Headache Intensity During this Injection Cycle:  Headache intensity during this injection cycle:      A.  2/10  =  Typical pain level.    B.  10/10  =  Worst pain level in the last 5 weeks    C.  0/10  =  Lowest pain level.       4.  Change in headache medication usage during this injection cycle:  (For  Example:  Able to decrease use of oral pain medications.)  In the last 5 weeks, she had trigger point injections for TMJ.      5.  ER Visits During This Injection Cycle:  None      6.  Functional Performance:  Change in ADL's, social interaction, days lost from work, etc. Has had to cancel some activities due to headache.       BOTULINUM NEUROTOXIN INJECTION PROCEDURES:    VERIFICATION OF PATIENT IDENTIFICATION  Responsible Person:  wes  : verified  Full Name: verified    INDICATIONS FOR PROCEDURES:  Kalyn Rivero is a 47 year old patient with headache affecting the head, neck and shoulder girdle musculature secondary to a diagnosis of Chronic Migraine Headache with associated pain.    Her baseline symptoms have been recalcitrant to oral medications and conservative therapy.  She is here today for reinjection with Botox.    GOAL OF PROCEDURE:  The goal of this procedure is to improve decrease pain  associated with headache.    TOTAL DOSE ADMINISTERED:  Dose Administered:  170 units Botox (Botulinum Toxin Type A)       2:1 Dilution   Diluent Used:  Preservative Free Normal Saline  Total Volume of Diluent Used:  3.4 ml  Lot # /[id number] with Expiration Date:  10/2020     CONSENT:  The risks, benefits, and treatment options were discussed with Kalyn Rivero and she agreed to proceed.    EQUIPMENT USED:  Needle-25mm stimulating/recording  Needle-37mm stimulating/recording  EMG/NCS Machine    SKIN PREPARATION:  Skin preparation was performed using an alcohol wipe.      GUIDANCE DESCRIPTION:  Electro-myographic guidance was necessary throughout the posterior neck portion to accurately identify all areas of spastic muscles while avoiding injection of non-spastic muscles, neighboring nerves and nearby vascular structures.     AREA/MUSCLE INJECTED:    HEAD & SCALP MUSCLES:  units Botox = Total Dose, 2:1 Dilution  Right Trapezius - 15 units in 3 sites   Left Trapezius - 10 units in 3 sites     Right occipitalis -  20 units of Botox at 4 site/s.  Left Occipitalis - 15 units at 3 sites     Right Paracervical  15 units in 3 sites   Left paracervical 5 units in 2 sites       Right Frontalis - 15 units of Botox at 3 site/s.  Left Frontalis - 10 units of Botox at 2 site/s.    Right Temporalis - 25 units of Botox at 5 site/s.  Left Temporalis - 20 units of Botox at 4 site/s.    Mastication 1 site 5 units     Right  - 5 units of Botox at 1 site/s.   Left  - 5 units of Botox at 1 site/s.    Procerus - 5 units of Botox at 1 site/s.    Wastage 5 units       RESPONSE TO PROCEDURE:  Kalyn Rivero tolerated the procedure well and there were no immediate complications.   She was allowed to recover for an appropriate period of time and was discharged home in stable condition.     Follow Up  Kalyn Rivero was asked to follow up by phone in 7-14 days with Annamaria Lamb RN, Care Coordinator, to report her response to this series of injections.  Based on the patient's previous response to this therapy, Kalyn Rivero was rescheduled for the next series of injections in 12 weeks.      Rosina Quinones MD, MHA   Physical Medicine & Rehabilitation

## 2018-04-09 ENCOUNTER — TELEPHONE (OUTPATIENT)
Dept: PHYSICAL MEDICINE AND REHAB | Facility: CLINIC | Age: 48
End: 2018-04-09

## 2018-04-09 NOTE — TELEPHONE ENCOUNTER
[facility] Call Center    Phone Message    May a detailed message be left on voicemail: yes    Reason for Call: Symptoms or Concerns     If patient has red-flag symptoms, warm transfer to triage line    Current symptom or concern: Pt gets Botox injections for Headaches - wants call back to discuss Appt from 04/05/18 - said she didn't get them above her eye brows - said that is where they usually inject to help with the headaches and dizziness, right at the forehead, said there is no punctures there, thinks she only did back of head, Pt wants to know if she did forehead or not?, and if she can come in a get forehead done? Please return her call     Symptoms have been present for:  A lot of week - said symptoms have not gone away because she wasn't injected where she needed it    Has patient previously been seen for this? Yes    By : Rosina Quinones    Date: 04/05/18 was the last time    Are there any new or worsening symptoms? Yes: has not gone away      Action Taken: Message routed to:  [facility] ([facility]): JOSE

## 2018-04-10 ENCOUNTER — PRE VISIT (OUTPATIENT)
Dept: ORTHOPEDICS | Facility: CLINIC | Age: 48
End: 2018-04-10

## 2018-04-10 DIAGNOSIS — M25.521 RIGHT ELBOW PAIN: Primary | ICD-10-CM

## 2018-04-10 NOTE — TELEPHONE ENCOUNTER
Telephone Call     Kalyn Rivero was called today. She reports that she was not injcetd in the forehead area. She is adamant about it. I did explain to her that the entire protocol was completed that particular day, and if she had not been injected, we would know about it right away as there would be exact amount left.     During her encounter, Dr Negro and her fellow stepped into the procedure room several times, and at one occasion they both were inside the procedure room and were talking with my nurse. The patient was upset about it, and held the procedure not proceeding with the injections during their stay. Now the patient states that her forehead area was not injected due to the commotion in the clinic. I reassured her that all the injections were done.   She [id number] that she has had headaches since the procedure as her forehead area is the site of the start of the pain. I reminded the patient that it has been 4 days and we do not expect the injections to be effective yet. But patient is sure that the injections were not given, and she is convinced that she will have headaches for the reminder of the weeks.   I reassured her, and asked her to return to the clinic on Thursday 12th at 12:40 pm to review her headaches and plan to inject if needed.     For any questions, please feel free to page me at [phone]       Rosina Quinones MD   Department of PM&R

## 2018-04-10 NOTE — TELEPHONE ENCOUNTER
Patient is being seen for right elbow pain.    [place] [facility] [place] was told her screws from her previous surgery are floating around in her elbow.      Waiting for notes and [place] [facility]    Patient is coming to clinic for further discussion regarding plan of care.      Per standing orders, right elbow xrays have been ordered and scheduled.     Patient visit type and questionnaires have been reviewed and are correct for this appointment? Yes     Pinch and : yes    Hand Therapy: no    Radha Maria, ATC

## 2018-04-12 ENCOUNTER — OFFICE VISIT (OUTPATIENT)
Dept: PHYSICAL MEDICINE AND REHAB | Facility: CLINIC | Age: 48
End: 2018-04-12
Payer: COMMERCIAL

## 2018-04-12 ENCOUNTER — OFFICE VISIT (OUTPATIENT)
Dept: ORTHOPEDICS | Facility: CLINIC | Age: 48
End: 2018-04-12
Payer: COMMERCIAL

## 2018-04-12 ENCOUNTER — RADIANT APPOINTMENT (OUTPATIENT)
Dept: GENERAL RADIOLOGY | Facility: CLINIC | Age: 48
End: 2018-04-12
Attending: ORTHOPAEDIC SURGERY
Payer: COMMERCIAL

## 2018-04-12 DIAGNOSIS — M25.521 RIGHT ELBOW PAIN: ICD-10-CM

## 2018-04-12 DIAGNOSIS — G43.719 INTRACTABLE CHRONIC MIGRAINE WITHOUT AURA AND WITHOUT STATUS MIGRAINOSUS: Primary | ICD-10-CM

## 2018-04-12 DIAGNOSIS — M19.029 ELBOW ARTHRITIS: ICD-10-CM

## 2018-04-12 DIAGNOSIS — S42.401A ELBOW FRACTURE, RIGHT, CLOSED, INITIAL ENCOUNTER: Primary | ICD-10-CM

## 2018-04-12 NOTE — LETTER
4/12/2018       RE: Kalyn Rivero  [address] [facility] [id number]     Dear Colleague,    Thank you for referring your patient, Kalyn Rivero, to the [facility] PHYSICAL MEDICINE AND REHABILITATION at [facility]. Please see a copy of my visit note below.    Supplemental Botox:   Patient arrived today following the discussion and her ongoing concern that she was not injected in the forehead and eye brow area. She reports that she is injected she gets immediate relief of the diplopia in the right eye.   On presentation,   She was very anxious today  She was reassured and the effects, response timeline were discussed.     She was injected in 3 sites in left and right forehead 15 units on each site, 2 sites distal the hairline and the third further distal making a triangular pattern =30 units   5 units in each  and 5 units in procerus. To a total of 15 units     Total of 45 units were used today.         Again, thank you for allowing me to participate in the care of your patient.      Sincerely,    Rosina Quinones MD

## 2018-04-12 NOTE — LETTER
"4/12/2018       RE: Kalyn Rivero  [address] [facility] [id number]     Dear Colleague,    Thank you for referring your patient, Kalyn Rivero, to the [facility] at [facility]. Please see a copy of my visit note below.        HISTORY OF PRESENT ILLNESS:  [facility] Rivero comes in for a second opinion to her care with Dr. Blaise De Luna at [facility].  She has had a revision total elbow and has gone on to have a periprosthetic fracture.  She is seen today with her son who is also her PCA and also 2 other young men. They articulate that Dr. De Luna had recommended an amputation and she \"wants to die with my arm\".      PHYSICAL EXAMINATION:  On exam today, her incision is clean, dry and intact.  She has pain at the midshaft humerus with a Gandhi brace and sling.   She reports she has dysesthesias in the ulnar digits since her revision surgery.  She articulates and communicates well with her usual normal affect.       RADIOGRAPHS:  Radiographs  reviewed with total elbow implant in her elbow which is consistent with a loose alloprosthetic composite.with a posterior plate with plate loosening and allograft/humeral fracture.         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       PLAN: I spoke on the phone with Dr. De Luna.  I spoke with Dr. Nate Zimmer.  I spoke on the phone in follow up with the patient.    I would recommend referral to Dr. Nate Zimmer for fractured allograft management and Dr. Jack Law for management of shoulder pain.    30 minutes spend in patient management with >50% in face to face consultation.      Again, thank you for allowing me to participate in the care of your patient.      Sincerely,    Carrol Gaspar MD      "

## 2018-04-12 NOTE — MR AVS SNAPSHOT
After Visit Summary   4/12/2018    Kalyn Rivero    MRN: [id number]           Patient Information     Date Of Birth          1970        Visit Information        Provider Department      4/12/2018 8:20 AM Rosina Quinones MD [facility]        Today's Diagnoses     Intractable chronic migraine without aura and without status migrainosus    -  1       Follow-ups after your visit        Follow-up notes from your care team     Return in about 11 weeks (around 6/28/2018).      Your next 10 appointments already scheduled     Apr 16, 2018  8:45 AM CDT   (Arrive by 8:30 AM)   New Patient Visit with Aakash Zimmer MD   [facility] ([facility])    [address] [place] [address] [id number] [phone]            Apr 16, 2018 11:00 AM CDT   XR SHOULDER RIGHT 2 VIEWS with [id number]   [facility] ([facility])    [address] [place] [address] [id number] [phone]           Please bring a list of your current medicines to your exam. (Include vitamins, minerals and over-thecounter medicines.) Leave your valuables at home.  Tell your doctor if there is a chance you may be pregnant.  You do not need to do anything special for this exam.            Apr 16, 2018 11:20 AM CDT   (Arrive by 11:05 AM)   RETURN SHOULDER with River Law MD   [facility] ([facility])    [address] [place] [id number] [phone]            Apr 17, 2018 11:15 AM CDT   Office Visit with Mika Zamora MD   [facility]    [address] [facility] [address] [phone]           Bring a current list of meds and any records pertaining to this visit. For Physicals, please bring immunization records and any forms needing to be filled  out. Please arrive 10 minutes early to complete paperwork.            2018 11:45 AM CDT   SHORT with Mika Zamora MD   [facility]    [address] [facility] [address] [phone]            2018  9:40 AM CDT   (Arrive by 9:25 AM)   Return Botox with Rosina Quinones MD   [facility] ([facility] [place])    [address] [facility]  [address] [facility] [address] [phone]              Who to contact     Please call your clinic at [phone] to:    Ask questions about your health    Make or cancel appointments    Discuss your medicines    Learn about your test results    Speak to your doctor            Additional Information About Your Visit        [email or web address] Information     [employer] is an electronic gateway that provides easy, online access to your medical records. With [employer], you can request a clinic appointment, read your test results, renew a prescription or communicate with your care team.     To sign up for sfilatinot visit the website at [email or web address]   You will be asked to enter the access code listed below, as well as some personal information. Please follow the directions to create your username and password.     Your access code is: [email or web address]  Expires: 2018  6:30 AM     Your access code will  in 90 days. If you need help or a new code, please contact your [facility] or call [phone] for assistance.        Care EveryWhere ID     This is your Care EveryWhere ID. This could be used by other organizations to access your [place] medical records  [id number]         Blood Pressure from Last 3 Encounters:   18 (!) 134/93   01/15/18 120/80   18 150/86    Weight from Last 3 Encounters:   18 155 lb 8 oz (70.5 kg)   01/15/18 161 lb 6.4 oz (73.2 kg)   18 162 lb (73.5 kg)              We  Performed the Following     HC CHEMODENERVATE FACIAL,TRIGERM,NERV MIGRAINE     NEEDLE EMG GUIDE W CHEMODENERVATION        Primary Care Provider Office Phone # Fax #    Mika Zamora [phone] [address] [facility] [address]        Equal Access to Services     [facility]: Hadii aad ku hadasho Soomaali, waaxda luqadaha, qaybta kaalmada adeegyada, waxay lynnin hayherneston uriel anabelwarner sosa . So [facility] [phone].    ATENCIÓN: Si habla español, tiene a holcomb disposición servicios gratuitos de asistencia lingüística. Mellyame al [phone].    We comply with applicable federal civil rights laws and [place] laws. We do not discriminate on the basis of race, color, national origin, age, disability, sex, sexual orientation, or gender identity.            Thank you!     Thank you for choosing [facility]  for your care. Our goal is always to provide you with excellent care. Hearing back from our patients is one way we can continue to improve our services. Please take a few minutes to complete the written survey that you may receive in the mail after your visit with us. Thank you!             Your Updated Medication List - Protect others around you: Learn how to safely use, store and throw away your medicines at www.disposemymeds.org.          This list is accurate as of 4/12/18  1:12 PM.  Always use your most recent med list.                   Brand Name Dispense Instructions for use Diagnosis    * BOTOX IJ      Inject 170 Units as directed Lot # [id number] Expiration Date:  10/2020        * Botulinum Toxin Type A 200 units injection    BOTOX    200 Units    Inject 200 Units as directed every 3 months    Intractable chronic migraine without aura and without status migrainosus       CELEBREX PO      Take 200 mg by mouth 3 times daily        [facility] SENNA 8.6 MG tablet   Generic drug:  senna     120 tablet    TAKE 2 TABLETS BY MOUTH 2 TIMES DAILY     Rheumatoid arthritis with positive rheumatoid factor, involving unspecified site (H)       ENBREL SURECLICK 50 MG/ML autoinjector   Generic drug:  Etanercept           KLONOPIN PO      Take 0.5 mg by mouth 2 tablets per day        leflunomide 20 MG tablet    ARAVA     Take 20 mg by mouth every morning Reported on 3/28/2017        omeprazole 40 MG capsule    priLOSEC    90 capsule    TAKE 1 CAPSULE (40 MG) BY MOUTH DAILY TAKE 30-60 MINUTES BEFORE A MEAL.    PUD (peptic ulcer disease)       VITAMIN B-12 PO      Take 4 tablets by mouth daily        * Notice:  This list has 2 medication(s) that are the same as other medications prescribed for you. Read the directions carefully, and ask your doctor or other care provider to review them with you.

## 2018-04-12 NOTE — PROGRESS NOTES
Supplemental Botox:   Patient arrived today following the discussion and her ongoing concern that she was not injected in the forehead and eye brow area. She reports that she is injected she gets immediate relief of the diplopia in the right eye.   On presentation,   She was very anxious today  She was reassured and the effects, response timeline were discussed.     She was injected in 3 sites in left and right forehead 15 units on each site, 2 sites distal the hairline and the third further distal making a triangular pattern =30 units   5 units in each  and 5 units in procerus. To a total of 15 units     Total of 45 units were used today.

## 2018-04-12 NOTE — MR AVS SNAPSHOT
After Visit Summary   4/12/2018    Kalyn Rivero    MRN: [id number]           Patient Information     Date Of Birth          1970        Visit Information        Provider Department      4/12/2018 7:30 AM Carrol Gaspar MD [facility]        Today's Diagnoses     Elbow fracture, right, closed, initial encounter    -  1    Elbow arthritis           Follow-ups after your visit        Your next 10 appointments already scheduled     Apr 30, 2018 12:15 PM CDT   Office Visit with Mika Zamora MD   [facility]    [address] [facility] [address] [phone]           Bring a current list of meds and any records pertaining to this visit. For Physicals, please bring immunization records and any forms needing to be filled out. Please arrive 10 minutes early to complete paperwork.            Jun 28, 2018  9:40 AM CDT   (Arrive by 9:25 AM)   Return Botox with Rosina Quinones MD   [facility] ([facility] [place])    [address] [place] [address] [phone]              Who to contact     Please call your clinic at [phone] to:    Ask questions about your health    Make or cancel appointments    Discuss your medicines    Learn about your test results    Speak to your doctor            Additional Information About Your Visit        MyChart Information     [employer] is an electronic gateway that provides easy, online access to your medical records. With [employer], you can request a clinic appointment, read your test results, renew a prescription or communicate with your care team.     To sign up for Tonarat visit the website at [email or web address]   You will be asked to enter the access code listed below, as well as some personal information. Please follow the directions to create your username and password.     Your  access code is: [email or web address]  Expires: 2018  6:30 AM     Your access code will  in 90 days. If you need help or a new code, please contact your [facility] or call [phone] for assistance.        Care EveryWhere ID     This is your Care EveryWhere ID. This could be used by other organizations to access your [place] medical records  [id number]         Blood Pressure from Last 3 Encounters:   18 130/80   18 (!) 134/93   01/15/18 120/80    Weight from Last 3 Encounters:   18 70 kg (154 lb 6.4 oz)   18 71.7 kg (158 lb)   18 71.8 kg (158 lb 4.8 oz)              Today, you had the following     No orders found for display       Primary Care Provider Office Phone # Fax #    Mika Zamora [phone] [address] [facility] [address]        Equal Access to Services     [facility]: Hadii aad ku hadasho Soomaali, waaxda luqadaha, qaybta kaalmada adeegyada, waxay idiin hayaan uriel sosa . So [facility] [phone].    ATENCIÓN: Si habla español, tiene a holcomb disposición servicios gratuitos de asistencia lingüística. Llame al [phone].    We comply with applicable federal civil rights laws and [place] laws. We do not discriminate on the basis of race, color, national origin, age, disability, sex, sexual orientation, or gender identity.            Thank you!     Thank you for choosing [facility]  for your care. Our goal is always to provide you with excellent care. Hearing back from our patients is one way we can continue to improve our services. Please take a few minutes to complete the written survey that you may receive in the mail after your visit with us. Thank you!             Your Updated Medication List - Protect others around you: Learn how to safely use, store and throw away your medicines at www.disposemymeds.org.          This list is accurate as of 18 11:59 PM.  Always  use your most recent med list.                   Brand Name Dispense Instructions for use Diagnosis    * BOTOX IJ      Inject 170 Units as directed Lot # [id number] with Expiration Date:  10/2020        * Botulinum Toxin Type A 200 units injection    BOTOX    200 Units    Inject 200 Units as directed every 3 months    Intractable chronic migraine without aura and without status migrainosus       CELEBREX PO      Take 200 mg by mouth 3 times daily        [facility]S SENNA 8.6 MG tablet   Generic drug:  senna     120 tablet    TAKE 2 TABLETS BY MOUTH 2 TIMES DAILY    Rheumatoid arthritis with positive rheumatoid factor, involving unspecified site (H)       ENBREL SURECLICK 50 MG/ML autoinjector   Generic drug:  Etanercept           KLONOPIN PO      Take 0.5 mg by mouth 2 tablets per day        leflunomide 20 MG tablet    ARAVA     Take 20 mg by mouth every morning Reported on 3/28/2017        omeprazole 40 MG capsule    priLOSEC    90 capsule    TAKE 1 CAPSULE (40 MG) BY MOUTH DAILY TAKE 30-60 MINUTES BEFORE A MEAL.    PUD (peptic ulcer disease)       VITAMIN B-12 PO      Take 4 tablets by mouth daily        * Notice:  This list has 2 medication(s) that are the same as other medications prescribed for you. Read the directions carefully, and ask your doctor or other care provider to review them with you.

## 2018-04-13 ENCOUNTER — PRE VISIT (OUTPATIENT)
Dept: ORTHOPEDICS | Facility: CLINIC | Age: 48
End: 2018-04-13

## 2018-04-13 NOTE — TELEPHONE ENCOUNTER
FUTURE VISIT INFORMATION      FUTURE VISIT INFORMATION:    Date: 4/16    Time: 8:45    [place] [facility] [place] INFORMATION:    Referring provider:  Oleg Webber referral    Referring providers clinic:  [facility]    Reason for visit/diagnosis  Right arm    RECORDS REQUESTED FROM:       Clinic name Comments Records Status Imaging Status   [facility]  internal    [facility]  Care everywhere                              RECORDS STATUS

## 2018-04-16 ENCOUNTER — RADIANT APPOINTMENT (OUTPATIENT)
Dept: GENERAL RADIOLOGY | Facility: CLINIC | Age: 48
End: 2018-04-16
Attending: ORTHOPAEDIC SURGERY
Payer: COMMERCIAL

## 2018-04-16 ENCOUNTER — TEAM CONFERENCE (OUTPATIENT)
Dept: ORTHOPEDICS | Facility: CLINIC | Age: 48
End: 2018-04-16

## 2018-04-16 ENCOUNTER — OFFICE VISIT (OUTPATIENT)
Dept: ORTHOPEDICS | Facility: CLINIC | Age: 48
End: 2018-04-16
Payer: COMMERCIAL

## 2018-04-16 ENCOUNTER — HOSPITAL (OUTPATIENT)
Dept: ORTHOPEDICS | Facility: CLINIC | Age: 48
End: 2018-04-16

## 2018-04-16 VITALS — BODY MASS INDEX: 26.33 KG/M2 | WEIGHT: 158 LBS | HEIGHT: 65 IN

## 2018-04-16 VITALS — WEIGHT: 158.3 LBS | HEIGHT: 65 IN | BODY MASS INDEX: 26.37 KG/M2

## 2018-04-16 DIAGNOSIS — Z96.629 OTHER MECHANICAL COMPLICATION OF INTERNAL ELBOW JOINT PROSTHESIS (H): ICD-10-CM

## 2018-04-16 DIAGNOSIS — M05.9 RHEUMATOID ARTHRITIS WITH POSITIVE RHEUMATOID FACTOR, INVOLVING UNSPECIFIED SITE (H): Primary | ICD-10-CM

## 2018-04-16 DIAGNOSIS — M05.9 RHEUMATOID ARTHRITIS WITH POSITIVE RHEUMATOID FACTOR, INVOLVING UNSPECIFIED SITE (H): ICD-10-CM

## 2018-04-16 DIAGNOSIS — Z87.81 H/O FRACTURE OF HUMERUS: Primary | ICD-10-CM

## 2018-04-16 DIAGNOSIS — Z87.81 H/O FRACTURE OF HUMERUS: ICD-10-CM

## 2018-04-16 DIAGNOSIS — T84.098A OTHER MECHANICAL COMPLICATION OF INTERNAL ELBOW JOINT PROSTHESIS (H): ICD-10-CM

## 2018-04-16 ASSESSMENT — ENCOUNTER SYMPTOMS
POSTURAL DYSPNEA: 1
BACK PAIN: 1
SLEEP DISTURBANCES DUE TO BREATHING: 0
INSOMNIA: 1
MYALGIAS: 1
MEMORY LOSS: 1
NUMBNESS: 1
BOWEL INCONTINENCE: 0
ORTHOPNEA: 1
HYPOTENSION: 0
SMELL DISTURBANCE: 1
SINUS CONGESTION: 1
CONSTIPATION: 1
EYE WATERING: 1
HEMOPTYSIS: 0
POOR WOUND HEALING: 0
DECREASED CONCENTRATION: 1
LIGHT-HEADEDNESS: 1
TASTE DISTURBANCE: 1
SPEECH CHANGE: 1
VOMITING: 1
POLYDIPSIA: 1
DIZZINESS: 1
HALLUCINATIONS: 0
SEIZURES: 0
LEG PAIN: 1
DOUBLE VISION: 1
INCREASED ENERGY: 1
JOINT SWELLING: 1
MUSCLE CRAMPS: 1
NECK PAIN: 1
SORE THROAT: 1
ABDOMINAL PAIN: 1
EYE PAIN: 1
SYNCOPE: 1
PARALYSIS: 0
COUGH DISTURBING SLEEP: 1
EXERCISE INTOLERANCE: 0
HEADACHES: 1
WHEEZING: 1
SKIN CHANGES: 0
CHILLS: 1
LOSS OF CONSCIOUSNESS: 1
TINGLING: 1
SINUS PAIN: 1
NIGHT SWEATS: 0
WEAKNESS: 1
BLOOD IN STOOL: 0
HYPERTENSION: 0
HOARSE VOICE: 1
SPUTUM PRODUCTION: 1
ALTERED TEMPERATURE REGULATION: 1
DIARRHEA: 0
FEVER: 0
HOT FLASHES: 1
DYSPNEA ON EXERTION: 1
SNORES LOUDLY: 1
WEIGHT LOSS: 1
BRUISES/BLEEDS EASILY: 1
DEPRESSION: 0
SHORTNESS OF BREATH: 1
WEIGHT GAIN: 0
PANIC: 1
PALPITATIONS: 1
TROUBLE SWALLOWING: 1
NECK MASS: 1
ARTHRALGIAS: 1
STIFFNESS: 1
MUSCLE WEAKNESS: 1
FATIGUE: 1
NAUSEA: 1
COUGH: 1
DECREASED APPETITE: 1
RECTAL PAIN: 0
BLOATING: 1
JAUNDICE: 0
SWOLLEN GLANDS: 0
DISTURBANCES IN COORDINATION: 1
NAIL CHANGES: 1
NERVOUS/ANXIOUS: 1
POLYPHAGIA: 1
HEARTBURN: 1

## 2018-04-16 NOTE — PROGRESS NOTES
CHIEF COMPLAINT:  Right shoulder pain with associated periprosthetic fracture above an elbow replacement.      HISTORY OF PRESENT ILLNESS:  Ms. Rivero  returns today for followup.  She is being followed by Dr. Blaise De Luna at [facility].  She has had a revision total elbow and has gone on to have a periprosthetic fracture.      Since then, she has continued to struggle.  She is continuing to have shoulder pain which actually is worse than her elbow pain.      PHYSICAL EXAMINATION:  On exam today, her incision is clean, dry and intact.  She has pain at the elbow.  She articulates and communicates well with her usual normal affect.  She is seen today with her son who is also her PCA and also 2 other young men.      RADIOGRAPHS:  Radiographs obtained by Dr. Zimmer today demonstrate a fractured humerus.  She also has an implant in her elbow which is consistent with a loose alloprosthetic composite.      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      PLAN:  I had a nice talk with Ms. Rivero today about her rheumatoid arthritis.  I do think that she has a very complex problem.  I have discussed this with both Dr. Zimmer and with Dr. De Luna as well.  She understands that she does not have any good surgical reconstruction options, but I think that Dr. Zimmer's plan of removing the allograft and placing a compress would be a very reasonable thing to proceed with.  She will then see me down the road should she have any additional problems for consideration of reverse total shoulder arthroplasty.  She has also had nicotine cessation discussed with her.  I look forward to seeing her down the road should any additional problems arise.      cc: Aakash Zimmer MD

## 2018-04-16 NOTE — TELEPHONE ENCOUNTER
Pt is past due for fu pap smear  Reminder letter was sent 3/14/18  Pt answered and I assisted in scheduling her for 4/30/18  Yadi Hudson,   Pap Tracking     RAFAEL Talbot notified of Left great toe ingrown toenail & pt taking antibx's.  Also notified of scabs on arms and LLE.  Ok for surgery today per MD.

## 2018-04-16 NOTE — NURSING NOTE
"Chief Complaint   Patient presents with     Consult     Pt states that she is here today for  Referring:  SOFYA BRIDGES     1. ARTHROPLASTY,ELBOW,TOTAL PROSTH REPLACEMENT 2. REVISE WRIST JOINT,CARPAL INSTABILITY 3. TOTAL ELBOW ARTHROPLASTY  4. BONE GRAFT  DOS: 7/19/17 Misael De Luna MD         47 year old  1970    Ht 1.651 m (5' 5\")  Wt 71.8 kg (158 lb 4.8 oz)  BMI 26.34 kg/m2        Date/Surgery/Surgeon/Hospital:  1. 7/19/17, 1. Arthroplasty Right Elbow, Total Prosthesis Replacement. 2. Revise Wrist Joint, Carpal Instability. 3. Total Right Elbow Arthroplasty, Bone Graft., Dr. Misael De Luna, [facility]         [id number] [place] - [address] [facility]      Allergies   Allergen Reactions     Morphine Swelling     Codeine Nausea and Nausea and Vomiting     Swelling, itching. GI Intolerance.      Gabapentin Other (See Comments)     Pins,needles, stabbing aching at once  Numbness and Tingling     Ibuprofen      Sulfa Drugs      unknown     Tylenol [Acetaminophen]      Pt. States that she has Liver problems     Erythromycin Nausea     Vomiting      Penicillins Rash     Prednisone Palpitations     Heart racing     Current Outpatient Prescriptions   Medication     OnabotulinumtoxinA (BOTOX IJ)     omeprazole (PRILOSEC) 40 MG capsule     CVS SENNA 8.6 MG tablet     ClonazePAM (KLONOPIN PO)     Cyanocobalamin (VITAMIN B-12 PO)     leflunomide (ARAVA) 20 MG tablet     Botulinum Toxin Type A (BOTOX) 200 UNITS SOLR     Celecoxib (CELEBREX PO)     ENBREL SURECLICK 50 MG/ML autoinjector     Current Facility-Administered Medications   Medication     lidocaine 1 % injection 5 mL     Facility-Administered Medications Ordered in Other Visits   Medication     bupivacaine 0.5 % -  EPINEPHrine 1:200,000 injection     mepivacaine (PF) (CARBOCAINE) 2 % injection                         "

## 2018-04-16 NOTE — LETTER
4/16/2018       RE: Kalyn Rivero  [address] [facility] [address]     Dear Colleague,    Thank you for referring your patient, Kalyn Rivero, to the [facility] at [facility]. Please see a copy of my visit note below.    CHIEF COMPLAINT:  Right shoulder pain with associated periprosthetic fracture above an elbow replacement.      HISTORY OF PRESENT ILLNESS:  Ms. Rivero  returns today for followup.  She is being followed by Dr. Blaise De Luna at [facility].  She has had a revision total elbow and has gone on to have a periprosthetic fracture.      Since then, she has continued to struggle.  She is continuing to have shoulder pain which actually is worse than her elbow pain.      PHYSICAL EXAMINATION:  On exam today, her incision is clean, dry and intact.  She has pain at the elbow.  She articulates and communicates well with her usual normal affect.  She is seen today with her son who is also her PCA and also 2 other young men.      RADIOGRAPHS:  Radiographs obtained by Dr. Zimmer today demonstrate a fractured humerus.  She also has an implant in her elbow which is consistent with a loose alloprosthetic composite.      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      PLAN:  I had a nice talk with Ms. Rivero today about her rheumatoid arthritis.  I do think that she has a very complex problem.  I have discussed this with both Dr. Zimmer and with Dr. De Luna as well.  She understands that she does not have any good surgical reconstruction options, but I think that Dr. Zimmer's plan of removing the allograft and placing a compress would be a very reasonable thing to proceed with.  She will then see me down the road should she have any additional problems for consideration of reverse total shoulder arthroplasty.  She has also had nicotine cessation discussed with her.  I look forward to seeing her down the  road should any additional problems arise.     Again, thank you for allowing me to participate in the care of your patient.      Sincerely,    River Law MD      cc: Aakash Zimmer MD

## 2018-04-16 NOTE — NURSING NOTE
"Reason For Visit:   Chief Complaint   Patient presents with     Surgical Followup     Discuss surgical options. Right shoulder pain and fracture.      PCP: Mika Zamora  Ref: Dr. Hendricks          Occupation none.  Currently working? No.  Work status? On disability.  Date of injury: none      Date of surgery: none on the right      Smoker: Yes, rarely          ambidextrous hand dominant      SANE score  Affected shoulder: Right  Right shoulder SANE: 5  Left shoulder SANE: 90    Dynamometer  Not assessed due to fracture    Ht 1.651 m (5' 5\")  Wt 71.7 kg (158 lb)  BMI 26.29 kg/m2      Pain Assessment  Patient Currently in Pain: Yes  0-10 Pain Scale: 10  Primary Pain Location: Shoulder  Pain Orientation: Right  Pain Descriptors: Constant, Sharp, Shooting, Aching, Burning      Piedad Padilla LPN      "

## 2018-04-16 NOTE — TELEPHONE ENCOUNTER
[place] [address]    [facility]#: [id number]   PATIENT: SANDEE RIVERO   : 1970   DATE OF SERVICE: 2017     OPERATION REPORT    DATE OF SERVICE: 2017    STAFF SURGEON: Misael De Luna MD    PROCEDURES PERFORMED:   1. Right revision total elbow arthroplasty with distal humerus allograft.  2. Right elbow irrigation and debridement, with synovectomy. Synovial specimens X 3 sent for frozen section to rule severe inflammation which would be indicative of infection.)  3. Right distal humerus allograft bone graft.  4. Ulnar nerve neurolysis.    PREOPERATIVE DIAGNOSIS: Periprosthetic fracture, right total elbow arthroplasty.    POSTOPERATIVE DIAGNOSIS: Periprosthetic fracture, right total elbow arthroplasty.    STAFF SURGEON: Misael De Luna MD    ASSISTANT:   1. Salud Plummer MD.  2. Paz Anderson MD.    ANTIBIOTICS GIVEN: Clindamycin.    TOTAL TOURNIQUET TIME: 86 minutes.    IMPLANTS AND DRAINS:   1. Memo VariAx 2.7/3.5 posterolateral humerus plate, 12-hole.   2. [employer] Discovery portal elbow arthroplasty prosthesis.   3. Distal humerus cadaveric allograft (whole).  4. Sutures: 2-0 Vicryl, staples.    COMPLICATIONS: None.    FINDINGS:   1. Periprosthetic fracture right humeral shaft, loosening humeral component total elbow arthroplasty.   2. Ulnar nerve compression.   3. Extensive adhesions.  4. Frozen sections of synovial biopsies showed no severe inflammation    PROCEDURE INDICATIONS: Ms Rivero is a 46-year-old right-hand-dominant female with history of rheumatoid arthroplasty and multiple joint replacements, both upper and lower extremity. She had a prior total elbow arthroplasty performed at [facility]. This arthroplasty also underwent an I and D for possible infection and cement loosening. Subsequent to that, she sustained a periprosthetic fracture. She transferred her care to [facility] and agreed to undergo revision total elbow  arthroplasty with a distal humerus allograft replacement. Risks and benefits were discussed with the patient in the clinical setting. All questions were elicited and answered at that time.    DESCRIPTION OF PROCEDURE: The patient was identified in the preoperative holding area. Her identity, the site of surgery, and the surgery to be performed were verified with the patient. The operative extremity was marked with a surgical marking pen by the attending and witnessed by the patient. Again, all questions were elicited and answered. Risks and benefits were discussed as appropriate. An upper extremity regional block was then performed by the anesthesia team for pain control. She was then transported to the operating room and placed supine on the operating table. A brief time-out was performed. Anesthesia then performed their portion of the procedure by intubating and sedating the patient. She was placed in the lateral position. All bony prominences were well padded, including an axillary roll beneath the axilla, and she was secured in the lateral position. A foam wedge was placed under her operative extremity.     She was then prepped and draped in standard surgical fashion. A formal time-out was performed with all staff present. Once she was draped, a sterile tourniquet was placed on the upper extremity. The prior surgical incisions on her posteromedial and lateral elbow were drawn and identified. A 15 blade scalpel was used to reincise the posteromedial incision through the skin only. The subcutaneous tissues were then dissected with a combination of blunt digital dissection and tenotomy scissors. The subcutaneous tissues overlying the FCU and insertion of the previously transposed ulnar nerve were identified and mobilized. Once the ulnar nerve was visualized, the dissection proceeded proximally to mobilize the ulnar nerve. Extensive adhesions were observed around the ulnar nerve proximal to the cubital tunnel and  near the arcade of [place]. Prior Ethibond suture was noted and removed around what appeared to be a subfascial sling, as expected, with anterior transposition of the ulnar nerve. Once the nerve was released, a vessel loop was placed around the nerve and the vessel loop was secured and used to gently retract the nerve away from the field. We then proceeded to the lateral aspect of the elbow. Metzenbaum scissors were used to elevate the subcutaneous tissues over the triceps fascia. The branching of the radial nerve on the lateral aspect of the triceps was identified, mobilized, and secured with a vessel loop. Both nerves were secured and protected as the Leiva elevator was placed beneath the triceps along the posterior cortex of the humerus to elevate the triceps muscle en bloc. Once the triceps was elevated, dissection proceeded distally over the medial and lateral condyles of the distal humerus. Bovie cautery was used to perform a medial and lateral release along the elbow joint to further elevate the triceps. A retractor was then placed beneath the triceps muscle wad and retracted medially and laterally as necessary to visualize the posterior cortex of the humerus and the implant. The fracture site was readily seen. The radial nerve was observed intact and crossing from medial lateral edge near the fracture site. The radial nerve was mobilized, secured with a vessel loop, and gently retracted away from the fracture site. The distal end of the humeral stem was seen protruding from the fracture interval. The screwdriver from the Discovery total elbow set was used to release the pins in the yoke at the hinge of the prosthesis. These 2 securing pin screws and the ball bearing hinge were removed and set aside. The humeral stem was easily removed along with the distal humerus fractured fragment once the soft tissues were cleared. There was clear loosening of the implant within the humerus. Irrigation was used to clean  the polyethylene. The polyethylene liner was inspected. Although there was light wear noted, there was no significant wear or defect seen on the polyethylene hinge liner. Multiple cultures were then taken from the surgical field and sent for analysis, along with tissue for frozen section.     Frozen section was confirmed as being absent of gross signs of bacterial infection. The decision was then made to proceed with the revision total elbow replacement with distal humerus cadaveric allograft bone graft. The proximal end of the humerus was then reamed and broached to accept the appropriate sized implant. A broach up to tip size 5 was confirmed to be a secure fit after sequential reaming. The end of the proximal humerus at the fracture site was then revised using an oscillating saw to obtain fresh edges of the bone. The distance between the proximal humeral shaft and ulna was measured and noted. A [employer] Discovery humeral implant size 150 was tentatively selected to bridge the 120 mm gap of resected humerus.     The cadaveric distal allograft bone graft was then prepared on the back table. This was sized to approximately 120 mm and trimmed as necessary with the oscillating saw. The transcondylar groove was then prepared, first by placing the templating guide over the posterior humerus between the trochlea and capitellar ridge. A  drill hole was created to guide the posterior olecranon reamer. Once the posterior olecranon was reamed, a narrow oscillating saw was used to remove the capitellum and trochlea parallel and tangential to the edge of the reaming [place] diameter. The capitellum and trochlea were set aside. A  was used to contour the edges of the remaining olecranon fossa. A handheld bur shaver was used to make fine adjustments on the lateral wall of the distal humerus groove in order to accommodate a size 5 implant trial. A narrow drill was used to create the starting hole in the  distal humerus canal. This was used to guide a series of sequential broach/rasps through the canal until a size 5 humeral stem trial was fitted.     The surgical wound was then irrigated with 3 L of sterile saline. A deep debridement was performed to remove excess cement and calcified deep soft tissue. There was a significant amount of discolored soft tissue from the prior cement noted within the field. The humeral stem size 5 trial was placed within the distal humerus bone graft. This was placed flush against the proximal humerus segment and a 12-hole Memo VariAx 2.7/3.5 posterolateral humerus plate was fitted along the shaft of the humerus. Two 0.062 K-wires were placed within the plate and then C-arm fluoroscopic images were obtained to confirm plate position and length.     Two distal cortical 2.7 screws were drilled and secured into the distal [place] posterolateral humerus plate along with a proximal 3.5 hole with a screw placed in compression position in the elliptical hole. The screw was tightened until appropriate compression was obtained at the osteotomy site. Two additional unicortical screw holes were then drilled and secured with 3.5 nonlocking unicortical [place] screws just proximal and distal to the union osteotomy site.     The humeral implant trial was then removed. The surgical wound was irrigated. The implant cement was mixed and injected into the humeral canal. The final humeral shaft implant for the [email or web address] Discovery total elbow set was inserted into the humeral canal until the anterior flange was flush with the cortex. The cobalt chrome total elbow hinge was secured around the polyethylene and slid onto the yoke of the humeral implant. This was secured with 2 threaded pins on the medial and lateral side to secure the rotating hinge. X-rays were obtained to confirm placement of the arthroplasty on AP and lateral views.     The posterolateral distal humerus Memo plate was then  additionally secured with multiple bicortical and unicortical screws proximal to the osteotomy site, and cortical and locking screws in the distal humerus and metadiaphyseal region and lateral condyle. Fluoroscopic images were repeated. Range of motion was confirmed at the elbow from approximately 10 degrees of extension to 150 degrees of flexion passively. Full supination and pronation was noted. An additional 3 L of sterile saline was used to irrigate the surgical wound. The triceps was reapproximated to the deep tissues using 2-0 Vicryl suture. All vessel loops were removed. Both the radial nerve, lateral cutaneous nerve, and ulnar nerve were confirmed mobilized and intact. The ulnar nerve did not appear to subluxate with motion and a revision anterior transposition was not performed. The subdermal tissues were reapproximated using 2-0 Vicryl, and the skin was closed using staples.    INTRAOPERATIVE FINDINGS: Right periprosthetic fracture of total elbow arthroplasty with aseptic loosening.    POSTOPERATIVE PLAN: The patient will be placed in a long-arm, post-mold splint and sling for comfort. She will be encouraged to move all of her fingers and her wrist after the surgery. She is ambulatory and her diet will be advanced. Both IV and oral pain medications will be given for pain control. She may likely be discharged tomorrow after a period of observation. Her stay may be extended if intraoperative cultures show positive findings.    Salud Plummer MD  Fellow   Orthopedic Service

## 2018-04-16 NOTE — PROGRESS NOTES
[facility]  Orthopaedic Surgery, Joint Replacement Consultation  by Aakash Zimmer M.D.    Kalyn Rivero MRN# [id number]   Age: 47 year old YOB: 1970     Requesting physician: Carrol Mendoza, Mika Law            Assessment and Plan:   Assessment:  Failed distal humeral reconstruction with APC and TEA.  Rotator cuff dysfunction R shoulder with RA    Plan:  We discussed 3 options:  1. Consider either distal humeral replacement with Compress osseointegration either mated to existing ulnar implant or revision of ulnar component PRN.  Altnerative would be to perform same procedure combined with R total humeral replacement and R reverse TSA.  I will discuss with Dr. Law.  2. Above elbow amputation  3. Leave as it without any further intervention and wear brace on permanent basis.    I reiterated the importance of nicotine cessation.  Pt noted she can stop and was willing to do so and willing to submit to confirmation with serum cotinine testing in 1 months time.    I did confer with Dr. Law today about this patient's situation and he felt that a reverse total shoulder replacement was not imminent and that we should proceed with reconstruction of the distal humerus only.  We will try to use the Compress device and positioned it such that a reverse TSA will be possible above the Compress device or, alternatively as a backup, future conversion of the Compress to a total humeral replacement device in combination with reverse total shoulder arthroplasty would be possible.    Pt will return in 1 mo with cotinine testing.          History of Present Illness:   47 year old female  chief complaint    Pt seen at request of Rod Gaspar and Thanh.  She has had mutliple prior operations on her RUE.  She is ambidextrous. She has marked R rotator cuff pathology in addition to failed distal humeral allograft prosthetic elbow construct arthroplasty.  Per pt's  report, Dr. De Luna has advised above elbow amputation.    [facility]. De Luna [place] [facility]  47 y.o. female with PMH of RA, osteoarthritis, fibromyalgia who is now 9 months s/p right revision total elbow arthroplasty with a distal femoral allograft and ulnar nerve neurolysis with some addition of plate fixation of a periprosthetic humerus fracture. Today she reports she is doing terribly. Says 3-4 weeks ago she was bending over to put her shoes on when her dog ran into her causing her arm to bend backwards. Says she had immediate pain. Wrapped her arm and kept it immobilized. Says she spent 2 weeks in bed and could not move her arm or shower. Endorses decreased appetite, insomnia, and says she felt that she might have a heart attack due to pain. Endorses constant numbness and tingling sensation in her fingers 3-5 and new numbness and tingling in her thumb. Prior to this injury, she denies having any pain, numbness, tingling, or decreased elbow motion. Was evaluated in the ED on 03/27/2018 and was given a Gandhi brace and a sling. Denies having a brace prior to coming to the ED. Comes in today wearing the brace and sling. Has tenderness throughout her upper arm and elbow. She is on a pain contract and declined additional pain medications while in the ED. Would like to increase her dose of pain medications at this time. Is planning on being evaluated by a new pain clinic provider, but in the same Pain Clinic. Also states that she would like to be admitted after surgery for post-op pain control. Is planning on undergoing a total shoulder replacement.    Background history:  DX:  1. RA  2. Chronic pain    TREATMENTS:  1. 07/19/2017 right revision total elbow arthroplasty with a distal femoral allograft and ulnar nerve neurolysis with some addition of plate fixation of a periprosthetic humerus fracture ([place]) [facility].[place] VariAx 2.7/3.5 posterolateral humerus plate, 12-hole.  [employer] Discovery portal  "elbow arthroplasty prosthesis.   2. Multiple other orthopaedic procedures, bilateral TKA, L shoulder hemiarthroplasty, L wrist replacement, C1-2 arthrodesis  3. Prior R ulnar n transposition by Dr. Gaspar with resolution of dysesthesias.  Sx's returned after repositioning by Dr. De Luna            Physical Exam:     EXAMINATION pertinent findings:   VITAL SIGNS: Height 1.651 m (5' 5\"), weight 71.8 kg (158 lb 4.8 oz), not currently breastfeeding.  Body mass index is 26.34 kg/(m^2).  RESP: non labored breathing   ABD: benign   SKIN: grossly normal   LYMPHATIC: grossly normal   NEURO: grossly normal, median, ulnar, radial n 5/5 motor function.    VASCULAR: satisfactory perfusion of all extremities   MUSCULOSKELETAL:   Shoulder FF 80, ABD90, IR L spine, ER 60 limited by pain.  Elbow  deg. Supination 80, pronation 90.  Gross laxity               Data:   All laboratory data reviewed  All imaging studies reviewed by me         Aakash Zimmer MD  [facility] Family Professor  Oncology and Adult Reconstructive Surgery  Dept Orthopaedic Surgery, [facility]  [phone] office, [phone] pager  [email or web address]    OR Surgeon - Misael De Luna MD - 2017 12:00 AM CDT  Formatting of this note may be different [place] [address]    [facility]#: [id number]   PATIENT: SANDEE ALLEN   : 1970   DATE OF SERVICE: 2017     OPERATION REPORT    DATE OF SERVICE: 2017    STAFF SURGEON: Misael De Luna MD    PROCEDURES PERFORMED:   1. Right revision total elbow arthroplasty with distal humerus allograft.  2. Right elbow irrigation and debridement, with synovectomy. Synovial specimens X 3 sent for frozen section to rule severe inflammation which would be indicative of infection.)  3. Right distal humerus allograft bone graft.  4. Ulnar nerve neurolysis.    PREOPERATIVE DIAGNOSIS: Periprosthetic fracture, right total elbow " arthroplasty.    POSTOPERATIVE DIAGNOSIS: Periprosthetic fracture, right total elbow arthroplasty.    STAFF SURGEON: Misael De Luna MD    ASSISTANT:   1. Salud Plummer MD.  2. Paz Anderson MD.    ANTIBIOTICS GIVEN: Clindamycin.    TOTAL TOURNIQUET TIME: 86 minutes.    IMPLANTS AND DRAINS:   1. [place] VariAx 2.7/3.5 posterolateral humerus plate, 12-hole.   2. [employer] Discovery portal elbow arthroplasty prosthesis.   3. Distal humerus cadaveric allograft (whole).  4. Sutures: 2-0 Vicryl, staples.    COMPLICATIONS: None.    FINDINGS:   1. Periprosthetic fracture right humeral shaft, loosening humeral component total elbow arthroplasty.   2. Ulnar nerve compression.   3. Extensive adhesions.  4. Frozen sections of synovial biopsies showed no severe inflammation    PROCEDURE INDICATIONS: Ms Rivero is a 46-year-old right-hand-dominant female with history of rheumatoid arthroplasty and multiple joint replacements, both upper and lower extremity. She had a prior total elbow arthroplasty performed at [facility]. This arthroplasty also underwent an I and D for possible infection and cement loosening. Subsequent to that, she sustained a periprosthetic fracture. She transferred her [place] [facility] and agreed to undergo revision total elbow arthroplasty with a distal humerus allograft replacement. Risks and benefits were discussed with the patient in the clinical setting. All questions were elicited and answered at that time.    DESCRIPTION OF PROCEDURE: The patient was identified in the preoperative holding area. Her identity, the site of surgery, and the surgery to be performed were verified with the patient. The operative extremity was marked with a surgical marking pen by the attending and witnessed by the patient. Again, all questions were elicited and answered. Risks and benefits were discussed as appropriate. An upper extremity regional block was then performed by the anesthesia team  for pain control. She was then transported to the operating room and placed supine on the operating table. A brief time-out was performed. Anesthesia then performed their portion of the procedure by intubating and sedating the patient. She was placed in the lateral position. All bony prominences were well padded, including an axillary roll beneath the axilla, and she was secured in the lateral position. A foam wedge was placed under her operative extremity.     She was then prepped and draped in standard surgical fashion. A formal time-out was performed with all staff present. Once she was draped, a sterile tourniquet was placed on the upper extremity. The prior surgical incisions on her posteromedial and lateral elbow were drawn and identified. A 15 blade scalpel was used to reincise the posteromedial incision through the skin only. The subcutaneous tissues were then dissected with a combination of blunt digital dissection and tenotomy scissors. The subcutaneous tissues overlying the FCU and insertion of the previously transposed ulnar nerve were identified and mobilized. Once the ulnar nerve was visualized, the dissection proceeded proximally to mobilize the ulnar nerve. Extensive adhesions were observed around the ulnar nerve proximal to the cubital tunnel and near the arcade of [place]. Prior Ethibond suture was noted and removed around what appeared to be a subfascial sling, as expected, with anterior transposition of the ulnar nerve. Once the nerve was released, a vessel loop was placed around the nerve and the vessel loop was secured and used to gently retract the nerve away from the field. We then proceeded to the lateral aspect of the elbow. Metzenbaum scissors were used to elevate the subcutaneous tissues over the triceps fascia. The branching of the radial nerve on the lateral aspect of the triceps was identified, mobilized, and secured with a vessel loop. Both nerves were secured and protected as the  Leiva elevator was placed beneath the triceps along the posterior cortex of the humerus to elevate the triceps muscle en bloc. Once the triceps was elevated, dissection proceeded distally over the medial and lateral condyles of the distal humerus. Bovie cautery was used to perform a medial and lateral release along the elbow joint to further elevate the triceps. A retractor was then placed beneath the triceps muscle wad and retracted medially and laterally as necessary to visualize the posterior cortex of the humerus and the implant. The fracture site was readily seen. The radial nerve was observed intact and crossing from medial lateral edge near the fracture site. The radial nerve was mobilized, secured with a vessel loop, and gently retracted away from the fracture site. The distal end of the humeral stem was seen protruding from the fracture interval. The screwdriver from the Discovery total elbow set was used to release the pins in the yoke at the hinge of the prosthesis. These 2 securing pin screws and the ball bearing hinge were removed and set aside. The humeral stem was easily removed along with the distal humerus fractured fragment once the soft tissues were cleared. There was clear loosening of the implant within the humerus. Irrigation was used to clean the polyethylene. The polyethylene liner was inspected. Although there was light wear noted, there was no significant wear or defect seen on the polyethylene hinge liner. Multiple cultures were then taken from the surgical field and sent for analysis, along with tissue for frozen section.     Frozen section was confirmed as being absent of gross signs of bacterial infection. The decision was then made to proceed with the revision total elbow replacement with distal humerus cadaveric allograft bone graft. The proximal end of the humerus was then reamed and broached to accept the appropriate sized implant. A broach up to tip size 5 was confirmed to be a  secure fit after sequential reaming. The end of the proximal humerus at the fracture site was then revised using an oscillating saw to obtain fresh edges of the bone. The distance between the proximal humeral shaft and ulna was measured and noted. A [employer] Discovery humeral implant size 150 was tentatively selected to bridge the 120 mm gap of resected humerus.     The cadaveric distal allograft bone graft was then prepared on the back table. This was sized to approximately 120 mm and trimmed as necessary with the oscillating saw. The transcondylar groove was then prepared, first by placing the templating guide over the posterior humerus between the trochlea and capitellar ridge. A  drill hole was created to guide the posterior olecranon reamer. Once the posterior olecranon was reamed, a narrow oscillating saw was used to remove the capitellum and trochlea parallel and tangential to the edge of the reaming [place] diameter. The capitellum and trochlea were set aside. A  was used to contour the edges of the remaining olecranon fossa. A handheld bur shaver was used to make fine adjustments on the lateral wall of the distal humerus groove in order to accommodate a size 5 implant trial. A narrow drill was used to create the starting hole in the distal humerus canal. This was used to guide a series of sequential broach/rasps through the canal until a size 5 humeral stem trial was fitted.     The surgical wound was then irrigated with 3 L of sterile saline. A deep debridement was performed to remove excess cement and calcified deep soft tissue. There was a significant amount of discolored soft tissue from the prior cement noted within the field. The humeral stem size 5 trial was placed within the distal humerus bone graft. This was placed flush against the proximal humerus segment and a 12-hole [place] VariAx 2.7/3.5 posterolateral humerus plate was fitted along the shaft of the humerus. Two  0.062 K-wires were placed within the plate and then C-arm fluoroscopic images were obtained to confirm plate position and length.     Two distal cortical 2.7 screws were drilled and secured into the distal [place] posterolateral humerus plate along with a proximal 3.5 hole with a screw placed in compression position in the elliptical hole. The screw was tightened until appropriate compression was obtained at the osteotomy site. Two additional unicortical screw holes were then drilled and secured with 3.5 nonlocking unicortical Memo screws just proximal and distal to the union osteotomy site.     The humeral implant trial was then removed. The surgical wound was irrigated. The implant cement was mixed and injected into the humeral canal. The final humeral shaft implant for the [employer] total elbow set was inserted into the humeral canal until the anterior flange was flush with the cortex. The cobalt chrome total elbow hinge was secured around the polyethylene and slid onto the yoke of the humeral implant. This was secured with 2 threaded pins on the medial and lateral side to secure the rotating hinge. X-rays were obtained to confirm placement of the arthroplasty on AP and lateral views.     The posterolateral distal humerus [place] plate was then additionally secured with multiple bicortical and unicortical screws proximal to the osteotomy site, and cortical and locking screws in the distal humerus and metadiaphyseal region and lateral condyle. Fluoroscopic images were repeated. Range of motion was confirmed at the elbow from approximately 10 degrees of extension to 150 degrees of flexion passively. Full supination and pronation was noted. An additional 3 L of sterile saline was used to irrigate the surgical wound. The triceps was reapproximated to the deep tissues using 2-0 Vicryl suture. All vessel loops were removed. Both the radial nerve, lateral cutaneous nerve, and ulnar nerve were confirmed  mobilized and intact. The ulnar nerve did not appear to subluxate with motion and a revision anterior transposition was not performed. The subdermal tissues were reapproximated using 2-0 Vicryl, and the skin was closed using staples.    INTRAOPERATIVE FINDINGS: Right periprosthetic fracture of total elbow arthroplasty with aseptic loosening.    POSTOPERATIVE PLAN: The patient will be placed in a long-arm, post-mold splint and sling for comfort. She will be encouraged to move all of her fingers and her wrist after the surgery. She is ambulatory and her diet will be advanced. Both IV and oral pain medications will be given for pain control. She may likely be discharged tomorrow after a period of observation. Her stay may be extended if intraoperative cultures show positive findings.    Salud Plummer MD  Fellow   Orthopedic Service    COSIGNER:  Misael De Luna MD  Staff Surgeon  Orthopaedic Service    My signature attests that I was present for the key or critical portion of this procedure. I was immediately available or had arranged immediate staff availability for all the non-critical or non-key portions of the entire procedure.  Misael De Luna MD, 7/28/2017 9:47 AM      Received in Transcription: 07/19/2017 16:30:03  T: 07/20/2017 00:06:04  M:   Job #: [id number]  OJW/MODL          DATA for DOCUMENTATION:         Past Medical History:     Patient Active Problem List   Diagnosis     Hypothyroidism     Advanced directives, counseling/discussion     Acetaminophen overdose     Hepatic failure (H)     Pancreatitis     Anemia     Chronic pain     Pneumonia     Suicide risk     Grief reaction     Liver failure, acute     CARDIOVASCULAR SCREENING; LDL GOAL LESS THAN 160     Renal stone     Health Care Home     TOTAL KNEE ARTHROPLASTY - bilateral     Knee joint replacement - left     Trochanteric bursitis - left     Atlantoaxial instability     Postoperative pain     S/P cervical spinal fusion     Drug-seeking  "behavior     Opioid type dependence (H)     Severe frontal headaches     Abdominal pain, unspecified abdominal location     Esophageal reflux     Right upper extremity numbness     Right arm numbness     Lesion of ulnar nerve     TMJ (temporomandibular joint syndrome)     Intractable chronic migraine without aura     Encounter for long-term opiate analgesic use     Rheumatoid arthritis of foot (H)     Other drug-induced neutropenia (H)     Rheumatoid arthritis with positive rheumatoid factor, involving unspecified site (H)     Cervical high risk HPV (human papillomavirus) test positive     Past Medical History:   Diagnosis Date     Acetaminophen overdose 3/24/2011     Anemia      Anesthesia complication     pt states has a histor of heart stopping during anesthesia,     Arrhythmia      Cerebral infarction (H)      Cervical high risk HPV (human papillomavirus) test positive 02/22/2017    type 18 & other HR HPV     Chronic infection     MRSA     Chronic pain 3/24/2011     Degenerative joint disease      Endometriosis      Fibromyalgia      Gastro-oesophageal reflux disease      Heart murmur      History of blood transfusion      Hypothyroidism      Immune disorder (H)      Learning disability      Malignant neoplasm (H)      Migraine      MRSA (methicillin resistant staph aureus) culture positive      Neck injuries      Opioid type dependence (H)      Other chronic pain      PONV (postoperative nausea and vomiting)     states \"flatlines\"during anesthesia     RA (rheumatoid arthritis) (H)      Renal stone      Scoliosis      Stomach ulcer     history       Also see scanned health assessment forms.       Past Surgical History:     Past Surgical History:   Procedure Laterality Date     ARTHRODESIS FOOT  5/23/2012    Procedure:ARTHRODESIS FOOT; Right Subtalar andTaloNavicular  Fusion  ; Surgeon:BRIGHT ZHOU; [facility]     ARTHRODESIS FOOT Left 4/22/2015    Procedure: ARTHRODESIS FOOT;  Surgeon: Eladio, " Chris Malik MD;  [facility]     ARTHROPLASTY ELBOW Right 9/30/2015    Procedure: ARTHROPLASTY ELBOW;  Surgeon: Carrol Gaspar MD;  [facility]     ARTHROPLASTY KNEE Right      ARTHROPLASTY WRIST      x2 Lt wrist replacement.     ARTHROTOMY ELBOW Right 6/18/2015    Procedure: ARTHROTOMY ELBOW;  Surgeon: Carrol Gaspar MD;  Location: [facility] OR     C ANESTH,DX ARTHROSCOPIC PROC KNEE JOINT  10/24/2007    right total knee arthroplasty     C SHLDR ARTHROSCOP,DIAGNOSTIC  12/17/2008    left total shoulder arthroplasty by Dr. Law     C SHOULDER SURG PROC UNLISTED       C TOTAL KNEE ARTHROPLASTY  1/18/12    Left     CYSTOSCOPY  02/06/2009    1.cystoscopy.2.bilateral ureteroscopy.3.basketing of stone fragments from the right kidney.4.bilateral double-J ureteral stent placement.5.ann catheter placement.6.fluoroscopy and intraoperative interpretation of images.     CYSTOSCOPY  01/08/2007    1. cystoscopy and left stent removal.2.left ureteroscopy     DECOMPRESSION CUBITAL TUNNEL  6/6/2014    Procedure: DECOMPRESSION CUBITAL TUNNEL;  Surgeon: Janelle Coates MD;  [facility]     ENDOSCOPY  03/31/2005    upper GI endoscopy-[facility]     ESOPHAGOSCOPY, GASTROSCOPY, DUODENOSCOPY (EGD), COMBINED  3/17/2014    Procedure: COMBINED ESOPHAGOSCOPY, GASTROSCOPY, DUODENOSCOPY (EGD), BIOPSY SINGLE OR MULTIPLE;;  Surgeon: Duane, William Charles, MD;  Location: [facility] OR     FUSION CERVICAL POSTERIOR ONE LEVEL  11/18/2013    Procedure: FUSION CERVICAL POSTERIOR ONE LEVEL;  Cervical 1-2 Posterior Cervical Fusion (Harms Procedure);  Surgeon: Carrol Gunn MD;  Location: [facility] OR     GYN SURGERY       INJECT MAJOR JOINT / BURSA Left 1/5/2017    Procedure: INJECT MAJOR JOINT / BURSA;  Surgeon: Fredy Patel DO;  [facility] OR     INJECT STEROID (LOCATION) Left 6/18/2015    Procedure: INJECT STEROID (LOCATION);  Surgeon: Carrol Gaspar MD;  [facility] OR     NECK  SURGERY       REMOVE FOREIGN BODY UPPER EXTREMITY Right 3/2/2017    Procedure: REMOVE FOREIGN BODY UPPER EXTREMITY;  Surgeon: Carrol Gaspar MD;  Location:  OR     RESECT BONE UPPER EXTREMITY Left 4/27/2017    Procedure: RESECT BONE UPPER EXTREMITY;  Left Radial Head Resection;  Surgeon: Carrol Gaspar MD;  Location:  OR     ROTATOR CUFF REPAIR RT/LT  10/19/2005    1.left distal clavicle excision.2.acromioplasty.3.coracoacromial ligament resection.4.rotator cuff exploration            Social History:     Social History     Social History     Marital status: Single     Spouse name: N/A     Number of children: N/A     Years of education: N/A     Occupational History     Not on file.     Social History Main Topics     Smoking status: Light Tobacco Smoker     Packs/day: 0.10     Years: 10.00     Types: Cigarettes     Start date: 8/6/1983     Last attempt to quit: 7/31/2013     Smokeless tobacco: Never Used      Comment: Quit 9/1/14     Alcohol use No     Drug use: No     Sexual activity: No     Other Topics Concern     Not on file     Social History Narrative            Family History:       Family History   Problem Relation Age of Onset     DIABETES Mother      Hypertension Mother      Allergies Mother      Alcohol/Drug Mother      LIVER CIRROSIS     Blood Disease Mother      B12 DEF     Neurologic Disorder Mother      Epilepsy     OSTEOPOROSIS Mother      CEREBROVASCULAR DISEASE Maternal Grandmother      Arthritis Maternal Grandmother      RHEUMATOID     CANCER Maternal Grandmother      Thyroid Disease Maternal Grandmother      OSTEOPOROSIS Maternal Grandmother      HEART DISEASE Maternal Grandmother      Depression Maternal Grandmother      Neurologic Disorder Maternal Grandmother      MIGRAINES     Anxiety Disorder Maternal Grandmother      DIABETES Maternal Grandmother      Migraines Maternal Grandmother      Deep Vein Thrombosis Maternal Grandmother      CEREBROVASCULAR DISEASE Maternal  Grandfather      CANCER Maternal Grandfather      MENTAL ILLNESS Maternal Grandfather      CEREBROVASCULAR DISEASE Paternal Grandmother      Arthritis Paternal Grandmother      RHEUMATOID     CANCER Paternal Grandmother      OSTEOPOROSIS Paternal Grandmother      HEART DISEASE Paternal Grandmother      Depression Paternal Grandmother      Neurologic Disorder Paternal Grandmother      MIGRAINES     Thyroid Disease Paternal Grandmother      CEREBROVASCULAR DISEASE Paternal Grandfather      CANCER Paternal Grandfather      HEART DISEASE Brother      MURMUR     Asthma Son      Endocrine Disease Son      Neurologic Disorder Father      epilepsy     Seizure Disorder Father      Hypertension Father      Skin Cancer Father      Anxiety Disorder Father      MENTAL ILLNESS Father      Hyperlipidemia Father      Neurologic Disorder Daughter      MIGRAINES     Arthritis Daughter      JR     Hypertension Son      LUNG DISEASE Brother      Anxiety Disorder Son      Asthma Son      Hypertension Son      Migraines Son      Spine Problems Son      MENTAL ILLNESS Brother      Migraines Brother      Cardiac Sudden Death Brother      Spine Problems Brother      Glaucoma No family hx of      Macular Degeneration No family hx of      Unknown/Adopted No family hx of      Anesthesia Reaction No family hx of      Other Cancer No family hx of      Rheumatoid Arthritis No family hx of      Bone Cancer No family hx of      Low Back Problems No family hx of             Medications:     Current Outpatient Prescriptions   Medication Sig     OnabotulinumtoxinA (BOTOX IJ) Inject 170 Units as directed Lot # /[id number] with Expiration Date:  10/2020     omeprazole (PRILOSEC) 40 MG capsule TAKE 1 CAPSULE (40 MG) BY MOUTH DAILY TAKE 30-60 MINUTES BEFORE A MEAL.     [facility] SENNA 8.6 MG tablet TAKE 2 TABLETS BY MOUTH 2 TIMES DAILY     ClonazePAM (KLONOPIN PO) Take 0.5 mg by mouth 2 tablets per day     Cyanocobalamin (VITAMIN B-12 PO) Take 4 tablets by mouth  daily      leflunomide (ARAVA) 20 MG tablet Take 20 mg by mouth every morning Reported on 3/28/2017     Botulinum Toxin Type A (BOTOX) 200 UNITS SOLR Inject 200 Units as directed every 3 months     Celecoxib (CELEBREX PO) Take 200 mg by mouth 3 times daily     ENBREL SURECLICK 50 MG/ML autoinjector      Current Facility-Administered Medications   Medication     lidocaine 1 % injection 5 mL     Facility-Administered Medications Ordered in Other Visits   Medication     bupivacaine 0.5 % -  EPINEPHrine 1:200,000 injection     mepivacaine (PF) (CARBOCAINE) 2 % injection              Review of Systems:   A comprehensive 10 point review of systems (constitutional, ENT, cardiac, peripheral vascular, lymphatic, respiratory, GI, , Musculoskeletal, skin, Neurological) was performed and found to be negative except as described in this note.     See intake form completed by patient    Answers for HPI/ROS submitted by the patient on 4/16/2018   General Symptoms: Yes  Skin Symptoms: Yes  HENT Symptoms: Yes  EYE SYMPTOMS: Yes  HEART SYMPTOMS: Yes  LUNG SYMPTOMS: Yes  INTESTINAL SYMPTOMS: Yes  URINARY SYMPTOMS: No  GYNECOLOGIC SYMPTOMS: Yes  BREAST SYMPTOMS: No  SKELETAL SYMPTOMS: Yes  BLOOD SYMPTOMS: Yes  NERVOUS SYSTEM SYMPTOMS: Yes  MENTAL HEALTH SYMPTOMS: Yes  Fever: No  Loss of appetite: Yes  Weight loss: Yes  Weight gain: No  Fatigue: Yes  Night sweats: No  Chills: Yes  Increased stress: Yes  Excessive hunger: Yes  Excessive thirst: Yes  Feeling hot or cold when others believe the temperature is normal: Yes  Loss of height: Yes  Surgical site pain: Yes  Hallucinations: No  Change in or Loss of Energy: Yes  Hyperactivity: Yes  Confusion: Yes  Changes in hair: No  Changes in moles/birth marks: No  Itching: Yes  Rashes: No  Changes in nails: Yes  Acne: No  Hair in places you don't want it: No  Change in facial hair: No  Warts: No  Non-healing sores: No  Scarring: Yes  Flaking of skin: No  Color changes of hands/feet in cold :  Yes  Sun sensitivity: Yes  Skin thickening: No  Ear pain: Yes  Ear discharge: No  Hearing loss: Yes  Tinnitus: Yes  Nosebleeds: No  Congestion: Yes  Sinus pain: Yes  Trouble swallowing: Yes   Voice hoarseness: Yes  Mouth sores: Yes  Sore throat: Yes  Tooth pain: Yes  Gum tenderness: Yes  Bleeding gums: No  Change in taste: Yes  Change in sense of smell: Yes  Dry mouth: Yes  Hearing aid used: No  Neck lump: Yes  Eye pain: Yes  Vision loss: Yes  Dry eyes: Yes  Watery eyes: Yes  Eye bulging: No  Double vision: Yes  Flashing of lights: Yes  Spots: Yes  Cough: Yes  Sputum or phlegm: Yes  Coughing up blood: No  Difficulty breating or shortness of breath: Yes  Snoring: Yes  Wheezing: Yes  Difficulty breathing on exertion: Yes  Nighttime Cough: Yes  Difficulty breathing when lying flat: Yes  Chest pain or pressure: Yes  Fast or irregular heartbeat: Yes  Pain in legs with walking: Yes  Trouble breathing while lying down: Yes  Fingers or toes appear blue: No  High blood pressure: No  Low blood pressure: No  Fainting: Yes  Murmurs: Yes  Pacemaker: No  Varicose veins: No  Wake up at night with shortness of breath: No  Light-headedness: Yes  Exercise intolerance: No  Heart burn or indigestion: Yes  Nausea: Yes  Vomiting: Yes  Abdominal pain: Yes  Bloating: Yes  Constipation: Yes  Diarrhea: No  Blood in stool: No  Black stools: No  Rectal or Anal pain: No  Fecal incontinence: No  Yellowing of skin or eyes: No  Vomit with blood: No  Change in stools: No  Back pain: Yes  Muscle aches: Yes  Neck pain: Yes  Swollen joints: Yes  Joint pain: Yes  Bone pain: Yes  Muscle cramps: Yes  Muscle weakness: Yes  Joint stiffness: Yes  Bone fracture: Yes  Anemia: Yes  Swollen glands: No  Easy bleeding or bruising: Yes  Trouble with coordination: Yes  Dizziness or trouble with balance: Yes  Fainting or black-out spells: Yes  Memory loss: Yes  Headache: Yes  Seizures: No  Speech problems: Yes  Tingling: Yes  Weakness: Yes  Difficulty walking:  Yes  Paralysis: No  Numbness: Yes  Bleeding or spotting between periods: Yes  Heavy or painful periods: No  Irregular periods: Yes  Vaginal discharge: Yes  Hot flashes: Yes  Vaginal dryness: No  Genital ulcers: No  Post-menopausal bleeding: Yes  Nervous or Anxious: Yes  Depression: No  Trouble sleeping: Yes  Trouble thinking or concentrating: Yes  Mood changes: Yes  Panic attacks: Yes

## 2018-04-16 NOTE — MR AVS SNAPSHOT
After Visit Summary   4/16/2018    Kalyn Rivero    MRN: [id number]           Patient Information     Date Of Birth          1970        Visit Information        Provider Department      4/16/2018 8:45 AM Aakash Zimmer MD 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 For Physicals, please bring immunization records and any forms needing to be filled out. Please arrive 10 minutes early to complete paperwork.            Apr 18, 2018 11:45 AM CDT   SHORT with Mika Zamora MD   [facility] ([facility])    [address] [facility] [address]   [phone]            Jun 28, 2018  9:40 AM CDT   (Arrive by 9:25 AM)   Return Botox with Rosina Quinones MD   [facility] ([facility] [place])    [address] [place] [address] [phone]              Who to contact     Please call your clinic at [phone] to:    Ask questions about your health    Make or cancel appointments    Discuss your medicines    Learn about your test results    Speak to your doctor            Additional Information About Your Visit        [employer] Information     [employer] is an electronic gateway that provides easy, online access to your medical records. With [employer], you can request a clinic appointment, read your test results, renew a  "prescription or communicate with your care team.     To sign up for MyChart visit the website at [email or web address]   You will be asked to enter the access code listed below, as well as some personal information. Please follow the directions to create your username and password.     Your access code is: [email or web address]  Expires: 2018  6:30 AM     Your access code will  in 90 days. If you need help or a new code, please contact your [facility] or call [phone] for assistance.        Care EveryWhere ID     This is your Care EveryWhere ID. This could be used by other organizations to access your [place] medical records  [id number]        Your Vitals Were     Height BMI (Body Mass Index)                1.651 m (5' 5\") 26.34 kg/m2           Blood Pressure from Last 3 Encounters:   18 (!) 134/93   01/15/18 120/80   18 150/86    Weight from Last 3 Encounters:   18 71.7 kg (158 lb)   18 71.8 kg (158 lb 4.8 oz)   18 70.5 kg (155 lb 8 oz)               Primary Care Provider Office Phone # Fax #    Mika Zamora [phone] [address] [facility] [address]        Equal Access to Services     GARRY BURTON AH: Hadii ahslee ku hadasho Soomaali, waaxda luqadaha, qaybta kaalmada adevladislavyada, alexis sosa . So [facility] [phone].    ATENCIÓN: Si habla español, tiene a holcomb disposición servicios gratuitos de asistencia lingüística. Lis al [phone].    We comply with applicable federal civil rights laws and [place] laws. We do not discriminate on the basis of race, color, national origin, age, disability, sex, sexual orientation, or gender identity.            Thank you!     Thank you for choosing [facility]  for your care. Our goal is always to provide you with excellent care. Hearing back from our patients is one way we can continue to improve our services. Please take a " few minutes to complete the written survey that you may receive in the mail after your visit with us. Thank you!             Your Updated Medication List - Protect others around you: Learn how to safely use, store and throw away your medicines at www.disposemymeds.org.          This list is accurate as of 4/16/18 11:59 PM.  Always use your most recent med list.                   Brand Name Dispense Instructions for use Diagnosis    * BOTOX IJ      Inject 170 Units as directed Lot # /[id number] with Expiration Date:  10/2020        * Botulinum Toxin Type A 200 units injection    BOTOX    200 Units    Inject 200 Units as directed every 3 months    Intractable chronic migraine without aura and without status migrainosus       CELEBREX PO      Take 200 mg by mouth 3 times daily        CVS SENNA 8.6 MG tablet   Generic drug:  senna     120 tablet    TAKE 2 TABLETS BY MOUTH 2 TIMES DAILY    Rheumatoid arthritis with positive rheumatoid factor, involving unspecified site (H)       ENBREL SURECLICK 50 MG/ML autoinjector   Generic drug:  Etanercept           KLONOPIN PO      Take 0.5 mg by mouth 2 tablets per day        leflunomide 20 MG tablet    ARAVA     Take 20 mg by mouth every morning Reported on 3/28/2017        omeprazole 40 MG capsule    priLOSEC    90 capsule    TAKE 1 CAPSULE (40 MG) BY MOUTH DAILY TAKE 30-60 MINUTES BEFORE A MEAL.    PUD (peptic ulcer disease)       VITAMIN B-12 PO      Take 4 tablets by mouth daily        * Notice:  This list has 2 medication(s) that are the same as other medications prescribed for you. Read the directions carefully, and ask your doctor or other care provider to review them with you.

## 2018-04-16 NOTE — MR AVS SNAPSHOT
After Visit Summary   4/16/2018    Kalyn Rivero    MRN: [id number]           Patient Information     Date Of Birth          1970        Visit Information        Provider Department      4/16/2018 11:20 AM River Law MD [facility]        Today's Diagnoses     Rheumatoid arthritis with positive rheumatoid factor, involving unspecified site (H)    -  1      Care Instructions    Will follow-up          Follow-ups after your visit        Your next 10 appointments already scheduled     Apr 18, 2018 11:45 AM CDT   SHORT with Mika Zamora MD   [facility]    [address] [facility] [address]   [phone]            Apr 30, 2018 12:15 PM CDT   Office Visit with Mika Zamora MD   [facility]    [address] [facility] [address]   [phone]           Bring a current list of meds and any records pertaining to this visit. For Physicals, please bring immunization records and any forms needing to be filled out. Please arrive 10 minutes early to complete paperwork.            Jun 28, 2018  9:40 AM CDT   (Arrive by 9:25 AM)   Return Botox with Rosina Quinones MD   [facility] ([facility] [place])    [address] [place] [address] [phone]              Who to contact     Please call your clinic at [phone] to:    Ask questions about your health    Make or cancel appointments    Discuss your medicines    Learn about your test results    Speak to your doctor            Additional Information About Your Visit        [employer] Information     [employer] is an electronic gateway that provides easy, online access to your medical records. With [employer], you can request a clinic appointment, read your test results, renew a prescription or communicate with your care team.    "  To sign up for Ondoret visit the website at [email or web address]   You will be asked to enter the access code listed below, as well as some personal information. Please follow the directions to create your username and password.     Your access code is: [email or web address]  Expires: 2018  6:30 AM     Your access code will  in 90 days. If you need help or a new code, please contact your [facility] or call [phone] for assistance.        Care EveryWhere ID     This is your Care EveryWhere ID. This could be used by other organizations to access your [place] medical records  [id number]        Your Vitals Were     Height BMI (Body Mass Index)                1.651 m (5' 5\") 26.29 kg/m2           Blood Pressure from Last 3 Encounters:   18 (!) 134/93   01/15/18 120/80   18 150/86    Weight from Last 3 Encounters:   18 71.7 kg (158 lb)   18 71.8 kg (158 lb 4.8 oz)   18 70.5 kg (155 lb 8 oz)              Today, you had the following     No orders found for display       Primary Care Provider Office Phone # Fax #    Mika Zamora [phone] [address] [facility] [address]        Equal Access to Services     [facility]: Hadii ashlee shelton hadasho Sokayla, waaxda luqadaha, qaybta kaalmada rachid, alexis sosa . So [facility] [phone].    ATENCIÓN: Si habla español, tiene a holcomb disposición servicios gratuitos de asistencia lingüística. Llame al [phone].    We comply with applicable federal civil rights laws and [place] laws. We do not discriminate on the basis of race, color, national origin, age, disability, sex, sexual orientation, or gender identity.            Thank you!     Thank you for choosing [facility]  for your care. Our goal is always to provide you with excellent care. Hearing back from our patients is one way we can continue to improve our " services. Please take a few minutes to complete the written survey that you may receive in the mail after your visit with us. Thank you!             Your Updated Medication List - Protect others around you: Learn how to safely use, store and throw away your medicines at www.disposemymeds.org.          This list is accurate as of 4/16/18 11:59 PM.  Always use your most recent med list.                   Brand Name Dispense Instructions for use Diagnosis    * BOTOX IJ      Inject 170 Units as directed Lot # /[id number] with Expiration Date:  10/2020        * Botulinum Toxin Type A 200 units injection    BOTOX    200 Units    Inject 200 Units as directed every 3 months    Intractable chronic migraine without aura and without status migrainosus       CELEBREX PO      Take 200 mg by mouth 3 times daily        CVS SENNA 8.6 MG tablet   Generic drug:  senna     120 tablet    TAKE 2 TABLETS BY MOUTH 2 TIMES DAILY    Rheumatoid arthritis with positive rheumatoid factor, involving unspecified site (H)       ENBREL SURECLICK 50 MG/ML autoinjector   Generic drug:  Etanercept           KLONOPIN PO      Take 0.5 mg by mouth 2 tablets per day        leflunomide 20 MG tablet    ARAVA     Take 20 mg by mouth every morning Reported on 3/28/2017        omeprazole 40 MG capsule    priLOSEC    90 capsule    TAKE 1 CAPSULE (40 MG) BY MOUTH DAILY TAKE 30-60 MINUTES BEFORE A MEAL.    PUD (peptic ulcer disease)       VITAMIN B-12 PO      Take 4 tablets by mouth daily        * Notice:  This list has 2 medication(s) that are the same as other medications prescribed for you. Read the directions carefully, and ask your doctor or other care provider to review them with you.

## 2018-04-18 ENCOUNTER — OFFICE VISIT (OUTPATIENT)
Dept: FAMILY MEDICINE | Facility: CLINIC | Age: 48
End: 2018-04-18
Payer: COMMERCIAL

## 2018-04-18 VITALS
DIASTOLIC BLOOD PRESSURE: 80 MMHG | HEART RATE: 95 BPM | TEMPERATURE: 99 F | BODY MASS INDEX: 25.69 KG/M2 | OXYGEN SATURATION: 98 % | WEIGHT: 154.4 LBS | SYSTOLIC BLOOD PRESSURE: 130 MMHG | RESPIRATION RATE: 20 BRPM

## 2018-04-18 DIAGNOSIS — M05.721 RHEUMATOID ARTHRITIS INVOLVING RIGHT ELBOW WITH POSITIVE RHEUMATOID FACTOR (H): Primary | ICD-10-CM

## 2018-04-18 PROCEDURE — 99214 OFFICE O/P EST MOD 30 MIN: CPT | Performed by: FAMILY MEDICINE

## 2018-04-18 ASSESSMENT — PAIN SCALES - GENERAL: PAINLEVEL: WORST PAIN (10)

## 2018-04-18 NOTE — MR AVS SNAPSHOT
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 For Physicals, please bring immunization records and any forms needing to be filled out. Please arrive 10 minutes early to complete paperwork.            Jun 28, 2018  9:40 AM CDT   (Arrive by 9:25 AM)   Return Botox with Rosina Quinones MD   [facility] ([facility] [place])    [address] [place] [address] [phone]              Who to contact     If you have questions or need follow up information about today's clinic visit or your schedule please contact [facility] directly at [phone].  Normal or non-critical lab and imaging results will be communicated to you by MyChart, letter or phone within 4 business days after the clinic has received the results. If you do not hear from us within 7 days, please contact the clinic through MyChart or phone. If you have a critical or abnormal lab result, we will notify you by phone as soon as possible.  Submit refill requests through [email or web address] or call your pharmacy and they will forward the refill request to us. Please allow 3 business days for your refill to be completed.          Additional Information About Your Visit       "  MyChart Information     [employer] lets you send messages to your doctor, view your test results, renew your prescriptions, schedule appointments and more. To sign up, go to [email or web address] . Click on \"Log in\" on the left side of the screen, which will take you to the Welcome page. Then click on \"Sign up Now\" on the right side of the page.     You will be asked to enter the access code listed below, as well as some personal information. Please follow the directions to create your username and password.     Your access code is: [email or web address]  Expires: 2018  6:30 AM     Your access code will  in 90 days. If you need help or a new code, please call your [place] clinic or [phone].        Care EveryWhere ID     This is your Care EveryWhere ID. This could be used by other organizations to access your [place] medical records  [id number]        Your Vitals Were     Pulse Temperature Respirations Pulse Oximetry BMI (Body Mass Index)       95 99  F (37.2  C) (Oral) 20 98% 25.69 kg/m2        Blood Pressure from Last 3 Encounters:   18 130/80   18 (!) 134/93   01/15/18 120/80    Weight from Last 3 Encounters:   18 154 lb 6.4 oz (70 kg)   18 158 lb (71.7 kg)   18 158 lb 4.8 oz (71.8 kg)              Today, you had the following     No orders found for display       Primary Care Provider Office Phone # Fax #    Mika Zamora [phone] [address] [address] [facility] [address]        Equal Access to Services     GARRY BURTON : Hadii ashlee acosta Sokayla, waaxda luqadaha, qaybta kaalmada rachid, alexis hollins. So [facility] [phone].    ATENCIÓN: Si habla español, tiene a holcomb disposición servicios gratuitos de asistencia lingüística. Llame al [phone].    We comply with applicable federal civil rights laws and [place] laws. We do not discriminate on the basis of race, color, national origin, age, disability, " sex, sexual orientation, or gender identity.            Thank you!     [facility]  [facility]. Our goal is always to provide you with excellent care. Hearing back from our patients is one way we can continue to improve our services. Please take a few minutes to complete the written survey that you may receive in the mail after your visit with us. Thank you!             Your Updated Medication List - Protect others around you: Learn how to safely use, store and throw away your medicines at www.disposemymeds.org.          This list is accurate as of 4/18/18  1:39 PM.  Always use your most recent med list.                   Brand Name Dispense Instructions for use Diagnosis    * BOTOX IJ      Inject 170 Units as directed Lot # /[id number] with Expiration Date:  10/2020        * Botulinum Toxin Type A 200 units injection    BOTOX    200 Units    Inject 200 Units as directed every 3 months    Intractable chronic migraine without aura and without status migrainosus       CELEBREX PO      Take 200 mg by mouth 3 times daily        CVS SENNA 8.6 MG tablet   Generic drug:  senna     120 tablet    TAKE 2 TABLETS BY MOUTH 2 TIMES DAILY    Rheumatoid arthritis with positive rheumatoid factor, involving unspecified site (H)       ENBREL SURECLICK 50 MG/ML autoinjector   Generic drug:  Etanercept           KLONOPIN PO      Take 0.5 mg by mouth 2 tablets per day        leflunomide 20 MG tablet    ARAVA     Take 20 mg by mouth every morning Reported on 3/28/2017        omeprazole 40 MG capsule    priLOSEC    90 capsule    TAKE 1 CAPSULE (40 MG) BY MOUTH DAILY TAKE 30-60 MINUTES BEFORE A MEAL.    PUD (peptic ulcer disease)       VITAMIN B-12 PO      Take 4 tablets by mouth daily        * Notice:  This list has 2 medication(s) that are the same as other medications prescribed for you. Read the directions carefully, and ask your doctor or other care provider to review them with you.

## 2018-04-18 NOTE — PROGRESS NOTES
SUBJECTIVE:  47 year old.The patient has a history of rheumatoid arthritis.  This started years ago. Patient enters today [facility].  She claims she has a broken humerus and screws that need to be removed from her elbow.she has seen several orthopedic surgeons. She is accompanied by her son ami LION.    Location right humerus ansd elbow quality sharp and achy Associated symptoms are pain .  Brought on by movement of arm and elbow .  Reviewed health maintenance  Patient Active Problem List   Diagnosis     Hypothyroidism     Advanced directives, counseling/discussion     Acetaminophen overdose     Hepatic failure (H)     Pancreatitis     Anemia     Chronic pain     Pneumonia     Suicide risk     Grief reaction     Liver failure, acute     CARDIOVASCULAR SCREENING; LDL GOAL LESS THAN 160     Renal stone     Health Care Home     TOTAL KNEE ARTHROPLASTY - bilateral     Knee joint replacement - left     Trochanteric bursitis - left     Atlantoaxial instability     Postoperative pain     S/P cervical spinal fusion     Drug-seeking behavior     Opioid type dependence (H)     Severe frontal headaches     Abdominal pain, unspecified abdominal location     Esophageal reflux     Right upper extremity numbness     Right arm numbness     Lesion of ulnar nerve     TMJ (temporomandibular joint syndrome)     Intractable chronic migraine without aura     Encounter for long-term opiate analgesic use     Rheumatoid arthritis of foot (H)     Other drug-induced neutropenia (H)     Rheumatoid arthritis with positive rheumatoid factor, involving unspecified site (H)     Cervical high risk HPV (human papillomavirus) test positive     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)     Past Medical History:   Diagnosis Date     Acetaminophen overdose 3/24/2011     Anemia      Anesthesia complication     pt states has a histor of heart stopping during anesthesia,     Arrhythmia      Cerebral infarction (H)      Cervical  "high risk HPV (human papillomavirus) test positive 02/22/2017    type 18 & other HR HPV     Chronic infection     MRSA     Chronic pain 3/24/2011     Degenerative joint disease      Endometriosis      Fibromyalgia      Gastro-oesophageal reflux disease      Heart murmur      History of blood transfusion      Hypothyroidism      Immune disorder (H)      Learning disability      Malignant neoplasm (H)      Migraine      MRSA (methicillin resistant staph aureus) culture positive      Neck injuries      Opioid type dependence (H)      Other chronic pain      PONV (postoperative nausea and vomiting)     states \"flatlines\"during anesthesia     RA (rheumatoid arthritis) (H)      Renal stone      Scoliosis      Stomach ulcer     history     Reviewing record I found from Dr Zimmer the following options  1. Consider either distal humeral replacement with Compress osseointegration either mated to existing ulnar implant or revision of ulnar component PRN.  Altnerative would be to perform same procedure combined with R total humeral replacement and R reverse TSA.  I will discuss with Dr. Law.  2. Above elbow amputation  3. Leave as it without any further intervention and wear brace on permanent basis.  OBJECTIVE:  no apparent distress  /80  Pulse 95  Temp 99  F (37.2  C) (Oral)  Resp 20  Wt 154 lb 6.4 oz (70 kg)  SpO2 98%  BMI 25.69 kg/m2  Patient is moving arm and fore arm and does not appear to be in a lot of pain.     ICD-10-CM    1. Rheumatoid arthritis involving right elbow with positive rheumatoid factor (H) M05.721     PLAN: although I did not see the recommendations of Dr Zimmer until after we did discuss the option as presented to the patient.  It was decided to wait one month with no surgery.  She will stop smoking and call Rheumatology to see if she should stop her Enbral. She then will follow up with ortho  Over  half of theTotal time 25 minutes spent in cordination care. Discussed diagnoses, prognoses " and treatment.

## 2018-04-18 NOTE — NURSING NOTE
"Chief Complaint   Patient presents with     Musculoskeletal Problem     right arm, discuss referral        Initial BP (!) 134/98  Pulse 95  Temp 99  F (37.2  C) (Oral)  Resp 20  Wt 154 lb 6.4 oz (70 kg)  SpO2 98%  BMI 25.69 kg/m2 Estimated body mass index is 25.69 kg/(m^2) as calculated from the following:    Height as of 4/16/18: 5' 5\" (1.651 m).    Weight as of this encounter: 154 lb 6.4 oz (70 kg).  Medication Reconciliation: complete  Raymond Schulte CMA    "

## 2018-04-20 ENCOUNTER — TELEPHONE (OUTPATIENT)
Dept: ANESTHESIOLOGY | Facility: CLINIC | Age: 48
End: 2018-04-20

## 2018-04-20 DIAGNOSIS — F11.21 OPIOID DEPENDENCE IN REMISSION (H): Primary | ICD-10-CM

## 2018-04-20 DIAGNOSIS — G89.29 COPING WITH CHRONIC PAIN: ICD-10-CM

## 2018-04-20 DIAGNOSIS — G47.9 SLEEP DISTURBANCE: ICD-10-CM

## 2018-04-20 DIAGNOSIS — M79.7 FIBROMYALGIA: ICD-10-CM

## 2018-04-20 NOTE — TELEPHONE ENCOUNTER
Health Call Center    Phone Message    May a detailed message be left on voicemail: yes    Reason for Call: Other: Pt of Dr. Randle looking to schedule in with her. Informed her of clinic change. Pt also has a referral for her son to be seen as well with Dr. Randle and would like to discuss that as well.     Action Taken: Message routed to:  [facility] ([facility]

## 2018-04-22 NOTE — PROGRESS NOTES
"    HISTORY OF PRESENT ILLNESS:  Kalyn Rivero comes in for a second opinion to her care with Dr. Blaise De Luna at [facility].  She has had a revision total elbow and has gone on to have a periprosthetic fracture.  She is seen today with her son who is also her PCA and also 2 other young men. They articulate that Dr. De Luna had recommended an amputation and she \"wants to die with my arm\".      PHYSICAL EXAMINATION:  On exam today, her incision is clean, dry and intact.  She has pain at the midshaft humerus with a Gandhi brace and sling.  She reports she has dysesthesias in the ulnar digits since her revision surgery.  She articulates and communicates well with her usual normal affect.       RADIOGRAPHS:  Radiographs reviewed with total elbow implant in her elbow which is consistent with a loose alloprosthetic composite.with a posterior plate with plate loosening and allograft/humeral fracture.         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       PLAN: I spoke on the phone with Dr. De Luna.  I spoke with Dr. Nate Zimmer.  I spoke on the phone in follow up with the patient.    I would recommend referral to Dr. Nate Zimmer for fractured allograft management and Dr. Jack Law for management of shoulder pain.    30 minutes spend in patient management with >50% in face to face consultation.    "

## 2018-04-25 DIAGNOSIS — G89.29 COPING WITH CHRONIC PAIN: Primary | ICD-10-CM

## 2018-04-25 DIAGNOSIS — M79.7 FIBROMYALGIA: ICD-10-CM

## 2018-04-25 NOTE — TELEPHONE ENCOUNTER
To make an appointment for health psychology, call any of these providers to make an appointment:    Dr. Tim Lyons [phone]  Dr. Eneida Darby [phone]  Dr. Cy Esparza [phone]  Dr. Karina Douglas [phone]  Dr. Cherelle Merida [phone]  Dr. Aspen Randle [phone]

## 2018-04-28 ENCOUNTER — HEALTH MAINTENANCE LETTER (OUTPATIENT)
Age: 48
End: 2018-04-28

## 2018-05-03 ENCOUNTER — OFFICE VISIT (OUTPATIENT)
Dept: FAMILY MEDICINE | Facility: CLINIC | Age: 48
End: 2018-05-03
Payer: COMMERCIAL

## 2018-05-03 VITALS
WEIGHT: 153 LBS | BODY MASS INDEX: 25.49 KG/M2 | OXYGEN SATURATION: 98 % | SYSTOLIC BLOOD PRESSURE: 128 MMHG | TEMPERATURE: 97.2 F | HEIGHT: 65 IN | DIASTOLIC BLOOD PRESSURE: 88 MMHG | HEART RATE: 105 BPM

## 2018-05-03 DIAGNOSIS — B97.7 HIGH RISK HPV INFECTION: Primary | ICD-10-CM

## 2018-05-03 PROCEDURE — 99207 ZZC NO CHARGE LOS: CPT | Performed by: FAMILY MEDICINE

## 2018-05-03 PROCEDURE — 88305 TISSUE EXAM BY PATHOLOGIST: CPT | Performed by: FAMILY MEDICINE

## 2018-05-03 PROCEDURE — 57456 ENDOCERV CURETTAGE W/SCOPE: CPT | Performed by: FAMILY MEDICINE

## 2018-05-03 ASSESSMENT — PAIN SCALES - GENERAL: PAINLEVEL: NO PAIN (0)

## 2018-05-03 NOTE — MR AVS SNAPSHOT
"              After Visit Summary   5/3/2018    Kalyn Rivero    MRN: [id number]           Patient Information     Date Of Birth          1970        Visit Information        Provider Department      5/3/2018 1:35 PM Mika Zamora MD; [place] PROCEDURE ROOM 2 [facility] [place]        Today's Diagnoses     High risk HPV infection    -  1       Follow-ups after your visit        Your next 10 appointments already scheduled     Jun 28, 2018  9:40 AM CDT   (Arrive by 9:25 AM)   Return Botox with Rosina Quinones MD   [facility] ([facility])    [address] [place] [address]  3rd [facility] [address] [phone]              Who to contact     If you have questions or need follow up information about today's clinic visit or your schedule please contact [facility] directly at [phone].  Normal or non-critical lab and imaging results will be communicated to you by MyChart, letter or phone within 4 business days after the clinic has received the results. If you do not hear from us within 7 days, please contact the clinic through MyChart or phone. If you have a critical or abnormal lab result, we will notify you by phone as soon as possible.  Submit refill requests through [employer] or call your pharmacy and they will forward the refill request to us. Please allow 3 business days for your refill to be completed.          Additional Information About Your Visit        MyChart Information     [employer] lets you send messages to your doctor, view your test results, renew your prescriptions, schedule appointments and more. To sign up, go to [email or web address] . Click on \"Log in\" on the left side of the screen, which will take you to the Welcome page. Then click on \"Sign up Now\" on the right side of the page.     You will be asked to enter the access code listed below, as well as some personal information. Please " "follow the directions to create your username and password.     Your access code is: [email or web address]  Expires: 2018  6:30 AM     Your access code will  in 90 days. If you need help or a new code, please call your [place] clinic or [phone].        Care EveryWhere ID     This is your Care EveryWhere ID. This could be used by other organizations to access your [place] medical records  [id number]        Your Vitals Were     Pulse Temperature Height Pulse Oximetry BMI (Body Mass Index)       105 97.2  F (36.2  C) (Oral) 5' 5\" (1.651 m) 98% 25.46 kg/m2        Blood Pressure from Last 3 Encounters:   18 128/88   18 130/80   18 (!) 134/93    Weight from Last 3 Encounters:   18 153 lb (69.4 kg)   18 154 lb 6.4 oz (70 kg)   18 158 lb (71.7 kg)              We Performed the Following     COLP CERVIX/UPPER VAGINA W ENDOCERV CURETT     Surgical pathology exam        Primary Care Provider Office Phone # Fax #    Mika Zamora [phone] [address] [facility] [address]        Equal Access to Services     MARY BURTON AH: Hadii aad ku hadasho Soomaali, waaxda luqadaha, qaybta kaalmada adeegyada, waxay idiin hayaan uriel khtrav sosa . So [facility] [phone].    ATENCIÓN: Si habla español, tiene a holocmb disposición servicios gratuitos de asistencia lingüística. [facility] [phone].    We comply with applicable federal civil rights laws and [place] laws. We do not discriminate on the basis of race, color, national origin, age, disability, sex, sexual orientation, or gender identity.            Thank you!     Thank you for choosing [facility]  for your care. Our goal is always to provide you with excellent care. Hearing back from our patients is one way we can continue to improve our services. Please take a few minutes to complete the written survey that you may receive in the mail after your visit with us. Thank you!           "   Your Updated Medication List - Protect others around you: Learn how to safely use, store and throw away your medicines at www.disposemymeds.org.          This list is accurate as of 5/3/18  2:24 PM.  Always use your most recent med list.                   Brand Name Dispense Instructions for use Diagnosis    * BOTOX IJ      Inject 170 Units as directed Lot # [id number] with Expiration Date:  10/2020        * Botulinum Toxin Type A 200 units injection    BOTOX    200 Units    Inject 200 Units as directed every 3 months    Intractable chronic migraine without aura and without status migrainosus       CELEBREX PO      Take 200 mg by mouth 3 times daily        [employer] SENNA 8.6 MG tablet   Generic drug:  senna     120 tablet    TAKE 2 TABLETS BY MOUTH 2 TIMES DAILY    Rheumatoid arthritis with positive rheumatoid factor, involving unspecified site (H)       ENBREL SURECLICK 50 MG/ML autoinjector   Generic drug:  Etanercept           KLONOPIN PO      Take 0.5 mg by mouth 2 tablets per day        leflunomide 20 MG tablet    ARAVA     Take 20 mg by mouth every morning Reported on 3/28/2017        omeprazole 40 MG capsule    priLOSEC    90 capsule    TAKE 1 CAPSULE (40 MG) BY MOUTH DAILY TAKE 30-60 MINUTES BEFORE A MEAL.    PUD (peptic ulcer disease)       VITAMIN B-12 PO      Take 4 tablets by mouth daily        * Notice:  This list has 2 medication(s) that are the same as other medications prescribed for you. Read the directions carefully, and ask your doctor or other care provider to review them with you.

## 2018-05-03 NOTE — LETTER
"            [facility]  [address] [facility] [address]  Phone: [phone]                                                                            Affirmation of Informed Consent for Surgery or Invasive Procedure    1.  I, (print patient's name) Kalyn Rivero,  1970,   a.  Agree that I will have (include both the medical term and patient words):           Chief Complaint   Patient presents with     Colposcopy      b.  At [facility].     c.  The reason for this procedure is (medical condition):  Abnormal pap.   d.  This will be done or supervised by:  Mika Zamora MD.   e.  My doctor may have help from others.  Help could include opening or closing         the wound. Help might also include taking grafts, cutting out tissue,                           implanting devices.  I have been told who will help, if known.     2.  I have talked to my doctor or health care team about:   a.  What the procedure is and what will happen.   b   How it may help me (the benefits).   c.  How it may harm me (the most likely and most serious risks).   d.  The long-term effects the procedure might have.    e.  My other choices for treatment.  The risks and benefits of those choices.    f.   What will likely happen if I say \"no\" to this procedure.    g.  How I might feel right after and how quickly I might be expected to recover.      h.  What medicines will be used to manage pain or sedate me.     3.  I agree that:  (If I do not agree with a statement, I have crossed it out and              initialed next to it.)       a.  I will ask questions.     b.  No one has promised me definite results.    c.  If serious problems are found during the procedure, the treatment may                    change.   d.  If I have \"do not resuscitate\" (DNR) wishes, I have discussed this with my                              doctor and they will be put on hold during the procedure.   e. Students and " other appropriate people, approved by the facility, may watch                      the procedure and help with tasks they are qualified for.                                                    f.  Pictures or videos may be taken. They may be used for medical or                  educational reasons only.       g. Tissues or organs removed from my body as part of the normal course of the                    procedure may be used for research or teaching purposes. They will be                  disposed of with respect.                    h.  If a staff person is exposed to my blood or body fluids, my blood will be drawn                   and tested for HIV and hepatitis.  I understand that by law, the test results will                    go:         -  In my medical record.                         -  To the Employee Occupational Health Service and/or Infection Control                                  at this facility.    -  To the [place] [employer] officials.                 i.  I may have a blood transfusion: I have talked to my doctor or care team about:    -  Why I may need a blood transfusion.     - The risks, benefits, and side effects of transfusion - and the risks of not        Having one.     -  Blood safety and other options for treatment.        Consent for blood transfusion obtained during a hospital admission is valid for the entire hospital stay.  Consent  for blood transfusion obtained in the clinic setting is valid for a year from the signature date.                                4.  I understand that:   a.  I can change my mind.  If I do, I must tell my doctor or team as soon as                           possible.              b.  The team members may change during the procedure.                c.   The team will double-check who I am.  They will ask me what I am having                         done and the site of the site of the procedure.  This is done for my safety.    My questions have been answered.   I agree to the procedure.  I have made my special needs and instructions known.      Kalyn Lucas Mat      5/3/2018 1:26 PM  Patient (or representative)/Relationship to patient             Date  Time             Date  Time  Print name of patient (or representative)/relationship to patient    Witness:  I have verified that the signature is that of the patient or patient's representative and that this has been signed before the procedure:    NAME:        5/3/2018  1:26 PM             Date  Time  Person verifying patient's name or patient representative's signature     Provider:  I have discussed the procedure and the information stated above with the patient (or the patient's representative) and answered their questions. The patient or their representative consented to the procedure:      Mika Zamora MD    5/3/2018  1:26 PM  Physician or Provider's Signature(s)   Date  Time       Intepreter (if used):       5/3/2018  1:26 PM                                   Name       Language/Organization Date  Time    Consent for procedure valid for 30 days after patient signature date     [facility]  AFFIRMATION OF INFORMED CONSENT FOR SURGERY OR INVASIVE PROCEDURE               Original - Medical Records                   Statement Selected

## 2018-05-03 NOTE — NURSING NOTE
"Chief Complaint   Patient presents with     Colposcopy       Initial BP (!) 129/92  Pulse 105  Temp 97.2  F (36.2  C) (Oral)  Ht 5' 5\" (1.651 m)  Wt 153 lb (69.4 kg)  SpO2 98%  BMI 25.46 kg/m2 Estimated body mass index is 25.46 kg/(m^2) as calculated from the following:    Height as of this encounter: 5' 5\" (1.651 m).    Weight as of this encounter: 153 lb (69.4 kg).  Medication Reconciliation: complete  Tessie Ayala M.A.    "

## 2018-05-03 NOTE — PROGRESS NOTES
Here for Colposcopy      47 year old female presents for colposcopy,  Pap smear   1 months ago showed: ASCUS and endocervical component absent. The prior pap showed hpv.   No LMP recorded. Patient has had an injection.  Allergies: Morphine; Codeine; Gabapentin; Ibuprofen; Sulfa drugs; Tylenol [acetaminophen]; Erythromycin; Penicillins; and Prednisone  Reviewed health maintenance   Patient Active Problem List   Diagnosis     Hypothyroidism     Advanced directives, counseling/discussion     Acetaminophen overdose     Hepatic failure (H)     Pancreatitis     Anemia     Chronic pain     Pneumonia     Suicide risk     Grief reaction     Liver failure, acute     CARDIOVASCULAR SCREENING; LDL GOAL LESS THAN 160     Renal stone     Health Care Home     TOTAL KNEE ARTHROPLASTY - bilateral     Knee joint replacement - left     Trochanteric bursitis - left     Atlantoaxial instability     Postoperative pain     S/P cervical spinal fusion     Drug-seeking behavior     Opioid type dependence (H)     Severe frontal headaches     Abdominal pain, unspecified abdominal location     Esophageal reflux     Right upper extremity numbness     Right arm numbness     Lesion of ulnar nerve     TMJ (temporomandibular joint syndrome)     Intractable chronic migraine without aura     Encounter for long-term opiate analgesic use     Rheumatoid arthritis of foot (H)     Other drug-induced neutropenia (H)     Rheumatoid arthritis with positive rheumatoid factor, involving unspecified site (H)     Cervical high risk HPV (human papillomavirus) test positive     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    Procedure for colposcopy and biopsy has been explained to the   patient and consent obtained.    PROCEDURE:  Speculum placed in vagina and excellent visualization of cervix   acheived, cervix swabbed x 3 with acetic acid  solution.    FINDINGS:  Cervix: abnormal vessels noted at 1200 o'clock; SCJ visualized 360 degrees without lesions, endocervical curettage performed, cervical biopsies taken at 12 o'clock and specimen labelled and sent to pathology.                   Procedure Summary: Patient tolerated procedure well.    ICD-10-CM    1. High risk HPV infection B97.7 COLP CERVIX/UPPER VAGINA W ENDOCERV CURETT     Surgical pathology exam    PLAN:AWAIT BIOPSIES

## 2018-05-07 LAB — COPATH REPORT: NORMAL

## 2018-05-09 ENCOUNTER — OFFICE VISIT (OUTPATIENT)
Dept: PSYCHOLOGY | Facility: CLINIC | Age: 48
End: 2018-05-09
Payer: COMMERCIAL

## 2018-05-09 DIAGNOSIS — F43.89 STRESS-RELATED PHYSIOLOGICAL RESPONSE AFFECTING PHYSICAL CONDITION: Primary | ICD-10-CM

## 2018-05-09 NOTE — MR AVS SNAPSHOT
After Visit Summary   2018    Kalyn Rivero    MRN: [id number]           Patient Information     Date Of Birth          1970        Visit Information        Provider Department      2018 9:00 AM Aspen Randle, PhD [facility]        Today's Diagnoses     Stress-related physiological response affecting physical condition    -  1       Follow-ups after your visit        Follow-up notes from your care team     Return in about 2 weeks (around 2018).      Your next 10 appointments already scheduled     May 24, 2018  9:00 AM CDT   (Arrive by 8:45 AM)   Return Visit with Aspen Randle, PhD   [facility] ([facility])    [address] [place] [address] [phone]            2018  9:40 AM CDT   (Arrive by 9:25 AM)   Return Botox with Rosina Quinones MD   [facility] ([facility])    [address] [place] [address] [phone]              Who to contact     Please call your clinic at [phone] to:    Ask questions about your health    Make or cancel appointments    Discuss your medicines    Learn about your test results    Speak to your doctor            Additional Information About Your Visit        MyChart Information     7 Cups of Teat is an electronic gateway that provides easy, online access to your medical records. With Hundo, you can request a clinic appointment, read your test results, renew a prescription or communicate with your care team.     To sign up for 7 Cups of Teat visit the website at [email or web address]   You will be asked to enter the access code listed below, as well as some personal information. Please follow the directions to create your username and password.     Your access code is: [email or web address]  Expires: 2018  6:30 AM     Your access code will   in 90 days. If you need help or a new code, please contact your [facility] or call [phone] for assistance.        Care EveryWhere ID     This is your Care EveryWhere ID. This could be used by other organizations to access your [place] medical records  [id number]         Blood Pressure from Last 3 Encounters:   05/03/18 128/88   04/18/18 130/80   04/05/18 (!) 134/93    Weight from Last 3 Encounters:   05/03/18 69.4 kg (153 lb)   04/18/18 70 kg (154 lb 6.4 oz)   04/16/18 71.7 kg (158 lb)              Today, you had the following     No orders found for display       Primary Care Provider Office Phone # Fax #    Mika Zamora [phone] [address] [facility] [address]        Equal Access to Services     [facility]: Hadii aad ku hadasho Soomaali, waaxda luqadaha, qaybta kaalmada adeegyada, waxay lynnin hayaan uriel sosa . So [facility] [phone].    ATENCIÓN: Si habla español, tiene a holcomb disposición servicios gratuitos de asistencia lingüística. Llame al [phone].    We comply with applicable federal civil rights laws and [place] laws. We do not discriminate on the basis of race, color, national origin, age, disability, sex, sexual orientation, or gender identity.            Thank you!     Thank you for choosing [facility]  for your care. Our goal is always to provide you with excellent care. Hearing back from our patients is one way we can continue to improve our services. Please take a few minutes to complete the written survey that you may receive in the mail after your visit with us. Thank you!             Your Updated Medication List - Protect others around you: Learn how to safely use, store and throw away your medicines at www.disposemymeds.org.          This list is accurate as of 5/9/18 10:09 AM.  Always use your most recent med list.                   Brand Name Dispense Instructions for use  Diagnosis    * BOTOX IJ      Inject 170 Units as directed Lot # [id number] with Expiration Date:  10/2020        * Botulinum Toxin Type A 200 units injection    BOTOX    200 Units    Inject 200 Units as directed every 3 months    Intractable chronic migraine without aura and without status migrainosus       CELEBREX PO      Take 200 mg by mouth 3 times daily        CVS SENNA 8.6 MG tablet   Generic drug:  senna     120 tablet    TAKE 2 TABLETS BY MOUTH 2 TIMES DAILY    Rheumatoid arthritis with positive rheumatoid factor, involving unspecified site (H)       ENBREL SURECLICK 50 MG/ML autoinjector   Generic drug:  Etanercept           KLONOPIN PO      Take 0.5 mg by mouth 2 tablets per day        leflunomide 20 MG tablet    ARAVA     Take 20 mg by mouth every morning Reported on 3/28/2017        omeprazole 40 MG capsule    priLOSEC    90 capsule    TAKE 1 CAPSULE (40 MG) BY MOUTH DAILY TAKE 30-60 MINUTES BEFORE A MEAL.    PUD (peptic ulcer disease)       VITAMIN B-12 PO      Take 4 tablets by mouth daily        * Notice:  This list has 2 medication(s) that are the same as other medications prescribed for you. Read the directions carefully, and ask your doctor or other care provider to review them with you.

## 2018-05-09 NOTE — PROGRESS NOTES
[facility]   Medical    Psychology Progress Note    Patient Name: Kalyn Rivero    YOB: 1970   Medical Record Number: [id number]  Date: 5/9/2018              SUBJECTIVE            Interval history:   Stress with dad and her rental house.  Some indications he might sell it and leave her homeless  also other challenges with her R humerous and shoulder fracture. Several surgeons have recommended amputation of her arm.  But found one who is willing to try to save it,  But she must quit smoking.  Has weaned down to 1 day but struggling.    Her  PA quit last week.   Having to rely on 17 yo son more          OBJECTIVE:    18 month interval since last appt              Length of Visit: 45 minutes        Mental status: No Distress - Normal Mental Status   Usually rapid speech and distractability         Session objective:   Continued behavioral pain management skill improvment         Behavioral inventions and response:                           CBT   On coping with unknowns  -  Consider possibilities and developing contingency plans                      She has done some of this but also strong recommendation to get   Advice re her renters rights salas as a vulnerable adult getting ready to have surgery.                  I printed out lists of  contact numbers of [facility].      Reviewed role of auditory EMDR  In manage ing stress/sig cravings                            ASSESSMENT              Diagnoses:                                              F43.8      Other stress related disorder                                                               Psychosocial and Contextual Factors: worsened         Progress toward goals: as expected.               PLAN:               Next Appointment: Kalyn Rivero will schedule a follow-up appointment in 2 weeks.         Assignment: auditory EMDR         Objectives / interventions for next session:          Management of Anxiety. Goals  include: CBT (adapt constructive thought processes and beliefs regarding fears) and  Auditory EMDR  ED Randle, Ph.D., L.P. ...............5/9/2018 9:05 AM                Medical Psychologist

## 2018-05-24 ENCOUNTER — OFFICE VISIT (OUTPATIENT)
Dept: PSYCHOLOGY | Facility: CLINIC | Age: 48
End: 2018-05-24
Payer: COMMERCIAL

## 2018-05-24 DIAGNOSIS — F43.89 STRESS-RELATED PHYSIOLOGICAL RESPONSE AFFECTING PHYSICAL CONDITION: Primary | ICD-10-CM

## 2018-05-24 NOTE — PROGRESS NOTES
[facility]   Medical    Psychology Progress Note    Patient Name: Kalyn Rivero    YOB: 1970   Medical Record Number: [id number]  Date: 5/24/2018              SUBJECTIVE  9:10 time in    Time out  9:50            Interval history:  Still worried  Re her Dad and his badgering for more money.  And also worried re the home health care agency and if they are doing the paper work she needs.  She has checked but no clear answer  meanwhile [facility], which prescribes her opioids has a new program and she is has appts with several team members including a psychologist as part of her treatment plan.        OBJECTIVE:              Length of Visit: 40 minutes        Mental status: Agitated and  As usual         Session objective:   Continued behavioral pain management skill improvement         Behavioral inventions and response:                          CBT   On planning and considering change salas in regards to future housing and her home health agency            Breathing Imagery    Ocean recommend once an hour                                                                       ASSESSMENT              Diagnoses:                                                                 F43.8      Other stress related disorder                                                                 Psychosocial and Contextual Factors: had fluctuating course         Progress toward goals: as expected.         PLAN:               Next Appointment: Kalyn Rivero will schedule a follow-up appointment in [facility] .  She will follow with [facility] in her new pain management program         Assignment: Healthy breathing skills  for daily use                       Aspen Randle, Ph.D., L.P. ...............5/24/2018 9:11 AM                   Medical Psychologist

## 2018-05-24 NOTE — MR AVS SNAPSHOT
After Visit Summary   2018    Kalyn Rivero    MRN: [id number]           Patient Information     Date Of Birth          1970        Visit Information        Provider Department      2018 9:00 AM Aspen Randle, PhD [facility]        Today's Diagnoses     Stress-related physiological response affecting physical condition    -  1       Follow-ups after your visit        Your next 10 appointments already scheduled     2018  9:40 AM CDT   (Arrive by 9:25 AM)   Return Botox with Rosina Quinones MD   [facility] ([facility])    [address] [place] [address] [phone]              Who to contact     Please call your clinic at [phone] to:    Ask questions about your health    Make or cancel appointments    Discuss your medicines    Learn about your test results    Speak to your doctor            Additional Information About Your Visit        MyChart Information     Zubkat is an electronic gateway that provides easy, online access to your medical records. With TradingView, you can request a clinic appointment, read your test results, renew a prescription or communicate with your care team.     To sign up for Zubkat visit the website at [email or web address]   You will be asked to enter the access code listed below, as well as some personal information. Please follow the directions to create your username and password.     Your access code is: [email or web address]  Expires: 2018  6:30 AM     Your access code will  in 90 days. If you need help or a new code, please contact your [facility] or call [phone] for assistance.        Care EveryWhere ID     This is your Care EveryWhere ID. This could be used by other organizations to access your [place] medical records  [id number]         Blood Pressure from Last 3  Encounters:   05/03/18 128/88   04/18/18 130/80   04/05/18 (!) 134/93    Weight from Last 3 Encounters:   05/03/18 69.4 kg (153 lb)   04/18/18 70 kg (154 lb 6.4 oz)   04/16/18 71.7 kg (158 lb)              Today, you had the following     No orders found for display       Primary Care Provider Office Phone # Fax #    Mika Zamora [phone] [address] [facility] [address]        Equal Access to Services     [facility]: Hadii aad ku hadasho Soomaali, waaxda luqadaha, qaybta kaalmada adelashawn, alexis sosa . So [facility] [phone].    ATENCIÓN: Si habla español, tiene a holcomb disposición servicios gratuitos de asistencia lingüística. [facility] [phone].    We comply with applicable federal civil rights laws and [place] laws. We do not discriminate on the basis of race, color, national origin, age, disability, sex, sexual orientation, or gender identity.            Thank you!     Thank you for choosing [facility]  for your care. Our goal is always to provide you with excellent care. Hearing back from our patients is one way we can continue to improve our services. Please take a few minutes to complete the written survey that you may receive in the mail after your visit with us. Thank you!             Your Updated Medication List - Protect others around you: Learn how to safely use, store and throw away your medicines at www.disposemymeds.org.          This list is accurate as of 5/24/18  9:59 AM.  Always use your most recent med list.                   Brand Name Dispense Instructions for use Diagnosis    * BOTOX IJ      Inject 170 Units as directed Lot # /[id number] with Expiration Date:  10/2020        * Botulinum Toxin Type A 200 units injection    BOTOX    200 Units    Inject 200 Units as directed every 3 months    Intractable chronic migraine without aura and without status migrainosus       CELEBREX PO      Take 200 mg by mouth  3 times daily        CVS SENNA 8.6 MG tablet   Generic drug:  senna     120 tablet    TAKE 2 TABLETS BY MOUTH 2 TIMES DAILY    Rheumatoid arthritis with positive rheumatoid factor, involving unspecified site (H)       ENBREL SURECLICK 50 MG/ML autoinjector   Generic drug:  Etanercept           KLONOPIN PO      Take 0.5 mg by mouth 2 tablets per day        leflunomide 20 MG tablet    ARAVA     Take 20 mg by mouth every morning Reported on 3/28/2017        omeprazole 40 MG capsule    priLOSEC    90 capsule    TAKE 1 CAPSULE (40 MG) BY MOUTH DAILY TAKE 30-60 MINUTES BEFORE A MEAL.    PUD (peptic ulcer disease)       VITAMIN B-12 PO      Take 4 tablets by mouth daily        * Notice:  This list has 2 medication(s) that are the same as other medications prescribed for you. Read the directions carefully, and ask your doctor or other care provider to review them with you.

## 2018-05-31 ENCOUNTER — OFFICE VISIT (OUTPATIENT)
Dept: FAMILY MEDICINE | Facility: CLINIC | Age: 48
End: 2018-05-31
Payer: COMMERCIAL

## 2018-05-31 VITALS
TEMPERATURE: 97.2 F | DIASTOLIC BLOOD PRESSURE: 88 MMHG | RESPIRATION RATE: 18 BRPM | OXYGEN SATURATION: 100 % | WEIGHT: 153 LBS | HEIGHT: 65 IN | SYSTOLIC BLOOD PRESSURE: 124 MMHG | BODY MASS INDEX: 25.49 KG/M2 | HEART RATE: 51 BPM

## 2018-05-31 DIAGNOSIS — L02.811 ABSCESS, SCALP: Primary | ICD-10-CM

## 2018-05-31 PROCEDURE — 99213 OFFICE O/P EST LOW 20 MIN: CPT | Performed by: FAMILY MEDICINE

## 2018-05-31 RX ORDER — CLINDAMYCIN HCL 150 MG
150 CAPSULE ORAL 4 TIMES DAILY
Qty: 28 CAPSULE | Refills: 0 | Status: SHIPPED | OUTPATIENT
Start: 2018-05-31 | End: 2018-06-07

## 2018-05-31 RX ORDER — FLUCONAZOLE 150 MG/1
150 TABLET ORAL
Qty: 4 TABLET | Refills: 0 | Status: SHIPPED | OUTPATIENT
Start: 2018-05-31 | End: 2018-07-27

## 2018-05-31 ASSESSMENT — PAIN SCALES - GENERAL: PAINLEVEL: NO PAIN (0)

## 2018-05-31 NOTE — PROGRESS NOTES
SUBJECTIVE:  47 year old.The patient has a history of staph infectin.  This started one week ago. Location scalp on the right  seen at [facility] and treated with Cleocin Associated symptoms are right arm swelling .  Better with better with cleocin. ROS no fever or chills       Reviewed health maintenance  Patient Active Problem List   Diagnosis     Hypothyroidism     Advanced directives, counseling/discussion     Acetaminophen overdose     Hepatic failure (H)     Pancreatitis     Anemia     Chronic pain     Pneumonia     Suicide risk     Grief reaction     Liver failure, acute     CARDIOVASCULAR SCREENING; LDL GOAL LESS THAN 160     Renal stone     Health Care Home     TOTAL KNEE ARTHROPLASTY - bilateral     Knee joint replacement - left     Trochanteric bursitis - left     Atlantoaxial instability     Postoperative pain     S/P cervical spinal fusion     Drug-seeking behavior     Opioid type dependence (H)     Severe frontal headaches     Abdominal pain, unspecified abdominal location     Esophageal reflux     Right upper extremity numbness     Right arm numbness     Lesion of ulnar nerve     TMJ (temporomandibular joint syndrome)     Intractable chronic migraine without aura     Encounter for long-term opiate analgesic use     Rheumatoid arthritis of foot (H)     Other drug-induced neutropenia (H)     Rheumatoid arthritis with positive rheumatoid factor, involving unspecified site (H)     Cervical high risk HPV (human papillomavirus) test positive     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)     Past Medical History:   Diagnosis Date     Acetaminophen overdose 3/24/2011     Anemia      Anesthesia complication     pt states has a histor of heart stopping during anesthesia,     Arrhythmia      Cerebral infarction (H)      Cervical high risk HPV (human papillomavirus) test positive 02/22/2017    type 18 & other HR HPV     Chronic infection     MRSA     Chronic pain 3/24/2011     Degenerative  "joint disease      Endometriosis      Fibromyalgia      Gastro-oesophageal reflux disease      Heart murmur      History of blood transfusion      Hypothyroidism      Immune disorder (H)      Learning disability      Malignant neoplasm (H)      Migraine      MRSA (methicillin resistant staph aureus) culture positive      Neck injuries      Opioid type dependence (H)      Other chronic pain      PONV (postoperative nausea and vomiting)     states \"flatlines\"during anesthesia     RA (rheumatoid arthritis) (H)      Renal stone      Scoliosis      Stomach ulcer     history       OBJECTIVE:  no apparent distress  /88  Pulse 51  Temp 97.2  F (36.2  C) (Oral)  Resp 18  Ht 5' 5\" (1.651 m)  Wt 153 lb (69.4 kg)  SpO2 100%  BMI 25.46 kg/m2  Are of dry erythema scalp on the right   No cervical ymphadenopathy    ICD-10-CM    1. Abscess, scalp L02.811 clindamycin (CLEOCIN) 150 MG capsule    PLAN: continue =Cleocin for one week      "

## 2018-05-31 NOTE — MR AVS SNAPSHOT
"              After Visit Summary   5/31/2018    Kalyn Rivero    MRN: [id number]           Patient Information     Date Of Birth          1970        Visit Information        Provider Department      5/31/2018 1:45 PM Mika Zamora MD [facility]        Today's Diagnoses     Abscess, scalp    -  1       Follow-ups after your visit        Your next 10 appointments already scheduled     Jun 28, 2018  9:40 AM CDT   (Arrive by 9:25 AM)   Return Botox with Rosina Quinones MD   [facility] ([facility] [place])    [address] [place] [address] [phone]              Who to contact     If you have questions or need follow up information about today's clinic visit or your schedule please contact [facility] directly at [phone].  Normal or non-critical lab and imaging results will be communicated to you by MyChart, letter or phone within 4 business days after the clinic has received the results. If you do not hear from us within 7 days, please contact the clinic through MyChart or phone. If you have a critical or abnormal lab result, we will notify you by phone as soon as possible.  Submit refill requests through Aradigm or call your pharmacy and they will forward the refill request to us. Please allow 3 business days for your refill to be completed.          Additional Information About Your Visit        MyChart Information     Aradigm lets you send messages to your doctor, view your test results, renew your prescriptions, schedule appointments and more. To sign up, go to [email or web address] . Click on \"Log in\" on the left side of the screen, which will take you to the Welcome page. Then click on \"Sign up Now\" on the right side of the page.     You will be asked to enter the access code listed below, as well as some personal information. Please follow the directions to create " "your username and password.     Your access code is: [email or web address]  Expires: 2018  6:30 AM     Your access code will  in 90 days. If you need help or a new code, please call your [facility] or [phone].        Care EveryWhere ID     This is your Care EveryWhere ID. This could be used by other organizations to access your [place] medical records  [id number]        Your Vitals Were     Pulse Temperature Respirations Height Pulse Oximetry BMI (Body Mass Index)    51 97.2  F (36.2  C) (Oral) 18 5' 5\" (1.651 m) 100% 25.46 kg/m2       Blood Pressure from Last 3 Encounters:   18 124/88   18 128/88   18 130/80    Weight from Last 3 Encounters:   18 153 lb (69.4 kg)   18 153 lb (69.4 kg)   18 154 lb 6.4 oz (70 kg)              Today, you had the following     No orders found for display         Today's Medication Changes          These changes are accurate as of 18  1:47 PM.  If you have any questions, ask your nurse or doctor.               Start taking these medicines.        Dose/Directions    clindamycin 150 MG capsule   Commonly known as:  CLEOCIN   Used for:  Abscess, scalp   Started by:  Mika Zamora MD        Dose:  150 mg   Take 1 capsule (150 mg) by mouth 4 times daily for 7 days   Quantity:  28 capsule   Refills:  0       fluconazole 150 MG tablet   Commonly known as:  DIFLUCAN   Used for:  Abscess, scalp   Started by:  Mika Zamora MD        Dose:  150 mg   Take 1 tablet (150 mg) by mouth every 3 days   Quantity:  4 tablet   Refills:  0            Where to get your medicines      These medications were sent to [facility]/pharmacy #[id number] - [place], [place] - [address] [place] [address]     Phone:  [phone]     clindamycin 150 MG capsule    fluconazole 150 MG tablet                Primary Care Provider Office Phone # Fax #    Mika Zamora [phone] [address] [address] " [facility] [address]        Equal Access to Services     [facility]PH[facility] : Hadii ashlee shelton hadchalothomas Soarielleali, waaxda luqadaha, qaybta kaalmada rachid, alexis hollins. So [facility] [phone].    ATENCIÓN: Si habla español, tiene a holcomb disposición servicios gratuitos de asistencia lingüística. [facility] [phone].    We comply with applicable federal civil rights laws and [place] laws. We do not discriminate on the basis of race, color, national origin, age, disability, sex, sexual orientation, or gender identity.            Thank you!     Thank you for choosing [facility]  for your care. Our goal is always to provide you with excellent care. Hearing back from our patients is one way we can continue to improve our services. Please take a few minutes to complete the written survey that you may receive in the mail after your visit with us. Thank you!             Your Updated Medication List - Protect others around you: Learn how to safely use, store and throw away your medicines at www.disposemymeds.org.          This list is accurate as of 5/31/18  1:47 PM.  Always use your most recent med list.                   Brand Name Dispense Instructions for use Diagnosis    * BOTOX IJ      Inject 170 Units as directed Lot # [id number] with Expiration Date:  10/2020        * Botulinum Toxin Type A 200 units injection    BOTOX    200 Units    Inject 200 Units as directed every 3 months    Intractable chronic migraine without aura and without status migrainosus       CELEBREX PO      Take 200 mg by mouth 3 times daily        clindamycin 150 MG capsule    CLEOCIN    28 capsule    Take 1 capsule (150 mg) by mouth 4 times daily for 7 days    Abscess, scalp       CVS SENNA 8.6 MG tablet   Generic drug:  senna     120 tablet    TAKE 2 TABLETS BY MOUTH 2 TIMES DAILY    Rheumatoid arthritis with positive rheumatoid factor, involving unspecified site (H)       ENBREL SURECLICK 50 MG/ML autoinjector    Generic drug:  Etanercept           fluconazole 150 MG tablet    DIFLUCAN    4 tablet    Take 1 tablet (150 mg) by mouth every 3 days    Abscess, scalp       KLONOPIN PO      Take 0.5 mg by mouth 2 tablets per day        leflunomide 20 MG tablet    ARAVA     Take 20 mg by mouth every morning Reported on 3/28/2017        omeprazole 40 MG capsule    priLOSEC    90 capsule    TAKE 1 CAPSULE (40 MG) BY MOUTH DAILY TAKE 30-60 MINUTES BEFORE A MEAL.    PUD (peptic ulcer disease)       VITAMIN B-12 PO      Take 4 tablets by mouth daily        * Notice:  This list has 2 medication(s) that are the same as other medications prescribed for you. Read the directions carefully, and ask your doctor or other care provider to review them with you.

## 2018-06-28 ENCOUNTER — OFFICE VISIT (OUTPATIENT)
Dept: PHYSICAL MEDICINE AND REHAB | Facility: CLINIC | Age: 48
End: 2018-06-28
Payer: COMMERCIAL

## 2018-06-28 VITALS
DIASTOLIC BLOOD PRESSURE: 96 MMHG | SYSTOLIC BLOOD PRESSURE: 128 MMHG | WEIGHT: 146.6 LBS | HEART RATE: 89 BPM | HEIGHT: 65 IN | BODY MASS INDEX: 24.43 KG/M2

## 2018-06-28 DIAGNOSIS — G43.719 INTRACTABLE CHRONIC MIGRAINE WITHOUT AURA AND WITHOUT STATUS MIGRAINOSUS: Primary | ICD-10-CM

## 2018-06-28 ASSESSMENT — PAIN SCALES - GENERAL: PAINLEVEL: WORST PAIN (10)

## 2018-06-28 NOTE — PROGRESS NOTES
"MIGRAINE BOTOX HEADACHE PROCEDURE NOTE     BP (!) 128/96  Pulse 89  Ht 5' 5\" (1.651 m)  Wt 146 lb 9.6 oz (66.5 kg)  BMI 24.4 kg/m2       Current Outpatient Prescriptions:      Botulinum Toxin Type A (BOTOX) 200 UNITS SOLR, Inject 200 Units as directed every 3 months, Disp: 200 Units, Rfl: 3     Celecoxib (CELEBREX PO), Take 200 mg by mouth 3 times daily, Disp: , Rfl:      ClonazePAM (KLONOPIN PO), Take 0.5 mg by mouth 2 tablets per day, Disp: , Rfl:      CVS SENNA 8.6 MG tablet, TAKE 2 TABLETS BY MOUTH 2 TIMES DAILY, Disp: 120 tablet, Rfl: 1     Cyanocobalamin (VITAMIN B-12 PO), Take 4 tablets by mouth daily , Disp: , Rfl:      ENBREL SURECLICK 50 MG/ML autoinjector, , Disp: , Rfl:      fluconazole (DIFLUCAN) 150 MG tablet, Take 1 tablet (150 mg) by mouth every 3 days, Disp: 4 tablet, Rfl: 0     leflunomide (ARAVA) 20 MG tablet, Take 20 mg by mouth every morning Reported on 3/28/2017, Disp: , Rfl:      omeprazole (PRILOSEC) 40 MG capsule, TAKE 1 CAPSULE (40 MG) BY MOUTH DAILY TAKE 30-60 MINUTES BEFORE A MEAL., Disp: 90 capsule, Rfl: 1     OnabotulinumtoxinA (BOTOX IJ), Inject 170 Units as directed Lot # /[id number] with Expiration Date:  10/2020, Disp: , Rfl:     Current Facility-Administered Medications:      lidocaine 1 % injection 5 mL, 5 mL, INTRA-ARTICULAR, Once, Carrol Gaspar MD    Facility-Administered Medications Ordered in Other Visits:      bupivacaine 0.5 % -  EPINEPHrine 1:200,000 injection, , , PRN, Yvon Negrete MD, 20 mL at 04/27/17 1505     mepivacaine (PF) (CARBOCAINE) 2 % injection, , , PRN, Yvon Negrete MD, 10 mL at 06/18/15 1250        Allergies   Allergen Reactions     Morphine Swelling     Codeine Nausea and Nausea and Vomiting     Swelling, itching. GI Intolerance.      Gabapentin Other (See Comments)     Pins,needles, stabbing aching at once  Numbness and Tingling     Ibuprofen      Sulfa Drugs      unknown     Tylenol [Acetaminophen]      Pt. States " that she has Liver problems     Erythromycin Nausea     Vomiting      Penicillins Rash     Prednisone Palpitations     Heart racing        PHYSICAL EXAM  Pt states headache today, throbbing in nature, rates 10/10. She states that it started 3 weeks ago.        JESSICA Lucas was diagnosed with rheumatoid arthritis since age 21 years, and migraine. She was getting Botox since age 21 years, she was getting them at [facility] but the physician left the state.    She has a history of Right elbow replacement, and developed a fracture of the humerus, progressed rotator cuff tear. She is about to see her surgeon on the 16th of April. She reports that the surgeon at [facility] for a amputation. She notes that she has a lot of stress due to this.     Kalyn notes that she was discharged from her pain clinic, which prescribed her OxyContin for the right elbow pain. She continues to be on adderol. She is accompanied by her PCA today.     All the stress and insomnia has resulted in headaches.  She is in significant pain in the right elbow which makes the migraines worse.     Patient reports the following new medical problems since last visit: Right arm surgery July 19, 2017 at [facility].    RESPONSE TO PREVIOUS TREATMENT:    Patient Kalyn Rivero received 170 units of Botox on 4/05/2018 and an additional 45 units on 4/10/18.     Problems following the previous series of neurotoxin injections included:  No problems reported    Benefits by Patient Report:       1.  Headache Frequency During this Injection Cycle:  0 headache since last injections up until 3 weeks ago          2.  Headache Duration During this Injection Cycle: lasts for 2 hours      3.  Headache Intensity During this Injection Cycle:  Headache intensity during this injection cycle:      A.  2/10  =  Typical pain level.    B.  10/10  =  Worst pain level in the last 5 weeks    C.  0/10  =  Lowest pain level.       4.  Change in headache medication usage during this  injection cycle:  (For Example:  Able to decrease use of oral pain medications.)  In the last 5 weeks, she had trigger point injections for TMJ.      5.  ER Visits During This Injection Cycle:  None      6.  Functional Performance:  Change in ADL's, social interaction, days lost from work, etc. Has had to cancel some activities due to headache.       BOTULINUM NEUROTOXIN INJECTION PROCEDURES:    VERIFICATION OF PATIENT IDENTIFICATION  Responsible Person:  wes  : verified  Full Name: verified    INDICATIONS FOR PROCEDURES:  Kalyn Rivero is a 47 year old patient with headache affecting the head, neck and shoulder girdle musculature secondary to a diagnosis of Chronic Migraine Headache with associated pain.    Her baseline symptoms have been recalcitrant to oral medications and conservative therapy.  She is here today for reinjection with Botox.    GOAL OF PROCEDURE:  The goal of this procedure is to improve decrease pain  associated with headache.    TOTAL DOSE ADMINISTERED:  Total dose; 200 units   Dose Administered:  190 units Botox (Botulinum Toxin Type A)       2:1 Dilution   Unavoidable waste 10 units   Diluent Used:  Preservative Free Normal Saline  Total Volume of Diluent Used:  4.0 ml  Lot # /C3 with Expiration Date:  2020   NDC [address]-92    CONSENT:  The risks, benefits, and treatment options were discussed with Kalyn Rivero and she agreed to proceed.    EQUIPMENT USED:  Needle-25mm stimulating/recording  Needle-37mm stimulating/recording  EMG/NCS Machine    SKIN PREPARATION:  Skin preparation was performed using an alcohol wipe.      GUIDANCE DESCRIPTION:  Electro-myographic guidance was necessary throughout the posterior neck portion to accurately identify all areas of spastic muscles while avoiding injection of non-spastic muscles, neighboring nerves and nearby vascular structures.     AREA/MUSCLE INJECTED:    HEAD & SCALP MUSCLES:  190 units Botox = Total Dose, 2:1  Dilution  Right Trapezius - 15 units in 3 sites   Left Trapezius - 10 units in 3 sites     Right occipitalis - 20 units of Botox at 4 site/s.  Left Occipitalis - 15 units at 3 sites     Right Paracervical  15 units in 3 sites   Left paracervical 5 units in 2 sites     Right longissimus cervicis 5 units in 1 site  Right semispinalis cervicis  5 units in 1 site   Right Lev scapulae 5 units in 1 site         Right Frontalis - 15 units of Botox at 3 site/s.  Left Frontalis - 15 units of Botox at 3 site/s.    Right Temporalis - 25 units of Botox at 5 site/s.  Left Temporalis - 20 units of Botox at 4 site/s.    Right Masseter, superficial belly   1 site 2.5 units   Left Masseter, superficial belly 1 site 2.5 units     Right  - 5 units of Botox at 1 site/s.   Left  - 5 units of Botox at 1 site/s.    Procerus - 5 units of Botox at 1 site/s.          RESPONSE TO PROCEDURE:  Kalyn Rivero tolerated the procedure well and there were no immediate complications.   She was allowed to recover for an appropriate period of time and was discharged home in stable condition.     Follow Up  Kalyn Rivero was asked to follow up by phone in 7-14 days with Annamaria Lamb RN, Care Coordinator, to report her response to this series of injections.  Based on the patient's previous response to this therapy, Kalyn Rivero was rescheduled for the next series of injections in 12 weeks.      Rosina Quinones MD, MHA   Physical Medicine & Rehabilitation

## 2018-06-28 NOTE — LETTER
"6/28/2018       RE: Kalyn Rivero  [address] [facility] [address]     Dear Colleague,    Thank you for referring your patient, Kalyn Rivero, to the [facility] PHYSICAL MEDICINE AND REHABILITATION at [facility]. Please see a copy of my visit note below.    MIGRAINE BOTOX HEADACHE PROCEDURE NOTE     BP (!) 128/96  Pulse 89  Ht 5' 5\" (1.651 m)  Wt 146 lb 9.6 oz (66.5 kg)  BMI 24.4 kg/m2       Current Outpatient Prescriptions:      Botulinum Toxin Type A (BOTOX) 200 UNITS SOLR, Inject 200 Units as directed every 3 months, Disp: 200 Units, Rfl: 3     Celecoxib (CELEBREX PO), Take 200 mg by mouth 3 times daily, Disp: , Rfl:      ClonazePAM (KLONOPIN PO), Take 0.5 mg by mouth 2 tablets per day, Disp: , Rfl:      CVS SENNA 8.6 MG tablet, TAKE 2 TABLETS BY MOUTH 2 TIMES DAILY, Disp: 120 tablet, Rfl: 1     Cyanocobalamin (VITAMIN B-12 PO), Take 4 tablets by mouth daily , Disp: , Rfl:      ENBREL SURECLICK 50 MG/ML autoinjector, , Disp: , Rfl:      fluconazole (DIFLUCAN) 150 MG tablet, Take 1 tablet (150 mg) by mouth every 3 days, Disp: 4 tablet, Rfl: 0     leflunomide (ARAVA) 20 MG tablet, Take 20 mg by mouth every morning Reported on 3/28/2017, Disp: , Rfl:      omeprazole (PRILOSEC) 40 MG capsule, TAKE 1 CAPSULE (40 MG) BY MOUTH DAILY TAKE 30-60 MINUTES BEFORE A MEAL., Disp: 90 capsule, Rfl: 1     OnabotulinumtoxinA (BOTOX IJ), Inject 170 Units as directed Lot # /C3 with Expiration Date:  10/2020, Disp: , Rfl:     Current Facility-Administered Medications:      lidocaine 1 % injection 5 mL, 5 mL, INTRA-ARTICULAR, Once, Carrol Gaspar MD    Facility-Administered Medications Ordered in Other Visits:      bupivacaine 0.5 % -  EPINEPHrine 1:200,000 injection, , , PRN, Yvon Negrete MD, 20 mL at 04/27/17 1505     mepivacaine (PF) (CARBOCAINE) 2 % injection, , , PRN, Penelope, Yvon Esteban MD, 10 mL at 06/18/15 1250        Allergies "   Allergen Reactions     Morphine Swelling     Codeine Nausea and Nausea and Vomiting     Swelling, itching. GI Intolerance.      Gabapentin Other (See Comments)     Pins,needles, stabbing aching at once  Numbness and Tingling     Ibuprofen      Sulfa Drugs      unknown     Tylenol [Acetaminophen]      Pt. States that she has Liver problems     Erythromycin Nausea     Vomiting      Penicillins Rash     Prednisone Palpitations     Heart racing        PHYSICAL EXAM  Pt states headache today, throbbing in nature, rates 10/10. She states that it started 3 weeks ago.        JESSICA Lucas was diagnosed with rheumatoid arthritis since age 21 years, and migraine. She was getting Botox since age 21 years, she was getting them at [facility] but the physician left the state.    She has a history of Right elbow replacement, and developed a fracture of the humerus, progressed rotator cuff tear. She is about to see her surgeon on the 16th of April. She reports that the surgeon at [facility] for a amputation. She notes that she has a lot of stress due to this.     Kalyn notes that she was discharged from her pain clinic, which prescribed her OxyContin for the right elbow pain. She continues to be on adderol. She is accompanied by her PCA today.     All the stress and insomnia has resulted in headaches.  She is in significant pain in the right elbow which makes the migraines worse.     Patient reports the following new medical problems since last visit: Right arm surgery July 19, 2017 at [facility].    RESPONSE TO PREVIOUS TREATMENT:    Patient Kalyn Rivero received 170 units of Botox on 4/05/2018 and an additional 45 units on 4/10/18.     Problems following the previous series of neurotoxin injections included:  No problems reported    Benefits by Patient Report:       1.  Headache Frequency During this Injection Cycle:  0 headache since last injections up until 3 weeks ago          2.  Headache Duration During this Injection Cycle:  lasts for 2 hours      3.  Headache Intensity During this Injection Cycle:  Headache intensity during this injection cycle:      A.  2/10  =  Typical pain level.    B.  10/10  =  Worst pain level in the last 5 weeks    C.  0/10  =  Lowest pain level.       4.  Change in headache medication usage during this injection cycle:  (For Example:  Able to decrease use of oral pain medications.)  In the last 5 weeks, she had trigger point injections for TMJ.      5.  ER Visits During This Injection Cycle:  None      6.  Functional Performance:  Change in ADL's, social interaction, days lost from work, etc. Has had to cancel some activities due to headache.       BOTULINUM NEUROTOXIN INJECTION PROCEDURES:    VERIFICATION OF PATIENT IDENTIFICATION  Responsible Person:  wes  : verified  Full Name: verified    INDICATIONS FOR PROCEDURES:  Kalyn Rivero is a 47 year old patient with headache affecting the head, neck and shoulder girdle musculature secondary to a diagnosis of Chronic Migraine Headache with associated pain.    Her baseline symptoms have been recalcitrant to oral medications and conservative therapy.  She is here today for reinjection with Botox.    GOAL OF PROCEDURE:  The goal of this procedure is to improve decrease pain  associated with headache.    TOTAL DOSE ADMINISTERED:  Total dose; 200 units   Dose Administered:  190 units Botox (Botulinum Toxin Type A)       2:1 Dilution   Unavoidable waste 10 units   Diluent Used:  Preservative Free Normal Saline  Total Volume of Diluent Used:  4.0 ml  Lot # /C3 with Expiration Date:  2020   NDC [address]-24    CONSENT:  The risks, benefits, and treatment options were discussed with Kalyn Rivero and she agreed to proceed.    EQUIPMENT USED:  Needle-25mm stimulating/recording  Needle-37mm stimulating/recording  EMG/NCS Machine    SKIN PREPARATION:  Skin preparation was performed using an alcohol wipe.      GUIDANCE DESCRIPTION:  Electro-myographic  guidance was necessary throughout the posterior neck portion to accurately identify all areas of spastic muscles while avoiding injection of non-spastic muscles, neighboring nerves and nearby vascular structures.     AREA/MUSCLE INJECTED:    HEAD & SCALP MUSCLES:  190 units Botox = Total Dose, 2:1 Dilution  Right Trapezius - 15 units in 3 sites   Left Trapezius - 10 units in 3 sites     Right occipitalis - 20 units of Botox at 4 site/s.  Left Occipitalis - 15 units at 3 sites     Right Paracervical  15 units in 3 sites   Left paracervical 5 units in 2 sites     Right longissimus cervicis 5 units in 1 site  Right semispinalis cervicis  5 units in 1 site   Right Lev scapulae 5 units in 1 site         Right Frontalis - 15 units of Botox at 3 site/s.  Left Frontalis - 15 units of Botox at 3 site/s.    Right Temporalis - 25 units of Botox at 5 site/s.  Left Temporalis - 20 units of Botox at 4 site/s.    Right Masseter, superficial belly   1 site 2.5 units   Left Masseter, superficial belly 1 site 2.5 units     Right  - 5 units of Botox at 1 site/s.   Left  - 5 units of Botox at 1 site/s.    Procerus - 5 units of Botox at 1 site/s.          RESPONSE TO PROCEDURE:  Kalyn Rivero tolerated the procedure well and there were no immediate complications.   She was allowed to recover for an appropriate period of time and was discharged home in stable condition.     Follow Up  Kalyn Rivero was asked to follow up by phone in 7-14 days with Annamaria Lamb RN, Care Coordinator, to report her response to this series of injections.  Based on the patient's previous response to this therapy, Kalyn Rivero was rescheduled for the next series of injections in 12 weeks.      Rosina Quinones MD, MHA   Physical Medicine & Rehabilitation

## 2018-06-28 NOTE — MR AVS SNAPSHOT
"              After Visit Summary   6/28/2018    Kalyn Rivero    MRN: [id number]           Patient Information     Date Of Birth          1970        Visit Information        Provider Department      6/28/2018 9:40 AM Rosina Quinones MD [facility] Physical Medicine and Rehabilitation        Today's Diagnoses     Intractable chronic migraine without aura and without status migrainosus    -  1       Follow-ups after your visit        Follow-up notes from your care team     Return in about 12 weeks (around 9/20/2018).      Your next 10 appointments already scheduled     Sep 20, 2018 11:20 AM CDT   (Arrive by 11:05 AM)   Return Botox with Rosina Quinones MD   [facility] Physical Medicine and Rehabilitation ([facility])    [address]  [address] [facility] [address] [phone]              Who to contact     Please call your clinic at [phone] to:    Ask questions about your health    Make or cancel appointments    Discuss your medicines    Learn about your test results    Speak to your doctor            Additional Information About Your Visit        Care EveryWhere ID     This is your Care EveryWhere ID. This could be used by other organizations to access your [facility] medical records  [id number]        Your Vitals Were     Pulse Height BMI (Body Mass Index)             89 5' 5\" (1.651 m) 24.4 kg/m2          Blood Pressure from Last 3 Encounters:   06/28/18 (!) 128/96   05/31/18 124/88   05/03/18 128/88    Weight from Last 3 Encounters:   06/28/18 146 lb 9.6 oz (66.5 kg)   05/31/18 153 lb (69.4 kg)   05/03/18 153 lb (69.4 kg)              We Performed the Following     HC CHEMODENERVATE FACIAL,TRIGERM,NERV MIGRAINE     NEEDLE EMG GUIDE W CHEMODENERVATION        Primary Care Provider Office Phone # Fax #    Mika Zamora [phone] [address] [facility] [address]        Equal Access to Services     [facility] " GAAR : Hadii aad ku hadchaloo Soarielleali, waaxda luqadaha, qaybta kaalmada adelashawn, alexis lynnin hayaan ranvladislav gillespie ian . So [facility] [phone].    ATENCIÓN: Si habla edgar, tiene a holcomb disposición servicios gratuitos de asistencia lingüística. Llame al [phone].    We comply with applicable federal civil rights laws and [place] laws. We do not discriminate on the basis of race, color, national origin, age, disability, sex, sexual orientation, or gender identity.            Thank you!     Thank you for choosing [facility] PHYSICAL MEDICINE AND REHABILITATION  for your care. Our goal is always to provide you with excellent care. Hearing back from our patients is one way we can continue to improve our services. Please take a few minutes to complete the written survey that you may receive in the mail after your visit with us. Thank you!             Your Updated Medication List - Protect others around you: Learn how to safely use, store and throw away your medicines at www.disposemymeds.org.          This list is accurate as of 6/28/18 10:04 AM.  Always use your most recent med list.                   Brand Name Dispense Instructions for use Diagnosis    * BOTOX IJ      Inject 170 Units as directed Lot # /[id number] with Expiration Date:  10/2020        * BOTOX IJ      Inject 200 Units as directed Total dose 200 units  Administered 190 units  Unavoidable waste 30 units  Lot # /C3 with Expiration Date:  12/2020  NDC [address]-44        * Botulinum Toxin Type A 200 units injection    BOTOX    200 Units    Inject 200 Units as directed every 3 months    Intractable chronic migraine without aura and without status migrainosus       CELEBREX PO      Take 200 mg by mouth 3 times daily        CVS SENNA 8.6 MG tablet   Generic drug:  senna     120 tablet    TAKE 2 TABLETS BY MOUTH 2 TIMES DAILY    Rheumatoid arthritis with positive rheumatoid factor, involving unspecified site (H)       ENBREL SURECLICK 50 MG/ML  autoinjector   Generic drug:  Etanercept           fluconazole 150 MG tablet    DIFLUCAN    4 tablet    Take 1 tablet (150 mg) by mouth every 3 days    Abscess, scalp       KLONOPIN PO      Take 0.5 mg by mouth 2 tablets per day        leflunomide 20 MG tablet    ARAVA     Take 20 mg by mouth every morning Reported on 3/28/2017        omeprazole 40 MG capsule    priLOSEC    90 capsule    TAKE 1 CAPSULE (40 MG) BY MOUTH DAILY TAKE 30-60 MINUTES BEFORE A MEAL.    PUD (peptic ulcer disease)       VITAMIN B-12 PO      Take 4 tablets by mouth daily        * Notice:  This list has 3 medication(s) that are the same as other medications prescribed for you. Read the directions carefully, and ask your doctor or other care provider to review them with you.

## 2018-07-25 ENCOUNTER — TRANSFERRED RECORDS (OUTPATIENT)
Dept: HEALTH INFORMATION MANAGEMENT | Facility: CLINIC | Age: 48
End: 2018-07-25

## 2018-07-25 LAB
ALT SERPL-CCNC: 41 IU/L (ref 5–35)
CREAT SERPL-MCNC: 0.82 MG/DL (ref 0.51–1.3)
GFR SERPL CREATININE-BSD FRML MDRD: 79.8 ML/MIN/1.73M2

## 2018-07-27 ENCOUNTER — HOSPITAL ENCOUNTER (EMERGENCY)
Facility: CLINIC | Age: 48
Discharge: HOME | End: 2018-07-27
Attending: EMERGENCY MEDICINE | Admitting: EMERGENCY MEDICINE
Payer: COMMERCIAL

## 2018-07-27 ENCOUNTER — APPOINTMENT (OUTPATIENT)
Dept: CT IMAGING | Facility: CLINIC | Age: 48
End: 2018-07-27
Attending: EMERGENCY MEDICINE
Payer: COMMERCIAL

## 2018-07-27 VITALS
RESPIRATION RATE: 16 BRPM | DIASTOLIC BLOOD PRESSURE: 93 MMHG | BODY MASS INDEX: 22.29 KG/M2 | HEIGHT: 67 IN | TEMPERATURE: 98.9 F | OXYGEN SATURATION: 100 % | SYSTOLIC BLOOD PRESSURE: 128 MMHG | WEIGHT: 142 LBS | HEART RATE: 81 BPM

## 2018-07-27 DIAGNOSIS — S06.0X0A CONCUSSION WITHOUT LOSS OF CONSCIOUSNESS, INITIAL ENCOUNTER: ICD-10-CM

## 2018-07-27 PROCEDURE — 70450 CT HEAD/BRAIN W/O DYE: CPT

## 2018-07-27 PROCEDURE — 72125 CT NECK SPINE W/O DYE: CPT

## 2018-07-27 PROCEDURE — 99284 EMERGENCY DEPT VISIT MOD MDM: CPT | Mod: 25

## 2018-07-27 PROCEDURE — 25000125 ZZHC RX 250: Performed by: EMERGENCY MEDICINE

## 2018-07-27 RX ORDER — MECLIZINE HYDROCHLORIDE 25 MG/1
25 TABLET ORAL EVERY 6 HOURS PRN
Qty: 30 TABLET | Refills: 1 | Status: SHIPPED | OUTPATIENT
Start: 2018-07-27 | End: 2019-08-22

## 2018-07-27 RX ORDER — ONDANSETRON 4 MG/1
4 TABLET, ORALLY DISINTEGRATING ORAL EVERY 8 HOURS PRN
Qty: 10 TABLET | Refills: 0 | Status: SHIPPED | OUTPATIENT
Start: 2018-07-27 | End: 2018-07-30

## 2018-07-27 RX ORDER — ONDANSETRON 4 MG/1
4 TABLET, ORALLY DISINTEGRATING ORAL ONCE
Status: COMPLETED | OUTPATIENT
Start: 2018-07-27 | End: 2018-07-27

## 2018-07-27 RX ADMIN — ONDANSETRON 4 MG: 4 TABLET, ORALLY DISINTEGRATING ORAL at 18:59

## 2018-07-27 ASSESSMENT — ENCOUNTER SYMPTOMS
NECK PAIN: 1
DIZZINESS: 1
PHOTOPHOBIA: 1
NAUSEA: 1

## 2018-07-27 NOTE — ED PROVIDER NOTES
"  History     Chief Complaint:    Fall     HPI   Kalyn Rivero is a 47 year old female who presents to the ED for evaluation following a fall. The patient reports that she slipped on water around 1000 and hit the back of her head on a table. She denies loss of consciousness secondary to the fall. Presently, she is complaining of a headache, neck pain, dizziness, nausea, and \"fuzziness\" in her right eye. The patient has no other complaints at this time.    Allergies:  Morphine  Codeine  Gabapentin  Ibuprofen  Sulfa drugs  Tylneol  Erythromycin  Penicillins  Prednisone     Medications:    Botox  Celebrex  Klonopin  Enbrel sureclick  Arava  Prilosec     Past Medical History:    Acetaminophen right eye  Anemia  Anesthesia complication  Arrhythmia  Cerebral infarction  Cervical high risk HPV test positive  Chronic infection  Chronic pain  DJD  Endometriosis  Fibromyalgia  GERD  Heart murmer  Blood transfusion  Hypothyroidism  Immune disorder  Learning disability  Malignant neoplasm  Migraine  MRSA  Opioid dependence  PONV  RA  Renal stone  Scoliosis  Stomach ulcer    Past Surgical History:    Arthrodesis foot (x2)  Arthroplasty foot  Arthroplasty knee (L and R)  Arthroplasty elbow  Arthroplasty Wrist  Shoulder procedure  Cystoscopy (x2)  Decompression cubital tunnel  Endoscopy  EGD  Fusion cervical posterior one level  Gyn surgery  Inject major joint/ bursa  Inject steroid  Neck surgery  Remove foreign body, upper extremity  Resect bone upper extremity  Rotator cuff repair    Family History:    Diabetes  HTN  Epilepsy  Osteoporosis  Asthma  Endocrine disease  Skin cancer  Anxiety  Migraines  Hyperlipidemia  Heart disease    Social History:  Light tobacco smoker.   Negative for alcohol use.  Marital Status:  Single [1]     Review of Systems   Eyes: Positive for photophobia and visual disturbance.   Gastrointestinal: Positive for nausea.   Musculoskeletal: Positive for neck pain.   Neurological: Positive for " "dizziness. Negative for syncope.   All other systems reviewed and are negative.      Physical Exam   First Vitals:  BP: (!) 141/93  Pulse: 102  Temp: 98.9  F (37.2  C)  Resp: 16  Height: 170.7 cm (5' 7.2\")  Weight: 64.4 kg (142 lb)  SpO2: 100 %      Physical Exam  Vitals: reviewed by me  General: Pt seen on [facility], [facility], cooperative, and alert to conversation  Eyes: Tracking well, clear conjunctiva BL  ENT: MMM, midline trachea.  Full range of motion to neck, no evidence of trauma to head or neck, does have mild midline tenderness to upper C-spine.  No skin changes.  Lungs:   No tachypnea, no accessory muscle use. No respiratory distress.   CV: Rate as above, regular rhythm.    Abd: Soft, non tender, no guarding, no rebound. Non distended  MSK: no peripheral edema or joint effusion.  No evidence of trauma  No CTL or S spine tenderness, no skin changes to back.  Skin: No rash, normal turgor and temperature  Neuro: Clear speech and no facial droop.  Bilateral upper and bilateral lower extremities with sensation intact light touch and 5 out of 5 motor throughout.  Psych: Not RIS, no e/o AH/VH      Emergency Department Course   Imaging:  Radiographic findings were communicated with the patient who voiced understanding of the findings.  CT Cervical Spine without contrast:   1. Negative for fracture.  2. Posterior fusion at the C1-2 level as per radiology.    CT Head without contrast:   No bleed or fractures are identified as per radiology.      Interventions:  1859 Zofran 4 mg PO      Emergency Department Course:  Nursing notes and vitals reviewed. (1735) I performed an exam of the patient as documented above.     IV inserted. Medicine administered as documented above. Blood drawn. This was sent to the lab for further testing, results above.     The patient was sent for a Head CT and Cervical Spine CT while in the emergency department, findings above.     1910 I rechecked the patient and discussed the " results of her workup thus far.     Findings and plan explained to the Patient. Patient discharged home with instructions regarding supportive care, medications, and reasons to return. The importance of close follow-up was reviewed. The patient was prescribed Antivert and Zofran.    I personally reviewed the imaging results with the Patient and answered all related questions prior to discharge.       Impression & Plan    Medical Decision Making:  Kalyn Rivero is a very pleasant 47-year-old female who presents to the emergency room after low-energy head trauma earlier today.  I suspect the diagnosis of concussion, given residual nausea, in the setting of negative CT scan and negative C-spine scan as well.  Reassuringly, the patient has a normal neurologic exam, but is still bothered by some low level nausea as well.  Will discharge her symptomatically therapy, she is understanding return to ED precautions as well as no contact sports in the coming days.  Patient and son at bedside are both okay with this plan.  No skin findings, nothing that needs to be sewn up, will send to primary care next week and give concussion precautions.  Critical Care time:  none    Diagnosis:    ICD-10-CM    1. Concussion without loss of consciousness, initial encounter S06.0X0A        Disposition:  discharged to home    Discharge Medications:  Discharge Medication List as of 7/27/2018  7:16 PM      START taking these medications    Details   meclizine (ANTIVERT) 25 MG tablet Take 1 tablet (25 mg) by mouth every 6 hours as needed for dizziness, Disp-30 tablet, R-1, Local Print      ondansetron (ZOFRAN ODT) 4 MG ODT tab Take 1 tablet (4 mg) by mouth every 8 hours as needed for nausea, Disp-10 tablet, R-0, Local Print           Scribe Disclosure:  I,  Giancarlo Benjamin, am serving as a scribe on 7/27/2018 at 5:35 PM to personally document services performed by Ángel Garrido MD, based on my observations and the provider's statements to  me.          Giancarlo Benjamin  7/27/2018    EMERGENCY DEPARTMENT       Ángel Garrido MD  07/27/18 0528

## 2018-07-27 NOTE — ED AVS SNAPSHOT
Emergency Department    [address] [facility] [address]    Phone:  [phone]    Fax:  [phone]                                       Kalyn Rivero   MRN: [id number]    Department:   Emergency Department   Date of Visit:  7/27/2018           Patient Information     Date Of Birth          1970        Your diagnoses for this visit were:     Concussion without loss of consciousness, initial encounter        You were seen by Ángel Garrido MD.      Follow-up Information     Follow up with Mika Zamora MD. Schedule an appointment as soon as possible for a visit in 3 days.    Specialties:  Family Practice, OB/Gyn    Why:  As needed, For repeat evaluation and symptom check    Contact information:    [address] [facility] [address] [phone]          Follow up with  Emergency Department.    Specialty:  EMERGENCY MEDICINE    Why:  If symptoms worsen    Contact information:    [address] [facility] [id number] [phone]        Discharge Instructions       Healing after a Concussion  Rest  Rest is the best treatment for a concussion. Avoid physical activity until you see your doctor. Avoid activities that cause your symptoms to get worse or make you feel tired. This includes physical activities, watching TV, texting or playing video games.  Don't nap during the day. If you do nap, make sure it is for less than an hour and before 3 p.m. If you find it is hard to fall asleep, talk to your doctor. You may need medicine to help you sleep.  If symptoms have not become worse, you do not need to be wakened and checked on at night.  School  You can rest your brain by staying at home for 1 to 2 days. It is best to get back into the school routine.   At school, you may have trouble taking tests or working on a computer. Symptoms may get worse in band, choir, busy classes or a noisy lunchroom. A doctor can work with the school if  you need a plan to help you succeed.  Work  You may need to change your work routine as you recover. A doctor can help you create a plan for the conditions at your job.  Treat pain    It is best to avoid taking medicines, but if needed, take Tylenol (acetaminophen) for headaches and pain every 4 to 6 hours.    Do not take drugstore medicines such as ibuprofen, Advil, Motrin, Benadryl, Aleve, sleep aids or Tylenol PM. These drugs may cause new problems.    If you cannot manage your pain with Tylenol, call your doctor or go to the emergency department.  Watch symptoms closely  Each day, keep track of your symptoms. This will help your doctor see how well you are healing. Write down the symptom, how often it occurs, how long it lasts, and what makes it better or worse.  Possible symptoms: headache, stomach upset, feeling confused or dizzy, motion sickness, and personality changes.  Returning to activity  Take your time returning to activity. A doctor can help you decide what levels of activity are best for you. If you're returning to a sport, you should see a health care provider before you do.  If you have questions, call  Your doctor, Concussion hotline: [phone], or Athletic medicine: [phone].   For informational purposes only. Not to replace the advice of your health care provider.  Copyright   2014 [facility]. All rights reserved. LiveRS[employer] 127386 - Rev 12/16.      Your next 10 appointments already scheduled     Sep 20, 2018 11:20 AM CDT   (Arrive by 11:05 AM)   Return Botox with Rosina Quinones MD   [facility] ([facility])    [address] [place] [id number] [phone]              24 Hour Appointment Hotline       To make an appointment at any [place] clinic, call [phone] ([phone]). If you don't have a family doctor or clinic, we will help you find one. [facility] are  conveniently located to serve the needs of you and your family.             Review of your medicines      START taking        Dose / Directions Last dose taken    meclizine 25 MG tablet   Commonly known as:  ANTIVERT   Dose:  25 mg   Quantity:  30 tablet        Take 1 tablet (25 mg) by mouth every 6 hours as needed for dizziness   Refills:  1        ondansetron 4 MG ODT tab   Commonly known as:  ZOFRAN ODT   Dose:  4 mg   Quantity:  10 tablet        Take 1 tablet (4 mg) by mouth every 8 hours as needed for nausea   Refills:  0          Our records show that you are taking the medicines listed below. If these are incorrect, please call your family doctor or clinic.        Dose / Directions Last dose taken    * BOTOX IJ   Dose:  170 Units        Inject 170 Units as directed Lot # /[id number] with Expiration Date:  10/2020   Refills:  0        * BOTOX IJ   Dose:  200 Units        Inject 200 Units as directed Total dose 200 units  Administered 190 units  Unavoidable waste 30 units  Lot # /[id number] with Expiration Date:  12/2020  NDC [address]-01   Refills:  0        * Botulinum Toxin Type A 200 units injection   Commonly known as:  BOTOX   Dose:  200 Units   Quantity:  200 Units        Inject 200 Units as directed every 3 months   Refills:  3        CELEBREX PO   Dose:  200 mg        Take 200 mg by mouth 3 times daily   Refills:  0        [employer] SENNA 8.6 MG tablet   Quantity:  120 tablet   Generic drug:  senna        TAKE 2 TABLETS BY MOUTH 2 TIMES DAILY   Refills:  1        ENBREL SURECLICK 50 MG/ML autoinjector   Generic drug:  Etanercept        Refills:  0        KLONOPIN PO   Dose:  0.5 mg        Take 0.5 mg by mouth 2 tablets per day   Refills:  0        leflunomide 20 MG tablet   Commonly known as:  ARAVA   Dose:  20 mg        Take 20 mg by mouth every morning Reported on 3/28/2017   Refills:  0        omeprazole 40 MG capsule   Commonly known as:  priLOSEC   Quantity:  90 capsule        TAKE 1 CAPSULE (40 MG) BY MOUTH  DAILY TAKE 30-60 MINUTES BEFORE A MEAL.   Refills:  1        VITAMIN B-12 PO   Dose:  4 tablet        Take 4 tablets by mouth daily   Refills:  0        * Notice:  This list has 3 medication(s) that are the same as other medications prescribed for you. Read the directions carefully, and ask your doctor or other care provider to review them with you.            Prescriptions were sent or printed at these locations (2 Prescriptions)                   Other Prescriptions                Printed at Department/Unit printer (2 of 2)         meclizine (ANTIVERT) 25 MG tablet               ondansetron (ZOFRAN ODT) 4 MG ODT tab                Procedures and tests performed during your visit     CT Head w/o Contrast    Cervical spine CT w/o contrast      Orders Needing Specimen Collection     None      Pending Results     Date and Time Order Name Status Description    7/27/2018 1738 Cervical spine CT w/o contrast Preliminary     7/27/2018 1738 CT Head w/o Contrast Preliminary             Pending Culture Results     No orders found from 7/25/2018 to 7/28/2018.            Pending Results Instructions     If you had any lab results that were not finalized at the time of your Discharge, you can call the ED Lab Result RN at [phone]. You will be contacted by this team for any positive Lab results or changes in treatment. The nurses are available 7 days a week from 10A to 6:30P.  You can leave a message 24 hours per day and they will return your call.        Test Results From Your Hospital Stay        7/27/2018  6:10 PM      Narrative     CT SCAN OF THE HEAD WITHOUT CONTRAST   7/27/2018 6:07 PM     HISTORY: fall with headache;     TECHNIQUE:  Axial images of the head and coronal reformations without  IV contrast material. Radiation dose for this scan was reduced using  automated exposure control, adjustment of the mA and/or kV according  to patient size, or iterative reconstruction technique.    COMPARISON: None.    FINDINGS:   The ventricles are normal in size, shape and configuration.   The brain parenchyma and subarachnoid spaces are normal. There is no  evidence of intracranial hemorrhage, mass, acute infarct or anomaly.  The visualized portions of the sinuses and mastoids appear normal. No  intracranial bleed or skull fractures.        Impression     IMPRESSION: No bleed or fractures are identified.           7/27/2018  6:13 PM      Narrative     CT CERVICAL SPINE WITHOUT CONTRAST   7/27/2018 6:07 PM     HISTORY: fall with neck pain., fall with neck pain.;      TECHNIQUE: Axial images of the cervical spine were obtained without  intravenous contrast. Multiplanar reformations were performed.   Radiation dose for this scan was reduced using automated exposure  control, adjustment of the mA and/or kV according to patient size, or  iterative reconstruction technique.    COMPARISON: CT dated 11/13/2014    FINDINGS: There is no evidence of fracture. Patient is status post  previous posterior fusion at the C1-C2 level. Posterior wires and  posterior rods are in place. These were present on the previous scan.       Craniocervical junction: Normal.     C1-C2:  There is ankylosis of the right lateral mass of C1 and the  right lateral mass of C2.     C2-C3:  Normal disc, facet joints, spinal canal and neural foramina.     C3-C4:  Normal disc, facet joints, spinal canal and neural foramina.     C4-C5:  Normal disc, facet joints, spinal canal and neural foramina.     C5-C6:  Normal disc, facet joints, spinal canal and neural foramina.      C6-C7:  Normal disc, facet joints, spinal canal and neural foramina.      C7-T1:   Normal disc, facet joints, spinal canal and neural foramina.        Impression     IMPRESSION:    1. Negative for fracture.  2. Posterior fusion at the C1-2 level.                Clinical Quality Measure: Blood Pressure Screening     Your blood pressure was checked while you were in the emergency department today. The last reading  we obtained was  BP: (!) 128/93 . Please read the guidelines below about what these numbers mean and what you should do about them.  If your systolic blood pressure (the top number) is less than 120 and your diastolic blood pressure (the bottom number) is less than 80, then your blood pressure is normal. There is nothing more that you need to do about it.  If your systolic blood pressure (the top number) is 120-139 or your diastolic blood pressure (the bottom number) is 80-89, your blood pressure may be higher than it should be. You should have your blood pressure rechecked within a year by a primary care provider.  If your systolic blood pressure (the top number) is 140 or greater or your diastolic blood pressure (the bottom number) is 90 or greater, you may have high blood pressure. High blood pressure is treatable, but if left untreated over time it can put you at risk for heart attack, stroke, or kidney failure. You should have your blood pressure rechecked by a primary care provider within the next 4 weeks.  If your provider in the emergency department today gave you specific instructions to follow-up with your doctor or provider even sooner than that, you should follow that instruction and not wait for up to 4 weeks for your follow-up visit.        Thank you for choosing [facility]       Thank you for choosing [facility] for your care. Our goal is always to provide you with excellent care. Hearing back from our patients is one way we can continue to improve our services. Please take a few minutes to complete the written survey that you may receive in the mail after you visit with us. Thank you!        Care EveryWhere ID     This is your Care EveryWhere ID. This could be used by other organizations to access your [facility] medical records  [id number]        Equal Access to Services     MARY BURTON : Makeda Morrell, francine gilbert, alexis whiteside  ah. So [facility] [phone].    ATENCIÓN: Si habla español, tiene a holcomb disposición servicios gratuitos de asistencia lingüística. Llame al [phone].    We comply with applicable federal civil rights laws and [place] laws. We do not discriminate on the basis of race, color, national origin, age, disability, sex, sexual orientation, or gender identity.            After Visit Summary       This is your record. Keep this with you and show to your community pharmacist(s) and doctor(s) at your next visit.

## 2018-07-27 NOTE — ED AVS SNAPSHOT
Emergency Department    [address] [place] [address]    Phone:  [phone]    Fax:  [phone]                                       Kalyn Rivero   MRN: [id number]    Department:   Emergency Department   Date of Visit:  7/27/2018           After Visit Summary Signature Page     I have received my discharge instructions, and my questions have been answered. I have discussed any challenges I see with this plan with the nurse or doctor.    ..........................................................................................................................................  Patient/Patient Representative Signature      ..........................................................................................................................................  Patient Representative Print Name and Relationship to Patient    ..................................................               ................................................  Date                                            Time    ..........................................................................................................................................  Reviewed by Signature/Title    ...................................................              ..............................................  Date                                                            Time

## 2018-07-28 NOTE — DISCHARGE INSTRUCTIONS
Healing after a Concussion  Rest  Rest is the best treatment for a concussion. Avoid physical activity until you see your doctor. Avoid activities that cause your symptoms to get worse or make you feel tired. This includes physical activities, watching TV, texting or playing video games.  Don't nap during the day. If you do nap, make sure it is for less than an hour and before 3 p.m. If you find it is hard to fall asleep, talk to your doctor. You may need medicine to help you sleep.  If symptoms have not become worse, you do not need to be wakened and checked on at night.  School  You can rest your brain by staying at home for 1 to 2 days. It is best to get back into the school routine.   At school, you may have trouble taking tests or working on a computer. Symptoms may get worse in band, choir, busy classes or a noisy lunchroom. A doctor can work with the school if you need a plan to help you succeed.  Work  You may need to change your work routine as you recover. A doctor can help you create a plan for the conditions at your job.  Treat pain    It is best to avoid taking medicines, but if needed, take Tylenol (acetaminophen) for headaches and pain every 4 to 6 hours.    Do not take drugstore medicines such as ibuprofen, Advil, Motrin, Benadryl, Aleve, sleep aids or Tylenol PM. These drugs may cause new problems.    If you cannot manage your pain with Tylenol, call your doctor or go to the emergency department.  Watch symptoms closely  Each day, keep track of your symptoms. This will help your doctor see how well you are healing. Write down the symptom, how often it occurs, how long it lasts, and what makes it better or worse.  Possible symptoms: headache, stomach upset, feeling confused or dizzy, motion sickness, and personality changes.  Returning to activity  Take your time returning to activity. A doctor can help you decide what levels of activity are best for you. If you're returning to a sport, you should  see a health care provider before you do.  If you have questions, call  Your doctor, Concussion hotline: [phone], or Athletic medicine: [phone].   For informational purposes only. Not to replace the advice of your health care provider.  Copyright   2014 [employer]. All rights reserved. [employer] 648481 - Rev 12/16.

## 2018-08-15 LAB — AST SERPL-CCNC: 32 U/L (ref 5–34)

## 2018-08-24 DIAGNOSIS — Z96.629 OTHER MECHANICAL COMPLICATION OF INTERNAL ELBOW JOINT PROSTHESIS (H): Primary | ICD-10-CM

## 2018-08-24 DIAGNOSIS — T84.098A OTHER MECHANICAL COMPLICATION OF INTERNAL ELBOW JOINT PROSTHESIS (H): Primary | ICD-10-CM

## 2018-08-27 ENCOUNTER — OFFICE VISIT (OUTPATIENT)
Dept: ORTHOPEDICS | Facility: CLINIC | Age: 48
End: 2018-08-27
Payer: COMMERCIAL

## 2018-08-27 ENCOUNTER — RADIANT APPOINTMENT (OUTPATIENT)
Dept: GENERAL RADIOLOGY | Facility: CLINIC | Age: 48
End: 2018-08-27
Attending: ORTHOPAEDIC SURGERY
Payer: COMMERCIAL

## 2018-08-27 VITALS — HEIGHT: 65 IN | WEIGHT: 151.6 LBS | BODY MASS INDEX: 25.26 KG/M2

## 2018-08-27 DIAGNOSIS — Z96.629 OTHER MECHANICAL COMPLICATION OF INTERNAL ELBOW JOINT PROSTHESIS (H): ICD-10-CM

## 2018-08-27 DIAGNOSIS — T84.098A OTHER MECHANICAL COMPLICATION OF INTERNAL ELBOW JOINT PROSTHESIS (H): Primary | ICD-10-CM

## 2018-08-27 DIAGNOSIS — Z96.629 OTHER MECHANICAL COMPLICATION OF INTERNAL ELBOW JOINT PROSTHESIS (H): Primary | ICD-10-CM

## 2018-08-27 DIAGNOSIS — T84.098A OTHER MECHANICAL COMPLICATION OF INTERNAL ELBOW JOINT PROSTHESIS (H): ICD-10-CM

## 2018-08-27 PROBLEM — G43.909 MIGRAINE: Status: ACTIVE | Noted: 2017-08-21

## 2018-08-27 PROBLEM — M79.18 MYOFASCIAL PAIN: Status: ACTIVE | Noted: 2017-07-10

## 2018-08-27 PROBLEM — G47.00 INSOMNIA: Status: ACTIVE | Noted: 2018-04-04

## 2018-08-27 PROBLEM — G44.229 CHRONIC TENSION-TYPE HEADACHE: Status: ACTIVE | Noted: 2017-08-21

## 2018-08-27 PROBLEM — M97.41XA: Status: ACTIVE | Noted: 2017-08-28

## 2018-08-27 NOTE — PROGRESS NOTES
[facility]  Orthopaedic Surgery, Joint Replacement Consultation  by Aakash Zimmer M.D.     Kalyn Rivero MRN# [id number]   Age: 48 year old YOB: 1970      Requesting physician: Mika Roberts      Background history:  DX:  1. RA  2. Chronic pain     TREATMENTS:  1. 9/30/2015 (Oleg Webber): Right total elbow arthroplasty, ulnar nerve transposition  1. Implants: Biomet discovery TEA; Humerus [id number]; Ulna 3x73mm  2. 7/19/2017 (Varecka): Revision right total elbow arthroplasty with a distal humerus allograft and ulnar nerve neurolysis with some addition of plate fixation of a periprosthetic humerus fracture (Varecka) [facility].  1. Implants: [place] VariAx posterolateral humerus plate, 12-hole. [employer] (formerly Biomet) Discovery portal elbow arthroplasty prosthesis.   3. Multiple other orthopaedic procedures, bilateral TKA, L total shoulder arthroplasty, L wrist replacement, C1-2 arthrodesis            Assessment and Plan:   Assessment:  48 year old female s/p multiple right elbow surgeries now with total elbow arthroplasty, failed distal humerus allograft with persistent pain and limited function     Plan:  1.  Discussed complex nature problem with the patient as well as management options; options include   -Nonoperative management   -Amputation   -Revision surgery involving distal humerus replacement  2.  Discussed patient's case with Dr. Law in the office today; Dr. Law will defer any shoulder surgery until after elbow is taken care of; would like to see healing and improved function of the elbow prior to proceeding with shoulder surgery  3.  Educated patient that her elbow is at high risk for complications and failure; this could result in need for component removal leaving her with a flail arm  4.  Prescription for CT scan of the right humerus  5.  We will plan for lab tests for nicotine and narcotic use in 2-3 weeks  6.  We will  discuss with implant manufacturers regarding compatibility of distal humerus replacement with current ulnar component of total elbow arthroplasty    Total Time = 25 min, 50% of which was spent in counseling and coordination of care as documented above.          History of Present Illness:   48 year old female returning for re-evaluation and to discuss management options of her right elbow and distal humerus.  Patient has a complex history regarding his elbow with a surgical history that includes right radial head resection, right total elbow, periprosthetic right distal humerus fracture, allograft distal humerus with subsequent failure of ORIF.  At this time patient states that her pain is ongoing and limited function of her elbow.  She denies any numbness and tingling in her right hand.  Patient states that she has stopped all narcotic use and has stopped smoking.  However she was around secondhand smoke this past weekend.  Patient is adamant that she wants to avoid an amputation.           Physical Exam:   Gen: no acute distress  RESP: non labored breathing   ABD: benign   SKIN: well healed posterior elbow/arm incision  LYMPHATIC: grossly normal   NEURO: grossly normal   VASCULAR: satisfactory perfusion of all extremities   RUE: well healed posterior elbow/arm incision    Deformity of distal humerus    Palpable prominent distal humerus plate in the subcutaneous tissue with surrounding bursa    Arm and forearm compartments soft and compressible    +FPL/EPL/FDP/EDC    Sensation present throughout hand    Palpable radial pulse                          Data:   Right humerus x-rays: post-op changes with total elbow arthroplasty and plate and screws; evidence of failure of plate and screw construct with non-healed periprosthetic humerus fracture, allograft.     Prior Surgical Report   OR Surgeon - Misael De Luna MD - 07/19/2017 12:00 AM CDT  Formatting of this note may be different from the original.    [place] [address]    [facility]#: [id number]   PATIENT: SANDEE RIVERO   : 1970   DATE OF SERVICE: 2017     OPERATION REPORT    DATE OF SERVICE: 2017    STAFF SURGEON: Misael De Luna MD    PROCEDURES PERFORMED:   1. Right revision total elbow arthroplasty with distal humerus allograft.  2. Right elbow irrigation and debridement, with synovectomy. Synovial specimens X 3 sent for frozen section to rule severe inflammation which would be indicative of infection.)  3. Right distal humerus allograft bone graft.  4. Ulnar nerve neurolysis.    PREOPERATIVE DIAGNOSIS: Periprosthetic fracture, right total elbow arthroplasty.    POSTOPERATIVE DIAGNOSIS: Periprosthetic fracture, right total elbow arthroplasty.    STAFF SURGEON: Misael De Luna MD    ASSISTANT:   1. Salud Plummer MD.  2. Paz Anderson MD.    ANTIBIOTICS GIVEN: Clindamycin.    TOTAL TOURNIQUET TIME: 86 minutes.    IMPLANTS AND DRAINS:   1. Memo VariAx 2.7/3.5 posterolateral humerus plate, 12-hole.   2. [employer] (formerly [employer]) Discovery portal elbow arthroplasty prosthesis.   3. Distal humerus cadaveric allograft (whole).  4. Sutures: 2-0 Vicryl, staples.    COMPLICATIONS: None.    FINDINGS:   1. Periprosthetic fracture right humeral shaft, loosening humeral component total elbow arthroplasty.   2. Ulnar nerve compression.   3. Extensive adhesions.  4. Frozen sections of synovial biopsies showed no severe inflammation    PROCEDURE INDICATIONS: Ms Rivero is a 46-year-old right-hand-dominant female with history of rheumatoid arthroplasty and multiple joint replacements, both upper and lower extremity. She had a prior total elbow arthroplasty performed at [facility]. This arthroplasty also underwent an I and D for possible infection and cement loosening. Subsequent to that, she sustained a periprosthetic fracture. She transferred [facility] care to [facility] and agreed to undergo revision total elbow arthroplasty  with a distal humerus allograft replacement. Risks and benefits were discussed with the patient in the clinical setting. All questions were elicited and answered at that time.    DESCRIPTION OF PROCEDURE: The patient was identified in the preoperative holding area. Her identity, the site of surgery, and the surgery to be performed were verified with the patient. The operative extremity was marked with a surgical marking pen by the attending and witnessed by the patient. Again, all questions were elicited and answered. Risks and benefits were discussed as appropriate. An upper extremity regional block was then performed by the anesthesia team for pain control. She was then transported to the operating room and placed supine on the operating table. A brief time-out was performed. Anesthesia then performed their portion of the procedure by intubating and sedating the patient. She was placed in the lateral position. All bony prominences were well padded, including an axillary roll beneath the axilla, and she was secured in the lateral position. A foam wedge was placed under her operative extremity.     She was then prepped and draped in standard surgical fashion. A formal time-out was performed with all staff present. Once she was draped, a sterile tourniquet was placed on the upper extremity. The prior surgical incisions on her posteromedial and lateral elbow were drawn and identified. A 15 blade scalpel was used to reincise the posteromedial incision through the skin only. The subcutaneous tissues were then dissected with a combination of blunt digital dissection and tenotomy scissors. The subcutaneous tissues overlying the FCU and insertion of the previously transposed ulnar nerve were identified and mobilized. Once the ulnar nerve was visualized, the dissection proceeded proximally to mobilize the ulnar nerve. Extensive adhesions were observed around the ulnar nerve proximal to the cubital tunnel and near the arcade  [place]. Prior Ethibond suture was noted and removed around what appeared to be a subfascial sling, as expected, with anterior transposition of the ulnar nerve. Once the nerve was released, a vessel loop was placed around the nerve and the vessel loop was secured and used to gently retract the nerve away from the field. We then proceeded to the lateral aspect of the elbow. Metzenbaum scissors were used to elevate the subcutaneous tissues over the triceps fascia. The branching of the radial nerve on the lateral aspect of the triceps was identified, mobilized, and secured with a vessel loop. Both nerves were secured and protected as the Leiva elevator was placed beneath the triceps along the posterior cortex of the humerus to elevate the triceps muscle en bloc. Once the triceps was elevated, dissection proceeded distally over the medial and lateral condyles of the distal humerus. Bovie cautery was used to perform a medial and lateral release along the elbow joint to further elevate the triceps. A retractor was then placed beneath the triceps muscle wad and retracted medially and laterally as necessary to visualize the posterior cortex of the humerus and the implant. The fracture site was readily seen. The radial nerve was observed intact and crossing from medial lateral edge near the fracture site. The radial nerve was mobilized, secured with a vessel loop, and gently retracted away from the fracture site. The distal end of the humeral stem was seen protruding from the fracture interval. The screwdriver from the Discovery total elbow set was used to release the pins in the yoke at the hinge of the prosthesis. These 2 securing pin screws and the ball bearing hinge were removed and set aside. The humeral stem was easily removed along with the distal humerus fractured fragment once the soft tissues were cleared. There was clear loosening of the implant within the humerus. Irrigation was used to clean the  polyethylene. The polyethylene liner was inspected. Although there was light wear noted, there was no significant wear or defect seen on the polyethylene hinge liner. Multiple cultures were then taken from the surgical field and sent for analysis, along with tissue for frozen section.     Frozen section was confirmed as being absent of gross signs of bacterial infection. The decision was then made to proceed with the revision total elbow replacement with distal humerus cadaveric allograft bone graft. The proximal end of the humerus was then reamed and broached to accept the appropriate sized implant. A broach up to tip size 5 was confirmed to be a secure fit after sequential reaming. The end of the proximal humerus at the fracture site was then revised using an oscillating saw to obtain fresh edges of the bone. The distance between the proximal humeral shaft and ulna was measured and noted. A [employer]upp[employer] Discovery humeral implant size 150 was tentatively selected to bridge the 120 mm gap of resected humerus.     The cadaveric distal allograft bone graft was then prepared on the back table. This was sized to approximately 120 mm and trimmed as necessary with the oscillating saw. The transcondylar groove was then prepared, first by placing the templating guide over the posterior humerus between the trochlea and capitellar ridge. A  drill hole was created to guide the posterior olecranon reamer. Once the posterior olecranon was reamed, a narrow oscillating saw was used to remove the capitellum and trochlea parallel and tangential to the edge of the reaming [place] diameter. The capitellum and trochlea were set aside. A  was used to contour the edges of the remaining olecranon fossa. A handheld bur shaver was used to make fine adjustments on the lateral wall of the distal humerus groove in order to accommodate a size 5 implant trial. A narrow drill was used to create the starting hole in the  distal humerus canal. This was used to guide a series of sequential broach/rasps through the canal until a size 5 humeral stem trial was fitted.     The surgical wound was then irrigated with 3 L of sterile saline. A deep debridement was performed to remove excess cement and calcified deep soft tissue. There was a significant amount of discolored soft tissue from the prior cement noted within the field. The humeral stem size 5 trial was placed within the distal humerus bone graft. This was placed flush against the proximal humerus segment and a 12-hole Memo VariAx 2.7/3.5 posterolateral humerus plate was fitted along the shaft of the humerus. Two 0.062 K-wires were placed within the plate and then C-arm fluoroscopic images were obtained to confirm plate position and length.     Two distal cortical 2.7 screws were drilled and secured into the distal [place] posterolateral humerus plate along with a proximal 3.5 hole with a screw placed in compression position in the elliptical hole. The screw was tightened until appropriate compression was obtained at the osteotomy site. Two additional unicortical screw holes were then drilled and secured with 3.5 nonlocking unicortical [place] screws just proximal and distal to the union osteotomy site.     The humeral implant trial was then removed. The surgical wound was irrigated. The implant cement was mixed and injected into the humeral canal. The final humeral shaft implant for the [employer] Discovery total elbow set was inserted into the humeral canal until the anterior flange was flush with the cortex. The cobalt chrome total elbow hinge was secured around the polyethylene and slid onto the yoke of the humeral implant. This was secured with 2 threaded pins on the medial and lateral side to secure the rotating hinge. X-rays were obtained to confirm placement of the arthroplasty on AP and lateral views.     The posterolateral distal humerus Memo plate was then  additionally secured with multiple bicortical and unicortical screws proximal to the osteotomy site, and cortical and locking screws in the distal humerus and metadiaphyseal region and lateral condyle. Fluoroscopic images were repeated. Range of motion was confirmed at the elbow from approximately 10 degrees of extension to 150 degrees of flexion passively. Full supination and pronation was noted. An additional 3 L of sterile saline was used to irrigate the surgical wound. The triceps was reapproximated to the deep tissues using 2-0 Vicryl suture. All vessel loops were removed. Both the radial nerve, lateral cutaneous nerve, and ulnar nerve were confirmed mobilized and intact. The ulnar nerve did not appear to subluxate with motion and a revision anterior transposition was not performed. The subdermal tissues were reapproximated using 2-0 Vicryl, and the skin was closed using staples.    INTRAOPERATIVE FINDINGS: Right periprosthetic fracture of total elbow arthroplasty with aseptic loosening.    POSTOPERATIVE PLAN: The patient will be placed in a long-arm, post-mold splint and sling for comfort. She will be encouraged to move all of her fingers and her wrist after the surgery. She is ambulatory and her diet will be advanced. Both IV and oral pain medications will be given for pain control. She may likely be discharged tomorrow after a period of observation. Her stay may be extended if intraoperative cultures show positive findings.    Salud Plummer MD  Fellow   Orthopedic Service    COSIGNER:  Misael De Luna MD  Staff Surgeon  Orthopaedic Service    My signature attests that I was present for the key or critical portion of this procedure. I was immediately available or had arranged immediate staff availability for all the non-critical or non-key portions of the entire procedure.  Misael De Luna MD, 7/28/2017 9:47 AM      Received in Transcription: 07/19/2017 16:30:03  T: 07/20/2017 00:06:04  M:   Job #:  [id number]  OJW/MODL     Attending MD (Dr. Aakash Zimmer) Attestation :  This patient was seen and evaluated by me including a history, exam, and interpretation of all imaging and/or lab data.  Either a training physician (resident/fellow), who also saw the patient, or scribe has documented the clinic visit in the attached note.    Aakash Zimmer MD  [facility] Family Professor  Oncology and Adult Reconstructive Surgery  Dept Orthopaedic Surgery, [facility]  [phone] office, [phone] pager  [email or web address]

## 2018-08-27 NOTE — NURSING NOTE
"Chief Complaint   Patient presents with     RECHECK     Pt. states that she is here today to Discuss Sched. Surgery for R total humeral replacement and R reverse TSA.      RECHECK     Pt. states that since she was last seen she has fallen a few times and landed onto her Right Elbow. She was also seen in July by her Rheum. and had her Elbow Aspirated, but never heard back about the results.        48 year old  1970    Ht 1.651 m (5' 5\")  Wt 68.8 kg (151 lb 9.6 oz)  BMI 25.23 kg/m2        [id number] [address]    Allergies   Allergen Reactions     Morphine Swelling     Gabapentin Other (See Comments)     Pins,needles, stabbing aching at once  Pins,needles, stabbing aching at once  Numbness and Tingling     Ibuprofen      Sulfa Drugs      unknown     Tylenol [Acetaminophen]      Pt. States that she has Liver problems  Tylenol 3     Erythromycin Nausea     Vomiting      Penicillins Rash     Prednisone Palpitations     Heart racing     Current Outpatient Prescriptions   Medication     Botulinum Toxin Type A (BOTOX) 200 UNITS SOLR     Celecoxib (CELEBREX PO)     ClonazePAM (KLONOPIN PO)     [facility] SENNA 8.6 MG tablet     Cyanocobalamin (VITAMIN B-12 PO)     ENBREL SURECLICK 50 MG/ML autoinjector     leflunomide (ARAVA) 20 MG tablet     meclizine (ANTIVERT) 25 MG tablet     omeprazole (PRILOSEC) 40 MG capsule     OnabotulinumtoxinA (BOTOX IJ)     OnabotulinumtoxinA (BOTOX IJ)     Current Facility-Administered Medications   Medication     lidocaine 1 % injection 5 mL     Facility-Administered Medications Ordered in Other Visits   Medication     bupivacaine 0.5 % -  EPINEPHrine 1:200,000 injection     mepivacaine (PF) (CARBOCAINE) 2 % injection                         "

## 2018-08-27 NOTE — LETTER
8/27/2018     RE: Kalyn Rivero  [address] [facility] [address]     Dear Colleague,    Thank you for referring your patient, Kalyn Rivero, to the [facility]. Please see a copy of my visit note below.         [facility]  Orthopaedic Surgery, Joint Replacement Consultation  by Aaaksh Zimmer M.D.     Kalyn Rivero MRN# [id number]   Age: 48 year old YOB: 1970      Requesting physician: Carrol Mendoza, Mika Law      Background history:  DX:  1. RA  2. Chronic pain     TREATMENTS:  1. 9/30/2015 (Oleg Webber): Right total elbow arthroplasty, ulnar nerve transposition  1. Implants: Biomet discovery TEA; Humerus 1m120db; Ulna 3x73mm  2. 7/19/2017 (V[place]cka): Revision right total elbow arthroplasty with a distal humerus allograft and ulnar nerve neurolysis with some addition of plate fixation of a periprosthetic humerus fracture [place] [facility] [place]  [place]. Implants: Memo VariAx posterolateral humerus plate, 12-hole. [employer] (formerly Biomet) Discovery portal elbow arthroplasty prosthesis.   3. Multiple other orthopaedic procedures, bilateral TKA, L total shoulder arthroplasty, L wrist replacement, C1-2 arthrodesis            Assessment and Plan:   Assessment:  48 year old female s/p multiple right elbow surgeries now with total elbow arthroplasty, failed distal humerus allograft with persistent pain and limited function     Plan:  1.  Discussed complex nature problem with the patient as well as management options; options include   -Nonoperative management   -Amputation   -Revision surgery involving distal humerus replacement  2.  Discussed patient's case with Dr. Law in the office today; Dr. Law will defer any shoulder surgery until after elbow is taken care of; would like to see healing and improved function of the elbow prior to proceeding with shoulder surgery  3.   Educated patient that her elbow is at high risk for complications and failure; this could result in need for component removal leaving her with a flail arm  4.  Prescription for CT scan of the right humerus  5.  We will plan for lab tests for nicotine and narcotic use in 2-3 weeks  6.  We will discuss with implant manufacturers regarding compatibility of distal humerus replacement with current ulnar component of total elbow arthroplasty    Total Time = 25 min, 50% of which was spent in counseling and coordination of care as documented above.          History of Present Illness:   48 year old female returning for re-evaluation and to discuss management options of her right elbow and distal humerus.  Patient has a complex history regarding his elbow with a surgical history that includes right radial head resection, right total elbow, periprosthetic right distal humerus fracture, allograft distal humerus with subsequent failure of ORIF.  At this time patient states that her pain is ongoing and limited function of her elbow.  She denies any numbness and tingling in her right hand.  Patient states that she has stopped all narcotic use and has stopped smoking.  However she was around secondhand smoke this past weekend.  Patient is adamant that she wants to avoid an amputation.           Physical Exam:   Gen: no acute distress  RESP: non labored breathing   ABD: benign   SKIN: well healed posterior elbow/arm incision  LYMPHATIC: grossly normal   NEURO: grossly normal   VASCULAR: satisfactory perfusion of all extremities   RUE: well healed posterior elbow/arm incision    Deformity of distal humerus    Palpable prominent distal humerus plate in the subcutaneous tissue with surrounding bursa    Arm and forearm compartments soft and compressible    +FPL/EPL/FDP/EDC    Sensation present throughout hand    Palpable radial pulse                          Data:   Right humerus x-rays: post-op changes with total elbow arthroplasty  and plate and screws; evidence of failure of plate and screw construct with non-healed periprosthetic humerus fracture, allograft.     Prior Surgical Report   OR Surgeon - Misael De Luna MD - 2017 12:00 AM CDT  Formatting of this note may be different [place] [address]    [facility]#: [id number]   PATIENT: SANDEE RIVERO   : 1970   DATE OF SERVICE: 2017     OPERATION REPORT    DATE OF SERVICE: 2017    STAFF SURGEON: Misael De Luna MD    PROCEDURES PERFORMED:   1. Right revision total elbow arthroplasty with distal humerus allograft.  2. Right elbow irrigation and debridement, with synovectomy. Synovial specimens X 3 sent for frozen section to rule severe inflammation which would be indicative of infection.)  3. Right distal humerus allograft bone graft.  4. Ulnar nerve neurolysis.    PREOPERATIVE DIAGNOSIS: Periprosthetic fracture, right total elbow arthroplasty.    POSTOPERATIVE DIAGNOSIS: Periprosthetic fracture, right total elbow arthroplasty.    STAFF SURGEON: Misael De Luna MD    ASSISTANT:   1. Salud Plummer MD.  2. Paz Anderson MD.    ANTIBIOTICS GIVEN: Clindamycin.    TOTAL TOURNIQUET TIME: 86 minutes.    IMPLANTS AND DRAINS:   1. [place] VariAx 2.7/3.5 posterolateral humerus plate, 12-hole.   2. [employer] (formerly [employer]) Discovery portal elbow arthroplasty prosthesis.   3. Distal humerus cadaveric allograft (whole).  4. Sutures: 2-0 Vicryl, staples.    COMPLICATIONS: None.    FINDINGS:   1. Periprosthetic fracture right humeral shaft, loosening humeral component total elbow arthroplasty.   2. Ulnar nerve compression.   3. Extensive adhesions.  4. Frozen sections of synovial biopsies showed no severe inflammation    PROCEDURE INDICATIONS: Ms Rivero is a 46-year-old right-hand-dominant female with history of rheumatoid arthroplasty and multiple joint replacements, both upper and lower extremity. She had a prior total  elbow arthroplasty performed at [facility]. This arthroplasty also underwent an I and D for possible infection and cement loosening. Subsequent to that, she sustained a periprosthetic fracture. She transferred her care to [facility] and agreed to undergo revision total elbow arthroplasty with a distal humerus allograft replacement. Risks and benefits were discussed with the patient in the clinical setting. All questions were elicited and answered at that time.    DESCRIPTION OF PROCEDURE: The patient was identified in the preoperative holding area. Her identity, the site of surgery, and the surgery to be performed were verified with the patient. The operative extremity was marked with a surgical marking pen by the attending and witnessed by the patient. Again, all questions were elicited and answered. Risks and benefits were discussed as appropriate. An upper extremity regional block was then performed by the anesthesia team for pain control. She was then transported to the operating room and placed supine on the operating table. A brief time-out was performed. Anesthesia then performed their portion of the procedure by intubating and sedating the patient. She was placed in the lateral position. All bony prominences were well padded, including an axillary roll beneath the axilla, and she was secured in the lateral position. A foam wedge was placed under her operative extremity.     She was then prepped and draped in standard surgical fashion. A formal time-out was performed with all staff present. Once she was draped, a sterile tourniquet was placed on the upper extremity. The prior surgical incisions on her posteromedial and lateral elbow were drawn and identified. A 15 blade scalpel was used to reincise the posteromedial incision through the skin only. The subcutaneous tissues were then dissected with a combination of blunt digital dissection and tenotomy scissors. The subcutaneous tissues overlying the FCU  and insertion of the previously transposed ulnar nerve were identified and mobilized. Once the ulnar nerve was visualized, the dissection proceeded proximally to mobilize the ulnar nerve. Extensive adhesions were observed around the ulnar nerve proximal to the cubital tunnel and near the arcade of [place]. Prior Ethibond suture was noted and removed around what appeared to be a subfascial sling, as expected, with anterior transposition of the ulnar nerve. Once the nerve was released, a vessel loop was placed around the nerve and the vessel loop was secured and used to gently retract the nerve away from the field. We then proceeded to the lateral aspect of the elbow. Metzenbaum scissors were used to elevate the subcutaneous tissues over the triceps fascia. The branching of the radial nerve on the lateral aspect of the triceps was identified, mobilized, and secured with a vessel loop. Both nerves were secured and protected as the Leiva elevator was placed beneath the triceps along the posterior cortex of the humerus to elevate the triceps muscle en bloc. Once the triceps was elevated, dissection proceeded distally over the medial and lateral condyles of the distal humerus. Bovie cautery was used to perform a medial and lateral release along the elbow joint to further elevate the triceps. A retractor was then placed beneath the triceps muscle wad and retracted medially and laterally as necessary to visualize the posterior cortex of the humerus and the implant. The fracture site was readily seen. The radial nerve was observed intact and crossing from medial lateral edge near the fracture site. The radial nerve was mobilized, secured with a vessel loop, and gently retracted away from the fracture site. The distal end of the humeral stem was seen protruding from the fracture interval. The screwdriver from the Discovery total elbow set was used to release the pins in the yoke at the hinge of the prosthesis. These 2  securing pin screws and the ball bearing hinge were removed and set aside. The humeral stem was easily removed along with the distal humerus fractured fragment once the soft tissues were cleared. There was clear loosening of the implant within the humerus. Irrigation was used to clean the polyethylene. The polyethylene liner was inspected. Although there was light wear noted, there was no significant wear or defect seen on the polyethylene hinge liner. Multiple cultures were then taken from the surgical field and sent for analysis, along with tissue for frozen section.     Frozen section was confirmed as being absent of gross signs of bacterial infection. The decision was then made to proceed with the revision total elbow replacement with distal humerus cadaveric allograft bone graft. The proximal end of the humerus was then reamed and broached to accept the appropriate sized implant. A broach up to tip size 5 was confirmed to be a secure fit after sequential reaming. The end of the proximal humerus at the fracture site was then revised using an oscillating saw to obtain fresh edges of the bone. The distance between the proximal humeral shaft and ulna was measured and noted. A [employer]uki[employer] Discovery humeral implant size 150 was tentatively selected to bridge the 120 mm gap of resected humerus.     The cadaveric distal allograft bone graft was then prepared on the back table. This was sized to approximately 120 mm and trimmed as necessary with the oscillating saw. The transcondylar groove was then prepared, first by placing the templating guide over the posterior humerus between the trochlea and capitellar ridge. A  drill hole was created to guide the posterior olecranon reamer. Once the posterior olecranon was reamed, a narrow oscillating saw was used to remove the capitellum and trochlea parallel and tangential to the edge of the reaming [place] diameter. The capitellum and trochlea were set aside. A   was used to contour the edges of the remaining olecranon fossa. A handheld bur shaver was used to make fine adjustments on the lateral wall of the distal humerus groove in order to accommodate a size 5 implant trial. A narrow drill was used to create the starting hole in the distal humerus canal. This was used to guide a series of sequential broach/rasps through the canal until a size 5 humeral stem trial was fitted.     The surgical wound was then irrigated with 3 L of sterile saline. A deep debridement was performed to remove excess cement and calcified deep soft tissue. There was a significant amount of discolored soft tissue from the prior cement noted within the field. The humeral stem size 5 trial was placed within the distal humerus bone graft. This was placed flush against the proximal humerus segment and a 12-hole Memo VariAx 2.7/3.5 posterolateral humerus plate was fitted along the shaft of the humerus. Two 0.062 K-wires were placed within the plate and then C-arm fluoroscopic images were obtained to confirm plate position and length.     Two distal cortical 2.7 screws were drilled and secured into the distal [place] posterolateral humerus plate along with a proximal 3.5 hole with a screw placed in compression position in the elliptical hole. The screw was tightened until appropriate compression was obtained at the osteotomy site. Two additional unicortical screw holes were then drilled and secured with 3.5 nonlocking unicortical Memo screws just proximal and distal to the union osteotomy site.     The humeral implant trial was then removed. The surgical wound was irrigated. The implant cement was mixed and injected into the humeral canal. The final humeral shaft implant for the [employer] Discovery total elbow set was inserted into the humeral canal until the anterior flange was flush with the cortex. The cobalt chrome total elbow hinge was secured around the polyethylene and slid onto  the yoke of the humeral implant. This was secured with 2 threaded pins on the medial and lateral side to secure the rotating hinge. X-rays were obtained to confirm placement of the arthroplasty on AP and lateral views.     The posterolateral distal humerus Memo plate was then additionally secured with multiple bicortical and unicortical screws proximal to the osteotomy site, and cortical and locking screws in the distal humerus and metadiaphyseal region and lateral condyle. Fluoroscopic images were repeated. Range of motion was confirmed at the elbow from approximately 10 degrees of extension to 150 degrees of flexion passively. Full supination and pronation was noted. An additional 3 L of sterile saline was used to irrigate the surgical wound. The triceps was reapproximated to the deep tissues using 2-0 Vicryl suture. All vessel loops were removed. Both the radial nerve, lateral cutaneous nerve, and ulnar nerve were confirmed mobilized and intact. The ulnar nerve did not appear to subluxate with motion and a revision anterior transposition was not performed. The subdermal tissues were reapproximated using 2-0 Vicryl, and the skin was closed using staples.    INTRAOPERATIVE FINDINGS: Right periprosthetic fracture of total elbow arthroplasty with aseptic loosening.    POSTOPERATIVE PLAN: The patient will be placed in a long-arm, post-mold splint and sling for comfort. She will be encouraged to move all of her fingers and her wrist after the surgery. She is ambulatory and her diet will be advanced. Both IV and oral pain medications will be given for pain control. She may likely be discharged tomorrow after a period of observation. Her stay may be extended if intraoperative cultures show positive findings.    Salud Plummer MD  Fellow   Orthopedic Service    COSIGNER:  Misael De Luna MD  Staff Surgeon  Orthopaedic Service    My signature attests that I was present for the key or critical portion of this procedure.  I was immediately available or had arranged immediate staff availability for all the non-critical or non-key portions of the entire procedure.  Misael De Luna MD, 7/28/2017 9:47 AM      Received in Transcription: 07/19/2017 16:30:03  T: 07/20/2017 00:06:04  M:   Job #: [id number]  OJW/RAMAKRISHNAL

## 2018-08-27 NOTE — MR AVS SNAPSHOT
"              After Visit Summary   8/27/2018    Kalyn Rivero    MRN: [id number]           Patient Information     Date Of Birth          1970        Visit Information        Provider Department      8/27/2018 9:15 AM Aakash Zimmer MD [facility]        Today's Diagnoses     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)    -  1      Care Instructions    CT right humerus - call with results          Follow-ups after your visit        Your next 10 appointments already scheduled     Sep 20, 2018 11:20 AM CDT   (Arrive by 11:05 AM)   Return Botox with Rosina Quinones MD   [facility] ([facility])    [address]  [address] [facility] [address] [phone]              Who to contact     Please call your clinic at [phone] to:    Ask questions about your health    Make or cancel appointments    Discuss your medicines    Learn about your test results    Speak to your doctor            Additional Information About Your Visit        Care EveryWhere ID     This is your Care EveryWhere ID. This could be used by other organizations to access your [place] medical records  [id number]        Your Vitals Were     Height BMI (Body Mass Index)                1.651 m (5' 5\") 25.23 kg/m2           Blood Pressure from Last 3 Encounters:   No data found for BP    Weight from Last 3 Encounters:   No data found for Wt              Today, you had the following     No orders found for display       Primary Care Provider Office Phone # Fax #    Mika Yunior Zamora [phone] [address] [facility] [address]        Equal Access to Services     GARRY BURTON : Hadii ashlee lucioo Sokayla, waaxda luqadaha, qaybta kaalmada adeegyada, waxshari tawnya sosa . So [facility] [phone].    ATENCIÓN: Si habla español, tiene a holcomb disposición servicios gratuitos de " elijah ramosbhavya. Lis al [phone].    We comply with applicable federal civil rights laws and [place] laws. We do not discriminate on the basis of race, color, national origin, age, disability, sex, sexual orientation, or gender identity.            Thank you!     Thank you for choosing [facility]  for your care. Our goal is always to provide you with excellent care. Hearing back from our patients is one way we can continue to improve our services. Please take a few minutes to complete the written survey that you may receive in the mail after your visit with us. Thank you!             Your Updated Medication List - Protect others around you: Learn how to safely use, store and throw away your medicines at www.disposemymeds.org.          This list is accurate as of 8/27/18 11:59 PM.  Always use your most recent med list.                   Brand Name Dispense Instructions for use Diagnosis    * BOTOX IJ      Inject 170 Units as directed Lot # /[id number] with Expiration Date:  10/2020        * BOTOX IJ      Inject 200 Units as directed Total dose 200 units  Administered 190 units  Unavoidable waste 30 units  Lot # /[id number] with Expiration Date:  12/2020  NDC [address]-23        * Botulinum Toxin Type A 200 units injection    BOTOX    200 Units    Inject 200 Units as directed every 3 months    Intractable chronic migraine without aura and without status migrainosus       CELEBREX PO      Take 200 mg by mouth 3 times daily        [employer] SENNA 8.6 MG tablet   Generic drug:  senna     120 tablet    TAKE 2 TABLETS BY MOUTH 2 TIMES DAILY    Rheumatoid arthritis with positive rheumatoid factor, involving unspecified site (H)       ENBREL SURECLICK 50 MG/ML autoinjector   Generic drug:  Etanercept           KLONOPIN PO      Take 0.5 mg by mouth 2 tablets per day        leflunomide 20 MG tablet    ARAVA     Take 20 mg by mouth every morning Reported on 3/28/2017        meclizine 25 MG tablet    ANTIVERT     30 tablet    Take 1 tablet (25 mg) by mouth every 6 hours as needed for dizziness        omeprazole 40 MG capsule    priLOSEC    90 capsule    TAKE 1 CAPSULE (40 MG) BY MOUTH DAILY TAKE 30-60 MINUTES BEFORE A MEAL.    PUD (peptic ulcer disease)       VITAMIN B-12 PO      Take 4 tablets by mouth daily        * Notice:  This list has 3 medication(s) that are the same as other medications prescribed for you. Read the directions carefully, and ask your doctor or other care provider to review them with you.

## 2018-08-30 ENCOUNTER — RADIANT APPOINTMENT (OUTPATIENT)
Dept: CT IMAGING | Facility: CLINIC | Age: 48
End: 2018-08-30
Attending: ORTHOPAEDIC SURGERY
Payer: COMMERCIAL

## 2018-08-30 DIAGNOSIS — T84.098A OTHER MECHANICAL COMPLICATION OF INTERNAL ELBOW JOINT PROSTHESIS (H): ICD-10-CM

## 2018-08-30 DIAGNOSIS — Z96.629 OTHER MECHANICAL COMPLICATION OF INTERNAL ELBOW JOINT PROSTHESIS (H): ICD-10-CM

## 2018-09-12 DIAGNOSIS — K27.9 PUD (PEPTIC ULCER DISEASE): ICD-10-CM

## 2018-09-12 RX ORDER — OMEPRAZOLE 40 MG/1
CAPSULE, DELAYED RELEASE ORAL
Qty: 90 CAPSULE | Refills: 2 | Status: ON HOLD | OUTPATIENT
Start: 2018-09-12 | End: 2018-11-30

## 2018-09-17 DIAGNOSIS — Z96.629 OTHER MECHANICAL COMPLICATION OF INTERNAL ELBOW JOINT PROSTHESIS (H): Primary | ICD-10-CM

## 2018-09-17 DIAGNOSIS — T84.098A OTHER MECHANICAL COMPLICATION OF INTERNAL ELBOW JOINT PROSTHESIS (H): Primary | ICD-10-CM

## 2018-09-20 ENCOUNTER — OFFICE VISIT (OUTPATIENT)
Dept: PHYSICAL MEDICINE AND REHAB | Facility: CLINIC | Age: 48
End: 2018-09-20
Payer: COMMERCIAL

## 2018-09-20 DIAGNOSIS — G43.719 INTRACTABLE CHRONIC MIGRAINE WITHOUT AURA AND WITHOUT STATUS MIGRAINOSUS: Primary | ICD-10-CM

## 2018-09-20 NOTE — LETTER
9/20/2018       RE: Kalyn Rivero  [address] [facility] [address]     Dear Colleague,    Thank you for referring your patient, Kalyn Rivero, to the [facility] PHYSICAL MEDICINE AND REHABILITATION at [facility]. Please see a copy of my visit note below.    MIGRAINE BOTOX HEADACHE PROCEDURE NOTE     There were no vitals taken for this visit.       Current Outpatient Prescriptions:      Botulinum Toxin Type A (BOTOX) 200 UNITS SOLR, Inject 200 Units as directed every 3 months, Disp: 200 Units, Rfl: 3     Celecoxib (CELEBREX PO), Take 200 mg by mouth 3 times daily, Disp: , Rfl:      ClonazePAM (KLONOPIN PO), Take 0.5 mg by mouth 2 tablets per day, Disp: , Rfl:      [employer] SENNA 8.6 MG tablet, TAKE 2 TABLETS BY MOUTH 2 TIMES DAILY, Disp: 120 tablet, Rfl: 1     Cyanocobalamin (VITAMIN B-12 PO), Take 4 tablets by mouth daily , Disp: , Rfl:      ENBREL SURECLICK 50 MG/ML autoinjector, , Disp: , Rfl:      leflunomide (ARAVA) 20 MG tablet, Take 20 mg by mouth every morning Reported on 3/28/2017, Disp: , Rfl:      meclizine (ANTIVERT) 25 MG tablet, Take 1 tablet (25 mg) by mouth every 6 hours as needed for dizziness, Disp: 30 tablet, Rfl: 1     omeprazole (PRILOSEC) 40 MG capsule, TAKE 1 CAPSULE (40 MG) BY MOUTH DAILY TAKE 30-60 MINUTES BEFORE A MEAL., Disp: 90 capsule, Rfl: 2     OnabotulinumtoxinA (BOTOX IJ), Inject 200 Units as directed Total dose 200 units  Administered 190 units  Unavoidable waste 30 units  Lot # /C3 with Expiration Date:  12/2020  NDC [address]-19, Disp: , Rfl:      OnabotulinumtoxinA (BOTOX IJ), Inject 170 Units as directed Lot # /C3 with Expiration Date:  10/2020, Disp: , Rfl:     Current Facility-Administered Medications:      lidocaine 1 % injection 5 mL, 5 mL, INTRA-ARTICULAR, Once, Carrol Gaspar MD    Facility-Administered Medications Ordered in Other Visits:      bupivacaine 0.5 % -  EPINEPHrine 1:200,000 injection, , ,  Penelope WARD Brent Andrew Hoefs, MD, 20 mL at 04/27/17 1505     mepivacaine (PF) (CARBOCAINE) 2 % injection, , , Penelope WARD Brent Andrew Hoefs, MD, 10 mL at 06/18/15 1250        Allergies   Allergen Reactions     Morphine Swelling     Gabapentin Other (See Comments)     Pins,needles, stabbing aching at once  Pins,needles, stabbing aching at once  Numbness and Tingling     Ibuprofen      Sulfa Drugs      unknown     Tylenol [Acetaminophen]      Pt. States that she has Liver problems  Tylenol 3     Erythromycin Nausea     Vomiting      Penicillins Rash     Prednisone Palpitations     Heart racing        PHYSICAL EXAM  Pt states headache today, did not sleep well last night. Significant photophobia.  No throbbing in nature, rates 10/10 again.       JESSICA Lucas was diagnosed with rheumatoid arthritis since age 21 years, and migraine. She was getting Botox since age 21 years, she was getting them at [facility] but the physician left the state.    She has a history of Right elbow replacement, and developed a fracture of the humerus, progressed rotator cuff tear. She is about to see her surgeon on the 16th of April. She states that she is waiting for him to call her back.     She reports significant pain in the right elbow, which she states effects her migraines.     She is accompanied by her PCA today.       Patient reports the following new medical problems since last visit: Right arm surgery July 19, 2017 at [facility].    RESPONSE TO PREVIOUS TREATMENT:    Patient Kalyn Rivero received 190 units of Botox  on 6/28/18.     Problems following the previous series of neurotoxin injections included:  No problems reported    Benefits by Patient Report:       1.  Headache Frequency During this Injection Cycle:  0 headache since last injections up until 3 weeks ago. In the last 3 weeks, she has had non stop migraine. Associated with photophobia.           2.  Headache Duration During this Injection Cycle: ongoing   Dull in  nature      3.  Headache Intensity During this Injection Cycle:  Headache intensity during this injection cycle:      A.  2/10  =  Typical pain level.    B.  10/10  =  Worst pain level in the last 5 weeks    C.  0/10  =  Lowest pain level.       4.  Change in headache medication usage during this injection cycle:  (For Example:  Able to decrease use of oral pain medications.)  In the last 5 weeks, she had trigger point injections for TMJ.      5.  ER Visits During This Injection Cycle:  None        6.  Functional Performance:  Change in ADL's, social interaction, days lost from work, etc. Has had to cancel some activities due to headache.       BOTULINUM NEUROTOXIN INJECTION PROCEDURES:    VERIFICATION OF PATIENT IDENTIFICATION  Responsible Person:  wes  : verified  Full Name: verified    INDICATIONS FOR PROCEDURES:  Kalyn Rivero is a 48 year old patient with headache affecting the head, neck and shoulder girdle musculature secondary to a diagnosis of Chronic Migraine Headache with associated pain.    Her baseline symptoms have been recalcitrant to oral medications and conservative therapy.  She is here today for reinjection with Botox.    GOAL OF PROCEDURE:  The goal of this procedure is to improve decrease pain  associated with headache.    TOTAL DOSE ADMINISTERED:  Total dose; 200 units   Dose Administered:  190 units Botox (Botulinum Toxin Type A)       2:1 Dilution   Unavoidable waste 10 units   Diluent Used:  Preservative Free Normal Saline  Total Volume of Diluent Used:  4.0 ml  Lot # /C3 with Expiration Date:  3/2021   NDC [address]-40    CONSENT:  The risks, benefits, and treatment options were discussed with Kalyn Rivero and she agreed to proceed.    EQUIPMENT USED:  Needle-25mm stimulating/recording  Needle-37mm stimulating/recording  EMG/NCS Machine    SKIN PREPARATION:  Skin preparation was performed using an alcohol wipe.      GUIDANCE DESCRIPTION:  Electro-myographic guidance was  necessary throughout the posterior neck portion to accurately identify all areas of spastic muscles while avoiding injection of non-spastic muscles, neighboring nerves and nearby vascular structures.     AREA/MUSCLE INJECTED:    HEAD & SCALP MUSCLES:  190 units Botox = Total Dose, 2:1 Dilution  Right Trapezius - 15 units in 3 sites   Left Trapezius - 10 units in 3 sites     Right occipitalis - 20 units of Botox at 4 site/s.  Left Occipitalis - 15 units at 3 sites     Right Paracervical  15 units in 3 sites   Left paracervical 5 units in 2 sites     Right longissimus cervicis 5 units in 1 site  Right semispinalis cervicis  5 units in 1 site   Right Lev scapulae 5 units in 1 site         Right Frontalis - 15 units of Botox at 3 site/s.  Left Frontalis - 15 units of Botox at 3 site/s.    Right Temporalis - 25 units of Botox at 5 site/s.  Left Temporalis - 20 units of Botox at 4 site/s.    Right Masseter, superficial belly   1 site 2.5 units   Left Masseter, superficial belly 1 site 2.5 units     Right  - 5 units of Botox at 1 site/s.   Left  - 5 units of Botox at 1 site/s.    Procerus - 5 units of Botox at 1 site/s.          RESPONSE TO PROCEDURE:  Kalyn Rivero tolerated the procedure well and there were no immediate complications.   She was allowed to recover for an appropriate period of time and was discharged home in stable condition.       Follow Up  Kalyn Rivero was asked to follow up by phone in 7-14 days with Annamaria Lamb RN, Care Coordinator, to report her response to this series of injections.  Based on the patient's previous response to this therapy, Kalyn Rivero was rescheduled for the next series of injections in 12 weeks.      Again, thank you for allowing me to participate in the care of your patient.      Sincerely,    Rosina Quinones MD

## 2018-09-20 NOTE — MR AVS SNAPSHOT
After Visit Summary   9/20/2018    Kalyn Rivero    MRN: [id number]           Patient Information     Date Of Birth          1970        Visit Information        Provider Department      9/20/2018 11:20 AM Rosina Quinones MD [facility] Physical Medicine and Rehabilitation         Follow-ups after your visit        Your next 10 appointments already scheduled     Sep 20, 2018 11:20 AM CDT   (Arrive by 11:05 AM)   Return Botox with MD EFRAIN Hahn [facility] Physical Medicine and Rehabilitation ([facility])    [address] [address] [phone]              Who to contact     Please call your clinic at [phone] to:    Ask questions about your health    Make or cancel appointments    Discuss your medicines    Learn about your test results    Speak to your doctor            Additional Information About Your Visit        Care EveryWhere ID     This is your Care EveryWhere ID. This could be used by other organizations to access your [facility] medical records  [id number]         Blood Pressure from Last 3 Encounters:   06/28/18 (!) 128/96   05/31/18 124/88   05/03/18 128/88    Weight from Last 3 Encounters:   06/28/18 66.5 kg (146 lb 9.6 oz)   05/31/18 69.4 kg (153 lb)   05/03/18 69.4 kg (153 lb)              Today, you had the following     No orders found for display       Primary Care Provider Office Phone # Fax #    Mika Zamora [phone] [address] [facility] [address]        Equal Access to Services     [facility]: Hadii aad ku hadasho Soomaali, waaxda luqadaha, qaybta kaalmada adeegyada, waxay lynnin hayherneston uriel [place]'sandra . So [facility] [phone].    ATENCIÓN: Si habla español, tiene a holcomb disposición servicios gratuitos de asistencia lingüística. Llame al [phone].    We comply with applicable federal civil rights laws and [place] laws. We do not  discriminate on the basis of race, color, national origin, age, disability, sex, sexual orientation, or gender identity.            Thank you!     Thank you for choosing [facility] PHYSICAL MEDICINE AND REHABILITATION  for your care. Our goal is always to provide you with excellent care. Hearing back from our patients is one way we can continue to improve our services. Please take a few minutes to complete the written survey that you may receive in the mail after your visit with us. Thank you!             Your Updated Medication List - Protect others around you: Learn how to safely use, store and throw away your medicines at www.disposemymeds.org.          This list is accurate as of 6/28/18  9:51 AM.  Always use your most recent med list.                   Brand Name Dispense Instructions for use Diagnosis    * BOTOX IJ      Inject 170 Units as directed Lot # /C3 with Expiration Date:  10/2020        * BOTOX IJ      Inject 200 Units as directed Total dose 200 units  Administered 170 units  Unavoidable waste 30 units  Lot # /C3 with Expiration Date:  12/2020  NDC [address]-81        * Botulinum Toxin Type A 200 units injection    BOTOX    200 Units    Inject 200 Units as directed every 3 months    Intractable chronic migraine without aura and without status migrainosus       CELEBREX PO      Take 200 mg by mouth 3 times daily        CVS SENNA 8.6 MG tablet   Generic drug:  senna     120 tablet    TAKE 2 TABLETS BY MOUTH 2 TIMES DAILY    Rheumatoid arthritis with positive rheumatoid factor, involving unspecified site (H)       ENBREL SURECLICK 50 MG/ML autoinjector   Generic drug:  Etanercept           fluconazole 150 MG tablet    DIFLUCAN    4 tablet    Take 1 tablet (150 mg) by mouth every 3 days    Abscess, scalp       KLONOPIN PO      Take 0.5 mg by mouth 2 tablets per day        leflunomide 20 MG tablet    ARAVA     Take 20 mg by mouth every morning Reported on 3/28/2017        omeprazole 40 MG capsule     priLOSEC    90 capsule    TAKE 1 CAPSULE (40 MG) BY MOUTH DAILY TAKE 30-60 MINUTES BEFORE A MEAL.    PUD (peptic ulcer disease)       VITAMIN B-12 PO      Take 4 tablets by mouth daily        * Notice:  This list has 3 medication(s) that are the same as other medications prescribed for you. Read the directions carefully, and ask your doctor or other care provider to review them with you.

## 2018-09-20 NOTE — PROGRESS NOTES
MIGRAINE BOTOX HEADACHE PROCEDURE NOTE     There were no vitals taken for this visit.       Current Outpatient Prescriptions:      Botulinum Toxin Type A (BOTOX) 200 UNITS SOLR, Inject 200 Units as directed every 3 months, Disp: 200 Units, Rfl: 3     Celecoxib (CELEBREX PO), Take 200 mg by mouth 3 times daily, Disp: , Rfl:      ClonazePAM (KLONOPIN PO), Take 0.5 mg by mouth 2 tablets per day, Disp: , Rfl:      CVS SENNA 8.6 MG tablet, TAKE 2 TABLETS BY MOUTH 2 TIMES DAILY, Disp: 120 tablet, Rfl: 1     Cyanocobalamin (VITAMIN B-12 PO), Take 4 tablets by mouth daily , Disp: , Rfl:      ENBREL SURECLICK 50 MG/ML autoinjector, , Disp: , Rfl:      leflunomide (ARAVA) 20 MG tablet, Take 20 mg by mouth every morning Reported on 3/28/2017, Disp: , Rfl:      meclizine (ANTIVERT) 25 MG tablet, Take 1 tablet (25 mg) by mouth every 6 hours as needed for dizziness, Disp: 30 tablet, Rfl: 1     omeprazole (PRILOSEC) 40 MG capsule, TAKE 1 CAPSULE (40 MG) BY MOUTH DAILY TAKE 30-60 MINUTES BEFORE A MEAL., Disp: 90 capsule, Rfl: 2     OnabotulinumtoxinA (BOTOX IJ), Inject 200 Units as directed Total dose 200 units  Administered 190 units  Unavoidable waste 30 units  Lot # /C3 with Expiration Date:  12/2020  NDC [address]-54, Disp: , Rfl:      OnabotulinumtoxinA (BOTOX IJ), Inject 170 Units as directed Lot # /C3 with Expiration Date:  10/2020, Disp: , Rfl:     Current Facility-Administered Medications:      lidocaine 1 % injection 5 mL, 5 mL, INTRA-ARTICULAR, Once, Carrol Gaspar MD    Facility-Administered Medications Ordered in Other Visits:      bupivacaine 0.5 % -  EPINEPHrine 1:200,000 injection, , , PRN, Yvon Negrete MD, 20 mL at 04/27/17 1505     mepivacaine (PF) (CARBOCAINE) 2 % injection, , , PRN, Yvon Negrete MD, 10 mL at 06/18/15 1250        Allergies   Allergen Reactions     Morphine Swelling     Gabapentin Other (See Comments)     Pins,needles, stabbing aching at  once  Pins,needles, stabbing aching at once  Numbness and Tingling     Ibuprofen      Sulfa Drugs      unknown     Tylenol [Acetaminophen]      Pt. States that she has Liver problems  Tylenol 3     Erythromycin Nausea     Vomiting      Penicillins Rash     Prednisone Palpitations     Heart racing        PHYSICAL EXAM  Pt states headache today, did not sleep well last night. Significant photophobia.  No throbbing in nature, rates 10/10 again.       JESSICA Lucas was diagnosed with rheumatoid arthritis since age 21 years, and migraine. She was getting Botox since age 21 years, she was getting them at [facility] but the physician left the state.    She has a history of Right elbow replacement, and developed a fracture of the humerus, progressed rotator cuff tear. She is about to see her surgeon on the 16th of April. She states that she is waiting for him to call her back.     She reports significant pain in the right elbow, which she states effects her migraines.     She is accompanied by her PCA today.       Patient reports the following new medical problems since last visit: Right arm surgery July 19, 2017 at [facility]    RESPONSE TO PREVIOUS TREATMENT:    Patient Kalyn Rivero received 190 units of Botox  on 6/28/18.     Problems following the previous series of neurotoxin injections included:  No problems reported    Benefits by Patient Report:       1.  Headache Frequency During this Injection Cycle:  0 headache since last injections up until 3 weeks ago. In the last 3 weeks, she has had non stop migraine. Associated with photophobia.           2.  Headache Duration During this Injection Cycle: ongoing   Dull in nature      3.  Headache Intensity During this Injection Cycle:  Headache intensity during this injection cycle:      A.  2/10  =  Typical pain level.    B.  10/10  =  Worst pain level in the last 5 weeks    C.  0/10  =  Lowest pain level.       4.  Change in headache medication usage during this  injection cycle:  (For Example:  Able to decrease use of oral pain medications.)  In the last 5 weeks, she had trigger point injections for TMJ.      5.  ER Visits During This Injection Cycle:  None        6.  Functional Performance:  Change in ADL's, social interaction, days lost from work, etc. Has had to cancel some activities due to headache.       BOTULINUM NEUROTOXIN INJECTION PROCEDURES:    VERIFICATION OF PATIENT IDENTIFICATION  Responsible Person:  wes  : verified  Full Name: verified    INDICATIONS FOR PROCEDURES:  Kalyn Rivero is a 48 year old patient with headache affecting the head, neck and shoulder girdle musculature secondary to a diagnosis of Chronic Migraine Headache with associated pain.    Her baseline symptoms have been recalcitrant to oral medications and conservative therapy.  She is here today for reinjection with Botox.    GOAL OF PROCEDURE:  The goal of this procedure is to improve decrease pain  associated with headache.    TOTAL DOSE ADMINISTERED:  Total dose; 200 units   Dose Administered:  190 units Botox (Botulinum Toxin Type A)       2:1 Dilution   Unavoidable waste 10 units   Diluent Used:  Preservative Free Normal Saline  Total Volume of Diluent Used:  4.0 ml  Lot # /C3 with Expiration Date:  3/2021   NDC [address]-32    CONSENT:  The risks, benefits, and treatment options were discussed with Kalyn Rivero and she agreed to proceed.    EQUIPMENT USED:  Needle-25mm stimulating/recording  Needle-37mm stimulating/recording  EMG/NCS Machine    SKIN PREPARATION:  Skin preparation was performed using an alcohol wipe.      GUIDANCE DESCRIPTION:  Electro-myographic guidance was necessary throughout the posterior neck portion to accurately identify all areas of spastic muscles while avoiding injection of non-spastic muscles, neighboring nerves and nearby vascular structures.     AREA/MUSCLE INJECTED:    HEAD & SCALP MUSCLES:  190 units Botox = Total Dose, 2:1  Dilution  Right Trapezius - 15 units in 3 sites   Left Trapezius - 10 units in 3 sites     Right occipitalis - 20 units of Botox at 4 site/s.  Left Occipitalis - 15 units at 3 sites     Right Paracervical  15 units in 3 sites   Left paracervical 5 units in 2 sites     Right longissimus cervicis 5 units in 1 site  Right semispinalis cervicis  5 units in 1 site   Right Lev scapulae 5 units in 1 site         Right Frontalis - 15 units of Botox at 3 site/s.  Left Frontalis - 15 units of Botox at 3 site/s.    Right Temporalis - 25 units of Botox at 5 site/s.  Left Temporalis - 20 units of Botox at 4 site/s.    Right Masseter, superficial belly   1 site 2.5 units   Left Masseter, superficial belly 1 site 2.5 units     Right  - 5 units of Botox at 1 site/s.   Left  - 5 units of Botox at 1 site/s.    Procerus - 5 units of Botox at 1 site/s.          RESPONSE TO PROCEDURE:  Kalyn Rivero tolerated the procedure well and there were no immediate complications.   She was allowed to recover for an appropriate period of time and was discharged home in stable condition.       Follow Up  Kalyn Rivero was asked to follow up by phone in 7-14 days with Annamaria Lamb RN, Care Coordinator, to report her response to this series of injections.  Based on the patient's previous response to this therapy, Kalyn Rivero was rescheduled for the next series of injections in 12 weeks.      Rosina Quinones MD, MHA   Physical Medicine & Rehabilitation

## 2018-10-01 ENCOUNTER — TELEPHONE (OUTPATIENT)
Dept: ORTHOPEDICS | Facility: CLINIC | Age: 48
End: 2018-10-01

## 2018-10-01 NOTE — TELEPHONE ENCOUNTER
Kalyn called to inquire about the results of her CT scan and about the progress for obtaining a prosthesis for a revision elbow replacement.  Dr. Zimmer has spoken with the supplier and of the prosthesis and knows what is needed  Kalyn needs to come in for Cotinin/nicotine and drug screen before surgery is scheduled.  Lab orders are in [facility]. She has yet to come in to have those labs drawn.

## 2018-10-08 DIAGNOSIS — T84.098A OTHER MECHANICAL COMPLICATION OF INTERNAL ELBOW JOINT PROSTHESIS (H): ICD-10-CM

## 2018-10-08 DIAGNOSIS — Z96.629 OTHER MECHANICAL COMPLICATION OF INTERNAL ELBOW JOINT PROSTHESIS (H): ICD-10-CM

## 2018-10-09 LAB
ACETAMINOPHEN QUAL: NEGATIVE
AMOBARBITAL QUAL: NEGATIVE
BARBITAL QUAL: NEGATIVE
BUTABARBITAL QUAL: NEGATIVE
BUTALBITAL QUAL: NEGATIVE
CAFFEINE QUAL: NEGATIVE
CARBAMAZEPINE QUAL: NEGATIVE
CARISOPRODOL QUAL: NEGATIVE
CHLORPROPAMIDE UR-MCNC: NEGATIVE UG/ML
DRUGS SERPL SCN: NEGATIVE
ETHCLORVYNOL QUAL: NEGATIVE
ETHINAMATE QUAL: NEGATIVE
ETHOSUXIMIDE QUAL: NEGATIVE
ETHOTOIN QUAL: NEGATIVE
GLUTETHIMIDE QUAL: NEGATIVE
IBUPROFEN QUAL: NEGATIVE
MEPHENYTOIN QUAL: NEGATIVE
MEPHOBARBITAL QUAL: NEGATIVE
MEPROBAMATE QUAL: NEGATIVE
METHAQUALONE QUAL: NEGATIVE
METHARBITAL QUAL: NEGATIVE
METHSUXIMIDE QUAL: NEGATIVE
METHYPRYLON QUAL: NEGATIVE
PENTOBARBITAL QUAL: NEGATIVE
PHENACETIN QUAL: NEGATIVE
PHENOBARBITAL QUAL: NEGATIVE
PHENSUXIMIDE QUAL: NEGATIVE
PHENYTOIN QUAL: NEGATIVE
PRIMIDONE QUAL: NEGATIVE
SALICYLATE QUAL: NEGATIVE
SECOBARBITAL QUAL: NEGATIVE
TALBUTAL QUAL: NEGATIVE
THEOPHYLLINE QUAL: NEGATIVE
THIOPENTAL QUAL: NEGATIVE
TYBAMATE QUAL: NEGATIVE
VALPROIC ACID QUAL: NEGATIVE

## 2018-10-15 LAB
COTININE SERPL-MCNC: NORMAL NG/ML
NICOTINE SERPL-MCNC: NORMAL NG/ML

## 2018-10-16 ENCOUNTER — TELEPHONE (OUTPATIENT)
Dept: ORTHOPEDICS | Facility: CLINIC | Age: 48
End: 2018-10-16

## 2018-10-16 DIAGNOSIS — T84.098A OTHER MECHANICAL COMPLICATION OF INTERNAL ELBOW JOINT PROSTHESIS (H): Primary | ICD-10-CM

## 2018-10-16 DIAGNOSIS — Z96.629 OTHER MECHANICAL COMPLICATION OF INTERNAL ELBOW JOINT PROSTHESIS (H): Primary | ICD-10-CM

## 2018-10-16 NOTE — TELEPHONE ENCOUNTER
Kalyn had her labs drawn.  Dr. Zimmer said that we can proceed with scheduling surgery.  The vendor will need 2 weeks to get the distal humerus prosthesis.  Kalyn chose Friday, November 9th as an OR date.  Preparation for surgery was discussed.  She will schedule an appt. With PAC.  Surgery booklet was mailed to her home.

## 2018-10-23 ENCOUNTER — RADIANT APPOINTMENT (OUTPATIENT)
Dept: CARDIOLOGY | Facility: CLINIC | Age: 48
End: 2018-10-23
Attending: CLINICAL NURSE SPECIALIST
Payer: COMMERCIAL

## 2018-10-23 ENCOUNTER — PRE VISIT (OUTPATIENT)
Dept: SURGERY | Facility: CLINIC | Age: 48
End: 2018-10-23

## 2018-10-23 ENCOUNTER — OFFICE VISIT (OUTPATIENT)
Dept: SURGERY | Facility: CLINIC | Age: 48
End: 2018-10-23
Payer: COMMERCIAL

## 2018-10-23 ENCOUNTER — ANESTHESIA EVENT (OUTPATIENT)
Dept: SURGERY | Facility: CLINIC | Age: 48
End: 2018-10-23

## 2018-10-23 VITALS
OXYGEN SATURATION: 96 % | HEART RATE: 82 BPM | RESPIRATION RATE: 16 BRPM | SYSTOLIC BLOOD PRESSURE: 122 MMHG | HEIGHT: 65 IN | WEIGHT: 154.2 LBS | BODY MASS INDEX: 25.69 KG/M2 | DIASTOLIC BLOOD PRESSURE: 84 MMHG | TEMPERATURE: 97.9 F

## 2018-10-23 DIAGNOSIS — Z86.79 HISTORY OF PULMONARY HYPERTENSION: ICD-10-CM

## 2018-10-23 DIAGNOSIS — Z96.629 OTHER MECHANICAL COMPLICATION OF INTERNAL ELBOW JOINT PROSTHESIS (H): ICD-10-CM

## 2018-10-23 DIAGNOSIS — T84.098A OTHER MECHANICAL COMPLICATION OF INTERNAL ELBOW JOINT PROSTHESIS (H): ICD-10-CM

## 2018-10-23 DIAGNOSIS — Z01.818 PREOP EXAMINATION: Primary | ICD-10-CM

## 2018-10-23 LAB
ALBUMIN SERPL-MCNC: 3.7 G/DL (ref 3.4–5)
ALP SERPL-CCNC: 111 U/L (ref 40–150)
ALT SERPL W P-5'-P-CCNC: 24 U/L (ref 0–50)
ANION GAP SERPL CALCULATED.3IONS-SCNC: 7 MMOL/L (ref 3–14)
AST SERPL W P-5'-P-CCNC: 23 U/L (ref 0–45)
BILIRUB SERPL-MCNC: 0.4 MG/DL (ref 0.2–1.3)
BUN SERPL-MCNC: 10 MG/DL (ref 7–30)
CALCIUM SERPL-MCNC: 9.3 MG/DL (ref 8.5–10.1)
CHLORIDE SERPL-SCNC: 104 MMOL/L (ref 94–109)
CO2 SERPL-SCNC: 28 MMOL/L (ref 20–32)
CREAT SERPL-MCNC: 2.09 MG/DL (ref 0.52–1.04)
ERYTHROCYTE [DISTWIDTH] IN BLOOD BY AUTOMATED COUNT: 15.4 % (ref 10–15)
GFR SERPL CREATININE-BSD FRML MDRD: 25 ML/MIN/1.7M2
GLUCOSE SERPL-MCNC: 85 MG/DL (ref 70–99)
HCT VFR BLD AUTO: 43.3 % (ref 35–47)
HGB BLD-MCNC: 13.4 G/DL (ref 11.7–15.7)
MCH RBC QN AUTO: 28.3 PG (ref 26.5–33)
MCHC RBC AUTO-ENTMCNC: 30.9 G/DL (ref 31.5–36.5)
MCV RBC AUTO: 91 FL (ref 78–100)
PLATELET # BLD AUTO: 190 10E9/L (ref 150–450)
POTASSIUM SERPL-SCNC: 4 MMOL/L (ref 3.4–5.3)
PROT SERPL-MCNC: 8.5 G/DL (ref 6.8–8.8)
RBC # BLD AUTO: 4.74 10E12/L (ref 3.8–5.2)
SODIUM SERPL-SCNC: 140 MMOL/L (ref 133–144)
WBC # BLD AUTO: 5 10E9/L (ref 4–11)

## 2018-10-23 ASSESSMENT — LIFESTYLE VARIABLES: TOBACCO_USE: 1

## 2018-10-23 NOTE — ANESTHESIA PREPROCEDURE EVALUATION
Anesthesia Evaluation     . Pt has had prior anesthetic. Type: General    History of anesthetic complications   - PONV  reported h/o cardiac arrest during anesthesia      ROS/MED HX    ENT/Pulmonary:     (+)tobacco use (quit 6 weeks ago), Past use , . .    Neurologic: Comment: Receives Botox injections for migraines    (+)migraines, CVA date: many years ago without deficits    Cardiovascular:     (+) ----. : . . . :. valvular problems/murmurs . Previous cardiac testing Echodate:10/23/18results:date: results:ECG reviewed date:8/27/18 results:SR date: results:          METS/Exercise Tolerance:  1 - Eating, dressing   Hematologic:     (+) History of Transfusion no previous transfusion reaction -      Musculoskeletal: Comment: Fibromyalgia  (+) , , other musculoskeletal- RA, multiple orthopedic surgeries      GI/Hepatic:     (+) GERD Asymptomatic on medication, liver disease (h/o acute liver failure in 2012 2/2 acetaminophen OD),       Renal/Genitourinary:     (+) chronic renal disease, type: CRI, Nephrolithiasis , Pt does not require dialysis, Pt has no history of transplant,       Endo:     (+) thyroid problem hypothyroidism, .      Psychiatric:     (+) psychiatric history depression and anxiety      Infectious Disease:   (+) MRSA,       Malignancy:      - no malignancy   Other:    (+) C-spine cleared: N/A, H/O Chronic Pain,other significant disability Other (comment)                   Physical Exam  Normal systems: dental    Airway   Mallampati: I  TM distance: >3 FB  Neck ROM: full    Dental     Cardiovascular   Rhythm and rate: regular and normal      Pulmonary    breath sounds clear to auscultation    Other findings: For further details of assessment, testing, and physical exam please see H and P completed on same date.           PAC Discussion and Assessment    ASA Classification: 3  Case is suitable for: [facility]  Anesthetic techniques and relevant risks discussed: GA  Invasive monitoring and risk discussed:  No  Types:   Possibility and Risk of blood transfusion discussed: No  NPO instructions given:   Additional anesthetic preparation and risks discussed:   Needs early admission to pre-op area:   Other:     PAC Resident/NP Anesthesia Assessment:  Kalyn Rivero is a 48 year old female scheduled to undergo revision right distal humeral replacement with Dr. Zimmer on date TBD. She has the following specific operative considerations:   - RCRI : 0.9% risk of major adverse cardiac event.  - VTE risk: 0.26%  - BRITTANY # of risks 0/8  - Risk of PONV score = 3.  If > 2, anti-emetic intervention recommended. 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 Above procedure now planned.    --PONV. Significant history. Final decisions regarding prophylaxis by Anesthesia on DOS.   --Rheumatoid arthritis s/p multiple orthopedic procedures. Is followed by Rheumatology. She has held her Enbrel since September but continues to take Arava and Celebrex. According to patient she will be able to stay on these for surgery. Contacting surgery team to confirm. Widespread pain. Requires PCA care.    --No cardiac symptoms or meds. History of heart murmur. Updated echocardiogram today normal. EKG above. Activity limited by pain.   --Former smoker. Quit 6 weeks ago. Denies pulmonary symptoms.    --GERD. Will take Omeprazole on DOS.    --Renal insufficiency. Elevated creatinine today: 2.09, GFR 25. Will contact surgery team, and ask patient to follow up as soon as possible in primary clinic.    --Anxiety. Clonazepam prn.    --Migraines with Botox injections every 3 months.    --History of difficult pain control after surgery.     Patient was discussed with Dr Avalos.        Reviewed and Signed by PAC Mid-Level Provider/Resident  Mid-Level Provider/Resident: JULIETTE Reina, CNS  Date:  "10/23/18  Time: 5:00pm    Attending Anesthesiologist Anesthesia Assessment:  I have examined the patient and reviewed the medical record.  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  An echocardiogram from 2011 notes RVSP of 51 over RAP but she is unaware of pulmonary hypertension and is not treated for it.  She denies GERD.    PE:  Thin female with multiple joint deformities.  MPC 1 with slight limitation to neck extension.  3 FBTMD.  Lungs clear.  CV  RRR without murmur.    Will obtain a repeat echocardiogram to assess if pulmonary hypertension is present and treat accordingly  Have instructed patient to discontinue biologics 2 weeks prior to surgery to minimize infection risk, patient is declining - will refer to surgeon  Patient is very amenable to regional anesthesia (has had multiple supraclavicular injections, some with Exparel.  Final plan per attending anesthesiologist the day of surgery.        Reviewed and Signed by PAC Anesthesiologist  Anesthesiologist: Erich Avalos MD  Date: 10/23/2018  Time:   Pass/Fail:   Disposition:     PAC Pharmacist Assessment:        Pharmacist:   Date:   Time:                           .  "

## 2018-10-23 NOTE — PATIENT INSTRUCTIONS
Preparing for Your Surgery      Name:  Kalyn Rivero   MRN:  [id number]   :  1970   Today's Date:  10/23/2018     Arriving for surgery:  Surgery date:  To be called with information  Arrival time:  To be called with information  Please come to:     To be called with information    What can I eat or drink?  -  You may have solid food or milk products until 8 hours prior to your surgery.  -  You may have water, apple juice or 7up/Sprite until 2 hours prior to your surgery.    Which medicines can I take?  Stop Aspirin, vitamins and supplements one week prior to surgery.  Hold Ibuprofen and Naproxen for 24 hours prior to surgery. Celecoxib x 3 days before surgery      -  Please take these medications the day of surgery:  Tylenol if needed, take all other scheduled medications normally taken in the morning    How do I prepare myself?  -  Take two showers: one the night before surgery; and one the morning of surgery.         Use Scrubcare or Hibiclens to wash from neck down.  You may use your own     shampoo and conditioner. No other hair products.   -  Do NOT use lotion, powder, deodorant, or antiperspirant the day of your surgery.  -  Do NOT wear any makeup, fingernail polish or jewelry.    - Do not bring your own medications to the hospital, except for inhalers and eye   drops.  -  Bring your ID and insurance card.    Questions or Concerns:  -If you have questions or concerns regarding the day of surgery, please call [phone].     -If you are scheduled at the [facility]ulatory [facility] Center please call [phone].    -For questions after surgery please call your surgeons office.

## 2018-10-23 NOTE — MR AVS SNAPSHOT
After Visit Summary   10/23/2018    Kalyn Rivero    MRN: [id number]           Patient Information     Date Of Birth          1970        Visit Information        Provider Department      10/23/2018 3:00 PM Kandy Thompson APRN CNS [facility]        Today's Diagnoses     History of pulmonary hypertension    -  1      Care Instructions    Preparing for Your Surgery      Name:  Kalyn Rivero   MRN:  [id number]   :  1970   Today's Date:  10/23/2018     Arriving for surgery:  Surgery date:  To be called with information  Arrival time:  To be called with information  Please come to:     To be called with information    What can I eat or drink?  -  You may have solid food or milk products until 8 hours prior to your surgery.  -  You may have water, apple juice or 7up/Sprite until 2 hours prior to your surgery.    Which medicines can I take?  Stop Aspirin, vitamins and supplements one week prior to surgery.  Hold Ibuprofen and Naproxen for 24 hours prior to surgery. Celecoxib x 3 days before surgery      -  Please take these medications the day of surgery:  Tylenol if needed, take all other scheduled medications normally taken in the morning    How do I prepare myself?  -  Take two showers: one the night before surgery; and one the morning of surgery.         Use Scrubcare or Hibiclens to wash from neck down.  You may use your own     shampoo and conditioner. No other hair products.   -  Do NOT use lotion, powder, deodorant, or antiperspirant the day of your surgery.  -  Do NOT wear any makeup, fingernail polish or jewelry.    - Do not bring your own medications to the hospital, except for inhalers and eye   drops.  -  Bring your ID and insurance card.    Questions or Concerns:  -If you have questions or concerns regarding the day of surgery, please call [phone].     -If you are scheduled at the Ambulatory Surgery Center please call  "[phone].    -For questions after surgery please call your surgeons office.                     Follow-ups after your visit        Your next 10 appointments already scheduled     Dec 13, 2018 11:20 AM CST   (Arrive by 11:05 AM)   Return Botox with Rosina Quinones MD   [facility] ([facility])    [address]  [address] [place] [address] [phone]              Future tests that were ordered for you today     Open Future Orders        Priority Expected Expires Ordered    Echocardiogram Routine  11/22/2018 10/23/2018            Who to contact     Please call your clinic at [phone] to:    Ask questions about your health    Make or cancel appointments    Discuss your medicines    Learn about your test results    Speak to your doctor            Additional Information About Your Visit        Care EveryWhere ID     This is your Care EveryWhere ID. This could be used by other organizations to access your [facility] medical records  [id number]        Your Vitals Were     Pulse Temperature Respirations Height Pulse Oximetry BMI (Body Mass Index)    82 97.9  F (36.6  C) (Oral) 16 1.651 m (5' 5\") 96% 25.66 kg/m2       Blood Pressure from Last 3 Encounters:   10/23/18 122/84   07/27/18 (!) 128/93   06/28/18 (!) 128/96    Weight from Last 3 Encounters:   10/23/18 69.9 kg (154 lb 3.2 oz)   08/27/18 68.8 kg (151 lb 9.6 oz)   07/27/18 64.4 kg (142 lb)                 Today's Medication Changes          These changes are accurate as of 10/23/18  3:31 PM.  If you have any questions, ask your nurse or doctor.               These medicines have changed or have updated prescriptions.        Dose/Directions    * BOTOX IJ   This may have changed:  Another medication with the same name was changed. Make sure you understand how and when to take each.        Dose:  170 Units   Inject 170 Units as directed Lot # /C3 with Expiration Date:  " 10/2020   Refills:  0       * BOTOX IJ   This may have changed:  Another medication with the same name was changed. Make sure you understand how and when to take each.        Dose:  200 Units   Inject 200 Units as directed Total dose 200 units  Administered 190 units  Unavoidable waste 30 units  Lot # /C3 with Expiration Date:  12/2020  NDC [address]-32   Refills:  0       * Botulinum Toxin Type A 200 units injection   Commonly known as:  BOTOX   This may have changed:  additional instructions   Used for:  Intractable chronic migraine without aura and without status migrainosus        Dose:  200 Units   Inject 200 Units as directed every 3 months   Quantity:  200 Units   Refills:  3       meclizine 25 MG tablet   Commonly known as:  ANTIVERT   This may have changed:  when to take this        Dose:  25 mg   Take 1 tablet (25 mg) by mouth every 6 hours as needed for dizziness   Quantity:  30 tablet   Refills:  1       omeprazole 40 MG capsule   Commonly known as:  priLOSEC   This may have changed:  See the new instructions.   Used for:  PUD (peptic ulcer disease)        TAKE 1 CAPSULE (40 MG) BY MOUTH DAILY TAKE 30-60 MINUTES BEFORE A MEAL.   Quantity:  90 capsule   Refills:  2       * Notice:  This list has 3 medication(s) that are the same as other medications prescribed for you. Read the directions carefully, and ask your doctor or other care provider to review them with you.             Primary Care Provider Office Phone # Fax #    Mika Zamora [phone] [address] [facility] [address]        Equal Access to Services     [facility]: Hadii ashlee acosta Sokayla, waaxda luqadaha, qaybta kaalmada adevladislavyada, waxay tawnya gaston adevladislav hollins. So [facility] [phone].    ATENCIÓN: Si habla español, tiene a holcomb disposición servicios gratuitos de asistencia lingüística. Llame al [phone].    We comply with applicable federal civil rights laws and [place] laws.  We do not discriminate on the basis of race, color, national origin, age, disability, sex, sexual orientation, or gender identity.            Thank you!     Thank you for choosing [facility]  for your care. Our goal is always to provide you with excellent care. Hearing back from our patients is one way we can continue to improve our services. Please take a few minutes to complete the written survey that you may receive in the mail after your visit with us. Thank you!             Your Updated Medication List - Protect others around you: Learn how to safely use, store and throw away your medicines at www.disposemymeds.org.          This list is accurate as of 10/23/18  3:31 PM.  Always use your most recent med list.                   Brand Name Dispense Instructions for use Diagnosis    * BOTOX IJ      Inject 170 Units as directed Lot # /C3 with Expiration Date:  10/2020        * BOTOX IJ      Inject 200 Units as directed Total dose 200 units  Administered 190 units  Unavoidable waste 30 units  Lot # /C3 with Expiration Date:  12/2020  NDC [address]-61        * Botulinum Toxin Type A 200 units injection    BOTOX    200 Units    Inject 200 Units as directed every 3 months    Intractable chronic migraine without aura and without status migrainosus       CELEBREX PO      Take 200 mg by mouth 3 times daily        [facility] SENNA 8.6 MG tablet   Generic drug:  senna     120 tablet    TAKE 2 TABLETS BY MOUTH 2 TIMES DAILY    Rheumatoid arthritis with positive rheumatoid factor, involving unspecified site (H)       ENBREL SURECLICK 50 MG/ML autoinjector   Generic drug:  Etanercept      Inject 50 mg Subcutaneous twice a week On hold for surgery since 09/17/2018        KLONOPIN PO      Take 0.5 mg by mouth 3 times daily        leflunomide 20 MG tablet    ARAVA     Take 20 mg by mouth every morning Reported on 3/28/2017        meclizine 25 MG tablet    ANTIVERT    30 tablet    Take 1 tablet (25  mg) by mouth every 6 hours as needed for dizziness        omeprazole 40 MG capsule    priLOSEC    90 capsule    TAKE 1 CAPSULE (40 MG) BY MOUTH DAILY TAKE 30-60 MINUTES BEFORE A MEAL.    PUD (peptic ulcer disease)       VITAMIN B-12 PO      Take 4 tablets by mouth every morning        * Notice:  This list has 3 medication(s) that are the same as other medications prescribed for you. Read the directions carefully, and ask your doctor or other care provider to review them with you.

## 2018-10-24 NOTE — H&P
Pre-Operative H & P     CC:  Preoperative exam to assess for increased cardiopulmonary risk while undergoing surgery and anesthesia.    Date of Encounter: 10/23/2018  Primary Care Physician:  Mika Zamora  Reason for visit: 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) [T84.961M, Z96.629]  - Primary   HPI  Kalyn Rivero is a 48 year old female who presents for pre-operative H & P in preparation for revision right distal humeral replacement with Dr. Zimmer on date TBD at [facility]. History is obtained from the patient.     Patient with history of multiple right elbow surgeries including total elbow arthroplasty and failed distal humerus allograft with persistent pain and limited function. She recently returned to Dr. Zimmer to discuss options. She was counseled for above procedure.     Patient's history is complex with rheumatoid arthritis with many orthopedic surgeries. She is followed by Rheumatology on Enbrel, Arava and Celebrex. She has widespread chronic pain and requires assistance from PCAs for care. She is treated for GERD and anxiety.         Past Medical History  Past Medical History:   Diagnosis Date     Acetaminophen overdose 3/24/2011     Anemia      Anesthesia complication     pt states has a history of heart stopping during anesthesia,     Arrhythmia      Cerebral infarction (H)      Cervical high risk HPV (human papillomavirus) test positive 02/22/2017    type 18 & other HR HPV     Chronic infection     MRSA     Chronic pain 3/24/2011     Degenerative joint disease      Endometriosis      Fibromyalgia      Gastro-oesophageal reflux disease      Heart murmur      History of blood transfusion      Hypothyroidism      Immune disorder (H)      Learning disability      Malignant neoplasm (H)      Migraine      MRSA (methicillin resistant staph aureus) culture positive      Neck injuries      Opioid type dependence (H)       "Other chronic pain      PONV (postoperative nausea and vomiting)     states \"flatlines\"during anesthesia     RA (rheumatoid arthritis) (H)      Renal stone      Scoliosis      Stomach ulcer     history       Past Surgical History  Past Surgical History:   Procedure Laterality Date     ARTHRODESIS FOOT  5/23/2012    Procedure:ARTHRODESIS FOOT; Right Subtalar andTaloNavicular  Fusion  ; Surgeon:BRIGHT HENDRICKS; [facility]     ARTHRODESIS FOOT Left 4/22/2015    Procedure: ARTHRODESIS FOOT;  Surgeon: Bright Hendricks MD;  [facility]     ARTHROPLASTY ELBOW Right 9/30/2015    Procedure: ARTHROPLASTY ELBOW;  Surgeon: Carrol Gaspar MD;  [facility]     ARTHROPLASTY KNEE Right      ARTHROPLASTY WRIST      x2 Lt wrist replacement.     ARTHROTOMY ELBOW Right 6/18/2015    Procedure: ARTHROTOMY ELBOW;  Surgeon: Carrol Gaspar MD;  [facility]     C ANESTH,DX ARTHROSCOPIC PROC KNEE JOINT  10/24/2007    right total knee arthroplasty     C SHLDR ARTHROSCOP,DIAGNOSTIC  12/17/2008    left total shoulder arthroplasty by Dr. Law     C SHOULDER SURG PROC UNLISTED       C TOTAL KNEE ARTHROPLASTY  1/18/12    Left     CYSTOSCOPY  02/06/2009    1.cystoscopy.2.bilateral ureteroscopy.3.basketing of stone fragments from the right kidney.4.bilateral double-J ureteral stent placement.5.ann catheter placement.6.fluoroscopy and intraoperative interpretation of images.     CYSTOSCOPY  01/08/2007    1. cystoscopy and left stent removal.2.left ureteroscopy     DECOMPRESSION CUBITAL TUNNEL  6/6/2014    Procedure: DECOMPRESSION CUBITAL TUNNEL;  Surgeon: Janelle Coates MD;  Location:  OR     ENDOSCOPY  03/31/2005    upper GI endoscopy-[facility]     ESOPHAGOSCOPY, GASTROSCOPY, DUODENOSCOPY (EGD), COMBINED  3/17/2014    Procedure: COMBINED ESOPHAGOSCOPY, GASTROSCOPY, DUODENOSCOPY (EGD), BIOPSY SINGLE OR MULTIPLE;;  Surgeon: Duane, William Charles, MD;  Location:  OR     " "FUSION CERVICAL POSTERIOR ONE LEVEL  11/18/2013    Procedure: FUSION CERVICAL POSTERIOR ONE LEVEL;  Cervical 1-2 Posterior Cervical Fusion (Harms Procedure);  Surgeon: Carrol Gunn MD;  Location: [facility]     GYN SURGERY       INJECT MAJOR JOINT / BURSA Left 1/5/2017    Procedure: INJECT MAJOR JOINT / BURSA;  Surgeon: Fredy Patel DO;  [facility]     INJECT STEROID (LOCATION) Left 6/18/2015    Procedure: INJECT STEROID (LOCATION);  Surgeon: Carrol Gaspar MD;  [facility]     NECK SURGERY       REMOVE FOREIGN BODY UPPER EXTREMITY Right 3/2/2017    Procedure: REMOVE FOREIGN BODY UPPER EXTREMITY;  Surgeon: Carrol Gaspar MD;  Location:  OR     RESECT BONE UPPER EXTREMITY Left 4/27/2017    Procedure: RESECT BONE UPPER EXTREMITY;  Left Radial Head Resection;  Surgeon: Carrol Gaspar MD;  Location:  OR     ROTATOR CUFF REPAIR RT/LT  10/19/2005    1.left distal clavicle excision.2.acromioplasty.3.coracoacromial ligament resection.4.rotator cuff exploration       Hx of Blood transfusions/reactions: Yes, in past. No known reactions.      Hx of abnormal bleeding or anti-platelet use: Denies.     Menstrual history: No LMP recorded. Patient has had an injection.    Steroid use in the last year: Denies.     Personal or FH with difficulty with Anesthesia:  PONV. Past history of \"heart stopping\" during a knee surgery. Patient report \"too much anesthesia\". Few details available. Has had multiple procedures since that time.     Prior to Admission Medications  Current Outpatient Prescriptions   Medication Sig Dispense Refill     Botulinum Toxin Type A (BOTOX) 200 UNITS SOLR Inject 200 Units as directed every 3 months (Patient taking differently: Inject 200 Units as directed every 3 months LAST INJECT WAS 09/2018) 200 Units 3     Celecoxib (CELEBREX PO) Take 200 mg by mouth 3 times daily       ClonazePAM (KLONOPIN PO) Take 0.5 mg by mouth 3 times daily        " Cyanocobalamin (VITAMIN B-12 PO) Take 4 tablets by mouth every morning        ENBREL SURECLICK 50 MG/ML autoinjector Inject 50 mg Subcutaneous twice a week On hold for surgery since 09/17/2018       leflunomide (ARAVA) 20 MG tablet Take 20 mg by mouth every morning Reported on 3/28/2017       omeprazole (PRILOSEC) 40 MG capsule TAKE 1 CAPSULE (40 MG) BY MOUTH DAILY TAKE 30-60 MINUTES BEFORE A MEAL. (Patient taking differently: TAKE 1 CAPSULE (40 MG) BID PRN) 90 capsule 2     [employer] SENNA 8.6 MG tablet TAKE 2 TABLETS BY MOUTH 2 TIMES DAILY 120 tablet 1     meclizine (ANTIVERT) 25 MG tablet Take 1 tablet (25 mg) by mouth every 6 hours as needed for dizziness (Patient taking differently: Take 25 mg by mouth as needed for dizziness ) 30 tablet 1     OnabotulinumtoxinA (BOTOX IJ) Inject 200 Units as directed Total dose 200 units  Administered 190 units  Unavoidable waste 30 units  Lot # /[id number] with Expiration Date:  12/2020  NDC [address]-80       OnabotulinumtoxinA (BOTOX IJ) Inject 170 Units as directed Lot # /[id number] with Expiration Date:  10/2020         Allergies  Allergies   Allergen Reactions     Morphine Swelling     Gabapentin Other (See Comments)     Pins,needles, stabbing aching at once  Pins,needles, stabbing aching at once  Numbness and Tingling     Ibuprofen      Sulfa Drugs      unknown     Tylenol [Acetaminophen]      Pt. States that she has Liver problems  Tylenol 3     Erythromycin Nausea     Vomiting      Penicillins Rash     Prednisone Palpitations     Heart racing       Social History  Social History     Social History     Marital status: Single     Spouse name: N/A     Number of children: N/A     Years of education: N/A     Occupational History     Not on file.     Social History Main Topics     Smoking status: Light Tobacco Smoker     Packs/day: 0.10     Years: 10.00     Types: Cigarettes     Start date: 8/6/1983     Smokeless tobacco: Never Used      Comment: Quit 6 weeks ago     Alcohol use No      Drug use: No     Sexual activity: No     Other Topics Concern     Not on file     Social History Narrative       Family History  Family History   Problem Relation Age of Onset     Diabetes Mother      Hypertension Mother      Allergies Mother      Alcohol/Drug Mother      LIVER CIRROSIS     Blood Disease Mother      B12 DEF     Neurologic Disorder Mother      Epilepsy     Osteoporosis Mother      Cerebrovascular Disease Maternal Grandmother      Arthritis Maternal Grandmother      RHEUMATOID     Cancer Maternal Grandmother      Thyroid Disease Maternal Grandmother      Osteoporosis Maternal Grandmother      HEART DISEASE Maternal Grandmother      Depression Maternal Grandmother      Neurologic Disorder Maternal Grandmother      MIGRAINES     Anxiety Disorder Maternal Grandmother      Diabetes Maternal Grandmother      Migraines Maternal Grandmother      Deep Vein Thrombosis Maternal Grandmother      Cerebrovascular Disease Maternal Grandfather      Cancer Maternal Grandfather      Mental Illness Maternal Grandfather      Cerebrovascular Disease Paternal Grandmother      Arthritis Paternal Grandmother      RHEUMATOID     Cancer Paternal Grandmother      Osteoporosis Paternal Grandmother      HEART DISEASE Paternal Grandmother      Depression Paternal Grandmother      Neurologic Disorder Paternal Grandmother      MIGRAINES     Thyroid Disease Paternal Grandmother      Cerebrovascular Disease Paternal Grandfather      Cancer Paternal Grandfather      HEART DISEASE Brother      MURMUR     Asthma Son      Endocrine Disease Son      Neurologic Disorder Father      epilepsy     Seizure Disorder Father      Hypertension Father      Skin Cancer Father      Anxiety Disorder Father      Mental Illness Father      Hyperlipidemia Father      Neurologic Disorder Daughter      MIGRAINES     Arthritis Daughter      JR     Hypertension Son      LUNG DISEASE Brother      Anxiety Disorder Son      Asthma Son      Hypertension Son       Migraines Son      Spine Problems Son      Mental Illness Brother      Migraines Brother      Cardiac Sudden Death Brother      Spine Problems Brother      Glaucoma No family hx of      Macular Degeneration No family hx of      Unknown/Adopted No family hx of      Anesthesia Reaction No family hx of      Other Cancer No family hx of      Rheumatoid Arthritis No family hx of      Bone Cancer No family hx of      Low Back Problems No family hx of        Review of Systems  ROS/MED HX  The complete review of systems is negative other than noted in the HPI or here.  ENT/Pulmonary:     (+)tobacco use (quit 6 weeks ago), Past use , . .    Neurologic: Comment: Receives Botox injections for migraines    (+)migraines, CVA date: many years ago without deficits    Cardiovascular:     (+) ----. : . . . :. valvular problems/murmurs . Previous cardiac testing Echodate:10/23/18results:date: results:ECG reviewed date:8/27/18 results:SR date: results:          METS/Exercise Tolerance:  1 - Eating, dressing   Hematologic:     (+) History of Transfusion no previous transfusion reaction -      Musculoskeletal: Comment: Fibromyalgia  (+) , , other musculoskeletal- RA, multiple orthopedic surgeries      GI/Hepatic:     (+) GERD Asymptomatic on medication, liver disease (h/o acute liver failure in 2012 2/2 acetaminophen OD),       Renal/Genitourinary:     (+) chronic renal disease, type: CRI, Nephrolithiasis , Pt does not require dialysis, Pt has no history of transplant,       Endo:     (+) thyroid problem hypothyroidism, .      Psychiatric:     (+) psychiatric history depression and anxiety      Infectious Disease:   (+) MRSA,       Malignancy:      - no malignancy   Other:    (+) C-spine cleared: N/A, H/O Chronic Pain,other significant disability Other (comment)     Physical Exam  Normal systems: dental    Airway   Mallampati: I  TM distance: >3 FB  Neck ROM: full    Temp: 97.9  F (36.6  C) Temp src: Oral BP: 122/84 Pulse: 82    "Resp: 16 SpO2: 96 %         154 lbs 3.2 oz  5' 5\"   Body mass index is 25.66 kg/(m^2).       Physical Exam  Constitutional: Awake, alert, cooperative, no apparent distress, and appears stated age. Accompanied by her two PCAs.  Eyes: Pupils equal, round and reactive to light, extra ocular muscles intact, sclera clear, conjunctiva normal.  HENT: Normocephalic, oral pharynx with moist mucus membranes, good dentition. No goiter appreciated.   Respiratory: Clear to auscultation bilaterally, no crackles or wheezing. No cough or obvious dyspnea.  Cardiovascular: Regular rate and rhythm, normal S1 and S2, and no murmur noted. Carotids +2, no bruits. No edema. Palpable pulses to radial  DP and PT arteries.   GI: Normal bowel sounds, soft, non-distended, non-tender, no masses palpated, no hepatosplenomegaly.    Lymph/Hematologic: No cervical lymphadenopathy and no supraclavicular lymphadenopathy.  Genitourinary:  Deferred.   Skin: Warm and dry.     Musculoskeletal: Full ROM of neck. There is no redness or warmth of joints. Right elbow with changes of surgery and irregularity. Gross motor strength is limited.    Neurologic: Awake, alert, oriented to name, place and time. Cranial nerves II-XII are grossly intact. Gait is normal.   Neuropsychiatric: Calm, cooperative. Normal affect.     Labs: (personally reviewed)  Lab Results   Component Value Date    WBC 5.0 10/23/2018     Lab Results   Component Value Date    RBC 4.74 10/23/2018     Lab Results   Component Value Date    HGB 13.4 10/23/2018     Lab Results   Component Value Date    HCT 43.3 10/23/2018     Lab Results   Component Value Date    MCV 91 10/23/2018     Lab Results   Component Value Date    MCH 28.3 10/23/2018     Lab Results   Component Value Date    MCHC 30.9 10/23/2018     Lab Results   Component Value Date    RDW 15.4 10/23/2018     Lab Results   Component Value Date     10/23/2018     Last Comprehensive Metabolic Panel:  Sodium   Date Value Ref Range " Status   10/23/2018 140 133 - 144 mmol/L Final     Potassium   Date Value Ref Range Status   10/23/2018 4.0 3.4 - 5.3 mmol/L Final     Chloride   Date Value Ref Range Status   10/23/2018 104 94 - 109 mmol/L Final     Carbon Dioxide   Date Value Ref Range Status   10/23/2018 28 20 - 32 mmol/L Final     Anion Gap   Date Value Ref Range Status   10/23/2018 7 3 - 14 mmol/L Final     Glucose   Date Value Ref Range Status   10/23/2018 85 70 - 99 mg/dL Final     Urea Nitrogen   Date Value Ref Range Status   10/23/2018 10 7 - 30 mg/dL Final     Creatinine   Date Value Ref Range Status   10/23/2018 2.09 (H) 0.52 - 1.04 mg/dL Final     GFR Estimate   Date Value Ref Range Status   10/23/2018 25 (L) >60 mL/min/1.7m2 Final     Comment:     Non  GFR Calc     Calcium   Date Value Ref Range Status   10/23/2018 9.3 8.5 - 10.1 mg/dL Final     EKG: Personally reviewed 8/27/18 Sinus rhythm  Cardiac echo: 10/23/18  Interpretation Summary  Global and regional left ventricular function is normal with an EF of 55-60%.  Global right ventricular function is normal.  No significant valvular abnormalities were noted.  Pulmonary artery systolic pressure is normal.  The inferior vena cava was normal in size with preserved respiratory  variability.  No pericardial effusion is present.    8/30/18 CT humerus upper extremity  Impression:1. Postsurgical changes of elbow surgeries with associated periprosthetic fracture oat mid humerus. Overall findings are notsignificantly changed from 8/27/2018. a. Some of dislodged screws located within soft tissue muscle.    Xray right humerus 8/27/18  IMPRESSION: 1. Fracture with additional angulation distal to mid shaft right humerus.  2. Additional displacement consistent with loosening of humeralfixation plate migration distally.  3. Additional displacement of the long intramedullary hector associated with the total right elbow. Displacement of screws from the humeral fixation plate and the soft  tissues.  4. Total right elbow.    CXR 8/27/18  IMPRESSION: 1. No acute cardiopulmonary disease is seen.     Imaging and cardiac testing reviewed by this provider    Outside records reviewed from: Care Everywhere    ASSESSMENT and PLAN  Kalyn Rivero is a 48 year old female scheduled to undergo revision right distal humeral replacement with Dr. Zimmer on date TBD. She has the following specific operative considerations:   - RCRI : 0.9% risk of major adverse cardiac event.   - Anesthesia considerations:  Refer to PAC assessment in anesthesia records  - VTE risk: 0.26%  - BRITTANY # of risks 0/8  - Risk of PONV score = 3.  If > 2, anti-emetic intervention recommended. 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 Above procedure now planned.    --PONV. Significant history. Final decisions regarding prophylaxis by Anesthesia on DOS.   --Rheumatoid arthritis s/p multiple orthopedic procedures. Is followed by Rheumatology. She has held her Enbrel since September but continues to take Arava and Celebrex. According to patient she will be able to stay on these for surgery. Contacting surgery team to confirm. Widespread pain. Requires PCA care.    --No cardiac symptoms or meds. History of heart murmur. Updated echocardiogram today normal. EKG above. Activity limited by pain.   --Former smoker. Quit 6 weeks ago. Denies pulmonary symptoms.    --GERD. Will take Omeprazole on DOS.    --Renal insufficiency. Elevated creatinine today: 2.09, GFR 25. Will contact surgery team, and ask patient to follow up as soon as possible in primary clinic.    --Anxiety. Clonazepam prn.    --Migraines with Botox injections every 3 months.    --History of difficult pain control after surgery.     Arrival time, NPO, shower and medication instructions provided by nursing staff today.  Preparing For Your Surgery handout given.    Patient was discussed with Dr Avalos.    JULIETTE Johnson CNS  Preoperative Assessment Center  [facility]  Cl[facility] and [facility] Center  Phone: [phone]  Fax: [phone]

## 2018-10-31 ENCOUNTER — OFFICE VISIT (OUTPATIENT)
Dept: FAMILY MEDICINE | Facility: CLINIC | Age: 48
End: 2018-10-31
Payer: COMMERCIAL

## 2018-10-31 VITALS
RESPIRATION RATE: 20 BRPM | SYSTOLIC BLOOD PRESSURE: 102 MMHG | OXYGEN SATURATION: 100 % | WEIGHT: 152 LBS | HEART RATE: 109 BPM | TEMPERATURE: 97 F | HEIGHT: 65 IN | DIASTOLIC BLOOD PRESSURE: 77 MMHG | BODY MASS INDEX: 25.33 KG/M2

## 2018-10-31 DIAGNOSIS — R94.4 ABNORMAL RENAL FUNCTION TEST: Primary | ICD-10-CM

## 2018-10-31 LAB
ANION GAP SERPL CALCULATED.3IONS-SCNC: 6 MMOL/L (ref 3–14)
BUN SERPL-MCNC: 12 MG/DL (ref 7–30)
CALCIUM SERPL-MCNC: 9.1 MG/DL (ref 8.5–10.1)
CHLORIDE SERPL-SCNC: 105 MMOL/L (ref 94–109)
CO2 SERPL-SCNC: 27 MMOL/L (ref 20–32)
CREAT SERPL-MCNC: 0.89 MG/DL (ref 0.52–1.04)
GFR SERPL CREATININE-BSD FRML MDRD: 68 ML/MIN/1.7M2
GLUCOSE SERPL-MCNC: 120 MG/DL (ref 70–99)
POTASSIUM SERPL-SCNC: 4.7 MMOL/L (ref 3.4–5.3)
SODIUM SERPL-SCNC: 138 MMOL/L (ref 133–144)

## 2018-10-31 PROCEDURE — 99213 OFFICE O/P EST LOW 20 MIN: CPT | Performed by: FAMILY MEDICINE

## 2018-10-31 PROCEDURE — 36415 COLL VENOUS BLD VENIPUNCTURE: CPT | Performed by: FAMILY MEDICINE

## 2018-10-31 PROCEDURE — 80048 BASIC METABOLIC PNL TOTAL CA: CPT | Performed by: FAMILY MEDICINE

## 2018-10-31 ASSESSMENT — PAIN SCALES - GENERAL: PAINLEVEL: NO PAIN (0)

## 2018-10-31 NOTE — MR AVS SNAPSHOT
After Visit Summary   10/31/2018    Kalyn Rivero    MRN: [id number]           Patient Information     Date Of Birth          1970        Visit Information        Provider Department      10/31/2018 9:15 AM Mika Zamora MD [facility]        Today's Diagnoses     Abnormal renal function test    -  1       Follow-ups after your visit        Follow-up notes from your care team     Return in about 5 days (around 11/5/2018).      Your next 10 appointments already scheduled     Dec 13, 2018 11:20 AM CST   (Arrive by 11:05 AM)   Return Botox with Rosina Quinones MD   [facility] ([facility])    [address]  [address] [facility] [address] [phone]              Future tests that were ordered for you today     Open Standing Orders        Priority Remaining Interval Expires Ordered    Basic metabolic panel Routine 4/5  10/31/2019 10/31/2018            Who to contact     If you have questions or need follow up information about today's clinic visit or your schedule please contact [facility] directly at [phone].  Normal or non-critical lab and imaging results will be communicated to you by MyChart, letter or phone within 4 business days after the clinic has received the results. If you do not hear from us within 7 days, please contact the clinic through MyChart or phone. If you have a critical or abnormal lab result, we will notify you by phone as soon as possible.  Submit refill requests through [facility] or call your pharmacy and they will forward the refill request to us. Please allow 3 business days for your refill to be completed.          Additional Information About Your Visit        Care EveryWhere ID     This is your Care EveryWhere ID. This could be used by other organizations to access your [place] medical records  [id number]        Your Vitals Were     Pulse  "Temperature Respirations Height Pulse Oximetry BMI (Body Mass Index)    109 97  F (36.1  C) (Oral) 20 5' 5\" (1.651 m) 100% 25.29 kg/m2       Blood Pressure from Last 3 Encounters:   10/31/18 102/77   10/23/18 122/84   07/27/18 (!) 128/93    Weight from Last 3 Encounters:   10/31/18 152 lb (68.9 kg)   10/23/18 154 lb 3.2 oz (69.9 kg)   08/27/18 151 lb 9.6 oz (68.8 kg)                 Today's Medication Changes          These changes are accurate as of 10/31/18  9:35 AM.  If you have any questions, ask your nurse or doctor.               These medicines have changed or have updated prescriptions.        Dose/Directions    * BOTOX IJ   This may have changed:  Another medication with the same name was changed. Make sure you understand how and when to take each.        Dose:  170 Units   Inject 170 Units as directed Lot # /[id number] with Expiration Date:  10/2020   Refills:  0       * BOTOX IJ   This may have changed:  Another medication with the same name was changed. Make sure you understand how and when to take each.        Dose:  200 Units   Inject 200 Units as directed Total dose 200 units  Administered 190 units  Unavoidable waste 30 units  Lot # /[id number] with Expiration Date:  12/2020  NDC [address]-85   Refills:  0       * Botulinum Toxin Type A 200 units injection   Commonly known as:  BOTOX   This may have changed:  additional instructions   Used for:  Intractable chronic migraine without aura and without status migrainosus        Dose:  200 Units   Inject 200 Units as directed every 3 months   Quantity:  200 Units   Refills:  3       meclizine 25 MG tablet   Commonly known as:  ANTIVERT   This may have changed:  when to take this        Dose:  25 mg   Take 1 tablet (25 mg) by mouth every 6 hours as needed for dizziness   Quantity:  30 tablet   Refills:  1       omeprazole 40 MG capsule   Commonly known as:  priLOSEC   This may have changed:  See the new instructions.   Used for:  PUD (peptic ulcer disease)     "    TAKE 1 CAPSULE (40 MG) BY MOUTH DAILY TAKE 30-60 MINUTES BEFORE A MEAL.   Quantity:  90 capsule   Refills:  2       * Notice:  This list has 3 medication(s) that are the same as other medications prescribed for you. Read the directions carefully, and ask your doctor or other care provider to review them with you.             Primary Care Provider Office Phone # Fax #    Mika Zamora [phone] [address] [facility] [address]        Equal Access to Services     [facility] JOSEPHINE : Hadii aad ku hadasho Soomaali, waaxda luqadaha, qaybta kaalmada adeegyada, waxay idiin hayaan adeeg kharash la'sandra . So [facility] [phone].    ATENCIÓN: Si habla español, tiene a holcomb disposición servicios gratuitos de asistencia lingüística. [facility] [phone].    We comply with applicable federal civil rights laws and [place] laws. We do not discriminate on the basis of race, color, national origin, age, disability, sex, sexual orientation, or gender identity.            Thank you!     Thank you for choosing [facility]  for your care. Our goal is always to provide you with excellent care. Hearing back from our patients is one way we can continue to improve our services. Please take a few minutes to complete the written survey that you may receive in the mail after your visit with us. Thank you!             Your Updated Medication List - Protect others around you: Learn how to safely use, store and throw away your medicines at www.disposemymeds.org.          This list is accurate as of 10/31/18  9:35 AM.  Always use your most recent med list.                   Brand Name Dispense Instructions for use Diagnosis    * BOTOX IJ      Inject 170 Units as directed Lot # /C3 with Expiration Date:  10/2020        * BOTOX IJ      Inject 200 Units as directed Total dose 200 units  Administered 190 units  Unavoidable waste 30 units  Lot # /C3 with Expiration Date:  12/2020  NDC  [address]-23        * Botulinum Toxin Type A 200 units injection    BOTOX    200 Units    Inject 200 Units as directed every 3 months    Intractable chronic migraine without aura and without status migrainosus       CELEBREX PO      Take 200 mg by mouth 3 times daily        [employer] SENNA 8.6 MG tablet   Generic drug:  senna     120 tablet    TAKE 2 TABLETS BY MOUTH 2 TIMES DAILY    Rheumatoid arthritis with positive rheumatoid factor, involving unspecified site (H)       ENBREL SURECLICK 50 MG/ML autoinjector   Generic drug:  Etanercept      Inject 50 mg Subcutaneous twice a week On hold for surgery since 09/17/2018        KLONOPIN PO      Take 0.5 mg by mouth 3 times daily        leflunomide 20 MG tablet    ARAVA     Take 20 mg by mouth every morning Reported on 3/28/2017        meclizine 25 MG tablet    ANTIVERT    30 tablet    Take 1 tablet (25 mg) by mouth every 6 hours as needed for dizziness        omeprazole 40 MG capsule    priLOSEC    90 capsule    TAKE 1 CAPSULE (40 MG) BY MOUTH DAILY TAKE 30-60 MINUTES BEFORE A MEAL.    PUD (peptic ulcer disease)       VITAMIN B-12 PO      Take 4 tablets by mouth every morning        * Notice:  This list has 3 medication(s) that are the same as other medications prescribed for you. Read the directions carefully, and ask your doctor or other care provider to review them with you.

## 2018-10-31 NOTE — NURSING NOTE
"Chief Complaint   Patient presents with     RECHECK     discuss kidney levels -        Initial /77  Pulse 109  Temp 97  F (36.1  C) (Oral)  Resp 20  Ht 5' 5\" (1.651 m)  Wt 152 lb (68.9 kg)  SpO2 100%  BMI 25.29 kg/m2 Estimated body mass index is 25.29 kg/(m^2) as calculated from the following:    Height as of this encounter: 5' 5\" (1.651 m).    Weight as of this encounter: 152 lb (68.9 kg).  Medication Reconciliation: complete  Tessie Ayala M.A.    "

## 2018-10-31 NOTE — PROGRESS NOTES
SUBJECTIVE:  48 year old.The patient has a complaint of renal function deteriation that was done for surgery in the future.  This started 10/23.  Associated symptoms are none.  Brought on by [facility]. ROS no hematuria no baclpaim    Reviewed health maintenance  Patient Active Problem List   Diagnosis     Hypothyroidism     Advanced directives, counseling/discussion     Acetaminophen overdose     Hepatic failure (H)     Pancreatitis     Anemia     Chronic pain     Pneumonia     Suicide risk     Grief reaction     Liver failure, acute     CARDIOVASCULAR SCREENING; LDL GOAL LESS THAN 160     Renal stone     Health Care Home     TOTAL KNEE ARTHROPLASTY - bilateral     Knee joint replacement - left     Trochanteric bursitis - left     Atlantoaxial instability     Postoperative pain     S/P cervical spinal fusion     Drug-seeking behavior     Opioid type dependence (H)     Severe frontal headaches     Abdominal pain, unspecified abdominal location     Esophageal reflux     Right upper extremity numbness     Right arm numbness     Lesion of ulnar nerve     Arthralgia of temporomandibular joint     Intractable chronic migraine without aura     Encounter for long-term opiate analgesic use     Rheumatoid arthritis of foot (H)     Other drug-induced neutropenia (H)     Rheumatoid arthritis with positive rheumatoid factor, involving unspecified site (H)     Cervical high risk HPV (human papillomavirus) test positive     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)     Chronic tension-type headache     Fibromyositis     Insomnia     Migraine     Myofascial pain     Periprosthetic fracture around internal prosthetic right elbow joint     Past Medical History:   Diagnosis Date     Acetaminophen overdose 3/24/2011     Anemia      Anesthesia complication     pt states has a history of heart stopping during anesthesia,     Arrhythmia      Cerebral infarction (H)      Cervical high risk HPV (human  "papillomavirus) test positive 02/22/2017    type 18 & other HR HPV     Chronic infection     MRSA     Chronic pain 3/24/2011     Degenerative joint disease      Endometriosis      Fibromyalgia      Gastro-oesophageal reflux disease      Heart murmur      History of blood transfusion      Hypothyroidism      Immune disorder (H)      Learning disability      Malignant neoplasm (H)      Migraine      MRSA (methicillin resistant staph aureus) culture positive      Neck injuries      Opioid type dependence (H)      Other chronic pain      PONV (postoperative nausea and vomiting)     states \"flatlines\"during anesthesia     RA (rheumatoid arthritis) (H)      Renal stone      Scoliosis      Stomach ulcer     history       OBJECTIVE:  no apparent distress  /77  Pulse 109  Temp 97  F (36.1  C) (Oral)  Resp 20  Ht 5' 5\" (1.651 m)  Wt 152 lb (68.9 kg)  SpO2 100%  BMI 25.29 kg/m2    LUNGS:  CTA B/L, no wheezing or crackles.   Cardiovascular: negative, PMI normal. No lifts, heaves, or thrills. RRR. No murmurs, clicks gallops or rub   Gastrointestinal: Abdomen soft, non-tender. BS normal. No masses, organomegaly       ICD-10-CM    1. Abnormal renal function test R94.4 Basic metabolic panel     Basic metabolic panel    PLAN: stop all urban inhibitors and NSAIDS      "

## 2018-11-06 ENCOUNTER — HOSPITAL ENCOUNTER (INPATIENT)
Facility: CLINIC | Age: 48
Setting detail: SURGERY ADMIT
End: 2018-11-06
Attending: ORTHOPAEDIC SURGERY | Admitting: ORTHOPAEDIC SURGERY
Payer: COMMERCIAL

## 2018-11-06 ENCOUNTER — TELEPHONE (OUTPATIENT)
Dept: ORTHOPEDICS | Facility: CLINIC | Age: 48
End: 2018-11-06

## 2018-11-07 ENCOUNTER — TEAM CONFERENCE (OUTPATIENT)
Dept: OTHER | Facility: CLINIC | Age: 48
End: 2018-11-07

## 2018-11-07 NOTE — TELEPHONE ENCOUNTER
SURGERY PLAN (PRE-OP PLAN)    Patient Position (indicated by x):  x  Lateral decubitus, bean bag, full length     Lateral decubitus, Wixson hip positioner   x  Split drape with top bar     Revision AGNIESZKA drape with plastic side bags for leg     Extremity drape     Shoulder pack drape     Laparotomy drape   x  Saleh catheter          General Equipment Requests (indicated by x):      C-Arm with C-Armor drape     C-Arm (video capable, [employer] 9900 model)     O-Arm with Stealth imaging   x  Regular OR Table     Ty XR Table     SurgiGraphic 6000 (diving board) fluoro table     Cell saver     Zimmer Biopsy trephine set w/ K-wire & pituitary rongeurs   x  Small pituitary rongeur   x  Goran's angled curettes, narrow shaft     Bone graft, kapner gouges   x  Midas Gaurav bur      (Back-up, Don't open)   x  3.5mm screw drivers   x  Cement removal osteotomes   x  Vessel loop     Trauma/Fixation Requests (indicated by x):  x   Bone clamps - Large (Ty, Anne, Vivian)   x   Peggy/Biomet compress distal humerus replacement   x   DePuy bearing surfaces for ulnar component           Specimens and cultures (indicated by x):   x  Tissue cultures, aerobic and anaerobic without gram stain     Frozen section   x  pathology specimens - fresh     pathology specimens - formalin         Plan:  1. Removal of nail and humerus prosthesis; leave ulnar prosthesis in place  2. Right distal humerus replacement with Peggy/Biomet Compress  3. Ulnar component bearing surface exchange, DePuy    Christiano Holden MD  Orthopaedic Surgery, Adult Reconstruction Fellow  Pager [phone]

## 2018-11-07 NOTE — TELEPHONE ENCOUNTER
Spoke with Kalyn about the upcoming surgery. One of the components for her distal replacement will not be available to us by Friday, 11/9/18.  I told her that I will keep her updated on our progress.  She said that she would be available for surgery with very short notice.

## 2018-11-13 NOTE — PROGRESS NOTES
"Hand Therapy Initial Evaluation    Current Date:  6/29/2017    Diagnosis: R elbow fracture  DOI: 06/24/17   Procedure:  Fluid removal    Precautions: NA    Subjective:  Kalyn Rivero is a 46 year old ambidextrous hand dominant female.    Patient reports symptoms of pain, stiffness/loss of motion, weakness/loss of strength, edema, numbness and tingling  of the right elbow which occurred due to plugging in an extension cord. Since onset symptoms are Unchanged  Special tests:  x-ray and CT.  Previous treatment: splinted.    General health as reported by patient is poor.  Pertinent medical history includes:rheumatoid arthritis, high blood pressure, fibromyalgia, migraines/headaches, weakness  Medical allergies:see medical chart.  Surgical history: multiple.  Medication history: see medical chart.    Current occupation is none   Currently disabled  Job Tasks: NA  Barriers include:none  Prior functional level:  no limitations    Additional Occupational Profile Information (patterns of daily living, interests, values and needs): NA    Functional Outcome Measure:  Upper Extremity Functional Index Score:  SCORE:   Column Totals: /80: 3   (A lower score indicates greater disability.)    Objective:  Pain Level Report  VAS(0-10) 6/29/2017   At Rest: 10/10   With Use: 10/10     Report of Pain:  Location:  elbow  Pain Quality:  \"feels like pulling arm out of socket\"  Frequency: constant    Pain is worst:  daytime  Exacerbated by:  movement  Relieved by:  cold and rest  Progression:  Staying the same  ROM:  contraindicated    Strength:  contraindicated    Edema  Circumference:  (Measured in cm)  Elbow crease 6/29/2017   Right 28.0   Left 24.0     Scar:  none    Sensation:  Pt. Reports numbness/tingling after fracture     Assessment/Plan:  Patient's limitations or Problem List includes:  Pain, Decreased ROM/motion, Increased edema and Weakness of the right elbow which interferes with the patient's ability to perform Self Care " Tasks (dressing, eating, bathing, hygiene/toileting), Work Tasks, Sleep Patterns, Recreational Activities, Household Chores and Driving  as compared to previous level of function.    Rehab Potential:  Good - Return to full activity, some limitations    Patient will benefit from skilled Occupational Therapy to increase motion and decrease pain and edema to return to previous activity level and resume normal daily tasks and to reach their rehab potential.    Barriers to Learning:  No barrier    Communication Issues:  Patient appears to be able to clearly communicate and understand verbal and written communication and follow directions correctly.    Assessment of Occupational Performance:  5 or more Performance Deficits  Identified Performance Deficits: bathing/showering, toileting, dressing, feeding, functional mobility, hygiene and grooming, driving and community mobility, shopping, sleep and leisure activities      Clinical Decision Making (Complexity): Low complexity    Treatment Explanation:  The following has been discussed with the patient:  RX ordered/plan of care  Anticipated outcomes  Possible risks and side effects    Treatment Plan:    Frequency:  1 x visit  Duration:  NA; 1 x visit    Orthotic Fabrication:  Static orthosis  Discharge Plan:  Achieve all LTG.  Independent in home treatment program.  Reach maximal therapeutic benefit.    Home Exercise Program:  Long arm orthosis, full time           [Today's Date] : [unfilled] [Name] : Name: [unfilled] [] : : ~~ [Today's Date:] : [unfilled] [Dear  ___:] : Dear Dr. [unfilled]: [Consult] : I had the pleasure of evaluating your patient, [unfilled]. My full evaluation follows. [Consult - Single Provider] : Thank you very much for allowing me to participate in the care of this patient. If you have any questions, please do not hesitate to contact me. [Sincerely,] : Sincerely, [DrTerrence  ___] : Dr. BORDEN [FreeTextEntry4] : Victorina Jenkins MD [FreeTextEntry5] : [phone] [de-identified] : Shawanda Lopez MD\par Pediatric Cardiologist\par [facility]\par [address], [address]\[place] [address]\par [phone]\par

## 2018-11-21 ENCOUNTER — TELEPHONE (OUTPATIENT)
Dept: ORTHOPEDICS | Facility: CLINIC | Age: 48
End: 2018-11-21

## 2018-11-21 NOTE — TELEPHONE ENCOUNTER
Spoke with Kalyn talley: scheduling of the distal humerus reconstruction.  Our orthopaedic Fellow has been communicating with the  who will provide the prosthesis.  He now has all necessary components available for the surgery.  We will schedule it for next week - 11/27/18.  Preparation for surgery was reviewed.  Pre-Admission nursing will call to confirm an arrival time.

## 2018-11-26 ENCOUNTER — ANESTHESIA EVENT (OUTPATIENT)
Dept: SURGERY | Facility: CLINIC | Age: 48
DRG: 483 | End: 2018-11-26
Payer: COMMERCIAL

## 2018-11-26 ASSESSMENT — LIFESTYLE VARIABLES: TOBACCO_USE: 1

## 2018-11-27 ENCOUNTER — ANESTHESIA (OUTPATIENT)
Dept: SURGERY | Facility: CLINIC | Age: 48
DRG: 483 | End: 2018-11-27
Payer: COMMERCIAL

## 2018-11-27 ENCOUNTER — APPOINTMENT (OUTPATIENT)
Dept: GENERAL RADIOLOGY | Facility: CLINIC | Age: 48
DRG: 483 | End: 2018-11-27
Attending: PHYSICIAN ASSISTANT
Payer: COMMERCIAL

## 2018-11-27 ENCOUNTER — HOSPITAL ENCOUNTER (INPATIENT)
Facility: CLINIC | Age: 48
LOS: 3 days | Discharge: HOME | DRG: 483 | End: 2018-11-30
Attending: ORTHOPAEDIC SURGERY | Admitting: ORTHOPAEDIC SURGERY
Payer: COMMERCIAL

## 2018-11-27 DIAGNOSIS — Z96.629 OTHER MECHANICAL COMPLICATION OF INTERNAL ELBOW JOINT PROSTHESIS (H): Primary | ICD-10-CM

## 2018-11-27 DIAGNOSIS — T84.098A OTHER MECHANICAL COMPLICATION OF INTERNAL ELBOW JOINT PROSTHESIS (H): Primary | ICD-10-CM

## 2018-11-27 DIAGNOSIS — M97.41XD: ICD-10-CM

## 2018-11-27 PROBLEM — Z98.890 STATUS POST SURGERY: Status: ACTIVE | Noted: 2018-11-27

## 2018-11-27 LAB
ABO + RH BLD: NORMAL
ABO + RH BLD: NORMAL
APPEARANCE FLD: NORMAL
BLD GP AB SCN SERPL QL: NORMAL
BLOOD BANK CMNT PATIENT-IMP: NORMAL
COLOR FLD: NORMAL
EOSINOPHIL NFR FLD MANUAL: 1 %
GLUCOSE BLDC GLUCOMTR-MCNC: 117 MG/DL (ref 70–99)
HCG SERPL QL: NEGATIVE
HGB BLD-MCNC: 12.6 G/DL (ref 11.7–15.7)
LYMPHOCYTES NFR FLD MANUAL: 16 %
MONOS+MACROS NFR FLD MANUAL: 37 %
NEUTS BAND NFR FLD MANUAL: 46 %
SPECIMEN EXP DATE BLD: NORMAL
SPECIMEN SOURCE FLD: NORMAL
WBC # FLD AUTO: 1510 /UL

## 2018-11-27 PROCEDURE — 25000128 H RX IP 250 OP 636: Performed by: ANESTHESIOLOGY

## 2018-11-27 PROCEDURE — 0R9L3ZX DRAINAGE OF RIGHT ELBOW JOINT, PERCUTANEOUS APPROACH, DIAGNOSTIC: ICD-10-PCS | Performed by: ORTHOPAEDIC SURGERY

## 2018-11-27 PROCEDURE — 40000171 ZZH STATISTIC PRE-PROCEDURE ASSESSMENT III: Performed by: ORTHOPAEDIC SURGERY

## 2018-11-27 PROCEDURE — 83516 IMMUNOASSAY NONANTIBODY: CPT | Performed by: ORTHOPAEDIC SURGERY

## 2018-11-27 PROCEDURE — 25000128 H RX IP 250 OP 636: Performed by: STUDENT IN AN ORGANIZED HEALTH CARE EDUCATION/TRAINING PROGRAM

## 2018-11-27 PROCEDURE — 87070 CULTURE OTHR SPECIMN AEROBIC: CPT | Performed by: ORTHOPAEDIC SURGERY

## 2018-11-27 PROCEDURE — 25000128 H RX IP 250 OP 636: Performed by: NURSE ANESTHETIST, CERTIFIED REGISTERED

## 2018-11-27 PROCEDURE — 36000068 ZZH SURGERY LEVEL 5 1ST 30 MIN - UMMC: Performed by: ORTHOPAEDIC SURGERY

## 2018-11-27 PROCEDURE — 0RRL0JZ REPLACEMENT OF RIGHT ELBOW JOINT WITH SYNTHETIC SUBSTITUTE, OPEN APPROACH: ICD-10-PCS | Performed by: ORTHOPAEDIC SURGERY

## 2018-11-27 PROCEDURE — 0PPF04Z REMOVAL OF INTERNAL FIXATION DEVICE FROM RIGHT HUMERAL SHAFT, OPEN APPROACH: ICD-10-PCS | Performed by: ORTHOPAEDIC SURGERY

## 2018-11-27 PROCEDURE — 87075 CULTR BACTERIA EXCEPT BLOOD: CPT | Performed by: ORTHOPAEDIC SURGERY

## 2018-11-27 PROCEDURE — 25000125 ZZHC RX 250: Performed by: NURSE ANESTHETIST, CERTIFIED REGISTERED

## 2018-11-27 PROCEDURE — 86901 BLOOD TYPING SEROLOGIC RH(D): CPT | Performed by: ANESTHESIOLOGY

## 2018-11-27 PROCEDURE — 85018 HEMOGLOBIN: CPT | Performed by: ANESTHESIOLOGY

## 2018-11-27 PROCEDURE — C1713 ANCHOR/SCREW BN/BN,TIS/BN: HCPCS | Performed by: ORTHOPAEDIC SURGERY

## 2018-11-27 PROCEDURE — C9290 INJ, BUPIVACAINE LIPOSOME: HCPCS | Performed by: STUDENT IN AN ORGANIZED HEALTH CARE EDUCATION/TRAINING PROGRAM

## 2018-11-27 PROCEDURE — 89051 BODY FLUID CELL COUNT: CPT | Performed by: ORTHOPAEDIC SURGERY

## 2018-11-27 PROCEDURE — 25000125 ZZHC RX 250: Performed by: ORTHOPAEDIC SURGERY

## 2018-11-27 PROCEDURE — 36415 COLL VENOUS BLD VENIPUNCTURE: CPT | Performed by: ANESTHESIOLOGY

## 2018-11-27 PROCEDURE — 36000070 ZZH SURGERY LEVEL 5 EA 15 ADDTL MIN - UMMC: Performed by: ORTHOPAEDIC SURGERY

## 2018-11-27 PROCEDURE — 25000128 H RX IP 250 OP 636: Performed by: ORTHOPAEDIC SURGERY

## 2018-11-27 PROCEDURE — 71000016 ZZH RECOVERY PHASE 1 LEVEL 3 FIRST HR: Performed by: ORTHOPAEDIC SURGERY

## 2018-11-27 PROCEDURE — 86140 C-REACTIVE PROTEIN: CPT | Performed by: ORTHOPAEDIC SURGERY

## 2018-11-27 PROCEDURE — 27210794 ZZH OR GENERAL SUPPLY STERILE: Performed by: ORTHOPAEDIC SURGERY

## 2018-11-27 PROCEDURE — 87176 TISSUE HOMOGENIZATION CULTR: CPT | Performed by: ORTHOPAEDIC SURGERY

## 2018-11-27 PROCEDURE — 84311 SPECTROPHOTOMETRY: CPT | Performed by: ORTHOPAEDIC SURGERY

## 2018-11-27 PROCEDURE — 86900 BLOOD TYPING SEROLOGIC ABO: CPT | Performed by: ANESTHESIOLOGY

## 2018-11-27 PROCEDURE — 86850 RBC ANTIBODY SCREEN: CPT | Performed by: ANESTHESIOLOGY

## 2018-11-27 PROCEDURE — 25000128 H RX IP 250 OP 636: Performed by: PHYSICIAN ASSISTANT

## 2018-11-27 PROCEDURE — 40000986 XR HUMERUS PORT RT: Mod: RT

## 2018-11-27 PROCEDURE — 25000566 ZZH SEVOFLURANE, EA 15 MIN: Performed by: ORTHOPAEDIC SURGERY

## 2018-11-27 PROCEDURE — 37000008 ZZH ANESTHESIA TECHNICAL FEE, 1ST 30 MIN: Performed by: ORTHOPAEDIC SURGERY

## 2018-11-27 PROCEDURE — 84703 CHORIONIC GONADOTROPIN ASSAY: CPT | Performed by: ANESTHESIOLOGY

## 2018-11-27 PROCEDURE — 37000009 ZZH ANESTHESIA TECHNICAL FEE, EACH ADDTL 15 MIN: Performed by: ORTHOPAEDIC SURGERY

## 2018-11-27 PROCEDURE — 71000017 ZZH RECOVERY PHASE 1 LEVEL 3 EA ADDTL HR: Performed by: ORTHOPAEDIC SURGERY

## 2018-11-27 PROCEDURE — 40000986 XR ELBOW PORT RT 3 G/E VW: Mod: RT

## 2018-11-27 PROCEDURE — 00000146 ZZHCL STATISTIC GLUCOSE BY METER IP

## 2018-11-27 PROCEDURE — 12000001 ZZH R&B MED SURG/OB UMMC

## 2018-11-27 PROCEDURE — 25000125 ZZHC RX 250: Performed by: ANESTHESIOLOGY

## 2018-11-27 PROCEDURE — 99207 ZZC MOONLIGHTING INDICATOR: CPT | Performed by: INTERNAL MEDICINE

## 2018-11-27 DEVICE — IMPLANTABLE DEVICE: Type: IMPLANTABLE DEVICE | Site: ELBOW | Status: FUNCTIONAL

## 2018-11-27 RX ORDER — HYDROMORPHONE HYDROCHLORIDE 1 MG/ML
.4-.8 INJECTION, SOLUTION INTRAMUSCULAR; INTRAVENOUS; SUBCUTANEOUS
Status: DISCONTINUED | OUTPATIENT
Start: 2018-11-27 | End: 2018-11-30 | Stop reason: HOSPADM

## 2018-11-27 RX ORDER — CEFAZOLIN SODIUM 1 G/3ML
INJECTION, POWDER, FOR SOLUTION INTRAMUSCULAR; INTRAVENOUS PRN
Status: DISCONTINUED | OUTPATIENT
Start: 2018-11-27 | End: 2018-11-27

## 2018-11-27 RX ORDER — LIDOCAINE 40 MG/G
CREAM TOPICAL
Status: DISCONTINUED | OUTPATIENT
Start: 2018-11-27 | End: 2018-11-30 | Stop reason: HOSPADM

## 2018-11-27 RX ORDER — MEPERIDINE HYDROCHLORIDE 25 MG/ML
25 INJECTION INTRAMUSCULAR; INTRAVENOUS; SUBCUTANEOUS
Status: DISCONTINUED | OUTPATIENT
Start: 2018-11-27 | End: 2018-11-28

## 2018-11-27 RX ORDER — HYDROMORPHONE HYDROCHLORIDE 1 MG/ML
.3-.5 INJECTION, SOLUTION INTRAMUSCULAR; INTRAVENOUS; SUBCUTANEOUS EVERY 5 MIN PRN
Status: CANCELLED | OUTPATIENT
Start: 2018-11-27

## 2018-11-27 RX ORDER — FENTANYL CITRATE 50 UG/ML
25-50 INJECTION, SOLUTION INTRAMUSCULAR; INTRAVENOUS
Status: DISCONTINUED | OUTPATIENT
Start: 2018-11-27 | End: 2018-11-27 | Stop reason: HOSPADM

## 2018-11-27 RX ORDER — CLONAZEPAM 0.5 MG/1
0.5 TABLET ORAL 3 TIMES DAILY
Status: DISCONTINUED | OUTPATIENT
Start: 2018-11-27 | End: 2018-11-30 | Stop reason: HOSPADM

## 2018-11-27 RX ORDER — SODIUM CHLORIDE, SODIUM LACTATE, POTASSIUM CHLORIDE, CALCIUM CHLORIDE 600; 310; 30; 20 MG/100ML; MG/100ML; MG/100ML; MG/100ML
INJECTION, SOLUTION INTRAVENOUS CONTINUOUS
Status: DISCONTINUED | OUTPATIENT
Start: 2018-11-27 | End: 2018-11-27 | Stop reason: HOSPADM

## 2018-11-27 RX ORDER — CLINDAMYCIN PHOSPHATE 600 MG/50ML
600 INJECTION, SOLUTION INTRAVENOUS
Status: COMPLETED | OUTPATIENT
Start: 2018-11-27 | End: 2018-11-27

## 2018-11-27 RX ORDER — HYDROMORPHONE HYDROCHLORIDE 2 MG/1
2-4 TABLET ORAL
Status: DISCONTINUED | OUTPATIENT
Start: 2018-11-27 | End: 2018-11-28

## 2018-11-27 RX ORDER — ACETAMINOPHEN 325 MG/1
975 TABLET ORAL EVERY 8 HOURS
Status: DISCONTINUED | OUTPATIENT
Start: 2018-11-27 | End: 2018-11-27

## 2018-11-27 RX ORDER — ONDANSETRON 2 MG/ML
4 INJECTION INTRAMUSCULAR; INTRAVENOUS EVERY 30 MIN PRN
Status: DISCONTINUED | OUTPATIENT
Start: 2018-11-27 | End: 2018-11-27 | Stop reason: HOSPADM

## 2018-11-27 RX ORDER — MAGNESIUM HYDROXIDE 1200 MG/15ML
LIQUID ORAL PRN
Status: DISCONTINUED | OUTPATIENT
Start: 2018-11-27 | End: 2018-11-27 | Stop reason: HOSPADM

## 2018-11-27 RX ORDER — ONDANSETRON 4 MG/1
4 TABLET, ORALLY DISINTEGRATING ORAL EVERY 6 HOURS PRN
Status: DISCONTINUED | OUTPATIENT
Start: 2018-11-27 | End: 2018-11-30 | Stop reason: HOSPADM

## 2018-11-27 RX ORDER — ACETAMINOPHEN 325 MG/1
650 TABLET ORAL EVERY 4 HOURS PRN
Status: DISCONTINUED | OUTPATIENT
Start: 2018-11-30 | End: 2018-11-30 | Stop reason: HOSPADM

## 2018-11-27 RX ORDER — ONDANSETRON 4 MG/1
4 TABLET, ORALLY DISINTEGRATING ORAL EVERY 30 MIN PRN
Status: DISCONTINUED | OUTPATIENT
Start: 2018-11-27 | End: 2018-11-27 | Stop reason: HOSPADM

## 2018-11-27 RX ORDER — SODIUM CHLORIDE, SODIUM LACTATE, POTASSIUM CHLORIDE, CALCIUM CHLORIDE 600; 310; 30; 20 MG/100ML; MG/100ML; MG/100ML; MG/100ML
INJECTION, SOLUTION INTRAVENOUS CONTINUOUS PRN
Status: DISCONTINUED | OUTPATIENT
Start: 2018-11-27 | End: 2018-11-27

## 2018-11-27 RX ORDER — NALOXONE HYDROCHLORIDE 0.4 MG/ML
.1-.4 INJECTION, SOLUTION INTRAMUSCULAR; INTRAVENOUS; SUBCUTANEOUS
Status: DISCONTINUED | OUTPATIENT
Start: 2018-11-27 | End: 2018-11-28

## 2018-11-27 RX ORDER — AMOXICILLIN 250 MG
2 CAPSULE ORAL 2 TIMES DAILY
Status: DISCONTINUED | OUTPATIENT
Start: 2018-11-27 | End: 2018-11-30 | Stop reason: HOSPADM

## 2018-11-27 RX ORDER — NALOXONE HYDROCHLORIDE 0.4 MG/ML
.1-.4 INJECTION, SOLUTION INTRAMUSCULAR; INTRAVENOUS; SUBCUTANEOUS
Status: DISCONTINUED | OUTPATIENT
Start: 2018-11-27 | End: 2018-11-27 | Stop reason: HOSPADM

## 2018-11-27 RX ORDER — ONDANSETRON 2 MG/ML
4 INJECTION INTRAMUSCULAR; INTRAVENOUS EVERY 6 HOURS PRN
Status: DISCONTINUED | OUTPATIENT
Start: 2018-11-27 | End: 2018-11-30 | Stop reason: HOSPADM

## 2018-11-27 RX ORDER — ONDANSETRON 2 MG/ML
INJECTION INTRAMUSCULAR; INTRAVENOUS PRN
Status: DISCONTINUED | OUTPATIENT
Start: 2018-11-27 | End: 2018-11-27

## 2018-11-27 RX ORDER — PROCHLORPERAZINE MALEATE 10 MG
10 TABLET ORAL EVERY 6 HOURS PRN
Status: DISCONTINUED | OUTPATIENT
Start: 2018-11-27 | End: 2018-11-30 | Stop reason: HOSPADM

## 2018-11-27 RX ORDER — FLUMAZENIL 0.1 MG/ML
0.2 INJECTION, SOLUTION INTRAVENOUS
Status: DISCONTINUED | OUTPATIENT
Start: 2018-11-27 | End: 2018-11-27 | Stop reason: HOSPADM

## 2018-11-27 RX ORDER — LIDOCAINE 40 MG/G
CREAM TOPICAL
Status: DISCONTINUED | OUTPATIENT
Start: 2018-11-27 | End: 2018-11-27 | Stop reason: HOSPADM

## 2018-11-27 RX ORDER — CEFAZOLIN SODIUM 1 G/3ML
1 INJECTION, POWDER, FOR SOLUTION INTRAMUSCULAR; INTRAVENOUS EVERY 8 HOURS
Status: DISCONTINUED | OUTPATIENT
Start: 2018-11-27 | End: 2018-11-30 | Stop reason: HOSPADM

## 2018-11-27 RX ORDER — NALOXONE HYDROCHLORIDE 0.4 MG/ML
.1-.4 INJECTION, SOLUTION INTRAMUSCULAR; INTRAVENOUS; SUBCUTANEOUS
Status: DISCONTINUED | OUTPATIENT
Start: 2018-11-27 | End: 2018-11-30 | Stop reason: HOSPADM

## 2018-11-27 RX ORDER — BUPIVACAINE HYDROCHLORIDE 2.5 MG/ML
INJECTION, SOLUTION EPIDURAL; INFILTRATION; INTRACAUDAL PRN
Status: DISCONTINUED | OUTPATIENT
Start: 2018-11-27 | End: 2018-11-27

## 2018-11-27 RX ORDER — LIDOCAINE HYDROCHLORIDE 20 MG/ML
INJECTION, SOLUTION INFILTRATION; PERINEURAL PRN
Status: DISCONTINUED | OUTPATIENT
Start: 2018-11-27 | End: 2018-11-27

## 2018-11-27 RX ORDER — FENTANYL CITRATE 50 UG/ML
INJECTION, SOLUTION INTRAMUSCULAR; INTRAVENOUS PRN
Status: DISCONTINUED | OUTPATIENT
Start: 2018-11-27 | End: 2018-11-27

## 2018-11-27 RX ORDER — SODIUM CHLORIDE, SODIUM LACTATE, POTASSIUM CHLORIDE, CALCIUM CHLORIDE 600; 310; 30; 20 MG/100ML; MG/100ML; MG/100ML; MG/100ML
INJECTION, SOLUTION INTRAVENOUS CONTINUOUS
Status: DISCONTINUED | OUTPATIENT
Start: 2018-11-27 | End: 2018-11-30 | Stop reason: HOSPADM

## 2018-11-27 RX ORDER — AMOXICILLIN 250 MG
1 CAPSULE ORAL 2 TIMES DAILY
Status: DISCONTINUED | OUTPATIENT
Start: 2018-11-27 | End: 2018-11-30 | Stop reason: HOSPADM

## 2018-11-27 RX ORDER — PROPOFOL 10 MG/ML
INJECTION, EMULSION INTRAVENOUS PRN
Status: DISCONTINUED | OUTPATIENT
Start: 2018-11-27 | End: 2018-11-27

## 2018-11-27 RX ORDER — SCOLOPAMINE TRANSDERMAL SYSTEM 1 MG/1
1 PATCH, EXTENDED RELEASE TRANSDERMAL ONCE
Status: COMPLETED | OUTPATIENT
Start: 2018-11-27 | End: 2018-11-27

## 2018-11-27 RX ADMIN — PROCHLORPERAZINE EDISYLATE 10 MG: 5 INJECTION INTRAMUSCULAR; INTRAVENOUS at 17:23

## 2018-11-27 RX ADMIN — SODIUM CHLORIDE 1 G: 9 INJECTION, SOLUTION INTRAVENOUS at 12:00

## 2018-11-27 RX ADMIN — FENTANYL CITRATE 50 MCG: 50 INJECTION, SOLUTION INTRAMUSCULAR; INTRAVENOUS at 12:16

## 2018-11-27 RX ADMIN — SODIUM CHLORIDE, POTASSIUM CHLORIDE, SODIUM LACTATE AND CALCIUM CHLORIDE: 600; 310; 30; 20 INJECTION, SOLUTION INTRAVENOUS at 14:16

## 2018-11-27 RX ADMIN — FENTANYL CITRATE 25 MCG: 50 INJECTION, SOLUTION INTRAMUSCULAR; INTRAVENOUS at 17:34

## 2018-11-27 RX ADMIN — CLINDAMYCIN PHOSPHATE 600 MG: 12 INJECTION, SOLUTION INTRAVENOUS at 11:40

## 2018-11-27 RX ADMIN — FENTANYL CITRATE 50 MCG: 50 INJECTION, SOLUTION INTRAMUSCULAR; INTRAVENOUS at 12:58

## 2018-11-27 RX ADMIN — FENTANYL CITRATE 50 MCG: 50 INJECTION, SOLUTION INTRAMUSCULAR; INTRAVENOUS at 13:17

## 2018-11-27 RX ADMIN — HYDROMORPHONE HYDROCHLORIDE 0.5 MG: 1 INJECTION, SOLUTION INTRAMUSCULAR; INTRAVENOUS; SUBCUTANEOUS at 18:36

## 2018-11-27 RX ADMIN — SUGAMMADEX 150 MG: 100 INJECTION, SOLUTION INTRAVENOUS at 16:30

## 2018-11-27 RX ADMIN — BUPIVACAINE HYDROCHLORIDE 10 ML: 2.5 INJECTION, SOLUTION EPIDURAL; INFILTRATION; INTRACAUDAL at 17:05

## 2018-11-27 RX ADMIN — BUPIVACAINE 10 ML: 13.3 INJECTION, SUSPENSION, LIPOSOMAL INFILTRATION at 17:05

## 2018-11-27 RX ADMIN — FENTANYL CITRATE 50 MCG: 50 INJECTION, SOLUTION INTRAMUSCULAR; INTRAVENOUS at 12:32

## 2018-11-27 RX ADMIN — CEFAZOLIN 1 G: 1 INJECTION, POWDER, FOR SOLUTION INTRAMUSCULAR; INTRAVENOUS at 23:01

## 2018-11-27 RX ADMIN — FENTANYL CITRATE 50 MCG: 50 INJECTION, SOLUTION INTRAMUSCULAR; INTRAVENOUS at 13:22

## 2018-11-27 RX ADMIN — SODIUM CHLORIDE, POTASSIUM CHLORIDE, SODIUM LACTATE AND CALCIUM CHLORIDE: 600; 310; 30; 20 INJECTION, SOLUTION INTRAVENOUS at 15:48

## 2018-11-27 RX ADMIN — SCOPALAMINE 1 PATCH: 1 PATCH, EXTENDED RELEASE TRANSDERMAL at 09:52

## 2018-11-27 RX ADMIN — FENTANYL CITRATE 25 MCG: 50 INJECTION, SOLUTION INTRAMUSCULAR; INTRAVENOUS at 17:26

## 2018-11-27 RX ADMIN — ROCURONIUM BROMIDE 40 MG: 10 INJECTION INTRAVENOUS at 11:36

## 2018-11-27 RX ADMIN — LIDOCAINE HYDROCHLORIDE 60 MG: 20 INJECTION, SOLUTION INFILTRATION; PERINEURAL at 11:34

## 2018-11-27 RX ADMIN — FENTANYL CITRATE 50 MCG: 50 INJECTION, SOLUTION INTRAMUSCULAR; INTRAVENOUS at 12:04

## 2018-11-27 RX ADMIN — ONDANSETRON 4 MG: 2 INJECTION INTRAMUSCULAR; INTRAVENOUS at 17:11

## 2018-11-27 RX ADMIN — ONDANSETRON 4 MG: 2 INJECTION INTRAMUSCULAR; INTRAVENOUS at 15:36

## 2018-11-27 RX ADMIN — FENTANYL CITRATE 50 MCG: 50 INJECTION, SOLUTION INTRAMUSCULAR; INTRAVENOUS at 12:23

## 2018-11-27 RX ADMIN — SODIUM CHLORIDE, POTASSIUM CHLORIDE, SODIUM LACTATE AND CALCIUM CHLORIDE: 600; 310; 30; 20 INJECTION, SOLUTION INTRAVENOUS at 11:23

## 2018-11-27 RX ADMIN — CEFAZOLIN 2 G: 1 INJECTION, POWDER, FOR SOLUTION INTRAMUSCULAR; INTRAVENOUS at 15:25

## 2018-11-27 RX ADMIN — FENTANYL CITRATE 50 MCG: 50 INJECTION, SOLUTION INTRAMUSCULAR; INTRAVENOUS at 17:17

## 2018-11-27 RX ADMIN — PROPOFOL 160 MG: 10 INJECTION, EMULSION INTRAVENOUS at 11:35

## 2018-11-27 RX ADMIN — SUFENTANIL CITRATE 0.3 MCG/KG/HR: 50 INJECTION EPIDURAL; INTRAVENOUS at 13:25

## 2018-11-27 RX ADMIN — FENTANYL CITRATE 50 MCG: 50 INJECTION, SOLUTION INTRAMUSCULAR; INTRAVENOUS at 12:42

## 2018-11-27 RX ADMIN — FENTANYL CITRATE 50 MCG: 50 INJECTION, SOLUTION INTRAMUSCULAR; INTRAVENOUS at 11:34

## 2018-11-27 RX ADMIN — FENTANYL CITRATE 50 MCG: 50 INJECTION, SOLUTION INTRAMUSCULAR; INTRAVENOUS at 12:09

## 2018-11-27 RX ADMIN — HYDROMORPHONE HYDROCHLORIDE 0.5 MG: 1 INJECTION, SOLUTION INTRAMUSCULAR; INTRAVENOUS; SUBCUTANEOUS at 17:50

## 2018-11-27 RX ADMIN — MIDAZOLAM 2 MG: 1 INJECTION INTRAMUSCULAR; INTRAVENOUS at 11:23

## 2018-11-27 ASSESSMENT — ACTIVITIES OF DAILY LIVING (ADL): ADLS_ACUITY_SCORE: 17

## 2018-11-27 NOTE — IP AVS SNAPSHOT
MRN:[id number]                      After Visit Summary   11/27/2018    Kalyn Rivero    MRN: [id number]           Thank you!     Thank you for choosing [facility] for your care. Our goal is always to provide you with excellent care. Hearing back from our patients is one way we can continue to improve our services. Please take a few minutes to complete the written survey that you may receive in the mail after you visit with us. Thank you!        Patient Information     Date Of Birth          1970        Designated Caregiver       Most Recent Value    Caregiver    Will someone help with your care after discharge? yes    Name of designated caregiver Ángel Hernandez    Phone number of caregiver [phone]    Caregiver address [address]      About your hospital stay     You were admitted on:  November 27, 2018 You last received care in the:  [facility]    You were discharged on:  November 30, 2018        Reason for your hospital stay       Right elbow revision surgery                  Who to Call     For medical emergencies, please call 911.  For non-urgent questions about your medical care, please call your primary care provider or clinic, [phone]  For questions related to your surgery, please call your surgery clinic        Attending Provider     Provider Specialty    Aakash Zimmer MD Orthopedics       Primary Care Provider Office Phone # Fax #    Mika Zamora [phone]       When to contact your care team       CALL YOUR PHYSICIAN IF:  1.  Your pain begins to worsen and is unable to be controlled with your medications.  2.  Excessive redness or drainage of cloudy or bloody material from the wounds (Clear red tinted fluid and some mild drainage should be expected). Drainage of any kind 5 days after surgery should be reported to the doctor.  3.  You have a temperature elevation greater than 101.5    4.  You have pain, swelling or redness in your calf. You  have numbness or weakness in your leg or foot.    During regular business hours [place] [facility] [place] [phone] and ask for the triage nurse. After hours or weekends call the hospital  at [phone] and ask for the ortho resident on call.                  After Care Instructions     Activity       Your activity upon discharge:   No weightbearing on the right arm. Move your fingers and your wrist as much as possible.   Ok for moving your right elbow 0-90 degrees of flexion, 90 degrees to the side (abduction). External rotation to 30 degrees and internal rotation to the belly. Follow the precautions as taught by the therapists.            Diet       Follow this diet upon discharge: Orders Placed This Encounter      Regular Diet Adult            Discharge Instructions       A. COMFORT: Elevation - Elevate your right arm above the level of your heart. Swelling - An ice pack will be provided to control swelling and discomfort. Once the initial dressing is removed, place a thin towel between your skin and the ice pack. Pain Medication - Take medication as prescribed, but only as often as necessary. Avoid alcohol and driving if you are taking pain medication. Remember that Tylenol can be used for pain relief as well, as you wean off the narcotics. Maximum Tylenol dose for a single day is 4000 mg.     B. ACTIVITIES: As described above. Athletic activities - Athletic activities, such as swimming, bicycling, jogging, running and stop-and-go-sports should be avoided until allowed by your doctor.     C. INCISION CARE: Try to leave the dressing in place until your follow up appointment. If it becomes soiled, you may take it off. Replaced a dressing with 4x4 gauze over the incision and an ace wrap or tape to hold it on. Nursing please supply dressing supplies at discharge. Steri-strips (small white tape that is directly on the incision areas), if present, should be left on until they fall off or are removed at your first  office visit. You may also note strands of suture from each end of your incision. This is normal and is a knotless type of suture that can be trimmed at its base around 2 weeks from your surgery, otherwise it may be left to fall off on its own. Tub bathing, swimming, and soaking of the arm should be avoided until allowed by your doctor - usually 4 weeks after your surgery. Do not get the incision wet.     D. CALL OUR CLINIC ([phone] during regular office hours, or [phone] for the on-call resident MD for questions- RESIDENT DOES NOT HAVE ABILITY TO PRESCRIBE NARCOTICS) IF: Pain in your surgical area persists or worsens in the first few days after surgery. Excessive redness or drainage of cloudy or bloody material from the wounds (Clear red tinted fluid and some mild drainage should be expected). Drainage of any kind > one week after surgery should be reported to the doctor. You have a temperature elevation greater than 101.5  You have pain, swelling or redness in your calf. You have numbness or weakness in your arm or hand.            Discharge Instructions       HHC with RN - check clinically, VS, review meds, BMP, Mg, Hgb - results to PMD  Call primary MD to arrange follow up in next 1-2 weeks.  Suppository if no BM by 12/1                  Follow-up Appointments     Adult [facility] Follow-up and recommended labs and tests       Follow up with Dr. Zimmer , at (location with clinic name or city) [facility], within 3 weeks  to evaluate after surgery. The following labs/tests are recommended: X rays right humerus and elbow.    Appointments on [place] and/or [facility] (with [facility] or [facility] provider or service). Call [phone] if you haven't heard regarding these appointments within 7 days of discharge.                  Your next 10 appointments already scheduled     Dec 13, 2018 11:20 AM CST   (Arrive by 11:05 AM)   Return Botox with Rosina Quinones MD   [facility] Physical Medicine and  "Rehabilitation ([facility] [place])    [address]  3rd Floor  [facility] [id number] [phone]            Dec 13, 2018  2:00 PM CST   (Arrive by 1:45 PM)   Return Visit with Aakash Zimmer MD   [facility] ([facility])    [address]  4th Floor  [facility] [id number] [phone]              Additional Information     If you use hormonal birth control (such as the pill, patch, ring or implants): You'll need a second form of birth control for 7 days (condoms, a diaphragm or contraceptive foam). While in the hospital, you received a medicine called Bridion. Your normal birth control will not work as well for a week after taking this medicine.          Pending Results     Date and Time Order Name Status Description    11/27/2018 1241 Tissue Culture Aerobic Bacterial Preliminary     11/27/2018 1241 Anaerobic bacterial culture Preliminary     11/27/2018 1240 Synovasure Periprosthetic Joint Infection Detection In process     11/27/2018 1137 Fluid Culture Aerobic Bacterial Preliminary     11/27/2018 1137 Anaerobic bacterial culture Preliminary             Statement of Approval     Ordered          11/30/18 1422  I have reviewed and agree with all the recommendations and orders detailed in this document.  EFFECTIVE NOW     Approved and electronically signed by:  Mojgan Golden APRN CNP             Admission Information     Date & Time Provider Department Dept. Phone    11/27/2018 Aakash Zimmer MD  8A [phone]      Your Vitals Were     Blood Pressure Pulse Temperature Respirations Height Weight    111/63 86 99.1  F (37.3  C) (Oral) 16 1.676 m (5' 6\") 65.6 kg (144 lb 10 oz)    Last Period Pulse Oximetry BMI (Body Mass Index)             (LMP Unknown) 95% 23.34 kg/m2         Care EveryWhere ID     This is your Care EveryWhere ID. This could be used by other organizations to access your [place] medical " records  [id number]        Equal Access to Services     [facility] JOSEPHINE : Hadii ashlee shelton khadijahthomas Sokayla, waaxda luqadaha, qaybta kamaria teresacooper rashid, alexis gordilloscottwarner hollins. So [facility] [phone].    ATENCIÓN: Si habla español, tiene a holcomb disposición servicios gratuitos de asistencia lingüística. Llame al [phone].    We comply with applicable federal civil rights laws and [place] laws. We do not discriminate on the basis of race, color, national origin, age, disability, sex, sexual orientation, or gender identity.               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   Quantity:  100 tablet   Refills:  1         CONTINUE these medicines which may have CHANGED, or have new prescriptions. If we are uncertain of the size of tablets/capsules you have at home, strength may be listed as something that might have changed.        Dose / Directions    * BOTOX IJ   This may have changed:  Another medication with the same name was changed. Make sure you  understand how and when to take each.        Dose:  170 Units   Inject 170 Units as directed Lot # /[id number] with Expiration Date:  10/2020   Refills:  0       * BOTOX IJ   This may have changed:  Another medication with the same name was changed. Make sure you understand how and when to take each.        Dose:  200 Units   Inject 200 Units as directed Total dose 200 units  Administered 190 units  Unavoidable waste 30 units  Lot # /[id number] with Expiration Date:  12/2020  NDC [address]-30   Refills:  0       * Botulinum Toxin Type A 200 units injection   Commonly known as:  BOTOX   This may have changed:  additional instructions   Used for:  Intractable chronic migraine without aura and without status migrainosus        Dose:  200 Units   Inject 200 Units as directed every 3 months   Quantity:  200 Units   Refills:  3       meclizine 25 MG tablet   Commonly known as:  ANTIVERT   This may have changed:  when to take this        Dose:  25 mg   Take 1 tablet (25 mg) by mouth every 6 hours as needed for dizziness   Quantity:  30 tablet   Refills:  1       omeprazole 40 MG DR capsule   Commonly known as:  priLOSEC   This may have changed:  See the new instructions.        Dose:  40 mg   Take 1 capsule (40 mg) by mouth 2 times daily (before meals)   Quantity:  30 capsule   Refills:  0       * Notice:  This list has 3 medication(s) that are the same as other medications prescribed for you. Read the directions carefully, and ask your doctor or other care provider to review them with you.      CONTINUE these medicines which have NOT CHANGED        Dose / Directions    [facility] SENNA 8.6 MG tablet   Used for:  Rheumatoid arthritis with positive rheumatoid factor, involving unspecified site (H)   Generic drug:  senna        TAKE 2 TABLETS BY MOUTH 2 TIMES DAILY   Quantity:  120 tablet   Refills:  1       KLONOPIN PO        Dose:  0.5 mg   Take 0.5 mg by mouth 3 times daily   Refills:  0       leflunomide 20 MG tablet   Commonly  "known as:  ARAVA        Dose:  20 mg   Take 20 mg by mouth every morning Reported on 3/28/2017   Refills:  0       VITAMIN B-12 PO        Dose:  4 tablet   Take 4 tablets by mouth every morning   Refills:  0         STOP taking     CELEBREX PO           ENBREL SURECLICK 50 MG/ML autoinjector   Generic drug:  etanercept                Where to get your medicines      These medications were sent to [facility] [place], [place] - [address]  [address], [facility] [address]     Phone:  [phone]     bisacodyl 10 MG suppository    senna-docusate 8.6-50 MG tablet         Some of these will need a paper prescription and others can be bought over the counter. Ask your nurse if you have questions.     Bring a paper prescription for each of these medications     oxyCODONE 5 MG tablet                Protect others around you: Learn how to safely use, store and throw away your medicines at www.disposemymeds.org.        Information about your nerve block     Today you received a block to numb the nerves near your surgery site.    This is a block using local anesthetic or \"numbing\" medication injected around the nerves to anesthetize or \"numb\" the area supplied by those nerves. This block is injected into the muscle layer near your surgical site. The type of anesthesia (Exparel) your anesthesia team used to numb your abdomen may give you relief for up to 72 hours.     Diet: There are no diet restrictions, but you should drink plenty of fluids, unless you are on a fluid-restricted diet.     Activity: If your surgical site is an arm or leg you should be careful with your affected limb, since it is possible to injure your limb without being aware of it due to the numbing. Until full feeling returns, you should guard against bumping or hitting your limb, and avoid extreme hot or cold temperatures on the skin.    Pain Medication: As the block wears off, the feeling will return as a tingling or " prickly sensation near your surgical site. You will experience more discomfort from your incisions as the feeling returns. You may want to take a pain pill (a narcotic or Tylenol if this was prescribed by your surgeon) when you start to experience mild pain, before the pain becomes more severe. If your pain medications do not control your pain, you should notify your surgeon. If you are taking narcotics for pain management, do not drink alcohol, drive a car, or perform hazardous activities.  If you have questions or concerns you may call your surgeon at the number provided with your discharge instructions.     Call your surgeon if you experience blurry vision, ringing in the ears or metallic taste in your mouth.         Information about OPIOIDS     PRESCRIPTION OPIOIDS: WHAT YOU NEED TO KNOW   We gave you an opioid (narcotic) pain medicine. It is important to manage your pain, but opioids are not always the best choice. You should first try all the other options your care team gave you. Take this medicine for as short a time (and as few doses) as possible.    Some activities can increase your pain, such as bandage changes or therapy sessions. It may help to take your pain medicine 30 to 60 minutes before these activities. Reduce your stress by getting enough sleep, working on hobbies you enjoy and practicing relaxation or meditation. Talk to your care team about ways to manage your pain beyond prescription opioids.    These medicines have risks:    DO NOT drive when on new or higher doses of pain medicine. These medicines can affect your alertness and reaction times, and you could be arrested for driving under the influence (DUI). If you need to use opioids long-term, talk to your care team about driving.    DO NOT operate heavy machinery    DO NOT do any other dangerous activities while taking these medicines.    DO NOT drink any alcohol while taking these medicines.     If the opioid prescribed includes  acetaminophen, DO NOT take with any other medicines that contain acetaminophen. Read all labels carefully. Look for the word  acetaminophen  or  Tylenol.  Ask your pharmacist if you have questions or are unsure.    You can get addicted to pain medicines, especially if you have a history of addiction (chemical, alcohol or substance dependence). Talk to your care team about ways to reduce this risk.    All opioids tend to cause constipation. Drink plenty of water and eat foods that have a lot of fiber, such as fruits, vegetables, prune juice, apple juice and high-fiber cereal. Take a laxative (Miralax, milk of magnesia, Colace, Senna) if you don t move your bowels at least every other day. Other side effects include upset stomach, sleepiness, dizziness, throwing up, tolerance (needing more of the medicine to have the same effect), physical dependence and slowed breathing.    Store your pills in a secure place, locked if possible. We will not replace any lost or stolen medicine. If you don t finish your medicine, please throw away (dispose) as directed by your pharmacist. The [place] Pollution Control Agency has more information about safe disposal: https://www.pca.[facility].mn.us/living-green/managing-unwanted-medications             Medication List: This is a list of all your medications and when to take them. Check marks below indicate your daily home schedule. Keep this list as a reference.      Medications           Morning Afternoon Evening Bedtime As Needed    bisacodyl 10 MG suppository   Commonly known as:  DULCOLAX   Place 1 suppository (10 mg) rectally daily as needed                                * BOTOX IJ   Inject 170 Units as directed Lot # /C3 with Expiration Date:  10/2020                                * BOTOX IJ   Inject 200 Units as directed Total dose 200 units  Administered 190 units  Unavoidable waste 30 units  Lot # /C3 with Expiration Date:  12/2020  NDC [address]-80                                 * Botulinum Toxin Type A 200 units injection   Commonly known as:  BOTOX   Inject 200 Units as directed every 3 months                                [employer] SENNA 8.6 MG tablet   TAKE 2 TABLETS BY MOUTH 2 TIMES DAILY   Generic drug:  senna                                KLONOPIN PO   Take 0.5 mg by mouth 3 times daily   Last time this was given:  0.5 mg on 11/30/2018  5:09 PM                                leflunomide 20 MG tablet   Commonly known as:  ARAVA   Take 20 mg by mouth every morning Reported on 3/28/2017   Last time this was given:  20 mg on 11/30/2018  7:43 AM                                meclizine 25 MG tablet   Commonly known as:  ANTIVERT   Take 1 tablet (25 mg) by mouth every 6 hours as needed for dizziness                                omeprazole 40 MG DR capsule   Commonly known as:  priLOSEC   Take 1 capsule (40 mg) by mouth 2 times daily (before meals)   Last time this was given:  40 mg on 11/30/2018  5:09 PM                                oxyCODONE 5 MG tablet   Commonly known as:  ROXICODONE   Take 1-2 tablets (5-10 mg) by mouth every 3 hours as needed for moderate to severe pain   Last time this was given:  10 mg on 11/30/2018  2:45 PM                                senna-docusate 8.6-50 MG tablet   Commonly known as:  SENOKOT-S/PERICOLACE   Take 1-2 tablets by mouth 2 times daily For constipation.   Last time this was given:  2 tablets on 11/30/2018  7:42 AM                                VITAMIN B-12 PO   Take 4 tablets by mouth every morning                                * Notice:  This list has 3 medication(s) that are the same as other medications prescribed for you. Read the directions carefully, and ask your doctor or other care provider to review them with you.

## 2018-11-27 NOTE — IP AVS SNAPSHOT
[facility]    [address]    [facility] [address]    Phone:  [phone]                                       After Visit Summary   11/27/2018    Kalyn Rivero    MRN: [id number]           After Visit Summary Signature Page     I have received my discharge instructions, and my questions have been answered. I have discussed any challenges I see with this plan with the nurse or doctor.    ..........................................................................................................................................  Patient/Patient Representative Signature      ..........................................................................................................................................  Patient Representative Print Name and Relationship to Patient    ..................................................               ................................................  Date                                   Time    ..........................................................................................................................................  Reviewed by Signature/Title    ...................................................              ..............................................  Date                                               Time          22EPIC Rev 08/18

## 2018-11-27 NOTE — ANESTHESIA POSTPROCEDURE EVALUATION
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       Anesthesia Type:  Not documented    Reportable Event: NO     PAIN: Uncomplicated   Sign Out status: Comfortable, Well controlled pain     PONV: No PONV   Sign Out status:  No Nausea or Vomiting     Neuro/Psych: Uneventful perioperative course   Sign Out Status: Preoperative baseline; Age appropriate mentation     Airway/Resp.: Uneventful perioperative course   Sign Out Status: Non labored breathing, age appropriate RR; Resp. Status within EXPECTED Parameters     CV: Uneventful perioperative course   Sign Out status: Appropriate BP and perfusion indices; Appropriate HR/Rhythm     Disposition:   Sign Out in:  PACU  Recovery Course: Uneventful  Follow-Up: Not required           Last Anesthesia Record Vitals:  CRNA VITALS  2018 1429 - 2018 1459      2018             Pulse: 65    Ht Rate: 65    Temp: 35.8  C (96.4  F)    SpO2: 99 %    Resp Rate (observed): 11          Last PACU/Preop Vitals:  Vitals:    18 0820   BP: 111/84   Resp: 20   Temp: 36.4  C (97.5  F)   SpO2: 99%         Electronically Signed By: Yunior Montanez MD, MD, 2018, 2:59 PM

## 2018-11-27 NOTE — OP NOTE
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 aspiration of right elbow  2. explantation of hardware right distal humerus  3. excision of right humeral diaphysis and distal humerus  4. right distal humeral replacement  5. revision of right total elbow arthroplasty    Surgeons:  Aakash Zimmer MD, Christiano Holden MD, Jonathon Rausch PA-C     anesthesia: General endotracheal    Estimated blood loss: Less than 200 cc    Implants: Biomet compress fixation distal humeral replacement.  10 mm anchor plug, 600 pound compression spindle size short XX Small 1 cm length, 16 mm intercalary body segment with 60 mm distal humerus.  Compress device SRS mini taper adapter 4 cm length.  DonJoy  humeral bearings and ulnar bearing for Discovery elbow (ulnar component).     Indications: This patient has undergone multiple prior operative procedures listed below.  She developed a periprosthetic fracture and underwent internal fixation which subsequently failed.  She has a nonunion as well as a displaced fracture through her humeral diaphysis.  Preoperative evaluation revealed no evidence of septic process.  After extensive discussion with the shoulder and hand surgery services, it was elected to proceed with distal humeral replacement.     Prior treatments:  1. 07/19/2017 right revision total elbow arthroplasty with a distal femoral allograft and ulnar nerve neurolysis with some addition of plate fixation of a periprosthetic humerus fracture ([place]) [facility] VariAx 2.7/3.5 posterolateral humerus plate, 12-hole. [employer] ([employer]) Discovery portal elbow arthroplasty prosthesis.   2. Multiple other orthopaedic procedures, bilateral TKA, L shoulder hemiarthroplasty, L wrist replacement, C1-2  arthrodesis  3. Prior R ulnar n transposition by Dr. Gaspar with resolution of dysesthesias.  Sx's returned after repositioning by Dr. De Luna    Procedure: The patient was placed on the operating table in supine position after induction of general anesthesia.  The right shoulder and entire right upper extremity was prepped and draped in sterile manner.  The head of the bed was elevated 30 degrees.    In order to obtain adequate cultures prior to administration of antibiotic, a large bursal sac around the right elbow was aspirated.  Sterile prep was performed in the right elbow was aspirated and 5 cc of fluid was sent for cell count, differential as well as culture examination.  Perioperative antibiotics were then administered initially with clindamycin and subsequently using Ancef after a test dose administration.    An extensile anterolateral exposure to the distal two thirds of the right humerus was now performed by making a curvilinear incision along the anterior deltoid border and extending this along the anterolateral aspect of the arm across the lateral humeral epicondyle and along the lateral aspect of the ulna.  We dissected along the lateral border of the brachioradialis muscle and divided this from the triceps tendon.  The dissection was then taken down directly onto the distal humerus.  The displaced plate and distal humeral and ulnar component were identified.  We dissected proximally until the radial nerve was identified.  The radial nerve was visually identified and then careful dissection was performed to isolate this and mobilize it from the surrounding tissue and humerus.  The soft tissue sleeve was maintained around the radial nerve to protect it from stretch injury.  Any bleeding points were cauterized or ligated with hemoclips.  The dissection was then continued proximally to the level of the deltoid tuberosity.  At this point I continued through the mid substance of the deltoid and took down  portions of the deltoid attachment to the deltoid tuberosity in order to access the proximal end of the displaced humeral plate.    The failed hardware fixation was now removed.  Multiple screws within the soft tissues were removed.  An additional screw affixing the plate to the humerus was removed.  The plate was then detached from the humeral diaphysis and extracted proximally.  Multiple additional screws were found in the soft tissues.  I searched the entire soft tissue envelope to remove all loose screws that were safely accessible and any remaining hardware was deemed inaccessible for removal without incurring unacceptable morbidity.  At this point I now proceeded with the excision of the right humeral diaphysis and distal humerus.    The dissection was continued proximally for an additional 5 cm in order to expose the portion of the humeral diaphysis to be excised.  The operative plan for length of humeral diaphysis to be excised extending to the proximal end of the plate where it had eroded the posterior cortex of the humerus.  An osteotomy was performed at this level.  I then performed sub-periosteal resection to excise the humeral diaphysis.  Once this had been completed, I gained access to the distal humerus.      In order to excise the distal humerus, I disarticulated the prosthetic elbow joint.  The locking set screws within the distal humeral component was very removed.  I dissected around the olecranon to the the medial portion of the triceps tendon and opened up an envelope to access the medial setscrew.  Once this had been removed the joint was disarticulated.  The hemispherical bearings for the distal humerus as well as the plastic bearing within the ulnar component were now extracted using the 's instrumentation.  The ulnar component was inspected.  It was a DonJoy discovery ulnar component and was well fixed and did not require extraction.  The ulna bearing did require removal.    At  this point the remaining portion of the distal humerus was excised from the soft tissues.  Fractured bone fragments were removed.  I save the autogenous bone and placed.it through a bone mill for use as bone graft.    The reconstruction was now performed by placing the compress osseointegration spindle.  Preparation of the proximal humerus was performed by intramedullary reaming to a size 10 mm to accommodate a 10 mm plug and a 12 mm intramedullary diameter at the osteotomy junction which represented the smallest possible diameter.  The real 10 mm plug was now placed within the distal humerus.  Crosspin fixation was then undertaken by placing the appropriate jig onto the 10 mm plug, it was seated firmly and 5 pin sites for cross pinning were drilled in each of the 24 mm pins was placed across the distal humerus for transfixion and fixation of the ankle plug.  Once  completed, the face of the osteotomy site was reamed using the face reamer.  I measured the cortical thickness and selected a 600 pound spindle for application.    The 1 cm length 600 pound compression pressure spindle was now applied to the remaining humerus with care being taken to ensure proper rotational alignment.  I then planned for fixation and reconstruction using a total 17 cm of length distal humerus.  This was assessed with intraoperative reduction.  I then assembled the 60 mm intercalary body segment as well as the distal humerus and impacted the Kirk taper junctions firmly.  The set screws locking the diaphyseal body segment as well as the distal humeral segment were then tightened.  I then prepared the appropriate humeral bearings and ulnar polyethylene bearing and inserted these.  The ulnar bearing had been refrigerated in advance to contract the size of the device and then it was inserted within the circular portion of the ulna and rotated into proper position.  The locking pin was placed and set correctly.  Furthermore the humeral  bearings were then applied and locked into position of the humeral component was then reduced to the ulnar component.  The locking set screws were also then applied both medially and laterally.  The elbow was placed through range of motion.  Full extension and further flexion 135 degrees was possible.  Furthermore external rotation of 60 degrees and internal rotation to the patient's abdomen was possible.  Humeral length was judged using soft tissue tension.  I then thoroughly irrigated the entire wound.  Wound closure was accomplished by reapproximating the muscle envelope using interrupted 0 Ethibond sutures.  These were placed along the entire muscle envelope that have been opened and along the site of the radial nerve.  The drain was placed in the deep portion of the wound and brought out through a distal stab wound incision.  The previously milled bone graft was then placed around the compression spindle ostial integration site.  Upon complete layered wound closure a sling was applied as well as a sterile dressing.    Postoperative plan will be for the patient to continue postoperative antibiotics for at least 3 days until her intraoperative cultures are confirmed negative.  Range of motion allowable: Flexion 0-90 degrees, abduction 0-90 degrees, internal rotation to abdomen, external rotation 60 degrees.  Full elbow motion and supination and pronation will be allowable.  No lifting activities.  No weightbearing.    The 22 modifier is appended to the above CPT procedure codes due to the following reasons.  This patient has had multiple operative procedures performed which greatly increased level complexity and risk associated with the extensile dissection performed.  Furthermore, extensive planning had to be undertaken order to meet a Biomet compression ostial integration device with a DonJoy humeral ulnar component.  This is represented by the operative time of 5 hours, 11min.     MD Karen Muñiz  Family Professor  Oncology and Adult Reconstructive Surgery  Dept Orthopaedic Surgery, [facility]  [phone] office, [phone] pager  [email or web address]

## 2018-11-27 NOTE — ANESTHESIA PREPROCEDURE EVALUATION
Anesthesia Evaluation     . Pt has had prior anesthetic. Type: General    History of anesthetic complications   - PONV  reported h/o cardiac arrest during anesthesia      ROS/MED HX    ENT/Pulmonary:     (+)tobacco use (quit 6 weeks ago), Past use , . .    Neurologic: Comment: Receives Botox injections for migraines    (+)migraines, CVA date: many years ago without deficits    Cardiovascular:     (+) ----. : . . . :. valvular problems/murmurs . Previous cardiac testing Echodate:10/23/18results:date: results:ECG reviewed date:8/27/18 results:SR date: results:          METS/Exercise Tolerance:  1 - Eating, dressing   Hematologic:     (+) History of Transfusion no previous transfusion reaction -      Musculoskeletal: Comment: Fibromyalgia  (+) , , other musculoskeletal- RA, multiple orthopedic surgeries      GI/Hepatic:     (+) GERD Asymptomatic on medication, liver disease (h/o acute liver failure in 2012 2/2 acetaminophen OD),       Renal/Genitourinary:     (+) chronic renal disease, type: CRI, Nephrolithiasis , Pt does not require dialysis, Pt has no history of transplant,       Endo:     (+) thyroid problem hypothyroidism, .      Psychiatric:     (+) psychiatric history depression and anxiety      Infectious Disease:   (+) MRSA,       Malignancy:      - no malignancy   Other:    (+) C-spine cleared: N/A, H/O Chronic Pain,other significant disability Other (comment)                   Physical Exam  Normal systems: dental    Airway   Mallampati: I  TM distance: >3 FB  Neck ROM: full    Dental     Cardiovascular   Rhythm and rate: regular and normal      Pulmonary    breath sounds clear to auscultation    Other findings: For further details of assessment, testing, and physical exam please see H and P completed on same date.           PAC Discussion and Assessment    ASA Classification: 3  [place] [facility] [place]  Anesthetic techniques and relevant risks discussed: GA  Invasive monitoring and risk discussed:  No  Types:   Possibility and Risk of blood transfusion discussed: No  NPO instructions given:   Additional anesthetic preparation and risks discussed:   Needs early admission to pre-op area:   Other:     PAC Resident/NP Anesthesia Assessment:  Kalyn Rivero is a 48 year old female scheduled to undergo revision right distal humeral replacement with Dr. Zimmer on date TBD. She has the following specific operative considerations:   - RCRI : 0.9% risk of major adverse cardiac event.  - VTE risk: 0.26%  - BRITTANY # of risks 0/8  - Risk of PONV score = 3.  If > 2, anti-emetic intervention recommended. 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 Above procedure now planned.    --PONV. Significant history. Final decisions regarding prophylaxis by Anesthesia on DOS.   --Rheumatoid arthritis s/p multiple orthopedic procedures. Is followed by Rheumatology. She has held her Enbrel since September but continues to take Arava and Celebrex. According to patient she will be able to stay on these for surgery. Contacting surgery team to confirm. Widespread pain. Requires PCA care.    --No cardiac symptoms or meds. History of heart murmur. Updated echocardiogram today normal. EKG above. Activity limited by pain.   --Former smoker. Quit 6 weeks ago. Denies pulmonary symptoms.    --GERD. Will take Omeprazole on DOS.    --Renal insufficiency. Elevated creatinine today: 2.09, GFR 25. Will contact surgery team, and ask patient to follow up as soon as possible in primary clinic.    --Anxiety. Clonazepam prn.    --Migraines with Botox injections every 3 months.    --History of difficult pain control after surgery.     Patient was discussed with Dr Avalos.        Reviewed and Signed by PAC Mid-Level Provider/Resident  Mid-Level Provider/Resident: JULIETTE Riena, CNS  Date:  "10/23/18  Time: 5:00pm    Attending Anesthesiologist Anesthesia Assessment:  I have examined the patient and reviewed the medical record.  I have discussed the patient with the RAINA and concur with her assessment  The patient is scheduled to have revision of right elbow arthroplasty for prosthetic loosening (she has had multiple surgeries on this in the past)  The patient claims that she has had multiple \"deaths\" with anesthesia though she has had seven procedures at [facility] without incident (most with combined regional / GA with LMA or regional /MAC)  The patient has RA and has been on multiple agents and is currently using the biologic leflunomide (no prednisone)  She had previous history of C1-2 instability which is now resolved s/p C1-2 fusion (fiberoptic intubation)  She denies cardiac symptoms, has activity level over 3 METS.  An echocardiogram from 2011 notes RVSP of 51 over RAP but she is unaware of pulmonary hypertension and is not treated for it.  She denies GERD.    PE:  Thin female with multiple joint deformities.  MPC 1 with slight limitation to neck extension.  3 FBTMD.  Lungs clear.  CV  RRR without murmur.    Will obtain a repeat echocardiogram to assess if pulmonary hypertension is present and treat accordingly  Have instructed patient to discontinue biologics 2 weeks prior to surgery to minimize infection risk, patient is declining - will refer to surgeon  Patient is very amenable to regional anesthesia (has had multiple supraclavicular injections, some with Exparel.  Final plan per attending anesthesiologist the day of surgery.        Reviewed and Signed by PAC Anesthesiologist  Anesthesiologist: Erich Avalos MD  Date: 10/23/2018  Time:   Pass/Fail:   Disposition:     PAC Pharmacist Assessment:        Pharmacist:   Date:   Time:      Anesthesia Plan      History & Physical Review      ASA Status:  2 .        Plan for General and LMA with Intravenous induction. Maintenance will be TIVA.    PONV " prophylaxis:  Ondansetron (or other 5HT-3) and Scopolamine patch  Additional equipment: Videolaryngoscope      Postoperative Care  Postoperative pain management:  IV analgesics, Oral pain medications, Peripheral nerve block (Single Shot) and Multi-modal analgesia.      Consents                          .    STE[facility] AN PHYSICAL EXAM    Assessment:   ASA SCORE: 2

## 2018-11-27 NOTE — ANESTHESIA PROCEDURE NOTES
Peripheral Nerve Block Procedure Note    Staff:     Anesthesiologist:  SONIA ROBERTSON    Resident/CRNA:  HAILEY HARRISON    Block performed by resident/CRNA in the presence of a teaching physician    Location: PACU  Procedure Start/Stop TImes:      11/27/2018 5:00 PM     11/27/2018 5:07 PM    patient identified, IV checked, site marked, risks and benefits discussed, informed consent, monitors and equipment checked, pre-op evaluation, at physician/surgeon's request and post-op pain management      Correct Patient: Yes      Correct Position: Yes      Correct Site: Yes      Correct Procedure: Yes      Correct Laterality:  Yes    Site Marked:  Yes  Procedure details:     Procedure:  Supraclavicular    ASA:  2    Diagnosis:  Post operative pain    Laterality:  Right    Position:  Supine    Sterile Prep: chloraprep, mask and sterile gloves      Local skin infiltration:  None    Needle:  Short bevel and insulated    Needle gauge:  21    Needle length (mm):  110    Abnormal pain on injection: No      Blood Aspirated: No      Paresthesias:  No    Bleeding at site: No      Bolus via:  Needle    Infusion Method:  Single Shot    Complications:  None  Assessment/Narrative:     Injection made incrementally with aspirations every (mL):  5

## 2018-11-27 NOTE — BRIEF OP NOTE
[facility], [place]    Brief Operative Note    Pre-operative diagnosis: Failed right Distal Humeral Allograft, Mechanical Complication Of Internal Elbow Joint Prosthesis   Post-operative diagnosis Same    Procedure:  1 - aspiration of Right elbow   2 - Explantation of failed hardware Right humeral periprosthetic fracture non-union   3- Right distal humerus excision   4- Right distal humerus replacement   5 - Revision Right total elbow arthroplasty    Surgeon:     * Aakash Zimmer MD - Primary     * Christiano Holden MD - Assisting     * Jonathon Rausch PA-C - Assisting  Anesthesia: Combined General with Supraclavicular Block   Estimated blood loss: Less than 100 ml  Drains: Ty-Barbour  Specimens:   ID Type Source Tests Collected by Time   1 : Fluid Right Elbow Fluid Elbow, Right ANAEROBIC BACTERIAL CULTURE, CELL COUNT WITH DIFFERENTIAL FLUID, FLUID CULTURE AEROBIC BACTERIAL Aakash Zimmer MD 11/27/2018 11:30 AM   2 : Right Elbow Fluid Elbow, Right SYNOVASURE PERIPROSTHETIC JOINT INFECTION DETECTION Aakash Zimmer MD 11/27/2018 12:40 PM   3 : Right Elbow Tissue Elbow, Right ANAEROBIC BACTERIAL CULTURE, TISSUE CULTURE AEROBIC BACTERIAL Aakash Zimmer MD 11/27/2018 12:41 PM     Findings:   Right distal humerus non-union, failure of hardware and prosthesis  Complications: None.    Post-op Exam in OR:  RUE: palpable radial pulse    +FPL/EPL/FDP/EDC/IO; intact but slightly weak finger extension, weak index finger extension    SILT R/M nerves; sensation present but altered at baseline in ulnar nerve    Plan:  Admit to Orthopaedics  Pain control  IV antibiotics while in the hospital  Follow-up aspiration cultures  DVT prophylaxis  PT/OT: NWB RUE    Ok for ROM: limit internal rotation to chest and external rotation to 30 degrees  Sling for comfort    FINAL IMPLANTS:   Implant Name   Lot No. LRB No. Used   Humeral Condyle Kit, Discovery Elbow System  DJO [id number]  Right 1   COMPRESS DEVICE SHORT ANCHOR PLUG   10mm BI[employer]ET [id number] Right 1   COMPRESS DEVICE TRANSVERSE PIN  24mm BIOMET [id number] Right 1   COMPRESS DEVICE NUT  BI[employer]ET [id number] Right 1   COMPRESS MINI-TAPER HA 1 CM SPINDLE SHORT XX-SMALL 25 MM COLLAR POROUS COAT   600 pound BIOMET [id number] Right 1   REVISION ULNA BEARING-STANDARD WITH EXTRA PIN-ARCORN  [employer]ET [id number] Right 1   DISTAL BODY WITH SCREW    60mm BIOMET [id number] Right 1   COMPRESS DEVICE SRS MINI TAPER ADAPTER  4cm BIOMET [id number] Right 1   INTERCALARARY SEGMENT WITH SCREW SEGMENTAL REVISION SYSTEM   60mm BIOMET [id number] Right 1   HUMERAL CONDYLE KIT HEXALOBULAR  DJO [id number] Right 1

## 2018-11-27 NOTE — ANESTHESIA CARE TRANSFER NOTE
Patient: Kalyn Rivero    Procedure(s):  1 - aspiration of Right elbow 2- Explantation of failed hardware Right humeral periprosthetic fracture non-union 3- Right distal humerus excision 4- Right distal humerus replacement 5 - Revision Right total elbow arthroplasty    Diagnosis: Failed Distal Humeral Allograft, Mechanical Complication Of Internal Elbow Joint Prosthesis   Diagnosis Additional Information: No value filed.    Anesthesia Type:   General, LMA     Note:  Airway :Face Mask  Patient transferred to:PACU  Handoff Report: Identifed the Patient, Identified the Reponsible Provider, Reviewed the pertinent medical history, Discussed the surgical course, Reviewed Intra-OP anesthesia mangement and issues during anesthesia, Set expectations for post-procedure period and Allowed opportunity for questions and acknowledgement of understanding      Vitals: (Last set prior to Anesthesia Care Transfer)    CRNA VITALS  11/27/2018 1610 - 11/27/2018 1653      11/27/2018             Resp Rate (observed): 12                Electronically Signed By: Shelbie Euceda CRNA, JULIETTE ABEL  November 27, 2018  4:53 PM

## 2018-11-28 ENCOUNTER — APPOINTMENT (OUTPATIENT)
Dept: OCCUPATIONAL THERAPY | Facility: CLINIC | Age: 48
DRG: 483 | End: 2018-11-28
Attending: ORTHOPAEDIC SURGERY
Payer: COMMERCIAL

## 2018-11-28 ENCOUNTER — APPOINTMENT (OUTPATIENT)
Dept: OCCUPATIONAL THERAPY | Facility: CLINIC | Age: 48
DRG: 483 | End: 2018-11-28
Attending: PHYSICIAN ASSISTANT
Payer: COMMERCIAL

## 2018-11-28 LAB
ALBUMIN UR-MCNC: NEGATIVE MG/DL
ANION GAP SERPL CALCULATED.3IONS-SCNC: 6 MMOL/L (ref 3–14)
APPEARANCE UR: ABNORMAL
BACTERIA #/AREA URNS HPF: ABNORMAL /HPF
BILIRUB UR QL STRIP: NEGATIVE
BUN SERPL-MCNC: 7 MG/DL (ref 7–30)
CALCIUM SERPL-MCNC: 7.6 MG/DL (ref 8.5–10.1)
CHLORIDE SERPL-SCNC: 107 MMOL/L (ref 94–109)
CO2 SERPL-SCNC: 26 MMOL/L (ref 20–32)
COLOR UR AUTO: ABNORMAL
CREAT SERPL-MCNC: 0.82 MG/DL (ref 0.52–1.04)
ERYTHROCYTE [DISTWIDTH] IN BLOOD BY AUTOMATED COUNT: 14.3 % (ref 10–15)
GFR SERPL CREATININE-BSD FRML MDRD: 74 ML/MIN/1.7M2
GLUCOSE SERPL-MCNC: 96 MG/DL (ref 70–99)
GLUCOSE UR STRIP-MCNC: NEGATIVE MG/DL
HCT VFR BLD AUTO: 32.5 % (ref 35–47)
HGB BLD-MCNC: 10.3 G/DL (ref 11.7–15.7)
HGB UR QL STRIP: NEGATIVE
KETONES UR STRIP-MCNC: 10 MG/DL
LEUKOCYTE ESTERASE UR QL STRIP: NEGATIVE
MCH RBC QN AUTO: 28.9 PG (ref 26.5–33)
MCHC RBC AUTO-ENTMCNC: 31.7 G/DL (ref 31.5–36.5)
MCV RBC AUTO: 91 FL (ref 78–100)
MUCOUS THREADS #/AREA URNS LPF: PRESENT /LPF
NITRATE UR QL: NEGATIVE
PH UR STRIP: 6 PH (ref 5–7)
PLATELET # BLD AUTO: 147 10E9/L (ref 150–450)
POTASSIUM SERPL-SCNC: 3.5 MMOL/L (ref 3.4–5.3)
RBC # BLD AUTO: 3.56 10E12/L (ref 3.8–5.2)
RBC #/AREA URNS AUTO: 0 /HPF (ref 0–2)
SODIUM SERPL-SCNC: 139 MMOL/L (ref 133–144)
SOURCE: ABNORMAL
SP GR UR STRIP: 1 (ref 1–1.03)
SQUAMOUS #/AREA URNS AUTO: 18 /HPF (ref 0–1)
UROBILINOGEN UR STRIP-MCNC: NORMAL MG/DL (ref 0–2)
WBC # BLD AUTO: 7.2 10E9/L (ref 4–11)
WBC #/AREA URNS AUTO: 1 /HPF (ref 0–5)

## 2018-11-28 PROCEDURE — 99232 SBSQ HOSP IP/OBS MODERATE 35: CPT | Performed by: INTERNAL MEDICINE

## 2018-11-28 PROCEDURE — 97166 OT EVAL MOD COMPLEX 45 MIN: CPT | Mod: GO

## 2018-11-28 PROCEDURE — 25000132 ZZH RX MED GY IP 250 OP 250 PS 637: Performed by: PHYSICIAN ASSISTANT

## 2018-11-28 PROCEDURE — 97110 THERAPEUTIC EXERCISES: CPT | Mod: GO

## 2018-11-28 PROCEDURE — 81001 URINALYSIS AUTO W/SCOPE: CPT | Performed by: INTERNAL MEDICINE

## 2018-11-28 PROCEDURE — 85027 COMPLETE CBC AUTOMATED: CPT | Performed by: INTERNAL MEDICINE

## 2018-11-28 PROCEDURE — 25000132 ZZH RX MED GY IP 250 OP 250 PS 637: Performed by: NURSE PRACTITIONER

## 2018-11-28 PROCEDURE — 25000132 ZZH RX MED GY IP 250 OP 250 PS 637: Performed by: INTERNAL MEDICINE

## 2018-11-28 PROCEDURE — 12000001 ZZH R&B MED SURG/OB UMMC

## 2018-11-28 PROCEDURE — 99207 ZZC CDG-MDM COMPONENT: MEETS LOW - DOWN CODED: CPT | Performed by: INTERNAL MEDICINE

## 2018-11-28 PROCEDURE — 25000128 H RX IP 250 OP 636: Performed by: PHYSICIAN ASSISTANT

## 2018-11-28 PROCEDURE — 25000131 ZZH RX MED GY IP 250 OP 636 PS 637: Performed by: PHYSICIAN ASSISTANT

## 2018-11-28 PROCEDURE — 40000133 ZZH STATISTIC OT WARD VISIT

## 2018-11-28 PROCEDURE — 97535 SELF CARE MNGMENT TRAINING: CPT | Mod: GO

## 2018-11-28 PROCEDURE — 25000132 ZZH RX MED GY IP 250 OP 250 PS 637: Performed by: STUDENT IN AN ORGANIZED HEALTH CARE EDUCATION/TRAINING PROGRAM

## 2018-11-28 PROCEDURE — 80048 BASIC METABOLIC PNL TOTAL CA: CPT | Performed by: INTERNAL MEDICINE

## 2018-11-28 PROCEDURE — 36415 COLL VENOUS BLD VENIPUNCTURE: CPT | Performed by: INTERNAL MEDICINE

## 2018-11-28 PROCEDURE — 87086 URINE CULTURE/COLONY COUNT: CPT | Performed by: INTERNAL MEDICINE

## 2018-11-28 RX ORDER — LEFLUNOMIDE 20 MG/1
20 TABLET ORAL EVERY MORNING
Status: DISCONTINUED | OUTPATIENT
Start: 2018-11-28 | End: 2018-11-30 | Stop reason: HOSPADM

## 2018-11-28 RX ORDER — HYDROMORPHONE HCL/0.9% NACL/PF 0.2MG/0.2
0.2 SYRINGE (ML) INTRAVENOUS ONCE
Status: DISCONTINUED | OUTPATIENT
Start: 2018-11-28 | End: 2018-11-29 | Stop reason: CLARIF

## 2018-11-28 RX ORDER — OXYCODONE HYDROCHLORIDE 5 MG/1
5-10 TABLET ORAL
Status: DISCONTINUED | OUTPATIENT
Start: 2018-11-28 | End: 2018-11-30 | Stop reason: HOSPADM

## 2018-11-28 RX ADMIN — HYDROMORPHONE HYDROCHLORIDE 0.8 MG: 1 INJECTION, SOLUTION INTRAMUSCULAR; INTRAVENOUS; SUBCUTANEOUS at 13:17

## 2018-11-28 RX ADMIN — ONDANSETRON 4 MG: 2 INJECTION INTRAMUSCULAR; INTRAVENOUS at 19:50

## 2018-11-28 RX ADMIN — HYDROMORPHONE HYDROCHLORIDE 4 MG: 2 TABLET ORAL at 06:24

## 2018-11-28 RX ADMIN — HYDROMORPHONE HYDROCHLORIDE 0.8 MG: 1 INJECTION, SOLUTION INTRAMUSCULAR; INTRAVENOUS; SUBCUTANEOUS at 15:52

## 2018-11-28 RX ADMIN — OMEPRAZOLE 40 MG: 20 CAPSULE, DELAYED RELEASE ORAL at 09:44

## 2018-11-28 RX ADMIN — HYDROMORPHONE HYDROCHLORIDE 0.8 MG: 1 INJECTION, SOLUTION INTRAMUSCULAR; INTRAVENOUS; SUBCUTANEOUS at 03:30

## 2018-11-28 RX ADMIN — OXYCODONE HYDROCHLORIDE 10 MG: 5 TABLET ORAL at 19:45

## 2018-11-28 RX ADMIN — CEFAZOLIN 1 G: 1 INJECTION, POWDER, FOR SOLUTION INTRAMUSCULAR; INTRAVENOUS at 18:32

## 2018-11-28 RX ADMIN — CLONAZEPAM 0.5 MG: 0.5 TABLET ORAL at 22:19

## 2018-11-28 RX ADMIN — ONDANSETRON 4 MG: 2 INJECTION INTRAMUSCULAR; INTRAVENOUS at 02:19

## 2018-11-28 RX ADMIN — CEFAZOLIN 1 G: 1 INJECTION, POWDER, FOR SOLUTION INTRAMUSCULAR; INTRAVENOUS at 09:43

## 2018-11-28 RX ADMIN — LEFLUNOMIDE 20 MG: 20 TABLET ORAL at 09:51

## 2018-11-28 RX ADMIN — HYDROMORPHONE HYDROCHLORIDE 0.8 MG: 1 INJECTION, SOLUTION INTRAMUSCULAR; INTRAVENOUS; SUBCUTANEOUS at 23:05

## 2018-11-28 RX ADMIN — HYDROMORPHONE HYDROCHLORIDE 0.8 MG: 1 INJECTION, SOLUTION INTRAMUSCULAR; INTRAVENOUS; SUBCUTANEOUS at 18:32

## 2018-11-28 RX ADMIN — CLONAZEPAM 0.5 MG: 0.5 TABLET ORAL at 15:57

## 2018-11-28 RX ADMIN — HYDROMORPHONE HYDROCHLORIDE 0.8 MG: 1 INJECTION, SOLUTION INTRAMUSCULAR; INTRAVENOUS; SUBCUTANEOUS at 20:49

## 2018-11-28 RX ADMIN — HYDROMORPHONE HYDROCHLORIDE 4 MG: 2 TABLET ORAL at 12:30

## 2018-11-28 RX ADMIN — PROCHLORPERAZINE EDISYLATE 10 MG: 5 INJECTION INTRAMUSCULAR; INTRAVENOUS at 22:26

## 2018-11-28 RX ADMIN — OMEPRAZOLE 40 MG: 20 CAPSULE, DELAYED RELEASE ORAL at 15:57

## 2018-11-28 RX ADMIN — HYDROMORPHONE HYDROCHLORIDE 0.8 MG: 1 INJECTION, SOLUTION INTRAMUSCULAR; INTRAVENOUS; SUBCUTANEOUS at 07:58

## 2018-11-28 RX ADMIN — ONDANSETRON 4 MG: 2 INJECTION INTRAMUSCULAR; INTRAVENOUS at 14:11

## 2018-11-28 RX ADMIN — HYDROMORPHONE HYDROCHLORIDE 2 MG: 2 TABLET ORAL at 02:18

## 2018-11-28 RX ADMIN — SODIUM CHLORIDE, POTASSIUM CHLORIDE, SODIUM LACTATE AND CALCIUM CHLORIDE: 600; 310; 30; 20 INJECTION, SOLUTION INTRAVENOUS at 03:30

## 2018-11-28 RX ADMIN — OXYCODONE HYDROCHLORIDE 10 MG: 5 TABLET ORAL at 13:37

## 2018-11-28 RX ADMIN — HYDROMORPHONE HYDROCHLORIDE 0.8 MG: 1 INJECTION, SOLUTION INTRAMUSCULAR; INTRAVENOUS; SUBCUTANEOUS at 11:08

## 2018-11-28 RX ADMIN — CLONAZEPAM 0.5 MG: 0.5 TABLET ORAL at 09:43

## 2018-11-28 ASSESSMENT — ACTIVITIES OF DAILY LIVING (ADL)
ADLS_ACUITY_SCORE: 16
ADLS_ACUITY_SCORE: 17
ADLS_ACUITY_SCORE: 16
ADLS_ACUITY_SCORE: 17
ADLS_ACUITY_SCORE: 16
ADLS_ACUITY_SCORE: 17

## 2018-11-28 NOTE — OR NURSING
PACU to Inpatient Nursing Handoff    Patient Kalyn Rivero is a 48 year old female who speaks English.   Procedure Procedure(s):  1 - aspiration of Right elbow 2- Explantation of failed hardware Right humeral periprosthetic fracture non-union 3- Right distal humerus excision 4- Right distal humerus replacement 5 - Revision Right total elbow arthroplasty   Surgeon(s) Primary: Aakash Zimmer MD  Assisting: Jonathon Rausch PA-C; Christiano Holden MD     Allergies   Allergen Reactions     Morphine Swelling     Gabapentin Other (See Comments)     Pins,needles, stabbing aching at once  Pins,needles, stabbing aching at once  Numbness and Tingling     Ibuprofen      Doesn't take due to gastric ulcer     Sulfa Drugs      unknown     Tylenol [Acetaminophen]      Pt. States that she has Liver problems  Tylenol 3     Erythromycin Nausea     Vomiting      Penicillins Rash     Prednisone Palpitations     Heart racing       Isolation  [unfilled]     Past Medical History   has a past medical history of Acetaminophen overdose (3/24/2011); Anemia; Anesthesia complication; Arrhythmia; Cerebral infarction (H); Cervical high risk HPV (human papillomavirus) test positive (02/22/2017); Chronic infection; Chronic pain (3/24/2011); Degenerative joint disease; Endometriosis; Fibromyalgia; Gastro-oesophageal reflux disease; Heart murmur; History of blood transfusion; Hypothyroidism; Immune disorder (H); Learning disability; Malignant neoplasm (H); Migraine; MRSA (methicillin resistant staph aureus) culture positive; Neck injuries; Opioid type dependence (H); Other chronic pain; PONV (postoperative nausea and vomiting); RA (rheumatoid arthritis) (H); Renal stone; Scoliosis; and Stomach ulcer. She also has no past medical history of Amblyopia; Aortic valve disorders; Asymptomatic human immunodeficiency virus (HIV) infection status (H); Back injury; Bleeding disorder (H); Bone tumor; Cataract; Chest pain; Chronic  osteoarthritis; Deep vein thrombosis (DVT) (H); Depressive disorder; Diabetes mellitus (H); Diabetic retinopathy (H); Giant rugal gastritis; Glaucoma (increased eye pressure); Hearing problem; Heart disease; Hyperlipidaemia; Lung disease; Reduced vision; Retinal detachment; Senile macular degeneration; Shortness of breath; Strabismus; Tuberculosis; or Uveitis. Severe RA, 32 joint surgeries. Note that above last three sentences are things she does NOT have.    Anesthesia Combined General with Supraclavicular Block   Dermatome Level     Preop Meds Not applicable   Nerve block Supraclavicular.  Location:right. Med:Exparel (liposomal bupivacaine). Time given: 1714   Intraop Meds fentanyl (Sublimaze): 500 mcg total  ondansetron (Zofran): last given at 1536   Local Meds No   Antibiotics cefazolin (Ancef) - last given at 1525  clindamycin (Cleocin) - last given at 1140     Pain Patient Currently in Pain: yes  Comfort: tolerable with discomfort  Pain Control: inadequate pain control   PACU meds  fentanyl (Sublimaze): 75 mcg (total dose) last given at 1711   hydromorphone (Dilaudid): 1 mg (total dose) last given at 1845   ondansetron (Zofran): 1711 mg (total dose) last given at 75   prochlorperazine (Compazine): 10 mg (total dose) last given at 1725  Zofran 4mg @ 1711   PCA / epidural No   Capnography  yes   Telemetry ECG Rhythm: Sinus rhythm   Inpatient Telemetry Monitor Ordered? No        Labs Glucose Lab Results   Component Value Date     10/31/2018       Hgb Lab Results   Component Value Date    HGB 12.6 11/27/2018       INR Lab Results   Component Value Date    INR 1.00 09/08/2014      PACU Imaging Completed     Wound/Incision Incision/Surgical Site 06/06/14 Right Elbow (Active)   Number of days:1635       Incision/Surgical Site 04/22/15 Left Foot (Active)   Number of days:1315       Incision/Surgical Site 06/18/15 Right Elbow (Active)   Number of days:1258       Incision/Surgical Site 09/30/15 Right Elbow  (Active)   Number of days:1154       Incision/Surgical Site 01/05/17 Left Hip (Active)   Number of days:691       Incision/Surgical Site 03/02/17 (Active)   Number of days:635       Incision/Surgical Site 04/27/17 Left Arm (Active)   Number of days:579       Incision/Surgical Site 11/27/18 Anterior;Right Arm (Active)   Incision Assessment UTV 11/27/2018  6:16 PM   Closure Sutures 11/27/2018  5:15 PM   Dressing Intervention Clean, dry, intact 11/27/2018  6:16 PM   Number of days:0      CMS Peripheral Neurovascular WDL:  WDL except (11/27/18 1650)  LUE Temperature: warm (11/27/18 0912)  LUE Color: no discoloration (11/27/18 0912)  LUE Sensation: tingling present;numbness present (numbness and tingling wrist to fingers) (11/27/18 0912)  RUE Temperature: warm (11/27/18 1650)  RUE Color: no discoloration (11/27/18 0912)  RUE Sensation: numbness present (11/27/18 1650)  LLE Temperature: warm (11/27/18 0912)  LLE Color: no discoloration (11/27/18 0912)  LLE Sensation: other (see comments) (intermittent numbness and tingling - none preop) (11/27/18 0912)  RLE Temperature: warm (11/27/18 0912)  RLE Color: no discoloration (11/27/18 0912)  RLE Sensation:  (intermittent numbness and tingling - none preop) (11/27/18 0912)   Equipment ice pack and shoulder sling   Other LDA       IV Access Peripheral IV 11/27/18 Left Lower forearm (Active)   Site Assessment WDL 11/27/2018  6:15 PM   Line Status Infusing 11/27/2018  5:45 PM   Phlebitis Scale 0-->no symptoms 11/27/2018  5:30 PM   Infiltration Scale 0 11/27/2018  5:30 PM   Number of days:0      Blood Products Not applicable  mL   Intake/Output Date 11/27/18 0700 - 11/28/18 0659   Shift 3684-9316 0252-4838 8340-7103 24 Hour Total   I  N  T  A  K  E   I.V. 1300 850  2150    Shift Total  (mL/kg) 1300  (19.82) 850  (12.96)  2150  (32.77)   O  U  T  P  U  T   Urine 120 50  170    Emesis/NG output  50  50    Drains  15  15    Blood 50 50  100    Shift Total  (mL/kg) 170  (2.59)  165  (2.52)  335  (5.11)   Weight (kg) 65.6 65.6 65.6 65.6        Drains / Ann Closed/Suction Drain 1 Right;Anterior Shoulder Bulb 15 [place] (Active)   Site Description WDL 11/27/2018  5:30 PM   Dressing Status Normal: Clean, Dry & Intact 11/27/2018  5:30 PM   Output (ml) 15 ml 11/27/2018  5:00 PM   Number of days:0       Urethral Catheter Double-lumen;Latex;Straight-tip 16 fr (Active)   Tube Description UTV 11/27/2018  5:30 PM   Collection Container Standard 11/27/2018  5:30 PM   Securement Method Leg strap 11/27/2018  5:30 PM   Number of days:0      Time of void PreOp Void Prior to Procedure: 0845 (11/27/18 0912)    PostOp      Diapered? No   Bladder Scan  ann in place   PO    tolerating sips     Vitals    B/P: 118/58  T: 98.1  F (36.7  C)    Temp src: Axillary  P:       Heart Rate: 50 (11/27/18 1800)     R: 9  O2:  SpO2: 100 %    O2 Device: Simple face mask (11/27/18 1800)    Oxygen Delivery: 7 LPM (11/27/18 1800)         Family/support present son   Patient belongings Patient Belongings: clothing;shoes  Disposition of Belongings: Other (see comment) (labeled and secured)   Patient transported on cart   DC meds/scripts (obs/outpt) Not applicable   Inpatient Pain Meds Released? Yes       Special needs/considerations IM consult, chronic pain in every joint for many years   Tasks needing completion Very nice, IV and PO Dilaudid ordered for the floor       Everett Cloud RN  [facility] [id number]

## 2018-11-28 NOTE — PROGRESS NOTES
"[facility] Internal Medicine Progress Note            Interval History:   Record reviewed.  Seen with RN.  S/P  aspiration of Right elbow.   Explantation of failed hardware.   Right humeral periprosthetic fracture non-union.   Right distal humerus excision.  Right distal humerus replacement.  Revision Right total elbow arthroplasty 11/27.  Dr. Zimmer.  Indication Failed right Distal Humeral Allograft, Mechanical Complication Of Internal Elbow Joint Prosthesis.   Inadequate pain control earlier today with IV/po dilaudid.  Since changed to po oxycodone 5-10 mg q 3h with IV dilaudid 0.4-0.8 mg q2h.  Improved, tolerable level of pain control earlier when seen.  Nausea with emesis attributed to uncontrolled pain.  Has tolerated oxycodone in the past.  RA on Arava.  Off enbrel.  Decision nopt to resume celebrex.   Up to BR without LH.  No CP, SOB, cough, lingering nausea, reflux.  Occas upper abdominal discomfort.  H/o bleeding PUD. Last BM  11/27.  Urinary frequency.  No dysuria typical of past UTI.           Medications:   All medications reviewed today          Physical Exam:   Blood pressure 134/77, temperature 99.5  F (37.5  C), temperature source Oral, resp. rate 14, height 1.676 m (5' 6\"), weight 65.6 kg (144 lb 10 oz), SpO2 93 %, not currently breastfeeding.    Intake/Output Summary (Last 24 hours) at 11/28/18 1610  Last data filed at 11/28/18 0638   Gross per 24 hour   Intake              300 ml   Output              915 ml   Net             -615 ml       General:  Alert.  Appropriate.  No distress.  Off O2.     Heent:      Neck:    Skin:    Chest:  clear    Cardiac:  Reg without gallop, murmur.  No JVD.     Abdomen:  Non distended, soft, non-tender.  BS normal.     Extremities:  Perfused.  No edema, calf, posterior thigh tenderness to suggest DVT.     Neuro:            Data:     Results for orders placed or performed during the hospital encounter of 11/27/18 (from the past 24 hour(s))   POC US Guidance " Needle Placement    Impression    Right Supraclavicular block   XR Elbow Port Right G/E 3 Views    Narrative    XR HUMERUS PORT RT, XR ELBOW PORT RT 3 G/E VW 11/27/2018 7:06 PM     HISTORY:  Status post distal humerus replacement, revision total  elbow;     COMPARISON: 8/27/2018    FINDINGS: Since the previous film there is been placement of an  endoprosthesis distal humerus. The proximal and of the endoprosthesis  extends to within 8 cm of the proximal aspect of the humerus. Multiple  transcortical screws are seen proximally. There is some bone graft or  bony debris at the junction of the endoprosthesis and the native  humerus. Component appears to be in good alignment and position. Some  air in the soft tissues. Presumed surgical drain projected over this  area. There is a prosthetic total right elbow joint associated with  this construct with the elbow component engaging the proximal ulna.  This appears to be cemented in position. Minimal density in this  periarticular region of the elbow present representing bony debris.  Again noted are the changes of resection of the proximal radius.      Impression    IMPRESSION:   1. Humeral endoprosthesis with total right elbow which interfaces with  the proximal ulna. Components in good alignment and position.  2. Bony density at the junction of the native humerus and the  endoprosthesis presumably representing bony graft or bony debris.  3. Surgical drain in place.    SONIA RODRIGUEZ MD   XR Humerus Port Right G/E 2 Views    Narrative    XR HUMERUS PORT RT, XR ELBOW PORT RT 3 G/E VW 11/27/2018 7:06 PM     HISTORY:  Status post distal humerus replacement, revision total  elbow;     COMPARISON: 8/27/2018    FINDINGS: Since the previous film there is been placement of an  endoprosthesis distal humerus. The proximal and of the endoprosthesis  extends to within 8 cm of the proximal aspect of the humerus. Multiple  transcortical screws are seen proximally. There is some bone  graft or  bony debris at the junction of the endoprosthesis and the native  humerus. Component appears to be in good alignment and position. Some  air in the soft tissues. Presumed surgical drain projected over this  area. There is a prosthetic total right elbow joint associated with  this construct with the elbow component engaging the proximal ulna.  This appears to be cemented in position. Minimal density in this  periarticular region of the elbow present representing bony debris.  Again noted are the changes of resection of the proximal radius.      Impression    IMPRESSION:   1. Humeral endoprosthesis with total right elbow which interfaces with  the proximal ulna. Components in good alignment and position.  2. Bony density at the junction of the native humerus and the  endoprosthesis presumably representing bony graft or bony debris.  3. Surgical drain in place.    SONIA RODRIGUEZ MD   Internal Medicine Adult IP Consult for [facility]: Patient to be seen: Routine within 24 hrs; Call back #: [phone]; Perioperative medical management; Consultant may enter orders: Yes    Narrative    Edwin Garcia MD     11/28/2018  2:01 AM                                    HOSPITALIST CONSULTATION     REQUESTING PHYSICIAN: Aakash Zimmer MD    REASON FOR CONSULTATION: Evaluation, Recommendations and co   management of Medical Comorbidities.     ASSESSMENT & PLAN :     Kalyn Rivero is a 48 year old female admitted on   11/27/2018 following procedure, s/p following Orthopedic   Procedure:     Pre-operative diagnosis:                   Failed right Distal   Humeral Allograft, Mechanical Complication Of Internal Elbow   Joint Prosthesis   Post-operative diagnosis                  Same     Procedure:  1 - aspiration of Right elbow   2 - Explantation of failed hardware Right humeral periprosthetic   fracture non-union   3- Right distal humerus excision   4- Right distal humerus replacement   5 - Revision Right total  elbow arthroplasty     Surgeon:     * Aakash Zimmer MD - Primary  Anesthesia:               Combined General with Supraclavicular   Block                      Estimated blood loss:            Less than 100 ml  Complications:                      None.        PMH, Pre Op eval reviewed. At current doing well.   Reports numbness of the fingers of surgical hand. Otherwise no   other concern.     Patient's history is complex with rheumatoid arthritis with many   orthopedic surgeries. She is followed by Rheumatology on Enbrel,   Arava and Celebrex. She has widespread chronic pain and requires   assistance from PCAs for care. She is treated for GERD and   anxiety.     Pre op :     EKG: NSR.   Cardiac echo: 10/23/18  Global and regional left ventricular function is normal with an   EF of 55-60%.  Global right ventricular function is normal.  No significant valvular abnormalities were noted.  Pulmonary artery systolic pressure is normal.  The inferior vena cava was normal in size with preserved   respiratory variability.  No pericardial effusion is present.    # Rheumatoid arthritis s/p multiple orthopedic procedures. Is   followed by Rheumatology outpatient. She has held her Enbrel   since September but continues to take Arava and Celebrex ( on   hold now for surgery).   - defer timing of resuming her RA meds to ortho and Rheum team   based on wound healing. Infection status.     # Former smoker. Quit  recently. Denies pulmonary symptoms. IS.     # History of heart murmur. No cardiac symptoms or meds.Echo:   normal as above.     # GERD. Continue home Omeprazole.    # Anxiety. Clonazepam prn.   # Migraines with Botox injections every 3 months.         # Orthopedics: POD # 0  Hemodynamics: stable.   continue on IV fluids, until adequate PO.   Monitor hgb for anemia of acute blood loss. Transfuse for hgb   <7.0    Analgesia: Per Orthopedic team.   DVT prophylaxis:- Per orthopedic team  Activity per orthopedic team. RONI YOUNG.  Ok for ROM: limit internal   rotation to chest and external rotation to 30 degrees. Sling for   comfort.    Antibiotics and wound care as per primary team. IV antibiotics   while in the hospital. Follow-up aspirate cultures.     * Numbness R fingers likely related to block. Recent surgery.   Monitor. RN to notify ortho for worsening symptom.     Encourage Incentive spirometry to prevent atelectasis   Minimize use of narcotics as able. Consider bowel regimen with   narcotics.       Thank you for letting us get involved in care of Ms Rivero  Please page with any questions.      Edwin Garcia MD  House Physician  Pager: [phone]    11/27/2018        CHIEF COMPLAINT: numbness R fingers.     HISTORY OF PRESENT ILLNESS: Obtained from the patient and chart   review including Pre op evaluation, procedure note.    48 year old year old female  with above discussed medical   problems s/p above mentioned orthopedic admitted on 11/27/2018    for post op care and monitoring  (for further details for   indication of surgery and operative note, please refer to Aakash Zimmer MD note). Medicine consulted to evaluate, recommend   and/or co manage medical co morbidities.   No documented hypotension, hypoxemia or other significant   complications intra or post operative.   Currently: Incisional Pain controlled. Denies any chest pain,   shortness of breath or LH or palpitations. Denies any nausea,   vomiting or pain abdomen. No fever or chills. Denies any dysuria.    Denies any new rash.   Numbness R fingers as above.   Medical issues as discussed above.   Denies any other medical concern.     PAST MEDICAL HISTORY:   Past Medical History:   Diagnosis Date     Acetaminophen overdose 3/24/2011     Anemia      Anesthesia complication     pt states has a history of heart stopping during anesthesia,     Arrhythmia      Cerebral infarction (H)      Cervical high risk HPV (human papillomavirus) test positive   02/22/2017    type 18 &  "other HR HPV     Chronic infection     MRSA     Chronic pain 3/24/2011     Degenerative joint disease      Endometriosis      Fibromyalgia      Gastro-oesophageal reflux disease      Heart murmur      History of blood transfusion      Hypothyroidism      Immune disorder (H)      Learning disability      Malignant neoplasm (H)      Migraine      MRSA (methicillin resistant staph aureus) culture positive      Neck injuries      Opioid type dependence (H)      Other chronic pain      PONV (postoperative nausea and vomiting)     states \"flatlines\"during anesthesia     RA (rheumatoid arthritis) (H)      Renal stone      Scoliosis      Stomach ulcer     history       PAST SURGICAL HISTORY:   Past Surgical History:   Procedure Laterality Date     ARTHRODESIS FOOT  5/23/2012    Procedure:ARTHRODESIS FOOT; Right Subtalar andTaloNavicular    Fusion  ; Surgeon:CHRIS HENDRICKS; [facility]     ARTHRODESIS FOOT Left 4/22/2015    Procedure: ARTHRODESIS FOOT;  Surgeon: Chris Hendricks MD;  [facility]     ARTHROPLASTY ELBOW Right 9/30/2015    Procedure: ARTHROPLASTY ELBOW;  Surgeon: Carrol Gaspar MD;  [facility]     ARTHROPLASTY KNEE Right      ARTHROPLASTY WRIST      x2 Lt wrist replacement.     ARTHROTOMY ELBOW Right 6/18/2015    Procedure: ARTHROTOMY ELBOW;  Surgeon: Carrol Gaspar MD;  [facility]     C ANESTH,DX ARTHROSCOPIC PROC KNEE JOINT  10/24/2007    right total knee arthroplasty     C SHLDR ARTHROSCOP,DIAGNOSTIC  12/17/2008    left total shoulder arthroplasty by Dr. Law     C SHOULDER SURG PROC UNLISTED       C TOTAL KNEE ARTHROPLASTY  1/18/12    Left     CYSTOSCOPY  02/06/2009    1.cystoscopy.2.bilateral ureteroscopy.3.basketing of stone   fragments from the right kidney.4.bilateral double-J ureteral   stent placement.5.ann catheter placement.6.fluoroscopy and   intraoperative interpretation of images.     CYSTOSCOPY  01/08/2007    1. cystoscopy and left " stent removal.2.left ureteroscopy     DECOMPRESSION CUBITAL TUNNEL  6/6/2014    Procedure: DECOMPRESSION CUBITAL TUNNEL;  Surgeon: Janelle Coates MD;  [facility]     ENDOSCOPY  03/31/2005    upper GI endoscopy-[facility]     ESOPHAGOSCOPY, GASTROSCOPY, DUODENOSCOPY (EGD), COMBINED    3/17/2014    Procedure: COMBINED ESOPHAGOSCOPY, GASTROSCOPY, DUODENOSCOPY   (EGD), BIOPSY SINGLE OR MULTIPLE;;  Surgeon: Duane, William Charles, MD;  Location: [facility] OR     FUSION CERVICAL POSTERIOR ONE LEVEL  11/18/2013    Procedure: FUSION CERVICAL POSTERIOR ONE LEVEL;  Cervical 1-2   Posterior Cervical Fusion (Harms Procedure);  Surgeon: Carrol Gunn MD;  [facility]     GYN SURGERY       INJECT MAJOR JOINT / BURSA Left 1/5/2017    Procedure: INJECT MAJOR JOINT / BURSA;  Surgeon: Fredy Patel DO;  [facility]     INJECT STEROID (LOCATION) Left 6/18/2015    Procedure: INJECT STEROID (LOCATION);  Surgeon: Carrol Gaspar MD;  [facility]     NECK SURGERY       REMOVE FOREIGN BODY UPPER EXTREMITY Right 3/2/2017    Procedure: REMOVE FOREIGN BODY UPPER EXTREMITY;  Surgeon: Carrol Gaspar MD;  [facility] OR     RESECT BONE UPPER EXTREMITY Left 4/27/2017    Procedure: RESECT BONE UPPER EXTREMITY;  Left Radial Head   Resection;  Surgeon: Carrol Gaspar MD;  [facility]: [facility]   OR     ROTATOR CUFF REPAIR RT/LT  10/19/2005    1.left distal clavicle excision.2.acromioplasty.3.coracoacromial   ligament resection.4.rotator cuff exploration       FH: reviewed.     Family History   Problem Relation Age of Onset     Diabetes Mother      Hypertension Mother      Allergies Mother      Alcohol/Drug Mother      LIVER CIRROSIS     Blood Disease Mother      B12 DEF     Neurologic Disorder Mother      Epilepsy     Osteoporosis Mother      Cerebrovascular Disease Maternal Grandmother      Arthritis Maternal Grandmother      RHEUMATOID     Cancer Maternal  Grandmother      Thyroid Disease Maternal Grandmother      Osteoporosis Maternal Grandmother      HEART DISEASE Maternal Grandmother      Depression Maternal Grandmother      Neurologic Disorder Maternal Grandmother      MIGRAINES     Anxiety Disorder Maternal Grandmother      Diabetes Maternal Grandmother      Migraines Maternal Grandmother      Deep Vein Thrombosis Maternal Grandmother      Cerebrovascular Disease Maternal Grandfather      Cancer Maternal Grandfather      Mental Illness Maternal Grandfather      Cerebrovascular Disease Paternal Grandmother      Arthritis Paternal Grandmother      RHEUMATOID     Cancer Paternal Grandmother      Osteoporosis Paternal Grandmother      HEART DISEASE Paternal Grandmother      Depression Paternal Grandmother      Neurologic Disorder Paternal Grandmother      MIGRAINES     Thyroid Disease Paternal Grandmother      Cerebrovascular Disease Paternal Grandfather      Cancer Paternal Grandfather      HEART DISEASE Brother      MURMUR     Asthma Son      Endocrine Disease Son      Neurologic Disorder Father      epilepsy     Seizure Disorder Father      Hypertension Father      Skin Cancer Father      Anxiety Disorder Father      Mental Illness Father      Hyperlipidemia Father      Neurologic Disorder Daughter      MIGRAINES     Arthritis Daughter      JR     Hypertension Son      LUNG DISEASE Brother      Anxiety Disorder Son      Asthma Son      Hypertension Son      Migraines Son      Spine Problems Son      Mental Illness Brother      Migraines Brother      Cardiac Sudden Death Brother      Spine Problems Brother      Glaucoma No family hx of      Macular Degeneration No family hx of      Unknown/Adopted No family hx of      Anesthesia Reaction No family hx of      Other Cancer No family hx of      Rheumatoid Arthritis No family hx of      Bone Cancer No family hx of      Low Back Problems No family hx of         SH: reviewed.     Social History     Social History      Marital status: Single     Spouse name: N/A     Number of children: N/A     Years of education: N/A     Social History Main Topics     Smoking status: Former Smoker     Packs/day: 0.10     Years: 10.00     Types: Cigarettes     Start date: 8/6/1983     Smokeless tobacco: Never Used      Comment: Quit 6 weeks ago     Alcohol use No     Drug use: No     Sexual activity: No     Other Topics Concern     None     Social History Narrative       ALLERGIES:   Allergies   Allergen Reactions     Morphine Swelling     Gabapentin Other (See Comments)     Pins,needles, stabbing aching at once  Pins,needles, stabbing aching at once  Numbness and Tingling     Ibuprofen      Doesn't take due to gastric ulcer     Sulfa Drugs      unknown     Tylenol [Acetaminophen]      Pt. States that she has Liver problems  Tylenol 3     Erythromycin Nausea     Vomiting      Penicillins Rash     Prednisone Palpitations     Heart racing         HOME MEDICATIONS:     Prior to Admission medications    Medication Sig Start Date End Date Taking? Authorizing Provider   Celecoxib (CELEBREX PO) Take 200 mg by mouth 3 times daily   Yes   Reported, Patient   ClonazePAM (KLONOPIN PO) Take 0.5 mg by mouth 3 times daily      Yes Reported, Patient   Cyanocobalamin (VITAMIN B-12 PO) Take 4 tablets by mouth every   morning    Yes Reported, Patient   leflunomide (ARAVA) 20 MG tablet Take 20 mg by mouth every   morning Reported on 3/28/2017   Yes Reported, Patient   omeprazole (PRILOSEC) 40 MG capsule TAKE 1 CAPSULE (40 MG) BY   MOUTH DAILY TAKE 30-60 MINUTES BEFORE A MEAL.  Patient taking differently: TAKE 1 CAPSULE (40 MG) BID PRN   9/12/18  Yes Mika Zamora MD   Botulinum Toxin Type A (BOTOX) 200 UNITS SOLR Inject 200 Units as   directed every 3 months  Patient taking differently: Inject 200 Units as directed every 3   months LAST INJECT WAS 09/2018 12/27/16   Fredy Patel DO [facility] SENNA 8.6 MG tablet TAKE 2 TABLETS BY MOUTH 2 TIMES  "DAILY   11/20/17   Mika Zamora MD   ENBREL SURECLICK 50 MG/ML autoinjector Inject 50 mg Subcutaneous   twice a week On hold for surgery since 09/17/2018 3/29/17     Reported, Patient   meclizine (ANTIVERT) 25 MG tablet Take 1 tablet (25 mg) by mouth   every 6 hours as needed for dizziness  Patient taking differently: Take 25 mg by mouth as needed for   dizziness  7/27/18   Ángel Garrido MD   OnabotulinumtoxinA (BOTOX IJ) Inject 200 Units as directed Total   dose 200 units  Administered 190 units  Unavoidable waste 30   units  Lot # /[id number] with Expiration Date:  12/2020  ND[facility]   [address]-97    Reported, Patient   OnabotulinumtoxinA (BOTOX IJ) Inject 170 Units as directed Lot #   /[id number] with Expiration Date:  10/2020    Reported, Patient       CURRENT MEDICATIONS:    Current Facility-Administered Medications   Medication     [START ON 11/30/2018] acetaminophen (TYLENOL) tablet 650 mg     acetaminophen (TYLENOL) tablet 975 mg     bupivacaine liposome (EXPAREL) LONG ACTING injection was   administered into the infiltration site to produce postsurgical   analgesia. Duration of action is up to 72 hours, and other   \"ana\" medications should not be given for 96 hours with the   exception of the lidocaine 5% patch (LIDODERM) and the lidocaine   10mg in potassium infusions. This entry is for INFORMATION ONLY.     ceFAZolin (ANCEF) 1 g vial to attach to  ml bag for ADULT   or 50 ml bag for PEDS     clonazePAM (klonoPIN) tablet 0.5 mg     HYDROmorphone (DILAUDID) tablet 2-4 mg     HYDROmorphone (PF) (DILAUDID) injection 0.4-0.8 mg     lactated ringers infusion     lidocaine (LMX4) cream     lidocaine 1 % 1 mL     meperidine (DEMEROL) injection 25 mg     naloxone (NARCAN) injection 0.1-0.4 mg     naloxone (NARCAN) injection 0.1-0.4 mg     [START ON 11/28/2018] omeprazole (priLOSEC) CR capsule 40 mg     ondansetron (ZOFRAN-ODT) ODT tab 4 mg    Or     ondansetron (ZOFRAN) injection 4 " "mg     prochlorperazine (COMPAZINE) injection 10 mg    Or     prochlorperazine (COMPAZINE) tablet 10 mg     senna-docusate (SENOKOT-S/PERICOLACE) 8.6-50 MG per tablet 1   tablet    Or     senna-docusate (SENOKOT-S/PERICOLACE) 8.6-50 MG per tablet 2   tablet     sodium chloride (PF) 0.9% PF flush 3 mL     sodium chloride (PF) 0.9% PF flush 3 mL     Facility-Administered Medications Ordered in Other Encounters   Medication     bupivacaine 0.5 % -  EPINEPHrine 1:200,000 injection     mepivacaine (PF) (CARBOCAINE) 2 % injection         ROS: 10 point ROS neg other than the symptoms noted above in the   HPI.    PHYSICAL EXAMINATION:     BP 97/40  Temp 95.7  F (35.4  C) (Oral)  Resp 22  Ht 1.676 m   (5' 6\")  Wt 65.6 kg (144 lb 10 oz)  LMP  (LMP Unknown)  SpO2   97%  BMI 23.34 kg/m2  Temp (24hrs), Av.4  F (36.3  C), Min:95.7  F (35.4  C),   Max:98.1  F (36.7  C)      BMI= Body mass index is 23.34 kg/(m^2).      Intake/Output Summary (Last 24 hours) at 18 1939  Last data filed at 18 1815   Gross per 24 hour   Intake             2250 ml   Output              335 ml   Net             1915 ml       General: Alert, interactive, NAD.   HEENT: AT/NC. PERRLA. Anicteric.Moist MM.    Neck: Supple. No JVD. No Lymphadenopathy.  Heart/CVS: Normal S1 and S2. Regular.    Chest/Respi: Non labored breathing. CTA BL.   Abdomen/GI: Soft, non distended, non tender. No g/r/r.   Extremities/MSK: Distal pulses 2+, well perfused. R arm: sling,   dressing. Distally finger tips warm. Good cap refill. Numbness+.   Rest per ortho.   Neuro: Alert and oriented x4. Cranial nerves II-XII are grossly   intact.    Skin:  No new rash over exposed areas.       LABORATORY DATA: reviewed.     Recent Results (from the past 24 hour(s))   Glucose by meter    Collection Time: 18  8:36 AM   Result Value Ref Range    Glucose 117 (H) 70 - 99 mg/dL   Hemoglobin    Collection Time: 18  9:27 AM   Result Value Ref Range    Hemoglobin " 12.6 11.7 - 15.7 g/dL   ABO/Rh type and screen    Collection Time: 11/27/18  9:27 AM   Result Value Ref Range    ABO A     RH(D) Pos     Antibody Screen Neg     Test Valid Only At          [facility]    Specimen Expires 11/30/2018    HCG qualitative Blood    Collection Time: 11/27/18  9:27 AM   Result Value Ref Range    HCG Qualitative Serum Negative NEG^Negative   Anaerobic bacterial culture    Collection Time: 11/27/18 11:30 AM   Result Value Ref Range    Specimen Description Right Elbow Fluid     Special Requests Received in anaerobic tubes.     Culture Micro PENDING    Cell count with differential fluid    Collection Time: 11/27/18 11:30 AM   Result Value Ref Range    Body Fluid Analysis Source Right Elbow     % Neutrophils Fluid 46 %    % Lymphocytes Fluid 16 %    % Mono/Macro Fluid 37 %    % Eosinophils Fluid 1 %    Color Fluid Red     Appearance Fluid Cloudy     WBC Fluid 1510 /uL   Fluid Culture Aerobic Bacterial    Collection Time: 11/27/18 11:30 AM   Result Value Ref Range    Specimen Description Right Elbow Fluid     Culture Micro PENDING        Recent Results (from the past 24 hour(s))   POC US Guidance Needle Placement    Impression    Right Supraclavicular block           Edwin Garcia MD   CBC with platelets   Result Value Ref Range    WBC 7.2 4.0 - 11.0 10e9/L    RBC Count 3.56 (L) 3.8 - 5.2 10e12/L    Hemoglobin 10.3 (L) 11.7 - 15.7 g/dL    Hematocrit 32.5 (L) 35.0 - 47.0 %    MCV 91 78 - 100 fl    MCH 28.9 26.5 - 33.0 pg    MCHC 31.7 31.5 - 36.5 g/dL    RDW 14.3 10.0 - 15.0 %    Platelet Count 147 (L) 150 - 450 10e9/L   Basic metabolic panel   Result Value Ref Range    Sodium 139 133 - 144 mmol/L    Potassium 3.5 3.4 - 5.3 mmol/L    Chloride 107 94 - 109 mmol/L    Carbon Dioxide 26 20 - 32 mmol/L    Anion Gap 6 3 - 14 mmol/L    Glucose 96 70 - 99 mg/dL    Urea Nitrogen 7 7 - 30 mg/dL    Creatinine 0.82 0.52 - 1.04 mg/dL    GFR Estimate 74 >60  mL/min/1.7m2    GFR Estimate If Black 90 >60 mL/min/1.7m2    Calcium 7.6 (L) 8.5 - 10.1 mg/dL     *Note: Due to a large number of results and/or encounters for the requested time period, some results have not been displayed. A complete set of results can be found in Results Review.              Assessment and Plan:   1)  S/P complex orthopedic procedure right elbow, as above.  Pain control issue earlier improved with transition to po oxycodone and continued IV dilaudid.  Tolerating.  No opiates pre op.  Cultures neg to date.   2)  Acute blood loss anemia, adequate.   3)  Low grade fever likely related to #1.  Check UA/UC with voiding symptoms.  4)  RA on Arava (not a biologic).   5)  H/o bleeding PUD.  Basis for intermittent upper abdominal discomfort unclear.  Potentially acid reflux.  6)  GERD controlled on omeprazole.   7)  Anxiety on klonopin prn.   8)  H/o cerebral infarction.  9)  Chronic pain, fibromyalgia.     PLAN:  1)  Review meds, orders.  OK to continue Arava.  Increase omeprazole to BID.  Continue gentle IV fluids with emesis.  2)  Continue present pain regimen.  3)  IS, bowel meds, mechanical DVT prophylaxis, mobilize.  4)  Trend labs.  UA/UC.  5)  Monitor clinically.   Disposition Plan   Expected discharge in 2-3 days to prior living arrangement once pain controlled, mobilizing. .     Entered: Tim Davis 11/28/2018, 4:10 PM              Attestation:  I have reviewed today's vital signs, notes, medications, labs and imaging.     Tim Davis MD

## 2018-11-28 NOTE — PROGRESS NOTES
"Orthopaedic Surgery Progress Note   November 28, 2018    Subjective: No acute events overnight. Feels block has worn off and she is having intermittent pain. Thinks medications are helping. No nausea or vomiting. Saleh still in place. +Flatus, no BM. Denies fever or chills, CP, SOB, motor dysfunction or weakness. Feels numbness and tingling has improved in right hand.     Objective: BP 97/64  Temp 96.9  F (36.1  C) (Oral)  Resp 11  Ht 1.676 m (5' 6\")  Wt 65.6 kg (144 lb 10 oz)  LMP  (LMP Unknown)  SpO2 96%  BMI 23.34 kg/m2    Drain: 75/35 ml output last 2 shifts    General: NAD, alert and oriented, cooperative with exam. Anxious.    Cardio: RRR, extremities wwp.   Respiratory: Non-labored breathing.  MSK: RUE: dressing c/d/i without strikethrough. Dressing with serosanguinous output. Fires EPL/FPL/IO/EDC all with 4-5 strength. SILT m/r/u nerve distributions, slightly decreased in ulnar nerve at baseline per patient. Palpable radial pulse, fingers wwp with brisk cap refill.     Labs:  Hemoglobin   Date Value Ref Range Status   11/28/2018 10.3 (L) 11.7 - 15.7 g/dL Final   ]  All cultures:    Recent Labs  Lab 11/27/18  1241 11/27/18  1130   CULT Culture negative monitoring continues Culture negative monitoring continues  PENDING       Assessment and Plan: Kalyn Rivero is a 48 year old female with PMH including rheumatoid arthritis, GERD, and anxiety with multiple previous surgeries on the right elbow now s/p aspiration right elbow, explant of right elbow failed hardware, right distal humerus replacement and revision right total elbow arthroplasty on 11/27/18 with Dr. Zimmer.     Ortho Primary  Activity: Up with assist until independent.  Weight bearing status: NWB RUE. Range of motion right elbow flexion 0-90 deg, abduction 0-90 degrees, internal rotation to abdomen, external rotation to 30 degrees.   Pain management: Transition from IV to PO as tolerated. Will discuss resumption of celebrex and " leflunomide with Dr. Zimmer today.   Antibiotics: Ancef continue while inpatient and following surgical cultures.  Diet: Begin with clear fluids and progress diet as tolerated.   DVT prophylaxis: mechanical   Imaging: in PACU, will discuss need to repeat with Dr. Zimmer today.  Labs: Hgb POD#1-2.  Dressings: dressing in place until follow up  Elevation: Elevate RUE on pillows to keep above the level of the heart as much as possible.   Drains: Document output per shift, discontinue when <30cc/shift.   Physical Therapy/Occupational Therapy: Eval and treat.  Cultures: Pending, follow culture results closely.    Consults: Hospitalist, [facility] cares.  Follow-up: Clinic with Dr. Zimmer in 2 weeks   Disposition: Pending progress with therapies, pain control on orals, and medical stability, anticipate discharge to home on POD #2-3.    Paul Bull MD  Orthopaedic Surgery Resident, PGY-4  Pager: [phone]     For questions about this patient during the day, please attempt to contact me at my pager ([phone]) prior to contacting the Orthopaedic Surgery resident on call. Thank you!

## 2018-11-28 NOTE — CONSULTS
HOSPITALIST CONSULTATION     REQUESTING PHYSICIAN: Aakash Zimmer MD    REASON FOR CONSULTATION: Evaluation, Recommendations and co management of Medical Comorbidities.     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        PMH, Pre Op eval reviewed. At current doing well.   Reports numbness of the fingers of surgical hand. Otherwise no other concern.     Patient's history is complex with rheumatoid arthritis with many orthopedic surgeries. She is followed by Rheumatology on Enbrel, Arava and Celebrex. She has widespread chronic pain and requires assistance from PCAs for care. She is treated for GERD and anxiety.     Pre op :     EKG: NSR.   Cardiac echo: 10/23/18  Global and regional left ventricular function is normal with an EF of 55-60%.  Global right ventricular function is normal.  No significant valvular abnormalities were noted.  Pulmonary artery systolic pressure is normal.  The inferior vena cava was normal in size with preserved respiratory variability.  No pericardial effusion is present.    # Rheumatoid arthritis s/p multiple orthopedic procedures. Is followed by Rheumatology outpatient. She has held  her Enbrel since September but continues to take Arava and Celebrex ( on hold now for surgery).   - defer timing of resuming her RA meds to ortho and Rheum team based on wound healing. Infection status.     # Former smoker. Quit  recently. Denies pulmonary symptoms. IS.     # History of heart murmur. No cardiac symptoms or meds.Echo: normal as above.     # GERD. Continue home Omeprazole.    # Anxiety. Clonazepam prn.   # Migraines with Botox injections every 3 months.         # Orthopedics: POD # 0  Hemodynamics: stable.   continue on IV fluids, until adequate PO.   Monitor hgb for anemia of acute blood loss. Transfuse for hgb <7.0    Analgesia: Per Orthopedic team.   DVT prophylaxis:- Per orthopedic team  Activity per orthopedic team. NWB RUE. Ok for ROM: limit internal rotation to chest and external rotation to 30 degrees. Sling for comfort.    Antibiotics and wound care as per primary team. IV antibiotics while in the hospital. Follow-up aspirate cultures.     * Numbness R fingers likely related to block. Recent surgery. Monitor. RN to notify ortho for worsening symptom.     Encourage Incentive spirometry to prevent atelectasis   Minimize use of narcotics as able. Consider bowel regimen with narcotics.       Thank you for letting us get involved in care of Ms Rivero  Please page with any questions.      Edwin Garcia MD  House Physician  Pager: [phone]    11/27/2018        CHIEF COMPLAINT: numbness R fingers.     HISTORY OF PRESENT ILLNESS: Obtained from the patient and chart review including Pre op evaluation, procedure note.    48 year old year old female  with above discussed medical problems s/p above mentioned orthopedic admitted on 11/27/2018  for post op care and monitoring  (for further details for indication of surgery and operative note, please refer to Aakash Zimmer MD note). Medicine consulted to evaluate, recommend and/or co manage medical co morbidities.   No documented hypotension, hypoxemia  "or other significant complications intra or post operative.   Currently: Incisional Pain controlled. Denies any chest pain, shortness of breath or LH or palpitations. Denies any nausea, vomiting or pain abdomen. No fever or chills. Denies any dysuria.  Denies any new rash.   Numbness R fingers as above.   Medical issues as discussed above.   Denies any other medical concern.     PAST MEDICAL HISTORY:   Past Medical History:   Diagnosis Date     Acetaminophen overdose 3/24/2011     Anemia      Anesthesia complication     pt states has a history of heart stopping during anesthesia,     Arrhythmia      Cerebral infarction (H)      Cervical high risk HPV (human papillomavirus) test positive 02/22/2017    type 18 & other HR HPV     Chronic infection     MRSA     Chronic pain 3/24/2011     Degenerative joint disease      Endometriosis      Fibromyalgia      Gastro-oesophageal reflux disease      Heart murmur      History of blood transfusion      Hypothyroidism      Immune disorder (H)      Learning disability      Malignant neoplasm (H)      Migraine      MRSA (methicillin resistant staph aureus) culture positive      Neck injuries      Opioid type dependence (H)      Other chronic pain      PONV (postoperative nausea and vomiting)     states \"flatlines\"during anesthesia     RA (rheumatoid arthritis) (H)      Renal stone      Scoliosis      Stomach ulcer     history       PAST SURGICAL HISTORY:   Past Surgical History:   Procedure Laterality Date     ARTHRODESIS FOOT  5/23/2012    Procedure:ARTHRODESIS FOOT; Right Subtalar andTaloNavicular  Fusion  ; Surgeon:CHRIS ZHOU; [facility]     ARTHRODESIS FOOT Left 4/22/2015    Procedure: ARTHRODESIS FOOT;  Surgeon: Chris Zhou MD;  [facility]     ARTHROPLASTY ELBOW Right 9/30/2015    Procedure: ARTHROPLASTY ELBOW;  Surgeon: Carrol Gaspar MD;  [facility]     ARTHROPLASTY KNEE Right      ARTHROPLASTY WRIST      x2 Lt wrist " replacement.     ARTHROTOMY ELBOW Right 6/18/2015    Procedure: ARTHROTOMY ELBOW;  Surgeon: Carrol Gaspar MD;  [facility]     C ANESTH,DX ARTHROSCOPIC PROC KNEE JOINT  10/24/2007    right total knee arthroplasty     C SHLDR ARTHROSCOP,DIAGNOSTIC  12/17/2008    left total shoulder arthroplasty by Dr. Thanh TROUNG SHOULDER SURG PROC UNLISTED       C TOTAL KNEE ARTHROPLASTY  1/18/12    Left     CYSTOSCOPY  02/06/2009    1.cystoscopy.2.bilateral ureteroscopy.3.basketing of stone fragments from the right kidney.4.bilateral double-J ureteral stent placement.5.ann catheter placement.6.fluoroscopy and intraoperative interpretation of images.     CYSTOSCOPY  01/08/2007    1. cystoscopy and left stent removal.2.left ureteroscopy     DECOMPRESSION CUBITAL TUNNEL  6/6/2014    Procedure: DECOMPRESSION CUBITAL TUNNEL;  Surgeon: Janelle Coates MD;  [facility]     ENDOSCOPY  03/31/2005    [facility] endoscopy-[facility]     ESOPHAGOSCOPY, GASTROSCOPY, DUODENOSCOPY (EGD), COMBINED  3/17/2014    Procedure: COMBINED ESOPHAGOSCOPY, GASTROSCOPY, DUODENOSCOPY (EGD), BIOPSY SINGLE OR MULTIPLE;;  Surgeon: Duane, William Charles, MD;  Location: [facility] OR     FUSION CERVICAL POSTERIOR ONE LEVEL  11/18/2013    Procedure: FUSION CERVICAL POSTERIOR ONE LEVEL;  Cervical 1-2 Posterior Cervical Fusion (Harms Procedure);  Surgeon: Carrol Gunn MD;  [facility]: [facility]     GYN SURGERY       INJECT MAJOR JOINT / BURSA Left 1/5/2017    Procedure: INJECT MAJOR JOINT / BURSA;  Surgeon: Fredy Patel DO;  Location:  OR     INJECT STEROID (LOCATION) Left 6/18/2015    Procedure: INJECT STEROID (LOCATION);  Surgeon: Carrol Gaspar MD;  Location:  OR     NECK SURGERY       REMOVE FOREIGN BODY UPPER EXTREMITY Right 3/2/2017    Procedure: REMOVE FOREIGN BODY UPPER EXTREMITY;  Surgeon: Carrol Gaspar MD;  Location:  OR     RESECT BONE UPPER EXTREMITY Left 4/27/2017    Procedure:  RESECT BONE UPPER EXTREMITY;  Left Radial Head Resection;  Surgeon: Carrol Gaspar MD;  [facility]     ROTATOR CUFF REPAIR RT/LT  10/19/2005    1.left distal clavicle excision.2.acromioplasty.3.coracoacromial ligament resection.4.rotator cuff exploration       FH: reviewed.     Family History   Problem Relation Age of Onset     Diabetes Mother      Hypertension Mother      Allergies Mother      Alcohol/Drug Mother      LIVER CIRROSIS     Blood Disease Mother      B12 DEF     Neurologic Disorder Mother      Epilepsy     Osteoporosis Mother      Cerebrovascular Disease Maternal Grandmother      Arthritis Maternal Grandmother      RHEUMATOID     Cancer Maternal Grandmother      Thyroid Disease Maternal Grandmother      Osteoporosis Maternal Grandmother      HEART DISEASE Maternal Grandmother      Depression Maternal Grandmother      Neurologic Disorder Maternal Grandmother      MIGRAINES     Anxiety Disorder Maternal Grandmother      Diabetes Maternal Grandmother      Migraines Maternal Grandmother      Deep Vein Thrombosis Maternal Grandmother      Cerebrovascular Disease Maternal Grandfather      Cancer Maternal Grandfather      Mental Illness Maternal Grandfather      Cerebrovascular Disease Paternal Grandmother      Arthritis Paternal Grandmother      RHEUMATOID     Cancer Paternal Grandmother      Osteoporosis Paternal Grandmother      HEART DISEASE Paternal Grandmother      Depression Paternal Grandmother      Neurologic Disorder Paternal Grandmother      MIGRAINES     Thyroid Disease Paternal Grandmother      Cerebrovascular Disease Paternal Grandfather      Cancer Paternal Grandfather      HEART DISEASE Brother      MURMUR     Asthma Son      Endocrine Disease Son      Neurologic Disorder Father      epilepsy     Seizure Disorder Father      Hypertension Father      Skin Cancer Father      Anxiety Disorder Father      Mental Illness Father      Hyperlipidemia Father      Neurologic Disorder  Daughter      MIGRAINES     Arthritis Daughter      JR     Hypertension Son      LUNG DISEASE Brother      Anxiety Disorder Son      Asthma Son      Hypertension Son      Migraines Son      Spine Problems Son      Mental Illness Brother      Migraines Brother      Cardiac Sudden Death Brother      Spine Problems Brother      Glaucoma No family hx of      Macular Degeneration No family hx of      Unknown/Adopted No family hx of      Anesthesia Reaction No family hx of      Other Cancer No family hx of      Rheumatoid Arthritis No family hx of      Bone Cancer No family hx of      Low Back Problems No family hx of         SH: reviewed.     Social History     Social History     Marital status: Single     Spouse name: N/A     Number of children: N/A     Years of education: N/A     Social History Main Topics     Smoking status: Former Smoker     Packs/day: 0.10     Years: 10.00     Types: Cigarettes     Start date: 8/6/1983     Smokeless tobacco: Never Used      Comment: Quit 6 weeks ago     Alcohol use No     Drug use: No     Sexual activity: No     Other Topics Concern     None     Social History Narrative       ALLERGIES:   Allergies   Allergen Reactions     Morphine Swelling     Gabapentin Other (See Comments)     Pins,needles, stabbing aching at once  Pins,needles, stabbing aching at once  Numbness and Tingling     Ibuprofen      Doesn't take due to gastric ulcer     Sulfa Drugs      unknown     Tylenol [Acetaminophen]      Pt. States that she has Liver problems  Tylenol 3     Erythromycin Nausea     Vomiting      Penicillins Rash     Prednisone Palpitations     Heart racing         HOME MEDICATIONS:     Prior to Admission medications    Medication Sig Start Date End Date Taking? Authorizing Provider   Celecoxib (CELEBREX PO) Take 200 mg by mouth 3 times daily   Yes Reported, Patient   ClonazePAM (KLONOPIN PO) Take 0.5 mg by mouth 3 times daily    Yes Reported, Patient   Cyanocobalamin (VITAMIN B-12 PO) Take 4  "tablets by mouth every morning    Yes Reported, Patient   leflunomide (ARAVA) 20 MG tablet Take 20 mg by mouth every morning Reported on 3/28/2017   Yes Reported, Patient   omeprazole (PRILOSEC) 40 MG capsule TAKE 1 CAPSULE (40 MG) BY MOUTH DAILY TAKE 30-60 MINUTES BEFORE A MEAL.  Patient taking differently: TAKE 1 CAPSULE (40 MG) BID PRN 9/12/18  Yes Mika Zamora MD   Botulinum Toxin Type A (BOTOX) 200 UNITS SOLR Inject 200 Units as directed every 3 months  Patient taking differently: Inject 200 Units as directed every 3 months LAST INJECT WAS 09/2018 12/27/16   Fredy Patel DO   [employer] SENNA 8.6 MG tablet TAKE 2 TABLETS BY MOUTH 2 TIMES DAILY 11/20/17   Mika Zamora MD   ENBREL SURECLICK 50 MG/ML autoinjector Inject 50 mg Subcutaneous twice a week On hold for surgery since 09/17/2018 3/29/17   Reported, Patient   meclizine (ANTIVERT) 25 MG tablet Take 1 tablet (25 mg) by mouth every 6 hours as needed for dizziness  Patient taking differently: Take 25 mg by mouth as needed for dizziness  7/27/18   Ángel Garrido MD   OnabotulinumtoxinA (BOTOX IJ) Inject 200 Units as directed Total dose 200 units  Administered 190 units  Unavoidable waste 30 units  Lot # /[id number] with Expiration Date:  12/2020  NDC [address]-72    Reported, Patient   OnabotulinumtoxinA (BOTOX IJ) Inject 170 Units as directed Lot # /[id number] with Expiration Date:  10/2020    Reported, Patient       CURRENT MEDICATIONS:    Current Facility-Administered Medications   Medication     [START ON 11/30/2018] acetaminophen (TYLENOL) tablet 650 mg     acetaminophen (TYLENOL) tablet 975 mg     bupivacaine liposome (EXPAREL) LONG ACTING injection was administered into the infiltration site to produce postsurgical analgesia. Duration of action is up to 72 hours, and other \"ana\" medications should not be given for 96 hours with the exception of the lidocaine 5% patch (LIDODERM) and the lidocaine 10mg in " "potassium infusions. This entry is for INFORMATION ONLY.     ceFAZolin (ANCEF) 1 g vial to attach to  ml bag for ADULT or 50 ml bag for PEDS     clonazePAM (klonoPIN) tablet 0.5 mg     HYDROmorphone (DILAUDID) tablet 2-4 mg     HYDROmorphone (PF) (DILAUDID) injection 0.4-0.8 mg     lactated ringers infusion     lidocaine (LMX4) cream     lidocaine 1 % 1 mL     meperidine (DEMEROL) injection 25 mg     naloxone (NARCAN) injection 0.1-0.4 mg     naloxone (NARCAN) injection 0.1-0.4 mg     [START ON 2018] omeprazole (priLOSEC) CR capsule 40 mg     ondansetron (ZOFRAN-ODT) ODT tab 4 mg    Or     ondansetron (ZOFRAN) injection 4 mg     prochlorperazine (COMPAZINE) injection 10 mg    Or     prochlorperazine (COMPAZINE) tablet 10 mg     senna-docusate (SENOKOT-S/PERICOLACE) 8.6-50 MG per tablet 1 tablet    Or     senna-docusate (SENOKOT-S/PERICOLACE) 8.6-50 MG per tablet 2 tablet     sodium chloride (PF) 0.9% PF flush 3 mL     sodium chloride (PF) 0.9% PF flush 3 mL     Facility-Administered Medications Ordered in Other Encounters   Medication     bupivacaine 0.5 % -  EPINEPHrine 1:200,000 injection     mepivacaine (PF) (CARBOCAINE) 2 % injection         ROS: 10 point ROS neg other than the symptoms noted above in the HPI.    PHYSICAL EXAMINATION:     BP 97/40  Temp 95.7  F (35.4  C) (Oral)  Resp 22  Ht 1.676 m (5' 6\")  Wt 65.6 kg (144 lb 10 oz)  LMP  (LMP Unknown)  SpO2 97%  BMI 23.34 kg/m2  Temp (24hrs), Av.4  F (36.3  C), Min:95.7  F (35.4  C), Max:98.1  F (36.7  C)      BMI= Body mass index is 23.34 kg/(m^2).      Intake/Output Summary (Last 24 hours) at 18 1939  Last data filed at 18 1815   Gross per 24 hour   Intake             2250 ml   Output              335 ml   Net             1915 ml       General: Alert, interactive, NAD.   HEENT: AT/NC. PERRLA. Anicteric.Moist MM.    Neck: Supple. No JVD. No Lymphadenopathy.  Heart/CVS: Normal S1 and S2. Regular.    Chest/Respi: Non labored " breathing. CTA BL.   Abdomen/GI: Soft, non distended, non tender. No g/r/r.   Extremities/MSK: Distal pulses 2+, well perfused. R arm: sling, dressing. Distally finger tips warm. Good cap refill. Numbness+. Rest per ortho.   Neuro: Alert and oriented x4. Cranial nerves II-XII are grossly intact.    Skin:  No new rash over exposed areas.       LABORATORY DATA: reviewed.     Recent Results (from the past 24 hour(s))   Glucose by meter    Collection Time: 11/27/18  8:36 AM   Result Value Ref Range    Glucose 117 (H) 70 - 99 mg/dL   Hemoglobin    Collection Time: 11/27/18  9:27 AM   Result Value Ref Range    Hemoglobin 12.6 11.7 - 15.7 g/dL   ABO/Rh type and screen    Collection Time: 11/27/18  9:27 AM   Result Value Ref Range    ABO A     RH(D) Pos     Antibody Screen Neg     Test Valid Only At          [facility]    Specimen Expires 11/30/2018    HCG qualitative Blood    Collection Time: 11/27/18  9:27 AM   Result Value Ref Range    HCG Qualitative Serum Negative NEG^Negative   Anaerobic bacterial culture    Collection Time: 11/27/18 11:30 AM   Result Value Ref Range    Specimen Description Right Elbow Fluid     Special Requests Received in anaerobic tubes.     Culture Micro PENDING    Cell count with differential fluid    Collection Time: 11/27/18 11:30 AM   Result Value Ref Range    Body Fluid Analysis Source Right Elbow     % Neutrophils Fluid 46 %    % Lymphocytes Fluid 16 %    % Mono/Macro Fluid 37 %    % Eosinophils Fluid 1 %    Color Fluid Red     Appearance Fluid Cloudy     WBC Fluid 1510 /uL   Fluid Culture Aerobic Bacterial    Collection Time: 11/27/18 11:30 AM   Result Value Ref Range    Specimen Description Right Elbow Fluid     Culture Micro PENDING        Recent Results (from the past 24 hour(s))   POC US Guidance Needle Placement    Impression    Right Supraclavicular block           Edwin Garcia MD

## 2018-11-28 NOTE — PROGRESS NOTES
" 11/28/18 0900   Quick Adds   Type of Visit Initial Occupational Therapy Evaluation   Living Environment   Lives With child(rk), adult   Living Arrangements house   Home Accessibility bed and bath on same level;tub/shower is not walk in  (has ramp)   Number of Stairs to Enter Home (ramp)   Transportation Available family or friend will provide   Living Environment Comment Tub/shower combo, extended tub bench and shower stool, grab bars, RTS   Self-Care   Usual Activity Tolerance moderate   Current Activity Tolerance fair   Regular Exercise yes   Activity/Exercise Type walking   Exercise Amount/Frequency other (see comments)  (occasionally)   Equipment Currently Used at Home raised toilet;shower chair;grab bar   Activity/Exercise/Self-Care Comment Pt reports receiving total assist for ADLs. Pt has PCA and nursing assist in the home.    Functional Level Prior   Ambulation 0-->independent   Transferring 0-->independent   Toileting 3-->assistive equipment and person   Bathing 3-->assistive equipment and person   Dressing 3-->assistive equipment and person   Eating 3-->assistive equipment and person   Communication 0-->understands/communicates without difficulty   Swallowing 2-->difficulty swallowing liquids   Cognition 0 - no cognition issues reported   Fall history within last six months yes   Number of times patient has fallen within last six months 10   Which of the above functional risks had a recent onset or change? ambulation   General Information   Onset of Illness/Injury or Date of Surgery - Date 11/27/18   Referring Physician Dr. Zimmer   Patient/Family Goals Statement \"I want to hold my grandchild.\"   Additional Occupational Profile Info/Pertinent History of Current Problem PMH including rheumatoid arthritis, GERD, and anxiety with multiple previous surgeries on the right elbow now s/p aspiration right elbow, explant of right elbow failed hardware, right distal humerus replacement and revision right total " elbow arthroplasty   Precautions/Limitations fall precautions;other (see comments)  (NWB, ROM restrictions-see notes)   Weight-Bearing Status - LUE full weight-bearing   Weight-Bearing Status - RUE nonweight-bearing   Weight-Bearing Status - LLE full weight-bearing   Weight-Bearing Status - RLE full weight-bearing   General Observations capno, drain   Cognitive Status Examination   Orientation orientation to person, place and time   Visual Perception   Visual Perception No deficits were identified   Sensory Examination   Sensory Comments No N/T noted   Pain Assessment   Patient Currently in Pain Yes, see Vital Sign flowsheet   Range of Motion (ROM)   ROM Comment R UE limited due to post op precautions   Strength   Strength Comments R UE limited due to post op precautions   Muscle Tone Assessment   Muscle Tone Quick Adds No deficits were identified   Coordination   Upper Extremity Coordination Right UE impaired   Functional Limitations Impaired ability to perform bilateral tasks   Mobility   Bed Mobility Comments Not assessed, patient refused OOB activity without meds (celebrex, abrava).   Transfer Skills   Transfer Comments Not assessed, patient refused OOB activity without meds (celebrex, abrava).   Transfer Skill: Bed to Chair/Chair to Bed   Level of [place]: Bed to Chair unable to perform   Transfer Skill: Sit to Stand   Level of [place]: Sit/Stand unable to perform   Transfer Skill: Toilet Transfer   Level of [place]: Toilet unable to perform   Bathing   Level of [place] dependent (less than 25% patients effort)   Upper Body Dressing   Level of [place]: Dress Upper Body dependent (less than 25% patients effort)   Lower Body Dressing   Level of [place]: Dress Lower Body dependent (less than 25% patients effort)   Toileting   Level of [place]: Toilet maximum assist (25% patients effort)   Grooming   Level of [place]: Grooming maximum assist (25% patients effort)  "  Eating/Self Feeding   Level of [place]: Eating moderate assist (50% patients effort)   Activities of Daily Living Analysis   Impairments Contributing to Impaired Activities of Daily Living pain;post surgical precautions;ROM decreased;strength decreased   General Therapy Interventions   Planned Therapy Interventions ADL retraining;home program guidelines;ROM   Clinical Impression   Criteria for Skilled Therapeutic Interventions Met yes, treatment indicated   OT Diagnosis Impaired ADLs and shoulder/elbow ROM   Influenced by the following impairments pain, post op precautions   Assessment of Occupational Performance 3-5 Performance Deficits   Identified Performance Deficits dressing, toileting, bathing, g/h   Clinical Decision Making (Complexity) Moderate complexity   Therapy Frequency 2 times/day  (decreasing to daily as appropriate)   Predicted Duration of Therapy Intervention (days/wks) 3-4 days   Anticipated Equipment Needs at Discharge raised toilet seat;shower chair   Anticipated Discharge Disposition Home with Assist;Home with Home Therapy   Risks and Benefits of Treatment have been explained. Yes   Patient, Family & other staff in agreement with plan of care Yes   [facility] TM \"6 Clicks\"   2016, Trustees of [facility], under license to [employer].  All rights reserved.   6 Clicks Short Forms Daily Activity Inpatient Short Form   [facility]  \"6 Clicks\" Daily Activity Inpatient Short Form   1. Putting on and taking off regular lower body clothing? 1 - Total   2. Bathing (including washing, rinsing, drying)? 1 - Total   3. Toileting, which includes using toilet, bedpan or urinal? 2 - A Lot   4. Putting on and taking off regular upper body clothing? 1 - Total   5. Taking care of personal grooming such as brushing teeth? 2 - A Lot   6. Eating meals? 3 - A Little   Daily Activity Raw Score (Score out of 24.Lower scores equate to lower levels of function) 10   Total " Evaluation Time   Total Evaluation Time (Minutes) 8

## 2018-11-28 NOTE — PLAN OF CARE
"Assisted her up to the bedside commode, very impulsive in her movements. Her AMALIA bulb opened up while she was getting up to the commode, her linens were changed due to the blood spillage. Wailing \" I am in pain!\" given 0.8 mg of dilaudid. Attempted to give oral dilaudid 4 mg, she refused, attempted to  make a plan for pain control, explained she can have dilaudid IV every 2 hours, and it would help her pain to take the oral dilaudid in between taking the IV. \" No I do not want to take the oral dilaudid!\"  \" If I take it then I am told I cannot have the IV dilaudid\"  \" I want to talk to my doctor, my arm is hurting.\" call placed to Mojgan Golden, she said she would page her MD and have him come and see her.   "

## 2018-11-28 NOTE — PLAN OF CARE
Problem: Patient Care Overview  Goal: Plan of Care/Patient Progress Review  Outcome: Improving    VS: VSS   O2: 96% on room air   Output: Saleh removed, due to void   Last BM: 11/27/18, before surgery per patient   Activity: Patient hasn't gotten out of bed, patients states she cannot walk without celbrex and arava   Skin: Intact except incision to right arm, also has tattoos and scars   Pain: Took 2mg dilaudid PO and 0.8mg IV dilaudid for acute pain   CMS: Patient reports itching sensation as nerve block was wearing off. Has mentioned that she has numbness baseline in extremities.   Dressing: CDI   Diet: Regular   LDA: PIV to left wrist infusing LR at 50ml/hr, AMALIA output 35ml this shift   Equipment: Sling, CAPNO, IV pole, PCDs   Plan: TBD   Additional Info:

## 2018-11-28 NOTE — PLAN OF CARE
Problem: Surgery Nonspecified (Adult)  Goal: Signs and Symptoms of Listed Potential Problems Will be Absent, Minimized or Managed (Surgery Nonspecified)  Signs and symptoms of listed potential problems will be absent, minimized or managed by discharge/transition of care (reference Surgery Nonspecified (Adult) CPG).  Outcome: No Change    VS: Post op BP's soft trending WDL - Afebrile   O2: CAPNO stable, Stable on room air mid to upper 90's   Output: Voiding adequate amounts via ann catheter   Last BM: 11.27.18 prior to surgery    Activity: NWB RUE. - Pt arrived to unit 1930, declined ambulation and sitting at edge of bed   Skin: Intact ex incisions, tattoos, scars   Pain: Denies pain at this time - had post operative nerve block placed in PACU   CMS: Residual numbness from nerve block - pt reports baseline tingling in all extremities - pt was sedated when answering this question.   Dressing: CDI    Diet: Regular diet appetite was poor. Encourage oral fluids   LDA: PIV is infusing LR at 50mL/hr   Equipment: Sling, CAPNO, Ann, PCD's, IV pole   Plan: Manage pain and monitor progress with therapies.    Additional Info: Set expectation with patient ann would be removed early AM.  Internal med consult pending  Combined general with Supraclavicular block  EBL 100mL  Evening medication held - pt sedated/sleeping

## 2018-11-28 NOTE — PLAN OF CARE
Problem: Patient Care Overview  Goal: Plan of Care/Patient Progress Review  Discharge Planner OT   Patient plan for discharge: Home with assist from PCA and nursing  Current status: Patient educated on role of OT/POC. Educated on HEP and ROM goals, completed a few reps of slight PROM elbow flex/ext. and AROM pron/sup, provided handout. Encouraged patient to complete wrist exercises while elbow pain is managed. Patient's elbow resting in 90* flexion and unable to move much at all. Unable to locate sling in room, pt does not know where it is. Patient declined any OOB activity in AM and PM session d/t pain and being without arthritis meds.  Barriers to return to prior living situation: pain, post op precautions,   Recommendations for discharge: Home with assist from PCA and nursing  Rationale for recommendations: Continue OT daily to maximize safety and IND with HEP and ADLs/functional mobility.        Entered by: Hernan Nuñez 11/28/2018 9:40 AM

## 2018-11-29 ENCOUNTER — APPOINTMENT (OUTPATIENT)
Dept: OCCUPATIONAL THERAPY | Facility: CLINIC | Age: 48
DRG: 483 | End: 2018-11-29
Attending: ORTHOPAEDIC SURGERY
Payer: COMMERCIAL

## 2018-11-29 LAB
ANION GAP SERPL CALCULATED.3IONS-SCNC: 7 MMOL/L (ref 3–14)
BACTERIA SPEC CULT: NO GROWTH
BUN SERPL-MCNC: 5 MG/DL (ref 7–30)
CALCIUM SERPL-MCNC: 7.8 MG/DL (ref 8.5–10.1)
CHLORIDE SERPL-SCNC: 105 MMOL/L (ref 94–109)
CO2 SERPL-SCNC: 25 MMOL/L (ref 20–32)
CREAT SERPL-MCNC: 0.78 MG/DL (ref 0.52–1.04)
ERYTHROCYTE [DISTWIDTH] IN BLOOD BY AUTOMATED COUNT: 14.3 % (ref 10–15)
GFR SERPL CREATININE-BSD FRML MDRD: 79 ML/MIN/1.7M2
GLUCOSE SERPL-MCNC: 93 MG/DL (ref 70–99)
HCT VFR BLD AUTO: 31.6 % (ref 35–47)
HGB BLD-MCNC: 10 G/DL (ref 11.7–15.7)
Lab: NORMAL
MAGNESIUM SERPL-MCNC: 1.5 MG/DL (ref 1.6–2.3)
MCH RBC QN AUTO: 28.7 PG (ref 26.5–33)
MCHC RBC AUTO-ENTMCNC: 31.6 G/DL (ref 31.5–36.5)
MCV RBC AUTO: 91 FL (ref 78–100)
PLATELET # BLD AUTO: 141 10E9/L (ref 150–450)
POTASSIUM SERPL-SCNC: 3.4 MMOL/L (ref 3.4–5.3)
RBC # BLD AUTO: 3.49 10E12/L (ref 3.8–5.2)
SODIUM SERPL-SCNC: 137 MMOL/L (ref 133–144)
SPECIMEN SOURCE: NORMAL
WBC # BLD AUTO: 7.4 10E9/L (ref 4–11)

## 2018-11-29 PROCEDURE — 97535 SELF CARE MNGMENT TRAINING: CPT | Mod: GO

## 2018-11-29 PROCEDURE — 83735 ASSAY OF MAGNESIUM: CPT | Performed by: INTERNAL MEDICINE

## 2018-11-29 PROCEDURE — 85018 HEMOGLOBIN: CPT | Performed by: INTERNAL MEDICINE

## 2018-11-29 PROCEDURE — 97110 THERAPEUTIC EXERCISES: CPT | Mod: GO

## 2018-11-29 PROCEDURE — 80048 BASIC METABOLIC PNL TOTAL CA: CPT | Performed by: INTERNAL MEDICINE

## 2018-11-29 PROCEDURE — 25000132 ZZH RX MED GY IP 250 OP 250 PS 637: Performed by: INTERNAL MEDICINE

## 2018-11-29 PROCEDURE — 25000125 ZZHC RX 250: Performed by: INTERNAL MEDICINE

## 2018-11-29 PROCEDURE — 99207 ZZC CDG-MDM COMPONENT: MEETS MODERATE - UP CODED: CPT | Performed by: INTERNAL MEDICINE

## 2018-11-29 PROCEDURE — 40000133 ZZH STATISTIC OT WARD VISIT

## 2018-11-29 PROCEDURE — 25000128 H RX IP 250 OP 636: Performed by: PHYSICIAN ASSISTANT

## 2018-11-29 PROCEDURE — 85027 COMPLETE CBC AUTOMATED: CPT | Performed by: INTERNAL MEDICINE

## 2018-11-29 PROCEDURE — 25000132 ZZH RX MED GY IP 250 OP 250 PS 637: Performed by: PHYSICIAN ASSISTANT

## 2018-11-29 PROCEDURE — 99232 SBSQ HOSP IP/OBS MODERATE 35: CPT | Performed by: INTERNAL MEDICINE

## 2018-11-29 PROCEDURE — 36415 COLL VENOUS BLD VENIPUNCTURE: CPT | Performed by: INTERNAL MEDICINE

## 2018-11-29 PROCEDURE — 25000132 ZZH RX MED GY IP 250 OP 250 PS 637: Performed by: STUDENT IN AN ORGANIZED HEALTH CARE EDUCATION/TRAINING PROGRAM

## 2018-11-29 PROCEDURE — 25000132 ZZH RX MED GY IP 250 OP 250 PS 637: Performed by: NURSE PRACTITIONER

## 2018-11-29 PROCEDURE — 12000001 ZZH R&B MED SURG/OB UMMC

## 2018-11-29 RX ADMIN — CEFAZOLIN 1 G: 1 INJECTION, POWDER, FOR SOLUTION INTRAMUSCULAR; INTRAVENOUS at 19:53

## 2018-11-29 RX ADMIN — CEFAZOLIN 1 G: 1 INJECTION, POWDER, FOR SOLUTION INTRAMUSCULAR; INTRAVENOUS at 11:38

## 2018-11-29 RX ADMIN — HYDROMORPHONE HYDROCHLORIDE 0.8 MG: 1 INJECTION, SOLUTION INTRAMUSCULAR; INTRAVENOUS; SUBCUTANEOUS at 09:30

## 2018-11-29 RX ADMIN — SENNOSIDES AND DOCUSATE SODIUM 1 TABLET: 8.6; 5 TABLET ORAL at 19:52

## 2018-11-29 RX ADMIN — CLONAZEPAM 0.5 MG: 0.5 TABLET ORAL at 10:31

## 2018-11-29 RX ADMIN — PROCHLORPERAZINE EDISYLATE 10 MG: 5 INJECTION INTRAMUSCULAR; INTRAVENOUS at 10:30

## 2018-11-29 RX ADMIN — ONDANSETRON 4 MG: 2 INJECTION INTRAMUSCULAR; INTRAVENOUS at 07:06

## 2018-11-29 RX ADMIN — OXYCODONE HYDROCHLORIDE 10 MG: 5 TABLET ORAL at 19:52

## 2018-11-29 RX ADMIN — HYDROMORPHONE HYDROCHLORIDE 0.8 MG: 1 INJECTION, SOLUTION INTRAMUSCULAR; INTRAVENOUS; SUBCUTANEOUS at 21:45

## 2018-11-29 RX ADMIN — LEFLUNOMIDE 20 MG: 20 TABLET ORAL at 08:50

## 2018-11-29 RX ADMIN — CEFAZOLIN 1 G: 1 INJECTION, POWDER, FOR SOLUTION INTRAMUSCULAR; INTRAVENOUS at 02:11

## 2018-11-29 RX ADMIN — HYDROMORPHONE HYDROCHLORIDE 0.8 MG: 1 INJECTION, SOLUTION INTRAMUSCULAR; INTRAVENOUS; SUBCUTANEOUS at 05:04

## 2018-11-29 RX ADMIN — OXYCODONE HYDROCHLORIDE 10 MG: 5 TABLET ORAL at 06:34

## 2018-11-29 RX ADMIN — CLONAZEPAM 0.5 MG: 0.5 TABLET ORAL at 16:40

## 2018-11-29 RX ADMIN — PROCHLORPERAZINE EDISYLATE 10 MG: 5 INJECTION INTRAMUSCULAR; INTRAVENOUS at 16:50

## 2018-11-29 RX ADMIN — HYDROMORPHONE HYDROCHLORIDE 0.8 MG: 1 INJECTION, SOLUTION INTRAMUSCULAR; INTRAVENOUS; SUBCUTANEOUS at 02:08

## 2018-11-29 RX ADMIN — SENNOSIDES AND DOCUSATE SODIUM 1 TABLET: 8.6; 5 TABLET ORAL at 09:01

## 2018-11-29 RX ADMIN — OXYCODONE HYDROCHLORIDE 10 MG: 5 TABLET ORAL at 16:40

## 2018-11-29 RX ADMIN — Medication 2 G: at 09:54

## 2018-11-29 RX ADMIN — OMEPRAZOLE 40 MG: 20 CAPSULE, DELAYED RELEASE ORAL at 08:50

## 2018-11-29 RX ADMIN — OXYCODONE HYDROCHLORIDE 10 MG: 5 TABLET ORAL at 13:34

## 2018-11-29 RX ADMIN — OMEPRAZOLE 40 MG: 20 CAPSULE, DELAYED RELEASE ORAL at 16:40

## 2018-11-29 RX ADMIN — CLONAZEPAM 0.5 MG: 0.5 TABLET ORAL at 21:40

## 2018-11-29 RX ADMIN — OXYCODONE HYDROCHLORIDE 10 MG: 5 TABLET ORAL at 03:15

## 2018-11-29 ASSESSMENT — ACTIVITIES OF DAILY LIVING (ADL)
ADLS_ACUITY_SCORE: 16
ADLS_ACUITY_SCORE: 17
ADLS_ACUITY_SCORE: 16
ADLS_ACUITY_SCORE: 16

## 2018-11-29 NOTE — PROGRESS NOTES
I have reviewed all charting by student nurse Lorraine Ybarra and I agree with all findings. I have done my own personal assessment and agree with the charting completed by the student nurse.     Claudia Romero RN  11/29/18  1:44 PM

## 2018-11-29 NOTE — PLAN OF CARE
Problem: Patient Care Overview  Goal: Plan of Care/Patient Progress Review  Outcome: Improving    VS: VSS   O2: 95% RA   Output: Voiding without difficulty   Last BM: 11/27/18   Activity: Standby assist   Skin: Intact except incision to right arm   Pain: IV dilaudid 0.8mg, PO oxycodone 10mg   CMS: Baseline numbness in extremities   Dressing: CDI   Diet: Regular   LDA: PIV infusing   Equipment: Sling, IV pole, Capno   Plan: TBD   Additional Info:

## 2018-11-29 NOTE — PLAN OF CARE
Problem: Patient Care Overview  Goal: Plan of Care/Patient Progress Review  VS: VSS   O2: RA   Output: Voiding in bathroom with no difficulty   Last BM: 11/27/2018   Activity: Standby assist   Skin: Incision to right arm   Pain: Oxycodone and IV dilaudid. Attempted to educate patient on transitioning to orals but patient continues to prefer it. Pt does state that pain is improving   CMS: Baseline numbness in all her extremeties   Dressing: CDI   Diet: Regular, Pt had nausea at the beginning of shift given zofran and compazine given with improvement   LDA: PIV infusing   Equipment: Walker, PCD,    Plan: TBD pending pain management   Additional Info:

## 2018-11-29 NOTE — PLAN OF CARE
Problem: Patient Care Overview  Goal: Plan of Care/Patient Progress Review  Discharge Planner OT   Patient plan for discharge: Home with assist from PCA and nurse  Current status: Patient educated on radial nerve palsy wrist/hand exercises, completed 3-5 reps of each. Completed HEP for elbow/wrist, 10 reps each. Therapist performed PROM for elbow flex/ext., demonstrating slightly more ROM today, however pt still limited by pain in all activities. Ambulated to bathroom SBA. Toilet transfer and standing g/h SBA w/ assist for set-up.   Barriers to return to prior living situation: pain, impulsivity  Recommendations for discharge: Home with assist from PCA and nurse  Rationale for recommendations: Continue with daily OT to maximize IND and safety with HEP and basic ADLs/functional mobility.       Entered by: Hernan Nuñez 11/29/2018 9:41 AM

## 2018-11-29 NOTE — PLAN OF CARE
Problem: Patient Care Overview  Goal: Plan of Care/Patient Progress Review  PT:  No skilled PT needs identified by OT.  OT will continue to see pt.  Will complete orders.

## 2018-11-29 NOTE — PLAN OF CARE
"Problem: Patient Care Overview  Goal: Interdisciplinary Rounds/Family Conf  Outcome: No Change    VS: Are stable   O2: She is on room air, her lungs are clear   Output: She is getting up to the bedside commode, voiding in good amounts   Last BM: 11/27/2018   Activity: She is turning and repositioning herself in bed, getting up to the bedside commode   Skin: Tattoos, surgical incision covered with dressing to right arm   Pain: Calling out for pain meds as often as she can get them, refused to take oral pain meds this morning, prefers the IV dilaudid   CMS: Baseline numbness in all her extremeties   Dressing: CDI   Diet: Regular, poor appetite   LDA: PIV   Equipment: Walker, PCD,    Plan: TBD   Additional Info: She cries out in pain, can be heard in the hallway, becomes upset when the door is closed for noise control, when asked what she wants for pain the pill or the IV \" I cannot make a decision right now\"              "

## 2018-11-29 NOTE — PROGRESS NOTES
"[facility] Internal Medicine Progress Note            Interval History:   Record reviewed.  Seen with RN.  Pain control remains adequate.  Nausea with emesis during night.  Patient attributes to pain or possible viral illness.  Ambulated to  without LH.  No CP, SOB, cough, lingering nausea.  No reflux on BID omeprazole.  No ongoing abdominal pain.  BM 2 days ago.  Voiding Ok.           Medications:   All medications reviewed today          Physical Exam:   Blood pressure 94/54, pulse 86, temperature 98.5  F (36.9  C), resp. rate 16, height 1.676 m (5' 6\"), weight 65.6 kg (144 lb 10 oz), SpO2 97 %, not currently breastfeeding.    Intake/Output Summary (Last 24 hours) at 11/28/18 1610  Last data filed at 11/28/18 0638   Gross per 24 hour   Intake              300 ml   Output              915 ml   Net             -615 ml       General:  Alert.  Appropriate.  No distress.  Off O2.     Heent:      Neck:    Skin:    Chest:  clear    Cardiac:  Reg without gallop, murmur.  No JVD.     Abdomen:  Non distended, soft, non-tender.  BS normal.     Extremities:  Perfused.  No edema, calf, posterior thigh tenderness to suggest DVT.     Neuro:            Data:     Results for orders placed or performed during the hospital encounter of 11/27/18 (from the past 24 hour(s))   UA with Microscopic   Result Value Ref Range    Color Urine Straw     Appearance Urine Slightly Cloudy     Glucose Urine Negative NEG^Negative mg/dL    Bilirubin Urine Negative NEG^Negative    Ketones Urine 10 (A) NEG^Negative mg/dL    Specific Gravity Urine 1.005 1.003 - 1.035    Blood Urine Negative NEG^Negative    pH Urine 6.0 5.0 - 7.0 pH    Protein Albumin Urine Negative NEG^Negative mg/dL    Urobilinogen mg/dL Normal 0.0 - 2.0 mg/dL    Nitrite Urine Negative NEG^Negative    Leukocyte Esterase Urine Negative NEG^Negative    Source Midstream Urine     WBC Urine 1 0 - 5 /HPF    RBC Urine 0 0 - 2 /HPF    Bacteria Urine Few (A) NEG^Negative /HPF    " Squamous Epithelial /HPF Urine 18 (H) 0 - 1 /HPF    Mucous Urine Present (A) NEG^Negative /LPF   Urine Culture Aerobic Bacterial   Result Value Ref Range    Specimen Description Midstream Urine     Special Requests Specimen received in preservative     Culture Micro Culture negative < 24 hours, reincubate    Basic metabolic panel   Result Value Ref Range    Sodium 137 133 - 144 mmol/L    Potassium 3.4 3.4 - 5.3 mmol/L    Chloride 105 94 - 109 mmol/L    Carbon Dioxide 25 20 - 32 mmol/L    Anion Gap 7 3 - 14 mmol/L    Glucose 93 70 - 99 mg/dL    Urea Nitrogen 5 (L) 7 - 30 mg/dL    Creatinine 0.78 0.52 - 1.04 mg/dL    GFR Estimate 79 >60 mL/min/1.7m2    GFR Estimate If Black >90 >60 mL/min/1.7m2    Calcium 7.8 (L) 8.5 - 10.1 mg/dL   Magnesium   Result Value Ref Range    Magnesium 1.5 (L) 1.6 - 2.3 mg/dL   CBC with platelets   Result Value Ref Range    WBC 7.4 4.0 - 11.0 10e9/L    RBC Count 3.49 (L) 3.8 - 5.2 10e12/L    Hemoglobin 10.0 (L) 11.7 - 15.7 g/dL    Hematocrit 31.6 (L) 35.0 - 47.0 %    MCV 91 78 - 100 fl    MCH 28.7 26.5 - 33.0 pg    MCHC 31.6 31.5 - 36.5 g/dL    RDW 14.3 10.0 - 15.0 %    Platelet Count 141 (L) 150 - 450 10e9/L     *Note: Due to a large number of results and/or encounters for the requested time period, some results have not been displayed. A complete set of results can be found in Results Review.              Assessment and Plan:   1)  S/P complex orthopedic procedure right elbow, as above.  Adequate pain control. Cultures neg to date.   2)  Acute blood loss anemia, adequate.   3)  Low grade fever likely related to #1.  UA bland.  Normal WBC.  Likely 2nd to #1.   4)  RA on Arava (not a biologic).   5)  H/o bleeding PUD.  Basis for intermittent upper abdominal discomfort unclear.  Potentially acid reflux.  6)  GERD controlled on omeprazole.   7)  Anxiety on klonopin prn.   8)  H/o cerebral infarction.  9)  Chronic pain, fibromyalgia.     PLAN:  1)  Same meds, orders.  2)  As nausea resolved,  tolerating a diet with benign abdominal exam monitor for now.    3)  IS, bowel meds, mechanical DVT prophylaxis, mobilize.  4)  Trend labs.  F/u UC, surgery cultures.   5)  Monitor clinically.   Disposition Plan   Expected discharge in 1-2 days to prior living arrangement once pain controlled, mobilizing. .     Entered: Tim Davis 11/29/2018, 3:34 PM              Attestation:  I have reviewed today's vital signs, notes, medications, labs and imaging.     Tim Davis MD

## 2018-11-29 NOTE — PLAN OF CARE
Problem: Surgery Nonspecified (Adult)  Goal: Signs and Symptoms of Listed Potential Problems Will be Absent, Minimized or Managed (Surgery Nonspecified)  Signs and symptoms of listed potential problems will be absent, minimized or managed by discharge/transition of care (reference Surgery Nonspecified (Adult) CPG).     VS: Vital signs are currently stable although hypotensive in the morning 94/54.    O2: >90% on room air.    Output: Pt voids adequately, appropriate fluid intake, and calls for assistance to the bathroom as needed.    Last BM: 11/27/18   Activity: Patient is 1 person assist with fall ID placement.    Skin: Surgical site is UTV but patient has multiple surgical scars and joint/bone replacements due to rheumatoid arthritis. Pt also has tattoos and slight bruising on the left arm due to IV attempt.    Pain: Patients pain is well managed. IV Dilaudid 0.8 mg x 1 AM. 10 mg of oral Oxycodone Q3 around the clock. Overall is tolerating.    CMS: Pt has baseline neuropathy. Does not report numbness and tingeing in feet today. Only reports subsiding tingling/numbness on left hand and right arm.    Dressing: Dressing on right arm is wrapped; UTV. Pt has a AMALIA drain, and last output was 50mL.    Diet: Patient is on a regular diet but has a poor appetite.    LDA: PIV on left hand. Currently patent and saline locked between antibiotics.    Equipment: IV pole and pt belongings.   Plan: Continue to monitor pt pain level, increase nutritional intake gradually, promote ambulation, and the use of the pneumatic compression device.    Additional Info: Continue to assess pt mentation. Is slightly forgetful salas during the collection of assessment data. Encourage to increase food intake.

## 2018-11-29 NOTE — PROGRESS NOTES
"Orthopaedic Surgery Progress Note   November 29, 2018    Subjective: Feels right hand is more numb and weak this AM. Denies chest pain or shortness of breath. Pain control better today. Voiding without difficulty.     Objective: /51 (BP Location: Left arm)  Temp 99.7  F (37.6  C) (Oral)  Resp 16  Ht 1.676 m (5' 6\")  Wt 65.6 kg (144 lb 10 oz)  LMP  (LMP Unknown)  SpO2 95%  BMI 23.34 kg/m2    Drain: 45/0/55 ml output last 3 shifts     General: NAD, alert and oriented, cooperative with exam. Anxious.    Cardio: RRR, extremities wwp.   Respiratory: Non-labored breathing.  MSK: RUE: dressing c/d/i without strikethrough. Drain with serosanguinous output. 0/5 EPL, EDC. 2/5 wrist extension. 4/5 FPL, 3/5 IO,  SILT m/r/u nerve distributions, reports decreased sensation in radial and ulnar nerve distrirbutions this AM. Palpable radial pulse, fingers wwp with brisk cap refill.    Labs:  Hemoglobin   Date Value Ref Range Status   11/28/2018 10.3 (L) 11.7 - 15.7 g/dL Final   ]  All cultures:    Recent Labs  Lab 11/28/18  1817 11/27/18  1241 11/27/18  1130   CULT PENDING Culture negative monitoring continues  Culture negative monitoring continues Culture negative monitoring continues  Culture negative monitoring continues       Assessment and Plan: Kalyn Rivero is a 48 year old female with PMH including rheumatoid arthritis, GERD, and anxiety with multiple previous surgeries on the right elbow now s/p aspiration right elbow, explant of right elbow failed hardware, right distal humerus replacement and revision right total elbow arthroplasty on 11/27/18 with Dr. Zimmer. New radial nerve palsy this AM.       Ortho Primary  Will discuss new radial nerve palsy with Dr. Zimmer this AM.   Activity: Up with assist until independent.  Weight bearing status: NWB RUE. Range of motion right elbow flexion 0-90 deg, abduction 0-90 degrees, internal rotation to abdomen, external rotation to 30 degrees.   Pain management: " Transition from IV to PO as tolerated. Ok to resume leflunomide, holding celebrex.   Antibiotics: Ancef continue while inpatient and following surgical cultures.  Diet: Begin with clear fluids and progress diet as tolerated.   DVT prophylaxis: mechanical   Imaging: in PACU complete.   Labs: Hgb POD#1-2.  Dressings: loosened this AM, to remain in place.   Elevation: Elevate RUE on pillows to keep above the level of the heart as much as possible.   Drains: Document output per shift, discontinue when <30cc/shift.   Physical Therapy/Occupational Therapy: Eval and treat.  Cultures: Pending, follow culture results closely.    Consults: Hospitalist, appreciate cares.  Follow-up: Clinic with Dr. Zimmer in 2 weeks   Disposition: Pending progress with therapies, pain control on orals, and medical stability, anticipate discharge to home on POD #3    Paul Bull MD  Orthopaedic Surgery Resident, PGY-4  Pager: [phone]     For questions about this patient during the day, please attempt to contact me at my pager ([phone]) prior to contacting the Orthopaedic Surgery resident on call. Thank you!

## 2018-11-30 ENCOUNTER — APPOINTMENT (OUTPATIENT)
Dept: OCCUPATIONAL THERAPY | Facility: CLINIC | Age: 48
DRG: 483 | End: 2018-11-30
Attending: ORTHOPAEDIC SURGERY
Payer: COMMERCIAL

## 2018-11-30 VITALS
OXYGEN SATURATION: 95 % | TEMPERATURE: 99.1 F | RESPIRATION RATE: 16 BRPM | SYSTOLIC BLOOD PRESSURE: 111 MMHG | DIASTOLIC BLOOD PRESSURE: 63 MMHG | HEIGHT: 66 IN | BODY MASS INDEX: 23.24 KG/M2 | HEART RATE: 86 BPM | WEIGHT: 144.62 LBS

## 2018-11-30 LAB
ANION GAP SERPL CALCULATED.3IONS-SCNC: 6 MMOL/L (ref 3–14)
BUN SERPL-MCNC: 5 MG/DL (ref 7–30)
CALCIUM SERPL-MCNC: 7.7 MG/DL (ref 8.5–10.1)
CHLORIDE SERPL-SCNC: 106 MMOL/L (ref 94–109)
CO2 SERPL-SCNC: 27 MMOL/L (ref 20–32)
CREAT SERPL-MCNC: 0.78 MG/DL (ref 0.52–1.04)
GFR SERPL CREATININE-BSD FRML MDRD: 79 ML/MIN/1.7M2
GLUCOSE SERPL-MCNC: 102 MG/DL (ref 70–99)
HGB BLD-MCNC: 10.1 G/DL (ref 11.7–15.7)
MAGNESIUM SERPL-MCNC: 1.9 MG/DL (ref 1.6–2.3)
POTASSIUM SERPL-SCNC: 3.4 MMOL/L (ref 3.4–5.3)
SODIUM SERPL-SCNC: 139 MMOL/L (ref 133–144)

## 2018-11-30 PROCEDURE — 83735 ASSAY OF MAGNESIUM: CPT | Performed by: INTERNAL MEDICINE

## 2018-11-30 PROCEDURE — 40000133 ZZH STATISTIC OT WARD VISIT

## 2018-11-30 PROCEDURE — 36415 COLL VENOUS BLD VENIPUNCTURE: CPT | Performed by: INTERNAL MEDICINE

## 2018-11-30 PROCEDURE — 97110 THERAPEUTIC EXERCISES: CPT | Mod: GO

## 2018-11-30 PROCEDURE — 25000132 ZZH RX MED GY IP 250 OP 250 PS 637: Performed by: PHYSICIAN ASSISTANT

## 2018-11-30 PROCEDURE — 99232 SBSQ HOSP IP/OBS MODERATE 35: CPT | Performed by: INTERNAL MEDICINE

## 2018-11-30 PROCEDURE — 85018 HEMOGLOBIN: CPT | Performed by: INTERNAL MEDICINE

## 2018-11-30 PROCEDURE — 25000132 ZZH RX MED GY IP 250 OP 250 PS 637: Performed by: INTERNAL MEDICINE

## 2018-11-30 PROCEDURE — 97535 SELF CARE MNGMENT TRAINING: CPT | Mod: GO

## 2018-11-30 PROCEDURE — 80048 BASIC METABOLIC PNL TOTAL CA: CPT | Performed by: INTERNAL MEDICINE

## 2018-11-30 PROCEDURE — 25000132 ZZH RX MED GY IP 250 OP 250 PS 637: Performed by: NURSE PRACTITIONER

## 2018-11-30 PROCEDURE — 25000132 ZZH RX MED GY IP 250 OP 250 PS 637: Performed by: STUDENT IN AN ORGANIZED HEALTH CARE EDUCATION/TRAINING PROGRAM

## 2018-11-30 PROCEDURE — 25000128 H RX IP 250 OP 636: Performed by: PHYSICIAN ASSISTANT

## 2018-11-30 RX ORDER — ACETAMINOPHEN 325 MG/1
650 TABLET ORAL EVERY 4 HOURS PRN
Qty: 100 TABLET | Refills: 1 | Status: SHIPPED | OUTPATIENT
Start: 2018-11-30 | End: 2018-11-30

## 2018-11-30 RX ORDER — OMEPRAZOLE 40 MG/1
40 CAPSULE, DELAYED RELEASE ORAL
Qty: 30 CAPSULE | COMMUNITY
Start: 2018-11-30 | End: 2021-07-14

## 2018-11-30 RX ORDER — BISACODYL 10 MG
10 SUPPOSITORY, RECTAL RECTAL DAILY PRN
Qty: 2 SUPPOSITORY | Refills: 0 | Status: SHIPPED | OUTPATIENT
Start: 2018-11-30 | End: 2019-08-22

## 2018-11-30 RX ORDER — SODIUM CHLORIDE 9 MG/ML
INJECTION, SOLUTION INTRAVENOUS
Status: DISCONTINUED
Start: 2018-11-30 | End: 2018-11-30 | Stop reason: HOSPADM

## 2018-11-30 RX ORDER — AMOXICILLIN 250 MG
1-2 CAPSULE ORAL 2 TIMES DAILY
Qty: 100 TABLET | Refills: 1 | Status: SHIPPED | OUTPATIENT
Start: 2018-11-30 | End: 2019-12-05

## 2018-11-30 RX ORDER — POTASSIUM CHLORIDE 750 MG/1
40 TABLET, EXTENDED RELEASE ORAL ONCE
Status: COMPLETED | OUTPATIENT
Start: 2018-11-30 | End: 2018-11-30

## 2018-11-30 RX ORDER — OXYCODONE HYDROCHLORIDE 5 MG/1
5-10 TABLET ORAL
Qty: 60 TABLET | Refills: 0 | Status: SHIPPED | OUTPATIENT
Start: 2018-11-30 | End: 2018-12-05

## 2018-11-30 RX ADMIN — OXYCODONE HYDROCHLORIDE 10 MG: 5 TABLET ORAL at 11:09

## 2018-11-30 RX ADMIN — OXYCODONE HYDROCHLORIDE 10 MG: 5 TABLET ORAL at 14:45

## 2018-11-30 RX ADMIN — LEFLUNOMIDE 20 MG: 20 TABLET ORAL at 07:43

## 2018-11-30 RX ADMIN — OMEPRAZOLE 40 MG: 20 CAPSULE, DELAYED RELEASE ORAL at 17:09

## 2018-11-30 RX ADMIN — CEFAZOLIN 1 G: 1 INJECTION, POWDER, FOR SOLUTION INTRAMUSCULAR; INTRAVENOUS at 11:16

## 2018-11-30 RX ADMIN — CLONAZEPAM 0.5 MG: 0.5 TABLET ORAL at 17:09

## 2018-11-30 RX ADMIN — SENNOSIDES AND DOCUSATE SODIUM 2 TABLET: 8.6; 5 TABLET ORAL at 07:42

## 2018-11-30 RX ADMIN — CLONAZEPAM 0.5 MG: 0.5 TABLET ORAL at 10:20

## 2018-11-30 RX ADMIN — OXYCODONE HYDROCHLORIDE 10 MG: 5 TABLET ORAL at 00:13

## 2018-11-30 RX ADMIN — OXYCODONE HYDROCHLORIDE 10 MG: 5 TABLET ORAL at 03:18

## 2018-11-30 RX ADMIN — HYDROMORPHONE HYDROCHLORIDE 0.5 MG: 1 INJECTION, SOLUTION INTRAMUSCULAR; INTRAVENOUS; SUBCUTANEOUS at 06:02

## 2018-11-30 RX ADMIN — OXYCODONE HYDROCHLORIDE 10 MG: 5 TABLET ORAL at 07:39

## 2018-11-30 RX ADMIN — CEFAZOLIN 1 G: 1 INJECTION, POWDER, FOR SOLUTION INTRAMUSCULAR; INTRAVENOUS at 03:09

## 2018-11-30 RX ADMIN — OMEPRAZOLE 40 MG: 20 CAPSULE, DELAYED RELEASE ORAL at 07:41

## 2018-11-30 RX ADMIN — POTASSIUM CHLORIDE 40 MEQ: 750 TABLET, EXTENDED RELEASE ORAL at 12:22

## 2018-11-30 ASSESSMENT — ACTIVITIES OF DAILY LIVING (ADL)
ADLS_ACUITY_SCORE: 16

## 2018-11-30 NOTE — PLAN OF CARE
Problem: Surgery Nonspecified (Adult)  Goal: Signs and Symptoms of Listed Potential Problems Will be Absent, Minimized or Managed (Surgery Nonspecified)  Signs and symptoms of listed potential problems will be absent, minimized or managed by discharge/transition of care (reference Surgery Nonspecified (Adult) CPG).           VS:       Pt A/O X 4. Afebrile. VSS. Lungs-clear bilaterally with both anterior and posterior. IS encouraged. Denies nausea, shortness of breath, and chest pain.     Output:       Bowels- active in all four quadrants. Last BM 11/27 per pt report, pt is passing gas. Voids spontaneously without difficulty in the bathroom.      Activity:       Pt up in room, to bathroom, and in hallways with assist of standby.     Skin:   Incisions to RUE, otherwise intact.     Pain:       Has pain in the right arm and given prn PO Dilaudid, ICE applied, and is tolerating well.      CMS:       CMS and Neuro's are intact. Denies numbness and tingling in all extremities.      Dressing:       RUE incisional dressing is clean, dry, and intact. AMALIA drain was removed.      Diet:       Pt is on a regular diet and appetite was good this shift.       LDA:       PIV is patent in the left arm and SL.      Equipment:       Pt refused to wear PCDs. Pt is up ambulating frequently. Bilateral heels are elevated off the bed.     Plan:       Pt is able to make needs known and the call light is within the pt's reach. Continue to monitor.       Additional Info:       Plan to discharge to home later this afternoon.

## 2018-11-30 NOTE — PROGRESS NOTES
Pt continues to have pain and states that she would like another 5mg Oxycodone tablet.  Pt agreed to 10mg Oxy and 1 senna tablet at 752pm.  Pt was told she already received 10mg with her Senna.  She agrees that she was given 3 pills at 752pm, but states that only one was Oxycodone.  RN explained that she did receive 3 pills (2 white Oxycodone, and 1 orange Senna) and patient continued to argue that they were white, brown, and orange.  Then patient agreed there were 2 white pills, but that one was some sort of tablet. Pt states she was going to start taking pictures of her pills prior to taking them.  Charge RN aware and 2 RNs will be present with the administration of patient's medications for remainder of shift.

## 2018-11-30 NOTE — PROGRESS NOTES
"Orthopaedic Surgery Progress Note   November 30, 2018    Subjective: overall pain had been controlled, but one episode of uncontrolled pain overnight requiring IV dilaudid. Patient reports otherwise feeling well, denies chest pain, shortness of breath. Tolerating diet, voiding. Numbness in hand improved, movement improved as well.     Objective: BP 93/46 (BP Location: Right arm)  Pulse 86  Temp 98.5  F (36.9  C) (Axillary)  Resp 17  Ht 1.676 m (5' 6\")  Wt 65.6 kg (144 lb 10 oz)  LMP  (LMP Unknown)  SpO2 92%  BMI 23.34 kg/m2  Drain: 50 ml output charted over last 3 shifts      General: NAD, alert and oriented, cooperative with exam. Anxious and impulsive.    Cardio: RRR, extremities wwp.   Respiratory: Non-labored breathing.  MSK: RUE: dressing c/d/i without strikethrough. Drain with serosanguinous output. 4/5 EPL, EDC. 4/5 wrist extension. 4/5 FPL, 4/5 IO,  SILT m/r/u nerve distributions, baseline sensation throughout per patient, slightly decreased in median and ulnar. Palpable radial pulse, fingers wwp with brisk cap refill.    Labs:  Hemoglobin   Date Value Ref Range Status   11/30/2018 10.1 (L) 11.7 - 15.7 g/dL Final   ]  All cultures:    Recent Labs  Lab 11/28/18  1817 11/27/18  1241 11/27/18  1130   CULT No growth Culture negative monitoring continues  Culture negative monitoring continues Culture negative monitoring continues  Culture negative monitoring continues       Assessment and Plan: Kalyn Rivero is a 48 year old female with PMH including rheumatoid arthritis, GERD, and anxiety with multiple previous surgeries on the right elbow now s/p aspiration right elbow, explant of right elbow failed hardware, right distal humerus replacement and revision right total elbow arthroplasty on 11/27/18 with Dr. Zimmer. PIN palsy on 11/29 resolved.       Ortho Primary  Activity: Up with assist until independent.  Weight bearing status: NWB RUE. Range of motion right elbow flexion 0-90 deg, abduction " 0-90 degrees, internal rotation to abdomen, external rotation to 30 degrees.   Pain management: Transition from IV to PO as tolerated. Ok to resume leflunomide, holding celebrex.   Antibiotics: Ancef continue while inpatient and following surgical cultures.  Diet: Begin with clear fluids and progress diet as tolerated.   DVT prophylaxis: mechanical   Imaging: in PACU complete.   Labs: Hgb POD#1-2.  Dressings: loosened 11/29, to remain in place until follow up.   Elevation: Elevate RUE on pillows to keep above the level of the heart as much as possible.   Drains: Document output per shift, discontinue when <30cc/shift - likely today.   Physical Therapy/Occupational Therapy: Eval and treat.  Cultures: Pending, follow culture results closely.    Consults: Hospitalist, appreciate cares.  Follow-up: Clinic with Dr. Zimmer in 2 weeks   Disposition: Pending progress with therapies, pain control on orals, and medical stability, possible discharge to home today.     Paul Bull MD  Orthopaedic Surgery Resident, PGY-4  Pager: [phone]     For questions about this patient during the day, please attempt to contact me at my pager ([phone]) prior to contacting the Orthopaedic Surgery resident on call. Thank you!

## 2018-11-30 NOTE — PLAN OF CARE
Problem: Patient Care Overview  Goal: Plan of Care/Patient Progress Review  Discharge Planner OT   Patient plan for discharge: Home with assist from PCA, nursing, and son  Current status: Patient completed 10 reps of HEP and radial palsy exercises. Therapist performed PROM for elbow flex/ext. Patient reports she is unable to move her own arm, says PCA will assist w/ exercises. Patient ambulated SBA around room. Continues to demonstrate impulsivity, requires cues for safety.   Barriers to return to prior living situation: pain, post-op precautions  Recommendations for discharge: Home with assist from PCA and nursing, may benefit from OP PT dizzy/balance center d/t patient report of many falls and dizziness at home.   Rationale for recommendations: Continue daily OT to maximize IND w/ HEP and safety with basic ADLs and functional mobility.       Entered by: Hernan Nuñez 11/30/2018 10:14 AM

## 2018-11-30 NOTE — PROGRESS NOTES
Care Coordinator Progress Note    Admission Date/Time:  11/27/2018  Attending MD:  Aakash Zimmer MD    Data  Chart reviewed, discussed with interdisciplinary team.   Patient was admitted for:    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)  Periprosthetic fracture around internal prosthetic right elbow joint, subsequent encounter.       Assessment  Per PT/OT notes no outpatient or home care indicated at this time. RNCC available as needed.     Plan  Anticipated Discharge Date:  11/30/2018  Anticipated Discharge Plan:  Home     Cora Baugh RN, BSN  Care Coordinator, 8A  Phone [phone]  Pager [phone]

## 2018-11-30 NOTE — PLAN OF CARE
Problem: Patient Care Overview  Goal: Plan of Care/Patient Progress Review  Outcome: Completed Date Met: 11/29/18  VSS. Pt's temp was 100.4 and she was encouraged to use incentive spirometer. A/Ox's 4. Pain rated as tolerable to intolerable at times. Oxycodone for pain control, IV dilaudid x1. CMS intact, denies any n/t this shift. Tolerated regular diet. Denied any nausea, CP, SOB, lightheadedness or dizziness. Voiding without pain or difficulty. Passing flatus. Resting in bed at this time with call light in reach and able to make needs known.

## 2018-11-30 NOTE — PLAN OF CARE
Problem: Patient Care Overview  Goal: Individualization & Mutuality  Pt A&O x's 4. VSS, BP slightly low, pt denies dizziness or lightheadedness. BP rechecked this morning and noted slight improvement. Pt stated that's  her baseline. 02 sats in the 90s on RA.  Lungs clear. Denies SOB, CP and nausea. Tolerating regular diet. Bowel sound active in all quadrants. No BM but passing gas. Voiding into bedside commode. Pain well managed with oxycodone however pt got very anxious when she lost IV access. New PIV put in, pt received 0.5 iv dilaudid. CMS intact, denies N/T. Right arm dressing clean,dry and intact. Able to wiggle fingers. Pt slept between care and is able to make needs known, call light with in reach. Will continue to monitor.

## 2018-11-30 NOTE — PROGRESS NOTES
"[facility] [place] Internal Medicine Progress Note            Interval History:   Record reviewed.  Seen with RN.  In general doing well.  Feels actually will do better at home where has around the clock PCA services.    Nausea with emesis again during the night.  Patient again attributes to pain or possible \"anxiety\"  Ambulated to BR and montiel without LH.  No CP, SOB, cough, lingering nausea.  Tolerating diet.  No reflux on BID omeprazole.  No ongoing abdominal pain.  BM 3 days ago.  Voiding Ok.           Medications:   All medications reviewed today          Physical Exam:   Blood pressure 112/57, pulse 86, temperature 99.5  F (37.5  C), temperature source Oral, resp. rate 16, height 1.676 m (5' 6\"), weight 65.6 kg (144 lb 10 oz), SpO2 95 %, not currently breastfeeding.    Intake/Output Summary (Last 24 hours) at 11/28/18 1610  Last data filed at 11/28/18 0638   Gross per 24 hour   Intake              300 ml   Output              915 ml   Net             -615 ml   I/O this shift:  In: -   Out: 50 [Drains:50]      General:  Alert.  Appropriate.  No distress.  Off O2.     Heent:      Neck:    Skin:    Chest:  clear    Cardiac:  Reg without gallop, murmur.  No JVD.     Abdomen:  Non distended, soft, non-tender.  BS normal.     Extremities:  Perfused.  No edema, calf, posterior thigh tenderness to suggest DVT.     Neuro:            Data:     Results for orders placed or performed during the hospital encounter of 11/27/18 (from the past 24 hour(s))   Basic metabolic panel   Result Value Ref Range    Sodium 139 133 - 144 mmol/L    Potassium 3.4 3.4 - 5.3 mmol/L    Chloride 106 94 - 109 mmol/L    Carbon Dioxide 27 20 - 32 mmol/L    Anion Gap 6 3 - 14 mmol/L    Glucose 102 (H) 70 - 99 mg/dL    Urea Nitrogen 5 (L) 7 - 30 mg/dL    Creatinine 0.78 0.52 - 1.04 mg/dL    GFR Estimate 79 >60 mL/min/1.7m2    GFR Estimate If Black >90 >60 mL/min/1.7m2    Calcium 7.7 (L) 8.5 - 10.1 mg/dL   Hemoglobin   Result Value Ref Range    " Hemoglobin 10.1 (L) 11.7 - 15.7 g/dL   Magnesium   Result Value Ref Range    Magnesium 1.9 1.6 - 2.3 mg/dL     *Note: Due to a large number of results and/or encounters for the requested time period, some results have not been displayed. A complete set of results can be found in Results Review.              Assessment and Plan:   1)  S/P complex orthopedic procedure right elbow, as above.  Adequate pain control.  Mobilizing. . Cultures neg to date.    2)  Acute blood loss anemia, adequate.   3)  Low grade fever likely related to #1. Resolved.  UC neg.  Normal WBC.  Likely 2nd to #1.   4)  RA on Arava (not a biologic).   5)  H/o bleeding PUD.  Basis for intermittent upper abdominal discomfort unclear.  Potentially acid reflux.  On PPI.   6)  GERD controlled on omeprazole.   7)  Anxiety on klonopin prn.  Nausea potentially related. As tolerating diet with benign exam appears OK for discharge.  May actually do better in home setting.   8)  H/o cerebral infarction.  9)  Chronic pain, fibromyalgia.     PLAN:  1)  Clinically appears stable for discharge home with PCA services.  2)  Meds, orders reviewed.  Opiates per ortho.  Continue BID PPI.  3)  C with RN - check clinically, VS, review meds, BMP, Mg, Hgb - results to PMD Call primary MD to arrange follow up in next 1-2 weeks. Suppository if no BM by 12/1  Disposition Plan   Expected discharge today.      Entered: Tim Davis 11/30/2018, 2:44 PM              Attestation:  I have reviewed today's vital signs, notes, medications, labs and imaging.     Tim Davis MD

## 2018-11-30 NOTE — PROGRESS NOTES
Care Coordinator Progress Note    Admission Date/Time:  11/27/2018  Attending MD:  Aakash Zimmer MD    Data  Chart reviewed, discussed with interdisciplinary team.   Patient was admitted for:    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)  Periprosthetic fracture around internal prosthetic right elbow joint, subsequent encounter.    Concerns with insurance coverage for discharge needs: None.  Current Living Situation: Patient lives with adult child.  Support System: Involved  Services Involved: Home Care  Transportation at Discharge: Family or friend will provide  Transportation to Medical Appointments:   - Name of caregiver: sophie Neal  Barriers to Discharge: None    Coordination of Care and Referrals: Provided patient/family with options for Home Care.        Assessment  Patient reported to OT therapy student that she was dizzy and falling at home. This concern was discussed with LELO Ulrich, home care  With skilled nursing and occupational therapy was recommended. This writer met with patient at bedside to discuss home care options. Patient states that she has medication for dizziness but does not take it. She states that she was dizzy because of her right arm and now that she has had surgery the dizziness should not be a problem. This writer offered home care to assess her needs at home. Patient states that she currently has home care with skilled nursing through the [facility]. She states she has ten and a half hours of PCA cares daily and her son lives with her and is able to help care for her. She declined a referral to home care for RN and OT. Patient had questions regarding 24 hour home care in the future as her health declines. This writer encouraged her to contact her county  regarding future concerns.     No home care referral placed at this time. Patient would like to discharge home today. RNCC available as needed.     Plan  Anticipated Discharge Date:   11/30/2018  Anticipated Discharge Plan:  Home with current PCA and family assist.    Cora Baugh RN, BSN  Care Coordinator, 8A  Phone [phone]  Pager [phone]

## 2018-12-01 NOTE — PLAN OF CARE
Problem: Patient Care Overview  Goal: Plan of Care/Patient Progress Review  Occupational Therapy Discharge Summary    Reason for therapy discharge:    Discharged to home.    Progress towards therapy goal(s). [facility] on [facility] for goal details.  Goals partially met.  Barriers to achieving goals:   discharge from facility.    Therapy recommendation(s):    Recommend follow up for OP PT due to balance/dizziness

## 2018-12-02 ENCOUNTER — APPOINTMENT (OUTPATIENT)
Dept: ULTRASOUND IMAGING | Facility: CLINIC | Age: 48
End: 2018-12-02
Attending: FAMILY MEDICINE
Payer: COMMERCIAL

## 2018-12-02 ENCOUNTER — HOSPITAL ENCOUNTER (EMERGENCY)
Facility: CLINIC | Age: 48
Discharge: HOME | End: 2018-12-02
Attending: FAMILY MEDICINE | Admitting: FAMILY MEDICINE
Payer: COMMERCIAL

## 2018-12-02 VITALS
DIASTOLIC BLOOD PRESSURE: 73 MMHG | RESPIRATION RATE: 16 BRPM | TEMPERATURE: 98.4 F | SYSTOLIC BLOOD PRESSURE: 118 MMHG | OXYGEN SATURATION: 97 % | HEART RATE: 80 BPM

## 2018-12-02 DIAGNOSIS — I80.8 SUPERFICIAL PHLEBITIS OF ARM: ICD-10-CM

## 2018-12-02 LAB
ANION GAP SERPL CALCULATED.3IONS-SCNC: 9 MMOL/L (ref 3–14)
BACTERIA SPEC CULT: NO GROWTH
BACTERIA SPEC CULT: NO GROWTH
BASOPHILS # BLD AUTO: 0 10E9/L (ref 0–0.2)
BASOPHILS NFR BLD AUTO: 0.6 %
BUN SERPL-MCNC: 6 MG/DL (ref 7–30)
CALCIUM SERPL-MCNC: 8 MG/DL (ref 8.5–10.1)
CHLORIDE SERPL-SCNC: 106 MMOL/L (ref 94–109)
CO2 SERPL-SCNC: 25 MMOL/L (ref 20–32)
CREAT SERPL-MCNC: 0.71 MG/DL (ref 0.52–1.04)
DIFFERENTIAL METHOD BLD: ABNORMAL
EOSINOPHIL # BLD AUTO: 0.2 10E9/L (ref 0–0.7)
EOSINOPHIL NFR BLD AUTO: 4.1 %
ERYTHROCYTE [DISTWIDTH] IN BLOOD BY AUTOMATED COUNT: 14.1 % (ref 10–15)
GFR SERPL CREATININE-BSD FRML MDRD: 88 ML/MIN/1.7M2
GLUCOSE SERPL-MCNC: 92 MG/DL (ref 70–99)
HCT VFR BLD AUTO: 33 % (ref 35–47)
HGB BLD-MCNC: 10.3 G/DL (ref 11.7–15.7)
IMM GRANULOCYTES # BLD: 0 10E9/L (ref 0–0.4)
IMM GRANULOCYTES NFR BLD: 0.2 %
LYMPHOCYTES # BLD AUTO: 0.8 10E9/L (ref 0.8–5.3)
LYMPHOCYTES NFR BLD AUTO: 18.1 %
MAGNESIUM SERPL-MCNC: 1.9 MG/DL (ref 1.6–2.3)
MCH RBC QN AUTO: 28.5 PG (ref 26.5–33)
MCHC RBC AUTO-ENTMCNC: 31.2 G/DL (ref 31.5–36.5)
MCV RBC AUTO: 91 FL (ref 78–100)
MONOCYTES # BLD AUTO: 0.5 10E9/L (ref 0–1.3)
MONOCYTES NFR BLD AUTO: 10.8 %
NEUTROPHILS # BLD AUTO: 3.1 10E9/L (ref 1.6–8.3)
NEUTROPHILS NFR BLD AUTO: 66.2 %
NRBC # BLD AUTO: 0 10*3/UL
NRBC BLD AUTO-RTO: 0 /100
PLATELET # BLD AUTO: 212 10E9/L (ref 150–450)
POTASSIUM SERPL-SCNC: 3.9 MMOL/L (ref 3.4–5.3)
RBC # BLD AUTO: 3.61 10E12/L (ref 3.8–5.2)
SODIUM SERPL-SCNC: 140 MMOL/L (ref 133–144)
SPECIMEN SOURCE: NORMAL
SPECIMEN SOURCE: NORMAL
WBC # BLD AUTO: 4.6 10E9/L (ref 4–11)

## 2018-12-02 PROCEDURE — 85025 COMPLETE CBC W/AUTO DIFF WBC: CPT | Performed by: FAMILY MEDICINE

## 2018-12-02 PROCEDURE — 99285 EMERGENCY DEPT VISIT HI MDM: CPT | Mod: 25 | Performed by: FAMILY MEDICINE

## 2018-12-02 PROCEDURE — 93971 EXTREMITY STUDY: CPT | Mod: LT

## 2018-12-02 PROCEDURE — 25000132 ZZH RX MED GY IP 250 OP 250 PS 637: Performed by: FAMILY MEDICINE

## 2018-12-02 PROCEDURE — 93005 ELECTROCARDIOGRAM TRACING: CPT | Performed by: FAMILY MEDICINE

## 2018-12-02 PROCEDURE — 93010 ELECTROCARDIOGRAM REPORT: CPT | Mod: Z6 | Performed by: FAMILY MEDICINE

## 2018-12-02 PROCEDURE — 80048 BASIC METABOLIC PNL TOTAL CA: CPT | Performed by: FAMILY MEDICINE

## 2018-12-02 PROCEDURE — 83735 ASSAY OF MAGNESIUM: CPT | Performed by: FAMILY MEDICINE

## 2018-12-02 RX ORDER — OXYCODONE HYDROCHLORIDE 5 MG/1
10 TABLET ORAL ONCE
Status: COMPLETED | OUTPATIENT
Start: 2018-12-02 | End: 2018-12-02

## 2018-12-02 RX ADMIN — OXYCODONE HYDROCHLORIDE 10 MG: 5 TABLET ORAL at 14:57

## 2018-12-02 NOTE — ED PROVIDER NOTES
"  History     Chief Complaint   Patient presents with     Arm Pain     Brought in by EMS. Recent surgery on right elbow. Found bump on left AC, history of clots.      HPI  Kalyn Rivero is a 48 year old female with a history of chronic pain, DJD, fibromyalgia, rheumatoid arthritis, and opioid dependence who presents BIBA from home for evaluation of left elbow pain. Notably, patient underwent a right total elbow arthroplasty revision on 11/27/18 with Dr. Zimmer. The procedure was uncomplicated and patient was eventually discharged on 11/30/18 with prescriptions for Dulcolax & Senna, as well as oxycodone 5 mg. She presents today, however, as this morning, she woke with left elbow pain, in the area where her IV was placed post-operatively. Patient states her pain has been worsening and will occasionally radiate up her arm, \"like a blood pressure cuff is squeezing it\". In addition, patient complains of associated dizziness (is dizzy at baseline but states she is more dizzy that usual today) and nausea. She states she last took oxycodone at 5 AM today.  No chest pain or shortness of breath.  No fever or chills.    Past Medical History:   Diagnosis Date     Acetaminophen overdose 3/24/2011     Anemia      Anesthesia complication     pt states has a history of heart stopping during anesthesia,     Arrhythmia      Cerebral infarction (H)      Cervical high risk HPV (human papillomavirus) test positive 02/22/2017    type 18 & other HR HPV     Chronic infection     MRSA     Chronic pain 3/24/2011     Degenerative joint disease      Endometriosis      Fibromyalgia      Gastro-oesophageal reflux disease      Heart murmur      History of blood transfusion      Hypothyroidism      Immune disorder (H)      Learning disability      Malignant neoplasm (H)      Migraine      MRSA (methicillin resistant staph aureus) culture positive      Neck injuries      Opioid type dependence (H)      Other chronic pain      PONV " "(postoperative nausea and vomiting)     states \"flatlines\"during anesthesia     RA (rheumatoid arthritis) (H)      Renal stone      Scoliosis      Stomach ulcer     history       Past Surgical History:   Procedure Laterality Date     ARTHRODESIS FOOT  5/23/2012    Procedure:ARTHRODESIS FOOT; Right Subtalar andTaloNavicular  Fusion  ; Surgeon:BRIGHT HENDRICKS; [facility]     ARTHRODESIS FOOT Left 4/22/2015    Procedure: ARTHRODESIS FOOT;  Surgeon: Bright Hendricks MD;  [facility]     ARTHROPLASTY ELBOW Right 9/30/2015    Procedure: ARTHROPLASTY ELBOW;  Surgeon: Carrol Gaspar MD;  [facility]     ARTHROPLASTY KNEE Right      ARTHROPLASTY REVISION ELBOW Right 11/27/2018    Procedure: 1 - aspiration of Right elbow 2- Explantation of failed hardware Right humeral periprosthetic fracture non-union 3- Right distal humerus excision 4- Right distal humerus replacement 5 - Revision Right total elbow arthroplasty;  Surgeon: Aakash Zimmer MD;  [facility]     ARTHROPLASTY WRIST      x2 Lt wrist replacement.     ARTHROTOMY ELBOW Right 6/18/2015    Procedure: ARTHROTOMY ELBOW;  Surgeon: Carrol Gaspar MD;  [facility]     C ANESTH,DX ARTHROSCOPIC PROC KNEE JOINT  10/24/2007    right total knee arthroplasty     C SHLDR ARTHROSCOP,DIAGNOSTIC  12/17/2008    left total shoulder arthroplasty by Dr. Law     C SHOULDER SURG PROC UNLISTED       C TOTAL KNEE ARTHROPLASTY  1/18/12    Left     CYSTOSCOPY  02/06/2009    1.cystoscopy.2.bilateral ureteroscopy.3.basketing of stone fragments from the right kidney.4.bilateral double-J ureteral stent placement.5.ann catheter placement.6.fluoroscopy and intraoperative interpretation of images.     CYSTOSCOPY  01/08/2007    1. cystoscopy and left stent removal.2.left ureteroscopy     DECOMPRESSION CUBITAL TUNNEL  6/6/2014    Procedure: DECOMPRESSION CUBITAL TUNNEL;  Surgeon: Janelle Coates MD;  [facility]     ENDOSCOPY  " 03/31/2005    [facility] GI endoscopy-[facility]     ESOPHAGOSCOPY, GASTROSCOPY, DUODENOSCOPY (EGD), COMBINED  3/17/2014    Procedure: COMBINED ESOPHAGOSCOPY, GASTROSCOPY, DUODENOSCOPY (EGD), BIOPSY SINGLE OR MULTIPLE;;  Surgeon: Duane, William Charles, MD;  Location: [facility]     FUSION CERVICAL POSTERIOR ONE LEVEL  11/18/2013    Procedure: FUSION CERVICAL POSTERIOR ONE LEVEL;  Cervical 1-2 Posterior Cervical Fusion (Harms Procedure);  Surgeon: Carrol Gunn MD;  [facility]     GYN SURGERY       INJECT MAJOR JOINT / BURSA Left 1/5/2017    Procedure: INJECT MAJOR JOINT / BURSA;  Surgeon: Fredy Patel DO;  [facility]     INJECT STEROID (LOCATION) Left 6/18/2015    Procedure: INJECT STEROID (LOCATION);  Surgeon: Carrol Gaspar MD;  [facility]     NECK SURGERY       REMOVE FOREIGN BODY UPPER EXTREMITY Right 3/2/2017    Procedure: REMOVE FOREIGN BODY UPPER EXTREMITY;  Surgeon: Carrol Gaspar MD;  [facility]     RESECT BONE UPPER EXTREMITY Left 4/27/2017    Procedure: RESECT BONE UPPER EXTREMITY;  Left Radial Head Resection;  Surgeon: Carrol Gaspar MD;  [facility]     ROTATOR CUFF REPAIR RT/LT  10/19/2005    1.left distal clavicle excision.2.acromioplasty.3.coracoacromial ligament resection.4.rotator cuff exploration       Family History   Problem Relation Age of Onset     Diabetes Mother      Hypertension Mother      Allergies Mother      Alcohol/Drug Mother      LIVER CIRROSIS     Blood Disease Mother      B12 DEF     Neurologic Disorder Mother      Epilepsy     Osteoporosis Mother      Cerebrovascular Disease Maternal Grandmother      Arthritis Maternal Grandmother      RHEUMATOID     Cancer Maternal Grandmother      Thyroid Disease Maternal Grandmother      Osteoporosis Maternal Grandmother      Heart Disease Maternal Grandmother      Depression Maternal Grandmother      Neurologic Disorder Maternal Grandmother      MIGRAINES      Anxiety Disorder Maternal Grandmother      Diabetes Maternal Grandmother      Migraines Maternal Grandmother      Deep Vein Thrombosis Maternal Grandmother      Cerebrovascular Disease Maternal Grandfather      Cancer Maternal Grandfather      Mental Illness Maternal Grandfather      Cerebrovascular Disease Paternal Grandmother      Arthritis Paternal Grandmother      RHEUMATOID     Cancer Paternal Grandmother      Osteoporosis Paternal Grandmother      Heart Disease Paternal Grandmother      Depression Paternal Grandmother      Neurologic Disorder Paternal Grandmother      MIGRAINES     Thyroid Disease Paternal Grandmother      Cerebrovascular Disease Paternal Grandfather      Cancer Paternal Grandfather      Heart Disease Brother      MURMUR     Asthma Son      Endocrine Disease Son      Neurologic Disorder Father      epilepsy     Seizure Disorder Father      Hypertension Father      Skin Cancer Father      Anxiety Disorder Father      Mental Illness Father      Hyperlipidemia Father      Neurologic Disorder Daughter      MIGRAINES     Arthritis Daughter      JR     Hypertension Son      LUNG DISEASE Brother      Anxiety Disorder Son      Asthma Son      Hypertension Son      Migraines Son      Spine Problems Son      Mental Illness Brother      Migraines Brother      Cardiac Sudden Death Brother      Spine Problems Brother      Glaucoma No family hx of      Macular Degeneration No family hx of      Unknown/Adopted No family hx of      Anesthesia Reaction No family hx of      Other Cancer No family hx of      Rheumatoid Arthritis No family hx of      Bone Cancer No family hx of      Low Back Problems No family hx of        Social History   Substance Use Topics     Smoking status: Former Smoker     Packs/day: 0.10     Years: 10.00     Types: Cigarettes     Start date: 8/6/1983     Smokeless tobacco: Never Used      Comment: Quit 6 weeks ago     Alcohol use No       No current facility-administered medications for  this encounter.      Current Outpatient Prescriptions   Medication     ClonazePAM (KLONOPIN PO)     leflunomide (ARAVA) 20 MG tablet     omeprazole (PRILOSEC) 40 MG DR capsule     oxyCODONE (ROXICODONE) 5 MG tablet     senna-docusate (SENOKOT-S/PERICOLACE) 8.6-50 MG tablet     bisacodyl (DULCOLAX) 10 MG suppository     Botulinum Toxin Type A (BOTOX) 200 UNITS SOLR     Cyanocobalamin (VITAMIN B-12 PO)     meclizine (ANTIVERT) 25 MG tablet     OnabotulinumtoxinA (BOTOX IJ)     OnabotulinumtoxinA (BOTOX IJ)        Allergies   Allergen Reactions     Morphine Swelling     Gabapentin Other (See Comments)     Pins,needles, stabbing aching at once  Pins,needles, stabbing aching at once  Numbness and Tingling     Ibuprofen      Doesn't take due to gastric ulcer     Sulfa Drugs      unknown     Tylenol [Acetaminophen]      Pt. States that she has Liver problems  Tylenol 3     Erythromycin Nausea     Vomiting      Penicillins Rash     Prednisone Palpitations     Heart racing       I have reviewed the Medications, Allergies, Past Medical and Surgical History, and Social History in the Epic system.    Review of Systems     ROS: 14 point ROS neg other than the symptoms noted above in the HPI.    Physical Exam   BP: 140/80 (Right leg )  Pulse: 109  Temp: 98.4  F (36.9  C)  Resp: 16  SpO2: 98 %      Physical Exam   Constitutional: She is oriented to person, place, and time. She appears well-developed and well-nourished.   HENT:   Head: Normocephalic and atraumatic.   Mouth/Throat: Oropharynx is clear and moist.   Eyes: EOM are normal. Pupils are equal, round, and reactive to light.   Neck: Normal range of motion. Neck supple. No tracheal deviation present. No thyromegaly present.   Cardiovascular: Normal rate, regular rhythm, normal heart sounds and intact distal pulses.  Exam reveals no gallop and no friction rub.    No murmur heard.  Pulmonary/Chest: Effort normal and breath sounds normal. She exhibits no tenderness.   Abdominal:  Soft. Bowel sounds are normal. She exhibits no distension and no mass. There is no tenderness.   Musculoskeletal: She exhibits tenderness. She exhibits no edema.        Arms:  There is no sign of cellulitis, compartment syndrome, or other complication in the left upper extremity.  The right upper extremity is immobilized with a surgical dressing.   Neurological: She is alert and oriented to person, place, and time. No cranial nerve deficit. Coordination normal.   Skin: Skin is warm and dry. No rash noted. There is pallor.   Psychiatric: She has a normal mood and affect. Her behavior is normal.   Nursing note and vitals reviewed.      ED Course     ED Course     Procedures       12:07 PM  The patient was seen and examined by Dr. Wade in Room 2.          EKG Interpretation:      Interpreted by Mika Wade  Time reviewed: 1228  Symptoms at time of EKG: dizzy   Rhythm: normal sinus   Rate: normal  Axis: normal  Ectopy: none  Conduction: normal  ST Segments/ T Waves: No ST-T wave changes  Q Waves: none  Comparison to prior: Unchanged    Clinical Impression: normal EKG          Critical Care time:  none             Labs Ordered and Resulted from Time of ED Arrival Up to the Time of Departure from the ED   CBC WITH PLATELETS DIFFERENTIAL - Abnormal; Notable for the following:        Result Value    RBC Count 3.61 (*)     Hemoglobin 10.3 (*)     Hematocrit 33.0 (*)     MCHC 31.2 (*)     All other components within normal limits   BASIC METABOLIC PANEL - Abnormal; Notable for the following:     Urea Nitrogen 6 (*)     Calcium 8.0 (*)     All other components within normal limits   MAGNESIUM            Assessments & Plan (with Medical Decision Making)   Patient with an extensive past medical history including a prior history of DVT, presenting with left upper extremity pain and tenderness at the site of a recent peripheral IV start during hospitalization for total elbow surgery.  Differential diagnosis includes  superficial thrombophlebitis, DVT, cellulitis, abscess, lymphangitis, musculoskeletal pain.  Patient did also complain of feeling lightheaded in route.  Differential diagnosis of her lightheadedness initially considered included postoperative anemia, postoperative infection with sepsis, volume depletion, arrhythmia, aortic stenosis, PE, MI,  as well as more benign etiologies such as vasovagal and orthostatic syncope.  Her exam is significant for tenderness, and an area of firm superficial phlebitis which extends approximately 2 cm proximal to the left antecubital fossa.  There is no redness, warmth, erythema, and her distal CMS is intact with no signs of compartment syndrome.  Her cardiovascular exam is normal.  The remainder of a complete physical exam is normal.  Labs reveal baseline hemoglobin, normal white count, normal electrolytes.  Ultrasound confirms a small area of venous thrombosis in the superficial basilic vein consistent with her physical exam.  I did obtain an EKG due to her complaint of lightheadedness while in route which is normal.  Her lightheadedness has resolved.  She is not endorsing any chest pain, shortness of breath, her heart rate is normal, she is not hypoxic.  Patient clearly appears to have a case of superficial phlebitis.  Based on the clinical findings and the entire clinical scenario, the patient appears stable at this time to be treated symptomatically, and to follow-up as an outpatient for any further evaluation and treatment.  Discussed expected course, need for follow up, and indications for return with the patient.  See discharge instructions.        I have reviewed the nursing notes.    I have reviewed the findings, diagnosis, plan and need for follow up with the patient.    Discharge Medication List as of 12/2/2018  3:00 PM          Final diagnoses:   Superficial phlebitis of arm   I, Jonathan Plummer, am serving as a trained medical scribe to document services personally performed by  Pancho Wade MD, based on the provider's statements to me.   I, Pancho Wade MD, was physically present and have reviewed and verified the accuracy of this note documented by Jonathan Plummer.      12/2/2018   [facility], [place], EMERGENCY DEPARTMENT     Mika Wade MD  12/02/18 3284

## 2018-12-02 NOTE — DISCHARGE INSTRUCTIONS
Thank you for choosing [facility].     Please closely monitor for further symptoms. Return to the Emergency Department if you develop any new or worsening signs or symptoms.    If you received any opiate pain medications or sedatives during your visit, please do not drive for at least 8 hours.     Labs, cultures or final xray interpretations may still need to be reviewed.  We will call you if your plan of care needs to be changed.    Please follow up with your primary care physician or clinic.

## 2018-12-02 NOTE — ED AVS SNAPSHOT
[facility], Emergency Department    [address]    [facility] [place] [address]    Phone:  [phone]    Fax:  [phone]                                       Kalyn Rivero   MRN: [id number]    Department:  [facility], Emergency Department   Date of Visit:  12/2/2018           Patient Information     Date Of Birth          1970        Your diagnoses for this visit were:     Superficial phlebitis of arm        You were seen by Mika Wade MD.        Discharge Instructions       Thank you for choosing [facility].     Please closely monitor for further symptoms. Return to the Emergency Department if you develop any new or worsening signs or symptoms.    If you received any opiate pain medications or sedatives during your visit, please do not drive for at least 8 hours.     Labs, cultures or final xray interpretations may still need to be reviewed.  We will call you if your plan of care needs to be changed.    Please follow up with your primary care physician or clinic.      Discharge References/Attachments     THROMBOPHLEBITIS, SUPERFICIAL (ENGLISH)      Your next 10 appointments already scheduled     Dec 13, 2018 11:20 AM CST   (Arrive by 11:05 AM)   Return Botox with Rosina Quinones MD   [facility] ([facility])    [address] [place] [address]  3rd [facility] [address] [phone]            Dec 13, 2018  2:00 PM CST   (Arrive by 1:45 PM)   Return Visit with Aakash Zimmer MD   [facility] ([facility])    [address] [place] [address] [phone]              24 Hour Appointment Hotline       To make an appointment at any [facility], call [phone] ([phone]). If you don't have a family doctor or clinic, we will help you find one. [facility] are conveniently located to serve the needs of you and  your family.             Review of your medicines      Our records show that you are taking the medicines listed below. If these are incorrect, please call your family doctor or clinic.        Dose / Directions Last dose taken    bisacodyl 10 MG suppository   Commonly known as:  DULCOLAX   Dose:  10 mg   Quantity:  2 suppository        Place 1 suppository (10 mg) rectally daily as needed   Refills:  0        * BOTOX IJ   Dose:  170 Units        Inject 170 Units as directed Lot # /[id number] with Expiration Date:  10/2020   Refills:  0        * BOTOX IJ   Dose:  200 Units        Inject 200 Units as directed Total dose 200 units  Administered 190 units  Unavoidable waste 30 units  Lot # /[id number] with Expiration Date:  12/2020  NDC [address]-47   Refills:  0        * Botulinum Toxin Type A 200 units injection   Commonly known as:  BOTOX   Dose:  200 Units   Quantity:  200 Units        Inject 200 Units as directed every 3 months   Refills:  3        KLONOPIN PO   Dose:  0.5 mg        Take 0.5 mg by mouth 3 times daily   Refills:  0        leflunomide 20 MG tablet   Commonly known as:  ARAVA   Dose:  20 mg        Take 20 mg by mouth every morning Reported on 3/28/2017   Refills:  0        meclizine 25 MG tablet   Commonly known as:  ANTIVERT   Dose:  25 mg   Quantity:  30 tablet        Take 1 tablet (25 mg) by mouth every 6 hours as needed for dizziness   Refills:  1        omeprazole 40 MG DR capsule   Commonly known as:  priLOSEC   Dose:  40 mg   Quantity:  30 capsule        Take 1 capsule (40 mg) by mouth 2 times daily (before meals)   Refills:  0        oxyCODONE 5 MG tablet   Commonly known as:  ROXICODONE   Dose:  5-10 mg   Quantity:  60 tablet        Take 1-2 tablets (5-10 mg) by mouth every 3 hours as needed for moderate to severe pain   Refills:  0        senna-docusate 8.6-50 MG tablet   Commonly known as:  SENOKOT-S/PERICOLACE   Dose:  1-2 tablet   Quantity:  100 tablet        Take 1-2 tablets by mouth 2 times  daily For constipation.   Refills:  1        VITAMIN B-12 PO   Dose:  4 tablet        Take 4 tablets by mouth every morning   Refills:  0        * Notice:  This list has 3 medication(s) that are the same as other medications prescribed for you. Read the directions carefully, and ask your doctor or other care provider to review them with you.            Procedures and tests performed during your visit     Basic metabolic panel    CBC with platelets differential    EKG 12 lead    Magnesium    US Upper Extremity Venous Duplex Left      Orders Needing Specimen Collection     None      Pending Results     No orders found from 11/30/2018 to 12/3/2018.            Pending Culture Results     No orders found from 11/30/2018 to 12/3/2018.            Pending Results Instructions     If you had any lab results that were not finalized at the time of your Discharge, you can call the ED Lab Result RN at [phone]. You will be contacted by this team for any positive Lab results or changes in treatment. The nurses are available 7 days a week from 10A to 6:30P.  You can leave a message 24 hours per day and they will return your call.        Thank you for choosing [facility]       Thank you for choosing [facility] for your care. Our goal is always to provide you with excellent care. Hearing back from our patients is one way we can continue to improve our services. Please take a few minutes to complete the written survey that you may receive in the mail after you visit with us. Thank you!        Care EveryWhere ID     This is your Care EveryWhere ID. This could be used by other organizations to access your [facility] medical records  [id number]        Equal Access to Services     MARY BURTON : Makeda Morrell, francine gilbert, lila pineda adealexis hardin. So [facility] [phone].    ATENCIÓN: Si habla español, tiene a holcomb disposición servicios gratuitos de asistencia lingüística. Llame  al [phone].    We comply with applicable federal civil rights laws and [place] laws. We do not discriminate on the basis of race, color, national origin, age, disability, sex, sexual orientation, or gender identity.            After Visit Summary       This is your record. Keep this with you and show to your community pharmacist(s) and doctor(s) at your next visit.

## 2018-12-02 NOTE — ED AVS SNAPSHOT
[facility], Emergency Department    [address]    [facility] [address]    Phone:  [phone]    Fax:  [phone]                                       Kalyn Rivero   MRN: [id number]    Department:  [facility], Emergency Department   Date of Visit:  12/2/2018           After Visit Summary Signature Page     I have received my discharge instructions, and my questions have been answered. I have discussed any challenges I see with this plan with the nurse or doctor.    ..........................................................................................................................................  Patient/Patient Representative Signature      ..........................................................................................................................................  Patient Representative Print Name and Relationship to Patient    ..................................................               ................................................  Date                                   Time    ..........................................................................................................................................  Reviewed by Signature/Title    ...................................................              ..............................................  Date                                               Time          22EPIC Rev 08/18

## 2018-12-02 NOTE — ED NOTES
Bed: ED02  Expected date: 12/2/18  Expected time: 11:24 AM  Means of arrival:   Comments:  [address] 48 y.o female. Had surgery on right elbow on Thursday. Now has a bump on right a/c. Hx of blood clots.

## 2018-12-02 NOTE — DISCHARGE SUMMARY
ORTHOPAEDIC DISCHARGE SUMMARY     Date of Admission: 11/27/2018  Date of Discharge: 11/30/2018  6:10 PM  Disposition: Home  Staff Physician: No att. 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  She developed a periprosthetic fracture and underwent internal fixation which subsequently failed.  She has a nonunion as well as a displaced fracture through her humeral diaphysis.  Preoperative evaluation revealed no evidence of septic process.  After extensive discussion with the shoulder and hand surgery services, it was elected to proceed with distal humeral replacement.      Prior treatments:  1. 07/19/2017 right revision total elbow arthroplasty with a distal femoral allograft and ulnar nerve neurolysis with some addition of plate fixation of a periprosthetic humerus fracture ([place]) [facility].Memo VariAx 2.7/3.5 posterolateral humerus plate, 12-hole. [employer] Discovery portal elbow arthroplasty prosthesis.   2. Multiple other orthopaedic procedures, bilateral TKA, L shoulder hemiarthroplasty, L wrist replacement, C1-2 arthrodesis  3. Prior R ulnar n transposition by Dr. Gaspar with resolution of dysesthesias.  Sx's returned after repositioning by Dr. De Luna    HOSPITAL COURSE:    The patient was admitted post operatively. Kalyn Rivero has done well post-operatively. Medicine was consulted post operatively to aid in management of medical comorbidities. The patient received routine nursing cares  and is medically stable. Vital signs are stable. The patient is tolerating a regular diet without GI distress/nausea or vomiting. Voiding spontaneously. All PT/OT goals have been met for safe mobility. Pain is now controlled on oral medications. Stool softeners have been used while taking pain medications to help prevent constipation. Kalyn Rivero is deemed medically safe to discharge to home. IV antibiotics were continued throughout the patient's admission while following cultures. Cultures were negative at the time of discharge and abx were not continued. Cultures will continue to be followed.      Antibiotics:   given throughout the patient's stay as noted above  DVT prophylaxis:  Mechanical in hospital, no mechanical needed at discharge  PT Progress: He has met PT/OT goals for safe mobility.    Pain Meds:  He was weaned off all IV pain meds by discharge.  Inpatient Events: No significant events or complications.     Discharge orders and instructions as below.    FOLLOWUP:      Future Appointments  Date Time Provider Department Center   12/13/2018 11:20 AM Rosina Quinones MD [facility]   12/13/2018 2:00 PM Aakash Zimmer MD [facility]       Appointments at [facility]. Call [phone] if you haven't heard regarding these appointments within 7 days of discharge.    PLANNED DISCHARGE ORDERS:        Discharge Medication List as of 11/30/2018  5:38 PM      START taking these medications    Details   bisacodyl (DULCOLAX) 10 MG suppository Place 1 suppository (10 mg) rectally daily as needed, Disp-2 suppository, R-0, E-PrescribeUse if no BM by tomorrow.      oxyCODONE (ROXICODONE) 5 MG tablet Take 1-2 tablets (5-10 mg) by mouth every 3 hours as needed for moderate to severe pain, Disp-60 tablet, R-0, Local Print      senna-docusate (SENOKOT-S/PERICOLACE) 8.6-50 MG tablet Take 1-2 tablets by mouth 2 times daily For constipation., Disp-100 tablet, R-1, E-Prescribe          CONTINUE these medications which have CHANGED    Details   omeprazole (PRILOSEC) 40 MG DR capsule Take 1 capsule (40 mg) by mouth 2 times daily (before meals), Disp-30 capsule, Historical         CONTINUE these medications which have NOT CHANGED    Details   !! Botulinum Toxin Type A (BOTOX) 200 UNITS SOLR Inject 200 Units as directed every 3 months, Disp-200 Units, R-3, Injection      ClonazePAM (KLONOPIN PO) Take 0.5 mg by mouth 3 times daily , Historical      [employer] SENNA 8.6 MG tablet TAKE 2 TABLETS BY MOUTH 2 TIMES DAILY, Disp-120 tablet, R-1, E-Prescribe      Cyanocobalamin (VITAMIN B-12 PO) Take 4 tablets by mouth every morning , Historical      leflunomide (ARAVA) 20 MG tablet Take 20 mg by mouth every morning Reported on 3/28/2017, Historical      meclizine (ANTIVERT) 25 MG tablet Take 1 tablet (25 mg) by mouth every 6 hours as needed for dizziness, Disp-30 tablet, R-1, Local Print      !! OnabotulinumtoxinA (BOTOX IJ) Inject 200 Units as directed Total dose 200 units  Administered 190 units  Unavoidable waste 30 units  Lot # /C3 with Expiration Date:  12/2020  NDC [address]-53, Historical      !! OnabotulinumtoxinA (BOTOX IJ) Inject 170 Units as directed Lot # /C3 with Expiration Date:  10/2020, Historical       !! - Potential duplicate medications found. Please discuss with provider.      STOP taking these medications       acetaminophen (TYLENOL) 325 MG tablet Comments:   Reason for Stopping:         Celecoxib (CELEBREX PO) Comments:   Reason for Stopping:         ENBREL SURECLICK 50 MG/ML autoinjector Comments:   Reason for Stopping:                 Discharge Procedure Orders  Reason for your hospital stay   Order Comments: Right elbow revision surgery     Adult [facility] [place] Follow-up and recommended labs and tests   Order Comments: Follow up with Dr. Zimmer , at (location with clinic name or city) [facility], within 3 weeks  to evaluate after surgery. The following labs/tests are recommended: X  rays right humerus and elbow.    Appointments on [place] and/or [facility] (with [facility] or [facility] provider or service). Call [phone] if you haven't heard regarding these appointments within 7 days of discharge.     Activity   Order Comments: Your activity upon discharge:   No weightbearing on the right arm. Move your fingers and your wrist as much as possible.   Ok for moving your right elbow 0-90 degrees of flexion, 90 degrees to the side (abduction). External rotation to 30 degrees and internal rotation to the belly. Follow the precautions as taught by the therapists.   Order Specific Question Answer Comments   Is discharge order? Yes      When to contact your care team   Order Comments: CALL YOUR PHYSICIAN IF:  1.  Your pain begins to worsen and is unable to be controlled with your medications.  2.  Excessive redness or drainage of cloudy or bloody material from the wounds (Clear red tinted fluid and some mild drainage should be expected). Drainage of any kind 5 days after surgery should be reported to the doctor.  3.  You have a temperature elevation greater than 101.5    4.  You have pain, swelling or redness in your calf. You have numbness or weakness in your leg or foot.    During regular business hours call the [facility] [place] [facility] at [phone] and ask for the triage nurse. After hours or weekends call the hospital  at [phone] and ask for the ortho resident on call.     Discharge Instructions   Order Comments: A. COMFORT: Elevation - Elevate your right arm above the level of your heart. Swelling - An ice pack will be provided to control swelling and discomfort. Once the initial dressing is removed, place a thin towel between your skin and the ice pack. Pain Medication - Take medication as prescribed, but only as often as necessary. Avoid alcohol and driving if you are taking pain medication. Remember that Tylenol can be used for pain relief as well, as you wean off the narcotics. Maximum Tylenol dose  for a single day is 4000 mg.     B. ACTIVITIES: As described above. Athletic activities - Athletic activities, such as swimming, bicycling, jogging, running and stop-and-go-sports should be avoided until allowed by your doctor.     C. INCISION CARE: Try to leave the dressing in place until your follow up appointment. If it becomes soiled, you may take it off. Replaced a dressing with 4x4 gauze over the incision and an ace wrap or tape to hold it on. Nursing please supply dressing supplies at discharge. Steri-strips (small white tape that is directly on the incision areas), if present, should be left on until they fall off or are removed at your first office visit. You may also note strands of suture from each end of your incision. This is normal and is a knotless type of suture that can be trimmed at its base around 2 weeks from your surgery, otherwise it may be left to fall off on its own. Tub bathing, swimming, and soaking of the arm should be avoided until allowed by your doctor - usually 4 weeks after your surgery. Do not get the incision wet.     D. CALL OUR CLINIC ([phone] during regular office hours, or [phone] for the on-call resident MD for questions- RESIDENT DOES NOT HAVE ABILITY TO PRESCRIBE NARCOTICS) IF: Pain in your surgical area persists or worsens in the first few days after surgery. Excessive redness or drainage of cloudy or bloody material from the wounds (Clear red tinted fluid and some mild drainage should be expected). Drainage of any kind > one week after surgery should be reported to the doctor. You have a temperature elevation greater than 101.5  You have pain, swelling or redness in your calf. You have numbness or weakness in your arm or hand.     Discharge Instructions   Order Comments: C with RN - check clinically, VS, review meds, BMP, Mg, Hgb - results to PMD  Call primary MD to arrange follow up in next 1-2 weeks.  Suppository if no BM by 12/1     Diet   Order Comments:  Follow this diet upon discharge: Orders Placed This Encounter     Regular Diet Adult   Order Specific Question Answer Comments   Is discharge order? Yes            Discussed with Dr. Zimmer on the date of discharge.     Paul Bull MD  PGY-4  Orthopaedic Surgery  [phone]

## 2018-12-03 ENCOUNTER — TELEPHONE (OUTPATIENT)
Dept: ORTHOPEDICS | Facility: CLINIC | Age: 48
End: 2018-12-03

## 2018-12-03 ENCOUNTER — PATIENT OUTREACH (OUTPATIENT)
Dept: CARE COORDINATION | Facility: CLINIC | Age: 48
End: 2018-12-03

## 2018-12-03 DIAGNOSIS — Z96.621 ELBOW JOINT REPLACEMENT STATUS, RIGHT: Primary | ICD-10-CM

## 2018-12-03 LAB — INTERPRETATION ECG - MUSE: NORMAL

## 2018-12-03 NOTE — TELEPHONE ENCOUNTER
[facility] Call Center    Phone Message    May a detailed message be left on voicemail: yes    Reason for Call: Medication Question or concern regarding medication   Prescription Clarification  Name of Medication: Dilaudid  Prescribing Provider: Aakash Zimmer   Pharmacy: N/A   What on the order needs clarification? Needs Dilaudid instead of tylenol. Please call to discuss. Also needs to clarify home care.           Action Taken: Message routed to:  [facility] ([facility] Ortho

## 2018-12-03 NOTE — TELEPHONE ENCOUNTER
[facility] Call Center    Phone Message    May a detailed message be left on voicemail: yes    Reason for Call: Other: Pts son/PCA calling regarding medication again. Says this is urgent. Needs call back today.      Action Taken: Message routed to:  [facility] ([facility] Orthopedics    12/3/18 -  Called Kalyn back this morning.  She said that she was given oxycodone at that time of discharge and tylenol for breakthrough pain.  She states that she has an allergy to Tylenol and thought she was going to get Dilaudid for breakthrough pain.  She had been seen int ER over the weekend with severe right arm pain.  She was given #60 oxycodone and thinks that it will not last until her appt. With Dr. Zimmer on 12/13/18.   I told her that I would speak with Dr. Zimmer about her medications.    Dr. Zimmer called her back this afternoon.  He gave her instructions about moving her arm.  He explained that we don't give patients both Dilaudid and Oxycodone.  He told her that he expected that she would be in pain post-operatively.  He suggested that she lengthen the duration between oxycodone doses.  She will call when she needs a refill.  She had declined Home Care at the time of discharge.  Now she would like to utilitze home care.  Referral was sent to [facility].

## 2018-12-04 ENCOUNTER — TELEPHONE (OUTPATIENT)
Dept: ORTHOPEDICS | Facility: CLINIC | Age: 48
End: 2018-12-04
Payer: COMMERCIAL

## 2018-12-04 LAB
BACTERIA SPEC CULT: NORMAL
Lab: NORMAL
SPECIMEN SOURCE: NORMAL
SYNOVASURE PERIPROSTHETIC JOINT INFECTION DECTION: NORMAL

## 2018-12-04 NOTE — TELEPHONE ENCOUNTER
[facility] Call Center    Phone Message    May a detailed message be left on voicemail: yes    Reason for Call: Yaneth with [facility] called stating patient declined home care with them as she gets home care from [facility] but [facility] is stating otherwise, Yaneth is still working on helping patient since [facility] is stating otherwise      Action Taken: Message routed to:  [facility] ([facility] ORTHO

## 2018-12-05 DIAGNOSIS — M97.41XD: ICD-10-CM

## 2018-12-05 DIAGNOSIS — T84.098A OTHER MECHANICAL COMPLICATION OF INTERNAL ELBOW JOINT PROSTHESIS (H): ICD-10-CM

## 2018-12-05 DIAGNOSIS — Z96.629 OTHER MECHANICAL COMPLICATION OF INTERNAL ELBOW JOINT PROSTHESIS (H): ICD-10-CM

## 2018-12-05 RX ORDER — OXYCODONE HYDROCHLORIDE 5 MG/1
TABLET ORAL
Qty: 60 TABLET | Refills: 0 | Status: SHIPPED | OUTPATIENT
Start: 2018-12-05 | End: 2018-12-20

## 2018-12-11 DIAGNOSIS — Z98.890 STATUS POST SURGERY: Primary | ICD-10-CM

## 2018-12-11 LAB
BACTERIA SPEC CULT: NORMAL
Lab: NORMAL
SPECIMEN SOURCE: NORMAL

## 2018-12-12 ENCOUNTER — PATIENT OUTREACH (OUTPATIENT)
Dept: CARE COORDINATION | Facility: CLINIC | Age: 48
End: 2018-12-12

## 2018-12-13 ENCOUNTER — OFFICE VISIT (OUTPATIENT)
Dept: PHYSICAL MEDICINE AND REHAB | Facility: CLINIC | Age: 48
End: 2018-12-13
Payer: COMMERCIAL

## 2018-12-13 ENCOUNTER — OFFICE VISIT (OUTPATIENT)
Dept: ORTHOPEDICS | Facility: CLINIC | Age: 48
End: 2018-12-13
Payer: COMMERCIAL

## 2018-12-13 ENCOUNTER — ANCILLARY PROCEDURE (OUTPATIENT)
Dept: GENERAL RADIOLOGY | Facility: CLINIC | Age: 48
End: 2018-12-13
Attending: ORTHOPAEDIC SURGERY
Payer: COMMERCIAL

## 2018-12-13 VITALS
RESPIRATION RATE: 16 BRPM | OXYGEN SATURATION: 96 % | HEART RATE: 90 BPM | HEIGHT: 66 IN | BODY MASS INDEX: 22.82 KG/M2 | SYSTOLIC BLOOD PRESSURE: 150 MMHG | TEMPERATURE: 97.8 F | WEIGHT: 142 LBS | DIASTOLIC BLOOD PRESSURE: 95 MMHG

## 2018-12-13 DIAGNOSIS — G43.719 INTRACTABLE CHRONIC MIGRAINE WITHOUT AURA AND WITHOUT STATUS MIGRAINOSUS: Primary | ICD-10-CM

## 2018-12-13 DIAGNOSIS — M97.41XD: ICD-10-CM

## 2018-12-13 DIAGNOSIS — Z98.890 STATUS POST SURGERY: ICD-10-CM

## 2018-12-13 DIAGNOSIS — Z96.621 ELBOW JOINT REPLACEMENT STATUS, RIGHT: Primary | ICD-10-CM

## 2018-12-13 RX ORDER — CELECOXIB 100 MG/1
200 CAPSULE ORAL 3 TIMES DAILY
COMMUNITY
End: 2021-09-22 | Stop reason: DRUGHIGH

## 2018-12-13 ASSESSMENT — PAIN SCALES - GENERAL: PAINLEVEL: EXTREME PAIN (9)

## 2018-12-13 ASSESSMENT — MIFFLIN-ST. JEOR: SCORE: 1295.86

## 2018-12-13 NOTE — PROGRESS NOTES
"MIGRAINE BOTOX HEADACHE PROCEDURE NOTE     BP (!) 150/95   Pulse 90   Temp 97.8  F (36.6  C)   Resp 16   Ht 1.676 m (5' 6\")   Wt 64.4 kg (142 lb)   LMP  (LMP Unknown)   SpO2 96%   BMI 22.92 kg/m         Current Outpatient Medications:      Botulinum Toxin Type A (BOTOX) 200 UNITS SOLR, Inject 200 Units as directed every 3 months (Patient taking differently: Inject 200 Units as directed every 3 months LAST INJECT WAS 09/2018), Disp: 200 Units, Rfl: 3     celecoxib (CELEBREX) 100 MG capsule, Take 100 mg by mouth, Disp: , Rfl:      ClonazePAM (KLONOPIN PO), Take 0.5 mg by mouth 3 times daily , Disp: , Rfl:      leflunomide (ARAVA) 20 MG tablet, Take 20 mg by mouth every morning Reported on 3/28/2017, Disp: , Rfl:      OnabotulinumtoxinA (BOTOX IJ), Inject 200 Units as directed Total dose 200 units  Administered 190 units  Unavoidable waste 30 units  Lot # /C3 with Expiration Date:  12/2020  NDC [address]-07, Disp: , Rfl:      OnabotulinumtoxinA (BOTOX IJ), Inject 170 Units as directed Lot # /C3 with Expiration Date:  10/2020, Disp: , Rfl:      oxyCODONE (ROXICODONE) 5 MG tablet, Take one or two tablets by mouth every 4-6 hours as needed for pain., Disp: 60 tablet, Rfl: 0     senna-docusate (SENOKOT-S/PERICOLACE) 8.6-50 MG tablet, Take 1-2 tablets by mouth 2 times daily For constipation., Disp: 100 tablet, Rfl: 1     bisacodyl (DULCOLAX) 10 MG suppository, Place 1 suppository (10 mg) rectally daily as needed (Patient not taking: Reported on 12/13/2018), Disp: 2 suppository, Rfl: 0     Cyanocobalamin (VITAMIN B-12 PO), Take 4 tablets by mouth every morning , Disp: , Rfl:      meclizine (ANTIVERT) 25 MG tablet, Take 1 tablet (25 mg) by mouth every 6 hours as needed for dizziness (Patient not taking: Reported on 12/13/2018), Disp: 30 tablet, Rfl: 1     omeprazole (PRILOSEC) 40 MG DR capsule, Take 1 capsule (40 mg) by mouth 2 times daily (before meals), Disp: 30 capsule, Rfl:         Allergies   Allergen " Reactions     Morphine Swelling     Gabapentin Other (See Comments)     Pins,needles, stabbing aching at once  Pins,needles, stabbing aching at once  Numbness and Tingling     Ibuprofen      Doesn't take due to gastric ulcer     Sulfa Drugs      unknown     Tylenol [Acetaminophen]      Pt. States that she has Liver problems  Tylenol 3     Erythromycin Nausea     Vomiting      Penicillins Rash     Prednisone Palpitations     Heart racing        PHYSICAL EXAM  Pt states headache today, No throbbing in nature, rates 9/10 again.       JESSICA Mccain was diagnosed with rheumatoid arthritis since age 21 years, and migraine. She was getting Botox since age 21 years, she was getting them at [facility] but the physician left the state.    She has a history of Right elbow replacement, and developed a fracture of the humerus, progressed rotator cuff tear. She is about to see her surgeon on the 16th of April. She states that she is waiting for him to call her back.     She reports significant pain in the right elbow, which she states effects her migraines.she had surgery on 27th November, she was hospitalized for 4 days due to pain. She is currently on oxycodone and klonopin.      She is accompanied by her PCA today.     Patient reports the following new medical problems since last visit: Right arm surgery November 27, 2018 at  [facility] by Dr Zimmer.      RESPONSE TO PREVIOUS TREATMENT:    Patient Kalyn Rivero received 190 units of Botox  on 9/20/18.     Problems following the previous series of neurotoxin injections included:  No problems reported    Benefits by Patient Report:       1.  Headache Frequency During this Injection Cycle:  0 headache since last injections up until 3 weeks ago. In the last 3 weeks, she has had non stop migraine. Associated with photophobia.           2.  Headache Duration During this Injection Cycle: ongoing   Dull in nature      3.  Headache Intensity During this Injection Cycle:   "Headache intensity during this injection cycle:      A.  2/10  =  Typical pain level.    B.  10/10  =  Worst pain level in the last 3 weeks    C.  0/10  =  Lowest pain level.       4.  Change in headache medication usage during this injection cycle:  (For Example:  Able to decrease use of oral pain medications.)  In the last 5 weeks, she had trigger point injections for TMJ.      5.  ER Visits During This Injection Cycle:  Yes went to [facility] ED for a \"blood clot\".        6.  Functional Performance:  Change in ADL's, social interaction, days lost from work, etc. No.         BOTULINUM NEUROTOXIN INJECTION PROCEDURES:    VERIFICATION OF PATIENT IDENTIFICATION  Responsible Person:  JQ  : verified JQ  Full Name: verified JQ    INDICATIONS FOR PROCEDURES:  Kalyn Rivero is a 48 year old patient with headache affecting the head, neck and shoulder girdle musculature secondary to a diagnosis of Chronic Migraine Headache with associated pain.    Her baseline symptoms have been recalcitrant to oral medications and conservative therapy.  She is here today for reinjection with Botox.    GOAL OF PROCEDURE:  The goal of this procedure is to improve decrease pain  associated with headache.    TOTAL DOSE ADMINISTERED:  Total dose; 200 units   Dose Administered:  190 units Botox (Botulinum Toxin Type A)       2:1 Dilution   Unavoidable waste 10 units   Diluent Used:  Preservative Free Normal Saline  Total Volume of Diluent Used:  4.0 ml  Lot # /C3 with Expiration Date:  2021   NDC [address]-86    CONSENT:  The risks, benefits, and treatment options were discussed with Kalyn Rivero and she agreed to proceed.  Consent obtained by: Giuliana QUINTERO     EQUIPMENT USED:  Needle-25mm stimulating/recording  Needle-37mm stimulating/recording  EMG/NCS Machine    SKIN PREPARATION:  Skin preparation was performed using an alcohol wipe.      GUIDANCE DESCRIPTION:  Electro-myographic guidance was necessary " throughout the posterior neck portion to accurately identify all areas of spastic muscles while avoiding injection of non-spastic muscles, neighboring nerves and nearby vascular structures.     AREA/MUSCLE INJECTED:    HEAD & SCALP MUSCLES:  190 units Botox = Total Dose, 2:1 Dilution  Right Trapezius - 15 units in 3 sites   Left Trapezius - 10 units in 3 sites     Right occipitalis - 20 units of Botox at 4 site/s.  Left Occipitalis - 15 units at 3 sites     Right Paracervical  15 units in 3 sites   Left paracervical 5 units in 2 sites     Right longissimus cervicis 5 units in 1 site  Right semispinalis cervicis  5 units in 1 site   Right Lev scapulae 5 units in 1 site         Right Frontalis - 15 units of Botox at 3 site/s.  Left Frontalis - 15 units of Botox at 3 site/s.    Right Temporalis - 25 units of Botox at 5 site/s.  Left Temporalis - 20 units of Botox at 4 site/s.    Right Masseter, superficial belly   1 site 2.5 units   Left Masseter, superficial belly 1 site 2.5 units     Right  - 5 units of Botox at 1 site/s.   Left  - 5 units of Botox at 1 site/s.    Procerus - 5 units of Botox at 1 site/s.          RESPONSE TO PROCEDURE:  Kalyn Rivero tolerated the procedure well and there were no immediate complications.   She was allowed to recover for an appropriate period of time and was discharged home in stable condition.       Follow Up  Kalyn Rivero was asked to follow up by phone in 7-14 days with Annamaria Lamb RN, Care Coordinator, to report her response to this series of injections.  Based on the patient's previous response to this therapy, Kalyn Rivero was rescheduled for the next series of injections in 12 weeks.      Rosina Quinones MD, MHA   Physical Medicine & Rehabilitation

## 2018-12-13 NOTE — LETTER
"12/13/2018       RE: Kalyn Rivero  [address] [facility] [address]     Dear Colleague,    Thank you for referring your patient, Kalyn Rivero, to the [facility] PHYSICAL MEDICINE AND REHABILITATION at [facility]. Please see a copy of my visit note below.    MIGRAINE BOTOX HEADACHE PROCEDURE NOTE     BP (!) 150/95   Pulse 90   Temp 97.8  F (36.6  C)   Resp 16   Ht 1.676 m (5' 6\")   Wt 64.4 kg (142 lb)   LMP  (LMP Unknown)   SpO2 96%   BMI 22.92 kg/m          Current Outpatient Medications:      Botulinum Toxin Type A (BOTOX) 200 UNITS SOLR, Inject 200 Units as directed every 3 months (Patient taking differently: Inject 200 Units as directed every 3 months LAST INJECT WAS 09/2018), Disp: 200 Units, Rfl: 3     celecoxib (CELEBREX) 100 MG capsule, Take 100 mg by mouth, Disp: , Rfl:      ClonazePAM (KLONOPIN PO), Take 0.5 mg by mouth 3 times daily , Disp: , Rfl:      leflunomide (ARAVA) 20 MG tablet, Take 20 mg by mouth every morning Reported on 3/28/2017, Disp: , Rfl:      OnabotulinumtoxinA (BOTOX IJ), Inject 200 Units as directed Total dose 200 units  Administered 190 units  Unavoidable waste 30 units  Lot # /C3 with Expiration Date:  12/2020  NDC [address]-91, Disp: , Rfl:      OnabotulinumtoxinA (BOTOX IJ), Inject 170 Units as directed Lot # /C3 with Expiration Date:  10/2020, Disp: , Rfl:      oxyCODONE (ROXICODONE) 5 MG tablet, Take one or two tablets by mouth every 4-6 hours as needed for pain., Disp: 60 tablet, Rfl: 0     senna-docusate (SENOKOT-S/PERICOLACE) 8.6-50 MG tablet, Take 1-2 tablets by mouth 2 times daily For constipation., Disp: 100 tablet, Rfl: 1     bisacodyl (DULCOLAX) 10 MG suppository, Place 1 suppository (10 mg) rectally daily as needed (Patient not taking: Reported on 12/13/2018), Disp: 2 suppository, Rfl: 0     Cyanocobalamin (VITAMIN B-12 PO), Take 4 tablets by mouth every morning , Disp: , Rfl:      meclizine " (ANTIVERT) 25 MG tablet, Take 1 tablet (25 mg) by mouth every 6 hours as needed for dizziness (Patient not taking: Reported on 12/13/2018), Disp: 30 tablet, Rfl: 1     omeprazole (PRILOSEC) 40 MG DR capsule, Take 1 capsule (40 mg) by mouth 2 times daily (before meals), Disp: 30 capsule, Rfl:         Allergies   Allergen Reactions     Morphine Swelling     Gabapentin Other (See Comments)     Pins,needles, stabbing aching at once  Pins,needles, stabbing aching at once  Numbness and Tingling     Ibuprofen      Doesn't take due to gastric ulcer     Sulfa Drugs      unknown     Tylenol [Acetaminophen]      Pt. States that she has Liver problems  Tylenol 3     Erythromycin Nausea     Vomiting      Penicillins Rash     Prednisone Palpitations     Heart racing        PHYSICAL EXAM  Pt states headache today, No throbbing in nature, rates 9/10 again.       JESSICA Mccain was diagnosed with rheumatoid arthritis since age 21 years, and migraine. She was getting Botox since age 21 years, she was getting them at [facility] but the physician left the state.    She has a history of Right elbow replacement, and developed a fracture of the humerus, progressed rotator cuff tear. She is about to see her surgeon on the 16th of April. She states that she is waiting for him to call her back.     She reports significant pain in the right elbow, which she states effects her migraines.she had surgery on 27th November, she was hospitalized for 4 days due to pain. She is currently on oxycodone and klonopin.      She is accompanied by her PCA today.     Patient reports the following new medical problems since last visit: Right arm surgery November 27, 2018 at  [facility] by Dr Zimmer.      RESPONSE TO PREVIOUS TREATMENT:    Patient Kalyn Rivero received 190 units of Botox  on 9/20/18.     Problems following the previous series of neurotoxin injections included:  No problems reported    Benefits by Patient Report:       1.   "Headache Frequency During this Injection Cycle:  0 headache since last injections up until 3 weeks ago. In the last 3 weeks, she has had non stop migraine. Associated with photophobia.           2.  Headache Duration During this Injection Cycle: ongoing   Dull in nature      3.  Headache Intensity During this Injection Cycle:  Headache intensity during this injection cycle:      A.  2/10  =  Typical pain level.    B.  10/10  =  Worst pain level in the last 3 weeks    C.  0/10  =  Lowest pain level.       4.  Change in headache medication usage during this injection cycle:  (For Example:  Able to decrease use of oral pain medications.)  In the last 5 weeks, she had trigger point injections for TMJ.      5.  ER Visits During This Injection Cycle:  Yes went to [facility] ED for a \"blood clot\".        6.  Functional Performance:  Change in ADL's, social interaction, days lost from work, etc. No.         BOTULINUM NEUROTOXIN INJECTION PROCEDURES:    VERIFICATION OF PATIENT IDENTIFICATION  Responsible Person:  JQ  : verified JQ  Full Name: verified JQ    INDICATIONS FOR PROCEDURES:  Kalyn Rivero is a 48 year old patient with headache affecting the head, neck and shoulder girdle musculature secondary to a diagnosis of Chronic Migraine Headache with associated pain.    Her baseline symptoms have been recalcitrant to oral medications and conservative therapy.  She is here today for reinjection with Botox.    GOAL OF PROCEDURE:  The goal of this procedure is to improve decrease pain  associated with headache.    TOTAL DOSE ADMINISTERED:  Total dose; 200 units   Dose Administered:  190 units Botox (Botulinum Toxin Type A)       2:1 Dilution   Unavoidable waste 10 units   Diluent Used:  Preservative Free Normal Saline  Total Volume of Diluent Used:  4.0 ml  Lot # /C3 with Expiration Date:  2021   NDC [address]-66    CONSENT:  The risks, benefits, and treatment options were discussed with Kalyn " Shayy Rivero and she agreed to proceed.  Consent obtained by: Giuliana Diaz [facility]     EQUIPMENT USED:  Needle-25mm stimulating/recording  Needle-37mm stimulating/recording  EMG/NCS Machine    SKIN PREPARATION:  Skin preparation was performed using an alcohol wipe.      GUIDANCE DESCRIPTION:  Electro-myographic guidance was necessary throughout the posterior neck portion to accurately identify all areas of spastic muscles while avoiding injection of non-spastic muscles, neighboring nerves and nearby vascular structures.     AREA/MUSCLE INJECTED:    HEAD & SCALP MUSCLES:  190 units Botox = Total Dose, 2:1 Dilution  Right Trapezius - 15 units in 3 sites   Left Trapezius - 10 units in 3 sites     Right occipitalis - 20 units of Botox at 4 site/s.  Left Occipitalis - 15 units at 3 sites     Right Paracervical  15 units in 3 sites   Left paracervical 5 units in 2 sites     Right longissimus cervicis 5 units in 1 site  Right semispinalis cervicis  5 units in 1 site   Right Lev scapulae 5 units in 1 site         Right Frontalis - 15 units of Botox at 3 site/s.  Left Frontalis - 15 units of Botox at 3 site/s.    Right Temporalis - 25 units of Botox at 5 site/s.  Left Temporalis - 20 units of Botox at 4 site/s.    Right Masseter, superficial belly   1 site 2.5 units   Left Masseter, superficial belly 1 site 2.5 units     Right  - 5 units of Botox at 1 site/s.   Left  - 5 units of Botox at 1 site/s.    Procerus - 5 units of Botox at 1 site/s.          RESPONSE TO PROCEDURE:  Kalyn Rivero tolerated the procedure well and there were no immediate complications.   She was allowed to recover for an appropriate period of time and was discharged home in stable condition.       Follow Up  Kalyn Rivero was asked to follow up by phone in 7-14 days with Annamaria Lamb RN, Care Coordinator, to report her response to this series of injections.  Based on the patient's previous response to this therapy,  Kalyn Rivero was rescheduled for the next series of injections in 12 weeks.      Rosina Quinones MD, MHA   Physical Medicine & Rehabilitation

## 2018-12-13 NOTE — LETTER
12/13/2018       RE: Kalyn Rivero  [address] [facility] [address]     Dear Colleague,    Thank you for referring your patient, Kalyn Rivero, to the [facility] at [facility]. Please see a copy of my visit note below.    Kalyn Rivero MRN# [id number]   Age: 48 year old YOB: 1970     [place]ing physician[place] Mika [place] [facility]     [place] history:  DX:  1. RA  2. Chronic pain  3. Failed right total elbow arthroplasty   4. Right distal humerus periprosthetic fracture nonunion   5. Failed internal fixation      TREATMENTS:  1. 9/30/2015 (Oleg Webber): Right total elbow arthroplasty, ulnar nerve transposition  1. Implants: Biomet discovery TEA; Humerus [id number]; Ulna [id number]  2. 7/19/2017 (Calixto): Revision right total elbow arthroplasty with a distal humerus allograft and ulnar nerve neurolysis with some addition of plate fixation of a periprosthetic humerus fracture ([place]ixto[place] [facility]  1. Implants: Kinsman VariAx posterolateral humerus plate, 12-hole. [employer] (formerly [employer]) Discovery portal elbow arthroplasty prosthesis.   3. Multiple other orthopaedic procedures, bilateral TKA, L total shoulder arthroplasty, L wrist replacement, C1-2 arthrodesis    4. 11/27/2018, aspiration of right elbow, explanation of hardware right distal humerus, explantation of hardware right distal humerus, excision of right humeral diaphysis and distal humerus, right distal humeral replacement, revision of right total elbow arthroplasty (Goran)     Postoperative follow-up clinic visit  Kalyn Rivero is a 48 year old female returning to the office today for post-operative care; on 11/27/2018 she underwent removal of hardware right distal humerus and right distal humerus replacement with total elbow arthroplasty.  Patient states that she is doing well.  She continues to keep a soft wrap on  the arm.  Her pain is controlled.    EXAM:  RUE: Dressing removed    Arm incision is healing well; ends of suture were clipped, Steri-Strips removed    Small amount of Steri-Strips; no active drainage    Elbow range of motion 5-110 degrees    Arm and forearm compartments soft and compressible    +FPL/EPL/FDP/EDC/IO; SILT R/M/U nerve distributions    Palpable radial pulse    IMPRESSION:  Ms. Rivero is s/p  removal of hardware right distal humerus and right distal humerus replacement with total elbow arthroplasty on 11/27/2018; doing well    PLAN:    Discussed continue postoperative care    Educated patient on the importance of keeping incision clean and dry; keep pets away from scratching around her incision as this may increase risk of subsequent infection    Okay to wrap the arm with gauze and Ace wrap    Avoid external rotation and twisting the elbow    5 pound weightbearing restriction right upper extremity    Follow-up with Dr. Zimmer in 3 month    Christiano Holden MD  Orthopaedic Surgery, Adult Reconstruction Fellow  Pager [phone]    Attending MD (Dr. Aakash Zimmer) Attestation :  This patient was seen and evaluated by me including a history, exam, and interpretation of all imaging and/or lab data.  Either a training physician (resident/fellow), who also saw the patient, or scribe has documented the visit in the attached note.    MD Karen Muñiz Family Professor  Oncology and Adult Reconstructive Surgery  Dept Orthopaedic Surgery, [facility]  [phone] office, [phone] pager  [email or web address]

## 2018-12-13 NOTE — PROGRESS NOTES
[facility]  Orthopaedic Oncology Surgery, Postoperative evaluation   by Aakash Zimmer M.D.    Kalyn Rivero MRN# [id number]   Age: 48 year old YOB: 1970     Requesting physician: Mika Roberts MD [facility]     Background history:  DX:  1. RA  2. Chronic pain  3. Failed right total elbow arthroplasty   4. Right distal humerus periprosthetic fracture nonunion   5. Failed internal fixation      TREATMENTS:  1. 9/30/2015 (Oleg Webber): Right total elbow arthroplasty, ulnar nerve transposition  1. Implants: Biomet discovery TEA; Humerus [id number]; Ulna 3x73mm  2. 7/19/2017 (Calixto): Revision right total elbow arthroplasty with a distal humerus allograft and ulnar nerve neurolysis with some addition of plate fixation of a periprosthetic humerus fracture (Calixto) [facility] [place] [facility].  1. Implants: Memo VariAx posterolateral humerus plate, 12-hole. [employer] (formerly [employer]) Discovery portal elbow arthroplasty prosthesis.   3. Multiple other orthopaedic procedures, bilateral TKA, L total shoulder arthroplasty, L wrist replacement, C1-2 arthrodesis    4. 11/27/2018, aspiration of right elbow, explanation of hardware right distal humerus, explantation of hardware right distal humerus, excision of right humeral diaphysis and distal humerus, right distal humeral replacement, revision of right total elbow arthroplasty (Goran)     Postoperative follow-up clinic visit  Kalyn Rivero is a 48 year old female returning to the office today for post-operative care; on 11/27/2018 she underwent removal of hardware right distal humerus and right distal humerus replacement with total elbow arthroplasty.  Patient states that she is doing well.  She continues to keep a soft wrap on the arm.  Her pain is controlled.    EXAM:  RUE: Dressing removed    Arm incision is healing well; ends of suture were clipped, Steri-Strips removed    Small amount of Steri-Strips; no active  drainage    Elbow range of motion 5-110 degrees    Arm and forearm compartments soft and compressible    +FPL/EPL/FDP/EDC/IO; SILT R/M/U nerve distributions    Palpable radial pulse    IMPRESSION:  Ms. Rivero is s/p  removal of hardware right distal humerus and right distal humerus replacement with total elbow arthroplasty on 11/27/2018; doing well    PLAN:    Discussed continue postoperative care    Educated patient on the importance of keeping incision clean and dry; keep pets away from scratching around her incision as this may increase risk of subsequent infection    Okay to wrap the arm with gauze and Ace wrap    Avoid external rotation and twisting the elbow    5 pound weightbearing restriction right upper extremity    Follow-up with Dr. Zimmer in 3 month    Christiano Holden MD  Orthopaedic Surgery, Adult Reconstruction Fellow  Pager [phone]    Attending MD (Dr. Aakash Zimmer) Attestation :  This patient was seen and evaluated by me including a history, exam, and interpretation of all imaging and/or lab data.  Either a training physician (resident/fellow), who also saw the patient, or scribe has documented the visit in the attached note.    MD Karen Muñiz Family Professor  Oncology and Adult Reconstructive Surgery  Dept Orthopaedic Surgery, [facility]  [phone] office, [phone] pager  [email or web address]  \

## 2018-12-13 NOTE — NURSING NOTE
Reason For Visit:   Chief Complaint   Patient presents with     Surgical Followup     1 - aspiration of Right elbow 2- Explantation of failed hardware Right humeral periprosthetic fracture non-union 3- Right distal humerus excision 4- Right distal humerus replacement 5 - Revision Right total elbow arthroplasty  on 11/27/2018       Pain Assessment  Patient Currently in Pain: Yes  0-10 Pain Scale: (9.5)  Primary Pain Location: Elbow  Pain Orientation: Right         Current Outpatient Medications   Medication Sig Dispense Refill     bisacodyl (DULCOLAX) 10 MG suppository Place 1 suppository (10 mg) rectally daily as needed (Patient not taking: Reported on 12/13/2018) 2 suppository 0     Botulinum Toxin Type A (BOTOX) 200 UNITS SOLR Inject 200 Units as directed every 3 months (Patient taking differently: Inject 200 Units as directed every 3 months LAST INJECT WAS 09/2018) 200 Units 3     celecoxib (CELEBREX) 100 MG capsule Take 100 mg by mouth       ClonazePAM (KLONOPIN PO) Take 0.5 mg by mouth 3 times daily        Cyanocobalamin (VITAMIN B-12 PO) Take 4 tablets by mouth every morning        leflunomide (ARAVA) 20 MG tablet Take 20 mg by mouth every morning Reported on 3/28/2017       meclizine (ANTIVERT) 25 MG tablet Take 1 tablet (25 mg) by mouth every 6 hours as needed for dizziness (Patient not taking: Reported on 12/13/2018) 30 tablet 1     omeprazole (PRILOSEC) 40 MG DR capsule Take 1 capsule (40 mg) by mouth 2 times daily (before meals) 30 capsule      OnabotulinumtoxinA (BOTOX IJ) Inject 200 Units as directed Total dose 200 units  Administered 190 units  Unavoidable waste 30 units  Lot # /C3 with Expiration Date:  12/2020  NDC [address]-31       OnabotulinumtoxinA (BOTOX IJ) Inject 170 Units as directed Lot # /C3 with Expiration Date:  10/2020       oxyCODONE (ROXICODONE) 5 MG tablet Take one or two tablets by mouth every 4-6 hours as needed for pain. 60 tablet 0     senna-docusate  (SENOKOT-S/PERICOLACE) 8.6-50 MG tablet Take 1-2 tablets by mouth 2 times daily For constipation. 100 tablet 1          Allergies   Allergen Reactions     Morphine Swelling     Gabapentin Other (See Comments)     Pins,needles, stabbing aching at once  Pins,needles, stabbing aching at once  Numbness and Tingling     Ibuprofen      Doesn't take due to gastric ulcer     Sulfa Drugs      unknown     Tylenol [Acetaminophen]      Pt. States that she has Liver problems  Tylenol 3     Erythromycin Nausea     Vomiting      Penicillins Rash     Prednisone Palpitations     Heart racing

## 2018-12-13 NOTE — MR AVS SNAPSHOT
"              After Visit Summary   12/13/2018    Kalyn Rivero    MRN: [id number]           Patient Information     Date Of Birth          1970        Visit Information        Provider Department      12/13/2018 11:20 AM Rosina Quinones MD [facility] Physical Medicine and Rehabilitation         Follow-ups after your visit        Your next 10 appointments already scheduled     Sep 25, 2018 10:45 AM CDT   MA SCREENING DIGITAL BILATERAL with MGMA1, MG MA TECH   [facility]    [address] [place] [address] [phone]           Do not use any powder, lotion or deodorant under your arms or on your breast. If you do, we will ask you to remove it before your exam.  Wear comfortable, two-piece clothing.  If you have any allergies, tell your care team.  Bring any previous mammograms from other facilities or have them mailed to the breast center. Three-dimensional (3D) mammograms are available at [place] locations in [facility], [facility], [facility], and [place]. [facility] locations include [place] and [facility] in [place]. Benefits of 3D mammograms include: - Improved rate of cancer detection - Decreases your chance of having to go back for more tests, which means fewer: - \"False-positive\" results (This means that there is an abnormal area but it isn't cancer.) - Invasive testing procedures, such as a biopsy or surgery - Can provide clearer images of the breast if you have dense breast tissue. 3D mammography is an optional exam that anyone can have with a 2D mammogram. It doesn't replace or take the place of a 2D mammogram. 2D mammograms remain an effective screening test for all women.  Not all insurance companies cover the cost of a 3D mammogram. Check with your insurance.            Dec 13, 2018 11:20 AM CST   (Arrive by 11:05 AM)   Return Botox with Rosina Quinones, " MD   [facility] Physical Medicine and Rehabilitation ([facility] [place])    [address]  3rd Floor  [facility] [address] [phone]              Future tests that were ordered for you today     Open Future Orders        Priority Expected Expires Ordered    MA Screening Digital Bilateral Routine  9/19/2019 9/19/2018            Who to contact     Please call your clinic at [phone] to:    Ask questions about your health    Make or cancel appointments    Discuss your medicines    Learn about your test results    Speak to your doctor            Additional Information About Your Visit        Care EveryWhere ID     This is your Care EveryWhere ID. This could be used by other organizations to access your [place] medical records  [id number]         Blood Pressure from Last 3 Encounters:   07/27/18 (!) 128/93   06/28/18 (!) 128/96   05/31/18 124/88    Weight from Last 3 Encounters:   08/27/18 68.8 kg (151 lb 9.6 oz)   07/27/18 64.4 kg (142 lb)   06/28/18 66.5 kg (146 lb 9.6 oz)              Today, you had the following     No orders found for display       Primary Care Provider Office Phone # Fax #    Mika Zamora [phone] [address] [address] [address]        Equal Access to Services     MARY BURTON : Hadii aad ku hadasho Soomaali, waaxda luqadaha, qaybta kaalmada adeegyada, waxay idiin hayherneston uriel hollins. So [facility] [phone].    ATENCIÓN: Si habla español, tiene a holcomb disposición servicios gratUnion County General Hospitalos de asistencia lingüística. LlTri[facility] [phone].    We comply with applicable federal civil rights laws and [place] laws. We do not discriminate on the basis of race, color, national origin, age, disability, sex, sexual orientation, or gender identity.            Thank you!     Thank you for choosing [facility] PHYSICAL MEDICINE AND REHABILITATION  for your care. Our goal is always to provide you with excellent care. Hearing back  from our patients is one way we can continue to improve our services. Please take a few minutes to complete the written survey that you may receive in the mail after your visit with us. Thank you!             Your Updated Medication List - Protect others around you: Learn how to safely use, store and throw away your medicines at www.disposemymeds.org.          This list is accurate as of 9/20/18 11:46 AM.  Always use your most recent med list.                   Brand Name Dispense Instructions for use Diagnosis    * BOTOX IJ      Inject 170 Units as directed Lot # /C3 with Expiration Date:  10/2020        * BOTOX IJ      Inject 200 Units as directed Total dose 200 units  Administered 190 units  Unavoidable waste 30 units  Lot # /C3 with Expiration Date:  12/2020  NDC [address]-11        * Botulinum Toxin Type A 200 units injection    BOTOX    200 Units    Inject 200 Units as directed every 3 months    Intractable chronic migraine without aura and without status migrainosus       CELEBREX PO      Take 200 mg by mouth 3 times daily        [employer] SENNA 8.6 MG tablet   Generic drug:  senna     120 tablet    TAKE 2 TABLETS BY MOUTH 2 TIMES DAILY    Rheumatoid arthritis with positive rheumatoid factor, involving unspecified site (H)       ENBREL SURECLICK 50 MG/ML autoinjector   Generic drug:  Etanercept           KLONOPIN PO      Take 0.5 mg by mouth 2 tablets per day        leflunomide 20 MG tablet    ARAVA     Take 20 mg by mouth every morning Reported on 3/28/2017        meclizine 25 MG tablet    ANTIVERT    30 tablet    Take 1 tablet (25 mg) by mouth every 6 hours as needed for dizziness        omeprazole 40 MG capsule    priLOSEC    90 capsule    TAKE 1 CAPSULE (40 MG) BY MOUTH DAILY TAKE 30-60 MINUTES BEFORE A MEAL.    PUD (peptic ulcer disease)       VITAMIN B-12 PO      Take 4 tablets by mouth daily        * Notice:  This list has 3 medication(s) that are the same as other medications prescribed for you.  Read the directions carefully, and ask your doctor or other care provider to review them with you.

## 2018-12-13 NOTE — NURSING NOTE
Chief Complaint   Patient presents with     Headache     [facility] RETURN BOTOX CHRONIC MIGRAINE HEADACHE     RECHECK     Botox     Francisco Javier Mason, EMT

## 2018-12-20 ENCOUNTER — TELEPHONE (OUTPATIENT)
Dept: ORTHOPEDICS | Facility: CLINIC | Age: 48
End: 2018-12-20

## 2018-12-20 DIAGNOSIS — T84.098A OTHER MECHANICAL COMPLICATION OF INTERNAL ELBOW JOINT PROSTHESIS (H): ICD-10-CM

## 2018-12-20 DIAGNOSIS — Z96.629 OTHER MECHANICAL COMPLICATION OF INTERNAL ELBOW JOINT PROSTHESIS (H): ICD-10-CM

## 2018-12-20 DIAGNOSIS — M97.41XD: ICD-10-CM

## 2018-12-20 RX ORDER — OXYCODONE HYDROCHLORIDE 5 MG/1
TABLET ORAL
Qty: 60 TABLET | Refills: 0 | Status: SHIPPED | OUTPATIENT
Start: 2018-12-20 | End: 2019-08-22

## 2018-12-20 NOTE — TELEPHONE ENCOUNTER
Ángel called to let us know that Kalyn will be out of oxycodone by this weekend and is needing a refill, especially for her physical therapy appointments.  Script was signed by Dr. Zimmer and taken to the  on 1st floor of the [facility]

## 2019-02-08 ENCOUNTER — TRANSFERRED RECORDS (OUTPATIENT)
Dept: HEALTH INFORMATION MANAGEMENT | Facility: CLINIC | Age: 49
End: 2019-02-08

## 2019-02-18 ENCOUNTER — TRANSFERRED RECORDS (OUTPATIENT)
Dept: HEALTH INFORMATION MANAGEMENT | Facility: CLINIC | Age: 49
End: 2019-02-18

## 2019-03-14 ENCOUNTER — OFFICE VISIT (OUTPATIENT)
Dept: PHYSICAL MEDICINE AND REHAB | Facility: CLINIC | Age: 49
End: 2019-03-14
Payer: COMMERCIAL

## 2019-03-14 VITALS
DIASTOLIC BLOOD PRESSURE: 92 MMHG | OXYGEN SATURATION: 100 % | WEIGHT: 134 LBS | BODY MASS INDEX: 21.53 KG/M2 | HEIGHT: 66 IN | TEMPERATURE: 98.5 F | HEART RATE: 111 BPM | RESPIRATION RATE: 16 BRPM | SYSTOLIC BLOOD PRESSURE: 126 MMHG

## 2019-03-14 DIAGNOSIS — G43.719 INTRACTABLE CHRONIC MIGRAINE WITHOUT AURA AND WITHOUT STATUS MIGRAINOSUS: Primary | ICD-10-CM

## 2019-03-14 RX ORDER — MEDROXYPROGESTERONE ACETATE 150 MG/ML
50 INJECTION, SUSPENSION INTRAMUSCULAR
COMMUNITY
Start: 2019-03-07

## 2019-03-14 ASSESSMENT — PAIN SCALES - GENERAL: PAINLEVEL: EXTREME PAIN (9)

## 2019-03-14 ASSESSMENT — MIFFLIN-ST. JEOR: SCORE: 1259.57

## 2019-03-14 NOTE — LETTER
3/14/2019       RE: Kalyn Rivero  [address] [facility] [address]     Dear Colleague,    Thank you for referring your patient, Kalyn Rivero, to the [facility] PHYSICAL MEDICINE AND REHABILITATION at [facility]. Please see a copy of my visit note below.    MIGRAINE BOTOX HEADACHE PROCEDURE NOTE     LMP  (LMP Unknown)        Current Outpatient Medications:      bisacodyl (DULCOLAX) 10 MG suppository, Place 1 suppository (10 mg) rectally daily as needed (Patient not taking: Reported on 12/13/2018), Disp: 2 suppository, Rfl: 0     Botulinum Toxin Type A (BOTOX) 200 UNITS SOLR, Inject 200 Units as directed every 3 months (Patient taking differently: Inject 200 Units as directed every 3 months LAST INJECT WAS 09/2018), Disp: 200 Units, Rfl: 3     celecoxib (CELEBREX) 100 MG capsule, Take 100 mg by mouth, Disp: , Rfl:      ClonazePAM (KLONOPIN PO), Take 0.5 mg by mouth 3 times daily , Disp: , Rfl:      Cyanocobalamin (VITAMIN B-12 PO), Take 4 tablets by mouth every morning , Disp: , Rfl:      leflunomide (ARAVA) 20 MG tablet, Take 20 mg by mouth every morning Reported on 3/28/2017, Disp: , Rfl:      meclizine (ANTIVERT) 25 MG tablet, Take 1 tablet (25 mg) by mouth every 6 hours as needed for dizziness (Patient not taking: Reported on 12/13/2018), Disp: 30 tablet, Rfl: 1     omeprazole (PRILOSEC) 40 MG DR capsule, Take 1 capsule (40 mg) by mouth 2 times daily (before meals), Disp: 30 capsule, Rfl:      OnabotulinumtoxinA (BOTOX IJ), Inject 200 Units as directed Total dose 200 units  Administered 190 units  Unavoidable waste 30 units  Lot # /C3 with Expiration Date:  12/2020  NDC [address]-63, Disp: , Rfl:      OnabotulinumtoxinA (BOTOX IJ), Inject 170 Units as directed Lot # /C3 with Expiration Date:  10/2020, Disp: , Rfl:      oxyCODONE (ROXICODONE) 5 MG tablet, Take one or two tablets by mouth every 4-6 hours as needed for pain., Disp: 60 tablet, Rfl:  0     senna-docusate (SENOKOT-S/PERICOLACE) 8.6-50 MG tablet, Take 1-2 tablets by mouth 2 times daily For constipation., Disp: 100 tablet, Rfl: 1        Allergies   Allergen Reactions     Morphine Swelling     Gabapentin Other (See Comments)     Pins,needles, stabbing aching at once  Pins,needles, stabbing aching at once  Numbness and Tingling     Ibuprofen      Doesn't take due to gastric ulcer     Sulfa Drugs      unknown     Tylenol [Acetaminophen]      Pt. States that she has Liver problems  Tylenol 3     Erythromycin Nausea     Vomiting      Penicillins Rash     Prednisone Palpitations     Heart racing        PHYSICAL EXAM  Pt states headache today, it's throbbing in nature, rates 9/10 again. It started a month ago.    JESSICA Mccain was diagnosed with rheumatoid arthritis since age 21 years, and migraine. She was getting Botox since age 21 years, she was getting them at [facility] but the physician left the state.    She has a history of Right elbow replacement, and developed a fracture of the humerus, progressed rotator cuff tear. She is about to see her surgeon on the 16th of April. She states that she is waiting for him to call her back.     She reports significant pain in the right elbow, which she states effects her migraines.she had surgery on 27th November, she was hospitalized for 4 days due to pain. She is currently on oxycodone and klonopin.  It still isn't healed and she has lost 35-40 lbs since her surgery. She is still in a lot of pain. She also missed her trigger point injections, so thinks that made the pain worse. Also grinding teeth more, due to more stress.    She is accompanied by her PCA today.     Patient reports the following new medical problems since last visit: Right arm surgery November 27, 2018 at  [facility] by Dr Zimmer.    RESPONSE TO PREVIOUS TREATMENT:    Patient Kalyn Rivero received 190 units of Botox  on 12/13/18.     Problems following the previous series of  neurotoxin injections included:  No problems reported    Benefits by Patient Report:     1.  Headache Frequency During this Injection Cycle:  0 headache since last injections up until 4 weeks ago. In the last 4 weeks, she has had non stop migraine. Associated with photophobia.      So it does work well for about 2 months.     2.  Headache Duration During this Injection Cycle: ongoing   Dull in nature      3.  Headache Intensity During this Injection Cycle:  Headache intensity during this injection cycle:      A.  2/10  =  Typical pain level.    B.  10/10  =  Worst pain level in the last 3 weeks    C.  0/10  =  Lowest pain level.       4.  Change in headache medication usage during this injection cycle:  (For Example:  Able to decrease use of oral pain medications.)  In the last 5 weeks, she had trigger point injections for TMJ. She had her Celebrex, Enbrel stopped, but had to start again.     5.  ER Visits During This Injection Cycle:  none       6.  Functional Performance:  Change in ADL's, social interaction, days lost from work, etc.yes, but mostly due to her arm surgery.      BOTULINUM NEUROTOXIN INJECTION PROCEDURES:    VERIFICATION OF PATIENT IDENTIFICATION  Responsible Person:  PAG  : verified PAG  Full Name: verified PAG    INDICATIONS FOR PROCEDURES:  Kalyn Rivero is a 48 year old patient with headache affecting the head, neck and shoulder girdle musculature secondary to a diagnosis of Chronic Migraine Headache with associated pain.    Her baseline symptoms have been recalcitrant to oral medications and conservative therapy.  She is here today for reinjection with Botox.    GOAL OF PROCEDURE:  The goal of this procedure is to improve decrease pain  associated with headache.    TOTAL DOSE ADMINISTERED:  Total dose; 200 units   Dose Administered:  200 units Botox (Botulinum Toxin Type A)       2:1 Dilution   Unavoidable waste 0 units   Diluent Used:  Preservative Free Normal Saline  Total Volume of  Diluent Used:  4.0 ml  Lot # /C2 with Expiration Date:  8/2021   NDC [address]-12    CONSENT:  The risks, benefits, and treatment options were discussed with Kalyn Rivero and she agreed to proceed.  Consent obtained by: Giuliana QUINTERO     EQUIPMENT USED:  Needle-25mm stimulating/recording  Needle-37mm stimulating/recording  EMG/NCS Machine    SKIN PREPARATION:  Skin preparation was performed using an alcohol wipe.    GUIDANCE DESCRIPTION:  Electro-myographic guidance was necessary throughout the posterior neck portion to accurately identify all areas of spastic muscles while avoiding injection of non-spastic muscles, neighboring nerves and nearby vascular structures.     AREA/MUSCLE INJECTED:    HEAD & SCALP MUSCLES:  190 units Botox = Total Dose, 2:1 Dilution  Right Trapezius - 15 units in 3 sites   Left Trapezius - 10 units in 3 sites     Right occipitalis - 20 units of Botox at 4 site/s.  Left Occipitalis - 15 units at 3 sites     Right Paracervical  15 units in 3 sites   Left paracervical 5 units in 2 sites     Right longissimus cervicis 5 units in 1 site  Right semispinalis cervicis  5 units in 1 site   Right Lev scapulae 5 units in 1 site at the neck site        Right Frontalis - 15 units of Botox at 3 site/s.  Left Frontalis - 15 units of Botox at 3 site/s.    Right Temporalis - 25 units of Botox at 5 site/s.  Left Temporalis - 25 units of Botox at 4 site/s.    Right Masseter, superficial belly   1 site 5 units   Left Masseter, superficial belly 1 site 5 units     Right  - 5 units of Botox at 1 site/s.   Left  - 5 units of Botox at 1 site/s.    Procerus - 5 units of Botox at 1 site/s.    RESPONSE TO PROCEDURE:  Kalyn Rivero tolerated the procedure well and there were no immediate complications.   She was allowed to recover for an appropriate period of time and was discharged home in stable condition.     Follow Up  Kalyn Rivero was asked to follow up by  phone in 7-14 days with Annamaria Lamb RN, Care Coordinator, to report her response to this series of injections.  Based on the patient's previous response to this therapy, Kalyn Rivero was rescheduled for the next series of injections in 12 weeks.    She has had 0 headaches until the last 4 weeks, when she had migraine 'non stop'. Her migraines are associated with nausea and dizziness.  Her son who accompanies her that she has been forgetful and difficulty comprehending. He is wondering about dementia.     She also notes that she has been grinding her teeth which also causes her headaches. We increased the dose to 5 units in the masseters.   We recommend we have you return for injections earlier than 12 weeks. Recommend RTC for Botox injections in 10 weeks.     Rosina Quinones MD, MHA   Physical Medicine & Rehabilitation

## 2019-03-14 NOTE — NURSING NOTE
Chief Complaint   Patient presents with     Headache     [facility] RETURN BOTOX- CHRONIC MIGRAINE BOTOX       Francisco Javier Mason, EMT

## 2019-03-14 NOTE — PROGRESS NOTES
MIGRAINE BOTOX HEADACHE PROCEDURE NOTE     LMP  (LMP Unknown)        Current Outpatient Medications:      bisacodyl (DULCOLAX) 10 MG suppository, Place 1 suppository (10 mg) rectally daily as needed (Patient not taking: Reported on 12/13/2018), Disp: 2 suppository, Rfl: 0     Botulinum Toxin Type A (BOTOX) 200 UNITS SOLR, Inject 200 Units as directed every 3 months (Patient taking differently: Inject 200 Units as directed every 3 months LAST INJECT WAS 09/2018), Disp: 200 Units, Rfl: 3     celecoxib (CELEBREX) 100 MG capsule, Take 100 mg by mouth, Disp: , Rfl:      ClonazePAM (KLONOPIN PO), Take 0.5 mg by mouth 3 times daily , Disp: , Rfl:      Cyanocobalamin (VITAMIN B-12 PO), Take 4 tablets by mouth every morning , Disp: , Rfl:      leflunomide (ARAVA) 20 MG tablet, Take 20 mg by mouth every morning Reported on 3/28/2017, Disp: , Rfl:      meclizine (ANTIVERT) 25 MG tablet, Take 1 tablet (25 mg) by mouth every 6 hours as needed for dizziness (Patient not taking: Reported on 12/13/2018), Disp: 30 tablet, Rfl: 1     omeprazole (PRILOSEC) 40 MG DR capsule, Take 1 capsule (40 mg) by mouth 2 times daily (before meals), Disp: 30 capsule, Rfl:      OnabotulinumtoxinA (BOTOX IJ), Inject 200 Units as directed Total dose 200 units  Administered 190 units  Unavoidable waste 30 units  Lot # /C3 with Expiration Date:  12/2020  NDC [address]-69, Disp: , Rfl:      OnabotulinumtoxinA (BOTOX IJ), Inject 170 Units as directed Lot # /C3 with Expiration Date:  10/2020, Disp: , Rfl:      oxyCODONE (ROXICODONE) 5 MG tablet, Take one or two tablets by mouth every 4-6 hours as needed for pain., Disp: 60 tablet, Rfl: 0     senna-docusate (SENOKOT-S/PERICOLACE) 8.6-50 MG tablet, Take 1-2 tablets by mouth 2 times daily For constipation., Disp: 100 tablet, Rfl: 1        Allergies   Allergen Reactions     Morphine Swelling     Gabapentin Other (See Comments)     Pins,needles, stabbing aching at once  Pins,needles, stabbing aching  at once  Numbness and Tingling     Ibuprofen      Doesn't take due to gastric ulcer     Sulfa Drugs      unknown     Tylenol [Acetaminophen]      Pt. States that she has Liver problems  Tylenol 3     Erythromycin Nausea     Vomiting      Penicillins Rash     Prednisone Palpitations     Heart racing        PHYSICAL EXAM  Pt states headache today, it's throbbing in nature, rates 9/10 again. It started a month ago.      JESSICA Mccain was diagnosed with rheumatoid arthritis since age 21 years, and migraine. She was getting Botox since age 21 years, she was getting them at [facility] but the physician left the state.    She has a history of Right elbow replacement, and developed a fracture of the humerus, progressed rotator cuff tear. She is about to see her surgeon on the 16th of April. She states that she is waiting for him to call her back.     She reports significant pain in the right elbow, which she states effects her migraines.she had surgery on 27th November, she was hospitalized for 4 days due to pain. She is currently on oxycodone and klonopin.  It still isn't healed and she has lost 35-40 lbs since her surgery. She is still in a lot of pain. She also missed her trigger point injections, so thinks that made the pain worse. Also grinding teeth more, due to more stress.    She is accompanied by her PCA today.     Patient reports the following new medical problems since last visit: Right arm surgery November 27, 2018 at  [facility] by Dr Zimmer.      RESPONSE TO PREVIOUS TREATMENT:    Patient Kalyn Rivero received 190 units of Botox  on 12/13/18.     Problems following the previous series of neurotoxin injections included:  No problems reported    Benefits by Patient Report:       1.  Headache Frequency During this Injection Cycle:  0 headache since last injections up until 4 weeks ago. In the last 4 weeks, she has had non stop migraine. Associated with photophobia.      So it does work well for  about 2 months.     2.  Headache Duration During this Injection Cycle: ongoing   Dull in nature      3.  Headache Intensity During this Injection Cycle:  Headache intensity during this injection cycle:      A.  2/10  =  Typical pain level.    B.  10/10  =  Worst pain level in the last 3 weeks    C.  0/10  =  Lowest pain level.       4.  Change in headache medication usage during this injection cycle:  (For Example:  Able to decrease use of oral pain medications.)  In the last 5 weeks, she had trigger point injections for TMJ. She had her Celebrex, Enbrel stopped, but had to start again.     5.  ER Visits During This Injection Cycle:  none       6.  Functional Performance:  Change in ADL's, social interaction, days lost from work, etc.yes, but mostly due to her arm surgery.        BOTULINUM NEUROTOXIN INJECTION PROCEDURES:    VERIFICATION OF PATIENT IDENTIFICATION  Responsible Person:  PAG  : verified PAG  Full Name: verified PAG    INDICATIONS FOR PROCEDURES:  Kalyn Rivero is a 48 year old patient with headache affecting the head, neck and shoulder girdle musculature secondary to a diagnosis of Chronic Migraine Headache with associated pain.    Her baseline symptoms have been recalcitrant to oral medications and conservative therapy.  She is here today for reinjection with Botox.    GOAL OF PROCEDURE:  The goal of this procedure is to improve decrease pain  associated with headache.    TOTAL DOSE ADMINISTERED:  Total dose; 200 units   Dose Administered:  200 units Botox (Botulinum Toxin Type A)       2:1 Dilution   Unavoidable waste 0 units   Diluent Used:  Preservative Free Normal Saline  Total Volume of Diluent Used:  4.0 ml  Lot # /C2 with Expiration Date:  2021   NDC [address]-06    CONSENT:  The risks, benefits, and treatment options were discussed with Kalyn Rivero and she agreed to proceed.  Consent obtained by: Giuliana QUINTERO     EQUIPMENT USED:  Needle-25mm  stimulating/recording  Needle-37mm stimulating/recording  EMG/NCS Machine    SKIN PREPARATION:  Skin preparation was performed using an alcohol wipe.      GUIDANCE DESCRIPTION:  Electro-myographic guidance was necessary throughout the posterior neck portion to accurately identify all areas of spastic muscles while avoiding injection of non-spastic muscles, neighboring nerves and nearby vascular structures.     AREA/MUSCLE INJECTED:    HEAD & SCALP MUSCLES:  190 units Botox = Total Dose, 2:1 Dilution  Right Trapezius - 15 units in 3 sites   Left Trapezius - 10 units in 3 sites     Right occipitalis - 20 units of Botox at 4 site/s.  Left Occipitalis - 15 units at 3 sites     Right Paracervical  15 units in 3 sites   Left paracervical 5 units in 2 sites     Right longissimus cervicis 5 units in 1 site  Right semispinalis cervicis  5 units in 1 site   Right Lev scapulae 5 units in 1 site at the neck site        Right Frontalis - 15 units of Botox at 3 site/s.  Left Frontalis - 15 units of Botox at 3 site/s.    Right Temporalis - 25 units of Botox at 5 site/s.  Left Temporalis - 25 units of Botox at 4 site/s.    Right Masseter, superficial belly   1 site 5 units   Left Masseter, superficial belly 1 site 5 units     Right  - 5 units of Botox at 1 site/s.   Left  - 5 units of Botox at 1 site/s.    Procerus - 5 units of Botox at 1 site/s.          RESPONSE TO PROCEDURE:  Kalyn Rivero tolerated the procedure well and there were no immediate complications.   She was allowed to recover for an appropriate period of time and was discharged home in stable condition.       Follow Up  Kalyn Rivero was asked to follow up by phone in 7-14 days with Annamaria Lamb RN, Care Coordinator, to report her response to this series of injections.  Based on the patient's previous response to this therapy, Kalyn Rivero was rescheduled for the next series of injections in 12 weeks.    She has had 0  headaches until the last 4 weeks, when she had migraine 'non stop'. Her migraines are associated with nausea and dizziness.  Her son who accompanies her that she has been forgetful and difficulty comprehending. He is wondering about dementia.     She also notes that she has been grinding her teeth which also causes her headaches. We increased the dose to 5 units in the masseters.   We recommend we have you return for injections earlier than 12 weeks. Recommend RTC for Botox injections in 10 weeks.     Rosina Quinones MD, MHA   Physical Medicine & Rehabilitation

## 2019-04-09 DIAGNOSIS — Z98.890 STATUS POST SURGERY: Primary | ICD-10-CM

## 2019-04-11 ENCOUNTER — ANCILLARY PROCEDURE (OUTPATIENT)
Dept: GENERAL RADIOLOGY | Facility: CLINIC | Age: 49
End: 2019-04-11
Attending: ORTHOPAEDIC SURGERY
Payer: COMMERCIAL

## 2019-04-11 ENCOUNTER — OFFICE VISIT (OUTPATIENT)
Dept: ORTHOPEDICS | Facility: CLINIC | Age: 49
End: 2019-04-11
Payer: COMMERCIAL

## 2019-04-11 DIAGNOSIS — Z96.621 ELBOW JOINT REPLACEMENT STATUS, RIGHT: Primary | ICD-10-CM

## 2019-04-11 DIAGNOSIS — Z98.890 STATUS POST SURGERY: ICD-10-CM

## 2019-04-11 NOTE — LETTER
4/11/2019       RE: Kalyn Rivero  [address] [facility] [address]     Dear Colleague,    Thank you for referring your patient, Kalyn Rivero, to the [facility] at [facility] Please see a copy of my visit note below.        [facility]  Orthopaedic Surgery, Joint Replacement Follow-up  by Aakash Zimmer M.D.    Kalyn Rivero MRN# [id number]   Age: 48 year old YOB: 1970     Requesting physician: Mika Beckman     Background history:  DX:  1. RA  2. Chronic pain  3. Failed right total elbow arthroplasty   4. Right distal humerus periprosthetic fracture nonunion   5. Failed internal fixation      TREATMENTS:  1. 9/30/2015 (Oleg Webber): Right total elbow arthroplasty, ulnar nerve transposition  1. Implants: [employer] discovery TEA; Humerus [id number]; Ulna 3x73mm  2. 7/19/2017 (Bismarkeckbhavya): Revision right total elbow arthroplasty with a distal humerus allograft and ulnar nerve neurolysis with some addition of plate fixation of a periprosthetic humerus fracture (Bismarkecka) [facility].  1. Implants: Memo VariAx posterolateral humerus plate, 12-hole. [employer] Discovery portal elbow arthroplasty prosthesis.   3. Multiple other orthopaedic procedures, bilateral TKA, L total shoulder arthroplasty, L wrist replacement, C1-2 arthrodesis    4. 11/27/2018, aspiration of right elbow, explanation of hardware right distal humerus, explantation of hardware right distal humerus, excision of right humeral diaphysis and distal humerus, right distal humeral replacement, revision of right total elbow arthroplasty (Goran)  [facility]          History of Present Illness:     48 year old female S/P removal of hardware right distal humerus and right distal humerus replacement with total elbow arthroplasty performed on 11/27/18. I last evaluated the patient on 12/13/18, at which time she was doing  well and her pain was controlled. She is not taking any pain meds.  She denies any nicotine exposure.    Today, she reports that a certain spot on her arm will not heal. She has minimal pain and some tingling, and some shoulder pain is better. She also reports getting an abscess in one of her teeth that took 3 rounds of antibiotic to get rid of it, and she is planning on getting 3 teeth pulled tomorrow.          Physical Exam:     EXAMINATION pertinent findings:   VITAL SIGNS: not currently breastfeeding.    SKIN: incision dry.  Suture noted.  Small punctate area x 2 incomplete epithelialization, <2mm  VASCULAR: satisfactory perfusion of all extremities   MUSCULOSKELETAL:                      Data:   All laboratory data reviewed  All imaging studies reviewed by me    XR right humerus - 2 views (4/11/19)  Implant in place. Minimal bone around osteosynthesis site         Assessment and Plan:   Assessment:  Ms. Rivero is s/p removal of hardware right distal humerus and right distal humerus replacement with total elbow arthroplasty on 11/27/2018; doing well. Remaining portion of Incision should heal uneventfully.    Full bony union at the compress fixation site is pending.    Plan:  -Discussed activity modification such as avoiding pulling motions and continued rest since it will be very easy to cause further damage to her arm with normal movements   -Begin taking calcium/Vitamin D supplements and drinking lots of milk to help with bone healing  -Avoid nicotine exposure at all cost. Avoid being around people who use nicotine.   -She is OK to get her teeth pulled tomorrow as long as she is taking an antibiotic  -Follow-up with me after 4 months for AP/LAt XR of the osteosynthesis site.    MD Karen Muñiz Family Professor  Oncology and Adult Reconstructive Surgery  Dept Orthopaedic Surgery, [facility]  [phone] office, [phone] pager  [email or web address]    Total Time = 20 min, 50% of which was spent  in counseling and coordination of care as documented above.    Scribe Disclosure:  I, Shiva Wheeler, am serving as a scribe to document services personally performed by Aakash Zimmer MD at this visit, based upon the provider's statements to me. All documentation has been reviewed by the aforementioned provider prior to being entered into the official medical record.

## 2019-04-11 NOTE — NURSING NOTE
Chief Complaint   Patient presents with     Surgical Followup     F/u Right Humerus/Elbow Revision DOS: 11/27/2018     RECHECK     Pt. reports that she has lost a lot of weight post surgery. She states that she is not fully healed, and explain that she has also been on antibiotics for an abcsess tooth. She states that she was on Clidamyicin for at least 3wks. She showed me her incision and she does have a suture that has not dissolved.      Medication Refill     She is scheduled for 3 tooth extractions TOMORROW (4/12) She states that she finished her antibiotics yesterday for the original tooth infection.        48 year old  1970        [id number] - [place], [place] - [address]    Allergies   Allergen Reactions     Morphine Swelling     Gabapentin Other (See Comments)     Pins,needles, stabbing aching at once  Pins,needles, stabbing aching at once  Numbness and Tingling     Ibuprofen      Doesn't take due to gastric ulcer     Sulfa Drugs Nausea and Vomiting     unknown     Tylenol [Acetaminophen]      Pt. States that she has Liver problems  Tylenol 3     Erythromycin Nausea     Vomiting      Penicillins Rash     Prednisone Palpitations     Heart racing       Current Outpatient Medications   Medication     bisacodyl (DULCOLAX) 10 MG suppository     Botulinum Toxin Type A (BOTOX) 200 UNITS SOLR     celecoxib (CELEBREX) 100 MG capsule     ClonazePAM (KLONOPIN PO)     Cyanocobalamin (VITAMIN B-12 PO)     ENBREL SURECLICK 50 MG/ML autoinjector     leflunomide (ARAVA) 20 MG tablet     omeprazole (PRILOSEC) 40 MG DR capsule     OnabotulinumtoxinA (BOTOX IJ)     OnabotulinumtoxinA (BOTOX IJ)     meclizine (ANTIVERT) 25 MG tablet     oxyCODONE (ROXICODONE) 5 MG tablet     senna-docusate (SENOKOT-S/PERICOLACE) 8.6-50 MG tablet     No current facility-administered medications for this visit.

## 2019-04-11 NOTE — PROGRESS NOTES
[facility]  Orthopaedic Surgery, Joint Replacement Follow-up  by Aakash Zimmer M.D.    Kalyn Rivero MRN# [id number]   Age: 48 year old YOB: 1970     Requesting physician: Mika Beckman     Background history:  DX:  1. RA  2. Chronic pain  3. Failed right total elbow arthroplasty   4. Right distal humerus periprosthetic fracture nonunion   5. Failed internal fixation      TREATMENTS:  1. 9/30/2015 (Oleg Webber): Right total elbow arthroplasty, ulnar nerve transposition  1. Implants: [employer] discovery TEA; Humerus [id number]; Ulna [id number]  2. 7/19/2017 (Varecka): Revision right total elbow arthroplasty with a distal humerus allograft and ulnar nerve neurolysis with some addition of plate fixation of a periprosthetic humerus fracture (Varecka) [facility].  1. Implants: Bulan VariAx posterolateral humerus plate, 12-hole. [employer] Discovery portal elbow arthroplasty prosthesis.   3. Multiple other orthopaedic procedures, bilateral TKA, L total shoulder arthroplasty, L wrist replacement, C1-2 arthrodesis    4. 11/27/2018, aspiration of right elbow, explanation of hardware right distal humerus, explantation of hardware right distal humerus, excision of right humeral diaphysis and distal humerus, right distal humeral replacement, revision of right total elbow arthroplasty (Goran) [facility]             History of Present Illness:   48 year old female S/P removal of hardware right distal humerus and right distal humerus replacement with total elbow arthroplasty performed on 11/27/18. I last evaluated the patient on 12/13/18, at which time she was doing well and her pain was controlled. She is not taking any pain meds.  She denies any nicotine exposure.    Today, she reports that a certain spot on her arm will not heal. She has minimal pain and some tingling, and some shoulder pain is better. She also reports getting an abscess in one of her  teeth that took 3 rounds of antibiotic to get rid of it, and she is planning on getting 3 teeth pulled tomorrow.          Physical Exam:     EXAMINATION pertinent findings:   VITAL SIGNS: not currently breastfeeding.    SKIN: incision dry.  Suture noted.  Small punctate area x 2 incomplete epithelialization, <2mm  VASCULAR: satisfactory perfusion of all extremities   MUSCULOSKELETAL:                      Data:   All laboratory data reviewed  All imaging studies reviewed by me    XR right humerus - 2 views (4/11/19)  Implant in place. Minimal bone around osteosynthesis site         Assessment and Plan:   Assessment:  Ms. Rivero is s/p removal of hardware right distal humerus and right distal humerus replacement with total elbow arthroplasty on 11/27/2018; doing well. Remaining portion of Incision should heal uneventfully.    Full bony union at the compress fixation site is pending.    Plan:  -Discussed activity modification such as avoiding pulling motions and continued rest since it will be very easy to cause further damage to her arm with normal movements   -Begin taking calcium/Vitamin D supplements and drinking lots of milk to help with bone healing  -Avoid nicotine exposure at all cost. Avoid being around people who use nicotine.   -She is OK to get her teeth pulled tomorrow as long as she is taking an antibiotic  -Follow-up with me after 4 months for AP/LAt XR of the osteosynthesis site.    Aakash Zimmer MD  [facility] Family Professor  Oncology and Adult Reconstructive Surgery  Dept Orthopaedic Surgery, [facility]  [phone] office, [phone] pager  [email or web address]    Total Time = 20 min, 50% of which was spent in counseling and coordination of care as documented above.        Scribe Disclosure:  I, Shiva Wheeler, am serving as a scribe to document services personally performed by Aakash Zimmer MD at this visit, based upon the provider's statements to me. All documentation has been reviewed by the  aforementioned provider prior to being entered into the official medical record.

## 2019-04-30 ENCOUNTER — OFFICE VISIT (OUTPATIENT)
Dept: FAMILY MEDICINE | Facility: CLINIC | Age: 49
End: 2019-04-30
Payer: COMMERCIAL

## 2019-04-30 ENCOUNTER — NURSE TRIAGE (OUTPATIENT)
Dept: NURSING | Facility: CLINIC | Age: 49
End: 2019-04-30

## 2019-04-30 VITALS
BODY MASS INDEX: 22.27 KG/M2 | SYSTOLIC BLOOD PRESSURE: 110 MMHG | DIASTOLIC BLOOD PRESSURE: 78 MMHG | HEART RATE: 81 BPM | TEMPERATURE: 97.7 F | OXYGEN SATURATION: 99 % | WEIGHT: 138 LBS

## 2019-04-30 DIAGNOSIS — N89.8 VAGINAL ITCHING: ICD-10-CM

## 2019-04-30 DIAGNOSIS — R30.0 DYSURIA: Primary | ICD-10-CM

## 2019-04-30 DIAGNOSIS — B37.31 CANDIDIASIS OF VAGINA: ICD-10-CM

## 2019-04-30 LAB
ALBUMIN UR-MCNC: NEGATIVE MG/DL
APPEARANCE UR: CLEAR
BILIRUB UR QL STRIP: NEGATIVE
COLOR UR AUTO: YELLOW
GLUCOSE UR STRIP-MCNC: NEGATIVE MG/DL
HGB UR QL STRIP: NEGATIVE
KETONES UR STRIP-MCNC: NEGATIVE MG/DL
LEUKOCYTE ESTERASE UR QL STRIP: NEGATIVE
NITRATE UR QL: NEGATIVE
PH UR STRIP: 6 PH (ref 5–7)
SOURCE: NORMAL
SP GR UR STRIP: <=1.005 (ref 1–1.03)
SPECIMEN SOURCE: NORMAL
UROBILINOGEN UR STRIP-ACNC: 0.2 EU/DL (ref 0.2–1)
WET PREP SPEC: NORMAL

## 2019-04-30 PROCEDURE — 81003 URINALYSIS AUTO W/O SCOPE: CPT | Performed by: NURSE PRACTITIONER

## 2019-04-30 PROCEDURE — 99213 OFFICE O/P EST LOW 20 MIN: CPT | Performed by: NURSE PRACTITIONER

## 2019-04-30 PROCEDURE — 87210 SMEAR WET MOUNT SALINE/INK: CPT | Performed by: NURSE PRACTITIONER

## 2019-04-30 PROCEDURE — 87086 URINE CULTURE/COLONY COUNT: CPT | Performed by: NURSE PRACTITIONER

## 2019-04-30 RX ORDER — FLUCONAZOLE 150 MG/1
150 TABLET ORAL ONCE
Qty: 1 TABLET | Refills: 0 | Status: SHIPPED | OUTPATIENT
Start: 2019-04-30 | End: 2019-08-22

## 2019-04-30 RX ORDER — CLINDAMYCIN HCL 300 MG
300 CAPSULE ORAL 3 TIMES DAILY
Refills: 0 | COMMUNITY
Start: 2019-04-26 | End: 2021-07-14

## 2019-04-30 ASSESSMENT — ANXIETY QUESTIONNAIRES
IF YOU CHECKED OFF ANY PROBLEMS ON THIS QUESTIONNAIRE, HOW DIFFICULT HAVE THESE PROBLEMS MADE IT FOR YOU TO DO YOUR WORK, TAKE CARE OF THINGS AT HOME, OR GET ALONG WITH OTHER PEOPLE: NOT DIFFICULT AT ALL
1. FEELING NERVOUS, ANXIOUS, OR ON EDGE: SEVERAL DAYS
7. FEELING AFRAID AS IF SOMETHING AWFUL MIGHT HAPPEN: NOT AT ALL
3. WORRYING TOO MUCH ABOUT DIFFERENT THINGS: NOT AT ALL
5. BEING SO RESTLESS THAT IT IS HARD TO SIT STILL: NEARLY EVERY DAY
2. NOT BEING ABLE TO STOP OR CONTROL WORRYING: NOT AT ALL
6. BECOMING EASILY ANNOYED OR IRRITABLE: MORE THAN HALF THE DAYS
GAD7 TOTAL SCORE: 7

## 2019-04-30 ASSESSMENT — PATIENT HEALTH QUESTIONNAIRE - PHQ9
5. POOR APPETITE OR OVEREATING: SEVERAL DAYS
SUM OF ALL RESPONSES TO PHQ QUESTIONS 1-9: 4

## 2019-04-30 ASSESSMENT — PAIN SCALES - GENERAL: PAINLEVEL: NO PAIN (0)

## 2019-04-30 NOTE — LETTER
[facility]  [address] [place]  [address]  [phone]        May 1, 2019    Kalyn Rivero  [address] [facility] [address]        Dear Kalyn,    The results of your recent labs are enclosed.   Urine culture was negative.   Please call the clinic if you have any concerns.     Results for orders placed or performed in visit on 04/30/19   UA reflex to Microscopic and Culture   Result Value Ref Range    Color Urine Yellow     Appearance Urine Clear     Glucose Urine Negative NEG^Negative mg/dL    Bilirubin Urine Negative NEG^Negative    Ketones Urine Negative NEG^Negative mg/dL    Specific Gravity Urine <=1.005 1.003 - 1.035    Blood Urine Negative NEG^Negative    pH Urine 6.0 5.0 - 7.0 pH    Protein Albumin Urine Negative NEG^Negative mg/dL    Urobilinogen Urine 0.2 0.2 - 1.0 EU/dL    Nitrite Urine Negative NEG^Negative    Leukocyte Esterase Urine Negative NEG^Negative    Source Midstream Urine    Urine Culture Aerobic Bacterial   Result Value Ref Range    Specimen Description Midstream Urine     Culture Micro No growth    Wet prep   Result Value Ref Range    Specimen Description Vagina     Wet Prep No Trichomonas seen     Wet Prep No clue cells seen     Wet Prep No yeast seen     Wet Prep Rare  WBC'S seen        *Note: Due to a large number of results and/or encounters for the requested time period, some results have not been displayed. A complete set of results can be found in Results Review.       If you have any questions please call the clinic at [phone].    Sincerely,    Jess SEGOVIA CNP  LMD

## 2019-04-30 NOTE — TELEPHONE ENCOUNTER
FNA triage call :   Presenting problem :  Pt called with Son  Ángel  Currently at    @ [facility] urgent care as advised by my Rhematologist .   Having a flare up of   Rheumatology symptoms in Upper and lower extremities  . Pt states because she is a  Complicated case  , has  a lot of Rx  allergy  And on a lot of anti- in flammatory Rxs  [facility]  Is reluctant to treat her and  recommended seeing  Her PCP Dr Zamora at [facility] .  Declines appt with her rheumatologist or  triage but Pt  only wants appt ASAP with Dr Zamora and sent to .     recommendations :  Would like call back at [phone]  Son's phone .   Caller verbalizes understanding and denies further questions and will call back if   triage or questions needed   . Cecilia Lam RN  - [place] Nurse Advisor

## 2019-04-30 NOTE — PROGRESS NOTES
"  SUBJECTIVE:   Kalyn Rivero is a 48 year old female who presents to clinic today for the following   health issues:      URINARY TRACT SYMPTOMS  Onset: frequency    Description:   Painful urination (Dysuria): no   Blood in urine (Hematuria): no   Delay in urine (Hesitency): no     Intensity: moderate    Progression of Symptoms:  worsening    Accompanying Signs & Symptoms:  Fever/chills: no   Flank pain YES- right   Nausea and vomiting: no   Any vaginal symptoms: vaginal odor  Abdominal/Pelvic Pain: no     History:   History of frequent UTI's: no   History of kidney stones: YES  Sexually Active: No  Possibility of pregnancy: No    Precipitating factors:       Therapies Tried and outcome: Is on clindamycin now for a pulled teeth    She had right elbow replacement November  She had 3 separate dental procedures and was treated with several rounds of antibiotics past month  She is currently on clindamycin    Thinks she has a bladder infection  Urinary frequency, \"smells funny\" but denies flank pain, dysuria, hematuria, urgency  Thinks she has a yeast infection from the antibiotics      Additional history: as documented    Reviewed  and updated as needed this visit by clinical staff  Tobacco  Allergies  Meds  Med Hx  Surg Hx  Fam Hx  Soc Hx        Reviewed and updated as needed this visit by Provider         Patient Active Problem List   Diagnosis     Hypothyroidism     Acetaminophen overdose     Hepatic failure (H)     Pancreatitis     Anemia     Chronic pain     Pneumonia     Suicide risk     Grief reaction     Liver failure, acute     CARDIOVASCULAR SCREENING; LDL GOAL LESS THAN 160     Renal stone     Health Care Home     TOTAL KNEE ARTHROPLASTY - bilateral     Knee joint replacement - left     Trochanteric bursitis - left     Atlantoaxial instability     Postoperative pain     S/P cervical spinal fusion     Drug-seeking behavior     Opioid type dependence (H)     Severe frontal headaches     Abdominal " pain, unspecified abdominal location     Esophageal reflux     Right upper extremity numbness     Right arm numbness     Lesion of ulnar nerve     Arthralgia of temporomandibular joint     Intractable chronic migraine without aura     Encounter for long-term opiate analgesic use     Rheumatoid arthritis of foot (H)     Other drug-induced neutropenia (H)     Rheumatoid arthritis with positive rheumatoid factor, involving unspecified site (H)     Cervical high risk HPV (human papillomavirus) test positive     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)     Chronic tension-type headache     Fibromyositis     Insomnia     Migraine     Myofascial pain     Periprosthetic fracture around internal prosthetic right elbow joint     Status post surgery     Past Surgical History:   Procedure Laterality Date     ARTHRODESIS FOOT  5/23/2012    Procedure:ARTHRODESIS FOOT; Right Subtalar andTaloNavicular  Fusion  ; Surgeon:CHRIS ZHOU; [facility]     ARTHRODESIS FOOT Left 4/22/2015    Procedure: ARTHRODESIS FOOT;  Surgeon: Chris Zhou MD;  [facility]     ARTHROPLASTY ELBOW Right 9/30/2015    Procedure: ARTHROPLASTY ELBOW;  Surgeon: Carrol Gaspar MD;  [facility]     ARTHROPLASTY KNEE Right      ARTHROPLASTY REVISION ELBOW Right 11/27/2018    Procedure: 1 - aspiration of Right elbow 2- Explantation of failed hardware Right humeral periprosthetic fracture non-union 3- Right distal humerus excision 4- Right distal humerus replacement 5 - Revision Right total elbow arthroplasty;  Surgeon: Aakash Zimmer MD;  [facility]     ARTHROPLASTY WRIST      x2 Lt wrist replacement.     ARTHROTOMY ELBOW Right 6/18/2015    Procedure: ARTHROTOMY ELBOW;  Surgeon: Carrol Gaspar MD;  [facility]     C ANESTH,DX ARTHROSCOPIC PROC KNEE JOINT  10/24/2007    right total knee arthroplasty     C SHLDR ARTHROSCOP,DIAGNOSTIC  12/17/2008    left total shoulder  arthroplasty by Dr. Law     C SHOULDER SURG PROC UNLISTED       C TOTAL KNEE ARTHROPLASTY  1/18/12    Left     CYSTOSCOPY  02/06/2009    1.cystoscopy.2.bilateral ureteroscopy.3.basketing of stone fragments from the right kidney.4.bilateral double-J ureteral stent placement.5.ann catheter placement.6.fluoroscopy and intraoperative interpretation of images.     CYSTOSCOPY  01/08/2007    1. cystoscopy and left stent removal.2.left ureteroscopy     DECOMPRESSION CUBITAL TUNNEL  6/6/2014    Procedure: DECOMPRESSION CUBITAL TUNNEL;  Surgeon: Janelle Coates MD;  [facility]     ENDOSCOPY  03/31/2005    upper GI endoscopy-[facility]     ESOPHAGOSCOPY, GASTROSCOPY, DUODENOSCOPY (EGD), COMBINED  3/17/2014    Procedure: COMBINED ESOPHAGOSCOPY, GASTROSCOPY, DUODENOSCOPY (EGD), BIOPSY SINGLE OR MULTIPLE;;  Surgeon: Duane, William Charles, MD;  Location: [facility] OR     FUSION CERVICAL POSTERIOR ONE LEVEL  11/18/2013    Procedure: FUSION CERVICAL POSTERIOR ONE LEVEL;  Cervical 1-2 Posterior Cervical Fusion (Harms Procedure);  Surgeon: Carrol Gunn MD;  [facility]     GYN SURGERY       INJECT MAJOR JOINT / BURSA Left 1/5/2017    Procedure: INJECT MAJOR JOINT / BURSA;  Surgeon: Fredy Patel DO;  [facility]: [facility] OR     INJECT STEROID (LOCATION) Left 6/18/2015    Procedure: INJECT STEROID (LOCATION);  Surgeon: Carrol Gaspar MD;  Location:  OR     NECK SURGERY       REMOVE FOREIGN BODY UPPER EXTREMITY Right 3/2/2017    Procedure: REMOVE FOREIGN BODY UPPER EXTREMITY;  Surgeon: Carrol Gaspar MD;  Location:  OR     RESECT BONE UPPER EXTREMITY Left 4/27/2017    Procedure: RESECT BONE UPPER EXTREMITY;  Left Radial Head Resection;  Surgeon: Carrol Gaspar MD;  Location:  OR     ROTATOR CUFF REPAIR RT/LT  10/19/2005    1.left distal clavicle excision.2.acromioplasty.3.coracoacromial ligament resection.4.rotator cuff exploration       Social History      Tobacco Use     Smoking status: Former Smoker     Packs/day: 0.10     Years: 10.00     Pack years: 1.00     Types: Cigarettes     Start date: 8/6/1983     Smokeless tobacco: Never Used     Tobacco comment: Quit 6 weeks ago   Substance Use Topics     Alcohol use: No     Family History   Problem Relation Age of Onset     Diabetes Mother      Hypertension Mother      Allergies Mother      Alcohol/Drug Mother         LIVER CIRROSIS     Blood Disease Mother         B12 DEF     Neurologic Disorder Mother         Epilepsy     Osteoporosis Mother      Cerebrovascular Disease Maternal Grandmother      Arthritis Maternal Grandmother         RHEUMATOID     Cancer Maternal Grandmother      Thyroid Disease Maternal Grandmother      Osteoporosis Maternal Grandmother      Heart Disease Maternal Grandmother      Depression Maternal Grandmother      Neurologic Disorder Maternal Grandmother         MIGRAINES     Anxiety Disorder Maternal Grandmother      Diabetes Maternal Grandmother      Migraines Maternal Grandmother      Deep Vein Thrombosis Maternal Grandmother      Cerebrovascular Disease Maternal Grandfather      Cancer Maternal Grandfather      Mental Illness Maternal Grandfather      Cerebrovascular Disease Paternal Grandmother      Arthritis Paternal Grandmother         RHEUMATOID     Cancer Paternal Grandmother      Osteoporosis Paternal Grandmother      Heart Disease Paternal Grandmother      Depression Paternal Grandmother      Neurologic Disorder Paternal Grandmother         MIGRAINES     Thyroid Disease Paternal Grandmother      Cerebrovascular Disease Paternal Grandfather      Cancer Paternal Grandfather      Heart Disease Brother         MURMUR     Asthma Son      Endocrine Disease Son      Neurologic Disorder Father         epilepsy     Seizure Disorder Father      Hypertension Father      Skin Cancer Father      Anxiety Disorder Father      Mental Illness Father      Hyperlipidemia Father      Neurologic  Disorder Daughter         MIGRAINES     Arthritis Daughter         JR     Hypertension Son      LUNG DISEASE Brother      Anxiety Disorder Son      Asthma Son      Hypertension Son      Migraines Son      Spine Problems Son      Mental Illness Brother      Migraines Brother      Cardiac Sudden Death Brother      Spine Problems Brother      Glaucoma No family hx of      Macular Degeneration No family hx of      Unknown/Adopted No family hx of      Anesthesia Reaction No family hx of      Other Cancer No family hx of      Rheumatoid Arthritis No family hx of      Bone Cancer No family hx of      Low Back Problems No family hx of            ROS:  Constitutional, HEENT, cardiovascular, pulmonary, gi and gu systems are negative, except as otherwise noted.    OBJECTIVE:     /78 (BP Location: Right arm, Patient Position: Chair, Cuff Size: Adult Regular)   Pulse 81   Temp 97.7  F (36.5  C) (Oral)   Wt 62.6 kg (138 lb)   SpO2 99%   Breastfeeding? No   BMI 22.27 kg/m    Body mass index is 22.27 kg/m .  GENERAL: healthy, alert and no distress  RESP: lungs clear to auscultation - no rales, rhonchi or wheezes  CV: regular rate and rhythm, normal S1 S2, no S3 or S4, no murmur, click or rub  BACK: no CVA tenderness  PSYCH: pleasant, conversation tangential, inconsistent historian    Diagnostic Test Results:  Results for orders placed or performed in visit on 04/30/19 (from the past 24 hour(s))   UA reflex to Microscopic and Culture   Result Value Ref Range    Color Urine Yellow     Appearance Urine Clear     Glucose Urine Negative NEG^Negative mg/dL    Bilirubin Urine Negative NEG^Negative    Ketones Urine Negative NEG^Negative mg/dL    Specific Gravity Urine <=1.005 1.003 - 1.035    Blood Urine Negative NEG^Negative    pH Urine 6.0 5.0 - 7.0 pH    Protein Albumin Urine Negative NEG^Negative mg/dL    Urobilinogen Urine 0.2 0.2 - 1.0 EU/dL    Nitrite Urine Negative NEG^Negative    Leukocyte Esterase Urine Negative  NEG^Negative    Source Midstream Urine    Wet prep   Result Value Ref Range    Specimen Description Vagina     Wet Prep No Trichomonas seen     Wet Prep No clue cells seen     Wet Prep No yeast seen     Wet Prep Rare  WBC'S seen        *Note: Due to a large number of results and/or encounters for the requested time period, some results have not been displayed. A complete set of results can be found in Results Review.       ASSESSMENT/PLAN:   1. Dysuria  - Symptoms not consistent with UTI and UA normal though may be negative d/t recent antibiotics. Will send for culture  - UA reflex to Microscopic and Culture  - Urine Culture Aerobic Bacterial    2. Vaginal itching  - Patient declined pelvic exam and reports no history of sexual activity for 10+ years. Declines G/C testing. Wet prep normal. Did write for 1 diflucan based on reported symptoms  - Wet prep    3. Candidiasis of vagina  - fluconazole (DIFLUCAN) 150 MG tablet; Take 1 tablet (150 mg) by mouth once for 1 dose  Dispense: 1 tablet; Refill: 0        JULIETTE Nolan [facility]

## 2019-05-01 LAB
BACTERIA SPEC CULT: NO GROWTH
SPECIMEN SOURCE: NORMAL

## 2019-05-01 ASSESSMENT — ANXIETY QUESTIONNAIRES: GAD7 TOTAL SCORE: 7

## 2019-05-01 NOTE — RESULT ENCOUNTER NOTE
[address] [address]  Phone: [phone]  Fax: [phone]      05/01/19    Kalyn Rivero  [address] [place] [id number]      Dear Kalyn,    The results of your recent labs are enclosed.  Urine culture was negative.   Please call the clinic if you have any concerns.    Sincerely,    JULIETTE Nolan CNP    Your [facility] Care Team

## 2019-05-20 ENCOUNTER — TRANSFERRED RECORDS (OUTPATIENT)
Dept: HEALTH INFORMATION MANAGEMENT | Facility: CLINIC | Age: 49
End: 2019-05-20

## 2019-05-28 ENCOUNTER — OFFICE VISIT (OUTPATIENT)
Dept: PHYSICAL MEDICINE AND REHAB | Facility: CLINIC | Age: 49
End: 2019-05-28
Payer: COMMERCIAL

## 2019-05-28 VITALS
SYSTOLIC BLOOD PRESSURE: 112 MMHG | BODY MASS INDEX: 21.03 KG/M2 | HEIGHT: 67 IN | WEIGHT: 134 LBS | HEART RATE: 94 BPM | DIASTOLIC BLOOD PRESSURE: 74 MMHG

## 2019-05-28 DIAGNOSIS — G43.719 INTRACTABLE CHRONIC MIGRAINE WITHOUT AURA AND WITHOUT STATUS MIGRAINOSUS: Primary | ICD-10-CM

## 2019-05-28 ASSESSMENT — MIFFLIN-ST. JEOR: SCORE: 1267.51

## 2019-05-28 ASSESSMENT — PAIN SCALES - GENERAL: PAINLEVEL: WORST PAIN (10)

## 2019-05-28 NOTE — LETTER
"5/28/2019       RE: Kalyn Rivero  [address] [facility] [address]     Dear Colleague,    Thank you for referring your patient, Kalyn Rivero, to the [facility] PHYSICAL MEDICINE AND REHABILITATION at [facility]. Please see a copy of my visit note below.    BOTULINUM TOXIN PROCEDURE - HEADACHE - NOTE    Chief Complaint   Patient presents with     Botox     Chronic Migraine- Botox       Ht 1.689 m (5' 6.5\")   Wt 60.8 kg (134 lb)   BMI 21.30 kg/m        Current Outpatient Medications:      bisacodyl (DULCOLAX) 10 MG suppository, Place 1 suppository (10 mg) rectally daily as needed, Disp: 2 suppository, Rfl: 0     Botulinum Toxin Type A (BOTOX) 200 UNITS SOLR, Inject 200 Units as directed every 3 months, Disp: 200 Units, Rfl: 3     celecoxib (CELEBREX) 100 MG capsule, Take 100 mg by mouth, Disp: , Rfl:      clindamycin (CLEOCIN) 300 MG capsule, Take 300 mg by mouth 3 times daily, Disp: , Rfl: 0     ClonazePAM (KLONOPIN PO), Take 0.5 mg by mouth 3 times daily , Disp: , Rfl:      Cyanocobalamin (VITAMIN B-12 PO), Take 4 tablets by mouth every morning , Disp: , Rfl:      ENBREL SURECLICK 50 MG/ML autoinjector, , Disp: , Rfl:      leflunomide (ARAVA) 20 MG tablet, Take 20 mg by mouth every morning Reported on 3/28/2017, Disp: , Rfl:      meclizine (ANTIVERT) 25 MG tablet, Take 1 tablet (25 mg) by mouth every 6 hours as needed for dizziness (Patient not taking: Reported on 4/11/2019), Disp: 30 tablet, Rfl: 1     omeprazole (PRILOSEC) 40 MG DR capsule, Take 1 capsule (40 mg) by mouth 2 times daily (before meals), Disp: 30 capsule, Rfl:      OnabotulinumtoxinA (BOTOX IJ), Inject 200 Units as directed Total dose 200 units  Administered 190 units  Unavoidable waste 30 units  Lot # /C3 with Expiration Date:  12/2020  NDC [address]-42, Disp: , Rfl:      OnabotulinumtoxinA (BOTOX IJ), Inject 170 Units as directed Lot # /C3 with Expiration Date:  10/2020, Disp: , " Rfl:      oxyCODONE (ROXICODONE) 5 MG tablet, Take one or two tablets by mouth every 4-6 hours as needed for pain. (Patient not taking: Reported on 3/14/2019), Disp: 60 tablet, Rfl: 0     senna-docusate (SENOKOT-S/PERICOLACE) 8.6-50 MG tablet, Take 1-2 tablets by mouth 2 times daily For constipation. (Patient not taking: Reported on 3/14/2019), Disp: 100 tablet, Rfl: 1     Allergies   Allergen Reactions     Morphine Swelling     Gabapentin Other (See Comments)     Pins,needles, stabbing aching at once  Pins,needles, stabbing aching at once  Numbness and Tingling     Ibuprofen      Doesn't take due to gastric ulcer     Sulfa Drugs Nausea and Vomiting     unknown     Tylenol [Acetaminophen]      Pt. States that she has Liver problems  Tylenol 3     Erythromycin Nausea     Vomiting      Penicillins Rash     Prednisone Palpitations     Heart racing        PHYSICAL EXAM:  Pt states headache today, rates 10/10 again.    HPI:  Patient reports the following no new medical problems since last visit: 3/14/19 and Patient denies new medical diagnoses, illnesses, hospitalizations, emergency room visits, and injuries since the previous injection with botulinum neurotoxin.    We reviewed the recommended safety guidelines for  Botox from any vaccine injection, such as the seasonal flu vaccine, by a minimum of 10-14 days with Kalyn Rivero. She acknowledged understanding.    RESPONSE TO PREVIOUS TREATMENT:  Change in headache pattern following last series of injections with 200 units of  Botox on 3/14/19.  She had a distinct wear-off with the Botox in the last week, having headaches every day of the last week.   No problems reported    1.  Headache frequency during this injection cycle: 5 headache days per month.      2.  Headache duration during this injection cycle:  Headache duration ranged from 2 days to 5 days. Pt reports that injection cycle worked well until about 1 week ago.     3.  Headache intensity during  this injection cycle:    A.  2/10  =  Typical pain level.  B.  10/10  =  Worst pain level.  C.  0/10  =  Lowest pain level.    4.  Change in headache medication usage during this injection cycle:  (For Example:  Able to decrease use of oral pain medications.) none    5.  ER Visits During This Injection Cycle:  none    6.  Functional Performance:  Change in ADL's, social interaction, days lost from work, etc. Pt reports that she does not do anything due to Migraines      BOTULINUM NEUROTOXIN INJECTION PROCEDURES:    VERIFICATION OF PATIENT IDENTIFICATION AND PROCEDURE     Initials   Patient Name jq   Patient  jq   Procedure Verified by: clay     Prior to the start of the procedure and with procedural staff participation, I verbally confirmed the patient s identity using two indicators, relevant allergies, that the procedure was appropriate and matched the consent or emergent situation, and that the correct equipment/implants were available. Immediately prior to starting the procedure I conducted the Time Out with the procedural staff and re-confirmed the patient s name, procedure, and site/side. (The Joint Commission universal protocol was followed.)  Yes    Sedation (Moderate or Deep): None    Above assessments performed by:    Rosina Quinones MD      INDICATIONS FOR PROCEDURES:  Kalyn Rivero is a 48 year old patient with chronic migraine headaches associated with cervicogenic  components.     Her baseline symptoms have been recalcitrant to oral medications and conservative therapy.  She is here today for reinjection with Botox.    GOAL OF PROCEDURE:  The goal of this procedure is to decrease pain .    TOTAL DOSE ADMINISTERED:  Dose Administered:  200 units  Botox (Botulinum Toxin Type A)       2:1 Dilution     Diluent Used:  Preservative Free Normal Saline  Total Volume of Diluent Used:  4 ml  Lot #  C3 with Expiration Date:  10/21  NDC #: Botox 100u ([address]-95)    Medication guide was offered to  patient and was declined.    CONSENT:  The risks, benefits, and treatment options were discussed with Kalyn Rivero and she agreed to proceed.    Written consent was obtained by jq.     EQUIPMENT USED:  Needle-37mm stimulating/recording    SKIN PREPARATION:  Skin preparation was performed using an alcohol wipe.    GUIDANCE DESCRIPTION:  Electro-myographic guidance was necessary throughout the procedure to accurately identify all areas of spastic muscles while avoiding injection of non-spastic muscles, neighboring nerves and nearby vascular structures.     AREA/MUSCLE INJECTED:      HEAD & SCALP MUSCLES:  190 units Botox = Total Dose, 2:1 Dilution  Right Trapezius - 15 units in 3 sites   Left Trapezius - 10 units in 3 sites      Right occipitalis - 20 units of Botox at 4 site/s.  Left Occipitalis - 15 units at 3 sites      Right Paracervical  15 units in 3 sites   Left paracervical 5 units in 2 sites            Right Frontalis - 15 units of Botox at 3 site/s.  Left Frontalis - 15 units of Botox at 3 site/s.     Right Temporalis - 30 units of Botox at 5 site/s.  Left Temporalis - 30 units of Botox at 4 site/s.     Right Masseter, superficial belly   1 site 7.5 units   Left Masseter, superficial belly 1 site 7.5 units      Right  - 7.5 units of Botox at 1 site/s.               Left  -7.5 units of Botox at 1 site/s.     Procerus - 5 units of Botox at 1 site/s.       RESPONSE TO PROCEDURE:  Kalyn Rivero tolerated the procedure well and there were no immediate complications.   She was allowed to recover for an appropriate period of time and was discharged home in stable condition.    FOLLOW UP:  Kalyn Rivero was asked to follow up by phone in 7-14 days with Annamaria Lamb RN, Care Coordinator, to report her response to this series of injections.  Based on the patient's previous response to this therapy, Kalyn Rivero was rescheduled for the next series of injections in 10  weeks.    PLAN (Medication Changes, Therapy Orders, Work or Disability Issues, etc.): Patient will continue to monitor response to today's injections.     Again, thank you for allowing me to participate in the care of your patient.      Sincerely,    Rosina Quinones MD

## 2019-05-28 NOTE — PROGRESS NOTES
"BOTULINUM TOXIN PROCEDURE - HEADACHE - NOTE    Chief Complaint   Patient presents with     Botox     Chronic Migraine- Botox       Ht 1.689 m (5' 6.5\")   Wt 60.8 kg (134 lb)   BMI 21.30 kg/m         Current Outpatient Medications:      bisacodyl (DULCOLAX) 10 MG suppository, Place 1 suppository (10 mg) rectally daily as needed, Disp: 2 suppository, Rfl: 0     Botulinum Toxin Type A (BOTOX) 200 UNITS SOLR, Inject 200 Units as directed every 3 months, Disp: 200 Units, Rfl: 3     celecoxib (CELEBREX) 100 MG capsule, Take 100 mg by mouth, Disp: , Rfl:      clindamycin (CLEOCIN) 300 MG capsule, Take 300 mg by mouth 3 times daily, Disp: , Rfl: 0     ClonazePAM (KLONOPIN PO), Take 0.5 mg by mouth 3 times daily , Disp: , Rfl:      Cyanocobalamin (VITAMIN B-12 PO), Take 4 tablets by mouth every morning , Disp: , Rfl:      ENBREL SURECLICK 50 MG/ML autoinjector, , Disp: , Rfl:      leflunomide (ARAVA) 20 MG tablet, Take 20 mg by mouth every morning Reported on 3/28/2017, Disp: , Rfl:      meclizine (ANTIVERT) 25 MG tablet, Take 1 tablet (25 mg) by mouth every 6 hours as needed for dizziness (Patient not taking: Reported on 4/11/2019), Disp: 30 tablet, Rfl: 1     omeprazole (PRILOSEC) 40 MG DR capsule, Take 1 capsule (40 mg) by mouth 2 times daily (before meals), Disp: 30 capsule, Rfl:      OnabotulinumtoxinA (BOTOX IJ), Inject 200 Units as directed Total dose 200 units  Administered 190 units  Unavoidable waste 30 units  Lot # /C3 with Expiration Date:  12/2020  NDC [address]-60, Disp: , Rfl:      OnabotulinumtoxinA (BOTOX IJ), Inject 170 Units as directed Lot # /C3 with Expiration Date:  10/2020, Disp: , Rfl:      oxyCODONE (ROXICODONE) 5 MG tablet, Take one or two tablets by mouth every 4-6 hours as needed for pain. (Patient not taking: Reported on 3/14/2019), Disp: 60 tablet, Rfl: 0     senna-docusate (SENOKOT-S/PERICOLACE) 8.6-50 MG tablet, Take 1-2 tablets by mouth 2 times daily For constipation. (Patient " not taking: Reported on 3/14/2019), Disp: 100 tablet, Rfl: 1     Allergies   Allergen Reactions     Morphine Swelling     Gabapentin Other (See Comments)     Pins,needles, stabbing aching at once  Pins,needles, stabbing aching at once  Numbness and Tingling     Ibuprofen      Doesn't take due to gastric ulcer     Sulfa Drugs Nausea and Vomiting     unknown     Tylenol [Acetaminophen]      Pt. States that she has Liver problems  Tylenol 3     Erythromycin Nausea     Vomiting      Penicillins Rash     Prednisone Palpitations     Heart racing        PHYSICAL EXAM:  Pt states headache today, rates 10/10 again.    HPI:  Patient reports the following no new medical problems since last visit: 3/14/19 and Patient denies new medical diagnoses, illnesses, hospitalizations, emergency room visits, and injuries since the previous injection with botulinum neurotoxin.    We reviewed the recommended safety guidelines for  Botox from any vaccine injection, such as the seasonal flu vaccine, by a minimum of 10-14 days with Kalyn Rivero. She acknowledged understanding.      RESPONSE TO PREVIOUS TREATMENT:  Change in headache pattern following last series of injections with 200 units of  Botox on 3/14/19.  She had a distinct wear-off with the Botox in the last week, having headaches every day of the last week.   No problems reported    1.  Headache frequency during this injection cycle: 5 headache days per month.      2.  Headache duration during this injection cycle:  Headache duration ranged from 2 days to 5 days. Pt reports that injection cycle worked well until about 1 week ago.     3.  Headache intensity during this injection cycle:    A.  2/10  =  Typical pain level.  B.  10/10  =  Worst pain level.  C.  0/10  =  Lowest pain level.    4.  Change in headache medication usage during this injection cycle:  (For Example:  Able to decrease use of oral pain medications.) none    5.  ER Visits During This Injection  Cycle:  none    6.  Functional Performance:  Change in ADL's, social interaction, days lost from work, etc. Pt reports that she does not do anything due to Migraines      BOTULINUM NEUROTOXIN INJECTION PROCEDURES:      VERIFICATION OF PATIENT IDENTIFICATION AND PROCEDURE     Initials   Patient Name jq   Patient  jq   Procedure Verified by: clay     Prior to the start of the procedure and with procedural staff participation, I verbally confirmed the patient s identity using two indicators, relevant allergies, that the procedure was appropriate and matched the consent or emergent situation, and that the correct equipment/implants were available. Immediately prior to starting the procedure I conducted the Time Out with the procedural staff and re-confirmed the patient s name, procedure, and site/side. (The Joint Commission universal protocol was followed.)  Yes    Sedation (Moderate or Deep): None    Above assessments performed by:    Rosina Quinones MD      INDICATIONS FOR PROCEDURES:  Kalyn Rivero is a 48 year old patient with chronic migraine headaches associated with cervicogenic  components.     Her baseline symptoms have been recalcitrant to oral medications and conservative therapy.  She is here today for reinjection with Botox.    GOAL OF PROCEDURE:  The goal of this procedure is to decrease pain .    TOTAL DOSE ADMINISTERED:  Dose Administered:  200 units  Botox (Botulinum Toxin Type A)       2:1 Dilution     Diluent Used:  Preservative Free Normal Saline  Total Volume of Diluent Used:  4 ml  Lot #  C3 with Expiration Date:  10/21  NDC #: Botox 100u ([address]-32)    Medication guide was offered to patient and was declined.    CONSENT:  The risks, benefits, and treatment options were discussed with Kalyn Rivero and she agreed to proceed.    Written consent was obtained by clay.     EQUIPMENT USED:  Needle-37mm stimulating/recording    SKIN PREPARATION:  Skin preparation was performed using  an alcohol wipe.    GUIDANCE DESCRIPTION:  Electro-myographic guidance was necessary throughout the procedure to accurately identify all areas of spastic muscles while avoiding injection of non-spastic muscles, neighboring nerves and nearby vascular structures.     AREA/MUSCLE INJECTED:      HEAD & SCALP MUSCLES:  190 units Botox = Total Dose, 2:1 Dilution  Right Trapezius - 15 units in 3 sites   Left Trapezius - 10 units in 3 sites      Right occipitalis - 20 units of Botox at 4 site/s.  Left Occipitalis - 15 units at 3 sites      Right Paracervical  15 units in 3 sites   Left paracervical 5 units in 2 sites            Right Frontalis - 15 units of Botox at 3 site/s.  Left Frontalis - 15 units of Botox at 3 site/s.     Right Temporalis - 30 units of Botox at 5 site/s.  Left Temporalis - 30 units of Botox at 4 site/s.     Right Masseter, superficial belly   1 site 7.5 units   Left Masseter, superficial belly 1 site 7.5 units      Right  - 7.5 units of Botox at 1 site/s.               Left  -7.5 units of Botox at 1 site/s.     Procerus - 5 units of Botox at 1 site/s.         RESPONSE TO PROCEDURE:  Kalyn Rivero tolerated the procedure well and there were no immediate complications.   She was allowed to recover for an appropriate period of time and was discharged home in stable condition.    FOLLOW UP:  Kalyn Rivero was asked to follow up by phone in 7-14 days with Annamaria Lamb RN, Care Coordinator, to report her response to this series of injections.  Based on the patient's previous response to this therapy, Kalyn Rivero was rescheduled for the next series of injections in 10 weeks.      PLAN (Medication Changes, Therapy Orders, Work or Disability Issues, etc.): Patient will continue to monitor response to today's injections.

## 2019-08-08 ENCOUNTER — TELEPHONE (OUTPATIENT)
Dept: PHYSICAL MEDICINE AND REHAB | Facility: CLINIC | Age: 49
End: 2019-08-08

## 2019-08-08 NOTE — TELEPHONE ENCOUNTER
[facility] Call Center    Phone Message    May a detailed message be left on voicemail: yes    Reason for Call: Other: pt called today to reschedule her botox injection with provider Rosina Quinones, our guidelines do NOT indicate that we are not allowed to reschedule return pts and the first available appt with this provider was October 2019.  After the call ended I checked with a colleague and she indicated that we don't schedule at all for PMR pts. Please call pt to reschedule her for her botox injection and then cancel the October 2019 appt if needed. Thank you.     Action Taken: Message routed to:  [facility] KARMA PMR

## 2019-08-12 NOTE — TELEPHONE ENCOUNTER
[facility] Call Center    Phone Message    May a detailed message be left on voicemail: yes    Reason for Call: Other: Pt called in saying that someone called her saying there is a sooner appt available next thursday at 10 am. please call pt back to discuss.      Action Taken: Message routed to:  [facility] KARMA GARCIA

## 2019-08-15 ENCOUNTER — OFFICE VISIT (OUTPATIENT)
Dept: PHYSICAL MEDICINE AND REHAB | Facility: CLINIC | Age: 49
End: 2019-08-15
Payer: COMMERCIAL

## 2019-08-15 VITALS
HEART RATE: 71 BPM | SYSTOLIC BLOOD PRESSURE: 107 MMHG | OXYGEN SATURATION: 98 % | WEIGHT: 137 LBS | DIASTOLIC BLOOD PRESSURE: 72 MMHG | RESPIRATION RATE: 16 BRPM | BODY MASS INDEX: 21.5 KG/M2 | HEIGHT: 67 IN

## 2019-08-15 DIAGNOSIS — G43.719 INTRACTABLE CHRONIC MIGRAINE WITHOUT AURA: Primary | ICD-10-CM

## 2019-08-15 DIAGNOSIS — G43.719 INTRACTABLE CHRONIC MIGRAINE WITHOUT AURA AND WITHOUT STATUS MIGRAINOSUS: Primary | ICD-10-CM

## 2019-08-15 ASSESSMENT — PAIN SCALES - GENERAL: PAINLEVEL: WORST PAIN (10)

## 2019-08-15 ASSESSMENT — MIFFLIN-ST. JEOR: SCORE: 1276.12

## 2019-08-15 NOTE — LETTER
"8/15/2019       RE: Kalyn Rivero  [address] [facility] [address]     Dear Colleague,    Thank you for referring your patient, Kalyn Rivero, to the [facility] PHYSICAL MEDICINE AND REHABILITATION at [facility]. Please see a copy of my visit note below.    BOTULINUM TOXIN PROCEDURE - HEADACHE - NOTE    Chief Complaint   Patient presents with     Headache     [facility] RETURN BOTOX        /72   Pulse 71   Resp 16   Ht 1.689 m (5' 6.5\")   Wt 62.1 kg (137 lb)   SpO2 98%   BMI 21.78 kg/m        Current Outpatient Medications:      bisacodyl (DULCOLAX) 10 MG suppository, Place 1 suppository (10 mg) rectally daily as needed, Disp: 2 suppository, Rfl: 0     Botulinum Toxin Type A (BOTOX) 200 UNITS SOLR, Inject 200 Units as directed every 3 months, Disp: 200 Units, Rfl: 3     celecoxib (CELEBREX) 100 MG capsule, Take 100 mg by mouth, Disp: , Rfl:      clindamycin (CLEOCIN) 300 MG capsule, Take 300 mg by mouth 3 times daily, Disp: , Rfl: 0     ClonazePAM (KLONOPIN PO), Take 0.5 mg by mouth 3 times daily , Disp: , Rfl:      Cyanocobalamin (VITAMIN B-12 PO), Take 4 tablets by mouth every morning , Disp: , Rfl:      ENBREL SURECLICK 50 MG/ML autoinjector, , Disp: , Rfl:      leflunomide (ARAVA) 20 MG tablet, Take 20 mg by mouth every morning Reported on 3/28/2017, Disp: , Rfl:      omeprazole (PRILOSEC) 40 MG DR capsule, Take 1 capsule (40 mg) by mouth 2 times daily (before meals), Disp: 30 capsule, Rfl:      OnabotulinumtoxinA (BOTOX IJ), Inject 200 Units as directed Total dose 200 units  Administered 190 units  Unavoidable waste 30 units  Lot # /C3 with Expiration Date:  12/2020  NDC [address]-19, Disp: , Rfl:      OnabotulinumtoxinA (BOTOX IJ), Inject 170 Units as directed Lot # /C3 with Expiration Date:  10/2020, Disp: , Rfl:      meclizine (ANTIVERT) 25 MG tablet, Take 1 tablet (25 mg) by mouth every 6 hours as needed for dizziness (Patient not " taking: Reported on 4/11/2019), Disp: 30 tablet, Rfl: 1     oxyCODONE (ROXICODONE) 5 MG tablet, Take one or two tablets by mouth every 4-6 hours as needed for pain. (Patient not taking: Reported on 3/14/2019), Disp: 60 tablet, Rfl: 0     senna-docusate (SENOKOT-S/PERICOLACE) 8.6-50 MG tablet, Take 1-2 tablets by mouth 2 times daily For constipation. (Patient not taking: Reported on 3/14/2019), Disp: 100 tablet, Rfl: 1     Allergies   Allergen Reactions     Morphine Swelling     Gabapentin Other (See Comments)     Pins,needles, stabbing aching at once  Pins,needles, stabbing aching at once  Numbness and Tingling     Ibuprofen      Doesn't take due to gastric ulcer     Sulfa Drugs Nausea and Vomiting     unknown     Tylenol [Acetaminophen]      Pt. States that she has Liver problems  Tylenol 3     Erythromycin Nausea     Vomiting      Penicillins Rash     Prednisone Palpitations     Heart racing        PHYSICAL EXAM:  Pt states headache today, rates 10/10. She is nauseated, and lightheadedness. She notes visual changes with the vision becoming tunnel vision.     HPI:  Patient reports the following no new medical problems since last visit: 3/14/19 and Patient denies new medical diagnoses, illnesses, hospitalizations, emergency room visits, and injuries since the previous injection with botulinum neurotoxin.    We reviewed the recommended safety guidelines for  Botox from any vaccine injection, such as the seasonal flu vaccine, by a minimum of 10-14 days with aKlyn Rivero. She acknowledged understanding.    Kalyn notes that she sees a clear wear off of the Botox in 9 weeks, and she states that she starts getting severe headaches associated with dizziness. She is requesting that we try to get her in earlier.       RESPONSE TO PREVIOUS TREATMENT:  Change in headache pattern following last series of injections with 200 units of  Botox on 5/28/19.    No problems reported    1.  Headache frequency during  this injection cycle: 5 headache days per month.  She has had daily headaches in the last 3 weeks. Prior to the three weeks, she was headache free for the entire cycle.     2.  Headache duration during this injection cycle:  Headache duration ranged from 2 days to 5 days.    3.  Headache intensity during this injection cycle:    A.  4/10  =  Typical pain level.  B.  10/10  =  Worst pain level.  C.  0/10  =  Lowest pain level.    4.  Change in headache medication usage during this injection cycle:  (For Example:  Able to decrease use of oral pain medications.) none      5.  ER Visits During This Injection Cycle:  None      6.  Functional Performance:  Change in ADL's, social interaction, days lost from work, etc. Pt reports that she does not do anything due to Migraines      BOTULINUM NEUROTOXIN INJECTION PROCEDURES:      VERIFICATION OF PATIENT IDENTIFICATION AND PROCEDURE     Initials   Patient Name fi   Patient  fi   Procedure Verified by: wes     Prior to the start of the procedure and with procedural staff participation, I verbally confirmed the patient s identity using two indicators, relevant allergies, that the procedure was appropriate and matched the consent or emergent situation, and that the correct equipment/implants were available. Immediately prior to starting the procedure I conducted the Time Out with the procedural staff and re-confirmed the patient s name, procedure, and site/side. (The Joint Commission universal protocol was followed.)  Yes    Sedation (Moderate or Deep): None    Above assessments performed by:    Rosina Quinones MD    INDICATIONS FOR PROCEDURES:  Kalyn Rivero is a 49 year old patient with chronic migraine headaches associated with cervicogenic  components.     Her baseline symptoms have been recalcitrant to oral medications and conservative therapy.  She is here today for reinjection with Botox.    GOAL OF PROCEDURE:  The goal of this procedure is to decrease pain  .    TOTAL DOSE ADMINISTERED:  Dose Administered:  200 units  Botox (Botulinum Toxin Type A)       2:1 Dilution     Diluent Used:  Preservative Free Normal Saline  Total Volume of Diluent Used:  4 ml  Lot #  C3 with Expiration Date:  3/22  NDC #: Botox 100u ([address]-09)    Medication guide was offered to patient and was declined.    CONSENT:  The risks, benefits, and treatment options were discussed with Kalyn Rivero and she agreed to proceed.    Written consent was obtained by .     EQUIPMENT USED:  Needle-37mm stimulating/recording    SKIN PREPARATION:  Skin preparation was performed using an alcohol wipe.    GUIDANCE DESCRIPTION:  Electro-myographic guidance was necessary throughout the procedure to accurately identify all areas of spastic muscles while avoiding injection of non-spastic muscles, neighboring nerves and nearby vascular structures.     AREA/MUSCLE INJECTED:      HEAD & SCALP MUSCLES:   200 units Botox = Total Dose, 2:1 Dilution  Right Trapezius - 15 units in 3 sites   Left Trapezius - 10 units in 3 sites      Right occipitalis - 20 units of Botox at 4 site/s.  Left Occipitalis - 15 units at 3 sites      Right Paracervical  15 units in 3 sites   Left paracervical 5 units in 2 sites        Right Frontalis - 12.5 units of Botox at 3 site/s.  Left Frontalis - 12.5 units of Botox at 3 site/s.     Right Temporalis - 30 units of Botox at 5 site/s.  Left Temporalis - 30 units of Botox at 4 site/s.     Right Masseter, superficial belly   1 site 7.5 units   Left Masseter, superficial belly 1 site 7.5 units      Right  - 7.5 units of Botox at 1 site/s.               Left  -7.5 units of Botox at 1 site/s.     Procerus - 5 units of Botox at 1 site/s.      RESPONSE TO PROCEDURE:  Kalyn Rivero tolerated the procedure well and there were no immediate complications.   She was allowed to recover for an appropriate period of time and was discharged home in stable  condition.    FOLLOW UP:  Kalyn Rivero was asked to follow up by phone in 7-14 days with Annamaria Lamb RN, Care Coordinator, to report her response to this series of injections.  Based on the patient's previous response to this therapy, Kalyn Rivero was rescheduled for the next series of injections in 10 weeks.    PLAN (Medication Changes, Therapy Orders, Work or Disability Issues, etc.): Patient will continue to monitor response to today's injections.   We will connect with the insurance for authorizing of earlier injections every 9 weeks.     Again, thank you for allowing me to participate in the care of your patient.      Sincerely,    Rosina Quinones MD

## 2019-08-15 NOTE — PROGRESS NOTES
"BOTULINUM TOXIN PROCEDURE - HEADACHE - NOTE    Chief Complaint   Patient presents with     Headache     [facility] RETURN BOTOX        /72   Pulse 71   Resp 16   Ht 1.689 m (5' 6.5\")   Wt 62.1 kg (137 lb)   SpO2 98%   BMI 21.78 kg/m         Current Outpatient Medications:      bisacodyl (DULCOLAX) 10 MG suppository, Place 1 suppository (10 mg) rectally daily as needed, Disp: 2 suppository, Rfl: 0     Botulinum Toxin Type A (BOTOX) 200 UNITS SOLR, Inject 200 Units as directed every 3 months, Disp: 200 Units, Rfl: 3     celecoxib (CELEBREX) 100 MG capsule, Take 100 mg by mouth, Disp: , Rfl:      clindamycin (CLEOCIN) 300 MG capsule, Take 300 mg by mouth 3 times daily, Disp: , Rfl: 0     ClonazePAM (KLONOPIN PO), Take 0.5 mg by mouth 3 times daily , Disp: , Rfl:      Cyanocobalamin (VITAMIN B-12 PO), Take 4 tablets by mouth every morning , Disp: , Rfl:      ENBREL SURECLICK 50 MG/ML autoinjector, , Disp: , Rfl:      leflunomide (ARAVA) 20 MG tablet, Take 20 mg by mouth every morning Reported on 3/28/2017, Disp: , Rfl:      omeprazole (PRILOSEC) 40 MG DR capsule, Take 1 capsule (40 mg) by mouth 2 times daily (before meals), Disp: 30 capsule, Rfl:      OnabotulinumtoxinA (BOTOX IJ), Inject 200 Units as directed Total dose 200 units  Administered 190 units  Unavoidable waste 30 units  Lot # /C3 with Expiration Date:  12/2020  NDC [address]-64, Disp: , Rfl:      OnabotulinumtoxinA (BOTOX IJ), Inject 170 Units as directed Lot # /C3 with Expiration Date:  10/2020, Disp: , Rfl:      meclizine (ANTIVERT) 25 MG tablet, Take 1 tablet (25 mg) by mouth every 6 hours as needed for dizziness (Patient not taking: Reported on 4/11/2019), Disp: 30 tablet, Rfl: 1     oxyCODONE (ROXICODONE) 5 MG tablet, Take one or two tablets by mouth every 4-6 hours as needed for pain. (Patient not taking: Reported on 3/14/2019), Disp: 60 tablet, Rfl: 0     senna-docusate (SENOKOT-S/PERICOLACE) 8.6-50 MG tablet, Take 1-2 tablets by " mouth 2 times daily For constipation. (Patient not taking: Reported on 3/14/2019), Disp: 100 tablet, Rfl: 1     Allergies   Allergen Reactions     Morphine Swelling     Gabapentin Other (See Comments)     Pins,needles, stabbing aching at once  Pins,needles, stabbing aching at once  Numbness and Tingling     Ibuprofen      Doesn't take due to gastric ulcer     Sulfa Drugs Nausea and Vomiting     unknown     Tylenol [Acetaminophen]      Pt. States that she has Liver problems  Tylenol 3     Erythromycin Nausea     Vomiting      Penicillins Rash     Prednisone Palpitations     Heart racing        PHYSICAL EXAM:  Pt states headache today, rates 10/10. She is nauseated, and lightheadedness. She notes visual changes with the vision becoming tunnel vision.     HPI:  Patient reports the following no new medical problems since last visit: 3/14/19 and Patient denies new medical diagnoses, illnesses, hospitalizations, emergency room visits, and injuries since the previous injection with botulinum neurotoxin.    We reviewed the recommended safety guidelines for  Botox from any vaccine injection, such as the seasonal flu vaccine, by a minimum of 10-14 days with Kalyn Rivero. She acknowledged understanding.    Kalyn notes that she sees a clear wear off of the Botox in 9 weeks, and she states that she starts getting severe headaches associated with dizziness. She is requesting that we try to get her in earlier.       RESPONSE TO PREVIOUS TREATMENT:  Change in headache pattern following last series of injections with 200 units of  Botox on 5/28/19.    No problems reported    1.  Headache frequency during this injection cycle: 5 headache days per month.  She has had daily headaches in the last 3 weeks. Prior to the three weeks, she was headache free for the entire cycle.     2.  Headache duration during this injection cycle:  Headache duration ranged from 2 days to 5 days.    3.  Headache intensity during this  injection cycle:    A.  4/10  =  Typical pain level.  B.  10/10  =  Worst pain level.  C.  0/10  =  Lowest pain level.    4.  Change in headache medication usage during this injection cycle:  (For Example:  Able to decrease use of oral pain medications.) none      5.  ER Visits During This Injection Cycle:  None      6.  Functional Performance:  Change in ADL's, social interaction, days lost from work, etc. Pt reports that she does not do anything due to Migraines      BOTULINUM NEUROTOXIN INJECTION PROCEDURES:      VERIFICATION OF PATIENT IDENTIFICATION AND PROCEDURE     Initials   Patient Name fi   Patient  fi   Procedure Verified by: wes     Prior to the start of the procedure and with procedural staff participation, I verbally confirmed the patient s identity using two indicators, relevant allergies, that the procedure was appropriate and matched the consent or emergent situation, and that the correct equipment/implants were available. Immediately prior to starting the procedure I conducted the Time Out with the procedural staff and re-confirmed the patient s name, procedure, and site/side. (The Joint Commission universal protocol was followed.)  Yes    Sedation (Moderate or Deep): None    Above assessments performed by:    Rosina Quinones MD      INDICATIONS FOR PROCEDURES:  Kalyn Rivero is a 49 year old patient with chronic migraine headaches associated with cervicogenic  components.     Her baseline symptoms have been recalcitrant to oral medications and conservative therapy.  She is here today for reinjection with Botox.    GOAL OF PROCEDURE:  The goal of this procedure is to decrease pain .    TOTAL DOSE ADMINISTERED:  Dose Administered:  200 units  Botox (Botulinum Toxin Type A)       2:1 Dilution     Diluent Used:  Preservative Free Normal Saline  Total Volume of Diluent Used:  4 ml  Lot #  C3 with Expiration Date:  3/22  NDC #: Botox 100u ([address]-92)    Medication guide was offered to  patient and was declined.    CONSENT:  The risks, benefits, and treatment options were discussed with Kalyn Rivero and she agreed to proceed.    Written consent was obtained by fi.     EQUIPMENT USED:  Needle-37mm stimulating/recording      SKIN PREPARATION:  Skin preparation was performed using an alcohol wipe.      GUIDANCE DESCRIPTION:  Electro-myographic guidance was necessary throughout the procedure to accurately identify all areas of spastic muscles while avoiding injection of non-spastic muscles, neighboring nerves and nearby vascular structures.       AREA/MUSCLE INJECTED:      HEAD & SCALP MUSCLES:  200 units Botox = Total Dose, 2:1 Dilution  Right Trapezius - 15 units in 3 sites   Left Trapezius - 10 units in 3 sites      Right occipitalis - 20 units of Botox at 4 site/s.  Left Occipitalis - 15 units at 3 sites      Right Paracervical  15 units in 3 sites   Left paracervical 5 units in 2 sites            Right Frontalis - 12.5 units of Botox at 3 site/s.  Left Frontalis - 12.5 units of Botox at 3 site/s.     Right Temporalis - 30 units of Botox at 5 site/s.  Left Temporalis - 30 units of Botox at 4 site/s.     Right Masseter, superficial belly   1 site 7.5 units   Left Masseter, superficial belly 1 site 7.5 units      Right  - 7.5 units of Botox at 1 site/s.               Left  -7.5 units of Botox at 1 site/s.     Procerus - 5 units of Botox at 1 site/s.         RESPONSE TO PROCEDURE:  Kayln Rivero tolerated the procedure well and there were no immediate complications.   She was allowed to recover for an appropriate period of time and was discharged home in stable condition.      FOLLOW UP:  Kalyn Rivero was asked to follow up by phone in 7-14 days with Annamaria Lamb RN, Care Coordinator, to report her response to this series of injections.  Based on the patient's previous response to this therapy, Kalyn Rivero was rescheduled for the next series of  injections in 10 weeks.      PLAN (Medication Changes, Therapy Orders, Work or Disability Issues, etc.): Patient will continue to monitor response to today's injections.   We will connect with the insurance for authorizing of earlier injections every 9 weeks.

## 2019-08-15 NOTE — NURSING NOTE
Chief Complaint   Patient presents with     Headache     [facility] RETURN BOTOX        Francisco Javier Mason, EMT

## 2019-08-21 ENCOUNTER — TRANSFERRED RECORDS (OUTPATIENT)
Dept: HEALTH INFORMATION MANAGEMENT | Facility: CLINIC | Age: 49
End: 2019-08-21

## 2019-08-22 ENCOUNTER — RESULT FOLLOW UP (OUTPATIENT)
Dept: FAMILY MEDICINE | Facility: CLINIC | Age: 49
End: 2019-08-22

## 2019-08-22 ENCOUNTER — OFFICE VISIT (OUTPATIENT)
Dept: FAMILY MEDICINE | Facility: CLINIC | Age: 49
End: 2019-08-22
Payer: COMMERCIAL

## 2019-08-22 VITALS
DIASTOLIC BLOOD PRESSURE: 80 MMHG | SYSTOLIC BLOOD PRESSURE: 121 MMHG | WEIGHT: 139 LBS | TEMPERATURE: 98 F | HEART RATE: 75 BPM | BODY MASS INDEX: 22.1 KG/M2

## 2019-08-22 DIAGNOSIS — Z11.1 SCREENING EXAMINATION FOR PULMONARY TUBERCULOSIS: ICD-10-CM

## 2019-08-22 DIAGNOSIS — Z12.4 SCREENING FOR MALIGNANT NEOPLASM OF CERVIX: Primary | ICD-10-CM

## 2019-08-22 DIAGNOSIS — J06.9 UPPER RESPIRATORY TRACT INFECTION, UNSPECIFIED TYPE: ICD-10-CM

## 2019-08-22 DIAGNOSIS — R87.810 CERVICAL HIGH RISK HPV (HUMAN PAPILLOMAVIRUS) TEST POSITIVE: ICD-10-CM

## 2019-08-22 DIAGNOSIS — K27.9 PUD (PEPTIC ULCER DISEASE): ICD-10-CM

## 2019-08-22 PROCEDURE — 86481 TB AG RESPONSE T-CELL SUSP: CPT | Performed by: FAMILY MEDICINE

## 2019-08-22 PROCEDURE — 99214 OFFICE O/P EST MOD 30 MIN: CPT | Performed by: FAMILY MEDICINE

## 2019-08-22 PROCEDURE — G0124 SCREEN C/V THIN LAYER BY MD: HCPCS | Performed by: FAMILY MEDICINE

## 2019-08-22 PROCEDURE — G0123 SCREEN CERV/VAG THIN LAYER: HCPCS | Performed by: FAMILY MEDICINE

## 2019-08-22 PROCEDURE — 36415 COLL VENOUS BLD VENIPUNCTURE: CPT | Performed by: FAMILY MEDICINE

## 2019-08-22 PROCEDURE — G0476 HPV COMBO ASSAY CA SCREEN: HCPCS | Performed by: FAMILY MEDICINE

## 2019-08-22 RX ORDER — FLUCONAZOLE 150 MG/1
150 TABLET ORAL ONCE
Qty: 1 TABLET | Refills: 0 | Status: SHIPPED | OUTPATIENT
Start: 2019-08-22 | End: 2019-12-05

## 2019-08-22 RX ORDER — AZITHROMYCIN 250 MG/1
TABLET, FILM COATED ORAL
Qty: 6 TABLET | Refills: 0 | Status: SHIPPED | OUTPATIENT
Start: 2019-08-22 | End: 2021-07-14

## 2019-08-22 ASSESSMENT — ENCOUNTER SYMPTOMS
COUGH: 1
FEVER: 0
SORE THROAT: 0
SWOLLEN GLANDS: 1

## 2019-08-22 NOTE — PROGRESS NOTES
URI   This is a new problem. The current episode started 1 to 4 weeks ago. Associated symptoms include congestion, coughing and swollen glands. Pertinent negatives include no fever or sore throat.         Review of Systems   Constitutional: Negative for fever.   HENT: Positive for congestion. Negative for sore throat.    Respiratory: Positive for cough.      Needs pap smear  OBJECTIVE:  no apparent distress  /80   Pulse 75   Temp 98  F (36.7  C) (Oral)   Wt 63 kg (139 lb)   BMI 22.10 kg/m       Physical Exam   Constitutional: She appears well-developed.   HENT:   Head: Normocephalic.   Right Ear: External ear normal.   Left Ear: External ear normal.   Mouth/Throat: Oropharynx is clear and moist.   Neck: Normal range of motion.   Cardiovascular: Normal rate and regular rhythm.   Pulmonary/Chest: Effort normal and breath sounds normal.     Pap preformed    ICD-10-CM    1. Screening for malignant neoplasm of cervix Z12.4 Pap imaged thin layer screen with HPV - recommended age 30 - 65 years (select HPV order below)   2. Screening examination for pulmonary tuberculosis Z11.1 Quantiferon TB Gold Plus   3. Upper respiratory tract infection, unspecified type J06.9 azithromycin (ZITHROMAX) 250 MG tablet     fluconazole (DIFLUCAN) 150 MG tablet    PLAN: pap 5 years if normal

## 2019-08-22 NOTE — LETTER
September 5, 2019    Kalyn Rivero  [address] [facility] [address]    Dear ,  This letter is regarding your recent Pap smear (cervical cancer screening) and Human Papillomavirus (HPV) test.  We are happy to inform you that your Pap smear result is normal. Cervical cancer is closely linked with certain types of HPV. Your results showed no evidence of high-risk HPV.  The presence of endometrial cells was seen on your current pap smear.  This is most likely due to your menstrual cycle.  No follow up is recommended unless you have irregular periods or spotting/bleeding in between your cycles.  If this occurs, please contact the clinic for a follow up appointment.  We recommend you have your next PAP smear and HPV test in 1 year.  You will still need to return to the clinic every year for an annual exam and other preventive tests.  If you have additional questions regarding this result, please call our registered nurse, Blanca at [phone].  Sincerely,    Mika Zamora MD/rafael

## 2019-08-22 NOTE — LETTER
October 29, 2020      Kalyn Lucas Mat  [address] [place] [id number]        Dear ,    At [facility], your health and wellness are our primary concern. That is why we are following up on your most recent pap smear that showed endometrial cells..    Please call [phone] to schedule an appointment for your recommended follow-up Pap smear and Human Papillomavirus (HPV) test at your earliest convenience.     If you have completed the appointment outside of the [facility] system, please have the records forwarded to our office. We will update your chart for your provider to review before your next annual wellness visit.     Thank you for choosing [facility]!      Sincerely,    Your [facility] Care Team /rolanda

## 2019-08-22 NOTE — TELEPHONE ENCOUNTER
Routing refill request to provider for review/approval because:  Medication is reported/historical    Tish Arteaga BSN, RN

## 2019-08-23 RX ORDER — OMEPRAZOLE 40 MG/1
CAPSULE, DELAYED RELEASE ORAL
Qty: 30 CAPSULE | Refills: 8 | Status: SHIPPED | OUTPATIENT
Start: 2019-08-23 | End: 2020-08-09

## 2019-08-26 LAB
GAMMA INTERFERON BACKGROUND BLD IA-ACNC: 0.03 IU/ML
M TB IFN-G BLD-IMP: NEGATIVE
M TB IFN-G CD4+ BCKGRND COR BLD-ACNC: 6.31 IU/ML
MITOGEN IGNF BCKGRD COR BLD-ACNC: 0 IU/ML
MITOGEN IGNF BCKGRD COR BLD-ACNC: 0.01 IU/ML

## 2019-08-28 LAB
FINAL DIAGNOSIS: NORMAL
HPV HR 12 DNA CVX QL NAA+PROBE: NEGATIVE
HPV16 DNA SPEC QL NAA+PROBE: NEGATIVE
HPV18 DNA SPEC QL NAA+PROBE: NEGATIVE
SPECIMEN DESCRIPTION: NORMAL
SPECIMEN SOURCE CVX/VAG CYTO: NORMAL

## 2019-09-03 LAB
COPATH REPORT: NORMAL
PAP: NORMAL

## 2019-09-04 NOTE — PROGRESS NOTES
2/22/17: NIL Pap, + HR HPV 18 & other HR HPV. Plan colp  3/28/17 [facility] - negative for dysplasia. Plan cotest in 1 year.   5/3/18 [place] [facility] - Negative. Plan cotest in 1 year.   6/6/19 Lost to follow-up for pap tracking  8/22/19 NIL Pap, Neg HPV, Endometrial cells present. Plan cotest in 1 year, unless abnormal bleeding then sooner.   9/5/19 Result letter sent per RN request (rlm)

## 2019-10-31 ENCOUNTER — OFFICE VISIT (OUTPATIENT)
Dept: PHYSICAL MEDICINE AND REHAB | Facility: CLINIC | Age: 49
End: 2019-10-31
Payer: COMMERCIAL

## 2019-10-31 VITALS
SYSTOLIC BLOOD PRESSURE: 103 MMHG | HEART RATE: 83 BPM | OXYGEN SATURATION: 98 % | TEMPERATURE: 98.8 F | DIASTOLIC BLOOD PRESSURE: 74 MMHG

## 2019-10-31 DIAGNOSIS — G43.719 INTRACTABLE CHRONIC MIGRAINE WITHOUT AURA AND WITHOUT STATUS MIGRAINOSUS: Primary | ICD-10-CM

## 2019-10-31 ASSESSMENT — PAIN SCALES - GENERAL: PAINLEVEL: WORST PAIN (10)

## 2019-10-31 NOTE — NURSING NOTE
Chief Complaint   Patient presents with     Botox     [facility] RETURN BOTOX     Headache       Cintia Ho, EMT

## 2019-10-31 NOTE — LETTER
10/31/2019       RE: Kalyn Rivero  [address] [facility] [address]     Dear Colleague,    Thank you for referring your patient, Kalyn Rivero, to the [facility] PHYSICAL MEDICINE AND REHABILITATION at [facility]. Please see a copy of my visit note below.    BOTULINUM TOXIN PROCEDURE - HEADACHE - NOTE    Chief Complaint   Patient presents with     Botox     [facility] RETURN BOTOX     Headache       /74 (Patient Position: Sitting)   Pulse 83   Temp 98.8  F (37.1  C) (Oral)   SpO2 98%        Current Outpatient Medications:      azithromycin (ZITHROMAX) 250 MG tablet, Two tablets first day, then one tablet daily for four days., Disp: 6 tablet, Rfl: 0     Botulinum Toxin Type A (BOTOX) 200 UNITS SOLR, Inject 200 Units as directed every 3 months, Disp: 200 Units, Rfl: 3     celecoxib (CELEBREX) 100 MG capsule, Take 100 mg by mouth, Disp: , Rfl:      clindamycin (CLEOCIN) 300 MG capsule, Take 300 mg by mouth 3 times daily, Disp: , Rfl: 0     ClonazePAM (KLONOPIN PO), Take 0.5 mg by mouth 3 times daily , Disp: , Rfl:      ENBREL SURECLICK 50 MG/ML autoinjector, , Disp: , Rfl:      leflunomide (ARAVA) 20 MG tablet, Take 20 mg by mouth every morning Reported on 3/28/2017, Disp: , Rfl:      omeprazole (PRILOSEC) 40 MG DR capsule, TAKE 1 CAPSULE (40 MG) BY MOUTH DAILY TAKE 30-60 MINUTES BEFORE A MEAL., Disp: 30 capsule, Rfl: 8     omeprazole (PRILOSEC) 40 MG DR capsule, Take 1 capsule (40 mg) by mouth 2 times daily (before meals), Disp: 30 capsule, Rfl:      OnabotulinumtoxinA (BOTOX IJ), Inject 200 Units as directed Total dose 200 units  Administered 190 units  Unavoidable waste 30 units  Lot # /C3 with Expiration Date:  12/2020  NDC [address]-67, Disp: , Rfl:      OnabotulinumtoxinA (BOTOX IJ), Inject 170 Units as directed Lot # /C3 with Expiration Date:  10/2020, Disp: , Rfl:      senna-docusate (SENOKOT-S/PERICOLACE) 8.6-50 MG tablet, Take 1-2 tablets  by mouth 2 times daily For constipation. (Patient not taking: Reported on 3/14/2019), Disp: 100 tablet, Rfl: 1    Current Facility-Administered Medications:      botulinum toxin type A (BOTOX) 100 units injection 200 Units, 200 Units, Intramuscular, Q90 Days, Rosina Quinones MD     Allergies   Allergen Reactions     Morphine Swelling     Gabapentin Other (See Comments)     Pins,needles, stabbing aching at once  Pins,needles, stabbing aching at once  Numbness and Tingling     Ibuprofen      Doesn't take due to gastric ulcer     Sulfa Drugs Nausea and Vomiting     unknown     Tylenol [Acetaminophen]      Pt. States that she has Liver problems  Tylenol 3     Erythromycin Nausea     Vomiting      Penicillins Rash     Prednisone Palpitations     Heart racing        PHYSICAL EXAM:  Pt states headache today, rates 2/10. She denies any sense of vomiting. She notes blurred vision.   Notes the headache to be more on the left side.       HPI:  Patient reports the following no new medical problems since last visit: 3/14/19 and Patient denies new medical diagnoses, illnesses, hospitalizations, emergency room visits, and injuries since the previous injection with botulinum neurotoxin.    We reviewed the recommended safety guidelines for  Botox from any vaccine injection, such as the seasonal flu vaccine, by a minimum of 10-14 days with Kalyn Rivero. She acknowledged understanding.    Kalyn notes that she has been stressed as her girlfriend who was homeless and moved in into her home, she is further stressed by her brother also coming to live in and the they did not get along.       RESPONSE TO PREVIOUS TREATMENT:  She notes no change in her pattern, and her last series of injections with 200 units of  Botox on 8/15/19.     No problems reported    1.  Headache frequency during this injection cycle: 0 headache days per month.  She has had daily headaches in the last 2 weeks. Prior to the three weeks,  she was headache free for the entire cycle.     2.  Headache duration during this injection cycle:  Headache duration ranged from 2 days to 3 days.    3.  Headache intensity during this injection cycle:    A.  4/10  =  Typical pain level.  B.  10/10  =  Worst pain level.  C.  0/10  =  Lowest pain level.    4.  Change in headache medication usage during this injection cycle:  (For Example:  Able to decrease use of oral pain medications.) none      5.  ER Visits During This Injection Cycle:  None      6.  Functional Performance:  Change in ADL's, social interaction, days lost from work, etc. Pt reports that she does not do anything due to Migraines      BOTULINUM NEUROTOXIN INJECTION PROCEDURES:      VERIFICATION OF PATIENT IDENTIFICATION AND PROCEDURE     Initials   Patient Name fi   Patient  fi   Procedure Verified by: wes     Prior to the start of the procedure and with procedural staff participation, I verbally confirmed the patient s identity using two indicators, relevant allergies, that the procedure was appropriate and matched the consent or emergent situation, and that the correct equipment/implants were available. Immediately prior to starting the procedure I conducted the Time Out with the procedural staff and re-confirmed the patient s name, procedure, and site/side. (The Joint Commission universal protocol was followed.)  Yes    Sedation (Moderate or Deep): None    Above assessments performed by:    Rosina Quinones MD      INDICATIONS FOR PROCEDURES:  Kalyn Rivero is a 49 year old patient with chronic migraine headaches associated with cervicogenic  components.     Her baseline symptoms have been recalcitrant to oral medications and conservative therapy.  She is here today for reinjection with Botox.    GOAL OF PROCEDURE:  The goal of this procedure is to decrease pain .    TOTAL DOSE ADMINISTERED:  Dose Administered:  200 units  Botox (Botulinum Toxin Type A)       2:1 Dilution     Diluent  Used:  Preservative Free Normal Saline  Total Volume of Diluent Used:  4 ml  Lot #  C3 with Expiration Date:  4/22  NDC #: Botox 100u ([address]-09)    Medication guide was offered to patient and was declined.    CONSENT:  The risks, benefits, and treatment options were discussed with Kalyn Rivero and she agreed to proceed.    Written consent was obtained by .     EQUIPMENT USED:  Needle-37mm stimulating/recording      SKIN PREPARATION:  Skin preparation was performed using an alcohol wipe.      GUIDANCE DESCRIPTION:  Electro-myographic guidance was necessary throughout the procedure to accurately identify all areas of spastic muscles while avoiding injection of non-spastic muscles, neighboring nerves and nearby vascular structures.       AREA/MUSCLE INJECTED:      HEAD & SCALP MUSCLES:  200 units Botox = Total Dose, 2:1 Dilution  Right Trapezius - 15 units in 3 sites   Left Trapezius - 10 units in 3 sites      Right occipitalis - 15 units of Botox at 4 site/s.  Left Occipitalis - 20 units at 3 sites      Right Paracervical  1 0 units in 3 sites   Left paracervical 10 units in 2 sites            Right Frontalis - 12.5 units of Botox at 3 site/s.  Left Frontalis - 12.5 units of Botox at 3 site/s.     Right Temporalis - 30 units of Botox at 5 site/s.  Left Temporalis - 30 units of Botox at 4 site/s.     Right Masseter, superficial belly   1 site 7.5 units   Left Masseter, superficial belly 1 site 7.5 units      Right  - 7.5 units of Botox at 1 site/s.               Left  -7.5 units of Botox at 1 site/s.     Procerus - 5 units of Botox at 1 site/s.         RESPONSE TO PROCEDURE:  Kalyn Rivero tolerated the procedure well and there were no immediate complications.   She was allowed to recover for an appropriate period of time and was discharged home in stable condition.      FOLLOW UP:  Kalyn Rivero was asked to follow up by phone in 7-14 days with Annamaria Lamb RN, Care  Coordinator, to report her response to this series of injections.  Based on the patient's previous response to this therapy, Kalyn Rivero was rescheduled for the next series of injections in 10 weeks.      PLAN (Medication Changes, Therapy Orders, Work or Disability Issues, etc.): Patient will continue to monitor response to today's injections.   We will connect with the insurance for authorizing of earlier injections every 9 weeks.  Changed the dose to reflect more on the left side of the neck today.       Again, thank you for allowing me to participate in the care of your patient.      Sincerely,    Rosina Quinones MD

## 2019-12-05 DIAGNOSIS — M97.41XD: ICD-10-CM

## 2019-12-05 DIAGNOSIS — J06.9 UPPER RESPIRATORY TRACT INFECTION, UNSPECIFIED TYPE: ICD-10-CM

## 2019-12-05 RX ORDER — AMOXICILLIN 250 MG
1-2 CAPSULE ORAL 2 TIMES DAILY
Qty: 100 TABLET | Refills: 1 | Status: SHIPPED | OUTPATIENT
Start: 2019-12-05 | End: 2021-07-14

## 2019-12-05 RX ORDER — FLUCONAZOLE 150 MG/1
150 TABLET ORAL ONCE
Qty: 1 TABLET | Refills: 0 | Status: SHIPPED | OUTPATIENT
Start: 2019-12-05 | End: 2019-12-05

## 2019-12-05 NOTE — TELEPHONE ENCOUNTER
"Requested Prescriptions   Pending Prescriptions Disp Refills     fluconazole (DIFLUCAN) 150 MG tablet 1 tablet 0     Sig: Take 1 tablet (150 mg) by mouth once for 1 dose       Antifungal Agents Failed - 12/5/2019  9:37 AM        Failed - Not Fluconazole or Terconazole      If oral Fluconazole or Terconazole, may refill if indicated in progress notes.           Failed - Medication is active on med list        Passed - Recent (12 mo) or future (30 days) visit within the authorizing provider's specialty     Patient has had an office visit with the authorizing provider or a provider within the authorizing providers department within the previous 12 mos or has a future within next 30 days. See \"Patient Info\" tab in inbasket, or \"Choose Columns\" in Meds & Orders section of the refill encounter.              senna-docusate (SENOKOT-S/PERICOLACE) 8.6-50 MG tablet 100 tablet 1     Sig: Take 1-2 tablets by mouth 2 times daily For constipation.       Laxatives Protocol Passed - 12/5/2019  9:37 AM        Passed - Patient is age 6 or older        Passed - Recent (12 mo) or future (30 days) visit within the authorizing provider's specialty     Patient has had an office visit with the authorizing provider or a provider within the authorizing providers department within the previous 12 mos or has a future within next 30 days. See \"Patient Info\" tab in inbasket, or \"Choose Columns\" in Meds & Orders section of the refill encounter.              Passed - Medication is active on med list          "

## 2020-01-23 ENCOUNTER — OFFICE VISIT (OUTPATIENT)
Dept: PHYSICAL MEDICINE AND REHAB | Facility: CLINIC | Age: 50
End: 2020-01-23
Payer: COMMERCIAL

## 2020-01-23 VITALS
HEART RATE: 70 BPM | BODY MASS INDEX: 23.43 KG/M2 | OXYGEN SATURATION: 100 % | TEMPERATURE: 97.8 F | WEIGHT: 147.4 LBS | DIASTOLIC BLOOD PRESSURE: 78 MMHG | SYSTOLIC BLOOD PRESSURE: 114 MMHG

## 2020-01-23 DIAGNOSIS — G43.719 INTRACTABLE CHRONIC MIGRAINE WITHOUT AURA AND WITHOUT STATUS MIGRAINOSUS: Primary | ICD-10-CM

## 2020-01-23 ASSESSMENT — PAIN SCALES - GENERAL: PAINLEVEL: MILD PAIN (2)

## 2020-01-23 NOTE — PROGRESS NOTES
BOTULINUM TOXIN PROCEDURE - HEADACHE - NOTE    Chief Complaint   Patient presents with     Botox     [facility] RETURN BOTOX - 200 U - CHRONIC MIGRAINES        Wt 66.9 kg (147 lb 6.4 oz)   BMI 23.43 kg/m         Current Outpatient Medications:      azithromycin (ZITHROMAX) 250 MG tablet, Two tablets first day, then one tablet daily for four days., Disp: 6 tablet, Rfl: 0     Botulinum Toxin Type A (BOTOX) 200 UNITS SOLR, Inject 200 Units as directed every 3 months, Disp: 200 Units, Rfl: 3     celecoxib (CELEBREX) 100 MG capsule, Take 100 mg by mouth, Disp: , Rfl:      clindamycin (CLEOCIN) 300 MG capsule, Take 300 mg by mouth 3 times daily, Disp: , Rfl: 0     ClonazePAM (KLONOPIN PO), Take 0.5 mg by mouth 3 times daily , Disp: , Rfl:      ENBREL SURECLICK 50 MG/ML autoinjector, , Disp: , Rfl:      leflunomide (ARAVA) 20 MG tablet, Take 20 mg by mouth every morning Reported on 3/28/2017, Disp: , Rfl:      omeprazole (PRILOSEC) 40 MG DR capsule, TAKE 1 CAPSULE (40 MG) BY MOUTH DAILY TAKE 30-60 MINUTES BEFORE A MEAL., Disp: 30 capsule, Rfl: 8     omeprazole (PRILOSEC) 40 MG DR capsule, Take 1 capsule (40 mg) by mouth 2 times daily (before meals), Disp: 30 capsule, Rfl:      OnabotulinumtoxinA (BOTOX IJ), Inject 200 Units as directed Total dose 200 units  Administered 190 units  Unavoidable waste 30 units  Lot # /C3 with Expiration Date:  12/2020  NDC [address]-74, Disp: , Rfl:      OnabotulinumtoxinA (BOTOX IJ), Inject 170 Units as directed Lot # /C3 with Expiration Date:  10/2020, Disp: , Rfl:      senna-docusate (SENOKOT-S/PERICOLACE) 8.6-50 MG tablet, Take 1-2 tablets by mouth 2 times daily For constipation., Disp: 100 tablet, Rfl: 1    Current Facility-Administered Medications:      botulinum toxin type A (BOTOX) 100 units injection 200 Units, 200 Units, Intramuscular, Q90 Days, Rosina Quinones MD     Allergies   Allergen Reactions     Morphine Swelling     Gabapentin Other (See Comments)      Pins,needles, stabbing aching at once  Pins,needles, stabbing aching at once  Numbness and Tingling     Ibuprofen      Doesn't take due to gastric ulcer     Sulfa Drugs Nausea and Vomiting     unknown     Tylenol [Acetaminophen]      Pt. States that she has Liver problems  Tylenol 3     Erythromycin Nausea     Vomiting      Penicillins Rash     Prednisone Palpitations     Heart racing        PHYSICAL EXAM:  The patient reports to a headache today. She does complain of right neck pain today. She is grading the pain about 3/10. She also complains of facial pain surrounding the eyes. She describes it as pressure.   She notes pressure in both the sinuses.       HPI:    Patient denies new medical diagnoses, illnesses, hospitalizations, emergency room visits, and injuries since the previous injection with botulinum neurotoxin. No changes since last appointment.     We reviewed the recommended safety guidelines for  Botox from any vaccine injection, such as the seasonal flu vaccine, by a minimum of 10-14 days with Kalyn Rivero. She acknowledged understanding.    She reports her brother is still living with her and is dying from lung cancer. She complains of being stressed. The increased stress causes the headaches.     She is accompanied by her PCA today.       RESPONSE TO PREVIOUS TREATMENT:  She notes no change in her pattern, and her last series of injections with 200 units of  Botox on 10/31/2019    No problems reported    1.  Headache frequency during this injection cycle: She reports to dull-ache headaches non stop for about two weeks. She feels she knows she is due for her botox injections which always seem to help.     2.  Headache duration during this injection cycle:  Headaches are non stop for the last two weeks.     3.  Headache intensity during this injection cycle:    A.  3-4/10  =  Typical pain level.  B.  10/10  =  Worst pain level.  C.  0/10  =  Lowest pain level.    4.  Change in headache  medication usage during this injection cycle:  (For Example:  Able to decrease use of oral pain medications.) none      5.  ER Visits During This Injection Cycle:  None      6.  Functional Performance:  Change in ADL's, social interaction, days lost from work, etc. - the patient has a PCA who helps with everything around the house and shopping etc      BOTULINUM NEUROTOXIN INJECTION PROCEDURES:      VERIFICATION OF PATIENT IDENTIFICATION AND PROCEDURE     Initials   Patient Name MD   Patient  MD   Procedure Verified by: MD     Prior to the start of the procedure and with procedural staff participation, I verbally confirmed the patient s identity using two indicators, relevant allergies, that the procedure was appropriate and matched the consent or emergent situation, and that the correct equipment/implants were available. Immediately prior to starting the procedure I conducted the Time Out with the procedural staff and re-confirmed the patient s name, procedure, and site/side. (The Joint Commission universal protocol was followed.)  Yes    Sedation (Moderate or Deep): None    Above assessments performed by:    Rosina Quinones MD      INDICATIONS FOR PROCEDURES:  Kalyn Rivero is a 49 year old patient with chronic migraine headaches associated with cervicogenic  components.     Her baseline symptoms have been recalcitrant to oral medications and conservative therapy.  She is here today for reinjection with Botox.    GOAL OF PROCEDURE:  The goal of this procedure is to decrease pain .    TOTAL DOSE ADMINISTERED:  Dose Administered:  200 units  Botox (Botulinum Toxin Type A)       2:1 Dilution     Diluent Used:  Preservative Free Normal Saline  Total Volume of Diluent Used:  4 ml  Lot #  C3 with Expiration Date: 2022  NDC #: Botox 100u ([address]-66)    Medication guide was offered to patient and was declined.    CONSENT:  The risks, benefits, and treatment options were discussed with aKlyn Lucas  Mat and she agreed to proceed.    Written consent was obtained by MD     EQUIPMENT USED:  Needle-37mm stimulating/recording      SKIN PREPARATION:  Skin preparation was performed using an alcohol wipe.      GUIDANCE DESCRIPTION:  Electro-myographic guidance was necessary throughout the procedure to accurately identify all areas of spastic muscles while avoiding injection of non-spastic muscles, neighboring nerves and nearby vascular structures.       AREA/MUSCLE INJECTED:      HEAD & SCALP MUSCLES:  200 units Botox = Total Dose, 2:1 Dilution  Right Trapezius - 15 units in 3 sites   Left Trapezius - 10 units in 3 sites      Right occipitalis - 15 units of Botox at 4 site/s.  Left Occipitalis - 20 units at 3 sites      Right Paracervical  10 units in 3 sites   Left paracervical 10 units in 2 sites            Right Frontalis - 12.5 units of Botox at 3 site/s.  Left Frontalis - 12.5 units of Botox at 3 site/s.     Right Temporalis - 30 units of Botox at 5 site/s.  Left Temporalis - 30 units of Botox at 4 site/s.     Right Masseter, superficial belly   1 site 7.5 units   Left Masseter, superficial belly 1 site 7.5 units      Right  - 7.5 units of Botox at 1 site/s.               Left  -7.5 units of Botox at 1 site/s.     Procerus - 5 units of Botox at 1 site/s.         RESPONSE TO PROCEDURE:  Kalyn Rivero tolerated the procedure well and there were no immediate complications.   She was allowed to recover for an appropriate period of time and was discharged home in stable condition.      FOLLOW UP:  Kalyn Rivero was asked to follow up by phone in 7-14 days with Annamaria Lamb RN, Care Coordinator, to report her response to this series of injections.  Based on the patient's previous response to this therapy, Kalyn Rivero was rescheduled for the next series of injections in 10 weeks.      PLAN (Medication Changes, Therapy Orders, Work or Disability Issues, etc.): Patient will  continue to monitor response to today's injections.

## 2020-01-23 NOTE — NURSING NOTE
Chief Complaint   Patient presents with     Botox     [facility] RETURN BOTOX - 200 U - CHRONIC MIGRAINES      Sandy Rogers, EMT

## 2020-01-23 NOTE — LETTER
1/23/2020       RE: Kalyn Rivero  [address] [facility] [address]     Dear Colleague,    Thank you for referring your patient, Kalyn Rivero, to the [facility] PHYSICAL MEDICINE AND REHABILITATION at [facility]. Please see a copy of my visit note below.    BOTULINUM TOXIN PROCEDURE - HEADACHE - NOTE    Chief Complaint   Patient presents with     Botox     UMP RETURN BOTOX - 200 U - CHRONIC MIGRAINES        Wt 66.9 kg (147 lb 6.4 oz)   BMI 23.43 kg/m          Current Outpatient Medications:      azithromycin (ZITHROMAX) 250 MG tablet, Two tablets first day, then one tablet daily for four days., Disp: 6 tablet, Rfl: 0     Botulinum Toxin Type A (BOTOX) 200 UNITS SOLR, Inject 200 Units as directed every 3 months, Disp: 200 Units, Rfl: 3     celecoxib (CELEBREX) 100 MG capsule, Take 100 mg by mouth, Disp: , Rfl:      clindamycin (CLEOCIN) 300 MG capsule, Take 300 mg by mouth 3 times daily, Disp: , Rfl: 0     ClonazePAM (KLONOPIN PO), Take 0.5 mg by mouth 3 times daily , Disp: , Rfl:      ENBREL SURECLICK 50 MG/ML autoinjector, , Disp: , Rfl:      leflunomide (ARAVA) 20 MG tablet, Take 20 mg by mouth every morning Reported on 3/28/2017, Disp: , Rfl:      omeprazole (PRILOSEC) 40 MG DR capsule, TAKE 1 CAPSULE (40 MG) BY MOUTH DAILY TAKE 30-60 MINUTES BEFORE A MEAL., Disp: 30 capsule, Rfl: 8     omeprazole (PRILOSEC) 40 MG DR capsule, Take 1 capsule (40 mg) by mouth 2 times daily (before meals), Disp: 30 capsule, Rfl:      OnabotulinumtoxinA (BOTOX IJ), Inject 200 Units as directed Total dose 200 units  Administered 190 units  Unavoidable waste 30 units  Lot # /C3 with Expiration Date:  12/2020  NDC [address]-13, Disp: , Rfl:      OnabotulinumtoxinA (BOTOX IJ), Inject 170 Units as directed Lot # /C3 with Expiration Date:  10/2020, Disp: , Rfl:      senna-docusate (SENOKOT-S/PERICOLACE) 8.6-50 MG tablet, Take 1-2 tablets by mouth 2 times daily For  constipation., Disp: 100 tablet, Rfl: 1    Current Facility-Administered Medications:      botulinum toxin type A (BOTOX) 100 units injection 200 Units, 200 Units, Intramuscular, Q90 Days, Rosina Quinonse MD     Allergies   Allergen Reactions     Morphine Swelling     Gabapentin Other (See Comments)     Pins,needles, stabbing aching at once  Pins,needles, stabbing aching at once  Numbness and Tingling     Ibuprofen      Doesn't take due to gastric ulcer     Sulfa Drugs Nausea and Vomiting     unknown     Tylenol [Acetaminophen]      Pt. States that she has Liver problems  Tylenol 3     Erythromycin Nausea     Vomiting      Penicillins Rash     Prednisone Palpitations     Heart racing        PHYSICAL EXAM:  The patient reports to a headache today. She does complain of right neck pain today. She is grading the pain about 3/10. She also complains of facial pain surrounding the eyes. She describes it as pressure.   She notes pressure in both the sinuses.       HPI:    Patient denies new medical diagnoses, illnesses, hospitalizations, emergency room visits, and injuries since the previous injection with botulinum neurotoxin. No changes since last appointment.     We reviewed the recommended safety guidelines for  Botox from any vaccine injection, such as the seasonal flu vaccine, by a minimum of 10-14 days with Kalyn Rivero. She acknowledged understanding.    She reports her brother is still living with her and is dying from lung cancer. She complains of being stressed. The increased stress causes the headaches.     She is accompanied by her PCA today.       RESPONSE TO PREVIOUS TREATMENT:  She notes no change in her pattern, and her last series of injections with 200 units of  Botox on 10/31/2019    No problems reported    1.  Headache frequency during this injection cycle: She reports to dull-ache headaches non stop for about two weeks. She feels she knows she is due for her botox injections  which always seem to help.     2.  Headache duration during this injection cycle:  Headaches are non stop for the last two weeks.     3.  Headache intensity during this injection cycle:    A.  3-4/10  =  Typical pain level.  B.  10/10  =  Worst pain level.  C.  0/10  =  Lowest pain level.    4.  Change in headache medication usage during this injection cycle:  (For Example:  Able to decrease use of oral pain medications.) none      5.  ER Visits During This Injection Cycle:  None      6.  Functional Performance:  Change in ADL's, social interaction, days lost from work, etc. - the patient has a PCA who helps with everything around the house and shopping etc      BOTULINUM NEUROTOXIN INJECTION PROCEDURES:      VERIFICATION OF PATIENT IDENTIFICATION AND PROCEDURE     Initials   Patient Name MD   Patient  MD   Procedure Verified by: MD     Prior to the start of the procedure and with procedural staff participation, I verbally confirmed the patient s identity using two indicators, relevant allergies, that the procedure was appropriate and matched the consent or emergent situation, and that the correct equipment/implants were available. Immediately prior to starting the procedure I conducted the Time Out with the procedural staff and re-confirmed the patient s name, procedure, and site/side. (The Joint Commission universal protocol was followed.)  Yes    Sedation (Moderate or Deep): None    Above assessments performed by:    Rosina Quinones MD      INDICATIONS FOR PROCEDURES:  Kalyn Rivero is a 49 year old patient with chronic migraine headaches associated with cervicogenic  components.     Her baseline symptoms have been recalcitrant to oral medications and conservative therapy.  She is here today for reinjection with Botox.    GOAL OF PROCEDURE:  The goal of this procedure is to decrease pain .    TOTAL DOSE ADMINISTERED:  Dose Administered:  200 units  Botox (Botulinum Toxin Type A)       2:1 Dilution      Diluent Used:  Preservative Free Normal Saline  Total Volume of Diluent Used:  4 ml  Lot #  C3 with Expiration Date: 5/2022  NDC #: Botox 100u ([address]-76)    Medication guide was offered to patient and was declined.    CONSENT:  The risks, benefits, and treatment options were discussed with Kalyn Rivero and she agreed to proceed.    Written consent was obtained by MD     EQUIPMENT USED:  Needle-37mm stimulating/recording      SKIN PREPARATION:  Skin preparation was performed using an alcohol wipe.      GUIDANCE DESCRIPTION:  Electro-myographic guidance was necessary throughout the procedure to accurately identify all areas of spastic muscles while avoiding injection of non-spastic muscles, neighboring nerves and nearby vascular structures.       AREA/MUSCLE INJECTED:      HEAD & SCALP MUSCLES:  200 units Botox = Total Dose, 2:1 Dilution  Right Trapezius - 15 units in 3 sites   Left Trapezius - 10 units in 3 sites      Right occipitalis - 15 units of Botox at 4 site/s.  Left Occipitalis - 20 units at 3 sites      Right Paracervical  10 units in 3 sites   Left paracervical 10 units in 2 sites            Right Frontalis - 12.5 units of Botox at 3 site/s.  Left Frontalis - 12.5 units of Botox at 3 site/s.     Right Temporalis - 30 units of Botox at 5 site/s.  Left Temporalis - 30 units of Botox at 4 site/s.     Right Masseter, superficial belly   1 site 7.5 units   Left Masseter, superficial belly 1 site 7.5 units      Right  - 7.5 units of Botox at 1 site/s.               Left  -7.5 units of Botox at 1 site/s.     Procerus - 5 units of Botox at 1 site/s.         RESPONSE TO PROCEDURE:  Kalyn Rivero tolerated the procedure well and there were no immediate complications.   She was allowed to recover for an appropriate period of time and was discharged home in stable condition.      FOLLOW UP:  Kalyn Rivero was asked to follow up by phone in 7-14 days with Annamaria  Adonis RN, Care Coordinator, to report her response to this series of injections.  Based on the patient's previous response to this therapy, Kalyn Rivero was rescheduled for the next series of injections in 10 weeks.      PLAN (Medication Changes, Therapy Orders, Work or Disability Issues, etc.): Patient will continue to monitor response to today's injections.     Rosina Quinones MD

## 2020-01-28 ENCOUNTER — HOSPITAL ENCOUNTER (EMERGENCY)
Facility: CLINIC | Age: 50
Discharge: HOME | End: 2020-01-28
Attending: EMERGENCY MEDICINE | Admitting: EMERGENCY MEDICINE
Payer: COMMERCIAL

## 2020-01-28 VITALS
HEIGHT: 66 IN | BODY MASS INDEX: 23.63 KG/M2 | DIASTOLIC BLOOD PRESSURE: 78 MMHG | OXYGEN SATURATION: 100 % | RESPIRATION RATE: 16 BRPM | WEIGHT: 147 LBS | SYSTOLIC BLOOD PRESSURE: 139 MMHG | TEMPERATURE: 96.4 F

## 2020-01-28 DIAGNOSIS — R10.9 FLANK PAIN: ICD-10-CM

## 2020-01-28 DIAGNOSIS — N10 ACUTE PYELONEPHRITIS: ICD-10-CM

## 2020-01-28 LAB
ALBUMIN UR-MCNC: 10 MG/DL
ANION GAP SERPL CALCULATED.3IONS-SCNC: 7 MMOL/L (ref 3–14)
APPEARANCE UR: ABNORMAL
BACTERIA #/AREA URNS HPF: ABNORMAL /HPF
BASOPHILS # BLD AUTO: 0.1 10E9/L (ref 0–0.2)
BASOPHILS NFR BLD AUTO: 0.8 %
BILIRUB UR QL STRIP: NEGATIVE
BUN SERPL-MCNC: 18 MG/DL (ref 7–30)
CALCIUM SERPL-MCNC: 8.9 MG/DL (ref 8.5–10.1)
CHLORIDE SERPL-SCNC: 106 MMOL/L (ref 94–109)
CO2 SERPL-SCNC: 26 MMOL/L (ref 20–32)
COLOR UR AUTO: YELLOW
CREAT SERPL-MCNC: 0.79 MG/DL (ref 0.52–1.04)
DIFFERENTIAL METHOD BLD: NORMAL
EOSINOPHIL # BLD AUTO: 0.2 10E9/L (ref 0–0.7)
EOSINOPHIL NFR BLD AUTO: 2.5 %
ERYTHROCYTE [DISTWIDTH] IN BLOOD BY AUTOMATED COUNT: 14.4 % (ref 10–15)
GFR SERPL CREATININE-BSD FRML MDRD: 88 ML/MIN/{1.73_M2}
GLUCOSE SERPL-MCNC: 97 MG/DL (ref 70–99)
GLUCOSE UR STRIP-MCNC: NEGATIVE MG/DL
HCT VFR BLD AUTO: 41.4 % (ref 35–47)
HGB BLD-MCNC: 13.2 G/DL (ref 11.7–15.7)
HGB UR QL STRIP: NEGATIVE
IMM GRANULOCYTES # BLD: 0 10E9/L (ref 0–0.4)
IMM GRANULOCYTES NFR BLD: 0.3 %
KETONES UR STRIP-MCNC: NEGATIVE MG/DL
LEUKOCYTE ESTERASE UR QL STRIP: ABNORMAL
LYMPHOCYTES # BLD AUTO: 2.1 10E9/L (ref 0.8–5.3)
LYMPHOCYTES NFR BLD AUTO: 33.2 %
MCH RBC QN AUTO: 28.4 PG (ref 26.5–33)
MCHC RBC AUTO-ENTMCNC: 31.9 G/DL (ref 31.5–36.5)
MCV RBC AUTO: 89 FL (ref 78–100)
MONOCYTES # BLD AUTO: 0.8 10E9/L (ref 0–1.3)
MONOCYTES NFR BLD AUTO: 12.7 %
MUCOUS THREADS #/AREA URNS LPF: PRESENT /LPF
NEUTROPHILS # BLD AUTO: 3.2 10E9/L (ref 1.6–8.3)
NEUTROPHILS NFR BLD AUTO: 50.5 %
NITRATE UR QL: NEGATIVE
NRBC # BLD AUTO: 0 10*3/UL
NRBC BLD AUTO-RTO: 0 /100
PH UR STRIP: 6 PH (ref 5–7)
PLATELET # BLD AUTO: 209 10E9/L (ref 150–450)
POTASSIUM SERPL-SCNC: 3.4 MMOL/L (ref 3.4–5.3)
RBC # BLD AUTO: 4.65 10E12/L (ref 3.8–5.2)
RBC #/AREA URNS AUTO: 1 /HPF (ref 0–2)
SODIUM SERPL-SCNC: 139 MMOL/L (ref 133–144)
SOURCE: ABNORMAL
SP GR UR STRIP: 1.02 (ref 1–1.03)
SQUAMOUS #/AREA URNS AUTO: 5 /HPF (ref 0–1)
UROBILINOGEN UR STRIP-MCNC: NORMAL MG/DL (ref 0–2)
WBC # BLD AUTO: 6.3 10E9/L (ref 4–11)
WBC #/AREA URNS AUTO: 68 /HPF (ref 0–5)
WBC CLUMPS #/AREA URNS HPF: PRESENT /HPF

## 2020-01-28 PROCEDURE — 85025 COMPLETE CBC W/AUTO DIFF WBC: CPT | Performed by: EMERGENCY MEDICINE

## 2020-01-28 PROCEDURE — 81001 URINALYSIS AUTO W/SCOPE: CPT | Performed by: EMERGENCY MEDICINE

## 2020-01-28 PROCEDURE — 87186 SC STD MICRODIL/AGAR DIL: CPT | Performed by: EMERGENCY MEDICINE

## 2020-01-28 PROCEDURE — 25000132 ZZH RX MED GY IP 250 OP 250 PS 637: Performed by: EMERGENCY MEDICINE

## 2020-01-28 PROCEDURE — 80048 BASIC METABOLIC PNL TOTAL CA: CPT | Performed by: EMERGENCY MEDICINE

## 2020-01-28 PROCEDURE — 99283 EMERGENCY DEPT VISIT LOW MDM: CPT

## 2020-01-28 PROCEDURE — 87088 URINE BACTERIA CULTURE: CPT | Performed by: EMERGENCY MEDICINE

## 2020-01-28 PROCEDURE — 87086 URINE CULTURE/COLONY COUNT: CPT | Performed by: EMERGENCY MEDICINE

## 2020-01-28 RX ORDER — OXYCODONE HYDROCHLORIDE 5 MG/1
5 TABLET ORAL ONCE
Status: COMPLETED | OUTPATIENT
Start: 2020-01-28 | End: 2020-01-28

## 2020-01-28 RX ORDER — FLUCONAZOLE 150 MG/1
TABLET ORAL
Qty: 2 TABLET | Refills: 0 | Status: SHIPPED | OUTPATIENT
Start: 2020-01-28 | End: 2020-01-31

## 2020-01-28 RX ORDER — CEPHALEXIN 500 MG/1
500 CAPSULE ORAL 4 TIMES DAILY
Qty: 28 CAPSULE | Refills: 0 | Status: SHIPPED | OUTPATIENT
Start: 2020-01-28 | End: 2020-02-04

## 2020-01-28 RX ORDER — CEPHALEXIN 500 MG/1
500 CAPSULE ORAL ONCE
Status: COMPLETED | OUTPATIENT
Start: 2020-01-28 | End: 2020-01-28

## 2020-01-28 RX ADMIN — CEPHALEXIN 500 MG: 500 CAPSULE ORAL at 02:27

## 2020-01-28 RX ADMIN — OXYCODONE HYDROCHLORIDE 5 MG: 5 TABLET ORAL at 02:43

## 2020-01-28 ASSESSMENT — ENCOUNTER SYMPTOMS
DYSURIA: 0
HEMATURIA: 0
FLANK PAIN: 1
CHILLS: 1
FREQUENCY: 1
FEVER: 0

## 2020-01-28 ASSESSMENT — MIFFLIN-ST. JEOR: SCORE: 1313.54

## 2020-01-28 NOTE — ED PROVIDER NOTES
"History     Chief Complaint:    Flank pain and urinary frequency    HPI  Kalyn Rivero is a 49 year old female with a history of UTI and kidney stones who presents to the emergency department today for evaluation of bilateral flank pain. 3 days ago she developed urinary frequency and a strange smell. The patient has a history of UTI's spreading to her kidneys and feels this is what has happened. She reports bilateral flank pain that feels like \"being kicked in the kidneys\" and hot/cold sweats. She has not actual dysuria or hematuria.  She said this does not feel like past kidney stones.      Allergies:  Morphine  Codeine  Gabapentin  Ibuprofen  Sulfa Drugs  Tylenol [Acetaminophen]  Erythromycin  Penicillins  Prednisone    Medications:    Botox  Celebrex  Clonazepam   Arava  Prilosec  Senokot    Past Medical History:    Acetaminophen overdose  Anemia  Anesthesia complication  Arrhythmia  Cerebral infarction   Cervical high risk HPV (human papillomavirus) test positive  Chronic infection  MRSA  Chronic pain  Degenerative joint disease  Endometriosis  Fibromyalgia  GERD  Heart murmur  Immune disorder   Hypothyroidism  Learning disability  Malignant neoplasm   Migraine  MRSA   Opioid type dependence   RA   Renal stone  Scoliosis  Stomach ulcer   Hepatic failure   Pancreatitis  Anemia  Trochanteric bursitis   Atlantoaxial instability  Drug-seeking behavior    Past Surgical History:    GYN surgery  Fusion cervical posterior one level  Decompression cubital tunnel  rotator cuff repair rt/l  Total knee arthroplasty  Arthrodesis foot bilateral  Arthroplasty wrist  Arthroplasty knee  Arthrotomy elbow right   Neck surgery  Inject Major Joint / Bursa left  Remove Foreign Body Upper Extremity right   Resect bone upper extremity left  Arthroplasty Revision Elbow right    Family History:    Diabetes  Hypertension  Alcohol/drug  Blood disease  Epilepsy  Osteoporosis  Heart disease   Asthma  Endocrine " "disease  Hypertension  Skin cancer  Anxiety disorder  Mental illness  Hyperlipidemia  Kidney disease  Migraines  Arthritis  Lung disease  Cancer  Spine problems  Cardiac sudden death  Spine problems    Social History:  The patient arrives with family member  Smoking former quit 1983  Alcohol use no  PCP: Mika Zamora  Marital Status: Single [1]      Review of Systems   Constitutional: Positive for chills. Negative for fever.   Genitourinary: Positive for flank pain and frequency. Negative for dysuria and hematuria.   All other systems reviewed and are negative.      Physical Exam     Patient Vitals for the past 24 hrs:   BP Temp Temp src Heart Rate Resp SpO2 Height Weight   01/28/20 0200 -- -- -- 72 -- 100 % -- --   01/28/20 0006 139/78 96.4  F (35.8  C) Temporal 91 16 100 % 1.676 m (5' 6\") 66.7 kg (147 lb)     Physical Exam  Constitutional:  Appears well-developed and well-nourished. Cooperative.   HENT:   Head:    Atraumatic.   Mouth/Throat:   Oropharynx is without erythema or exudate and mucous     membranes are moist.   Eyes:    Conjunctivae normal and EOM are normal.      Pupils are equal, round, and reactive to light.   Neck:    Normal range of motion. Neck supple.   Cardiovascular:  Normal rate, regular rhythm, normal heart sounds and radial and dorsalis pedis pulses are 2+ and symmetric.    Pulmonary/Chest:  Effort normal and breath sounds normal.   Abdominal:   Soft. Bowel sounds are normal.      No splenomegaly or hepatomegaly. No tenderness. No rebound.   Musculoskeletal:  Normal range of motion. No edema. Mild right sided CVA tenderness.  Neurological:  Alert. Normal strength. No cranial nerve deficit.   Skin:    Skin is warm and dry.   Psychiatric:   Normal mood and affect.     Emergency Department Course     Laboratory:  Laboratory findings were communicated with the patient who voiced understanding of the findings.    CBC:  o/w WNL. (WBC 6.3, HGB 13.2, )   BMP: Glucose 97, o/w WNL " (Creatinine: 0.79)    UA: protein albumin 10, leukocyte large, WBC/HPF 68 (H), WBC clumps present, bacteria few, squamous 5 (H), mucus present o/w Negative  Urine culture in progress    Interventions:  0227 Keflex 500 mg PO  0243 oxycodone 5 mg PO    Emergency Department Course:  Past medical records, nursing notes, and vitals reviewed.  0032: I performed an exam of the patient and obtained history, as documented above.     IV was inserted and blood was drawn for laboratory testing, results above.  The patient provided a urine sample here in the emergency department. This was sent for laboratory testing, findings above.    0204: I rechecked the patient. Explained findings to patient.    I personally reviewed the laboratory results with the Patient and answered all related questions prior to discharge.     Findings and plan explained to the Patient. Patient discharged home with instructions regarding supportive care, medications, and reasons to return. The importance of close follow-up was reviewed.   Impression & Plan      Medical Decision Making:   Kalyn Rivero is a 49 year old female who presents for evaluation of flank pain and urinary frequency.  Urinalysis is consistent with a urinary tract infection; given the systemic symptoms present, signs and symptoms consistent with pyelonephritis.   I doubt perinephric abscess, emphysematous pyelonephritis, ureterolithiasis, appendicitis, colitis, diverticulitis or any intraabdominal catastrophe.  Patient was given dose of antibiotics in ED; see above.  She requested Diflucan, as she gets yeast infections when taking antibiotics. She has multiple antibiotic allergies but has tolerated keflex in the past. Urine culture is pending.  I recommend she start taking a probiotic as well.   Given well appearance, I believe the risk of imminent deterioration is low enough that outpatient management is indicated.  Return if increasing pain, vomiting, fever, or inability to  tolerate the oral antibiotic.  Follow up with primary physician is indicated in 2 days.      Diagnosis:    ICD-10-CM   1. Flank pain R10.9   2. Acute pyelonephritis N10       Disposition:   discharged to home    Discharge Medications:     Medication List      Started    cephALEXin 500 MG capsule  Commonly known as:  KEFLEX  500 mg, Oral, 4 TIMES DAILY     * fluconazole 150 MG tablet  Commonly known as:  Diflucan  Take one tablet now, and one tablet in three days         * This list has 1 medication(s) that are the same as other medications prescribed for you. Read the directions carefully, and ask your doctor or other care provider to review them with you.            ASK your doctor about these medications    * fluconazole 150 MG tablet  Commonly known as:  DIFLUCAN  150 mg, Oral, ONCE  Ask about: Should I take this medication?         * This list has 1 medication(s) that are the same as other medications prescribed for you. Read the directions carefully, and ask your doctor or other care provider to review them with you.                Scribe Disclosure:  I, Brandy Rivas, am serving as a scribe at 12:32 AM on 1/28/2020 to document services personally performed by Boy Liu MD based on my observations and the provider's statements to me.     EMERGENCY DEPARTMENT       Boy Liu MD  01/28/20 0606

## 2020-01-28 NOTE — RESULT ENCOUNTER NOTE
Emergency Dept/Urgent Care discharge antibiotic (if prescribed): Cephalexin (Keflex) 500 mg capsule, 1 capsule (500 mg) by mouth 4 times daily for 7 days.  Date of Rx (if applicable):  1/28/2020  No changes in treatment per Urine culture protocol.

## 2020-01-28 NOTE — ED AVS SNAPSHOT
Emergency Department  [address] [place] [id number]  Phone:  [phone]  Fax:  [phone]                                    Kalyn Rivero   MRN: [id number]    Department:   Emergency Department   Date of Visit:  1/28/2020           After Visit Summary Signature Page    I have received my discharge instructions, and my questions have been answered. I have discussed any challenges I see with this plan with the nurse or doctor.    ..........................................................................................................................................  Patient/Patient Representative Signature      ..........................................................................................................................................  Patient Representative Print Name and Relationship to Patient    ..................................................               ................................................  Date                                   Time    ..........................................................................................................................................  Reviewed by Signature/Title    ...................................................              ..............................................  Date                                               Time          22EPIC Rev 08/18

## 2020-01-29 LAB
BACTERIA SPEC CULT: ABNORMAL
Lab: ABNORMAL
SPECIMEN SOURCE: ABNORMAL

## 2020-01-29 NOTE — RESULT ENCOUNTER NOTE
Final Urine Culture Report on 1/29/20  Emergency Dept/Urgent Care discharge antibiotic prescribed: Cephalexin (Keflex) 500 mg capsule, 1 capsule (500 mg) by mouth 4 times daily for 7 days.  #1. Bacteria, >100,000 colonies/mL Escherichia coli, is SUSCEPTIBLE to Antibiotic.    As per [facility] ED Lab Result protocol, no change in antibiotic therapy.

## 2020-01-31 ENCOUNTER — HOSPITAL ENCOUNTER (EMERGENCY)
Facility: CLINIC | Age: 50
Discharge: HOME | End: 2020-02-01
Attending: EMERGENCY MEDICINE | Admitting: EMERGENCY MEDICINE
Payer: COMMERCIAL

## 2020-01-31 VITALS
WEIGHT: 147 LBS | DIASTOLIC BLOOD PRESSURE: 88 MMHG | TEMPERATURE: 98.5 F | HEART RATE: 97 BPM | HEIGHT: 66 IN | OXYGEN SATURATION: 100 % | BODY MASS INDEX: 23.63 KG/M2 | SYSTOLIC BLOOD PRESSURE: 157 MMHG | RESPIRATION RATE: 20 BRPM

## 2020-01-31 DIAGNOSIS — M79.10 MYALGIA: ICD-10-CM

## 2020-01-31 PROCEDURE — 99285 EMERGENCY DEPT VISIT HI MDM: CPT | Mod: 25

## 2020-01-31 PROCEDURE — 93005 ELECTROCARDIOGRAM TRACING: CPT

## 2020-01-31 ASSESSMENT — MIFFLIN-ST. JEOR: SCORE: 1313.54

## 2020-01-31 NOTE — ED AVS SNAPSHOT
Emergency Department  [address] [place] [address]  Phone:  [phone]  Fax:  [phone]                                    Kalyn Rivero   MRN: [id number]    Department:   Emergency Department   Date of Visit:  1/31/2020           After Visit Summary Signature Page    I have received my discharge instructions, and my questions have been answered. I have discussed any challenges I see with this plan with the nurse or doctor.    ..........................................................................................................................................  Patient/Patient Representative Signature      ..........................................................................................................................................  Patient Representative Print Name and Relationship to Patient    ..................................................               ................................................  Date                                   Time    ..........................................................................................................................................  Reviewed by Signature/Title    ...................................................              ..............................................  Date                                               Time          22EPIC Rev 08/18

## 2020-02-01 ENCOUNTER — APPOINTMENT (OUTPATIENT)
Dept: GENERAL RADIOLOGY | Facility: CLINIC | Age: 50
End: 2020-02-01
Attending: EMERGENCY MEDICINE
Payer: COMMERCIAL

## 2020-02-01 LAB
ANION GAP SERPL CALCULATED.3IONS-SCNC: 6 MMOL/L (ref 3–14)
BASOPHILS # BLD AUTO: 0.1 10E9/L (ref 0–0.2)
BASOPHILS NFR BLD AUTO: 1.5 %
BUN SERPL-MCNC: 19 MG/DL (ref 7–30)
CALCIUM SERPL-MCNC: 8.8 MG/DL (ref 8.5–10.1)
CHLORIDE SERPL-SCNC: 109 MMOL/L (ref 94–109)
CO2 SERPL-SCNC: 21 MMOL/L (ref 20–32)
CREAT SERPL-MCNC: 0.85 MG/DL (ref 0.52–1.04)
DIFFERENTIAL METHOD BLD: ABNORMAL
EOSINOPHIL # BLD AUTO: 0.4 10E9/L (ref 0–0.7)
EOSINOPHIL NFR BLD AUTO: 7.5 %
ERYTHROCYTE [DISTWIDTH] IN BLOOD BY AUTOMATED COUNT: 14.6 % (ref 10–15)
GFR SERPL CREATININE-BSD FRML MDRD: 81 ML/MIN/{1.73_M2}
GLUCOSE SERPL-MCNC: 102 MG/DL (ref 70–99)
HCT VFR BLD AUTO: 42.5 % (ref 35–47)
HGB BLD-MCNC: 13.2 G/DL (ref 11.7–15.7)
IMM GRANULOCYTES # BLD: 0 10E9/L (ref 0–0.4)
IMM GRANULOCYTES NFR BLD: 0.7 %
INTERPRETATION ECG - MUSE: NORMAL
LYMPHOCYTES # BLD AUTO: 2 10E9/L (ref 0.8–5.3)
LYMPHOCYTES NFR BLD AUTO: 35.8 %
MCH RBC QN AUTO: 27.2 PG (ref 26.5–33)
MCHC RBC AUTO-ENTMCNC: 31.1 G/DL (ref 31.5–36.5)
MCV RBC AUTO: 88 FL (ref 78–100)
MONOCYTES # BLD AUTO: 0.7 10E9/L (ref 0–1.3)
MONOCYTES NFR BLD AUTO: 12.2 %
NEUTROPHILS # BLD AUTO: 2.3 10E9/L (ref 1.6–8.3)
NEUTROPHILS NFR BLD AUTO: 42.3 %
PLATELET # BLD AUTO: 159 10E9/L (ref 150–450)
POTASSIUM SERPL-SCNC: 4.4 MMOL/L (ref 3.4–5.3)
RBC # BLD AUTO: 4.85 10E12/L (ref 3.8–5.2)
SODIUM SERPL-SCNC: 136 MMOL/L (ref 133–144)
TROPONIN I SERPL-MCNC: <0.015 UG/L (ref 0–0.04)
WBC # BLD AUTO: 5.5 10E9/L (ref 4–11)

## 2020-02-01 PROCEDURE — 80048 BASIC METABOLIC PNL TOTAL CA: CPT | Performed by: EMERGENCY MEDICINE

## 2020-02-01 PROCEDURE — 84484 ASSAY OF TROPONIN QUANT: CPT | Performed by: EMERGENCY MEDICINE

## 2020-02-01 PROCEDURE — 85025 COMPLETE CBC W/AUTO DIFF WBC: CPT | Performed by: EMERGENCY MEDICINE

## 2020-02-01 PROCEDURE — 71046 X-RAY EXAM CHEST 2 VIEWS: CPT

## 2020-02-01 ASSESSMENT — ENCOUNTER SYMPTOMS
LIGHT-HEADEDNESS: 1
SLEEP DISTURBANCE: 1
MYALGIAS: 1
PALPITATIONS: 1

## 2020-02-01 NOTE — ED TRIAGE NOTES
Pt states if she raises left arm her heart hurts. Right arm replaced 5 times and now is unable to lift that.

## 2020-02-01 NOTE — DISCHARGE INSTRUCTIONS
Continue antibiotic for UTI/pyelonephritis as previously prescribed    Consider icing, 20 minutes, 4 times per day for any muscle pain

## 2020-02-01 NOTE — ED PROVIDER NOTES
"  History     Chief Complaint:  Chest Pain    The history is provided by the patient.      Kalyn Rivero is a 49 year old female, with complicated past medical and surgical history as noted below, including RA, fibromyalgia, chronic pain, drug seeking behavior, heart murmur, opioid type dependence, several right arm surgeries amongst others, not currently anticoagulated, who presents with a male  for evaluation of intermittent chest pain and palpitations that began this afternoon, exacerbated with movement of her right arm. Due to symptoms, patient was prompted to present.     Here, patient reports that she was seen 4 days ago in the ED for concern for a kidney infection with symptoms of urinary frequency and malodorous urine. Patient was started on Keflex after urinalysis and urine culture, which chart review shows pan sensitivity. Patient reports that since then, she has been unable to sleep and is unsure if this was due to Keflex interacting with her Klonopin. She states she took a dose of Keflex tonight at 2300 and felt like she was sweating \"but no prespiration\" and associated intermittent palpitations and chest pain. Patient also endorses associated lightheadedness, but states she is unsure if this is because \"I have the habit of not breathing when I'm in pain.\" She also reports that she has been having intermittent atraumatic right arm pain that radiates into her hand as though \"there is a blood pressure cuff on it\" with tingling in her hand and states that she usually has these symptoms \"when I have an infection.\" Of note, patient has had 5 surgeries on this arm. She also reports that she has exacerbation of her chest pain whenever she lifts her left arm.     Allergies:  Morphine  Codeine  Gabapentin  Ibuprofen  Sulfa Drugs  Tylenol [Acetaminophen]  Erythromycin  Penicillins  Prednisone     Medications:    Botox  Celebrex  Clonazepam   Arava  Prilosec  Keflex  Diflucan  Senokot     Past " "Medical History:    Acetaminophen overdose  Anemia  Anesthesia complication  Arrhythmia  Cerebral infarction   Cervical high risk HPV (human papillomavirus) test positive  Chronic infection  MRSA  Chronic pain  Degenerative joint disease  Endometriosis  Fibromyalgia  GERD  Heart murmur  Immune disorder   Hypothyroidism  Learning disability  Malignant neoplasm   Migraine  MRSA   Opioid type dependence   RA   Renal stone  Scoliosis  Stomach ulcer   Hepatic failure   Pancreatitis  Anemia  Trochanteric bursitis   Atlantoaxial instability  Drug-seeking behavior     Past Surgical History:    GYN surgery  Fusion cervical posterior one level  Decompression cubital tunnel  rotator cuff repair rt/l  Total knee arthroplasty  Arthrodesis foot bilateral  Arthroplasty wrist  Arthroplasty knee  Arthrotomy elbow right   Neck surgery  Inject Major Joint / Bursa left  Remove Foreign Body Upper Extremity right   Resect bone upper extremity left  Arthroplasty Revision Elbow right     Family History:    Diabetes  Hypertension  Alcohol/drug  Blood disease  Epilepsy  Osteoporosis  Heart disease    Asthma  Endocrine disease  Hypertension  Skin cancer  Anxiety disorder  Mental illness  Hyperlipidemia  Kidney disease  Migraines  Arthritis  Lung disease  Cancer  Spine problems  Cardiac sudden death  Spine problems    Social History:  The patient presents to the ED with a male .   Smoking Status: Former  Alcohol Use: No     Review of Systems   Cardiovascular: Positive for chest pain and palpitations.   Musculoskeletal: Positive for myalgias.   Neurological: Positive for light-headedness.   Psychiatric/Behavioral: Positive for sleep disturbance.   All other systems reviewed and are negative.      Physical Exam     Patient Vitals for the past 24 hrs:   BP Temp Temp src Pulse Resp SpO2 Height Weight   01/31/20 2249 (!) 157/88 98.5  F (36.9  C) Temporal 97 20 100 % 1.676 m (5' 6\") 66.7 kg (147 lb)       Physical Exam  General: Alert and " cooperative with exam. Patient in mild distress. Normal mentation.  Head:  Scalp is NC/AT  Eyes:  No scleral icterus, PERRL  ENT:  The external nose and ears are normal. The oropharynx is normal and without erythema; mucus membranes are moist.   Neck:  Normal range of motion without rigidity.  CV:  Regular rate and rhythm    No pathologic murmur   Resp:  Breath sounds are clear bilaterally    Non-labored, no retractions or accessory muscle use  GI:  Abdomen is soft, no distension, no tenderness. No peritoneal signs  MS:  No lower extremity edema     RUE: CMS intact. Evidence of prior surgeries. No overlying skin changes. Normal reflex. Mild TPP to right axilla. Compartments soft.   Skin:  Warm and dry, No rash or lesions noted.  Neuro: Oriented x 3. No gross motor deficits.       Emergency Department Course     ECG:  ECG taken at 2255, ECG read at 0005 by Dr. Chang, DO  Normal sinus rhythm  Normal ECG  Rate 88 bpm. WA interval 146. QRS duration 68. QT/QTc 358/433. P-R-T axes 34 74 51.      Imaging:  Radiology findings were communicated with the patient and family who voiced understanding of the findings.    Chest XR, PA & LAT  IMPRESSION: Normal heart size and mediastinal contour. No pleural effusion or pneumothorax. Lungs are clear. Status post left shoulder arthroplasty.  Reading per radiology.    Laboratory:  Laboratory findings were communicated with the patient and family who voiced understanding of the findings.    CBC: AWNL (WBC 5.5, HGB 13.2, )  BMP: Glucose 102 (H) o/w WNL (Creatinine 0.85)  Troponin (Collected 0027): <0.015      Emergency Department Course:  Past medical records, nursing notes, and vitals reviewed.    EKG obtained in the ED, see results above.      (0001)   I performed an exam of the patient as documented above. History obtained from patient.     IV was inserted and blood was drawn for laboratory testing, results above.    The patient was sent for a Chest XR, PA & LAT while in the  emergency department, results above.      (0139)   I rechecked the patient and discussed the results of her workup thus far. Discussed plan of care and patient will be discharged.     Findings and plan explained to the Patient. Patient discharged home with instructions regarding supportive care, medications, and reasons to return. The importance of close follow-up was reviewed.     I personally reviewed the laboratory and imaging results with the Patient and answered all related questions prior to discharge.     Impression & Plan     Medical Decision Making:  Patient is a 49-year-old female presents with right upper extremity myalgias and chest discomfort earlier today and recent diagnosis and current treatment pyelonephritis.  Patient's medical history and records were reviewed.  On evaluation, patient notes that her urinary symptoms are clinically improved; no indication for treatment change at this time; recommended continued use of Keflex.  Basic labs unremarkable as noted above.  No significant findings on physical exam.  Chest x-ray and EKG unremarkable.  Patient's arm pain/chest discomfort appears consistent with myalgia; discussed supportive care.  On reevaluation, patient was asymptomatic.  Discharged home.  Patient follow-up closely with PCP as needed.  Return precautions discussed.    Diagnosis:    ICD-10-CM    1. Myalgia M79.10        Disposition:  Discharged to home.    Scribe Disclosure:  I, Ruba Blank, am serving as a scribe at 12:06 AM on 2/1/2020 to document services personally performed by Luiz Chang DO based on my observations and the provider's statements to me.   1/31/2020    EMERGENCY DEPARTMENT       Luiz Chang DO  02/01/20 0457

## 2020-03-11 ENCOUNTER — TRANSFERRED RECORDS (OUTPATIENT)
Dept: HEALTH INFORMATION MANAGEMENT | Facility: CLINIC | Age: 50
End: 2020-03-11

## 2020-03-11 LAB
ALT SERPL-CCNC: 12 IU/L (ref 5–35)
AST SERPL-CCNC: 18 U/L (ref 5–34)
CREAT SERPL-MCNC: 0.93 MG/DL (ref 0.5–1.3)

## 2020-04-24 ENCOUNTER — TELEPHONE (OUTPATIENT)
Dept: PHYSICAL MEDICINE AND REHAB | Facility: CLINIC | Age: 50
End: 2020-04-24

## 2020-04-24 NOTE — TELEPHONE ENCOUNTER
[facility] Call Center    Phone Message    May a detailed message be left on voicemail: yes     Reason for Call: Other: .  pt would like to schedule a new appt for botox since she missed her recent appt.    May only reschedule existing appointments, not schedule new ones. Reschedule must be on same or later date from existing appointment, and scheduled with the same provider only.    Action Taken: Message routed to:  [facility] ([facility]): pcc    Travel Screening: Not Applicable

## 2020-05-14 ENCOUNTER — OFFICE VISIT (OUTPATIENT)
Dept: PHYSICAL MEDICINE AND REHAB | Facility: CLINIC | Age: 50
End: 2020-05-14
Payer: COMMERCIAL

## 2020-05-14 DIAGNOSIS — G43.719 INTRACTABLE CHRONIC MIGRAINE WITHOUT AURA AND WITHOUT STATUS MIGRAINOSUS: Primary | ICD-10-CM

## 2020-05-14 ASSESSMENT — PAIN SCALES - GENERAL: PAINLEVEL: MODERATE PAIN (4)

## 2020-05-14 NOTE — LETTER
5/14/2020        RE: Kalyn Rivero  [address] [facility] [address]     Dear Colleague,    Thank you for referring your patient, Kalyn Rivero, to the [facility] PHYSICAL MEDICINE AND REHABILITATION at [facility]. Please see a copy of my visit note below.    BOTULINUM TOXIN PROCEDURE - HEADACHE - NOTE    Chief Complaint   Patient presents with     Botox     Chronic migraine           Current Outpatient Medications:      celecoxib (CELEBREX) 100 MG capsule, Take 100 mg by mouth, Disp: , Rfl:      clindamycin (CLEOCIN) 300 MG capsule, Take 300 mg by mouth 3 times daily, Disp: , Rfl: 0     ClonazePAM (KLONOPIN PO), Take 0.5 mg by mouth 3 times daily , Disp: , Rfl:      leflunomide (ARAVA) 20 MG tablet, Take 20 mg by mouth every morning Reported on 3/28/2017, Disp: , Rfl:      omeprazole (PRILOSEC) 40 MG DR capsule, Take 1 capsule (40 mg) by mouth 2 times daily (before meals), Disp: 30 capsule, Rfl:      OnabotulinumtoxinA (BOTOX IJ), Inject 200 Units as directed Total dose 200 units  Administered 190 units  Unavoidable waste 30 units  Lot # /C3 with Expiration Date:  12/2020  NDC [address]-88, Disp: , Rfl:      OnabotulinumtoxinA (BOTOX IJ), Inject 170 Units as directed Lot # /C3 with Expiration Date:  10/2020, Disp: , Rfl:      azithromycin (ZITHROMAX) 250 MG tablet, Two tablets first day, then one tablet daily for four days. (Patient not taking: Reported on 1/23/2020), Disp: 6 tablet, Rfl: 0     Botulinum Toxin Type A (BOTOX) 200 UNITS SOLR, Inject 200 Units as directed every 3 months (Patient not taking: Reported on 1/23/2020), Disp: 200 Units, Rfl: 3     ENBREL SURECLICK 50 MG/ML autoinjector, , Disp: , Rfl:      omeprazole (PRILOSEC) 40 MG DR capsule, TAKE 1 CAPSULE (40 MG) BY MOUTH DAILY TAKE 30-60 MINUTES BEFORE A MEAL. (Patient not taking: Reported on 1/23/2020), Disp: 30 capsule, Rfl: 8     senna-docusate (SENOKOT-S/PERICOLACE) 8.6-50 MG  tablet, Take 1-2 tablets by mouth 2 times daily For constipation. (Patient not taking: Reported on 1/23/2020), Disp: 100 tablet, Rfl: 1    Current Facility-Administered Medications:      botulinum toxin type A (BOTOX) 100 units injection 200 Units, 200 Units, Intramuscular, Q90 Days, Rosina Quinones MD     Allergies   Allergen Reactions     Morphine Swelling     Codeine      Gabapentin Other (See Comments)     Pins,needles, stabbing aching at once  Pins,needles, stabbing aching at once  Numbness and Tingling     Ibuprofen      Doesn't take due to gastric ulcer     Sulfa Drugs Nausea and Vomiting     unknown     Tylenol [Acetaminophen]      Pt. States that she has Liver problems  Tylenol 3     Erythromycin Nausea     Vomiting      Penicillins Rash     Prednisone Palpitations     Heart racing        PHYSICAL EXAM:  The patient notes a headache today. She describes it as a dull ache. She is grading it a 4/10. She does dizziness just sitting. She is accompanied by her son who is her PCA. She is late for reinjection of Botox today.  She is about 4 weeks overdue.       HPI:    Patient denies new medical diagnoses, illnesses, hospitalizations, emergency room visits, and injuries since the previous injection with botulinum neurotoxin. No changes since last appointment.     We reviewed the recommended safety guidelines for  Botox from any vaccine injection, such as the seasonal flu vaccine, by a minimum of 10-14 days with Kalyn Rivero. She acknowledged understanding.    Her brother has moved out of her home but she does note increased stress due to him still dying due to lung cancer.          RESPONSE TO PREVIOUS TREATMENT:  She notes no change in her pattern, and her last series of injections with 200 units of  Botox on 1/23/20.     No problems reported.     1.  Headache frequency during this injection cycle: She notes a headache daily starting about 6 weeks ago. She is also dizzy when getting her  headaches.  She is overdue for Botox by 4 weeks.    2.  Headache duration during this injection cycle:  Headaches are continuous over the last six weeks    3.  Headache intensity during this injection cycle:    A.  3-4/10  =  Typical pain level.  B.  10/10  =  Worst pain level.  C.  0/10  =  Lowest pain level.  10/10 pain is accompanied by dizziness.     4.  Change in headache medication usage during this injection cycle:  (For Example:  Able to decrease use of oral pain medications.) none      5.  ER Visits During This Injection Cycle:  None      6.  Functional Performance:  Change in ADL's, social interaction, days lost from work, etc. - there is not much to do due to COVID19. There have been several days over the last 6 weeks she has not wanted to get out of bed due to headaches and feeling dizzy.       BOTULINUM NEUROTOXIN INJECTION PROCEDURES:      VERIFICATION OF PATIENT IDENTIFICATION AND PROCEDURE     Initials   Patient Name MD   Patient  MD   Procedure Verified by: MD     Prior to the start of the procedure and with procedural staff participation, I verbally confirmed the patient s identity using two indicators, relevant allergies, that the procedure was appropriate and matched the consent or emergent situation, and that the correct equipment/implants were available. Immediately prior to starting the procedure I conducted the Time Out with the procedural staff and re-confirmed the patient s name, procedure, and site/side. (The Joint Commission universal protocol was followed.)  Yes    Sedation (Moderate or Deep): None    Above assessments performed by:  Rosina Quinones MD, [facility]   Department of Rehabilitation         INDICATIONS FOR PROCEDURES:  Kalyn Rivero is a 49 year old patient with chronic migraine headaches associated with cervicogenic  components.     Her baseline symptoms have been recalcitrant to oral medications and conservative therapy.  She is here today for reinjection with  Botox.    GOAL OF PROCEDURE:  The goal of this procedure is to decrease pain .    TOTAL DOSE ADMINISTERED:  Dose Administered:  200 units  Botox (Botulinum Toxin Type A)       2:1 Dilution     Diluent Used:  Preservative Free Normal Saline  Total Volume of Diluent Used:  4 ml  Lot #  C3 with Expiration Date: 10/22  NDC #: Botox 100u ([address]-62)    Medication guide was offered to patient and was declined.    CONSENT:  The risks, benefits, and treatment options were discussed with Kalyn Lucas Mat and she agreed to proceed.    Written consent was obtained by MD     EQUIPMENT USED:  Needle-37mm stimulating/recording      SKIN PREPARATION:  Skin preparation was performed using an alcohol wipe.      GUIDANCE DESCRIPTION:  Electro-myographic guidance was necessary throughout the procedure to accurately identify all areas of spastic muscles while avoiding injection of non-spastic muscles, neighboring nerves and nearby vascular structures.       AREA/MUSCLE INJECTED:      HEAD & SCALP MUSCLES:  200 units Botox = Total Dose, 2:1 Dilution       Right Trapezius - 15 units in 3 sites    Left Trapezius - 10 units in 3 sites       Right occipitalis - 15 units of Botox at 4 site/s.   Left Occipitalis - 20 units at 3 sites       Right Paracervical  10 units in 3 sites    Left paracervical 10 units in 2 sites      Right Frontalis - 12.5 units of Botox at 3 site/s.  Left Frontalis - 12.5 units of Botox at 3 site/s.     Right Temporalis - 30 units of Botox at 5 site/s.  Left Temporalis - 30 units of Botox at 4 site/s.     Right Masseter, superficial belly   1 site 7.5 units   Left Masseter, superficial belly 1 site 7.5 units      Right  - 7.5 units of Botox at 1 site/s.               Left  -7.5 units of Botox at 1 site/s.     Procerus - 5 units of Botox at 1 site/s.         RESPONSE TO PROCEDURE:  Kalyn Rivero tolerated the procedure well and there were no immediate complications.   She was  allowed to recover for an appropriate period of time and was discharged home in stable condition.      FOLLOW UP:  Kalyn Rivero was asked to follow up by phone in 7-14 days with Annamaria Lamb RN, Care Coordinator, to report her response to this series of injections.  Based on the patient's previous response to this therapy, Kalyn Rivero was rescheduled for the next series of injections in 10 weeks.      PLAN (Medication Changes, Therapy Orders, Work or Disability Issues, etc.): Patient will continue to monitor response to today's injections.              Again, thank you for allowing me to participate in the care of your patient.      Sincerely,    Rosina Quinones MD

## 2020-05-14 NOTE — PROGRESS NOTES
BOTULINUM TOXIN PROCEDURE - HEADACHE - NOTE    Chief Complaint   Patient presents with     Botox     Chronic migraine           Current Outpatient Medications:      celecoxib (CELEBREX) 100 MG capsule, Take 100 mg by mouth, Disp: , Rfl:      clindamycin (CLEOCIN) 300 MG capsule, Take 300 mg by mouth 3 times daily, Disp: , Rfl: 0     ClonazePAM (KLONOPIN PO), Take 0.5 mg by mouth 3 times daily , Disp: , Rfl:      leflunomide (ARAVA) 20 MG tablet, Take 20 mg by mouth every morning Reported on 3/28/2017, Disp: , Rfl:      omeprazole (PRILOSEC) 40 MG DR capsule, Take 1 capsule (40 mg) by mouth 2 times daily (before meals), Disp: 30 capsule, Rfl:      OnabotulinumtoxinA (BOTOX IJ), Inject 200 Units as directed Total dose 200 units  Administered 190 units  Unavoidable waste 30 units  Lot # /C3 with Expiration Date:  12/2020  NDC [address]-76, Disp: , Rfl:      OnabotulinumtoxinA (BOTOX IJ), Inject 170 Units as directed Lot # /C3 with Expiration Date:  10/2020, Disp: , Rfl:      azithromycin (ZITHROMAX) 250 MG tablet, Two tablets first day, then one tablet daily for four days. (Patient not taking: Reported on 1/23/2020), Disp: 6 tablet, Rfl: 0     Botulinum Toxin Type A (BOTOX) 200 UNITS SOLR, Inject 200 Units as directed every 3 months (Patient not taking: Reported on 1/23/2020), Disp: 200 Units, Rfl: 3     ENBREL SURECLICK 50 MG/ML autoinjector, , Disp: , Rfl:      omeprazole (PRILOSEC) 40 MG DR capsule, TAKE 1 CAPSULE (40 MG) BY MOUTH DAILY TAKE 30-60 MINUTES BEFORE A MEAL. (Patient not taking: Reported on 1/23/2020), Disp: 30 capsule, Rfl: 8     senna-docusate (SENOKOT-S/PERICOLACE) 8.6-50 MG tablet, Take 1-2 tablets by mouth 2 times daily For constipation. (Patient not taking: Reported on 1/23/2020), Disp: 100 tablet, Rfl: 1    Current Facility-Administered Medications:      botulinum toxin type A (BOTOX) 100 units injection 200 Units, 200 Units, Intramuscular, Q90 Days, Rosina Quinones MD      Allergies   Allergen Reactions     Morphine Swelling     Codeine      Gabapentin Other (See Comments)     Pins,needles, stabbing aching at once  Pins,needles, stabbing aching at once  Numbness and Tingling     Ibuprofen      Doesn't take due to gastric ulcer     Sulfa Drugs Nausea and Vomiting     unknown     Tylenol [Acetaminophen]      Pt. States that she has Liver problems  Tylenol 3     Erythromycin Nausea     Vomiting      Penicillins Rash     Prednisone Palpitations     Heart racing        PHYSICAL EXAM:  The patient notes a headache today. She describes it as a dull ache. She is grading it a 4/10. She does dizziness just sitting. She is accompanied by her son who is her PCA. She is late for reinjection of Botox today.  She is about 4 weeks overdue.       HPI:    Patient denies new medical diagnoses, illnesses, hospitalizations, emergency room visits, and injuries since the previous injection with botulinum neurotoxin. No changes since last appointment.     We reviewed the recommended safety guidelines for  Botox from any vaccine injection, such as the seasonal flu vaccine, by a minimum of 10-14 days with Kalyn Rivero. She acknowledged understanding.    Her brother has moved out of her home but she does note increased stress due to him still dying due to lung cancer.          RESPONSE TO PREVIOUS TREATMENT:  She notes no change in her pattern, and her last series of injections with 200 units of  Botox on 1/23/20.     No problems reported.     1.  Headache frequency during this injection cycle: She notes a headache daily starting about 6 weeks ago. She is also dizzy when getting her headaches.  She is overdue for Botox by 4 weeks.    2.  Headache duration during this injection cycle:  Headaches are continuous over the last six weeks    3.  Headache intensity during this injection cycle:    A.  3-4/10  =  Typical pain level.  B.  10/10  =  Worst pain level.  C.  0/10  =  Lowest pain  level.  10/10 pain is accompanied by dizziness.     4.  Change in headache medication usage during this injection cycle:  (For Example:  Able to decrease use of oral pain medications.) none      5.  ER Visits During This Injection Cycle:  None      6.  Functional Performance:  Change in ADL's, social interaction, days lost from work, etc. - there is not much to do due to COVID19. There have been several days over the last 6 weeks she has not wanted to get out of bed due to headaches and feeling dizzy.       BOTULINUM NEUROTOXIN INJECTION PROCEDURES:      VERIFICATION OF PATIENT IDENTIFICATION AND PROCEDURE     Initials   Patient Name MD   Patient  MD   Procedure Verified by: MD     Prior to the start of the procedure and with procedural staff participation, I verbally confirmed the patient s identity using two indicators, relevant allergies, that the procedure was appropriate and matched the consent or emergent situation, and that the correct equipment/implants were available. Immediately prior to starting the procedure I conducted the Time Out with the procedural staff and re-confirmed the patient s name, procedure, and site/side. (The Joint Commission universal protocol was followed.)  Yes    Sedation (Moderate or Deep): None    Above assessments performed by:  Rosina Quinones MD, [facility]   Department of Rehabilitation         INDICATIONS FOR PROCEDURES:  Kalyn Rivero is a 49 year old patient with chronic migraine headaches associated with cervicogenic  components.     Her baseline symptoms have been recalcitrant to oral medications and conservative therapy.  She is here today for reinjection with Botox.    GOAL OF PROCEDURE:  The goal of this procedure is to decrease pain .    TOTAL DOSE ADMINISTERED:  Dose Administered:  200 units  Botox (Botulinum Toxin Type A)       2:1 Dilution     Diluent Used:  Preservative Free Normal Saline  Total Volume of Diluent Used:  4 ml  Lot #  C3 with Expiration Date:  10/22  NDC #: Botox 100u ([address]-31)    Medication guide was offered to patient and was declined.    CONSENT:  The risks, benefits, and treatment options were discussed with Kalyn Rivero and she agreed to proceed.    Written consent was obtained by MD     EQUIPMENT USED:  Needle-37mm stimulating/recording      SKIN PREPARATION:  Skin preparation was performed using an alcohol wipe.      GUIDANCE DESCRIPTION:  Electro-myographic guidance was necessary throughout the procedure to accurately identify all areas of spastic muscles while avoiding injection of non-spastic muscles, neighboring nerves and nearby vascular structures.       AREA/MUSCLE INJECTED:      HEAD & SCALP MUSCLES:  200 units Botox = Total Dose, 2:1 Dilution       Right Trapezius - 15 units in 3 sites    Left Trapezius - 10 units in 3 sites       Right occipitalis - 15 units of Botox at 4 site/s.   Left Occipitalis - 20 units at 3 sites       Right Paracervical  10 units in 3 sites    Left paracervical 10 units in 2 sites      Right Frontalis - 12.5 units of Botox at 3 site/s.  Left Frontalis - 12.5 units of Botox at 3 site/s.     Right Temporalis - 30 units of Botox at 5 site/s.  Left Temporalis - 30 units of Botox at 4 site/s.     Right Masseter, superficial belly   1 site 7.5 units   Left Masseter, superficial belly 1 site 7.5 units      Right  - 7.5 units of Botox at 1 site/s.               Left  -7.5 units of Botox at 1 site/s.     Procerus - 5 units of Botox at 1 site/s.         RESPONSE TO PROCEDURE:  Kalyn Rivero tolerated the procedure well and there were no immediate complications.   She was allowed to recover for an appropriate period of time and was discharged home in stable condition.      FOLLOW UP:  Kalyn Rivero was asked to follow up by phone in 7-14 days with Annamaria Lamb RN, Care Coordinator, to report her response to this series of injections.  Based on the patient's previous response to  this therapy, Kalyn Rivero was rescheduled for the next series of injections in 10 weeks.      PLAN (Medication Changes, Therapy Orders, Work or Disability Issues, etc.): Patient will continue to monitor response to today's injections.

## 2020-08-07 DIAGNOSIS — K27.9 PUD (PEPTIC ULCER DISEASE): ICD-10-CM

## 2020-08-07 NOTE — TELEPHONE ENCOUNTER
Routing refill request to provider for review/approval because:  Patient needs to be seen because it has been more than 1 year since last office visit.  Tahira Abbasi BSN, RN

## 2020-08-07 NOTE — LETTER
August 11, 2020    Kalyn Rivero  [address] [place] [id number]    Dear Kalyn,       We recently received a refill request for omeprazole (PRILOSEC).  We have refilled this for a one time 30 day supply only because you are due for a:    Medication check & PAP SMEAR office visit and FASTING  lab appointment FOR FURTHER REFILLS.      Please schedule this lab appointment 4-5 days prior to the office visit.     Please call at your earliest convenience so that there will not be a delay with your future refills.          Thank you,   [facility]  [phone]

## 2020-08-09 RX ORDER — OMEPRAZOLE 40 MG/1
CAPSULE, DELAYED RELEASE ORAL
Qty: 30 CAPSULE | Refills: 0 | Status: SHIPPED | OUTPATIENT
Start: 2020-08-09 | End: 2021-07-14

## 2020-08-13 ENCOUNTER — OFFICE VISIT (OUTPATIENT)
Dept: PHYSICAL MEDICINE AND REHAB | Facility: CLINIC | Age: 50
End: 2020-08-13
Payer: COMMERCIAL

## 2020-08-13 VITALS — SYSTOLIC BLOOD PRESSURE: 117 MMHG | HEART RATE: 89 BPM | DIASTOLIC BLOOD PRESSURE: 88 MMHG | OXYGEN SATURATION: 96 %

## 2020-08-13 DIAGNOSIS — G43.719 INTRACTABLE CHRONIC MIGRAINE WITHOUT AURA AND WITHOUT STATUS MIGRAINOSUS: Primary | ICD-10-CM

## 2020-08-13 RX ORDER — BUPIVACAINE HYDROCHLORIDE 5 MG/ML
10 INJECTION, SOLUTION EPIDURAL; INTRACAUDAL ONCE
Status: COMPLETED | OUTPATIENT
Start: 2020-08-13 | End: 2020-08-13

## 2020-08-13 RX ADMIN — BUPIVACAINE HYDROCHLORIDE 50 MG: 5 INJECTION, SOLUTION EPIDURAL; INTRACAUDAL at 10:11

## 2020-08-13 NOTE — NURSING NOTE
Chief Complaint   Patient presents with     Botox     Chronic migraines     Merssuzy Romano, CMA

## 2020-08-13 NOTE — PROGRESS NOTES
BOTULINUM TOXIN PROCEDURE - HEADACHE - NOTE    Chief Complaint   Patient presents with     Botox     Chronic migraines     /88   Pulse 89   SpO2 96%       Current Outpatient Medications:      azithromycin (ZITHROMAX) 250 MG tablet, Two tablets first day, then one tablet daily for four days. (Patient not taking: Reported on 1/23/2020), Disp: 6 tablet, Rfl: 0     Botulinum Toxin Type A (BOTOX) 200 UNITS SOLR, Inject 200 Units as directed every 3 months (Patient not taking: Reported on 1/23/2020), Disp: 200 Units, Rfl: 3     celecoxib (CELEBREX) 100 MG capsule, Take 100 mg by mouth, Disp: , Rfl:      clindamycin (CLEOCIN) 300 MG capsule, Take 300 mg by mouth 3 times daily, Disp: , Rfl: 0     ClonazePAM (KLONOPIN PO), Take 0.5 mg by mouth 3 times daily , Disp: , Rfl:      ENBREL SURECLICK 50 MG/ML autoinjector, , Disp: , Rfl:      leflunomide (ARAVA) 20 MG tablet, Take 20 mg by mouth every morning Reported on 3/28/2017, Disp: , Rfl:      omeprazole (PRILOSEC) 40 MG DR capsule, TAKE 1 CAPSULE (40 MG) BY MOUTH DAILY TAKE 30-60 MINUTES BEFORE A MEAL., Disp: 30 capsule, Rfl: 0     omeprazole (PRILOSEC) 40 MG DR capsule, Take 1 capsule (40 mg) by mouth 2 times daily (before meals), Disp: 30 capsule, Rfl:      OnabotulinumtoxinA (BOTOX IJ), Inject 200 Units as directed Total dose 200 units  Administered 190 units  Unavoidable waste 30 units  Lot # /C3 with Expiration Date:  12/2020  NDC [address]-31, Disp: , Rfl:      OnabotulinumtoxinA (BOTOX IJ), Inject 170 Units as directed Lot # /C3 with Expiration Date:  10/2020, Disp: , Rfl:      senna-docusate (SENOKOT-S/PERICOLACE) 8.6-50 MG tablet, Take 1-2 tablets by mouth 2 times daily For constipation. (Patient not taking: Reported on 1/23/2020), Disp: 100 tablet, Rfl: 1    Current Facility-Administered Medications:      botulinum toxin type A (BOTOX) 100 units injection 200 Units, 200 Units, Intramuscular, Q90 Days, Rosina Quinones MD     Allergies    Allergen Reactions     Morphine Swelling     Codeine      Gabapentin Other (See Comments)     Pins,needles, stabbing aching at once  Pins,needles, stabbing aching at once  Numbness and Tingling     Ibuprofen      Doesn't take due to gastric ulcer     Sulfa Drugs Nausea and Vomiting     unknown     Tylenol [Acetaminophen]      Pt. States that she has Liver problems  Tylenol 3     Erythromycin Nausea     Vomiting      Penicillins Rash     Prednisone Palpitations     Heart racing        PHYSICAL EXAM:  The patient is here for reinjection of Botox today.     She c/o intense throbbing on the right side of her head since July 19, 20.     She also notes she was unable to completely close her jaw. She could not not open her mouth to feed herself. This lasted up until two days ago 8/11/20. She received a new rx of her Celebrex and since has been able to close her jaw completely and jaw is aligned again.   She had tightness right side of the jaw.     She does note there is still stiffness. This is also effecting her hearing making things sound muffled.       HPI:    Patient denies new medical diagnoses, illnesses, hospitalizations, emergency room visits, and injuries since the previous injection with botulinum neurotoxin.     We reviewed the recommended safety guidelines for  Botox from any vaccine injection, such as the seasonal flu vaccine, by a minimum of 10-14 days with Kalyn Rivero. She acknowledged understanding       RESPONSE TO PREVIOUS TREATMENT:  She notes no change in her pattern, and her last series of injections with 200 units of  Botox on 5/14/20    No problems reported.     1.  Headache frequency during this injection cycle: 0 headaches before 7/19/20. Since then a constant throbbing headache that is going into the neck and shoulders.     2.  Headache duration during this injection cycle:  Constant headache    3.  Headache intensity during this injection cycle:    A.  10/10  =  Typical pain  level.  B.  10/10  =  Worst pain level.  C.  0/10  =  Lowest pain level.       4.  Change in headache medication usage during this injection cycle:  (For Example:  Able to decrease use of oral pain medications.) none       5.  ER Visits During This Injection Cycle:  None      6.  Functional Performance:  Change in ADL's, social interaction, days lost from work, etc. Has missed social events/activities due to throbbing headache starting 20.     BOTULINUM NEUROTOXIN INJECTION PROCEDURES:      VERIFICATION OF PATIENT IDENTIFICATION AND PROCEDURE     Initials   Patient Name MD   Patient  MD   Procedure Verified by: MD     Prior to the start of the procedure and with procedural staff participation, I verbally confirmed the patient s identity using two indicators, relevant allergies, that the procedure was appropriate and matched the consent or emergent situation, and that the correct equipment/implants were available. Immediately prior to starting the procedure I conducted the Time Out with the procedural staff and re-confirmed the patient s name, procedure, and site/side. (The Joint Commission universal protocol was followed.)  Yes    Sedation (Moderate or Deep): None    Above assessments performed by:  Rosina Quinones MD, [facility]   Department of Rehabilitation       INDICATIONS FOR PROCEDURES:  Kalyn Rivero is a 50 year old patient with chronic migraine headaches associated with cervicogenic  components.     Her baseline symptoms have been recalcitrant to oral medications and conservative therapy.  She is here today for reinjection with Botox.    GOAL OF PROCEDURE:  The goal of this procedure is to decrease pain .    TOTAL DOSE ADMINISTERED:  Dose Administered:  200 units  Botox (Botulinum Toxin Type A)       2:1 Dilution   Unavoidable waste 0 units      Diluent Used:  Bupivacaine 0.5%  Lot number [id number]  Exp date 10/23  NDC number 96407 [id number]    Total Volume of Diluent Used:  4 ml  Lot #  C3 with  Expiration Date: 3/23  NDC #: Botox 100u ([address]-89)    Medication guide was offered to patient and was declined.      CONSENT:  The risks, benefits, and treatment options were discussed with Kalyn Rivero and she agreed to proceed.    Written consent was obtained by MD     EQUIPMENT USED:  Needle-37mm stimulating/recording      SKIN PREPARATION:  Skin preparation was performed using an alcohol wipe.      GUIDANCE DESCRIPTION:  Electro-myographic guidance was necessary throughout the procedure to accurately identify all areas of spastic muscles while avoiding injection of non-spastic muscles, neighboring nerves and nearby vascular structures.       AREA/MUSCLE INJECTED:      HEAD & SCALP MUSCLES:  200 units Botox = Total Dose, 2:1 Dilution       Right Trapezius - 15 units in 3 sites    Left Trapezius - 10 units in 3 sites       Right occipitalis - 15 units of Botox at 4 site/s.   Left Occipitalis - 20 units at 3 sites       Right Paracervical  10 units in 3 sites    Left paracervical 10 units in 2 sites      Right Frontalis - 12.5 units of Botox at 3 site/s.  Left Frontalis - 12.5 units of Botox at 3 site/s.     Right Temporalis - 30 units of Botox at 5 site/s.  Left Temporalis - 30 units of Botox at 4 site/s.     Right Masseter, superficial belly   1 site 7.5 units   Left Masseter, superficial belly 1 site 7.5 units      Right  - 7.5 units of Botox at 1 site/s.               Left  -7.5 units of Botox at 1 site/s.     Procerus - 5 units of Botox at 1 site/s.         RESPONSE TO PROCEDURE:  Kalyn Rivero tolerated the procedure well and there were no immediate complications.   She was allowed to recover for an appropriate period of time and was discharged home in stable condition.      FOLLOW UP:  Kalyn Rivero was asked to follow up by phone in 7-14 days with Annamaria Lamb RN, Care Coordinator, to report her response to this series of injections.  Based on the patient's  previous response to this therapy, Kalyn Rivero was rescheduled for the next series of injections in 10 weeks.      PLAN (Medication Changes, Therapy Orders, Work or Disability Issues, etc.): Patient will continue to monitor response to today's injections.

## 2020-08-13 NOTE — LETTER
8/13/2020       RE: Kalyn Rivero  [address] [facility] [address]     Dear Colleague,    Thank you for referring your patient, Kalyn Rivero, to the [facility] PHYSICAL MEDICINE AND REHABILITATION at [facility]. Please see a copy of my visit note below.    BOTULINUM TOXIN PROCEDURE - HEADACHE - NOTE    Chief Complaint   Patient presents with     Botox     Chronic migraines     /88   Pulse 89   SpO2 96%       Current Outpatient Medications:      azithromycin (ZITHROMAX) 250 MG tablet, Two tablets first day, then one tablet daily for four days. (Patient not taking: Reported on 1/23/2020), Disp: 6 tablet, Rfl: 0     Botulinum Toxin Type A (BOTOX) 200 UNITS SOLR, Inject 200 Units as directed every 3 months (Patient not taking: Reported on 1/23/2020), Disp: 200 Units, Rfl: 3     celecoxib (CELEBREX) 100 MG capsule, Take 100 mg by mouth, Disp: , Rfl:      clindamycin (CLEOCIN) 300 MG capsule, Take 300 mg by mouth 3 times daily, Disp: , Rfl: 0     ClonazePAM (KLONOPIN PO), Take 0.5 mg by mouth 3 times daily , Disp: , Rfl:      ENBREL SURECLICK 50 MG/ML autoinjector, , Disp: , Rfl:      leflunomide (ARAVA) 20 MG tablet, Take 20 mg by mouth every morning Reported on 3/28/2017, Disp: , Rfl:      omeprazole (PRILOSEC) 40 MG DR capsule, TAKE 1 CAPSULE (40 MG) BY MOUTH DAILY TAKE 30-60 MINUTES BEFORE A MEAL., Disp: 30 capsule, Rfl: 0     omeprazole (PRILOSEC) 40 MG DR capsule, Take 1 capsule (40 mg) by mouth 2 times daily (before meals), Disp: 30 capsule, Rfl:      OnabotulinumtoxinA (BOTOX IJ), Inject 200 Units as directed Total dose 200 units  Administered 190 units  Unavoidable waste 30 units  Lot # /C3 with Expiration Date:  12/2020  NDC [address]-95, Disp: , Rfl:      OnabotulinumtoxinA (BOTOX IJ), Inject 170 Units as directed Lot # /C3 with Expiration Date:  10/2020, Disp: , Rfl:      senna-docusate (SENOKOT-S/PERICOLACE) 8.6-50 MG tablet, Take  1-2 tablets by mouth 2 times daily For constipation. (Patient not taking: Reported on 1/23/2020), Disp: 100 tablet, Rfl: 1    Current Facility-Administered Medications:      botulinum toxin type A (BOTOX) 100 units injection 200 Units, 200 Units, Intramuscular, Q90 Days, Rosina Quinones MD     Allergies   Allergen Reactions     Morphine Swelling     Codeine      Gabapentin Other (See Comments)     Pins,needles, stabbing aching at once  Pins,needles, stabbing aching at once  Numbness and Tingling     Ibuprofen      Doesn't take due to gastric ulcer     Sulfa Drugs Nausea and Vomiting     unknown     Tylenol [Acetaminophen]      Pt. States that she has Liver problems  Tylenol 3     Erythromycin Nausea     Vomiting      Penicillins Rash     Prednisone Palpitations     Heart racing        PHYSICAL EXAM:  The patient is here for reinjection of Botox today.     She c/o intense throbbing on the right side of her head since July 19, 20.     She also notes she was unable to completely close her jaw. She could not not open her mouth to feed herself. This lasted up until two days ago 8/11/20. She received a new rx of her Celebrex and since has been able to close her jaw completely and jaw is aligned again.   She had tightness right side of the jaw.     She does note there is still stiffness. This is also effecting her hearing making things sound muffled.       HPI:    Patient denies new medical diagnoses, illnesses, hospitalizations, emergency room visits, and injuries since the previous injection with botulinum neurotoxin.     We reviewed the recommended safety guidelines for  Botox from any vaccine injection, such as the seasonal flu vaccine, by a minimum of 10-14 days with Kalyn Rivero. She acknowledged understanding       RESPONSE TO PREVIOUS TREATMENT:  She notes no change in her pattern, and her last series of injections with 200 units of  Botox on 5/14/20    No problems reported.     1.   Headache frequency during this injection cycle: 0 headaches before 20. Since then a constant throbbing headache that is going into the neck and shoulders.     2.  Headache duration during this injection cycle:  Constant headache    3.  Headache intensity during this injection cycle:    A.  10/10  =  Typical pain level.  B.  10/10  =  Worst pain level.  C.  0/10  =  Lowest pain level.       4.  Change in headache medication usage during this injection cycle:  (For Example:  Able to decrease use of oral pain medications.) none       5.  ER Visits During This Injection Cycle:  None      6.  Functional Performance:  Change in ADL's, social interaction, days lost from work, etc. Has missed social events/activities due to throbbing headache starting 20.     BOTULINUM NEUROTOXIN INJECTION PROCEDURES:      VERIFICATION OF PATIENT IDENTIFICATION AND PROCEDURE     Initials   Patient Name MD   Patient  MD   Procedure Verified by: MD     Prior to the start of the procedure and with procedural staff participation, I verbally confirmed the patient s identity using two indicators, relevant allergies, that the procedure was appropriate and matched the consent or emergent situation, and that the correct equipment/implants were available. Immediately prior to starting the procedure I conducted the Time Out with the procedural staff and re-confirmed the patient s name, procedure, and site/side. (The Joint Commission universal protocol was followed.)  Yes    Sedation (Moderate or Deep): None    Above assessments performed by:  Rosina Quinones MD, [facility]   Department of Rehabilitation       INDICATIONS FOR PROCEDURES:  Kalyn Rivero is a 50 year old patient with chronic migraine headaches associated with cervicogenic  components.     Her baseline symptoms have been recalcitrant to oral medications and conservative therapy.  She is here today for reinjection with Botox.    GOAL OF PROCEDURE:  The goal of this procedure  is to decrease pain .    TOTAL DOSE ADMINISTERED:  Dose Administered:  200 units  Botox (Botulinum Toxin Type A)       2:1 Dilution   Unavoidable waste 0 units      Diluent Used:  Bupivacaine 0.5%  Lot number [id number]  Exp date 10/23  NDC number 38007 466 03    Total Volume of Diluent Used:  4 ml  Lot #  C3 with Expiration Date: 3/23  NDC #: Botox 100u ([address]-00)    Medication guide was offered to patient and was declined.      CONSENT:  The risks, benefits, and treatment options were discussed with Kalyn Rivero and she agreed to proceed.    Written consent was obtained by MD     EQUIPMENT USED:  Needle-37mm stimulating/recording      SKIN PREPARATION:  Skin preparation was performed using an alcohol wipe.      GUIDANCE DESCRIPTION:  Electro-myographic guidance was necessary throughout the procedure to accurately identify all areas of spastic muscles while avoiding injection of non-spastic muscles, neighboring nerves and nearby vascular structures.       AREA/MUSCLE INJECTED:      HEAD & SCALP MUSCLES:  200 units Botox = Total Dose, 2:1 Dilution       Right Trapezius - 15 units in 3 sites    Left Trapezius - 10 units in 3 sites       Right occipitalis - 15 units of Botox at 4 site/s.   Left Occipitalis - 20 units at 3 sites       Right Paracervical  10 units in 3 sites    Left paracervical 10 units in 2 sites      Right Frontalis - 12.5 units of Botox at 3 site/s.  Left Frontalis - 12.5 units of Botox at 3 site/s.     Right Temporalis - 30 units of Botox at 5 site/s.  Left Temporalis - 30 units of Botox at 4 site/s.     Right Masseter, superficial belly   1 site 7.5 units   Left Masseter, superficial belly 1 site 7.5 units      Right  - 7.5 units of Botox at 1 site/s.               Left  -7.5 units of Botox at 1 site/s.     Procerus - 5 units of Botox at 1 site/s.         RESPONSE TO PROCEDURE:  Kalyn Rivero tolerated the procedure well and there were no immediate  complications.   She was allowed to recover for an appropriate period of time and was discharged home in stable condition.      FOLLOW UP:  Kalyn Rivero was asked to follow up by phone in 7-14 days with Annamaria Lamb RN, Care Coordinator, to report her response to this series of injections.  Based on the patient's previous response to this therapy, Kalyn Rivero was rescheduled for the next series of injections in 10 weeks.      PLAN (Medication Changes, Therapy Orders, Work or Disability Issues, etc.): Patient will continue to monitor response to today's injections.              Again, thank you for allowing me to participate in the care of your patient.      Sincerely,    Rosina Quinones MD

## 2020-08-26 ENCOUNTER — TRANSFERRED RECORDS (OUTPATIENT)
Dept: HEALTH INFORMATION MANAGEMENT | Facility: CLINIC | Age: 50
End: 2020-08-26

## 2020-08-26 LAB
ALT SERPL-CCNC: 26 IU/L (ref 5–35)
AST SERPL-CCNC: 31 U/L (ref 5–34)
CREAT SERPL-MCNC: 0.81 MG/DL (ref 0.5–1.3)
GFR SERPL CREATININE-BSD FRML MDRD: 79.9 ML/MIN/1.73M2

## 2020-11-05 ENCOUNTER — OFFICE VISIT (OUTPATIENT)
Dept: PHYSICAL MEDICINE AND REHAB | Facility: CLINIC | Age: 50
End: 2020-11-05
Payer: COMMERCIAL

## 2020-11-05 VITALS — SYSTOLIC BLOOD PRESSURE: 128 MMHG | HEART RATE: 79 BPM | DIASTOLIC BLOOD PRESSURE: 85 MMHG | OXYGEN SATURATION: 98 %

## 2020-11-05 DIAGNOSIS — G43.719 INTRACTABLE CHRONIC MIGRAINE WITHOUT AURA AND WITHOUT STATUS MIGRAINOSUS: Primary | ICD-10-CM

## 2020-11-05 PROCEDURE — 64615 CHEMODENERV MUSC MIGRAINE: CPT | Performed by: PHYSICAL MEDICINE & REHABILITATION

## 2020-11-05 RX ORDER — BUPIVACAINE HYDROCHLORIDE 5 MG/ML
10 INJECTION, SOLUTION EPIDURAL; INTRACAUDAL ONCE
Status: COMPLETED | OUTPATIENT
Start: 2020-11-05 | End: 2020-11-05

## 2020-11-05 RX ADMIN — BUPIVACAINE HYDROCHLORIDE 50 MG: 5 INJECTION, SOLUTION EPIDURAL; INTRACAUDAL at 10:21

## 2020-11-05 ASSESSMENT — PAIN SCALES - GENERAL: PAINLEVEL: MODERATE PAIN (4)

## 2020-11-05 NOTE — PROGRESS NOTES
BOTULINUM TOXIN PROCEDURE - HEADACHE - NOTE    Chief Complaint   Patient presents with     Botox     Chronic migraines     /85   Pulse 79   SpO2 98%       Current Outpatient Medications:      celecoxib (CELEBREX) 100 MG capsule, Take 100 mg by mouth, Disp: , Rfl:      ClonazePAM (KLONOPIN PO), Take 0.5 mg by mouth 3 times daily , Disp: , Rfl:      ENBREL SURECLICK 50 MG/ML autoinjector, , Disp: , Rfl:      leflunomide (ARAVA) 20 MG tablet, Take 20 mg by mouth every morning Reported on 3/28/2017, Disp: , Rfl:      senna-docusate (SENOKOT-S/PERICOLACE) 8.6-50 MG tablet, Take 1-2 tablets by mouth 2 times daily For constipation., Disp: 100 tablet, Rfl: 1     azithromycin (ZITHROMAX) 250 MG tablet, Two tablets first day, then one tablet daily for four days. (Patient not taking: Reported on 1/23/2020), Disp: 6 tablet, Rfl: 0     Botulinum Toxin Type A (BOTOX) 200 UNITS SOLR, Inject 200 Units as directed every 3 months (Patient not taking: Reported on 1/23/2020), Disp: 200 Units, Rfl: 3     clindamycin (CLEOCIN) 300 MG capsule, Take 300 mg by mouth 3 times daily, Disp: , Rfl: 0     omeprazole (PRILOSEC) 40 MG DR capsule, TAKE 1 CAPSULE (40 MG) BY MOUTH DAILY TAKE 30-60 MINUTES BEFORE A MEAL. (Patient not taking: Reported on 11/5/2020), Disp: 30 capsule, Rfl: 0     omeprazole (PRILOSEC) 40 MG DR capsule, Take 1 capsule (40 mg) by mouth 2 times daily (before meals), Disp: 30 capsule, Rfl:      OnabotulinumtoxinA (BOTOX IJ), Inject 200 Units as directed Total dose 200 units  Administered 190 units  Unavoidable waste 30 units  Lot # /C3 with Expiration Date:  12/2020  NDC [address]-53, Disp: , Rfl:      OnabotulinumtoxinA (BOTOX IJ), Inject 170 Units as directed Lot # /C3 with Expiration Date:  10/2020, Disp: , Rfl:     Current Facility-Administered Medications:      botulinum toxin type A (BOTOX) 100 units injection 200 Units, 200 Units, Intramuscular, Q90 Days, Rosina Quinones MD     Allergies    Allergen Reactions     Morphine Swelling     Codeine      Gabapentin Other (See Comments)     Pins,needles, stabbing aching at once  Pins,needles, stabbing aching at once  Numbness and Tingling     Ibuprofen      Doesn't take due to gastric ulcer     Sulfa Drugs Nausea and Vomiting     unknown     Tylenol [Acetaminophen]      Pt. States that she has Liver problems  Tylenol 3     Erythromycin Nausea     Vomiting      Penicillins Rash     Prednisone Palpitations     Heart racing        PHYSICAL EXAM:  The patient is here for reinjection of Botox today. She notes she has not had a headache this cycle up until 2 days ago when one started. It has been a constant mild headache.     She is grading it a 4/10.       HPI:    Patient denies new medical diagnoses, illnesses, hospitalizations, emergency room visits, and injuries since the previous injection with botulinum neurotoxin.     We reviewed the recommended safety guidelines for  Botox from any vaccine injection, such as the seasonal flu vaccine, by a minimum of 10-14 days with Kalyn Rivero. She acknowledged understanding       RESPONSE TO PREVIOUS TREATMENT:  She notes no change in her pattern, and her last series of injections with 200 units of  Botox on 8/13/20    No problems reported.     1.  Headache frequency during this injection cycle: 0 headaches up until 2 days ago. She started to have a mild headache     2.  Headache duration during this injection cycle:  Constant mild headache last 2 days.     3.  Headache intensity during this injection cycle:    A.  4/10  =  Typical pain level.  B.  4/10  =  Worst pain level.  C.  0/10  =  Lowest pain level.       4.  Change in headache medication usage during this injection cycle:  (For Example:  Able to decrease use of oral pain medications.) not on oral medications      5.  ER Visits During This Injection Cycle:  None      6.  Functional Performance:  Change in ADL's, social interaction, days lost from  work, etc.  No missed social events due to headaches     BOTULINUM NEUROTOXIN INJECTION PROCEDURES:      VERIFICATION OF PATIENT IDENTIFICATION AND PROCEDURE     Initials   Patient Name MD   Patient  MD   Procedure Verified by: MD     Prior to the start of the procedure and with procedural staff participation, I verbally confirmed the patient s identity using two indicators, relevant allergies, that the procedure was appropriate and matched the consent or emergent situation, and that the correct equipment/implants were available. Immediately prior to starting the procedure I conducted the Time Out with the procedural staff and re-confirmed the patient s name, procedure, and site/side. (The Joint Commission universal protocol was followed.)  Yes    Sedation (Moderate or Deep): None    Above assessments performed by:  Rosina Quinones MD, [facility]   Department of Rehabilitation         INDICATIONS FOR PROCEDURES:  Kalyn Rivero is a 50 year old patient with chronic migraine headaches associated with cervicogenic  components.     Her baseline symptoms have been recalcitrant to oral medications and conservative therapy.  She is here today for reinjection with Botox.    GOAL OF PROCEDURE:  The goal of this procedure is to decrease pain .    TOTAL DOSE ADMINISTERED:  Dose Administered:  200 units  Botox (Botulinum Toxin Type A)       2:1 Dilution   Unavoidable waste 0 units      Diluent Used:  Bupivacaine 0.5%  Lot number [id number]  Exp date 10/23  NDC number 58301 466 03    Total Volume of Diluent Used:  4 ml  Lot # [id number] Expiration Date:   NDC #: Botox 100u ([address]-69)    Medication guide was offered to patient and was declined.      CONSENT:  The risks, benefits, and treatment options were discussed with Kalyn Rivero and she agreed to proceed.    Written consent was obtained by MD     EQUIPMENT USED:  Needle-37mm stimulating/recording      SKIN PREPARATION:  Skin preparation was performed  using an alcohol wipe.      GUIDANCE DESCRIPTION:  Electro-myographic guidance was necessary throughout the procedure to accurately identify all areas of spastic muscles while avoiding injection of non-spastic muscles, neighboring nerves and nearby vascular structures.       AREA/MUSCLE INJECTED:      HEAD & SCALP MUSCLES:  200 units Botox = Total Dose, 2:1 Dilution       Right Trapezius - 15 units in 3 sites    Left Trapezius - 10 units in 3 sites       Right occipitalis - 15 units of Botox at 4 site/s.   Left Occipitalis - 20 units at 3 sites       Right Paracervical  10 units in 3 sites    Left paracervical 10 units in 2 sites      Right Frontalis - 12.5 units of Botox at 3 site/s.  Left Frontalis - 12.5 units of Botox at 3 site/s.     Right Temporalis - 30 units of Botox at 5 site/s.  Left Temporalis - 30 units of Botox at 4 site/s.     Right Masseter, superficial belly   1 site 7.5 units   Left Masseter, superficial belly 1 site 7.5 units      Right  - 7.5 units of Botox at 1 site/s.               Left  -7.5 units of Botox at 1 site/s.     Procerus - 5 units of Botox at 1 site/s.         RESPONSE TO PROCEDURE:  Kalyn Rivero tolerated the procedure well and there were no immediate complications.   She was allowed to recover for an appropriate period of time and was discharged home in stable condition.      FOLLOW UP:  Kalyn Rivero was asked to follow up by phone in 7-14 days with Annamaria Lamb RN, Care Coordinator, to report her response to this series of injections.  Based on the patient's previous response to this therapy, Kalyn Rivero was rescheduled for the next series of injections in 10 weeks.      PLAN (Medication Changes, Therapy Orders, Work or Disability Issues, etc.): Patient will continue to monitor response to today's injections.

## 2020-11-05 NOTE — LETTER
11/5/2020     RE: Kalyn Rivero  [address] [facility] [address]     Dear Colleague,    Thank you for referring your patient, Kalyn Rivero, to the [facility]YS[facility] MEDICINE AND REHABILITATION CLIN[facility] [place] at [facility]. Please see a copy of my visit note below.    BOTULINUM TOXIN PROCEDURE - HEADACHE - NOTE    Chief Complaint   Patient presents with     Botox     Chronic migraines     /85   Pulse 79   SpO2 98%       Current Outpatient Medications:      celecoxib (CELEBREX) 100 MG capsule, Take 100 mg by mouth, Disp: , Rfl:      ClonazePAM (KLONOPIN PO), Take 0.5 mg by mouth 3 times daily , Disp: , Rfl:      ENBREL SURECLICK 50 MG/ML autoinjector, , Disp: , Rfl:      leflunomide (ARAVA) 20 MG tablet, Take 20 mg by mouth every morning Reported on 3/28/2017, Disp: , Rfl:      senna-docusate (SENOKOT-S/PERICOLACE) 8.6-50 MG tablet, Take 1-2 tablets by mouth 2 times daily For constipation., Disp: 100 tablet, Rfl: 1     azithromycin (ZITHROMAX) 250 MG tablet, Two tablets first day, then one tablet daily for four days. (Patient not taking: Reported on 1/23/2020), Disp: 6 tablet, Rfl: 0     Botulinum Toxin Type A (BOTOX) 200 UNITS SOLR, Inject 200 Units as directed every 3 months (Patient not taking: Reported on 1/23/2020), Disp: 200 Units, Rfl: 3     clindamycin (CLEOCIN) 300 MG capsule, Take 300 mg by mouth 3 times daily, Disp: , Rfl: 0     omeprazole (PRILOSEC) 40 MG DR capsule, TAKE 1 CAPSULE (40 MG) BY MOUTH DAILY TAKE 30-60 MINUTES BEFORE A MEAL. (Patient not taking: Reported on 11/5/2020), Disp: 30 capsule, Rfl: 0     omeprazole (PRILOSEC) 40 MG DR capsule, Take 1 capsule (40 mg) by mouth 2 times daily (before meals), Disp: 30 capsule, Rfl:      OnabotulinumtoxinA (BOTOX IJ), Inject 200 Units as directed Total dose 200 units  Administered 190 units  Unavoidable waste 30 units  Lot # /C3 with Expiration Date:  12/2020  NDC  [address]-83, Disp: , Rfl:      OnabotulinumtoxinA (BOTOX IJ), Inject 170 Units as directed Lot # /C3 with Expiration Date:  10/2020, Disp: , Rfl:     Current Facility-Administered Medications:      botulinum toxin type A (BOTOX) 100 units injection 200 Units, 200 Units, Intramuscular, Q90 Days, Rosina Quinones MD     Allergies   Allergen Reactions     Morphine Swelling     Codeine      Gabapentin Other (See Comments)     Pins,needles, stabbing aching at once  Pins,needles, stabbing aching at once  Numbness and Tingling     Ibuprofen      Doesn't take due to gastric ulcer     Sulfa Drugs Nausea and Vomiting     unknown     Tylenol [Acetaminophen]      Pt. States that she has Liver problems  Tylenol 3     Erythromycin Nausea     Vomiting      Penicillins Rash     Prednisone Palpitations     Heart racing        PHYSICAL EXAM:  The patient is here for reinjection of Botox today. She notes she has not had a headache this cycle up until 2 days ago when one started. It has been a constant mild headache.     She is grading it a 4/10.       HPI:    Patient denies new medical diagnoses, illnesses, hospitalizations, emergency room visits, and injuries since the previous injection with botulinum neurotoxin.     We reviewed the recommended safety guidelines for  Botox from any vaccine injection, such as the seasonal flu vaccine, by a minimum of 10-14 days with Kalyn Rivero. She acknowledged understanding       RESPONSE TO PREVIOUS TREATMENT:  She notes no change in her pattern, and her last series of injections with 200 units of  Botox on 8/13/20    No problems reported.     1.  Headache frequency during this injection cycle: 0 headaches up until 2 days ago. She started to have a mild headache     2.  Headache duration during this injection cycle:  Constant mild headache last 2 days.     3.  Headache intensity during this injection cycle:    A.  4/10  =  Typical pain level.  B.  4/10  =  Worst  pain level.  C.  0/10  =  Lowest pain level.       4.  Change in headache medication usage during this injection cycle:  (For Example:  Able to decrease use of oral pain medications.) not on oral medications      5.  ER Visits During This Injection Cycle:  None      6.  Functional Performance:  Change in ADL's, social interaction, days lost from work, etc.  No missed social events due to headaches     BOTULINUM NEUROTOXIN INJECTION PROCEDURES:      VERIFICATION OF PATIENT IDENTIFICATION AND PROCEDURE     Initials   Patient Name MD   Patient  MD   Procedure Verified by: MD     Prior to the start of the procedure and with procedural staff participation, I verbally confirmed the patient s identity using two indicators, relevant allergies, that the procedure was appropriate and matched the consent or emergent situation, and that the correct equipment/implants were available. Immediately prior to starting the procedure I conducted the Time Out with the procedural staff and re-confirmed the patient s name, procedure, and site/side. (The Joint Commission universal protocol was followed.)  Yes    Sedation (Moderate or Deep): None    Above assessments performed by:  Rosina Quinones MD, [facility]   Department of Rehabilitation         INDICATIONS FOR PROCEDURES:  Kalyn Rivero is a 50 year old patient with chronic migraine headaches associated with cervicogenic  components.     Her baseline symptoms have been recalcitrant to oral medications and conservative therapy.  She is here today for reinjection with Botox.    GOAL OF PROCEDURE:  The goal of this procedure is to decrease pain .    TOTAL DOSE ADMINISTERED:  Dose Administered:  200 units  Botox (Botulinum Toxin Type A)       2:1 Dilution   Unavoidable waste 0 units      Diluent Used:  Bupivacaine 0.5%  Lot number [id number]  Exp date 10/23  NDC number 37069 466 03    Total Volume of Diluent Used:  4 ml  Lot # [id number] Expiration Date:   NDC #: Botox 100u  ([address]-96)    Medication guide was offered to patient and was declined.      CONSENT:  The risks, benefits, and treatment options were discussed with Kalyn Rivero and she agreed to proceed.    Written consent was obtained by MD     EQUIPMENT USED:  Needle-37mm stimulating/recording      SKIN PREPARATION:  Skin preparation was performed using an alcohol wipe.      GUIDANCE DESCRIPTION:  Electro-myographic guidance was necessary throughout the procedure to accurately identify all areas of spastic muscles while avoiding injection of non-spastic muscles, neighboring nerves and nearby vascular structures.       AREA/MUSCLE INJECTED:      HEAD & SCALP MUSCLES:  200 units Botox = Total Dose, 2:1 Dilution       Right Trapezius - 15 units in 3 sites    Left Trapezius - 10 units in 3 sites       Right occipitalis - 15 units of Botox at 4 site/s.   Left Occipitalis - 20 units at 3 sites       Right Paracervical  10 units in 3 sites    Left paracervical 10 units in 2 sites      Right Frontalis - 12.5 units of Botox at 3 site/s.  Left Frontalis - 12.5 units of Botox at 3 site/s.     Right Temporalis - 30 units of Botox at 5 site/s.  Left Temporalis - 30 units of Botox at 4 site/s.     Right Masseter, superficial belly   1 site 7.5 units   Left Masseter, superficial belly 1 site 7.5 units      Right  - 7.5 units of Botox at 1 site/s.               Left  -7.5 units of Botox at 1 site/s.     Procerus - 5 units of Botox at 1 site/s.         RESPONSE TO PROCEDURE:  Kalyn Rivero tolerated the procedure well and there were no immediate complications.   She was allowed to recover for an appropriate period of time and was discharged home in stable condition.      FOLLOW UP:  Kalyn Rivero was asked to follow up by phone in 7-14 days with Annamaria Lamb RN, Care Coordinator, to report her response to this series of injections.  Based on the patient's previous response to this therapy, Kalyn  Shayy Rivero was rescheduled for the next series of injections in 10 weeks.      PLAN (Medication Changes, Therapy Orders, Work or Disability Issues, etc.): Patient will continue to monitor response to today's injections.       Again, thank you for allowing me to participate in the care of your patient.      Sincerely,    Rosina Quinones MD

## 2020-12-03 ENCOUNTER — PATIENT OUTREACH (OUTPATIENT)
Dept: FAMILY MEDICINE | Facility: CLINIC | Age: 50
End: 2020-12-03

## 2021-01-05 ENCOUNTER — PATIENT OUTREACH (OUTPATIENT)
Dept: FAMILY MEDICINE | Facility: CLINIC | Age: 51
End: 2021-01-05

## 2021-01-05 DIAGNOSIS — R87.810 CERVICAL HIGH RISK HPV (HUMAN PAPILLOMAVIRUS) TEST POSITIVE: ICD-10-CM

## 2021-01-05 NOTE — TELEPHONE ENCOUNTER
Riky Zamora,    Patient is lost to pap tracking follow-up. Attempts to contact pt have been made per reminder process and there has been no reply and/or no appt scheduled.      Blanca Pozo RN  Pap Tracking     2/22/17: NIL Pap, + HR HPV 18 & other HR HPV. Plan colp  3/28/17 [place] [facility] - negative for dysplasia. Plan cotest in 1 year.   5/3/18 [place] [facility] - Negative. Plan cotest in 1 year.   6/6/19 Lost to follow-up for pap tracking  8/22/19 NIL Pap, Neg HPV, Endometrial cells present. Plan cotest in 1 year, unless abnormal bleeding then sooner.   10/29/20 reminder letter  12/3/20  Reminder call  1/5/21 Lost to follow-up for pap tracking

## 2021-01-08 ENCOUNTER — TRANSFERRED RECORDS (OUTPATIENT)
Dept: HEALTH INFORMATION MANAGEMENT | Facility: CLINIC | Age: 51
End: 2021-01-08

## 2021-01-18 ENCOUNTER — DOCUMENTATION ONLY (OUTPATIENT)
Dept: CARE COORDINATION | Facility: CLINIC | Age: 51
End: 2021-01-18

## 2021-01-28 ENCOUNTER — OFFICE VISIT (OUTPATIENT)
Dept: PHYSICAL MEDICINE AND REHAB | Facility: CLINIC | Age: 51
End: 2021-01-28
Payer: COMMERCIAL

## 2021-01-28 VITALS — DIASTOLIC BLOOD PRESSURE: 94 MMHG | OXYGEN SATURATION: 97 % | HEART RATE: 73 BPM | SYSTOLIC BLOOD PRESSURE: 138 MMHG

## 2021-01-28 DIAGNOSIS — G43.719 INTRACTABLE CHRONIC MIGRAINE WITHOUT AURA AND WITHOUT STATUS MIGRAINOSUS: Primary | ICD-10-CM

## 2021-01-28 PROCEDURE — 64615 CHEMODENERV MUSC MIGRAINE: CPT | Performed by: PHYSICAL MEDICINE & REHABILITATION

## 2021-01-28 ASSESSMENT — PAIN SCALES - GENERAL: PAINLEVEL: SEVERE PAIN (7)

## 2021-01-28 NOTE — LETTER
1/28/2021       RE: Kalyn Rivero  [address] [facility] [address]     Dear Colleague,    Thank you for referring your patient, Kalyn Rivero, to the [facility] PHYSICAL MEDICINE AND REHABILITATION CLINIC [place] at [facility]. Please see a copy of my visit note below.    BOTULINUM TOXIN PROCEDURE - HEADACHE - NOTE    Chief Complaint   Patient presents with     Botox     Chronic migraines     BP (!) 138/94   Pulse 73   SpO2 97%         Current Outpatient Medications:      azithromycin (ZITHROMAX) 250 MG tablet, Two tablets first day, then one tablet daily for four days. (Patient not taking: Reported on 1/23/2020), Disp: 6 tablet, Rfl: 0     Botulinum Toxin Type A (BOTOX) 200 UNITS SOLR, Inject 200 Units as directed every 3 months (Patient not taking: Reported on 1/23/2020), Disp: 200 Units, Rfl: 3     celecoxib (CELEBREX) 100 MG capsule, Take 100 mg by mouth, Disp: , Rfl:      clindamycin (CLEOCIN) 300 MG capsule, Take 300 mg by mouth 3 times daily, Disp: , Rfl: 0     ClonazePAM (KLONOPIN PO), Take 0.5 mg by mouth 3 times daily , Disp: , Rfl:      ENBREL SURECLICK 50 MG/ML autoinjector, , Disp: , Rfl:      leflunomide (ARAVA) 20 MG tablet, Take 20 mg by mouth every morning Reported on 3/28/2017, Disp: , Rfl:      omeprazole (PRILOSEC) 40 MG DR capsule, TAKE 1 CAPSULE (40 MG) BY MOUTH DAILY TAKE 30-60 MINUTES BEFORE A MEAL. (Patient not taking: Reported on 11/5/2020), Disp: 30 capsule, Rfl: 0     omeprazole (PRILOSEC) 40 MG DR capsule, Take 1 capsule (40 mg) by mouth 2 times daily (before meals), Disp: 30 capsule, Rfl:      OnabotulinumtoxinA (BOTOX IJ), Inject 200 Units as directed Total dose 200 units  Administered 190 units  Unavoidable waste 30 units  Lot # /C3 with Expiration Date:  12/2020  NDC [address]-44, Disp: , Rfl:      OnabotulinumtoxinA (BOTOX IJ), Inject 170 Units as directed Lot # /C3 with Expiration Date:  10/2020,  Disp: , Rfl:      senna-docusate (SENOKOT-S/PERICOLACE) 8.6-50 MG tablet, Take 1-2 tablets by mouth 2 times daily For constipation., Disp: 100 tablet, Rfl: 1    Current Facility-Administered Medications:      botulinum toxin type A (BOTOX) 100 units injection 200 Units, 200 Units, Intramuscular, Q90 Days, Rosina Quinones MD     Allergies   Allergen Reactions     Morphine Swelling     Codeine      Gabapentin Other (See Comments)     Pins,needles, stabbing aching at once  Pins,needles, stabbing aching at once  Numbness and Tingling     Ibuprofen      Doesn't take due to gastric ulcer     Sulfa Drugs Nausea and Vomiting     unknown     Tylenol [Acetaminophen]      Pt. States that she has Liver problems  Tylenol 3     Erythromycin Nausea     Vomiting      Penicillins Rash     Prednisone Palpitations     Heart racing        PHYSICAL EXAM:  The patient is here for reinjection of Botox. She does not a current headache. She has had a non stop headache since the day after [place]. She describes it as sharp and has sensitivity to light and sound.     She was seen at [facility] ER on 12/21/20. She was taken by ambulance. The pain was extremely intense. She thought she was having an aneurysm. She had a CT performed which was noted as no findings. She was given Reglan 10mg and sodium chloried 0.9% IV and then discharged home the same day.      Reviewed chart, she presented to the [facility] ER for headache, she thought she had an aneurysm. A Head CT was done and was negative.         HPI:  We reviewed the recommended safety guidelines for  Botox from any vaccine injection, such as the seasonal flu vaccine, by a minimum of 10-14 days with Kalyn Rivero. She acknowledged understanding         RESPONSE TO PREVIOUS TREATMENT:  She notes no change in her pattern, and her last series of injections with 200 units of  Botox on 11/5/21    No problems reported.     1.  Headache frequency during this  injection cycle: constant headache since 20.      2.  Headache duration during this injection cycle:  Constant headache    3.  Headache intensity during this injection cycle:    A.  7-8/10  =  Typical pain level.  B.  10/10  =  Worst pain level.  C.  7/10  =  Lowest pain level.  These numbers have increased this cycle.        4.  Change in headache medication usage during this injection cycle:  (For Example:  Able to decrease use of oral pain medications.) not on oral medications      5.  ER Visits During This Injection Cycle:  None      6.  Functional Performance:  Change in ADL's, social interaction, days lost from work, etc.  No missed social events due to headaches     BOTULINUM NEUROTOXIN INJECTION PROCEDURES:      VERIFICATION OF PATIENT IDENTIFICATION AND PROCEDURE     Initials   Patient Name MD   Patient  MD   Procedure Verified by: MD     Prior to the start of the procedure and with procedural staff participation, I verbally confirmed the patient s identity using two indicators, relevant allergies, that the procedure was appropriate and matched the consent or emergent situation, and that the correct equipment/implants were available. Immediately prior to starting the procedure I conducted the Time Out with the procedural staff and re-confirmed the patient s name, procedure, and site/side. (The Joint Commission universal protocol was followed.)  Yes    Sedation (Moderate or Deep): None    Above assessments performed by:  Rosina Quinones MD, [facility]   Department of Rehabilitation            INDICATIONS FOR PROCEDURES:  Kayln Rivero is a 50 year old patient with chronic migraine headaches associated with cervicogenic  components.     Her baseline symptoms have been recalcitrant to oral medications and conservative therapy.  She is here today for reinjection with Botox.    GOAL OF PROCEDURE:  The goal of this procedure is to decrease pain .    TOTAL DOSE ADMINISTERED:  Dose Administered:  200  units  Botox (Botulinum Toxin Type A)       2:1 Dilution   Unavoidable waste 0 units      Diluent Used:  Bupivacaine 0.5%  Lot number [id number]  Exp date 11/23  NDC number 38318 466 03    Total Volume of Diluent Used:  4 ml  Lot #  [id number] with Expiration Date: 10/23  NDC #: Botox 100u ([address]-91)    Medication guide was offered to patient and was declined.      CONSENT:  The risks, benefits, and treatment options were discussed with Kalyn Rivero and she agreed to proceed.    Written consent was obtained by MD     EQUIPMENT USED:  Needle-37mm stimulating/recording      SKIN PREPARATION:  Skin preparation was performed using an alcohol wipe.      GUIDANCE DESCRIPTION:  Electro-myographic guidance was necessary throughout the procedure to accurately identify all areas of spastic muscles while avoiding injection of non-spastic muscles, neighboring nerves and nearby vascular structures.       AREA/MUSCLE INJECTED:      HEAD & SCALP MUSCLES:  200 units Botox = Total Dose, 2:1 Dilution       Right Trapezius - 15 units in 3 sites    Left Trapezius - 10 units in 3 sites       Right occipitalis - 15 units of Botox at 4 site/s.   Left Occipitalis - 20 units at 3 sites       Right Paracervical  10 units in 3 sites    Left paracervical 10 units in 2 sites      Right Frontalis - 12.5 units of Botox at 3 site/s.  Left Frontalis - 12.5 units of Botox at 3 site/s.     Right Temporalis - 30 units of Botox at 5 site/s.  Left Temporalis - 30 units of Botox at 4 site/s.     Right Masseter, superficial belly   1 site 7.5 units   Left Masseter, superficial belly 1 site 7.5 units      Right  - 7.5 units of Botox at 1 site/s.               Left  -7.5 units of Botox at 1 site/s.     Procerus - 5 units of Botox at 1 site/s.         RESPONSE TO PROCEDURE:  Kalyn Rivero tolerated the procedure well and there were no immediate complications.   She was allowed to recover for an appropriate period of time and  was discharged home in stable condition.      FOLLOW UP:  Kalyn Rivero was asked to follow up by phone in 7-14 days with Annamaria Lamb RN, Care Coordinator, to report her response to this series of injections.  Based on the patient's previous response to this therapy, Kalyn Rivero was rescheduled for the next series of injections in 10 weeks.      PLAN (Medication Changes, Therapy Orders, Work or Disability Issues, etc.): Patient will continue to monitor response to today's injections.       Again, thank you for allowing me to participate in the care of your patient.      Sincerely,    Rosina Quinones MD

## 2021-01-28 NOTE — PROGRESS NOTES
BOTULINUM TOXIN PROCEDURE - HEADACHE - NOTE    Chief Complaint   Patient presents with     Botox     Chronic migraines     BP (!) 138/94   Pulse 73   SpO2 97%         Current Outpatient Medications:      azithromycin (ZITHROMAX) 250 MG tablet, Two tablets first day, then one tablet daily for four days. (Patient not taking: Reported on 1/23/2020), Disp: 6 tablet, Rfl: 0     Botulinum Toxin Type A (BOTOX) 200 UNITS SOLR, Inject 200 Units as directed every 3 months (Patient not taking: Reported on 1/23/2020), Disp: 200 Units, Rfl: 3     celecoxib (CELEBREX) 100 MG capsule, Take 100 mg by mouth, Disp: , Rfl:      clindamycin (CLEOCIN) 300 MG capsule, Take 300 mg by mouth 3 times daily, Disp: , Rfl: 0     ClonazePAM (KLONOPIN PO), Take 0.5 mg by mouth 3 times daily , Disp: , Rfl:      ENBREL SURECLICK 50 MG/ML autoinjector, , Disp: , Rfl:      leflunomide (ARAVA) 20 MG tablet, Take 20 mg by mouth every morning Reported on 3/28/2017, Disp: , Rfl:      omeprazole (PRILOSEC) 40 MG DR capsule, TAKE 1 CAPSULE (40 MG) BY MOUTH DAILY TAKE 30-60 MINUTES BEFORE A MEAL. (Patient not taking: Reported on 11/5/2020), Disp: 30 capsule, Rfl: 0     omeprazole (PRILOSEC) 40 MG DR capsule, Take 1 capsule (40 mg) by mouth 2 times daily (before meals), Disp: 30 capsule, Rfl:      OnabotulinumtoxinA (BOTOX IJ), Inject 200 Units as directed Total dose 200 units  Administered 190 units  Unavoidable waste 30 units  Lot # /C3 with Expiration Date:  12/2020  NDC [address]-77, Disp: , Rfl:      OnabotulinumtoxinA (BOTOX IJ), Inject 170 Units as directed Lot # /C3 with Expiration Date:  10/2020, Disp: , Rfl:      senna-docusate (SENOKOT-S/PERICOLACE) 8.6-50 MG tablet, Take 1-2 tablets by mouth 2 times daily For constipation., Disp: 100 tablet, Rfl: 1    Current Facility-Administered Medications:      botulinum toxin type A (BOTOX) 100 units injection 200 Units, 200 Units, Intramuscular, Q90 Days, Rosina Quinones MD      Allergies   Allergen Reactions     Morphine Swelling     Codeine      Gabapentin Other (See Comments)     Pins,needles, stabbing aching at once  Pins,needles, stabbing aching at once  Numbness and Tingling     Ibuprofen      Doesn't take due to gastric ulcer     Sulfa Drugs Nausea and Vomiting     unknown     Tylenol [Acetaminophen]      Pt. States that she has Liver problems  Tylenol 3     Erythromycin Nausea     Vomiting      Penicillins Rash     Prednisone Palpitations     Heart racing        PHYSICAL EXAM:  The patient is here for reinjection of Botox. She does not a current headache. She has had a non stop headache since the day after [place]. She describes it as sharp and has sensitivity to light and sound.     She was seen at [facility] ER on 12/21/20. She was taken by ambulance. The pain was extremely intense. She thought she was having an aneurysm. She had a CT performed which was noted as no findings. She was given Reglan 10mg and sodium chloried 0.9% IV and then discharged home the same day.      Reviewed chart, she presented to the [facility] ER for headache, she thought she had an aneurysm. A Head CT was done and was negative.         HPI:  We reviewed the recommended safety guidelines for  Botox from any vaccine injection, such as the seasonal flu vaccine, by a minimum of 10-14 days with Kalyn Rivero. She acknowledged understanding         RESPONSE TO PREVIOUS TREATMENT:  She notes no change in her pattern, and her last series of injections with 200 units of  Botox on 11/5/21    No problems reported.     1.  Headache frequency during this injection cycle: constant headache since 12/26/20.      2.  Headache duration during this injection cycle:  Constant headache    3.  Headache intensity during this injection cycle:    A.  7-8/10  =  Typical pain level.  B.  10/10  =  Worst pain level.  C.  7/10  =  Lowest pain level.  These numbers have increased this cycle.        4.   Change in headache medication usage during this injection cycle:  (For Example:  Able to decrease use of oral pain medications.) not on oral medications      5.  ER Visits During This Injection Cycle:  None      6.  Functional Performance:  Change in ADL's, social interaction, days lost from work, etc.  No missed social events due to headaches     BOTULINUM NEUROTOXIN INJECTION PROCEDURES:      VERIFICATION OF PATIENT IDENTIFICATION AND PROCEDURE     Initials   Patient Name MD   Patient  MD   Procedure Verified by: MD     Prior to the start of the procedure and with procedural staff participation, I verbally confirmed the patient s identity using two indicators, relevant allergies, that the procedure was appropriate and matched the consent or emergent situation, and that the correct equipment/implants were available. Immediately prior to starting the procedure I conducted the Time Out with the procedural staff and re-confirmed the patient s name, procedure, and site/side. (The Joint Commission universal protocol was followed.)  Yes    Sedation (Moderate or Deep): None    Above assessments performed by:  Rosina Quinones MD, [facility]   Department of Rehabilitation            INDICATIONS FOR PROCEDURES:  Kalyn Rivero is a 50 year old patient with chronic migraine headaches associated with cervicogenic  components.     Her baseline symptoms have been recalcitrant to oral medications and conservative therapy.  She is here today for reinjection with Botox.    GOAL OF PROCEDURE:  The goal of this procedure is to decrease pain .    TOTAL DOSE ADMINISTERED:  Dose Administered:  200 units  Botox (Botulinum Toxin Type A)       2:1 Dilution   Unavoidable waste 0 units      Diluent Used:  Bupivacaine 0.5%  Lot number [id number]  Exp date   NDC number 22132 [id number]    Total Volume of Diluent Used:  4 ml  Lot #  [id number] with Expiration Date: 10/23  NDC #: Botox 100u ([address]-82)    Medication guide was offered to  patient and was declined.      CONSENT:  The risks, benefits, and treatment options were discussed with Kalyn Rivero and she agreed to proceed.    Written consent was obtained by MD     EQUIPMENT USED:  Needle-37mm stimulating/recording      SKIN PREPARATION:  Skin preparation was performed using an alcohol wipe.      GUIDANCE DESCRIPTION:  Electro-myographic guidance was necessary throughout the procedure to accurately identify all areas of spastic muscles while avoiding injection of non-spastic muscles, neighboring nerves and nearby vascular structures.       AREA/MUSCLE INJECTED:      HEAD & SCALP MUSCLES:  200 units Botox = Total Dose, 2:1 Dilution       Right Trapezius - 15 units in 3 sites    Left Trapezius - 10 units in 3 sites       Right occipitalis - 15 units of Botox at 4 site/s.   Left Occipitalis - 20 units at 3 sites       Right Paracervical  10 units in 3 sites    Left paracervical 10 units in 2 sites      Right Frontalis - 12.5 units of Botox at 3 site/s.  Left Frontalis - 12.5 units of Botox at 3 site/s.     Right Temporalis - 30 units of Botox at 5 site/s.  Left Temporalis - 30 units of Botox at 4 site/s.     Right Masseter, superficial belly   1 site 7.5 units   Left Masseter, superficial belly 1 site 7.5 units      Right  - 7.5 units of Botox at 1 site/s.               Left  -7.5 units of Botox at 1 site/s.     Procerus - 5 units of Botox at 1 site/s.         RESPONSE TO PROCEDURE:  Kalyn Rivero tolerated the procedure well and there were no immediate complications.   She was allowed to recover for an appropriate period of time and was discharged home in stable condition.      FOLLOW UP:  Kalyn Rivero was asked to follow up by phone in 7-14 days with Annamaria Lamb RN, Care Coordinator, to report her response to this series of injections.  Based on the patient's previous response to this therapy, Kalyn Rivero was rescheduled for the next series of  injections in 10 weeks.      PLAN (Medication Changes, Therapy Orders, Work or Disability Issues, etc.): Patient will continue to monitor response to today's injections.

## 2021-03-10 NOTE — PROGRESS NOTES
BOTULINUM TOXIN PROCEDURE - HEADACHE - NOTE    Chief Complaint   Patient presents with     Botox     [facility] RETURN BOTOX     Headache       /74 (Patient Position: Sitting)   Pulse 83   Temp 98.8  F (37.1  C) (Oral)   SpO2 98%        Current Outpatient Medications:      azithromycin (ZITHROMAX) 250 MG tablet, Two tablets first day, then one tablet daily for four days., Disp: 6 tablet, Rfl: 0     Botulinum Toxin Type A (BOTOX) 200 UNITS SOLR, Inject 200 Units as directed every 3 months, Disp: 200 Units, Rfl: 3     celecoxib (CELEBREX) 100 MG capsule, Take 100 mg by mouth, Disp: , Rfl:      clindamycin (CLEOCIN) 300 MG capsule, Take 300 mg by mouth 3 times daily, Disp: , Rfl: 0     ClonazePAM (KLONOPIN PO), Take 0.5 mg by mouth 3 times daily , Disp: , Rfl:      ENBREL SURECLICK 50 MG/ML autoinjector, , Disp: , Rfl:      leflunomide (ARAVA) 20 MG tablet, Take 20 mg by mouth every morning Reported on 3/28/2017, Disp: , Rfl:      omeprazole (PRILOSEC) 40 MG DR capsule, TAKE 1 CAPSULE (40 MG) BY MOUTH DAILY TAKE 30-60 MINUTES BEFORE A MEAL., Disp: 30 capsule, Rfl: 8     omeprazole (PRILOSEC) 40 MG DR capsule, Take 1 capsule (40 mg) by mouth 2 times daily (before meals), Disp: 30 capsule, Rfl:      OnabotulinumtoxinA (BOTOX IJ), Inject 200 Units as directed Total dose 200 units  Administered 190 units  Unavoidable waste 30 units  Lot # /C3 with Expiration Date:  12/2020  NDC [address]-13, Disp: , Rfl:      OnabotulinumtoxinA (BOTOX IJ), Inject 170 Units as directed Lot # /C3 with Expiration Date:  10/2020, Disp: , Rfl:      senna-docusate (SENOKOT-S/PERICOLACE) 8.6-50 MG tablet, Take 1-2 tablets by mouth 2 times daily For constipation. (Patient not taking: Reported on 3/14/2019), Disp: 100 tablet, Rfl: 1    Current Facility-Administered Medications:      botulinum toxin type A (BOTOX) 100 units injection 200 Units, 200 Units, Intramuscular, Q90 Days, Rosina Quinones MD     Allergies   Allergen  Patient Name: Danny Alvarez Jr.  : 1944    MRN: [id number]                              Today's Date: 3/10/2021       Admit Date: 3/1/2021    Visit Dx:     ICD-10-CM ICD-9-CM   1. Pleural effusion  J90 511.9   2. Abnormal CT of the abdomen  R93.5 793.6   3. Dyspnea on exertion  R06.00 786.09   4. Elevated troponin  R77.8 790.6     Patient Active Problem List   Diagnosis   • Malignant left pleural effusion positive for adenocarcinoma   • Elevated troponin   • Ascites   • Portal vein thrombosis   • Essential hypertension   • Obstructive sleep apnea syndrome   • Type 2 diabetes mellitus without complication (CMS/HCC)   • Acute on chronic combined systolic and diastolic CHF (congestive heart failure) (CMS/HCC)   • Acute deep vein thrombosis (DVT) of right peroneal vein (CMS/HCC)   • Right-sided pleural effusion   • Hyponatremia     Past Medical History:   Diagnosis Date   • Adenocarcinoma (CMS/HCC)    • Ascites    • CHF (congestive heart failure) (CMS/HCC)    • Diabetes mellitus (CMS/HCC)    • DVT (deep vein thrombosis) in pregnancy     Right   • Hypertension    • Hyponatremia    • Pleural effusion     Right     Past Surgical History:   Procedure Laterality Date   • CLAVICLE SURGERY     • PLEURAL CATHETER INSERTION Right 3/9/2021    Procedure: PLEURX CATHETER INSERTION WITH DRAINAGE OF PLEURAL EFFUSION LEFT CHEST TUBE INSERTION RIGHT;  Surgeon: Richy Reyes MD;  Location: [facility];  Service: Cardiothoracic;  Laterality: Right;  fluoro: 6 seconds  dose: 7 mGy     General Information     Row Name 03/10/21 1154          Physical Therapy Time and Intention    Document Type  re-evaluation  -     Mode of Treatment  physical therapy  -     Row Name 03/10/21 1154          General Information    Patient Profile Reviewed  yes  -     Prior Level of Function  independent:;ADL's;all household mobility;community mobility;gait;transfer;bed mobility  -     Existing Precautions/Restrictions  fall;other (see  "comments) left chest tube  -     Barriers to Rehab  medically complex  -     Row Name 03/10/21 1154          Living Environment    Lives With  spouse  -     Row Name 03/10/21 1154          Stairs Within Home, Primary    Stairs, Within Home, Primary  14  -     Stair Railings, Within Home, Primary  railing on left side (ascending)  -     Row Name 03/10/21 1154          Cognition    Orientation Status (Cognition)  oriented x 4  -     Row Name 03/10/21 1154          Safety Issues, Functional Mobility    Safety Issues Affecting Function (Mobility)  safety precautions follow-through/compliance;safety precaution awareness;sequencing abilities  -     Impairments Affecting Function (Mobility)  balance;endurance/activity tolerance;strength  -       User Key  (r) = Recorded By, (t) = Taken By, (c) = Cosigned By    Initials Name Provider Type    Marco Aguilera, PT Physical Therapist        Mobility     Row Name 03/10/21 1155          Bed Mobility    Comment (Bed Mobility)  UIC  -     Row Name 03/10/21 1155          Transfers    Comment (Transfers)  Verbal cues for hand placement with use of RW  -     Row Name 03/10/21 1155          Sit-Stand Transfer    Sit-Stand [place] (Transfers)  contact guard;verbal cues  -     Assistive Device (Sit-Stand Transfers)  walker, front-wheeled  -     Row Name 03/10/21 1155          Gait/Stairs (Locomotion)    [place] Level (Gait)  contact guard  -     Assistive Device (Gait)  walker, front-wheeled  -     Distance in Feet (Gait)  230  -     Deviations/Abnormal Patterns (Gait)  bilateral deviations;gait speed decreased  -     Bilateral Gait Deviations  heel strike decreased  -     Comment (Gait/Stairs)  One standing rest break as pt reports SOA and \"fuzzy\" with mask on.  -       User Key  (r) = Recorded By, (t) = Taken By, (c) = Cosigned By    Initials Name Provider Type    Marco Aguilera PT Physical Therapist        Obj/Interventions     Row " Reactions     Morphine Swelling     Gabapentin Other (See Comments)     Pins,needles, stabbing aching at once  Pins,needles, stabbing aching at once  Numbness and Tingling     Ibuprofen      Doesn't take due to gastric ulcer     Sulfa Drugs Nausea and Vomiting     unknown     Tylenol [Acetaminophen]      Pt. States that she has Liver problems  Tylenol 3     Erythromycin Nausea     Vomiting      Penicillins Rash     Prednisone Palpitations     Heart racing        PHYSICAL EXAM:  Pt states headache today, rates 2/10. She denies any sense of vomiting. She notes blurred vision.   Notes the headache to be more on the left side.       HPI:  Patient reports the following no new medical problems since last visit: 3/14/19 and Patient denies new medical diagnoses, illnesses, hospitalizations, emergency room visits, and injuries since the previous injection with botulinum neurotoxin.    We reviewed the recommended safety guidelines for  Botox from any vaccine injection, such as the seasonal flu vaccine, by a minimum of 10-14 days with Kalyn Rivero. She acknowledged understanding.    Kalyn notes that she has been stressed as her girlfriend who was homeless and moved in into her home, she is further stressed by her brother also coming to live in and the they did not get along.       RESPONSE TO PREVIOUS TREATMENT:  She notes no change in her pattern, and her last series of injections with 200 units of  Botox on 8/15/19.     No problems reported    1.  Headache frequency during this injection cycle: 0 headache days per month.  She has had daily headaches in the last 2 weeks. Prior to the three weeks, she was headache free for the entire cycle.     2.  Headache duration during this injection cycle:  Headache duration ranged from 2 days to 3 days.    3.  Headache intensity during this injection cycle:    A.  4/10  =  Typical pain level.  B.  10/10  =  Worst pain level.  C.  0/10  =  Lowest pain level.    4.   Change in headache medication usage during this injection cycle:  (For Example:  Able to decrease use of oral pain medications.) none      5.  ER Visits During This Injection Cycle:  None      6.  Functional Performance:  Change in ADL's, social interaction, days lost from work, etc. Pt reports that she does not do anything due to Migraines      BOTULINUM NEUROTOXIN INJECTION PROCEDURES:      VERIFICATION OF PATIENT IDENTIFICATION AND PROCEDURE     Initials   Patient Name fi   Patient  fi   Procedure Verified by: wes     Prior to the start of the procedure and with procedural staff participation, I verbally confirmed the patient s identity using two indicators, relevant allergies, that the procedure was appropriate and matched the consent or emergent situation, and that the correct equipment/implants were available. Immediately prior to starting the procedure I conducted the Time Out with the procedural staff and re-confirmed the patient s name, procedure, and site/side. (The Joint Commission universal protocol was followed.)  Yes    Sedation (Moderate or Deep): None    Above assessments performed by:    Rosina Quinones MD      INDICATIONS FOR PROCEDURES:  Kalyn Rivero is a 49 year old patient with chronic migraine headaches associated with cervicogenic  components.     Her baseline symptoms have been recalcitrant to oral medications and conservative therapy.  She is here today for reinjection with Botox.    GOAL OF PROCEDURE:  The goal of this procedure is to decrease pain .    TOTAL DOSE ADMINISTERED:  Dose Administered:  200 units  Botox (Botulinum Toxin Type A)       2:1 Dilution     Diluent Used:  Preservative Free Normal Saline  Total Volume of Diluent Used:  4 ml  Lot #  C3 with Expiration Date:    NDC #: Botox 100u ([address]-54)    Medication guide was offered to patient and was declined.    CONSENT:  The risks, benefits, and treatment options were discussed with Kalyn Rivero and  Name 03/10/21 1157          Range of Motion Comprehensive    General Range of Motion  bilateral upper extremity ROM WFL  -     Row Name 03/10/21 1157          Strength Comprehensive (MMT)    General Manual Muscle Testing (MMT) Assessment  lower extremity strength deficits identified  -     Comment, General Manual Muscle Testing (MMT) Assessment  BLE grossly 4+/5  -     Row Name 03/10/21 1157          Motor Skills    Therapeutic Exercise  ankle;knee;hip  -[facility] Name 03/10/21 1157          Hip (Therapeutic Exercise)    Hip (Therapeutic Exercise)  isometric exercises  -     Hip Isometrics (Therapeutic Exercise)  bilateral;gluteal sets;10 repetitions  -[facility] Name 03/10/21 115          Knee (Therapeutic Exercise)    Knee (Therapeutic Exercise)  strengthening exercise  -     Knee Strengthening (Therapeutic Exercise)  bilateral;SLR (straight leg raise);LAQ (long arc quad);10 repetitions  -[facility] Name 03/10/21 115          Ankle (Therapeutic Exercise)    Ankle (Therapeutic Exercise)  AROM (active range of motion)  -     Ankle AROM (Therapeutic Exercise)  bilateral;dorsiflexion;plantarflexion;10 repetitions;2 sets  -[facility] Name 03/10/21 1157          Balance    Balance Assessment  sitting static balance;sitting dynamic balance;standing static balance;standing dynamic balance  -     Static Sitting Balance  WFL;sitting in chair  -     Dynamic Sitting Balance  WFL;sitting in chair  -     Static Standing Balance  WFL;supported  -     Dynamic Standing Balance  mild impairment;supported  -     Balance Interventions  sitting;standing;sit to stand;supported;dynamic;static  -     Comment, Balance  no LOB noted  -       User Key  (r) = Recorded By, (t) = Taken By, (c) = Cosigned By    Initials Name Provider Type     Marco Garrett, PT Physical Therapist        Goals/Plan     Row Name 03/10/21 1202          Bed Mobility Goal 1 (PT)    Activity/Assistive Device (Bed Mobility Goal 1, PT)   bed mobility activities, all  -     [place] Level/Cues Needed (Bed Mobility Goal 1, PT)  independent  -     Time Frame (Bed Mobility Goal 1, PT)  short term goal (STG);5 days  -[facility] Name 03/10/21 1202          Transfer Goal 1 (PT)    Activity/Assistive Device (Transfer Goal 1, PT)  sit-to-stand/stand-to-sit;bed-to-chair/chair-to-bed  -     [place] Level/Cues Needed (Transfer Goal 1, PT)  independent;1 person assist  -     Time Frame (Transfer Goal 1, PT)  short term goal (STG);5 days  -[facility] Name 03/10/21 1202          Gait Training Goal 1 (PT)    Activity/Assistive Device (Gait Training Goal 1, PT)  gait (walking locomotion)  -     [place] Level (Gait Training Goal 1, PT)  standby assist  -     Distance (Gait Training Goal 1, PT)  350 feet  -     Time Frame (Gait Training Goal 1, PT)  long term goal (LTG);1 week  -[facility] Name 03/10/21 1202          Stairs Goal 1 (PT)    Activity/Assistive Device (Stairs Goal 1, PT)  stairs, all skills  -     [place] Level/Cues Needed (Stairs Goal 1, PT)  standby assist  -     Number of Stairs (Stairs Goal 1, PT)  14  -     Time Frame (Stairs Goal 1, PT)  long term goal (LTG);1 week  -       User Key  (r) = Recorded By, (t) = Taken By, (c) = Cosigned By    Initials Name Provider Type     Marco Garrett, PT Physical Therapist        Clinical Impression     [facility] Name 03/10/21 7101          Pain    Additional Documentation  Pain Scale: Numbers Pre/Post-Treatment (Group)  -[facility] Name 03/10/21 1871          Pain Scale: Numbers Pre/Post-Treatment    Pretreatment Pain Rating  0/10 - no pain  -     Posttreatment Pain Rating  0/10 - no pain  -     Pre/Posttreatment Pain Comment  denied  -     Pain Intervention(s)  Repositioned;Ambulation/increased activity  -[facility] Name 03/10/21 5757          Plan of Care Review    Plan of Care Reviewed With  patient  -     Progress  no change  -     Outcome Summary  PT re-eval  she agreed to proceed.    Written consent was obtained by .     EQUIPMENT USED:  Needle-37mm stimulating/recording      SKIN PREPARATION:  Skin preparation was performed using an alcohol wipe.      GUIDANCE DESCRIPTION:  Electro-myographic guidance was necessary throughout the procedure to accurately identify all areas of spastic muscles while avoiding injection of non-spastic muscles, neighboring nerves and nearby vascular structures.       AREA/MUSCLE INJECTED:      HEAD & SCALP MUSCLES:  200 units Botox = Total Dose, 2:1 Dilution  Right Trapezius - 15 units in 3 sites   Left Trapezius - 10 units in 3 sites      Right occipitalis - 15 units of Botox at 4 site/s.  Left Occipitalis - 20 units at 3 sites      Right Paracervical  10 units in 3 sites   Left paracervical 10 units in 2 sites            Right Frontalis - 12.5 units of Botox at 3 site/s.  Left Frontalis - 12.5 units of Botox at 3 site/s.     Right Temporalis - 30 units of Botox at 5 site/s.  Left Temporalis - 30 units of Botox at 4 site/s.     Right [place], superficial belly   1 site 7.5 units   Left [place], superficial belly 1 site 7.5 units      Right  - 7.5 units of Botox at 1 site/s.               Left  -7.5 units of Botox at 1 site/s.     Procerus - 5 units of Botox at 1 site/s.         RESPONSE TO PROCEDURE:  Kalyn Rivero tolerated the procedure well and there were no immediate complications.   She was allowed to recover for an appropriate period of time and was discharged home in stable condition.      FOLLOW UP:  Kalyn Rivero was asked to follow up by phone in 7-14 days with Annamaria Lamb RN, Care Coordinator, to report her response to this series of injections.  Based on the patient's previous response to this therapy, Kalyn Rivero was rescheduled for the next series of injections in 10 weeks.      PLAN (Medication Changes, Therapy Orders, Work or Disability Issues, etc.): Patient will continue to  complete. Pt continues to present with impaired mobility from PLOF warranting continued IPPT. Ambulated 230 feet with RW and CGA for safety. Edu to pt and spouse on POC and HEP. Goals remain appropriate. Continue d/c recs for home with assist.  -     Row Name 03/10/21 1158          Therapy Assessment/Plan (PT)    Rehab Potential (PT)  good, to achieve stated therapy goals  -     Criteria for Skilled Interventions Met (PT)  yes;skilled treatment is necessary  -     Row Name 03/10/21 1158          Vital Signs    Pre Systolic BP Rehab  82 sitting  -     Pre Treatment Diastolic BP  56  -JH     Intra Systolic BP Rehab  97 standing  -     Intra Treatment Diastolic BP  74  -JH     Post Systolic BP Rehab  87 sitting  -     Post Treatment Diastolic BP  59  -JH     Pre SpO2 (%)  98  -     O2 Delivery Pre Treatment  room air  -     O2 Delivery Intra Treatment  room air  -     Post SpO2 (%)  94  -JH     O2 Delivery Post Treatment  room air  -     Pre Patient Position  Sitting  -     Intra Patient Position  Standing  -     Post Patient Position  Sitting  -     Row Name 03/10/21 1158          Positioning and Restraints    Pre-Treatment Position  sitting in chair/recliner  -     Post Treatment Position  chair  -JH     In Chair  notified nsg;reclined;call light within reach;encouraged to call for assist;exit alarm on;with family/caregiver;legs elevated;RUE elevated;LUE elevated  -       User Key  (r) = Recorded By, (t) = Taken By, (c) = Cosigned By    Initials Name Provider Type     Marco Garrett, PT Physical Therapist        Outcome Measures     Row Name 03/10/21 1202          How much help from another person do you currently need...    Turning from your back to your side while in flat bed without using bedrails?  4  -JH     Moving from lying on back to sitting on the side of a flat bed without bedrails?  3  -JH     Moving to and from a bed to a chair (including a wheelchair)?  3  -JH     Standing  monitor response to today's injections.   We will connect with the insurance for authorizing of earlier injections every 9 weeks.  Changed the dose to reflect more on the left side of the neck today.           up from a chair using your arms (e.g., wheelchair, bedside chair)?  3  -     Climbing 3-5 steps with a railing?  3  -     To walk in hospital room?  3  -     AM-PAC 6 Clicks Score (PT)  19  -     Row Name 03/10/21 1202          Functional Assessment    Outcome Measure Options  AM-PAC 6 Clicks Basic Mobility (PT)  -       User Key  (r) = Recorded By, (t) = Taken By, (c) = Cosigned By    Initials Name Provider Type    Marco Aguilera, PT Physical Therapist        Physical Therapy Education                 Title: PT OT SLP Therapies (In Progress)     Topic: Physical Therapy (Done)     Point: Mobility training (Done)     Learning Progress Summary           Patient Acceptance, E,TB, VU by  at 3/10/2021 1203    Comment: edu on POC and HEP    Acceptance, E,D, VU,DU by  at 3/9/2021 1200    Acceptance, E,TB, VU by  at 3/8/2021 1618    Comment: edu on PT services, POC, HEP, d/c plans   Significant Other Acceptance, E,TB, VU by  at 3/10/2021 1203    Comment: edu on POC and HEP                   Point: Home exercise program (Done)     Learning Progress Summary           Patient Acceptance, E,TB, VU by  at 3/10/2021 1203    Comment: edu on POC and HEP    Acceptance, E,D, VU,DU by  at 3/9/2021 1200    Acceptance, E,TB, VU by  at 3/8/2021 1618    Comment: edu on PT services, POC, HEP, d/c plans   Significant Other Acceptance, E,TB, VU by  at 3/10/2021 1203    Comment: edu on POC and HEP                   Point: Body mechanics (Done)     Learning Progress Summary           Patient Acceptance, E,TB, VU by  at 3/10/2021 1203    Comment: edu on POC and HEP    Acceptance, E,D, VU,DU by  at 3/9/2021 1200    Acceptance, E,TB, VU by  at 3/8/2021 1618    Comment: edu on PT services, POC, HEP, d/c plans   Significant Other Acceptance, E,TB, VU by  at 3/10/2021 1203    Comment: edu on POC and HEP                   Point: Precautions (Done)     Learning Progress Summary           Patient Acceptance, E,TB,  VU by  at 3/10/2021 1203    Comment: edu on POC and HEP    Acceptance, E,D, VU,DU by  at 3/9/2021 1200    Acceptance, E,TB, VU by  at 3/8/2021 1618    Comment: edu on PT services, POC, HEP, d/c plans   Significant Other Acceptance, E,TB, VU by  at 3/10/2021 1203    Comment: edu on POC and HEP                               User Key     Initials Effective Dates Name Provider Type Discipline     06/19/15 -  Patti Vazquez, PT Physical Therapist PT     09/22/20 -  Marco Garrett PT Physical Therapist PT              PT Recommendation and Plan  Planned Therapy Interventions (PT): balance training, bed mobility training, gait training, home exercise program, patient/family education, stretching, strengthening, stair training, ROM (range of motion), postural re-education, transfer training  Plan of Care Reviewed With: patient  Progress: no change  Outcome Summary: PT re-eval complete. Pt continues to present with impaired mobility from PLOF warranting continued IPPT. Ambulated 230 feet with RW and CGA for safety. Edu to pt and spouse on POC and HEP. Goals remain appropriate. Continue d/c recs for home with assist.     Time Calculation:   PT Charges     Row Name 03/10/21 1203             Time Calculation    Start Time  1038  -      PT Received On  03/10/21  -      PT Goal Re-Cert Due Date  03/18/21  -         Time Calculation- PT    Total Timed Code Minutes- PT  15 minute(s)  -         Timed Charges    97207 - PT Therapeutic Activity Minutes  15  -        User Key  (r) = Recorded By, (t) = Taken By, (c) = Cosigned By    Initials Name Provider Type     Marco Garrett, PT Physical Therapist        Therapy Charges for Today     Code Description Service Date Service Provider Modifiers Qty    83803708007 HC PT THERAPEUTIC ACT EA 15 MIN 3/10/2021 Marco Garrett, PT GP 1    86300717495 HC PT RE-EVAL ESTABLISHED PLAN 2 3/10/2021 Marco Garrett, PT GP 1          PT G-Codes  Outcome Measure Options: AM-PAC 6  Clicks Basic Mobility (PT)  AM-PAC 6 Clicks Score (PT): 19  AM-PAC 6 Clicks Score (OT): 18    Marco Garrett, PT  3/10/2021

## 2021-03-12 ENCOUNTER — TELEPHONE (OUTPATIENT)
Dept: PHYSICAL MEDICINE AND REHAB | Facility: CLINIC | Age: 51
End: 2021-03-12
Payer: COMMERCIAL

## 2021-03-12 PROCEDURE — 99207 PR NO CHARGE LOS: CPT | Performed by: PHYSICAL MEDICINE & REHABILITATION

## 2021-03-12 NOTE — TELEPHONE ENCOUNTER
Contacted patient to let her know that eyelid drooping is a side effect from BOTOX. It will wear off when the BOTOX benefit is gone at the end of her cycle. Will let Dr. Quinones know so she can adjust injection placement with next appointment on April 8th. Also let Kalyn know if she is concerned about it being related to a sinus infection she recently had, she should seek an assessment with her primary care physician. Kalyn is in agreement with this plan.

## 2021-03-12 NOTE — TELEPHONE ENCOUNTER
[facility] Call Center    Phone Message    May a detailed message be left on voicemail: yes     Reason for Call: Other: Patient calling looking to speak with care team of Dr. Quinones  - patient states that she has rheumatoid arthritis and after botox she took a hot steamy bath to help with her rheumatid arthritis and she did that for about a week and states that since then everyday it seems like her eye lids fell and she has been seeing double vision. Patient is wondering if she should wait until 4/8 for next botox treatment or if she should come in earlier. States that it continues to get worse.     Please advise and call patient back at your earliest convenience to discuss further     Action Taken: Other: [facility] PM&R    Travel Screening: Not Applicable

## 2021-03-16 NOTE — TELEPHONE ENCOUNTER
Called patient at [phone]  She reports that her face fell, and her eyelids are droopy. She states that she has sinus infection. She notes that the eyelids drooped right after the Botox.   Recommend she go to the ED if she develops stroke symptoms.   Otherwise we will reduce the dose around the eyes and frontalis.

## 2021-04-08 ENCOUNTER — OFFICE VISIT (OUTPATIENT)
Dept: PHYSICAL MEDICINE AND REHAB | Facility: CLINIC | Age: 51
End: 2021-04-08
Payer: COMMERCIAL

## 2021-04-08 DIAGNOSIS — G43.719 INTRACTABLE CHRONIC MIGRAINE WITHOUT AURA AND WITHOUT STATUS MIGRAINOSUS: Primary | ICD-10-CM

## 2021-04-08 PROCEDURE — 64615 CHEMODENERV MUSC MIGRAINE: CPT | Performed by: PHYSICAL MEDICINE & REHABILITATION

## 2021-04-08 NOTE — LETTER
4/8/2021       RE: Kalyn Rivero  [address] [facility] [address]     Dear Colleague,    Thank you for referring your patient, Kalyn Rivero, to the [facility] [place] at [facility]. Please see a copy of my visit note below.    ERROR      BOTULINUM TOXIN PROCEDURE - HEADACHE - NOTE    Chief Complaint   Patient presents with     Botox     Chronic migraine         Current Outpatient Medications:      celecoxib (CELEBREX) 100 MG capsule, Take 100 mg by mouth, Disp: , Rfl:      ClonazePAM (KLONOPIN PO), Take 0.5 mg by mouth 3 times daily , Disp: , Rfl:      ENBREL SURECLICK 50 MG/ML autoinjector, , Disp: , Rfl:      leflunomide (ARAVA) 20 MG tablet, Take 20 mg by mouth every morning Reported on 3/28/2017, Disp: , Rfl:      senna-docusate (SENOKOT-S/PERICOLACE) 8.6-50 MG tablet, Take 1-2 tablets by mouth 2 times daily For constipation., Disp: 100 tablet, Rfl: 1     azithromycin (ZITHROMAX) 250 MG tablet, Two tablets first day, then one tablet daily for four days. (Patient not taking: Reported on 1/23/2020), Disp: 6 tablet, Rfl: 0     Botulinum Toxin Type A (BOTOX) 200 UNITS SOLR, Inject 200 Units as directed every 3 months (Patient not taking: Reported on 1/23/2020), Disp: 200 Units, Rfl: 3     clindamycin (CLEOCIN) 300 MG capsule, Take 300 mg by mouth 3 times daily, Disp: , Rfl: 0     omeprazole (PRILOSEC) 40 MG DR capsule, TAKE 1 CAPSULE (40 MG) BY MOUTH DAILY TAKE 30-60 MINUTES BEFORE A MEAL. (Patient not taking: Reported on 11/5/2020), Disp: 30 capsule, Rfl: 0     omeprazole (PRILOSEC) 40 MG DR capsule, Take 1 capsule (40 mg) by mouth 2 times daily (before meals), Disp: 30 capsule, Rfl:      OnabotulinumtoxinA (BOTOX IJ), Inject 200 Units as directed Total dose 200 units  Administered 190 units  Unavoidable waste 30 units  Lot # /C3 with Expiration Date:  12/2020  NDC [address]-14, Disp: ,  Rfl:      OnabotulinumtoxinA (BOTOX IJ), Inject 170 Units as directed Lot # /C3 with Expiration Date:  10/2020, Disp: , Rfl:     Current Facility-Administered Medications:      botulinum toxin type A (BOTOX) 100 units injection 200 Units, 200 Units, Intramuscular, Q90 Days, Rosina Quinones MD     Allergies   Allergen Reactions     Morphine Swelling     Codeine      Gabapentin Other (See Comments)     Pins,needles, stabbing aching at once  Pins,needles, stabbing aching at once  Numbness and Tingling     Ibuprofen      Doesn't take due to gastric ulcer     Sulfa Drugs Nausea and Vomiting     unknown     Tylenol [Acetaminophen]      Pt. States that she has Liver problems  Tylenol 3     Erythromycin Nausea     Vomiting      Penicillins Rash     Prednisone Palpitations     Heart racing        PHYSICAL EXAM:  The patient is here for reinjection of Botox today. She notes a current headache. She is grading it a 6/10. She describes it as a dull ache. She also complains of jaw pain today.     She is overdue for Botox by one week.     The patient was getting trigger point injections once a month from her dentist for TMJ. She has not been seen over a year. She has experienced an increase in jaw pain and headaches without the trigger point injections.         HPI:  We reviewed the recommended safety guidelines for  Botox from any vaccine injection, such as the seasonal flu vaccine, by a minimum of 10-14 days with Kalyn Rivero. She acknowledged understanding    Denies hospitalizations or ER visits.          RESPONSE TO PREVIOUS TREATMENT:  She notes no change in her pattern, and her last series of injections with 200 units of  Botox on 21  This cycle her stressors have included her best friend passing away. She is grieving, and the  is tomorrow     No problems reported.     1.  Headache frequency during this injection cycle: she has had multiple headaches this cycle. Too many to count.      2.  Headache duration during this injection cycle:  Constant dull headache    3.  Headache intensity during this injection cycle:    A.  5-8/10  =  Typical pain level.  B.  5/10  =  Worst pain level.  C. 5 /10  =  Lowest pain level.         4.  Change in headache medication usage during this injection cycle:  (For Example:  Able to decrease use of oral pain medications.) not on oral medications      5.  ER Visits During This Injection Cycle:  None      6.  Functional Performance:  Change in ADL's, social interaction, days lost from work, etc.  No missed social events due to headaches     BOTULINUM NEUROTOXIN INJECTION PROCEDURES:      VERIFICATION OF PATIENT IDENTIFICATION AND PROCEDURE     Initials   Patient Name MD   Patient  MD   Procedure Verified by: MD     Prior to the start of the procedure and with procedural staff participation, I verbally confirmed the patient s identity using two indicators, relevant allergies, that the procedure was appropriate and matched the consent or emergent situation, and that the correct equipment/implants were available. Immediately prior to starting the procedure I conducted the Time Out with the procedural staff and re-confirmed the patient s name, procedure, and site/side. (The Joint Commission universal protocol was followed.)  Yes    Sedation (Moderate or Deep): None    Above assessments performed by:  Rosina Quinones MD, [facility]   Department of Rehabilitation       INDICATIONS FOR PROCEDURES:  Kalyn Rivero is a 50 year old patient with chronic migraine headaches associated with cervicogenic  components.     Her baseline symptoms have been recalcitrant to oral medications and conservative therapy.  She is here today for reinjection with Botox.    GOAL OF PROCEDURE:  The goal of this procedure is to decrease pain .    TOTAL DOSE ADMINISTERED:  Dose Administered:  200 units  Botox (Botulinum Toxin Type A)       2:1 Dilution   Unavoidable waste 0 units      Diluent  Used:  Bupivacaine 0.5%  Lot number: [id number]  Exp date 12/23  NDC number 32424 466 03    Total Volume of Diluent Used:  4 ml  Lot #  [id number]  with Expiration Date: 7/23  NDC #: Botox 100u ([address]-90)    Medication guide was offered to patient and was declined.      CONSENT:  The risks, benefits, and treatment options were discussed with Kalyn Rivero and she agreed to proceed.    Written consent was obtained by MD     EQUIPMENT USED:  Needle-37mm stimulating/recording      SKIN PREPARATION:  Skin preparation was performed using an alcohol wipe.      GUIDANCE DESCRIPTION:  Electro-myographic guidance was necessary throughout the procedure to accurately identify all areas of spastic muscles while avoiding injection of non-spastic muscles, neighboring nerves and nearby vascular structures.       AREA/MUSCLE INJECTED:      HEAD & SCALP MUSCLES:  200 units Botox = Total Dose, 2:1 Dilution       Right Trapezius - 15 units in 3 sites    Left Trapezius - 10 units in 3 sites       Right occipitalis - 15 units of Botox at 4 site/s.   Left Occipitalis - 20 units at 3 sites       Right Paracervical  10 units in 3 sites    Left paracervical 10 units in 2 sites      Right Frontalis - 12.5 units of Botox at 3 site/s.  Left Frontalis - 12.5 units of Botox at 3 site/s.     Right Temporalis - 30 units of Botox at 5 site/s.  Left Temporalis - 30 units of Botox at 4 site/s.     Right Masseter, superficial belly   1 site 5 units   Left Masseter, superficial belly 1 site 5 units     Right lateral pterygoid  5 units in 1 site  Left lateral pterygoid  5 units in 1 site    Right  - 7.5 units of Botox at 2 site/s.               Left  -7.5 units of Botox at 2 site/s.              RESPONSE TO PROCEDURE:  Kalyn Rivero tolerated the procedure well and there were no immediate complications.   She was allowed to recover for an appropriate period of time and was discharged home in stable condition.      FOLLOW  UP:  Kalyn Rivero was asked to follow up by phone in 7-14 days with Annamaria Lamb RN, Care Coordinator, to report her response to this series of injections.  Based on the patient's previous response to this therapy, Kalyn Rivero was rescheduled for the next series of injections in 10 weeks.      PLAN (Medication Changes, Therapy Orders, Work or Disability Issues, etc.): Patient will continue to monitor response to today's injections.              Again, thank you for allowing me to participate in the care of your patient.      Sincerely,    Rosina Quinones MD

## 2021-04-08 NOTE — PROGRESS NOTES
BOTULINUM TOXIN PROCEDURE - HEADACHE - NOTE    Chief Complaint   Patient presents with     Botox     Chronic migraine         Current Outpatient Medications:      celecoxib (CELEBREX) 100 MG capsule, Take 100 mg by mouth, Disp: , Rfl:      ClonazePAM (KLONOPIN PO), Take 0.5 mg by mouth 3 times daily , Disp: , Rfl:      ENBREL SURECLICK 50 MG/ML autoinjector, , Disp: , Rfl:      leflunomide (ARAVA) 20 MG tablet, Take 20 mg by mouth every morning Reported on 3/28/2017, Disp: , Rfl:      senna-docusate (SENOKOT-S/PERICOLACE) 8.6-50 MG tablet, Take 1-2 tablets by mouth 2 times daily For constipation., Disp: 100 tablet, Rfl: 1     azithromycin (ZITHROMAX) 250 MG tablet, Two tablets first day, then one tablet daily for four days. (Patient not taking: Reported on 1/23/2020), Disp: 6 tablet, Rfl: 0     Botulinum Toxin Type A (BOTOX) 200 UNITS SOLR, Inject 200 Units as directed every 3 months (Patient not taking: Reported on 1/23/2020), Disp: 200 Units, Rfl: 3     clindamycin (CLEOCIN) 300 MG capsule, Take 300 mg by mouth 3 times daily, Disp: , Rfl: 0     omeprazole (PRILOSEC) 40 MG DR capsule, TAKE 1 CAPSULE (40 MG) BY MOUTH DAILY TAKE 30-60 MINUTES BEFORE A MEAL. (Patient not taking: Reported on 11/5/2020), Disp: 30 capsule, Rfl: 0     omeprazole (PRILOSEC) 40 MG DR capsule, Take 1 capsule (40 mg) by mouth 2 times daily (before meals), Disp: 30 capsule, Rfl:      OnabotulinumtoxinA (BOTOX IJ), Inject 200 Units as directed Total dose 200 units  Administered 190 units  Unavoidable waste 30 units  Lot # /C3 with Expiration Date:  12/2020  NDC [address]-85, Disp: , Rfl:      OnabotulinumtoxinA (BOTOX IJ), Inject 170 Units as directed Lot # /C3 with Expiration Date:  10/2020, Disp: , Rfl:     Current Facility-Administered Medications:      botulinum toxin type A (BOTOX) 100 units injection 200 Units, 200 Units, Intramuscular, Q90 Days, Rosina Quinoneseed, MD     Allergies   Allergen Reactions     Morphine  Swelling     Codeine      Gabapentin Other (See Comments)     Pins,needles, stabbing aching at once  Pins,needles, stabbing aching at once  Numbness and Tingling     Ibuprofen      Doesn't take due to gastric ulcer     Sulfa Drugs Nausea and Vomiting     unknown     Tylenol [Acetaminophen]      Pt. States that she has Liver problems  Tylenol 3     Erythromycin Nausea     Vomiting      Penicillins Rash     Prednisone Palpitations     Heart racing        PHYSICAL EXAM:  The patient is here for reinjection of Botox today. She notes a current headache. She is grading it a 6/10. She describes it as a dull ache. She also complains of jaw pain today.     She is overdue for Botox by one week.     The patient was getting trigger point injections once a month from her dentist for TMJ. She has not been seen over a year. She has experienced an increase in jaw pain and headaches without the trigger point injections.         HPI:  We reviewed the recommended safety guidelines for  Botox from any vaccine injection, such as the seasonal flu vaccine, by a minimum of 10-14 days with Kalyn Rivero. She acknowledged understanding    Denies hospitalizations or ER visits.          RESPONSE TO PREVIOUS TREATMENT:  She notes no change in her pattern, and her last series of injections with 200 units of  Botox on 21  This cycle her stressors have included her best friend passing away. She is grieving, and the  is tomorrow     No problems reported.     1.  Headache frequency during this injection cycle: she has had multiple headaches this cycle. Too many to count.     2.  Headache duration during this injection cycle:  Constant dull headache    3.  Headache intensity during this injection cycle:    A.  5-8/10  =  Typical pain level.  B.  5/10  =  Worst pain level.  C. 5 /10  =  Lowest pain level.         4.  Change in headache medication usage during this injection cycle:  (For Example:  Able to decrease use of  oral pain medications.) not on oral medications      5.  ER Visits During This Injection Cycle:  None      6.  Functional Performance:  Change in ADL's, social interaction, days lost from work, etc.  No missed social events due to headaches     BOTULINUM NEUROTOXIN INJECTION PROCEDURES:      VERIFICATION OF PATIENT IDENTIFICATION AND PROCEDURE     Initials   Patient Name MD   Patient  MD   Procedure Verified by: MD     Prior to the start of the procedure and with procedural staff participation, I verbally confirmed the patient s identity using two indicators, relevant allergies, that the procedure was appropriate and matched the consent or emergent situation, and that the correct equipment/implants were available. Immediately prior to starting the procedure I conducted the Time Out with the procedural staff and re-confirmed the patient s name, procedure, and site/side. (The Joint Commission universal protocol was followed.)  Yes    Sedation (Moderate or Deep): None    Above assessments performed by:  Rosina Quinones MD, [facility]   Department of Rehabilitation       INDICATIONS FOR PROCEDURES:  Kalyn Rivero is a 50 year old patient with chronic migraine headaches associated with cervicogenic  components.     Her baseline symptoms have been recalcitrant to oral medications and conservative therapy.  She is here today for reinjection with Botox.    GOAL OF PROCEDURE:  The goal of this procedure is to decrease pain .    TOTAL DOSE ADMINISTERED:  Dose Administered:  200 units  Botox (Botulinum Toxin Type A)       2:1 Dilution   Unavoidable waste 0 units      Diluent Used:  Bupivacaine 0.5%  Lot number: [id number]  Exp date   NDC number 78838 466 03    Total Volume of Diluent Used:  4 ml  Lot #  [id number]  with Expiration Date:   NDC #: Botox 100u ([address]-25)    Medication guide was offered to patient and was declined.      CONSENT:  The risks, benefits, and treatment options were discussed with Kalyn  Shayy Rivero and she agreed to proceed.    Written consent was obtained by MD     EQUIPMENT USED:  Needle-37mm stimulating/recording      SKIN PREPARATION:  Skin preparation was performed using an alcohol wipe.      GUIDANCE DESCRIPTION:  Electro-myographic guidance was necessary throughout the procedure to accurately identify all areas of spastic muscles while avoiding injection of non-spastic muscles, neighboring nerves and nearby vascular structures.       AREA/MUSCLE INJECTED:      HEAD & SCALP MUSCLES:  200 units Botox = Total Dose, 2:1 Dilution       Right Trapezius - 15 units in 3 sites    Left Trapezius - 10 units in 3 sites       Right occipitalis - 15 units of Botox at 4 site/s.   Left Occipitalis - 20 units at 3 sites       Right Paracervical  10 units in 3 sites    Left paracervical 10 units in 2 sites      Right Frontalis - 12.5 units of Botox at 3 site/s.  Left Frontalis - 12.5 units of Botox at 3 site/s.     Right Temporalis - 30 units of Botox at 5 site/s.  Left Temporalis - 30 units of Botox at 4 site/s.     Right Masseter, superficial belly   1 site 5 units   Left Masseter, superficial belly 1 site 5 units     Right lateral pterygoid  5 units in 1 site  Left lateral pterygoid  5 units in 1 site    Right  - 7.5 units of Botox at 2 site/s.               Left  -7.5 units of Botox at 2 site/s.              RESPONSE TO PROCEDURE:  Kalyn Rivero tolerated the procedure well and there were no immediate complications.   She was allowed to recover for an appropriate period of time and was discharged home in stable condition.      FOLLOW UP:  Kalyn Rivero was asked to follow up by phone in 7-14 days with Annamaria Lamb RN, Care Coordinator, to report her response to this series of injections.  Based on the patient's previous response to this therapy, Kalyn Rivero was rescheduled for the next series of injections in 10 weeks.      PLAN (Medication Changes, Therapy Orders,  Work or Disability Issues, etc.): Patient will continue to monitor response to today's injections.

## 2021-07-05 ENCOUNTER — APPOINTMENT (OUTPATIENT)
Dept: GENERAL RADIOLOGY | Facility: CLINIC | Age: 51
End: 2021-07-05
Attending: EMERGENCY MEDICINE
Payer: COMMERCIAL

## 2021-07-05 ENCOUNTER — HOSPITAL ENCOUNTER (EMERGENCY)
Facility: CLINIC | Age: 51
Discharge: HOME | End: 2021-07-05
Attending: EMERGENCY MEDICINE | Admitting: EMERGENCY MEDICINE
Payer: COMMERCIAL

## 2021-07-05 VITALS
TEMPERATURE: 98.5 F | WEIGHT: 173 LBS | SYSTOLIC BLOOD PRESSURE: 123 MMHG | DIASTOLIC BLOOD PRESSURE: 81 MMHG | HEART RATE: 92 BPM | OXYGEN SATURATION: 96 % | BODY MASS INDEX: 27.92 KG/M2 | RESPIRATION RATE: 18 BRPM

## 2021-07-05 DIAGNOSIS — R20.2 ARM PARESTHESIA, RIGHT: ICD-10-CM

## 2021-07-05 DIAGNOSIS — M79.621 PAIN OF RIGHT UPPER ARM: ICD-10-CM

## 2021-07-05 LAB
ALBUMIN SERPL-MCNC: 3.6 G/DL (ref 3.4–5)
ALP SERPL-CCNC: 102 U/L (ref 40–150)
ALT SERPL W P-5'-P-CCNC: 51 U/L (ref 0–50)
ANION GAP SERPL CALCULATED.3IONS-SCNC: 6 MMOL/L (ref 3–14)
AST SERPL W P-5'-P-CCNC: 39 U/L (ref 0–45)
BASOPHILS # BLD AUTO: 0.1 10E9/L (ref 0–0.2)
BASOPHILS NFR BLD AUTO: 1 %
BILIRUB SERPL-MCNC: 0.4 MG/DL (ref 0.2–1.3)
BUN SERPL-MCNC: 10 MG/DL (ref 7–30)
CALCIUM SERPL-MCNC: 8.7 MG/DL (ref 8.5–10.1)
CHLORIDE SERPL-SCNC: 113 MMOL/L (ref 94–109)
CO2 SERPL-SCNC: 22 MMOL/L (ref 20–32)
CREAT SERPL-MCNC: 0.88 MG/DL (ref 0.52–1.04)
CRP SERPL-MCNC: <2.9 MG/L (ref 0–8)
DIFFERENTIAL METHOD BLD: ABNORMAL
EOSINOPHIL # BLD AUTO: 0.2 10E9/L (ref 0–0.7)
EOSINOPHIL NFR BLD AUTO: 2.7 %
ERYTHROCYTE [DISTWIDTH] IN BLOOD BY AUTOMATED COUNT: 15.5 % (ref 10–15)
ERYTHROCYTE [SEDIMENTATION RATE] IN BLOOD BY WESTERGREN METHOD: 38 MM/H (ref 0–20)
GFR SERPL CREATININE-BSD FRML MDRD: 77 ML/MIN/{1.73_M2}
GLUCOSE SERPL-MCNC: 111 MG/DL (ref 70–99)
HCT VFR BLD AUTO: 40.8 % (ref 35–47)
HGB BLD-MCNC: 12.9 G/DL (ref 11.7–15.7)
IMM GRANULOCYTES # BLD: 0 10E9/L (ref 0–0.4)
IMM GRANULOCYTES NFR BLD: 0.1 %
INTERPRETATION ECG - MUSE: NORMAL
LYMPHOCYTES # BLD AUTO: 2.1 10E9/L (ref 0.8–5.3)
LYMPHOCYTES NFR BLD AUTO: 30.8 %
MCH RBC QN AUTO: 28.9 PG (ref 26.5–33)
MCHC RBC AUTO-ENTMCNC: 31.6 G/DL (ref 31.5–36.5)
MCV RBC AUTO: 92 FL (ref 78–100)
MONOCYTES # BLD AUTO: 0.8 10E9/L (ref 0–1.3)
MONOCYTES NFR BLD AUTO: 11.1 %
NEUTROPHILS # BLD AUTO: 3.7 10E9/L (ref 1.6–8.3)
NEUTROPHILS NFR BLD AUTO: 54.3 %
NRBC # BLD AUTO: 0 10*3/UL
NRBC BLD AUTO-RTO: 0 /100
PLATELET # BLD AUTO: 201 10E9/L (ref 150–450)
POTASSIUM SERPL-SCNC: 3.6 MMOL/L (ref 3.4–5.3)
PROT SERPL-MCNC: 8.1 G/DL (ref 6.8–8.8)
RBC # BLD AUTO: 4.46 10E12/L (ref 3.8–5.2)
SODIUM SERPL-SCNC: 141 MMOL/L (ref 133–144)
TROPONIN I SERPL-MCNC: <0.015 UG/L (ref 0–0.04)
WBC # BLD AUTO: 6.8 10E9/L (ref 4–11)

## 2021-07-05 PROCEDURE — 84484 ASSAY OF TROPONIN QUANT: CPT | Performed by: EMERGENCY MEDICINE

## 2021-07-05 PROCEDURE — 255N000002 HC RX 255 OP 636: Performed by: EMERGENCY MEDICINE

## 2021-07-05 PROCEDURE — 73030 X-RAY EXAM OF SHOULDER: CPT | Mod: RT

## 2021-07-05 PROCEDURE — 250N000011 HC RX IP 250 OP 636: Performed by: EMERGENCY MEDICINE

## 2021-07-05 PROCEDURE — 85025 COMPLETE CBC W/AUTO DIFF WBC: CPT | Performed by: EMERGENCY MEDICINE

## 2021-07-05 PROCEDURE — 99285 EMERGENCY DEPT VISIT HI MDM: CPT | Mod: 25 | Performed by: EMERGENCY MEDICINE

## 2021-07-05 PROCEDURE — 93005 ELECTROCARDIOGRAM TRACING: CPT | Performed by: EMERGENCY MEDICINE

## 2021-07-05 PROCEDURE — 93010 ELECTROCARDIOGRAM REPORT: CPT | Performed by: EMERGENCY MEDICINE

## 2021-07-05 PROCEDURE — A9585 GADOBUTROL INJECTION: HCPCS | Performed by: EMERGENCY MEDICINE

## 2021-07-05 PROCEDURE — 96375 TX/PRO/DX INJ NEW DRUG ADDON: CPT | Performed by: EMERGENCY MEDICINE

## 2021-07-05 PROCEDURE — 85652 RBC SED RATE AUTOMATED: CPT | Performed by: EMERGENCY MEDICINE

## 2021-07-05 PROCEDURE — 250N000013 HC RX MED GY IP 250 OP 250 PS 637: Performed by: EMERGENCY MEDICINE

## 2021-07-05 PROCEDURE — 96374 THER/PROPH/DIAG INJ IV PUSH: CPT | Performed by: EMERGENCY MEDICINE

## 2021-07-05 PROCEDURE — 80053 COMPREHEN METABOLIC PANEL: CPT | Performed by: EMERGENCY MEDICINE

## 2021-07-05 PROCEDURE — 86140 C-REACTIVE PROTEIN: CPT | Performed by: EMERGENCY MEDICINE

## 2021-07-05 RX ORDER — OXYCODONE HYDROCHLORIDE 5 MG/1
5 TABLET ORAL ONCE
Status: COMPLETED | OUTPATIENT
Start: 2021-07-05 | End: 2021-07-05

## 2021-07-05 RX ORDER — OXYCODONE HYDROCHLORIDE 5 MG/1
5 TABLET ORAL EVERY 6 HOURS PRN
Qty: 12 TABLET | Refills: 0 | Status: SHIPPED | OUTPATIENT
Start: 2021-07-05 | End: 2021-07-14

## 2021-07-05 RX ORDER — GADOBUTROL 604.72 MG/ML
7.5 INJECTION INTRAVENOUS ONCE
Status: DISCONTINUED | OUTPATIENT
Start: 2021-07-05 | End: 2021-07-05 | Stop reason: HOSPADM

## 2021-07-05 RX ORDER — ONDANSETRON 2 MG/ML
8 INJECTION INTRAMUSCULAR; INTRAVENOUS ONCE
Status: COMPLETED | OUTPATIENT
Start: 2021-07-05 | End: 2021-07-05

## 2021-07-05 RX ORDER — LORAZEPAM 2 MG/ML
0.5 INJECTION INTRAMUSCULAR ONCE
Status: COMPLETED | OUTPATIENT
Start: 2021-07-05 | End: 2021-07-05

## 2021-07-05 RX ADMIN — OXYCODONE HYDROCHLORIDE 5 MG: 5 TABLET ORAL at 05:13

## 2021-07-05 RX ADMIN — LORAZEPAM 0.5 MG: 2 INJECTION INTRAMUSCULAR; INTRAVENOUS at 07:49

## 2021-07-05 RX ADMIN — HYDROMORPHONE HYDROCHLORIDE 1 MG: 1 INJECTION, SOLUTION INTRAMUSCULAR; INTRAVENOUS; SUBCUTANEOUS at 08:59

## 2021-07-05 RX ADMIN — OXYCODONE HYDROCHLORIDE 5 MG: 5 TABLET ORAL at 06:56

## 2021-07-05 RX ADMIN — ONDANSETRON 8 MG: 2 INJECTION INTRAMUSCULAR; INTRAVENOUS at 08:45

## 2021-07-05 NOTE — ED NOTES
Patient declined pregnancy test prior to CT scan stating she is postmenopausal and not sexually active.

## 2021-07-05 NOTE — ED TRIAGE NOTES
R arm/shoulder radiates to arm pit and down arm through hand.  Pt stated hand and arm turn cold and hot.  Cap refill is okay.  Pt has had five elbow replacements, last one done at  approximately three years ago
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
## 2021-07-05 NOTE — DISCHARGE INSTRUCTIONS
The cause of your symptoms is not entirely clear. Please follow up with both your orthopedist as well as your neurologist as soon as possible. Return to the ER with any new or worsening symptoms or any other concerns.
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
## 2021-07-05 NOTE — ED PROVIDER NOTES
"ED Provider Note  [facility]      History     Chief Complaint   Patient presents with     Numbness     R arm/shoulder radiates to arm pit and down arm through hand.  Pt stated hand and arm turn cold and hot.  Cap refill is okay.  Pt has had five elbow replacements, last one done at  approximately three years ago.     HPI  Kalyn Rivero is a 50 year old female complicated past medical history including rheumatoid arthritis, multiple surgeries of the right arm, previous cervical fusion, amongst others, who presents to the ED with complaint of right arm pain and numbness.  She states that on 3 July she woke up with some achiness in the right upper arm and numbness.  She states that it was somewhat intermittent throughout the day, but when she woke up 4 July it encompassed her entire right arm and was constant.  She states is going down to her axilla as well.  She states that tonight her PCA put some icy hot or BenGay on the arm and wrapped it, though she did not feel it was too tight, she said that her hand became cold and was more tingling.  She states that when they unwrapped the arm this all went away.  She states that she has had multiple surgeries in the shoulder and upper arm, and that she has minimal movement/use of the arm.  She states, \"it's for decoration.  They wanted to cut it off but I did not want them to.\"  PCA reports that she is not supposed to lift anything with this arm.  He states that sometimes she likes to do activities to keep her self busy when he is asleep.  The patient states that she did not do anything with the arm at all, and does not know why is hurting so much.  She denies any pain in any other joints to make her think she is having a rheumatoid arthritis flare.  She states that she is using all of her meds as prescribed and there is no recent change.  She denies any chest pain, shortness of breath (aside from the pain taking her breath away) abdominal " "pain, vomiting, diarrhea.  She states at times the pain is so bad that it makes her nauseous.  No fever.    Past Medical History  Past Medical History:   Diagnosis Date     Acetaminophen overdose 3/24/2011     Anemia      Anesthesia complication     pt states has a history of heart stopping during anesthesia,     Arrhythmia      Cerebral infarction (H)      Cervical high risk HPV (human papillomavirus) test positive 02/22/2017    type 18 & other HR HPV     Chronic infection     MRSA     Chronic pain 3/24/2011     Degenerative joint disease      Endometriosis      Fibromyalgia      Gastro-oesophageal reflux disease      Heart murmur      History of blood transfusion      Hypothyroidism      Immune disorder (H)      Learning disability      Malignant neoplasm (H)      Migraine      MRSA (methicillin resistant staph aureus) culture positive      Neck injuries      Opioid type dependence (H)      Other chronic pain      PONV (postoperative nausea and vomiting)     states \"flatlines\"during anesthesia     RA (rheumatoid arthritis) (H)      Renal stone      Scoliosis      Stomach ulcer     history     Past Surgical History:   Procedure Laterality Date     ARTHRODESIS FOOT  5/23/2012    Procedure:ARTHRODESIS FOOT; Right Subtalar andTaloNavicular  Fusion  ; Surgeon:CHRIS ZHOU; [facility]     ARTHRODESIS FOOT Left 4/22/2015    Procedure: ARTHRODESIS FOOT;  Surgeon: Chris Zhou MD;  [facility]     ARTHROPLASTY ELBOW Right 9/30/2015    Procedure: ARTHROPLASTY ELBOW;  Surgeon: Carrol Gaspar MD;  [facility]     ARTHROPLASTY KNEE Right      ARTHROPLASTY REVISION ELBOW Right 11/27/2018    Procedure: 1 - aspiration of Right elbow 2- Explantation of failed hardware Right humeral periprosthetic fracture non-union 3- Right distal humerus excision 4- Right distal humerus replacement 5 - Revision Right total elbow arthroplasty;  Surgeon: Aakash Zimmer MD;  [facility]: [facility]     " ARTHROPLASTY WRIST      x2 Lt wrist replacement.     ARTHROTOMY ELBOW Right 6/18/2015    Procedure: ARTHROTOMY ELBOW;  Surgeon: Carrol Gaspar MD;  [facility]     C ANESTH,DX ARTHROSCOPIC PROC KNEE JOINT  10/24/2007    right total knee arthroplasty     C SHLDR ARTHROSCOP,DIAGNOSTIC  12/17/2008    left total shoulder arthroplasty by Dr. Thanh TRUONG SHOULDER SURG PROC UNLISTED       C TOTAL KNEE ARTHROPLASTY  1/18/12    Left     CYSTOSCOPY  02/06/2009    1.cystoscopy.2.bilateral ureteroscopy.3.basketing of stone fragments from the right kidney.4.bilateral double-J ureteral stent placement.5.ann catheter placement.6.fluoroscopy and intraoperative interpretation of images.     CYSTOSCOPY  01/08/2007    1. cystoscopy and left stent removal.2.left ureteroscopy     DECOMPRESSION CUBITAL TUNNEL  6/6/2014    Procedure: DECOMPRESSION CUBITAL TUNNEL;  Surgeon: Janelle Coates MD;  [facility]     ENDOSCOPY  03/31/2005    upper GI endoscopy-[facility]     ESOPHAGOSCOPY, GASTROSCOPY, DUODENOSCOPY (EGD), COMBINED  3/17/2014    Procedure: COMBINED ESOPHAGOSCOPY, GASTROSCOPY, DUODENOSCOPY (EGD), BIOPSY SINGLE OR MULTIPLE;;  Surgeon: Duane, William Charles, MD;  Location: [facility] OR     FUSION CERVICAL POSTERIOR ONE LEVEL  11/18/2013    Procedure: FUSION CERVICAL POSTERIOR ONE LEVEL;  Cervical 1-2 Posterior Cervical Fusion (Harms Procedure);  Surgeon: Carrol Gunn MD;  [facility]     GYN SURGERY       INJECT MAJOR JOINT / BURSA Left 1/5/2017    Procedure: INJECT MAJOR JOINT / BURSA;  Surgeon: Fredy Patel DO;  Location:  OR     INJECT STEROID (LOCATION) Left 6/18/2015    Procedure: INJECT STEROID (LOCATION);  Surgeon: Carorl Gaspar MD;  Location:  OR     NECK SURGERY       REMOVE FOREIGN BODY UPPER EXTREMITY Right 3/2/2017    Procedure: REMOVE FOREIGN BODY UPPER EXTREMITY;  Surgeon: Carrol Gaspar MD;  Location:  OR     RESECT BONE UPPER  EXTREMITY Left 4/27/2017    Procedure: RESECT BONE UPPER EXTREMITY;  Left Radial Head Resection;  Surgeon: Carrol Gaspar MD;  [facility]     ROTATOR CUFF REPAIR RT/LT  10/19/2005    1.left distal clavicle excision.2.acromioplasty.3.coracoacromial ligament resection.4.rotator cuff exploration     celecoxib (CELEBREX) 100 MG capsule  ClonazePAM (KLONOPIN PO)  ENBREL SURECLICK 50 MG/ML autoinjector  leflunomide (ARAVA) 20 MG tablet  azithromycin (ZITHROMAX) 250 MG tablet  Botulinum Toxin Type A (BOTOX) 200 UNITS SOLR  clindamycin (CLEOCIN) 300 MG capsule  omeprazole (PRILOSEC) 40 MG DR capsule  omeprazole (PRILOSEC) 40 MG DR capsule  OnabotulinumtoxinA (BOTOX IJ)  OnabotulinumtoxinA (BOTOX IJ)  senna-docusate (SENOKOT-S/PERICOLACE) 8.6-50 MG tablet      Allergies   Allergen Reactions     Morphine Swelling     Codeine      Gabapentin Other (See Comments)     Pins,needles, stabbing aching at once  Pins,needles, stabbing aching at once  Numbness and Tingling     Ibuprofen      Doesn't take due to gastric ulcer     Sulfa Drugs Nausea and Vomiting     unknown     Tylenol [Acetaminophen]      Pt. States that she has Liver problems  Tylenol 3     Erythromycin Nausea     Vomiting      Penicillins Rash     Prednisone Palpitations     Heart racing     Family History  Family History   Problem Relation Age of Onset     Diabetes Mother      Hypertension Mother      Allergies Mother      Alcohol/Drug Mother         LIVER CIRROSIS     Blood Disease Mother         B12 DEF     Neurologic Disorder Mother         Epilepsy     Osteoporosis Mother      Cerebrovascular Disease Maternal Grandmother      Arthritis Maternal Grandmother         RHEUMATOID     Cancer Maternal Grandmother      Thyroid Disease Maternal Grandmother      Osteoporosis Maternal Grandmother      Heart Disease Maternal Grandmother      Depression Maternal Grandmother      Neurologic Disorder Maternal Grandmother         MIGRAINES     Anxiety Disorder  Maternal Grandmother      Diabetes Maternal Grandmother      Migraines Maternal Grandmother      Deep Vein Thrombosis Maternal Grandmother      Cerebrovascular Disease Maternal Grandfather      Cancer Maternal Grandfather      Mental Illness Maternal Grandfather      Cerebrovascular Disease Paternal Grandmother      Arthritis Paternal Grandmother         RHEUMATOID     Cancer Paternal Grandmother      Osteoporosis Paternal Grandmother      Heart Disease Paternal Grandmother      Depression Paternal Grandmother      Neurologic Disorder Paternal Grandmother         MIGRAINES     Thyroid Disease Paternal Grandmother      Cerebrovascular Disease Paternal Grandfather      Cancer Paternal Grandfather      Heart Disease Brother         MURMUR     Asthma Son      Endocrine Disease Son      Neurologic Disorder Father         epilepsy     Seizure Disorder Father      Hypertension Father      Skin Cancer Father      Anxiety Disorder Father      Mental Illness Father      Hyperlipidemia Father      Kidney Disease Father      Neurologic Disorder Daughter         MIGRAINES     Arthritis Daughter         JR     Hypertension Son      LUNG DISEASE Brother      Cancer Brother         lung     Anxiety Disorder Son      Asthma Son      Hypertension Son      Migraines Son      Spine Problems Son      Mental Illness Brother      Migraines Brother      Cardiac Sudden Death Brother      Spine Problems Brother      Glaucoma No family hx of      Macular Degeneration No family hx of      Unknown/Adopted No family hx of      Anesthesia Reaction No family hx of      Other Cancer No family hx of      Rheumatoid Arthritis No family hx of      Bone Cancer No family hx of      Low Back Problems No family hx of      Social History   Social History     Tobacco Use     Smoking status: Former Smoker     Packs/day: 0.10     Years: 10.00     Pack years: 1.00     Types: Cigarettes     Start date: 8/6/1983     Smokeless tobacco: Never Used     Tobacco  comment: Quit 6 weeks ago   Substance Use Topics     Alcohol use: No     Drug use: No      Past medical history, past surgical history, medications, allergies, family history, and social history were reviewed with the patient. No additional pertinent items.       Review of Systems  A complete review of systems was performed with pertinent positives and negatives noted in the HPI, and all other systems negative.    Physical Exam   BP: (!) 136/95  Pulse: 116  Temp: 98.5  F (36.9  C)  Resp: 18  Weight: 78.5 kg (173 lb)  SpO2: 98 %  Physical Exam  Constitutional:       General: She is in acute distress (appears uncomfortable).      Appearance: She is not diaphoretic.   HENT:      Head: Atraumatic.      Mouth/Throat:      Pharynx: No oropharyngeal exudate.   Eyes:      General: No scleral icterus.     Pupils: Pupils are equal, round, and reactive to light.   Cardiovascular:      Heart sounds: Normal heart sounds.   Pulmonary:      Effort: No respiratory distress.      Breath sounds: Normal breath sounds.   Abdominal:      Palpations: Abdomen is soft.      Tenderness: There is no abdominal tenderness.   Musculoskeletal:         General: Tenderness present.        Arms:    Skin:     General: Skin is warm.      Findings: No rash.         ED Course      Procedures        EKG without acute ischemic change       Results for orders placed or performed during the hospital encounter of 07/05/21   XR Shoulder Right 2 Views     Status: None    Narrative    EXAM: XR SHOULDER 2 VIEW RIGHT  LOCATION: [facility]  DATE/TIME: 7/5/2021 5:54 AM    INDICATION: Right shoulder pain.  COMPARISON: None.      Impression    IMPRESSION: Degenerative change of the shoulder. No visible fracture or dislocation. Partial visualization of the humeral hardware and adjacent soft tissues screws.    CBC with platelets differential     Status: Abnormal   Result Value Ref Range    WBC 6.8 4.0 - 11.0 10e9/L    RBC Count 4.46 3.8 - 5.2 10e12/L     Hemoglobin 12.9 11.7 - 15.7 g/dL    Hematocrit 40.8 35.0 - 47.0 %    MCV 92 78 - 100 fl    MCH 28.9 26.5 - 33.0 pg    MCHC 31.6 31.5 - 36.5 g/dL    RDW 15.5 (H) 10.0 - 15.0 %    Platelet Count 201 150 - 450 10e9/L    Diff Method Automated Method     % Neutrophils 54.3 %    % Lymphocytes 30.8 %    % Monocytes 11.1 %    % Eosinophils 2.7 %    % Basophils 1.0 %    % Immature Granulocytes 0.1 %    Nucleated RBCs 0 0 /100    Absolute Neutrophil 3.7 1.6 - 8.3 10e9/L    Absolute Lymphocytes 2.1 0.8 - 5.3 10e9/L    Absolute Monocytes 0.8 0.0 - 1.3 10e9/L    Absolute Eosinophils 0.2 0.0 - 0.7 10e9/L    Absolute Basophils 0.1 0.0 - 0.2 10e9/L    Abs Immature Granulocytes 0.0 0 - 0.4 10e9/L    Absolute Nucleated RBC 0.0    Comprehensive metabolic panel     Status: Abnormal   Result Value Ref Range    Sodium 141 133 - 144 mmol/L    Potassium 3.6 3.4 - 5.3 mmol/L    Chloride 113 (H) 94 - 109 mmol/L    Carbon Dioxide 22 20 - 32 mmol/L    Anion Gap 6 3 - 14 mmol/L    Glucose 111 (H) 70 - 99 mg/dL    Urea Nitrogen 10 7 - 30 mg/dL    Creatinine 0.88 0.52 - 1.04 mg/dL    GFR Estimate 77 >60 mL/min/[1.73_m2]    GFR Estimate If Black 89 >60 mL/min/[1.73_m2]    Calcium 8.7 8.5 - 10.1 mg/dL    Bilirubin Total 0.4 0.2 - 1.3 mg/dL    Albumin 3.6 3.4 - 5.0 g/dL    Protein Total 8.1 6.8 - 8.8 g/dL    Alkaline Phosphatase 102 40 - 150 U/L    ALT 51 (H) 0 - 50 U/L    AST 39 0 - 45 U/L   CRP inflammation     Status: None   Result Value Ref Range    CRP Inflammation <2.9 0.0 - 8.0 mg/L   Erythrocyte sedimentation rate auto     Status: Abnormal   Result Value Ref Range    Sed Rate 38 (H) 0 - 20 mm/h   Troponin I     Status: None   Result Value Ref Range    Troponin I ES <0.015 0.000 - 0.045 ug/L   EKG 12 lead     Status: None   Result Value Ref Range    Interpretation ECG Click View Image link to view waveform and result      Medications   ondansetron (ZOFRAN) injection 8 mg (has no administration in time range)   oxyCODONE (ROXICODONE) tablet 5  mg (5 mg Oral Given 7/5/21 6965)   oxyCODONE (ROXICODONE) tablet 5 mg (5 mg Oral Given 7/5/21 6123)   LORazepam (ATIVAN) injection 0.5 mg (0.5 mg Intravenous Given 7/5/21 0629)        Assessments & Plan (with Medical Decision Making)   EKG did not show any acute ischemic change.  The cause of her symptoms are unclear.  There is nothing by history or exam to suggest infectious etiology as the cause.  I did do inflammatory markers given her underlying RA, and her CRP is nondetectable, ESR is lower than recent most recent values.  We did do a shoulder x-ray which does not show any acute pathology.  She continues to be very uncomfortable.  I did give her a single dose of oxycodone which helped to some degree though seem to get worse with manipulation for x-ray.  I did give her another dose.  She will appear anxious I did give her a small dose of Ativan both to see if that would help with her discomfort as well as her anxiety.  I did speak by phone with , neurologist on-call, and after lengthy discussion he did think it reasonable to MRI her C-spine as well as her brachial plexus to evaluate for any evidence of pathology.  We will also get full x-rays of her humerus and elbow to assess for any movement of the hardware that would suggest loosening.  The patient be signed out to the oncoming provider at shift change pending these imaging studies.  If negative, the patient can be discharged home with a plan to follow-up with orthopedics and neurology as an outpatient.  I did discuss this with the patient and she verbalized understanding.    Dictation Disclaimer: Some of this Note has been completed with voice-recognition dictation software. Although errors are generally corrected real-time, there is the potential for a rare error to be present in the completed chart.      I have reviewed the nursing notes. I have reviewed the findings, diagnosis, plan and need for follow up with the patient.    New Prescriptions     No medications on file       Final diagnoses:   Pain of right upper arm   Arm paresthesia, right       --  Suri Ruano  [facility] EMERGENCY DEPARTMENT  7/5/2021     Suri Ruano MD  07/05/21 0827

## 2021-07-07 ENCOUNTER — PATIENT OUTREACH (OUTPATIENT)
Dept: CARE COORDINATION | Facility: CLINIC | Age: 51
End: 2021-07-07

## 2021-07-07 ENCOUNTER — PATIENT OUTREACH (OUTPATIENT)
Dept: NURSING | Facility: CLINIC | Age: 51
End: 2021-07-07
Payer: COMMERCIAL

## 2021-07-07 DIAGNOSIS — G54.2 CERVICAL NERVE ROOT IMPINGEMENT: Primary | ICD-10-CM

## 2021-07-07 DIAGNOSIS — Z71.89 COUNSELING AND COORDINATION OF CARE: Primary | ICD-10-CM

## 2021-07-07 NOTE — PROGRESS NOTES
Clinic Care Coordination Contact  Community Health Worker Initial Outreach       CHW Additional Questions  If ED/Hospital discharge, follow-up appointment scheduled as recommended?: Yes  Medication changes made following ED/Hospital discharge?: N/A  MyChart active?: No    Patient accepts CC: No, Patient declined CCC. Patient wanted CHW to send a message to provider.. Patient will be sent Care Coordination introduction letter for future reference.     Yaneth MCCLAIN Community Health Worker  Clinic Care Coordination  [facility] Clinics : [place], [place] & [place]  Phone: [phone]    Reason for Referral: Care Transition: ED to outpatient       Health Maintenance Due   Topic Date Due   â¢ Influenza Vaccine (1) 08/01/2018       Patient is due for topics as listed above but declines Immunization(s) Influenza at this time. Education provided for Immunization(s) Influenza.

## 2021-07-07 NOTE — TELEPHONE ENCOUNTER
Reason for Call:  Other     Detailed comments: Patient was in the ER yesterday 07/06/21 and wanted PCP to know that ER doctor suggested she see a spine specialist. She also said she was seen at [facility] 07/05/21 and did not have a good experience.    Phone Number Patient can be reached at: Cell number on file:    Telephone Information:   Mobile [phone]       Best Time: Any    Can we leave a detailed message on this number? NO    Call taken on 7/7/2021 at 1:51 PM by Yaneth Post MA

## 2021-07-07 NOTE — PROGRESS NOTES
Clinic Care Coordination Contact  [facility]/Voicemail       Clinical Data: CHW Outreach  Outreach attempted x 1. Left message on patient's voicemail with call back information and requested return call.    Plan: CHW will try to reach patient again in 1-2 business days.    Yaneth MCCLAIN Community Health Worker  Clinic Care Coordination  [facility] Clin[facility] : [place], [place] & [place]  Phone: [phone]    Reason for Referral: Care Transition: ED to outpatient    Notes:  Seen in the ER 07/06/21  Any questions for CC RN

## 2021-07-07 NOTE — TELEPHONE ENCOUNTER
I have read the not from the ED and would not recommend consult to surgery at this time be rather to see pain management.  Order has been placed for pain management and non surgical orthopedics.

## 2021-07-07 NOTE — TELEPHONE ENCOUNTER
Patient notified of provider's message as written below. She was given the number below to schedule these appointments. Patient verbalized good understanding, had no further questions and needed no further support.Jaida Roman R.N.  Ortho -  Representative at: [phone]  Pain Management Referral -  Representative at: [phone]

## 2021-07-08 ENCOUNTER — TELEPHONE (OUTPATIENT)
Dept: FAMILY MEDICINE | Facility: CLINIC | Age: 51
End: 2021-07-08

## 2021-07-08 NOTE — TELEPHONE ENCOUNTER
Reason for Call:  Other call back    Detailed comments: REFERRAL GIVEN BY DR. CLARK CANNOT SEE PT FOR OVER A MONTH. [facility] [place], [phone], CAN SEE PT WITH A WEEK. THEY NEED REFERRAL FROM DR. BROWN. PT IS UNABLE TO SIT UP DUE TO PAIN AND FEELS CANNOT WAIT LONG TO SEE PAIN MGMT. PT ASKED TO BE NOTIFIED IF REFERRAL WAS SENT OR NOT.    Phone Number Patient can be reached at: Cell number on file:    Telephone Information:   Mobile [phone]       Best Time: ANYTIME     Can we leave a detailed message on this number? YES    Call taken on 7/8/2021 at 10:50 AM by Suellen Underwood

## 2021-07-09 NOTE — TELEPHONE ENCOUNTER
Faxed referral to [facility] @ [phone].Sharmin Gomez MA/MARIANNE    Called and informed patient that this was done.

## 2021-07-14 ENCOUNTER — OFFICE VISIT (OUTPATIENT)
Dept: FAMILY MEDICINE | Facility: CLINIC | Age: 51
End: 2021-07-14
Payer: COMMERCIAL

## 2021-07-14 VITALS
OXYGEN SATURATION: 97 % | HEIGHT: 66 IN | SYSTOLIC BLOOD PRESSURE: 120 MMHG | HEART RATE: 85 BPM | DIASTOLIC BLOOD PRESSURE: 83 MMHG | TEMPERATURE: 97.3 F | WEIGHT: 186 LBS | BODY MASS INDEX: 29.89 KG/M2

## 2021-07-14 DIAGNOSIS — R87.810 CERVICAL HIGH RISK HPV (HUMAN PAPILLOMAVIRUS) TEST POSITIVE: ICD-10-CM

## 2021-07-14 DIAGNOSIS — K21.9 GASTROESOPHAGEAL REFLUX DISEASE, UNSPECIFIED WHETHER ESOPHAGITIS PRESENT: ICD-10-CM

## 2021-07-14 DIAGNOSIS — M97.41XD: ICD-10-CM

## 2021-07-14 DIAGNOSIS — M54.12 CERVICAL RADICULOPATHY: ICD-10-CM

## 2021-07-14 DIAGNOSIS — Z13.220 SCREENING FOR HYPERLIPIDEMIA: Primary | ICD-10-CM

## 2021-07-14 DIAGNOSIS — Z12.31 ENCOUNTER FOR SCREENING MAMMOGRAM FOR BREAST CANCER: ICD-10-CM

## 2021-07-14 DIAGNOSIS — E03.9 HYPOTHYROIDISM, UNSPECIFIED TYPE: ICD-10-CM

## 2021-07-14 LAB — TSH SERPL DL<=0.005 MIU/L-ACNC: 1.61 MU/L (ref 0.4–4)

## 2021-07-14 PROCEDURE — 84443 ASSAY THYROID STIM HORMONE: CPT | Performed by: FAMILY MEDICINE

## 2021-07-14 PROCEDURE — 87624 HPV HI-RISK TYP POOLED RSLT: CPT | Performed by: FAMILY MEDICINE

## 2021-07-14 PROCEDURE — G0145 SCR C/V CYTO,THINLAYER,RESCR: HCPCS | Performed by: FAMILY MEDICINE

## 2021-07-14 PROCEDURE — 99396 PREV VISIT EST AGE 40-64: CPT | Performed by: FAMILY MEDICINE

## 2021-07-14 PROCEDURE — 36415 COLL VENOUS BLD VENIPUNCTURE: CPT | Performed by: FAMILY MEDICINE

## 2021-07-14 PROCEDURE — 99213 OFFICE O/P EST LOW 20 MIN: CPT | Mod: 25 | Performed by: FAMILY MEDICINE

## 2021-07-14 RX ORDER — OMEPRAZOLE 40 MG/1
40 CAPSULE, DELAYED RELEASE ORAL
Qty: 90 CAPSULE | Refills: 3 | Status: SHIPPED | OUTPATIENT
Start: 2021-07-14 | End: 2022-01-21

## 2021-07-14 RX ORDER — AMOXICILLIN 250 MG
1-2 CAPSULE ORAL 2 TIMES DAILY
Qty: 100 TABLET | Refills: 1 | Status: SHIPPED | OUTPATIENT
Start: 2021-07-14 | End: 2021-11-19

## 2021-07-14 RX ORDER — OXYCODONE HYDROCHLORIDE 5 MG/1
5 TABLET ORAL EVERY 6 HOURS PRN
Qty: 6 TABLET | Refills: 0 | Status: SHIPPED | OUTPATIENT
Start: 2021-07-14 | End: 2021-07-17

## 2021-07-14 ASSESSMENT — ENCOUNTER SYMPTOMS
EYE PAIN: 0
ARTHRALGIAS: 1
SORE THROAT: 0
COUGH: 0
CONSTIPATION: 0
NAUSEA: 1
DYSURIA: 0
JOINT SWELLING: 1
CHILLS: 0
FREQUENCY: 0
ABDOMINAL PAIN: 0
PALPITATIONS: 0
HEMATURIA: 0
FEVER: 0
HEMATOCHEZIA: 0
PARESTHESIAS: 0
DIARRHEA: 0
HEARTBURN: 1
NERVOUS/ANXIOUS: 0
WEAKNESS: 1
SHORTNESS OF BREATH: 1
MYALGIAS: 1
DIZZINESS: 1
HEADACHES: 1

## 2021-07-14 ASSESSMENT — MIFFLIN-ST. JEOR: SCORE: 1485.44

## 2021-07-14 ASSESSMENT — PAIN SCALES - GENERAL: PAINLEVEL: WORST PAIN (10)

## 2021-07-14 NOTE — PROGRESS NOTES
SUBJECTIVE:   CC: Kalyn Rivero is an 50 year old woman who presents for preventive health visit.        Patient has been advised of split billing requirements and indicates understanding: Yes  Healthy Habits:     Getting at least 3 servings of Calcium per day:  Yes    Bi-annual eye exam:  NO    Dental care twice a year:  NO    Sleep apnea or symptoms of sleep apnea:  Daytime drowsiness    Diet:  Regular (no restrictions) and Low salt    Frequency of exercise:  None    Taking medications regularly:  Yes    Medication side effects:  None    PHQ-2 Total Score: 0    Additional concerns today:  No               Today's PHQ-2 Score:   PHQ-2 ( 1999 Pf[employer]) 7/14/2021   Q1: Little interest or pleasure in doing things 0   Q2: Feeling down, depressed or hopeless 0   PHQ-2 Score 0   Q1: Little interest or pleasure in doing things Not at all   Q2: Feeling down, depressed or hopeless Not at all   PHQ-2 Score 0       Abuse: Current or Past (Physical, Sexual or Emotional) - No  Do you feel safe in your environment? Yes        Social History     Tobacco Use     Smoking status: Former Smoker     Packs/day: 0.10     Years: 10.00     Pack years: 1.00     Types: Cigarettes     Start date: 8/6/1983     Smokeless tobacco: Never Used     Tobacco comment: Quit 6 weeks ago   Substance Use Topics     Alcohol use: No     If you drink alcohol do you typically have >3 drinks per day or >7 drinks per week? No    Alcohol Use 7/14/2021   Prescreen: >3 drinks/day or >7 drinks/week? No       Reviewed orders with patient.  Reviewed health maintenance and updated orders accordingly - Yes  Reviewed care every where visit to Nelsy Huerta.  Patient has sever pain in right neck with a new increase in her C5-6 nerve root.  This may have been caused by attempting to do a MRI scan at [facility]    Breast Cancer Screening:    Breast CA Risk Assessment (FHS-7) 7/14/2021   Do you have a family history of breast, colon, or ovarian cancer? No  "/ Unknown       click delete button to remove this line now  Mammogram Screening: Recommended annual mammography  Pertinent mammograms are reviewed under the imaging tab.    History of abnormal Pap smear: yes  PAP / HPV Latest Ref Rng & Units 8/22/2019 2/22/2017 11/5/2013   PAP - OTHER-NIL, See Result NIL NIL   HPV 16 DNA NEG:Negative Negative Negative -   HPV 18 DNA NEG:Negative Negative Positive(A) -   OTHER HR HPV NEG:Negative Negative Positive(A) -     Reviewed and updated as needed this visit by clinical staff  Tobacco  Allergies  Meds   Med Hx  Surg Hx  Fam Hx  Soc Hx        Reviewed and updated as needed this visit by Provider                    Review of Systems   Constitutional: Negative for chills and fever.   HENT: Positive for ear pain. Negative for congestion, hearing loss and sore throat.    Eyes: Positive for visual disturbance. Negative for pain.   Respiratory: Positive for shortness of breath. Negative for cough.    Cardiovascular: Positive for peripheral edema. Negative for chest pain and palpitations.   Gastrointestinal: Positive for heartburn and nausea. Negative for abdominal pain, constipation, diarrhea and hematochezia.   Genitourinary: Positive for urgency. Negative for dysuria, frequency, genital sores and hematuria.   Musculoskeletal: Positive for arthralgias, joint swelling and myalgias.   Skin: Negative for rash.   Neurological: Positive for dizziness, weakness and headaches. Negative for paresthesias.   Psychiatric/Behavioral: Positive for mood changes. The patient is not nervous/anxious.           OBJECTIVE:   /83   Pulse 85   Temp 97.3  F (36.3  C) (Tympanic)   Ht 1.676 m (5' 6\")   Wt 84.4 kg (186 lb)   LMP  (LMP Unknown)   SpO2 97%   BMI 30.02 kg/m    Physical Exam  GENERAL: healthy, alert and marked distress with unable to sit comfortably  EYES: Eyes grossly normal to inspection, PERRL and conjunctivae and sclerae normal  HENT: ear canals and TM's normal, nose and " "mouth without ulcers or lesions  NECK: no adenopathy, no asymmetry, masses, or scars, thyroid normal to palpation marked tenderness posterior neck with decreased range of motion   RESP: lungs clear to auscultation - no rales, rhonchi or wheezes  BREAST: normal without masses, tenderness or nipple discharge and no palpable axillary masses or adenopathy  CV: regular rate and rhythm, normal S1 S2, no S3 or S4, no murmur, click or rub, no peripheral edema and peripheral pulses strong  ABDOMEN: soft, nontender, no hepatosplenomegaly, no masses and bowel sounds normal  MS: no gross musculoskeletal defects noted, no edema  SKIN: no suspicious lesions or rashes  NEURO: Normal strength and tone, mentation intact and speech normal  PSYCH: mentation appears normal, affect normal/bright  Pap preformed  Diagnostic Test Results:  Labs reviewed in Epic  Pap pending    ASSESSMENT/PLAN:       ICD-10-CM    1. Screening for hyperlipidemia  Z13.220    2. Cervical radiculopathy  M54.12 oxyCODONE (ROXICODONE) 5 MG tablet   3. Cervical high risk HPV (human papillomavirus) test positive  R87.810 Pap imaged thin layer screen with HPV - recommended age 30 - 65 years (select HPV order below)   4. Encounter for screening mammogram for breast cancer  Z12.31 *MA Screening Digital Bilateral   5. Hypothyroidism, unspecified type  E03.9 TSH WITH FREE T4 REFLEX       Patient has been advised of split billing requirements and indicates understanding: Yes  COUNSELING:  Reviewed preventive health counseling, as reflected in patient instructions       Regular exercise       Healthy diet/nutrition    Estimated body mass index is 30.02 kg/m  as calculated from the following:    Height as of this encounter: 1.676 m (5' 6\").    Weight as of this encounter: 84.4 kg (186 lb).    Weight management plan: less calories    She reports that she has quit smoking. Her smoking use included cigarettes. She started smoking about 37 years ago. She has a 1.00 pack-year " smoking history. She has never used smokeless tobacco.      Counseling Resources:  ATP IV Guidelines  Pooled Cohorts Equation Calculator  Breast Cancer Risk Calculator  BRCA-Related Cancer Risk Assessment: FHS-7 Tool  FRAX Risk Assessment  ICSI Preventive Guidelines  Dietary Guidelines for Americans, 2010  USDA's MyPlate  ASA Prophylaxis  Lung CA Screening    Mika Zamora MD  [facility]

## 2021-07-14 NOTE — NURSING NOTE
"Chief Complaint   Patient presents with     Physical       Initial /83   Pulse 85   Temp 97.3  F (36.3  C) (Tympanic)   Ht 1.676 m (5' 6\")   Wt 84.4 kg (186 lb)   LMP  (LMP Unknown)   SpO2 97%   BMI 30.02 kg/m   Estimated body mass index is 30.02 kg/m  as calculated from the following:    Height as of this encounter: 1.676 m (5' 6\").    Weight as of this encounter: 84.4 kg (186 lb).  Medication Reconciliation: complete  Kalyn Thakkar, LEIDY    "

## 2021-07-16 ENCOUNTER — TRANSFERRED RECORDS (OUTPATIENT)
Dept: HEALTH INFORMATION MANAGEMENT | Facility: CLINIC | Age: 51
End: 2021-07-16

## 2021-07-18 ENCOUNTER — TRANSFERRED RECORDS (OUTPATIENT)
Dept: HEALTH INFORMATION MANAGEMENT | Facility: CLINIC | Age: 51
End: 2021-07-18

## 2021-07-19 LAB
BKR LAB AP GYN ADEQUACY: NORMAL
BKR LAB AP GYN INTERPRETATION: NORMAL
BKR LAB AP HPV REFLEX: NORMAL
BKR LAB AP PREVIOUS ABNORMAL: NORMAL
PATH REPORT.COMMENTS IMP SPEC: NORMAL
PATH REPORT.RELEVANT HX SPEC: NORMAL

## 2021-07-22 LAB
HUMAN PAPILLOMA VIRUS 16 DNA: NEGATIVE
HUMAN PAPILLOMA VIRUS 18 DNA: NEGATIVE
HUMAN PAPILLOMA VIRUS FINAL DIAGNOSIS: NORMAL
HUMAN PAPILLOMA VIRUS OTHER HR: POSITIVE

## 2021-07-26 ENCOUNTER — PATIENT OUTREACH (OUTPATIENT)
Dept: FAMILY MEDICINE | Facility: CLINIC | Age: 51
End: 2021-07-26

## 2021-07-30 ENCOUNTER — OFFICE VISIT (OUTPATIENT)
Dept: NEUROSURGERY | Facility: CLINIC | Age: 51
End: 2021-07-30
Payer: COMMERCIAL

## 2021-07-30 VITALS
DIASTOLIC BLOOD PRESSURE: 74 MMHG | WEIGHT: 184 LBS | HEART RATE: 76 BPM | BODY MASS INDEX: 29.7 KG/M2 | SYSTOLIC BLOOD PRESSURE: 117 MMHG

## 2021-07-30 DIAGNOSIS — M54.2 NECK PAIN: Primary | ICD-10-CM

## 2021-07-30 DIAGNOSIS — Z96.629: ICD-10-CM

## 2021-07-30 PROCEDURE — 99203 OFFICE O/P NEW LOW 30 MIN: CPT | Performed by: PHYSICIAN ASSISTANT

## 2021-07-30 RX ORDER — OXYCODONE HYDROCHLORIDE 5 MG/1
5 TABLET ORAL 3 TIMES DAILY PRN
COMMUNITY
Start: 2021-07-26 | End: 2021-08-24

## 2021-07-30 ASSESSMENT — PAIN SCALES - GENERAL: PAINLEVEL: SEVERE PAIN (7)

## 2021-07-30 NOTE — PROGRESS NOTES
Neurosurgery Consult    HPI    Ms. Rivero is a 50-year-old female presents to clinic for evaluation of right-sided neck pain and arm pain.  She has a history of multiple surgeries on her right arm with a humerus and elbow prosthesis after multiple revision surgeries and traumatic failures of her hardware.  She has an essentially nonfunctioning right arm according to her and was told that she isleft arm around much.  She can move her right hand.  She states that she went to the emergency department in the beginning of July as she was having bandlike pain in her right upper arm.  She states when she was sent for an imaging she was handled very roughly by the technicians may move her arm in ways that she told him not to and this significantly exacerbated her symptoms and now she is having right-sided neck pain.  She subsequently had a cervical CT scan which showed right-sided foraminal stenosis at C5-6.  This appears to have worsened since her CT scan in 2018.  She recently was at [facility] orthopedics where she had a cervical MRI is awaiting those results.  Today she states that symptoms now are present for about 3 weeks and are fairly stable.  She reports numbness in her right hand.  She reports pain starting in the right side of her neck and radiating down her right arm.    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 Carrol Gunn.     Medical history  Chronic pain  Opioid dependence  Had headaches  History of multiple right arm surgeries as well as ulnar nerve transposition  Humerus and elbow prosthesis    Social history  Her son is her PCA and helps take care of her      B/P: 117/74, T: Data Unavailable, P: 76, R: Data Unavailable       Exam    Alert and oriented no acute distress, visibly uncomfortable with movement in her right arm.  She is able to move her left arm normally, stands and walks independently, and keeps her right arm which has multiple scars on it close to  her body.  She is able to move the right hand normally.      Imaging    Cervical CT scan demonstrates no fractures.  It does show right-sided C5-6 foraminal stenosis.  Right arm and humerus x-ray demonstrates possible loosening of the distal humeral prosthesis and some floating screws that appear to have been present since 2018 from prior surgeries.    Assessment    Right-sided neck pain, right-sided C5-6 foraminal stenosis  Right arm pain  History of multiple right arm surgeries    Plan:      I have discussed with the patient various things that we could try including cervical traction however due to her prior C1-2 fusion I would recommend she avoid this at this time.  Also an EMG to further evaluate her right arm symptoms likely with be difficult to interpret given the prior surgeries that she has had.  Given that she has a pending MRI at this time at [facility] orthopedics I recommend she follow-up with them for the results of that imaging, we will also try and obtain those images when they are available.  She indicates at this time she will just give this more time and follow-up in 3 to 6 weeks if she is not improving.    Total time of 30 minutes spent with the patient today in counseling and coordination of care.

## 2021-07-30 NOTE — LETTER
7/30/2021         RE: Kalyn Rivero  [address] [facility] [address]        Dear Colleague,    Thank you for referring your patient, Kalyn Rivero, to the [facility] [place]. Please see a copy of my visit note below.    Neurosurgery Consult    HPI    Ms. Rivero is a 50-year-old female presents to clinic for evaluation of right-sided neck pain and arm pain.  She has a history of multiple surgeries on her right arm with a humerus and elbow prosthesis after multiple revision surgeries and traumatic failures of her hardware.  She has an essentially nonfunctioning right arm according to her and was told that she isleft arm around much.  She can move her right hand.  She states that she went to the emergency department in the beginning of July as she was having bandlike pain in her right upper arm.  She states when she was sent for an imaging she was handled very roughly by the technicians may move her arm in ways that she told him not to and this significantly exacerbated her symptoms and now she is having right-sided neck pain.  She subsequently had a cervical CT scan which showed right-sided foraminal stenosis at C5-6.  This appears to have worsened since her CT scan in 2018.  She recently was at [facility] orthopedics where she had a cervical MRI is awaiting those results.  Today she states that symptoms now are present for about 3 weeks and are fairly stable.  She reports numbness in her right hand.  She reports pain starting in the right side of her neck and radiating down her right arm.    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 Carrol Gunn.     Medical history  Chronic pain  Opioid dependence  Had headaches  History of multiple right arm surgeries as well as ulnar nerve transposition  Humerus and elbow prosthesis    Social history  Her son is her PCA and helps take care of her      B/P: 117/74, T: Data  Unavailable, P: 76, R: Data Unavailable       Exam    Alert and oriented no acute distress, visibly uncomfortable with movement in her right arm.  She is able to move her left arm normally, stands and walks independently, and keeps her right arm which has multiple scars on it close to her body.  She is able to move the right hand normally.      Imaging    Cervical CT scan demonstrates no fractures.  It does show right-sided C5-6 foraminal stenosis.  Right arm and humerus x-ray demonstrates possible loosening of the distal humeral prosthesis and some floating screws that appear to have been present since 2018 from prior surgeries.    Assessment    Right-sided neck pain, right-sided C5-6 foraminal stenosis  Right arm pain  History of multiple right arm surgeries    Plan:      I have discussed with the patient various things that we could try including cervical traction however due to her prior C1-2 fusion I would recommend he avoid this at this time.  Also an EMG to further evaluate her right arm symptoms likely with be difficult to interpret given the prior surgeries that she has had.  Given that she has a pending MRI at this time at [facility] orthopedics I recommend she follow-up with them for the results of that imaging, we will also try and obtain those images when they are available.  She indicates at this time we will just do this more time and follow-up in 3 to 6 weeks if she is not improving.    Total time of 30 minutes spent with the patient today in counseling and coordination of care.      Again, thank you for allowing me to participate in the care of your patient.        Sincerely,        Donell Christina PA-C

## 2021-08-03 ENCOUNTER — TELEPHONE (OUTPATIENT)
Dept: FAMILY MEDICINE | Facility: CLINIC | Age: 51
End: 2021-08-03
Payer: COMMERCIAL

## 2021-08-03 NOTE — TELEPHONE ENCOUNTER
[facility] calling with update.   Most recent visit with Dr. Schmidt Will 7/26/21.   Pt wants pain clinic to address all pain, not just her neck pain.   Next scheduled visit samantha be Aug 24th.         Stas Jaimes RN   [facility]

## 2021-08-09 ENCOUNTER — TRANSFERRED RECORDS (OUTPATIENT)
Dept: HEALTH INFORMATION MANAGEMENT | Facility: CLINIC | Age: 51
End: 2021-08-09

## 2021-08-09 LAB
ALT SERPL-CCNC: 36 IU/L (ref 5–35)
AST SERPL-CCNC: 37 U/L (ref 5–34)
CREATININE (EXTERNAL): 0.86 MG/DL (ref 0.5–1.3)

## 2021-08-24 ENCOUNTER — TRANSFERRED RECORDS (OUTPATIENT)
Dept: HEALTH INFORMATION MANAGEMENT | Facility: CLINIC | Age: 51
End: 2021-08-24

## 2021-09-21 ENCOUNTER — TRANSFERRED RECORDS (OUTPATIENT)
Dept: HEALTH INFORMATION MANAGEMENT | Facility: CLINIC | Age: 51
End: 2021-09-21

## 2021-09-22 ENCOUNTER — OFFICE VISIT (OUTPATIENT)
Dept: FAMILY MEDICINE | Facility: CLINIC | Age: 51
End: 2021-09-22
Payer: COMMERCIAL

## 2021-09-22 VITALS
OXYGEN SATURATION: 95 % | HEART RATE: 96 BPM | WEIGHT: 186.2 LBS | TEMPERATURE: 97.8 F | BODY MASS INDEX: 30.05 KG/M2 | SYSTOLIC BLOOD PRESSURE: 110 MMHG | DIASTOLIC BLOOD PRESSURE: 85 MMHG

## 2021-09-22 DIAGNOSIS — M05.9 RHEUMATOID ARTHRITIS WITH POSITIVE RHEUMATOID FACTOR, INVOLVING UNSPECIFIED SITE (H): ICD-10-CM

## 2021-09-22 DIAGNOSIS — E66.811 CLASS 1 OBESITY DUE TO EXCESS CALORIES WITH SERIOUS COMORBIDITY AND BODY MASS INDEX (BMI) OF 30.0 TO 30.9 IN ADULT: Primary | ICD-10-CM

## 2021-09-22 DIAGNOSIS — E66.09 CLASS 1 OBESITY DUE TO EXCESS CALORIES WITH SERIOUS COMORBIDITY AND BODY MASS INDEX (BMI) OF 30.0 TO 30.9 IN ADULT: Primary | ICD-10-CM

## 2021-09-22 PROCEDURE — 99213 OFFICE O/P EST LOW 20 MIN: CPT | Performed by: FAMILY MEDICINE

## 2021-09-22 RX ORDER — CELECOXIB 200 MG/1
200 CAPSULE ORAL 3 TIMES DAILY
COMMUNITY

## 2021-09-22 RX ORDER — OXYCODONE HYDROCHLORIDE 5 MG/1
TABLET ORAL
COMMUNITY
Start: 2021-08-24 | End: 2023-07-19

## 2021-09-22 NOTE — PROGRESS NOTES
SUBJECTIVE:  51 year old.The patient has a complaint of obesity.  This started years ago. Associated symptoms RA .  Brought on by over eating.   Reviewed health maintenance  Patient Active Problem List   Diagnosis     Hypothyroidism     Acetaminophen overdose     Hepatic failure (H)     Pancreatitis     Anemia     Chronic pain     Pneumonia     Suicide risk     Grief reaction     Liver failure, acute     CARDIOVASCULAR SCREENING; LDL GOAL LESS THAN 160     Renal stone     Health Care Home     TOTAL KNEE ARTHROPLASTY - bilateral     Knee joint replacement - left     Trochanteric bursitis - left     Atlantoaxial instability     Postoperative pain     S/P cervical spinal fusion     Drug-seeking behavior     Opioid type dependence (H)     Severe frontal headaches     Abdominal pain, unspecified abdominal location     Esophageal reflux     Right upper extremity numbness     Right arm numbness     Lesion of ulnar nerve     Arthralgia of temporomandibular joint     Intractable chronic migraine without aura     Encounter for long-term opiate analgesic use     Rheumatoid arthritis of foot (H)     Other drug-induced neutropenia (H)     Rheumatoid arthritis with positive rheumatoid factor, involving unspecified site (H)     Cervical high risk HPV (human papillomavirus) test positive     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)     Chronic tension-type headache     Fibromyositis     Insomnia     Migraine     Myofascial pain     Periprosthetic fracture around internal prosthetic right elbow joint     Status post surgery     Past Medical History:   Diagnosis Date     Acetaminophen overdose 3/24/2011     Anemia      Anesthesia complication     pt states has a history of heart stopping during anesthesia,     Arrhythmia      Cerebral infarction (H)      Cervical high risk HPV (human papillomavirus) test positive 02/22/2017    type 18 & other HR HPV     Chronic infection     MRSA     Chronic pain 3/24/2011  "    Degenerative joint disease      Endometriosis      Fibromyalgia      Gastro-oesophageal reflux disease      Heart murmur      History of blood transfusion      Hypothyroidism      Immune disorder (H)      Learning disability      Malignant neoplasm (H)      Migraine      MRSA (methicillin resistant staph aureus) culture positive      Neck injuries      Opioid type dependence (H)      Other chronic pain      PONV (postoperative nausea and vomiting)     states \"flatlines\"during anesthesia     RA (rheumatoid arthritis) (H)      Renal stone      Scoliosis      Stomach ulcer     history       OBJECTIVE:  no apparent distress  BP (!) 132/92   Pulse 96   Temp 97.8  F (36.6  C) (Oral)   Wt 84.5 kg (186 lb 3.2 oz)   LMP  (LMP Unknown)   SpO2 95%   BMI 30.05 kg/m      LUNGS:  CTA B/L, no wheezing or crackles.   Cardiovascular: negative, PMI normal. No lifts, heaves, or thrills. RRR. No murmurs, clicks gallops or rub   Gastrointestinal: Abdomen soft, non-tender. BS normal. No masses, organomegaly       ICD-10-CM    1. Class 1 obesity due to excess calories with serious comorbidity and body mass index (BMI) of 30.0 to 30.9 in adult  E66.09     Z68.30     PLAN: see form from [facility]  Over  half of theTotal time 15 minutes spent in cordination care. Discussed diagnoses, prognoses and treatment.       "

## 2021-10-19 ENCOUNTER — TRANSFERRED RECORDS (OUTPATIENT)
Dept: HEALTH INFORMATION MANAGEMENT | Facility: CLINIC | Age: 51
End: 2021-10-19
Payer: COMMERCIAL

## 2021-11-06 ENCOUNTER — APPOINTMENT (OUTPATIENT)
Dept: CT IMAGING | Facility: CLINIC | Age: 51
End: 2021-11-06
Attending: EMERGENCY MEDICINE
Payer: COMMERCIAL

## 2021-11-06 ENCOUNTER — HOSPITAL ENCOUNTER (EMERGENCY)
Facility: CLINIC | Age: 51
Discharge: HOME | End: 2021-11-06
Attending: EMERGENCY MEDICINE | Admitting: EMERGENCY MEDICINE
Payer: COMMERCIAL

## 2021-11-06 VITALS
DIASTOLIC BLOOD PRESSURE: 107 MMHG | HEART RATE: 95 BPM | OXYGEN SATURATION: 97 % | SYSTOLIC BLOOD PRESSURE: 147 MMHG | RESPIRATION RATE: 18 BRPM | TEMPERATURE: 97.6 F | HEIGHT: 66 IN | BODY MASS INDEX: 29.89 KG/M2 | WEIGHT: 186 LBS

## 2021-11-06 DIAGNOSIS — R30.0 DYSURIA: ICD-10-CM

## 2021-11-06 LAB
ALBUMIN UR-MCNC: NEGATIVE MG/DL
ANION GAP SERPL CALCULATED.3IONS-SCNC: 6 MMOL/L (ref 3–14)
APPEARANCE UR: ABNORMAL
BASOPHILS # BLD AUTO: 0.1 10E3/UL (ref 0–0.2)
BASOPHILS NFR BLD AUTO: 1 %
BILIRUB UR QL STRIP: NEGATIVE
BUN SERPL-MCNC: 22 MG/DL (ref 7–30)
CALCIUM SERPL-MCNC: 9 MG/DL (ref 8.5–10.1)
CHLORIDE BLD-SCNC: 110 MMOL/L (ref 94–109)
CO2 SERPL-SCNC: 22 MMOL/L (ref 20–32)
COLOR UR AUTO: ABNORMAL
CREAT SERPL-MCNC: 1.06 MG/DL (ref 0.52–1.04)
EOSINOPHIL # BLD AUTO: 0.4 10E3/UL (ref 0–0.7)
EOSINOPHIL NFR BLD AUTO: 6 %
ERYTHROCYTE [DISTWIDTH] IN BLOOD BY AUTOMATED COUNT: 14.7 % (ref 10–15)
GFR SERPL CREATININE-BSD FRML MDRD: 61 ML/MIN/1.73M2
GLUCOSE BLD-MCNC: 106 MG/DL (ref 70–99)
GLUCOSE UR STRIP-MCNC: NEGATIVE MG/DL
HCT VFR BLD AUTO: 39.2 % (ref 35–47)
HGB BLD-MCNC: 12.2 G/DL (ref 11.7–15.7)
HGB UR QL STRIP: NEGATIVE
IMM GRANULOCYTES # BLD: 0 10E3/UL
IMM GRANULOCYTES NFR BLD: 0 %
KETONES UR STRIP-MCNC: NEGATIVE MG/DL
LEUKOCYTE ESTERASE UR QL STRIP: ABNORMAL
LYMPHOCYTES # BLD AUTO: 2 10E3/UL (ref 0.8–5.3)
LYMPHOCYTES NFR BLD AUTO: 31 %
MCH RBC QN AUTO: 27.4 PG (ref 26.5–33)
MCHC RBC AUTO-ENTMCNC: 31.1 G/DL (ref 31.5–36.5)
MCV RBC AUTO: 88 FL (ref 78–100)
MONOCYTES # BLD AUTO: 0.9 10E3/UL (ref 0–1.3)
MONOCYTES NFR BLD AUTO: 13 %
MUCOUS THREADS #/AREA URNS LPF: PRESENT /LPF
NEUTROPHILS # BLD AUTO: 3.1 10E3/UL (ref 1.6–8.3)
NEUTROPHILS NFR BLD AUTO: 49 %
NITRATE UR QL: NEGATIVE
NRBC # BLD AUTO: 0 10E3/UL
NRBC BLD AUTO-RTO: 0 /100
PH UR STRIP: 5.5 [PH] (ref 5–7)
PLATELET # BLD AUTO: 171 10E3/UL (ref 150–450)
POTASSIUM BLD-SCNC: 3.9 MMOL/L (ref 3.4–5.3)
RBC # BLD AUTO: 4.45 10E6/UL (ref 3.8–5.2)
RBC URINE: 1 /HPF
SODIUM SERPL-SCNC: 138 MMOL/L (ref 133–144)
SP GR UR STRIP: 1.03 (ref 1–1.03)
SQUAMOUS EPITHELIAL: 17 /HPF
UROBILINOGEN UR STRIP-MCNC: NORMAL MG/DL
WBC # BLD AUTO: 6.5 10E3/UL (ref 4–11)
WBC URINE: 14 /HPF

## 2021-11-06 PROCEDURE — 36415 COLL VENOUS BLD VENIPUNCTURE: CPT | Performed by: EMERGENCY MEDICINE

## 2021-11-06 PROCEDURE — 81001 URINALYSIS AUTO W/SCOPE: CPT | Performed by: EMERGENCY MEDICINE

## 2021-11-06 PROCEDURE — 99283 EMERGENCY DEPT VISIT LOW MDM: CPT

## 2021-11-06 PROCEDURE — 74176 CT ABD & PELVIS W/O CONTRAST: CPT

## 2021-11-06 PROCEDURE — 85025 COMPLETE CBC W/AUTO DIFF WBC: CPT | Performed by: EMERGENCY MEDICINE

## 2021-11-06 PROCEDURE — 80048 BASIC METABOLIC PNL TOTAL CA: CPT | Performed by: EMERGENCY MEDICINE

## 2021-11-06 PROCEDURE — 87086 URINE CULTURE/COLONY COUNT: CPT | Performed by: EMERGENCY MEDICINE

## 2021-11-06 RX ORDER — NITROFURANTOIN 25; 75 MG/1; MG/1
100 CAPSULE ORAL 2 TIMES DAILY
Qty: 10 CAPSULE | Refills: 0 | Status: SHIPPED | OUTPATIENT
Start: 2021-11-06 | End: 2021-11-11

## 2021-11-06 ASSESSMENT — ENCOUNTER SYMPTOMS
VOMITING: 0
COUGH: 0
SHORTNESS OF BREATH: 0
DIFFICULTY URINATING: 1
NAUSEA: 0
FEVER: 0
ABDOMINAL PAIN: 1

## 2021-11-06 ASSESSMENT — MIFFLIN-ST. JEOR: SCORE: 1480.44

## 2021-11-06 NOTE — ED PROVIDER NOTES
History   Chief Complaint:  Dysuria       History is provided by the patient.    HPI   Kalyn Rivero is a 51 year old female with history of endometriosis, malignant neoplasm, renal stone, hepatic failure, pancreatitis, and lactic acidosis who presents with urinary symptoms and RLQ abdominal pain. She says that around a month ago she was having similar symptoms to now where she was having difficulty urinating. She was seen at an urgent care where lab work was negative for an infection and she was put on antibiotics until her cultures came back negative as well. She presents today after she began having difficulty urinating since around 1000 today. On evaluation, she is now also complaining of a RLQ abdominal pain.    Review of Systems   Constitutional: Negative for fever.   Respiratory: Negative for cough and shortness of breath.    Cardiovascular: Negative for chest pain.   Gastrointestinal: Positive for abdominal pain (RLQ). Negative for nausea and vomiting.   Genitourinary: Positive for difficulty urinating.   All other systems reviewed and are negative.      Allergies:  Morphine  Codeine  Gabapentin  Ibuprofen  Sulfa Drugs  Tylenol  Erythromycin  Penicillins  Prednisone    Medications:  Celebrex  Klonopin  Arava  Prilosec  Botox  Roxicodone  Senokot-S    Past Medical History:     Acetaminophen overdose  Anemia  Anesthesia complication  Arrhythmia  Cerebral infarction  Chronic infection  DJD  Endometriosis  Fibromyalgia  GERD  Heart murmur  Hypothyroidism  Immune disorder  Learning disability  Malignant neoplasm  Migraine  MRSA  Neck injuries  Opioid dependence  PONV  Rheumatoid arthritis  Renal stone  Scoliosis  Stomach ulcer  Hepatic failure  Pancreatitis  Pneumonia  Suicide risk  Trochanteric bursitis  Atlantoaxial instability  Lesion of ulnar nerve  Fibromatosis  Insomnia  Periprosthetic fracture around internal prosthetic right elbow joint  Acute toxic encephalopathy  Metabolic acidosis  Lactic  "acidosis  Leukocytosis  Thrombocytosis  Hypoglycemia  Myofascial pain    Past Surgical History:    Arthrodesis foot  Arthroplasty elbow  Arthroplasty knee  Arthroplasty wrist  Arthrotomy elbow  Left shoulder arthroplasty  Cystoscopy   Endoscopy  EGD combined  Rotator cuff repair  Neck surgery   section  Pelvic laparoscopy    Family History:    Diabetes  Hypertension  Allergies  Liver cirrhosis  B12 deficiency  Seizures  Osteoporosis  Cerebrovascular disease  Rheumatoid arthritis  Thyroid cancer  Thyroid disease  Heart disease  Depression  Migraines  Anxiety  DVT  Mental illness  Asthma  Endocrine disease  Skin cancer  Hyperlipidemia  Lung cancer  Cardiac sudden death  Spinal problems    Social History:  Patient came from home.  Patient is accompanied in the ED.    Physical Exam     Patient Vitals for the past 24 hrs:   BP Temp Pulse Resp SpO2 Height Weight   21 0345 -- -- -- -- 95 % -- --   21 0330 (!) 153/142 -- 90 -- 95 % -- --   21 0230 (!) 108/90 -- 80 -- 94 % -- --   21 0136 (!) 152/94 97.6  F (36.4  C) 94 18 98 % 1.676 m (5' 6\") 84.4 kg (186 lb)       Physical Exam  General: Appears well-developed and well-nourished.   Head: No signs of trauma.   CV: Normal rate and regular rhythm.    Resp: Effort normal and breath sounds normal. No respiratory distress.   GI: Soft. There is no tenderness.  No rebound or guarding.  Normal bowel sounds.  No CVA tenderness.  MSK: Normal range of motion.   Neuro: The patient is alert and oriented. Speech normal.  Skin: Skin is warm and dry. No rash noted.   Psych: normal mood and affect. behavior is normal.       Emergency Department Course     Imaging:    CT Abdomen Pelvis w/o Contrast  1.  No hydronephrosis or calcified, obstructing urinary tract stone.  2.  Normal appendix.  3.  Cholelithiasis.  4.  Moderate hiatal hernia.  Reading per radiology    Laboratory:    CBC: WBC 6.5, HGB 12.2,      BMP: Chloride 110 (H), Glucose 106 (H), o/w WNL " (Creatinine 1.06 (H))     UA with microscopic: Appearance Slightly Cloudy (A), Leukocyte Esterase Moderate (A), Mucus Present (A), WBC 14 (H), Squamous Epithelials 17 (H), o/w WNL     Urine Culture Aerobic Bacterial: Pending    Emergency Department Course:    Reviewed:  I reviewed nursing notes, vitals, past medical history and Care Everywhere    Assessments:  0300 I obtained history and examined the patient as noted above.   0450 I rechecked the patient and explained findings.     Disposition:  The patient was discharged to home.     Impression & Plan     CMS Diagnoses: None    Medical Decision Making:  Kalyn Rivero is a 51-year-old woman who presents due to pain and difficulty urinating.  She states that she feels like she was having a frequent urge to urinate but then felt like she was not urinating since this morning.  She also reports having some dysuria.  She had similar symptoms 2 weeks ago that apparently got better with a course of antibiotics.  UA was obtained that did show some white blood cells, but also had a number of epithelial cells.  Patient did have some pain to the right flank area and a history of kidney stones so I did obtain a CT scan.  This did not show any signs of kidney stones.  There was some cholelithiasis but the patient is not having symptoms consistent with biliary colic.  We did send a urine culture but given her history I felt it was reasonable to start the patient on an antibiotic.  Patient's creatinine did not significantly elevated and she did not she was having urinary retention.  Recommended pushing plenty of fluids and follow-up in clinic.    Diagnosis:    ICD-10-CM    1. Dysuria  R30.0        Discharge Medications:  New Prescriptions    NITROFURANTOIN MACROCRYSTAL-MONOHYDRATE (MACROBID) 100 MG CAPSULE    Take 1 capsule (100 mg) by mouth 2 times daily for 5 days       Scribe Disclosure:  Paulo HERNÁNDEZ, am serving as a scribe at 2:37 AM on 11/6/2021 to document services  personally performed by Fredy Cuba MD based on my observations and the provider's statements to me.                Fredy Cuba MD  11/06/21 0646

## 2021-11-07 LAB — BACTERIA UR CULT: NORMAL

## 2021-11-09 ENCOUNTER — NURSE TRIAGE (OUTPATIENT)
Dept: FAMILY MEDICINE | Facility: CLINIC | Age: 51
End: 2021-11-09
Payer: COMMERCIAL

## 2021-11-09 DIAGNOSIS — R30.0 DYSURIA: Primary | ICD-10-CM

## 2021-11-09 NOTE — TELEPHONE ENCOUNTER
Reason for Call:  Same Day Appointment, Requested Provider:  Johnnie Barreto    PCP: Mika Zamora    Reason for visit: ER F/U     Duration of symptoms: bladder infection    Have you been treated for this in the past? Yes    Additional comments: Patient states her son see's Dr. Barreto and the ER states her blood work showed  Some concerns as well as her Bladder infection that hasn't gone away after second dose .. Patient states she's allergic to a lot of medication.. Patient is hoping to get in Thursday or Friday this week    Can we leave a detailed message on this number? YES    Phone number patient can be reached at: Other phone number:  [phone]    Best Time: anytime     Call taken on 11/9/2021 at 3:55 PM by Elaine Cagle

## 2021-11-10 ENCOUNTER — HOSPITAL ENCOUNTER (EMERGENCY)
Facility: CLINIC | Age: 51
Discharge: HOME | End: 2021-11-11
Attending: EMERGENCY MEDICINE | Admitting: EMERGENCY MEDICINE
Payer: COMMERCIAL

## 2021-11-10 DIAGNOSIS — K80.20 CALCULUS OF GALLBLADDER WITHOUT CHOLECYSTITIS WITHOUT OBSTRUCTION: ICD-10-CM

## 2021-11-10 DIAGNOSIS — R30.0 DYSURIA: ICD-10-CM

## 2021-11-10 DIAGNOSIS — L73.9 FOLLICULITIS: ICD-10-CM

## 2021-11-10 DIAGNOSIS — N17.9 ACUTE KIDNEY INJURY (H): ICD-10-CM

## 2021-11-10 LAB
ALBUMIN UR-MCNC: 10 MG/DL
APPEARANCE UR: CLEAR
BILIRUB UR QL STRIP: NEGATIVE
COLOR UR AUTO: YELLOW
GLUCOSE UR STRIP-MCNC: NEGATIVE MG/DL
HGB UR QL STRIP: NEGATIVE
KETONES UR STRIP-MCNC: 10 MG/DL
LEUKOCYTE ESTERASE UR QL STRIP: NEGATIVE
MUCOUS THREADS #/AREA URNS LPF: PRESENT /LPF
NITRATE UR QL: NEGATIVE
PH UR STRIP: 6 [PH] (ref 5–7)
RBC URINE: <1 /HPF
SP GR UR STRIP: 1.03 (ref 1–1.03)
SQUAMOUS EPITHELIAL: 3 /HPF
UROBILINOGEN UR STRIP-MCNC: 2 MG/DL
WBC URINE: <1 /HPF

## 2021-11-10 PROCEDURE — 81001 URINALYSIS AUTO W/SCOPE: CPT | Performed by: EMERGENCY MEDICINE

## 2021-11-10 PROCEDURE — 81003 URINALYSIS AUTO W/O SCOPE: CPT | Performed by: EMERGENCY MEDICINE

## 2021-11-10 PROCEDURE — 99284 EMERGENCY DEPT VISIT MOD MDM: CPT | Mod: 25

## 2021-11-10 NOTE — TELEPHONE ENCOUNTER
I'll place an order for a repeat UA and wet prep as there was no infection on the culture    Ok to make lab appointment   I will base follow up and treatment on the tests

## 2021-11-10 NOTE — TELEPHONE ENCOUNTER
"Called and spoke with patient.    She has completed the nitroFURantoin macrocrystal-monohydrate (MACROBID) 100 MG capsule today.    Patient reports that she continues to have the same symptoms (not empty bladder, urine urgency, some burning with urination, chills, cold sweats) as when she was evaluated in the ER 11/6/21.    She reports no improvement (symptoms seem to have worsen).    Patient is not able to get an appointment with PCP (Dr. Zamora) until 12/15/21.    She would like to be seen by Dr. Barreto either 11/11 or 11/12 (before the end of week).    She is concerned about her heart and kidneys.    No appointments available until \"same day\" appointment on 11/22/21    "

## 2021-11-11 ENCOUNTER — APPOINTMENT (OUTPATIENT)
Dept: ULTRASOUND IMAGING | Facility: CLINIC | Age: 51
End: 2021-11-11
Attending: EMERGENCY MEDICINE
Payer: COMMERCIAL

## 2021-11-11 VITALS
TEMPERATURE: 97 F | RESPIRATION RATE: 16 BRPM | HEART RATE: 93 BPM | SYSTOLIC BLOOD PRESSURE: 128 MMHG | OXYGEN SATURATION: 96 % | DIASTOLIC BLOOD PRESSURE: 94 MMHG

## 2021-11-11 LAB
ALBUMIN SERPL-MCNC: 3.4 G/DL (ref 3.4–5)
ALP SERPL-CCNC: 103 U/L (ref 40–150)
ALT SERPL W P-5'-P-CCNC: 43 U/L (ref 0–50)
ANION GAP SERPL CALCULATED.3IONS-SCNC: 4 MMOL/L (ref 3–14)
AST SERPL W P-5'-P-CCNC: 36 U/L (ref 0–45)
BASOPHILS # BLD AUTO: 0.1 10E3/UL (ref 0–0.2)
BASOPHILS NFR BLD AUTO: 1 %
BILIRUB SERPL-MCNC: 0.3 MG/DL (ref 0.2–1.3)
BUN SERPL-MCNC: 20 MG/DL (ref 7–30)
CALCIUM SERPL-MCNC: 8.7 MG/DL (ref 8.5–10.1)
CHLORIDE BLD-SCNC: 110 MMOL/L (ref 94–109)
CLUE CELLS: NORMAL
CO2 SERPL-SCNC: 24 MMOL/L (ref 20–32)
CREAT SERPL-MCNC: 1.2 MG/DL (ref 0.52–1.04)
EOSINOPHIL # BLD AUTO: 0.4 10E3/UL (ref 0–0.7)
EOSINOPHIL NFR BLD AUTO: 7 %
ERYTHROCYTE [DISTWIDTH] IN BLOOD BY AUTOMATED COUNT: 14.7 % (ref 10–15)
GFR SERPL CREATININE-BSD FRML MDRD: 53 ML/MIN/1.73M2
GLUCOSE BLD-MCNC: 124 MG/DL (ref 70–99)
HCT VFR BLD AUTO: 36.8 % (ref 35–47)
HGB BLD-MCNC: 11.7 G/DL (ref 11.7–15.7)
IMM GRANULOCYTES # BLD: 0 10E3/UL
IMM GRANULOCYTES NFR BLD: 0 %
LIPASE SERPL-CCNC: 146 U/L (ref 73–393)
LYMPHOCYTES # BLD AUTO: 2.2 10E3/UL (ref 0.8–5.3)
LYMPHOCYTES NFR BLD AUTO: 35 %
MCH RBC QN AUTO: 27.9 PG (ref 26.5–33)
MCHC RBC AUTO-ENTMCNC: 31.8 G/DL (ref 31.5–36.5)
MCV RBC AUTO: 88 FL (ref 78–100)
MONOCYTES # BLD AUTO: 1.2 10E3/UL (ref 0–1.3)
MONOCYTES NFR BLD AUTO: 19 %
NEUTROPHILS # BLD AUTO: 2.4 10E3/UL (ref 1.6–8.3)
NEUTROPHILS NFR BLD AUTO: 38 %
NRBC # BLD AUTO: 0 10E3/UL
NRBC BLD AUTO-RTO: 0 /100
PLATELET # BLD AUTO: 177 10E3/UL (ref 150–450)
POTASSIUM BLD-SCNC: 4.1 MMOL/L (ref 3.4–5.3)
PROT SERPL-MCNC: 7.9 G/DL (ref 6.8–8.8)
RBC # BLD AUTO: 4.19 10E6/UL (ref 3.8–5.2)
SODIUM SERPL-SCNC: 138 MMOL/L (ref 133–144)
TRICHOMONAS, WET PREP: NORMAL
WBC # BLD AUTO: 6.2 10E3/UL (ref 4–11)
WBC'S/HIGH POWER FIELD, WET PREP: NORMAL
YEAST, WET PREP: NORMAL

## 2021-11-11 PROCEDURE — 85025 COMPLETE CBC W/AUTO DIFF WBC: CPT | Performed by: EMERGENCY MEDICINE

## 2021-11-11 PROCEDURE — 36415 COLL VENOUS BLD VENIPUNCTURE: CPT | Performed by: EMERGENCY MEDICINE

## 2021-11-11 PROCEDURE — 96360 HYDRATION IV INFUSION INIT: CPT

## 2021-11-11 PROCEDURE — 76705 ECHO EXAM OF ABDOMEN: CPT

## 2021-11-11 PROCEDURE — 87210 SMEAR WET MOUNT SALINE/INK: CPT | Performed by: EMERGENCY MEDICINE

## 2021-11-11 PROCEDURE — 82435 ASSAY OF BLOOD CHLORIDE: CPT | Performed by: EMERGENCY MEDICINE

## 2021-11-11 PROCEDURE — 83690 ASSAY OF LIPASE: CPT | Performed by: EMERGENCY MEDICINE

## 2021-11-11 PROCEDURE — 258N000003 HC RX IP 258 OP 636: Performed by: EMERGENCY MEDICINE

## 2021-11-11 PROCEDURE — 80053 COMPREHEN METABOLIC PANEL: CPT | Performed by: EMERGENCY MEDICINE

## 2021-11-11 RX ORDER — MUPIROCIN 20 MG/G
OINTMENT TOPICAL 3 TIMES DAILY
Qty: 15 G | Refills: 0 | Status: SHIPPED | OUTPATIENT
Start: 2021-11-11 | End: 2021-11-18

## 2021-11-11 RX ADMIN — SODIUM CHLORIDE 1000 ML: 9 INJECTION, SOLUTION INTRAVENOUS at 02:34

## 2021-11-11 ASSESSMENT — ENCOUNTER SYMPTOMS
ABDOMINAL DISTENTION: 1
RHINORRHEA: 0
SORE THROAT: 0
DIARRHEA: 0
DYSURIA: 1
BACK PAIN: 1
APPETITE CHANGE: 0
JOINT SWELLING: 1
COUGH: 0
DIFFICULTY URINATING: 1

## 2021-11-11 NOTE — ED PROVIDER NOTES
History     Chief Complaint:  Dysuria (Pt presents with pain with urination and urinary frequecy, reports she was diagnosed with a UTI 5 days prior and just finished abx.)      JESSICA Rivero is a 51 year old female who presents with dysuria. She has strong smelling urine, and she has dysuria. She also has had decreased urine as well. She was on medication for her urinary symptoms, and it was able to help. She was no longer on any medication as of yesterday. She also has flank pain, and has cold sweats.  She has no diarrhea or vaginal discharge. She has no coughing, runny nose or sore throat. She has a normal appetite now, but eating greasy foods sometimes brings on the pain. She also has a distended abdomen as well. She denies COVID-19 exposure.    Review of Systems   Constitutional: Negative for appetite change.   HENT: Negative for rhinorrhea and sore throat.    Respiratory: Negative for cough.    Gastrointestinal: Positive for abdominal distention. Negative for diarrhea.   Genitourinary: Positive for decreased urine volume, difficulty urinating and dysuria. Negative for vaginal discharge.   Musculoskeletal: Positive for back pain (flank) and joint swelling.   All other systems reviewed and are negative.    Imaging:     CT Abdomen Pelvis w/o Contrast  1.  No hydronephrosis or calcified, obstructing urinary tract stone.  2.  Normal appendix.  3.  Cholelithiasis.  4.  Moderate hiatal hernia.  Reading per radiology     Laboratory:     CBC: WBC 6.5, HGB 12.2,       BMP: Chloride 110 (H), Glucose 106 (H), o/w WNL (Creatinine 1.06 (H))      UA with microscopic: Appearance Slightly Cloudy (A), Leukocyte Esterase Moderate (A), Mucus Present (A), WBC 14 (H), Squamous Epithelials 17 (H), o/w WNL     Urine Culture: Negative     Allergies:  Morphine  Codeine  Gabapentin  Ibuprofen  Sulfa Drugs  Tylenol [Acetaminophen]  Erythromycin  Penicillins  Prednisone      Medications:    celecoxib   Clonazepam    enbrel  leflunomide   nitrofurantoin macrocrystal-monohydrate  omeprazole   OnabotulinumtoxinA   oxycodone   senna-docusate   amitriptyline        Past Medical History:    Acetaminophen overdose  Anemia  Anesthesia complication  Cerebral infarction  Degenerative joint disease  Endometriosis  GERD  Fibromyalgia  Hypothyroidism  Malignant neoplasm  MRSA  Opioid type dependence  PONV  rheumatoid arthritis  Renal stone  Scoliosis  Stomach ulcer  Insomnia  Myofascial pain  Acetaminophen overdose  Hepatic failure  Trochanteric bursitis  Atlantoaxial instability  Pancreatitis  Acute toxic encephalopathy  Anemia    Past Surgical History:    Foot arthrodesis  Elbow arthroplasty  Wrist arthroplasty  Elbow arthrotomy   Shoulder arthroscopy  Cystoscopy  EGD  Neck surgery  Rotator cuff repair    Bilateral knee surgery    Family History:    Mother: diabetes, hypertension, allergies, alcohol/drug, blood disease, neurological disorder, osteoporosis  Maternal grandmother: arthritis, cancer, thyroid disease, osteoporosis, heart disease, depression,  Neurological disorder, anxiety disorder, diabetes, migraines, DVT  Maternal Grandfather: cerebrovascular disease, cancer, mental illness  Paternal Grandmother: cerebrovascular disease, arthritis, cancer, osteoporosis, heart disease, depression, neurological disorder, cancer  Brother: heart disease, lung disease, cancer, mental illness, migraines, cardiac sudden death, spine problems  Son: asthma, endocrine disease, hypertension, anxiety disorder, migraines, spine problems  Father: neurological disorder, seizure disorder, hypertension, skin cancer, anxiety disorder, mental illness, hyperlipidemia, kidney disease  Daughter: neurological disorder    Social History:  Patient presents to the ED with son    Physical Exam     Patient Vitals for the past 24 hrs:   BP Temp Temp src Pulse Resp SpO2   21 0300 -- -- -- -- -- 96 %   11/10/21 2220 (!) 128/94 97  F (36.1  C) Temporal 93  16 96 %       Physical Exam  General: Sitting up in bed  Eyes:  The pupils are equal and round    Conjunctivae and sclerae are normal  ENT:    Wearing a mask  Neck:  Normal range of motion  CV:  Regular rate, regular rhythm     Skin warm and well perfused   Resp:  Non labored breathing on room air    No tachypnea    No cough heard  GI:  Abdomen is soft, there is no rigidity    No distension    No rebound tenderness     Mild diffuse abdominal tenderness, more prominent on RUQ. No CVA tenderness  :  Minimal normal appearing vaginal discharge. No CMT. Cervix not visualized. No adnexal tenderness. Female tech in room during exam. Mild folliculitis on mons pubis, no abscess  MS:  Normal muscular tone  Skin:  No rash or acute skin lesions noted  Neuro:   Awake, alert.      Speech is normal and fluent.    Face is symmetric.     Moves all extremities equally  Psych: Normal affect.  Appropriate interactions.    Emergency Department Course     Imaging:  Abdomen US, limited (RUQ only)   Final Result   IMPRESSION:   1.  Cholelithiasis.   2.  Sonographic Velarde's sign negative. No biliary dilatation.   3.  Hepatic steatosis.                Laboratory:  Labs Ordered and Resulted from Time of ED Arrival to Time of ED Departure   UA MACROSCOPIC WITH REFLEX TO MICRO AND CULTURE - Abnormal       Result Value    Color Urine Yellow      Appearance Urine Clear      Glucose Urine Negative      Bilirubin Urine Negative      Ketones Urine 10  (*)     Specific Gravity Urine 1.032      Blood Urine Negative      pH Urine 6.0      Protein Albumin Urine 10  (*)     Urobilinogen Urine 2.0      Nitrite Urine Negative      Leukocyte Esterase Urine Negative      Mucus Urine Present (*)     RBC Urine <1      WBC Urine <1      Squamous Epithelials Urine 3 (*)    COMPREHENSIVE METABOLIC PANEL - Abnormal    Sodium 138      Potassium 4.1      Chloride 110 (*)     Carbon Dioxide (CO2) 24      Anion Gap 4      Urea Nitrogen 20      Creatinine 1.20 (*)      Calcium 8.7      Glucose 124 (*)     Alkaline Phosphatase 103      AST 36      ALT 43      Protein Total 7.9      Albumin 3.4      Bilirubin Total 0.3      GFR Estimate 53 (*)    LIPASE - Normal    Lipase 146     WET PREPARATION - Normal    Trichomonas Absent      Yeast Absent      Clue Cells Absent      WBCs/high power field None     CBC WITH PLATELETS AND DIFFERENTIAL    WBC Count 6.2      RBC Count 4.19      Hemoglobin 11.7      Hematocrit 36.8      MCV 88      MCH 27.9      MCHC 31.8      RDW 14.7      Platelet Count 177      % Neutrophils 38      % Lymphocytes 35      % Monocytes 19      % Eosinophils 7      % Basophils 1      % Immature Granulocytes 0      NRBCs per 100 WBC 0      Absolute Neutrophils 2.4      Absolute Lymphocytes 2.2      Absolute Monocytes 1.2      Absolute Eosinophils 0.4      Absolute Basophils 0.1      Absolute Immature Granulocytes 0.0      Absolute NRBCs 0.0         Emergency Department Course:    Reviewed:  I reviewed nursing notes, vitals, past history and care everywhere    Assessments:  0023 I obtained history and examined the patient as noted above.   0114 I rechecked the patient and performed a pelvic exam with a female ED tech in the room. She had 99 CCs of urine in her bladder.  0338 I rechecked the patient.      Interventions:  0234 NS 1000 mL IV    Disposition:  The patient was discharged to home.    Impression & Plan      Medical Decision Making:  Kalyn Rivero is a 51-year-old female who presented to the emergency department with dysuria.  Patient diagnosed with possible urinary tract infection a few days ago and put on antibiotics.  She is now off antibiotics and her symptoms are worse again.  Reviewed her urine culture which did not grow any bacteria.  Given this and her urine today that is clear, I doubt untreated bladder infection at this time.  Doubt pyelonephritis.  She has had negative urine culture in the past even before this most recent one with urinary  tract infection symptoms.  This leads me to suspect that there may be something else going on such as interstitial cystitis or other cause. Reviewed her CT abdomen from a few days ago. Had no stones. Did not think repeat CT abdomen was indicated today. Today notes more RUQ pain.  CT abdomen did have stone in gallbladder so ultrasound of her gallbladder obtained today.  This does show  cholelithiasis but no evidence of cholecystitis.  I am not sure that this even relates to her symptoms but now she does seem concerned that she is having the pain due to her gallbladder as she does note fatty food causes her to have nausea and right upper quadrant pain over the last few weeks.  Wet prep was negative.  She denies being sexually active and no concerns for STDs.  She was not retaining urine in the emergency department.  She does have evidence of folliculitis on exam so we will start on mupirocin.  She would like to follow-up with urologist.  Given contact information for general surgery as well.  She will also follow-up with primary care provider.    Diagnosis:    ICD-10-CM    1. Calculus of gallbladder without cholecystitis without obstruction  K80.20    2. Acute kidney injury (H)  N17.9    3. Dysuria  R30.0    4. Folliculitis  L73.9        Discharge Medications:  New Prescriptions    MUPIROCIN (BACTROBAN) 2 % EXTERNAL OINTMENT    Apply topically 3 times daily for 7 days     Scribe Disclosure:  Shayy HERNÁNDEZ Hired, am serving as a scribe at 11:46 PM on 11/10/2021 to document services personally performed by Yuliana Jaquez MD based on my observations and the provider's statements to me.      Yuliana Jaquez MD  11/11/21 0701

## 2021-11-11 NOTE — DISCHARGE INSTRUCTIONS
Apply the cream on the area of your rash  Follow up with primary care provider  May need to see urologist about your symptoms  You do have a gallstone which can cause right upper quadrant abdominal pain that worsens after eating but should not cause the urinary symptoms. Monitor your symptoms to see if any of your symptoms correlate with this.

## 2021-11-11 NOTE — TELEPHONE ENCOUNTER
Spoke with son. He is her PCA and consent to communicate is on file. Provider note as written was relayed. He had no further questions.     Fang Mock RN

## 2021-11-11 NOTE — TELEPHONE ENCOUNTER
Her labs and imaging were all reassuring   She needs a repeat kidney function in 2-3 weeks  The elevation is likely from feeling ill and dehydration    The gallstones are not causing obstruction and may not need further treatment ( 20 percent of adults have small stones that do not need to be removed)    She should follow up with a provider in 2-3 weeks for reassessment and further testing     No treatment is needed at this time

## 2021-11-11 NOTE — TELEPHONE ENCOUNTER
"Spoke with patient. She was back at ER last night. Reviewed discharge note with her. She was referred to surgeon for gall bladder and possibly urology.     Patient has concerns:  1)she still feels sick like she did before she was on antibiotics. She is having chills but no fever. She \"feels like she has an infection\" When she took last course of antibiotic she felt better and would like to repeat the course.     2)kidney function is decreased, not addressed.     Please advise. Next available appointment same day 11/22/21    Fang Mock RN    "

## 2021-11-11 NOTE — ED TRIAGE NOTES
Pt presents with pain with urination and urinary frequecy, reports she was diagnosed with a UTI 5 days prior and just finished abx.

## 2021-11-18 DIAGNOSIS — M97.41XD: ICD-10-CM

## 2021-11-19 RX ORDER — DOCUSATE SODIUM 50MG AND SENNOSIDES 8.6MG 8.6; 5 MG/1; MG/1
TABLET, FILM COATED ORAL
Qty: 100 TABLET | Refills: 1 | Status: SHIPPED | OUTPATIENT
Start: 2021-11-19 | End: 2022-05-26

## 2021-12-15 ENCOUNTER — OFFICE VISIT (OUTPATIENT)
Dept: FAMILY MEDICINE | Facility: CLINIC | Age: 51
End: 2021-12-15
Payer: COMMERCIAL

## 2021-12-15 VITALS
DIASTOLIC BLOOD PRESSURE: 85 MMHG | OXYGEN SATURATION: 98 % | TEMPERATURE: 98.2 F | HEART RATE: 107 BPM | RESPIRATION RATE: 16 BRPM | BODY MASS INDEX: 29.54 KG/M2 | SYSTOLIC BLOOD PRESSURE: 126 MMHG | WEIGHT: 183 LBS

## 2021-12-15 DIAGNOSIS — N18.2 CKD (CHRONIC KIDNEY DISEASE) STAGE 2, GFR 60-89 ML/MIN: ICD-10-CM

## 2021-12-15 DIAGNOSIS — R11.0 NAUSEA: ICD-10-CM

## 2021-12-15 DIAGNOSIS — K81.1 CHRONIC CHOLECYSTITIS: Primary | ICD-10-CM

## 2021-12-15 LAB
ALBUMIN SERPL-MCNC: 3.8 G/DL (ref 3.4–5)
ALP SERPL-CCNC: 125 U/L (ref 40–150)
ALT SERPL W P-5'-P-CCNC: 34 U/L (ref 0–50)
ANION GAP SERPL CALCULATED.3IONS-SCNC: 7 MMOL/L (ref 3–14)
AST SERPL W P-5'-P-CCNC: 29 U/L (ref 0–45)
BILIRUB SERPL-MCNC: 0.5 MG/DL (ref 0.2–1.3)
BUN SERPL-MCNC: 20 MG/DL (ref 7–30)
CALCIUM SERPL-MCNC: 9.3 MG/DL (ref 8.5–10.1)
CHLORIDE BLD-SCNC: 110 MMOL/L (ref 94–109)
CO2 SERPL-SCNC: 22 MMOL/L (ref 20–32)
CREAT SERPL-MCNC: 0.99 MG/DL (ref 0.52–1.04)
GFR SERPL CREATININE-BSD FRML MDRD: 67 ML/MIN/1.73M2
GLUCOSE BLD-MCNC: 119 MG/DL (ref 70–99)
POTASSIUM BLD-SCNC: 4.2 MMOL/L (ref 3.4–5.3)
PROT SERPL-MCNC: 8.9 G/DL (ref 6.8–8.8)
SODIUM SERPL-SCNC: 139 MMOL/L (ref 133–144)

## 2021-12-15 PROCEDURE — 80053 COMPREHEN METABOLIC PANEL: CPT | Performed by: FAMILY MEDICINE

## 2021-12-15 PROCEDURE — 36415 COLL VENOUS BLD VENIPUNCTURE: CPT | Performed by: FAMILY MEDICINE

## 2021-12-15 PROCEDURE — 99214 OFFICE O/P EST MOD 30 MIN: CPT | Performed by: FAMILY MEDICINE

## 2021-12-15 RX ORDER — ONDANSETRON 4 MG/1
4 TABLET, ORALLY DISINTEGRATING ORAL EVERY 8 HOURS PRN
Qty: 20 TABLET | Refills: 3 | Status: SHIPPED | OUTPATIENT
Start: 2021-12-15 | End: 2022-01-21

## 2021-12-15 NOTE — PROGRESS NOTES
SUBJECTIVE:  51 year old.The patient has a complaint of right upper quadrant pain.  This started 5 weeks ago.  quality sharp and radiates to back pain. Associated symptoms are nausea.  Brought on by fatty foods .  Better with time. ROS   No fever    Reviewed health maintenance  Patient Active Problem List   Diagnosis     Hypothyroidism     Acetaminophen overdose     Hepatic failure (H)     Pancreatitis     Anemia     Chronic pain     Pneumonia     Suicide risk     Grief reaction     Liver failure, acute     CARDIOVASCULAR SCREENING; LDL GOAL LESS THAN 160     Renal stone     Health Care Home     TOTAL KNEE ARTHROPLASTY - bilateral     Knee joint replacement - left     Trochanteric bursitis - left     Atlantoaxial instability     Postoperative pain     S/P cervical spinal fusion     Drug-seeking behavior     Opioid type dependence (H)     Severe frontal headaches     Abdominal pain, unspecified abdominal location     Esophageal reflux     Right upper extremity numbness     Right arm numbness     Lesion of ulnar nerve     Arthralgia of temporomandibular joint     Intractable chronic migraine without aura     Encounter for long-term opiate analgesic use     Rheumatoid arthritis of foot (H)     Other drug-induced neutropenia (H)     Rheumatoid arthritis with positive rheumatoid factor, involving unspecified site (H)     Cervical high risk HPV (human papillomavirus) test positive     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)     Chronic tension-type headache     Fibromyositis     Insomnia     Migraine     Myofascial pain     Periprosthetic fracture around internal prosthetic right elbow joint     Status post surgery     Past Medical History:   Diagnosis Date     Acetaminophen overdose 3/24/2011     Anemia      Anesthesia complication     pt states has a history of heart stopping during anesthesia,     Arrhythmia      Cerebral infarction (H)      Cervical high risk HPV (human papillomavirus)  "test positive 02/22/2017    type 18 & other HR HPV     Chronic infection     MRSA     Chronic pain 3/24/2011     Degenerative joint disease      Endometriosis      Fibromyalgia      Gastro-oesophageal reflux disease      Heart murmur      History of blood transfusion      Hypothyroidism      Immune disorder (H)      Learning disability      Malignant neoplasm (H)      Migraine      MRSA (methicillin resistant staph aureus) culture positive      Neck injuries      Opioid type dependence (H)      Other chronic pain      PONV (postoperative nausea and vomiting)     states \"flatlines\"during anesthesia     RA (rheumatoid arthritis) (H)      Renal stone      Scoliosis      Stomach ulcer     history       OBJECTIVE:  no apparent distress  /85   Pulse 107   Temp 98.2  F (36.8  C) (Tympanic)   Resp 16   Wt 83 kg (183 lb)   LMP  (LMP Unknown)   SpO2 98%   Breastfeeding No   BMI 29.54 kg/m      LUNGS:  CTA B/L, no wheezing or crackles.   Cardiovascular: negative, PMI normal. No lifts, heaves, or thrills. RRR. No murmurs, clicks gallops or rub   Gastrointestinal: Abdomen soft,-tender- right upper quadrant . BS normal. No masses, organomegaly       ICD-10-CM    1. Chronic cholecystitis  K81.1 Adult General Surg Referral   2. Nausea  R11.0 ondansetron (ZOFRAN-ODT) 4 MG ODT tab   3. CKD (chronic kidney disease) stage 2, GFR 60-89 ml/min  N18.2 **Comprehensive metabolic panel FUTURE 2mo    PLAN: ref general surgery  Hold Celebrex  Await CMP      Over  half of theTotal time 25 minutes spent in cordination care. Discussed diagnoses, prognoses and treatment.   "

## 2021-12-18 NOTE — TELEPHONE ENCOUNTER
Patient is calling stating had her neck cracked at fusion on Monday and has been dizzy since then, would like a call back to discuss. Thank you.   50

## 2021-12-20 ENCOUNTER — OFFICE VISIT (OUTPATIENT)
Dept: SURGERY | Facility: CLINIC | Age: 51
End: 2021-12-20
Payer: COMMERCIAL

## 2021-12-20 VITALS
WEIGHT: 184 LBS | HEART RATE: 81 BPM | DIASTOLIC BLOOD PRESSURE: 80 MMHG | BODY MASS INDEX: 29.7 KG/M2 | SYSTOLIC BLOOD PRESSURE: 118 MMHG

## 2021-12-20 DIAGNOSIS — K80.20 CALCULUS OF GALLBLADDER WITHOUT CHOLECYSTITIS WITHOUT OBSTRUCTION: Primary | ICD-10-CM

## 2021-12-20 PROCEDURE — 99204 OFFICE O/P NEW MOD 45 MIN: CPT | Performed by: SURGERY

## 2021-12-20 NOTE — PROGRESS NOTES
Patient seen in consultation for gallstones by Mika Zamora    HPI:  Patient is a 51 year old female  with complaints of gallstones seen on imaging at ER trip mid November  Feels some RUQ pain under the ribs, like a stabbing  Worse after eating. Now is more constant where before would come and go  The patient noticed the symptoms about 5-6 weeks ago.    ER trip was due to some dysuria but had mentioned this side pain also so had US and CT imaging.  Can get some chills and sweats, nausea, taking some anti-nausea pills  nothing makes the episode better.  Patient has family history of gallbladder problems  Chronic pain- see Dr Brayan Teixeira for her meds  Son is caretaker and present with her today    Review Of Systems    Skin: negative  Ears/Nose/Throat: negative  Respiratory: No shortness of breath, dyspnea on exertion, cough, or hemoptysis  Cardiovascular: negative ?heart issue with anesthesia  Gastrointestinal: as above  Genitourinary: negative  Musculoskeletal: joint pain, fibromyalgia  Neurologic: history of stroke, migraine  Hematologic/Lymphatic/Immunologic: negative  Endocrine: negative      Past Medical History:   Diagnosis Date     Acetaminophen overdose 3/24/2011     Anemia      Anesthesia complication     pt states has a history of heart stopping during anesthesia,     Arrhythmia      Cerebral infarction (H)      Cervical high risk HPV (human papillomavirus) test positive 02/22/2017    type 18 & other HR HPV     Chronic infection     MRSA     Chronic pain 3/24/2011     Degenerative joint disease      Endometriosis      Fibromyalgia      Gastro-oesophageal reflux disease      Heart murmur      History of blood transfusion      Hypothyroidism      Immune disorder (H)      Learning disability      Malignant neoplasm (H)      Migraine      MRSA (methicillin resistant staph aureus) culture positive      Neck injuries      Opioid type dependence (H)      Other chronic pain      PONV (postoperative nausea  "and vomiting)     states \"flatlines\"during anesthesia     RA (rheumatoid arthritis) (H)      Renal stone      Scoliosis      Stomach ulcer     history       Past Surgical History:   Procedure Laterality Date     ARTHRODESIS FOOT  5/23/2012    Procedure:ARTHRODESIS FOOT; Right Subtalar andTaloNavicular  Fusion  ; Surgeon:BRIGHT HENDRICKS; [facility]     ARTHRODESIS FOOT Left 4/22/2015    Procedure: ARTHRODESIS FOOT;  Surgeon: Bright Hendricks MD;  [facility]     ARTHROPLASTY ELBOW Right 9/30/2015    Procedure: ARTHROPLASTY ELBOW;  Surgeon: Carrol Gaspar MD;  [facility]     ARTHROPLASTY KNEE Right      ARTHROPLASTY REVISION ELBOW Right 11/27/2018    Procedure: 1 - aspiration of Right elbow 2- Explantation of failed hardware Right humeral periprosthetic fracture non-union 3- Right distal humerus excision 4- Right distal humerus replacement 5 - Revision Right total elbow arthroplasty;  Surgeon: Aakash Zimmer MD;  [facility]     ARTHROPLASTY WRIST      x2 Lt wrist replacement.     ARTHROTOMY ELBOW Right 6/18/2015    Procedure: ARTHROTOMY ELBOW;  Surgeon: Carrol Gaspar MD;  [facility]     C ANESTH,DX ARTHROSCOPIC PROC KNEE JOINT  10/24/2007    right total knee arthroplasty     C SHLDR ARTHROSCOP,DIAGNOSTIC  12/17/2008    left total shoulder arthroplasty by Dr. Law     C SHOULDER SURG PROC UNLISTED       C TOTAL KNEE ARTHROPLASTY  1/18/12    Left     CYSTOSCOPY  02/06/2009    1.cystoscopy.2.bilateral ureteroscopy.3.basketing of stone fragments from the right kidney.4.bilateral double-J ureteral stent placement.5.ann catheter placement.6.fluoroscopy and intraoperative interpretation of images.     CYSTOSCOPY  01/08/2007    1. cystoscopy and left stent removal.2.left ureteroscopy     DECOMPRESSION CUBITAL TUNNEL  6/6/2014    Procedure: DECOMPRESSION CUBITAL TUNNEL;  Surgeon: Janelle Coates MD;  [facility]     ENDOSCOPY  03/31/2005    upper GI " endoscopy-[facility] [place] [facility]     ESOPHAGOSCOPY, GASTROSCOPY, DUODENOSCOPY (EGD), COMBINED  3/17/2014    Procedure: COMBINED ESOPHAGOSCOPY, GASTROSCOPY, DUODENOSCOPY (EGD), BIOPSY SINGLE OR MULTIPLE;;  Surgeon: Duane, William Charles, MD;  [facility]     FUSION CERVICAL POSTERIOR ONE LEVEL  11/18/2013    Procedure: FUSION CERVICAL POSTERIOR ONE LEVEL;  Cervical 1-2 Posterior Cervical Fusion (Harms Procedure);  Surgeon: Carrol Gunn MD;  [facility]     GYN SURGERY       INJECT MAJOR JOINT / BURSA Left 1/5/2017    Procedure: INJECT MAJOR JOINT / BURSA;  Surgeon: Fredy Patel DO;  [facility]     INJECT STEROID (LOCATION) Left 6/18/2015    Procedure: INJECT STEROID (LOCATION);  Surgeon: Carrol Gaspar MD;  [facility]     NECK SURGERY       REMOVE FOREIGN BODY UPPER EXTREMITY Right 3/2/2017    Procedure: REMOVE FOREIGN BODY UPPER EXTREMITY;  Surgeon: Carrol Gaspar MD;  [facility]     RESECT BONE UPPER EXTREMITY Left 4/27/2017    Procedure: RESECT BONE UPPER EXTREMITY;  Left Radial Head Resection;  Surgeon: Carrol Gaspar MD;  [facility]     ROTATOR CUFF REPAIR RT/LT  10/19/2005    1.left distal clavicle excision.2.acromioplasty.3.coracoacromial ligament resection.4.rotator cuff exploration       Social History     Socioeconomic History     Marital status: Single     Spouse name: Not on file     Number of children: Not on file     Years of education: Not on file     Highest education level: Not on file   Occupational History     Not on file   Tobacco Use     Smoking status: Former Smoker     Packs/day: 0.10     Years: 10.00     Pack years: 1.00     Types: Cigarettes     Start date: 8/6/1983     Smokeless tobacco: Never Used     Tobacco comment: Quit 6 weeks ago   Substance and Sexual Activity     Alcohol use: No     Drug use: No     Sexual activity: Not Currently     Partners: Male   Other Topics Concern     Parent/sibling w/  CABG, MI or angioplasty before 65F 55M? Not Asked   Social History Narrative     Not on file     Social Determinants of Health     Financial Resource Strain: Not on file   Food Insecurity: Not on file   Transportation Needs: Not on file   Physical Activity: Not on file   Stress: Not on file   Social Connections: Not on file   Intimate Partner Violence: Not on file   Housing Stability: Not on file       Current Outpatient Medications   Medication Sig Dispense Refill     celecoxib (CELEBREX) 200 MG capsule Take 200 mg by mouth 3 times daily       ClonazePAM (KLONOPIN PO) Take 0.5 mg by mouth 3 times daily        ENBREL SURECLICK 50 MG/ML autoinjector 50 mg twice a week        leflunomide (ARAVA) 20 MG tablet Take 20 mg by mouth every morning Reported on 3/28/2017       omeprazole (PRILOSEC) 40 MG DR capsule Take 1 capsule (40 mg) by mouth 2 times daily (before meals) 90 capsule 3     OnabotulinumtoxinA (BOTOX IJ) Inject 200 Units as directed Total dose 200 units   Administered 190 units   Unavoidable waste 30 units   Lot # /C3 with Expiration Date:  12/2020   NDC [address]-52       ondansetron (ZOFRAN-ODT) 4 MG ODT tab Take 1 tablet (4 mg) by mouth every 8 hours as needed for nausea 20 tablet 3     oxyCODONE (ROXICODONE) 5 MG tablet Patient states she takes 4 tablets daily. Morning, noon, supper, bedtime       SENEXON-S 8.6-50 MG tablet TAKE 1-2 TABLETS BY MOUTH 2 TIMES DAILY FOR CONSTIPATION. 100 tablet 1       Medications and history reviewed    Physical exam:  Vitals: /80   Pulse 81   Wt 83.5 kg (184 lb)   LMP  (LMP Unknown)   BMI 29.70 kg/m    BMI= Body mass index is 29.7 kg/m .    Constitutional: healthy, alert and no distress  Head: Normocephalic. No masses, lesions, tenderness or abnormalities  Cardiovascular: negative, PMI normal. No lifts, heaves, or thrills. RRR. No murmurs, clicks gallops or rub  Respiratory: negative, Percussion normal. Good diaphragmatic excursion. Lungs  clear  Gastrointestinal: Abdomen soft, non-tender. BS normal. No masses, organomegaly, positive findings: obese. Negative [place]  : Deferred  Musculoskeletal: extremities normal- no gross deformities noted, gait normal and normal muscle tone  Skin: no suspicious lesions or rashes  Psychiatric: anxious and hyper almost manic but able to ask and answer appropriately      Labs show:  Normal CBC, CMP, lipase from ER visit and recheck (CMP) on 12/15    Imaging shows:  EXAM: CT ABDOMEN PELVIS W/O CONTRAST  LOCATION: [facility]  DATE/TIME: 11/6/2021 4:03 AM     INDICATION: Right flank pain.  COMPARISON: None.  TECHNIQUE: CT scan of the abdomen and pelvis was performed without IV contrast. Multiplanar reformats were obtained. Dose reduction techniques were used.  CONTRAST: None.     FINDINGS:   LOWER CHEST: Moderate sized hiatal hernia. Lung bases are clear.     HEPATOBILIARY: Tiny gallstones. Nondistended gallbladder. Normal liver.     PANCREAS: Normal.     SPLEEN: Normal.     ADRENAL GLANDS: Normal.     KIDNEYS/BLADDER: No hydronephrosis or calcified ureteral or bladder stone.     BOWEL: Normal caliber large and small bowel. Normal appendix. No free air.     LYMPH NODES: Normal.     VASCULATURE: Unremarkable.     PELVIC ORGANS: Normal.     MUSCULOSKELETAL: L5-S1 degenerative disc height loss.                                                                      IMPRESSION:   1.  No hydronephrosis or calcified, obstructing urinary tract stone.     2.  Normal appendix.     3.  Cholelithiasis.     4.  Moderate hiatal hernia.    EXAM: US ABDOMEN LIMITED  LOCATION: [facility]  DATE/TIME: 11/11/2021 1:43 AM     INDICATION: Right upper quadrant pain  COMPARISON: 11/06/2021  TECHNIQUE: Limited abdominal ultrasound.     FINDINGS:     GALLBLADDER: Cholelithiasis. No visible wall thickening. Sonographic Velarde's sign negative.     BILE DUCTS: No biliary dilatation. The common  duct measures 3 mm.     LIVER: Hepatic steatosis.     RIGHT KIDNEY: No hydronephrosis.     PANCREAS: Partial observation by bowel gas.     No visible ascites.                                                                      IMPRESSION:  1.  Cholelithiasis.  2.  Sonographic Velarde's sign negative. No biliary dilatation.  3.  Hepatic steatosis.    Assessment:     ICD-10-CM    1. Calculus of gallbladder without cholecystitis without obstruction  K80.20 Alesia-Operative Worksheet     Plan: Symptomatic cholelithiasis. By labs and imaging did not appear to have any acute cholecystitis or suspicion for choledocholithiasis though she is noting more constant pain now compared to a month ago.  Discussed imaging findings and what to expect with surgery. Risks of bleeding, infection, anesthesia complications, conversion to open, injury to nearby structures and bile duct injury all discussed.   We went over my discharge instructions and post-op restrictions.  Patient questions answered and we will schedule the procedure.  With her medical history we will plan to get this done at [facility]. That would also allow me to get this done robotically and utilize intraoperative ICG fluoroscopy imaging. Can still be outpatient.  We will send this note to Dr. Teixeira as well so we can figure what is needed for her postoperative pain management and prescriptions.    Bobby Ceja MD

## 2021-12-20 NOTE — LETTER
12/20/2021         RE: Kalyn Rivero  [address] [facility] [address]        Dear Colleague,    Thank you for referring your patient, Kalyn Rivero, to the [facility]. Please see a copy of my visit note below.    Patient seen in consultation for gallstones by Mika Zamora    HPI:  Patient is a 51 year old female  with complaints of gallstones seen on imaging at ER trip mid November  Feels some RUQ pain under the ribs, like a stabbing  Worse after eating. Now is more constant where before would come and go  The patient noticed the symptoms about 5-6 weeks ago.    ER trip was due to some dysuria but had mentioned this side pain also so had US and CT imaging.  Can get some chills and sweats, nausea, taking some anti-nausea pills  nothing makes the episode better.  Patient has family history of gallbladder problems  Chronic pain- see Dr Brayan Teixeira for her meds  Son is caretaker and present with her today    Review Of Systems    Skin: negative  Ears/Nose/Throat: negative  Respiratory: No shortness of breath, dyspnea on exertion, cough, or hemoptysis  Cardiovascular: negative ?heart issue with anesthesia  Gastrointestinal: as above  Genitourinary: negative  Musculoskeletal: joint pain, fibromyalgia  Neurologic: history of stroke, migraine  Hematologic/Lymphatic/Immunologic: negative  Endocrine: negative      Past Medical History:   Diagnosis Date     Acetaminophen overdose 3/24/2011     Anemia      Anesthesia complication     pt states has a history of heart stopping during anesthesia,     Arrhythmia      Cerebral infarction (H)      Cervical high risk HPV (human papillomavirus) test positive 02/22/2017    type 18 & other HR HPV     Chronic infection     MRSA     Chronic pain 3/24/2011     Degenerative joint disease      Endometriosis      Fibromyalgia      Gastro-oesophageal reflux disease      Heart murmur      History of blood transfusion      Hypothyroidism   "    Immune disorder (H)      Learning disability      Malignant neoplasm (H)      Migraine      MRSA (methicillin resistant staph aureus) culture positive      Neck injuries      Opioid type dependence (H)      Other chronic pain      PONV (postoperative nausea and vomiting)     states \"flatlines\"during anesthesia     RA (rheumatoid arthritis) (H)      Renal stone      Scoliosis      Stomach ulcer     history       Past Surgical History:   Procedure Laterality Date     ARTHRODESIS FOOT  5/23/2012    Procedure:ARTHRODESIS FOOT; Right Subtalar andTaloNavicular  Fusion  ; Surgeon:BRIGHT HENDRICKS; [facility]     ARTHRODESIS FOOT Left 4/22/2015    Procedure: ARTHRODESIS FOOT;  Surgeon: Bright Hendricks MD;  [facility]     ARTHROPLASTY ELBOW Right 9/30/2015    Procedure: ARTHROPLASTY ELBOW;  Surgeon: Carrol Gaspar MD;  [facility]     ARTHROPLASTY KNEE Right      ARTHROPLASTY REVISION ELBOW Right 11/27/2018    Procedure: 1 - aspiration of Right elbow 2- Explantation of failed hardware Right humeral periprosthetic fracture non-union 3- Right distal humerus excision 4- Right distal humerus replacement 5 - Revision Right total elbow arthroplasty;  Surgeon: Aakash Zimmer MD;  [facility]     ARTHROPLASTY WRIST      x2 Lt wrist replacement.     ARTHROTOMY ELBOW Right 6/18/2015    Procedure: ARTHROTOMY ELBOW;  Surgeon: Carrol Gaspar MD;  [facility]     C ANESTH,DX ARTHROSCOPIC PROC KNEE JOINT  10/24/2007    right total knee arthroplasty     C SHLDR ARTHROSCOP,DIAGNOSTIC  12/17/2008    left total shoulder arthroplasty by Dr. Thanh TRUONG SHOULDER SURG PROC UNLISTED       C TOTAL KNEE ARTHROPLASTY  1/18/12    Left     CYSTOSCOPY  02/06/2009    1.cystoscopy.2.bilateral ureteroscopy.3.basketing of stone fragments from the right kidney.4.bilateral double-J ureteral stent placement.5.ann catheter placement.6.fluoroscopy and intraoperative interpretation of images.     " CYSTOSCOPY  01/08/2007    1. cystoscopy and left stent removal.2.left ureteroscopy     DECOMPRESSION CUBITAL TUNNEL  6/6/2014    Procedure: DECOMPRESSION CUBITAL TUNNEL;  Surgeon: Janelle Coates MD;  [facility]     ENDOSCOPY  03/31/2005    upper GI endoscopy-[facility]     ESOPHAGOSCOPY, GASTROSCOPY, DUODENOSCOPY (EGD), COMBINED  3/17/2014    Procedure: COMBINED ESOPHAGOSCOPY, GASTROSCOPY, DUODENOSCOPY (EGD), BIOPSY SINGLE OR MULTIPLE;;  Surgeon: Duane, William Charles, MD;  [facility]: [facility]     FUSION CERVICAL POSTERIOR ONE LEVEL  11/18/2013    Procedure: FUSION CERVICAL POSTERIOR ONE LEVEL;  Cervical 1-2 Posterior Cervical Fusion (Harms Procedure);  Surgeon: Carrol Gunn MD;  [facility]     GYN SURGERY       INJECT MAJOR JOINT / BURSA Left 1/5/2017    Procedure: INJECT MAJOR JOINT / BURSA;  Surgeon: Fredy Patel DO;  [facility]     INJECT STEROID (LOCATION) Left 6/18/2015    Procedure: INJECT STEROID (LOCATION);  Surgeon: Carrol Gaspar MD;  [facility]     NECK SURGERY       REMOVE FOREIGN BODY UPPER EXTREMITY Right 3/2/2017    Procedure: REMOVE FOREIGN BODY UPPER EXTREMITY;  Surgeon: Carrol Gaspar MD;  [facility]     RESECT BONE UPPER EXTREMITY Left 4/27/2017    Procedure: RESECT BONE UPPER EXTREMITY;  Left Radial Head Resection;  Surgeon: Carrol Gaspar MD;  [facility]     ROTATOR CUFF REPAIR RT/LT  10/19/2005    1.left distal clavicle excision.2.acromioplasty.3.coracoacromial ligament resection.4.rotator cuff exploration       Social History     Socioeconomic History     Marital status: Single     Spouse name: Not on file     Number of children: Not on file     Years of education: Not on file     Highest education level: Not on file   Occupational History     Not on file   Tobacco Use     Smoking status: Former Smoker     Packs/day: 0.10     Years: 10.00     Pack years: 1.00     Types: Cigarettes     Start  date: 8/6/1983     Smokeless tobacco: Never Used     Tobacco comment: Quit 6 weeks ago   Substance and Sexual Activity     Alcohol use: No     Drug use: No     Sexual activity: Not Currently     Partners: Male   Other Topics Concern     Parent/sibling w/ CABG, MI or angioplasty before 65F 55M? Not Asked   Social History Narrative     Not on file     Social Determinants of Health     Financial Resource Strain: Not on file   Food Insecurity: Not on file   Transportation Needs: Not on file   Physical Activity: Not on file   Stress: Not on file   Social Connections: Not on file   Intimate Partner Violence: Not on file   Housing Stability: Not on file       Current Outpatient Medications   Medication Sig Dispense Refill     celecoxib (CELEBREX) 200 MG capsule Take 200 mg by mouth 3 times daily       ClonazePAM (KLONOPIN PO) Take 0.5 mg by mouth 3 times daily        ENBREL SURECLICK 50 MG/ML autoinjector 50 mg twice a week        leflunomide (ARAVA) 20 MG tablet Take 20 mg by mouth every morning Reported on 3/28/2017       omeprazole (PRILOSEC) 40 MG DR capsule Take 1 capsule (40 mg) by mouth 2 times daily (before meals) 90 capsule 3     OnabotulinumtoxinA (BOTOX IJ) Inject 200 Units as directed Total dose 200 units   Administered 190 units   Unavoidable waste 30 units   Lot # /C3 with Expiration Date:  12/2020   NDC [address]-69       ondansetron (ZOFRAN-ODT) 4 MG ODT tab Take 1 tablet (4 mg) by mouth every 8 hours as needed for nausea 20 tablet 3     oxyCODONE (ROXICODONE) 5 MG tablet Patient states she takes 4 tablets daily. Morning, noon, supper, bedtime       SENEXON-S 8.6-50 MG tablet TAKE 1-2 TABLETS BY MOUTH 2 TIMES DAILY FOR CONSTIPATION. 100 tablet 1       Medications and history reviewed    Physical exam:  Vitals: /80   Pulse 81   Wt 83.5 kg (184 lb)   LMP  (LMP Unknown)   BMI 29.70 kg/m    BMI= Body mass index is 29.7 kg/m .    Constitutional: healthy, alert and no distress  Head:  Normocephalic. No masses, lesions, tenderness or abnormalities  Cardiovascular: negative, PMI normal. No lifts, heaves, or thrills. RRR. No murmurs, clicks gallops or rub  Respiratory: negative, Percussion normal. Good diaphragmatic excursion. Lungs clear  Gastrointestinal: Abdomen soft, non-tender. BS normal. No masses, organomegaly, positive findings: obese. Negative [place]  : Deferred  Musculoskeletal: extremities normal- no gross deformities noted, gait normal and normal muscle tone  Skin: no suspicious lesions or rashes  Psychiatric: anxious and hyper almost manic but able to ask and answer appropriately      Labs show:  Normal CBC, CMP, lipase from ER visit and recheck (CMP) on 12/15    Imaging shows:  EXAM: CT ABDOMEN PELVIS W/O CONTRAST  LOCATION: [facility]  DATE/TIME: 11/6/2021 4:03 AM     INDICATION: Right flank pain.  COMPARISON: None.  TECHNIQUE: CT scan of the abdomen and pelvis was performed without IV contrast. Multiplanar reformats were obtained. Dose reduction techniques were used.  CONTRAST: None.     FINDINGS:   LOWER CHEST: Moderate sized hiatal hernia. Lung bases are clear.     HEPATOBILIARY: Tiny gallstones. Nondistended gallbladder. Normal liver.     PANCREAS: Normal.     SPLEEN: Normal.     ADRENAL GLANDS: Normal.     KIDNEYS/BLADDER: No hydronephrosis or calcified ureteral or bladder stone.     BOWEL: Normal caliber large and small bowel. Normal appendix. No free air.     LYMPH NODES: Normal.     VASCULATURE: Unremarkable.     PELVIC ORGANS: Normal.     MUSCULOSKELETAL: L5-S1 degenerative disc height loss.                                                                      IMPRESSION:   1.  No hydronephrosis or calcified, obstructing urinary tract stone.     2.  Normal appendix.     3.  Cholelithiasis.     4.  Moderate hiatal hernia.    EXAM: US ABDOMEN LIMITED  LOCATION: [facility]  DATE/TIME: 11/11/2021 1:43 AM     INDICATION: Right  upper quadrant pain  COMPARISON: 11/06/2021  TECHNIQUE: Limited abdominal ultrasound.     FINDINGS:     GALLBLADDER: Cholelithiasis. No visible wall thickening. Sonographic Velarde's sign negative.     BILE DUCTS: No biliary dilatation. The common duct measures 3 mm.     LIVER: Hepatic steatosis.     RIGHT KIDNEY: No hydronephrosis.     PANCREAS: Partial observation by bowel gas.     No visible ascites.                                                                      IMPRESSION:  1.  Cholelithiasis.  2.  Sonographic Velarde's sign negative. No biliary dilatation.  3.  Hepatic steatosis.    Assessment:     ICD-10-CM    1. Calculus of gallbladder without cholecystitis without obstruction  K80.20 Alesia-Operative Worksheet     Plan: Symptomatic cholelithiasis. By labs and imaging did not appear to have any acute cholecystitis or suspicion for choledocholithiasis though she is noting more constant pain now compared to a month ago.  Discussed imaging findings and what to expect with surgery. Risks of bleeding, infection, anesthesia complications, conversion to open, injury to nearby structures and bile duct injury all discussed.   We went over my discharge instructions and post-op restrictions.  Patient questions answered and we will schedule the procedure.  With her medical history we will plan to get this done at [facility]. That would also allow me to get this done robotically and utilize intraoperative ICG fluoroscopy imaging. Can still be outpatient.  We will send this note to Dr. Teixeira as well so we can figure what is needed for her postoperative pain management and prescriptions.    Bobby Ceja MD        Again, thank you for allowing me to participate in the care of your patient.        Sincerely,        Bobby Ceja MD

## 2021-12-21 ENCOUNTER — TELEPHONE (OUTPATIENT)
Dept: SURGERY | Facility: CLINIC | Age: 51
End: 2021-12-21
Payer: COMMERCIAL

## 2021-12-21 NOTE — TELEPHONE ENCOUNTER
Type of surgery: robitic assisted lap alex possible open  CPT 78799, poss [id number]  Calculus of gallbladder without cholecystitis without obstruction K80.20    Location of surgery: [facility]  Date and time of surgery: 2-24-22  Surgeon: Marcial  Pre-Op Appt Date: 2-9-22  Post-Op Appt Date: 3-14-22   Packet sent out: Yes  Pre-cert/Authorization completed:  No prior auth required per [place] 2022 list.    Date: 12-21-21    Anabella Alvarez  Prior Authorization Dept  [phone]

## 2021-12-21 NOTE — ED NOTES
Care hand off to Raymond DUNAWAY RN   Procedure(s):  BILATERAL L2-3 DISCECTOMY. general    Anesthesia Post Evaluation        Patient location during evaluation: PACU  Note status: Adequate. Level of consciousness: responsive to verbal stimuli and sleepy but conscious  Pain management: satisfactory to patient  Airway patency: patent  Anesthetic complications: no  Cardiovascular status: acceptable  Respiratory status: acceptable  Hydration status: acceptable  Comments: +Post-Anesthesia Evaluation and Assessment    Patient: Rosalie Romberg MRN: [id number]  SSN: [id number]   YOB: 1983  Age: 41 y.o. Sex: male      Cardiovascular Function/Vital Signs    /70 (BP 1 Location: Right upper arm, BP Patient Position: At rest)   Pulse 72   Temp 36.7 °C (98 °F)   Resp 20   Ht 6' (1.829 m)   Wt 104.3 kg (229 lb 15 oz)   SpO2 96%   BMI 31.19 kg/m²     Patient is status post Procedure(s):  BILATERAL L2-3 DISCECTOMY. Nausea/Vomiting: Controlled. Postoperative hydration reviewed and adequate. Pain:  Pain Scale 1: Numeric (0 - 10) (12/21/21 0945)  Pain Intensity 1: 0 (12/21/21 0945)   Managed. Neurological Status:   Neuro (WDL): Exceptions to WDL (12/21/21 0915)   At baseline. Mental Status and Level of Consciousness: Arousable. Pulmonary Status:   O2 Device: None (Room air) (12/21/21 0945)   Adequate oxygenation and airway patent. Complications related to anesthesia: None    Post-anesthesia assessment completed. No concerns. Signed By: Stacy Sotomayor MD    12/21/2021  Post anesthesia nausea and vomiting:  controlled      INITIAL Post-op Vital signs:   Vitals Value Taken Time   /70 12/21/21 0945   Temp 36.7 °C (98 °F) 12/21/21 0915   Pulse 64 12/21/21 0948   Resp 20 12/21/21 0948   SpO2 93 % 12/21/21 0948   Vitals shown include unvalidated device data.

## 2022-01-11 ENCOUNTER — TRANSFERRED RECORDS (OUTPATIENT)
Dept: HEALTH INFORMATION MANAGEMENT | Facility: CLINIC | Age: 52
End: 2022-01-11
Payer: COMMERCIAL

## 2022-01-20 DIAGNOSIS — R11.0 NAUSEA: ICD-10-CM

## 2022-01-20 NOTE — TELEPHONE ENCOUNTER
Patient calling for a refill on Zofran to be sent to [facility] pharmacy.  Karina Fenton MA  [facility]  2nd Floor  Primary Care

## 2022-01-21 ENCOUNTER — TELEPHONE (OUTPATIENT)
Dept: SURGERY | Facility: CLINIC | Age: 52
End: 2022-01-21
Payer: COMMERCIAL

## 2022-01-21 DIAGNOSIS — K21.9 GASTROESOPHAGEAL REFLUX DISEASE, UNSPECIFIED WHETHER ESOPHAGITIS PRESENT: ICD-10-CM

## 2022-01-21 RX ORDER — OMEPRAZOLE 40 MG/1
40 CAPSULE, DELAYED RELEASE ORAL
Qty: 90 CAPSULE | Refills: 0 | Status: SHIPPED | OUTPATIENT
Start: 2022-01-21 | End: 2022-04-23

## 2022-01-21 RX ORDER — ONDANSETRON 4 MG/1
4 TABLET, ORALLY DISINTEGRATING ORAL EVERY 8 HOURS PRN
Qty: 20 TABLET | Refills: 3 | Status: SHIPPED | OUTPATIENT
Start: 2022-01-21

## 2022-01-21 NOTE — TELEPHONE ENCOUNTER
Called pt and reached  left call back number    Aspen SIFUENTES RN Specialty Triage 1/21/2022 12:53 PM

## 2022-01-21 NOTE — TELEPHONE ENCOUNTER
Reason for Call:  Other call back    Detailed comments: Patient wanted to speak with Marcial.    Phone Number Patient can be reached at: Cell number on file:    Telephone Information:   Mobile [phone]       Best Time: Anytime.    Can we leave a detailed message on this number? YES    Call taken on 1/21/2022 at 9:20 AM by Charito Valle

## 2022-01-27 NOTE — TELEPHONE ENCOUNTER
Followed up with another call . Pt had her questions addressed. No action needed.    Aspen SIFUENTES RN Specialty Triage 1/27/2022 12:44 PM

## 2022-02-07 NOTE — PROGRESS NOTES
[facility]  [id number] [facility] [address]  Phone: [phone]  Primary Provider: Kizzy Zamora  Pre-op Performing Provider: KIZZY ZAMORA       PREOPERATIVE EVALUATION:  Today's date: 2/9/2022    Kalyn Rivero is a 51 year old female who presents for a preoperative evaluation.    Surgical Information:  Surgery/Procedure: gall bladder removal  Surgery Location: [facility]  Surgeon: Marcial  Surgery Date: 2/24/2022  Time of Surgery: 8:45 Am  Where patient plans to recover: At home with family      Type of Anesthesia Anticipated: General      Pre-op Questionnaire 2/9/2022   Surgeon: -   Surgery/Procedure: -   Surgery Date: -   Time of Surgery: -   1. Have you ever had a heart attack or stroke? CVA- no residual symptoms  Lasted 24 hours 25 years ago   2. Have you ever had surgery on your heart or blood vessels, such as a stent placement, a coronary artery bypass, or surgery on an artery in your head, neck, heart, or legs? No   3. Do you have chest pain with activity? No   4. Do you have a history of  heart failure? No   5. Do you currently have a cold, bronchitis or symptoms of other infection? No   6. Do you have a cough, shortness of breath, or wheezing? No   7. Do you or anyone in your family have previous history of blood clots? YES - aunt   8. Do you or does anyone in your family have a serious bleeding problem such as prolonged bleeding following surgeries or cuts? UNKNOWN -     9. Have you ever had problems with anemia or been told to take iron pills? YES - November 2021 11.7   10. Have you had any abnormal blood loss such as black, tarry or bloody stools, or abnormal vaginal bleeding? No   11. Have you ever had a blood transfusion? YES - shoulder surgery   11a. Have you ever had a transfusion reaction? No   12. Are you willing to have a blood transfusion if it is medically needed before, during, or after your surgery? Yes   13. Have you or any  of your relatives ever had problems with anesthesia? YES - shoulder surgery   14. Do you have sleep apnea, excessive snoring or daytime drowsiness? UNKNOWN -  No hyperthermia   15. Do you have any artifical heart valves or other implanted medical devices like a pacemaker, defibrillator, or continuous glucose monitor? No   16. Do you have artificial joints? YES - multiple joints   17. Are you allergic to latex? No   18. Is there any chance that you may be pregnant? No         Subjective     HPI related to upcoming procedure: cholecystis     Health Care Directive:  Patient does not have a Health Care Directive or Living Will: Discussed advance care planning with patient; however, patient declined at this time.    Preoperative Review of :   reviewed - controlled substances reflected in medication list.       Status of Chronic Conditions:  See problem list    Review of Systems  CONSTITUTIONAL: NEGATIVE for fever, chills, change in weight  INTEGUMENTARY/SKIN: NEGATIVE for worrisome rashes, moles or lesions  EYES: NEGATIVE for vision changes or irritation  ENT/MOUTH: NEGATIVE for ear, mouth and throat problems  RESP: NEGATIVE for significant cough or SOB  CV: NEGATIVE for chest pain, palpitations or peripheral edema  GI: NEGATIVE for nausea, abdominal pain, heartburn, or change in bowel habits  : NEGATIVE for frequency, dysuria, or hematuria  NEURO: NEGATIVE for weakness, dizziness or paresthesias  ENDOCRINE: NEGATIVE for temperature intolerance, skin/hair changes  HEME: NEGATIVE for bleeding problems  PSYCHIATRIC: NEGATIVE for changes in mood or affect    Patient Active Problem List    Diagnosis Date Noted     Health Care Home 05/27/2011     Priority: High     Not in Active Care Coordination                                                                          DX V65.8 REPLACED WITH 96794 HEALTH CARE HOME (04/08/2013)         Status post surgery 11/27/2018     Priority: Medium     Other mechanical complication  of internal elbow joint prosthesis (H) 04/16/2018     Priority: Medium     Insomnia 04/04/2018     Priority: Medium     Periprosthetic fracture around internal prosthetic right elbow joint 08/28/2017     Priority: Medium     Last Assessment & Plan:   47yoF with periprosthetic fracture of the humeral shaft, loosening of total elbow arthroplasty, loss of fixation of plate and screw construct.    - Discussed surgical intervention of right trans humeral amputation. At this time, pt is not ready to proceed with amputation. Also discussed possible ORIF with longer stem. Plan to discuss case with her shoulder replacement surgeon.   - Continue Gandhi brace and sling  - NWB RUE  - Pain management per pain provider  - Follow up 3 weeks       Chronic tension-type headache 08/21/2017     Priority: Medium     Migraine 08/21/2017     Priority: Medium     Myofascial pain 07/10/2017     Priority: Medium     Cervical high risk HPV (human papillomavirus) test positive 02/22/2017     Priority: Medium     2/22/17: NIL Pap, + HR HPV 18 & other HR HPV. Plan colp  3/28/17 [place] [facility] - negative for dysplasia. Plan cotest in 1 year.   5/3/18 [place] [facility] - Negative. Plan cotest in 1 year.   6/6/19 Lost to follow-up for pap tracking  8/22/19 NIL Pap, Neg HPV, Endometrial cells present. Plan cotest in 1 year, unless abnormal bleeding then sooner.   1/5/21 Lost to follow-up for pap tracking  7/14/2021 NIL pap, +HR HPV (not 16/18). Plan cotest in 1 year         Other drug-induced neutropenia (H) 04/14/2016     Priority: Medium     Rheumatoid arthritis with positive rheumatoid factor, involving unspecified site (H) 04/14/2016     Priority: Medium     Fibromyositis 07/27/2015     Priority: Medium     Rheumatoid arthritis of foot (H) 04/13/2015     Priority: Medium     Encounter for long-term opiate analgesic use 07/15/2014     Priority: Medium     Arthralgia of temporomandibular joint 07/10/2014     Priority: Medium     Intractable chronic  migraine without aura 07/10/2014     Priority: Medium     Problem list name updated by automated process. Provider to review       Lesion of ulnar nerve 06/03/2014     Priority: Medium     Right arm numbness 05/21/2014     Priority: Medium     Right upper extremity numbness 04/02/2014     Priority: Medium     Esophageal reflux 03/25/2014     Priority: Medium     Postoperative pain 11/20/2013     Priority: Medium     S/P cervical spinal fusion 11/20/2013     Priority: Medium     Drug-seeking behavior 11/20/2013     Priority: Medium     Opioid type dependence (H) 11/20/2013     Priority: Medium     Problem list name updated by automated process. Provider to review       Severe frontal headaches 11/20/2013     Priority: Medium     Abdominal pain, unspecified abdominal location 11/20/2013     Priority: Medium     Problem list name updated by automated process. Provider to review       Atlantoaxial instability 11/18/2013     Priority: Medium     Trochanteric bursitis - left 10/04/2012     Priority: Medium     Knee joint replacement - left 01/26/2012     Priority: Medium     Patient is followed by KIZZY LUA for ongoing prescription of narcotic pain medicine.  Med: oxycodone 5 mg.  Maximum use per month: 120  Expected duration: 8 weeks postoperative.  Narcotic agreement on file: YES  Clinic visit recommended: Q 4 weeks    Patient is followed by KIZZY LUA for ongoing prescription of narcotic pain medicine.  Med: oxycontin 20 mg.   Maximum use per month: 60  Expected duration: 1 month  Narcotic agreement on file: YES  Clinic visit recommended: Q 4 weeks       TOTAL KNEE ARTHROPLASTY - bilateral 12/12/2011     Priority: Medium     Liver failure, acute 04/22/2011     Priority: Medium     CARDIOVASCULAR SCREENING; LDL GOAL LESS THAN 160 04/22/2011     Priority: Medium     Renal stone      Priority: Medium     Suicide risk 04/17/2011     Priority: Medium     Grief reaction 04/17/2011     Priority: Medium     " 3       Acetaminophen overdose 03/24/2011     Priority: Medium     Hepatic failure (H) 03/24/2011     Priority: Medium     Pancreatitis 03/24/2011     Priority: Medium     Anemia 03/24/2011     Priority: Medium     Chronic pain 03/24/2011     Priority: Medium     Pneumonia 03/24/2011     Priority: Medium     Hypothyroidism      Priority: Medium      Past Medical History:   Diagnosis Date     Acetaminophen overdose 3/24/2011     Anemia      Anesthesia complication     pt states has a history of heart stopping during anesthesia,     Arrhythmia      Cerebral infarction (H)      Cervical high risk HPV (human papillomavirus) test positive 02/22/2017    type 18 & other HR HPV     Chronic infection     MRSA     Chronic pain 3/24/2011     Degenerative joint disease      Endometriosis      Fibromyalgia      Gastro-oesophageal reflux disease      Heart murmur      History of blood transfusion      Hypothyroidism      Immune disorder (H)      Learning disability      Malignant neoplasm (H)      Migraine      MRSA (methicillin resistant staph aureus) culture positive      Neck injuries      Opioid type dependence (H)      Other chronic pain      PONV (postoperative nausea and vomiting)     states \"flatlines\"during anesthesia     RA (rheumatoid arthritis) (H)      Renal stone      Scoliosis      Stomach ulcer     history     Past Surgical History:   Procedure Laterality Date     ARTHRODESIS FOOT  5/23/2012    Procedure:ARTHRODESIS FOOT; Right Subtalar andTaloNavicular  Fusion  ; Surgeon:CHRIS ZHOU; [facility]     ARTHRODESIS FOOT Left 4/22/2015    Procedure: ARTHRODESIS FOOT;  Surgeon: Chris Zhou MD;  [facility]     ARTHROPLASTY ELBOW Right 9/30/2015    Procedure: ARTHROPLASTY ELBOW;  Surgeon: Carrol Gaspar MD;  [facility]     ARTHROPLASTY KNEE Right      ARTHROPLASTY REVISION ELBOW Right 11/27/2018    Procedure: 1 - aspiration of Right elbow 2- Explantation of failed hardware " Right humeral periprosthetic fracture non-union 3- Right distal humerus excision 4- Right distal humerus replacement 5 - Revision Right total elbow arthroplasty;  Surgeon: Aakash Zimmer MD;  [facility]     ARTHROPLASTY WRIST      x2 Lt wrist replacement.     ARTHROTOMY ELBOW Right 6/18/2015    Procedure: ARTHROTOMY ELBOW;  Surgeon: Carrol Gaspar MD;  Location:  OR     [facility] ARTHROSCOP,DIAGNOSTIC  12/17/2008    left total shoulder arthroplasty by Dr. Law     CYSTOSCOPY  02/06/2009    1.cystoscopy.2.bilateral ureteroscopy.3.basketing of stone fragments from the right kidney.4.bilateral double-J ureteral stent placement.5.ann catheter placement.6.fluoroscopy and intraoperative interpretation of images.     CYSTOSCOPY  01/08/2007    1. cystoscopy and left stent removal.2.left ureteroscopy     DECOMPRESSION CUBITAL TUNNEL  6/6/2014    Procedure: DECOMPRESSION CUBITAL TUNNEL;  Surgeon: Janelle Coates MD;  [facility]     ENDOSCOPY  03/31/2005    upper GI endoscopy-[facility]     ESOPHAGOSCOPY, GASTROSCOPY, DUODENOSCOPY (EGD), COMBINED  3/17/2014    Procedure: COMBINED ESOPHAGOSCOPY, GASTROSCOPY, DUODENOSCOPY (EGD), BIOPSY SINGLE OR MULTIPLE;;  Surgeon: Duane, William Charles, MD;  Location: [facility] OR     FUSION CERVICAL POSTERIOR ONE LEVEL  11/18/2013    Procedure: FUSION CERVICAL POSTERIOR ONE LEVEL;  Cervical 1-2 Posterior Cervical Fusion ([facility]ms Procedure);  Surgeon: Carrol Gunn MD;  [facility] OR     GYN SURGERY       INJECT MAJOR JOINT / BURSA Left 1/5/2017    Procedure: INJECT MAJOR JOINT / BURSA;  Surgeon: Fredy Patel DO;  [facility]     INJECT STEROID (LOCATION) Left 6/18/2015    Procedure: INJECT STEROID (LOCATION);  Surgeon: Carrol Gaspar MD;  [facility]     NECK SURGERY       REMOVE FOREIGN BODY UPPER EXTREMITY Right 3/2/2017    Procedure: REMOVE FOREIGN BODY UPPER EXTREMITY;  Surgeon: Carrol Gaspar MD;   [facility]     RESECT BONE UPPER EXTREMITY Left 4/27/2017    Procedure: RESECT BONE UPPER EXTREMITY;  Left Radial Head Resection;  Surgeon: Carrol Gaspar MD;  [facility]     ROTATOR CUFF REPAIR RT/LT  10/19/2005    1.left distal clavicle excision.2.acromioplasty.3.coracoacromial ligament resection.4.rotator cuff exploration     [facility] ANESTH,DX ARTHROSCOPIC PROC KNEE JOINT  10/24/2007    right total knee arthroplasty     [facility] SHOULDER SURG PROC UNLISTED       [facility] TOTAL KNEE ARTHROPLASTY  1/18/12    Left     Current Outpatient Medications   Medication Sig Dispense Refill     celecoxib (CELEBREX) 200 MG capsule Take 200 mg by mouth 3 times daily       ClonazePAM (KLONOPIN PO) Take 0.5 mg by mouth 3 times daily        ENBREL SURECLICK 50 MG/ML autoinjector 50 mg twice a week        leflunomide (ARAVA) 20 MG tablet Take 20 mg by mouth every morning Reported on 3/28/2017       omeprazole (PRILOSEC) 40 MG DR capsule TAKE 1 CAPSULE (40 MG) BY MOUTH 2 TIMES DAILY (BEFORE MEALS) 90 capsule 0     OnabotulinumtoxinA (BOTOX IJ) Inject 200 Units as directed Total dose 200 units   Administered 190 units   Unavoidable waste 30 units   Lot # /C3 with Expiration Date:  12/2020   NDC [address]-01       ondansetron (ZOFRAN-ODT) 4 MG ODT tab Take 1 tablet (4 mg) by mouth every 8 hours as needed for nausea 20 tablet 3     oxyCODONE (ROXICODONE) 5 MG tablet Patient states she takes 4 tablets daily. Morning, noon, supper, bedtime       SENEXON-S 8.6-50 MG tablet TAKE 1-2 TABLETS BY MOUTH 2 TIMES DAILY FOR CONSTIPATION. 100 tablet 1       Allergies   Allergen Reactions     Morphine Swelling     Codeine      Gabapentin Other (See Comments)     Pins,needles, stabbing aching at once  Pins,needles, stabbing aching at once  Numbness and Tingling     Ibuprofen      Doesn't take due to gastric ulcer     Sulfa Drugs Nausea and Vomiting     unknown     Tylenol [Acetaminophen]      Pt. States that she has Liver  problems  Tylenol 3     Erythromycin Nausea     Vomiting      Penicillins Rash     Prednisone Palpitations     Heart racing        Social History     Tobacco Use     Smoking status: Former Smoker     Packs/day: 0.10     Years: 10.00     Pack years: 1.00     Types: Cigarettes     Start date: 8/6/1983     Smokeless tobacco: Never Used     Tobacco comment: Quit 6 weeks ago   Substance Use Topics     Alcohol use: No     Family History   Problem Relation Age of Onset     Diabetes Mother      Hypertension Mother      Allergies Mother      Alcohol/Drug Mother         LIVER CIRROSIS     Blood Disease Mother         B12 DEF     Neurologic Disorder Mother         Epilepsy     Osteoporosis Mother      Cerebrovascular Disease Maternal Grandmother      Arthritis Maternal Grandmother         RHEUMATOID     Cancer Maternal Grandmother      Thyroid Disease Maternal Grandmother      Osteoporosis Maternal Grandmother      Heart Disease Maternal Grandmother      Depression Maternal Grandmother      Neurologic Disorder Maternal Grandmother         MIGRAINES     Anxiety Disorder Maternal Grandmother      Diabetes Maternal Grandmother      Migraines Maternal Grandmother      Deep Vein Thrombosis Maternal Grandmother      Cerebrovascular Disease Maternal Grandfather      Cancer Maternal Grandfather      Mental Illness Maternal Grandfather      Cerebrovascular Disease Paternal Grandmother      Arthritis Paternal Grandmother         RHEUMATOID     Cancer Paternal Grandmother      Osteoporosis Paternal Grandmother      Heart Disease Paternal Grandmother      Depression Paternal Grandmother      Neurologic Disorder Paternal Grandmother         MIGRAINES     Thyroid Disease Paternal Grandmother      Cerebrovascular Disease Paternal Grandfather      Cancer Paternal Grandfather      Heart Disease Brother         MURMUR     Asthma Son      Endocrine Disease Son      Neurologic Disorder Father         epilepsy     Seizure Disorder Father       Hypertension Father      Skin Cancer Father      Anxiety Disorder Father      Mental Illness Father      Hyperlipidemia Father      Kidney Disease Father      Bladder Cancer Father      Neurologic Disorder Daughter         MIGRAINES     Arthritis Daughter         JR     Hypertension Son      LUNG DISEASE Brother      Cancer Brother         lung     Anxiety Disorder Son      Asthma Son      Hypertension Son      Migraines Son      Spine Problems Son      Mental Illness Brother      Migraines Brother      Cardiac Sudden Death Brother      Spine Problems Brother      Glaucoma No family hx of      Macular Degeneration No family hx of      Unknown/Adopted No family hx of      Anesthesia Reaction No family hx of      Other Cancer No family hx of      Rheumatoid Arthritis No family hx of      Bone Cancer No family hx of      Low Back Problems No family hx of      History   Drug Use No         Objective     LMP  (LMP Unknown)     Physical Exam    GENERAL APPEARANCE: healthy, alert and no distress     EYES: EOMI, PERRL     HENT: ear canals and TM's normal and nose and mouth without ulcers or lesions     NECK: no adenopathy, no asymmetry, masses, or scars and thyroid normal to palpation     RESP: lungs clear to auscultation - no rales, rhonchi or wheezes     CV: regular rates and rhythm, normal S1 S2, no S3 or S4 and no murmur, click or rub     ABDOMEN:  soft, nontender, no HSM or masses and bowel sounds normal     MS: extremities normal- no gross deformities noted, no evidence of inflammation in joints, FROM in all extremities.     SKIN: no suspicious lesions or rashes     NEURO: Normal strength and tone, sensory exam grossly normal, mentation intact and speech normal     PSYCH: mentation appears normal. and affect normal/bright     LYMPHATICS: No cervical adenopathy    Recent Labs   Lab Test 12/15/21  1631 11/11/21  0133 11/06/21  0326   HGB  --  11.7 12.2   PLT  --  177 171    138 138   POTASSIUM 4.2 4.1 3.9   CR  0.99 1.20* 1.06*        Diagnostics:  Labs pending at this time.  Results will be reviewed when available.   No EKG required for low risk surgery (cataract, skin procedure, breast biopsy, etc).    Revised Cardiac Risk Index (RCRI):  The patient has the following serious cardiovascular risks for perioperative complications:   - No serious cardiac risks = 0 points     RCRI Interpretation: 0 points: Class I (very low risk - 0.4% complication rate)       kathleen lopez    Signed Electronically by: Mika Zamora MD  Copy of this evaluation report is provided to requesting physician.

## 2022-02-09 ENCOUNTER — OFFICE VISIT (OUTPATIENT)
Dept: FAMILY MEDICINE | Facility: CLINIC | Age: 52
End: 2022-02-09
Payer: COMMERCIAL

## 2022-02-09 VITALS
DIASTOLIC BLOOD PRESSURE: 91 MMHG | OXYGEN SATURATION: 96 % | SYSTOLIC BLOOD PRESSURE: 142 MMHG | HEART RATE: 98 BPM | HEIGHT: 66 IN | TEMPERATURE: 98.2 F | BODY MASS INDEX: 29.96 KG/M2 | WEIGHT: 186.4 LBS

## 2022-02-09 DIAGNOSIS — D64.9 ANEMIA, UNSPECIFIED TYPE: ICD-10-CM

## 2022-02-09 DIAGNOSIS — K81.1 CHRONIC CHOLECYSTITIS: ICD-10-CM

## 2022-02-09 DIAGNOSIS — Z23 NEED FOR TDAP VACCINATION: ICD-10-CM

## 2022-02-09 DIAGNOSIS — Z12.11 COLON CANCER SCREENING: Primary | ICD-10-CM

## 2022-02-09 LAB
ALBUMIN SERPL-MCNC: 3.7 G/DL (ref 3.4–5)
ALP SERPL-CCNC: 122 U/L (ref 40–150)
ALT SERPL W P-5'-P-CCNC: 42 U/L (ref 0–50)
ANION GAP SERPL CALCULATED.3IONS-SCNC: 4 MMOL/L (ref 3–14)
AST SERPL W P-5'-P-CCNC: 34 U/L (ref 0–45)
BILIRUB SERPL-MCNC: 0.5 MG/DL (ref 0.2–1.3)
BUN SERPL-MCNC: 24 MG/DL (ref 7–30)
CALCIUM SERPL-MCNC: 9.2 MG/DL (ref 8.5–10.1)
CHLORIDE BLD-SCNC: 108 MMOL/L (ref 94–109)
CO2 SERPL-SCNC: 25 MMOL/L (ref 20–32)
CREAT SERPL-MCNC: 0.96 MG/DL (ref 0.52–1.04)
GFR SERPL CREATININE-BSD FRML MDRD: 72 ML/MIN/1.73M2
GLUCOSE BLD-MCNC: 102 MG/DL (ref 70–99)
POTASSIUM BLD-SCNC: 4.7 MMOL/L (ref 3.4–5.3)
PROT SERPL-MCNC: 8.5 G/DL (ref 6.8–8.8)
SODIUM SERPL-SCNC: 137 MMOL/L (ref 133–144)

## 2022-02-09 PROCEDURE — 90471 IMMUNIZATION ADMIN: CPT | Performed by: FAMILY MEDICINE

## 2022-02-09 PROCEDURE — 99214 OFFICE O/P EST MOD 30 MIN: CPT | Mod: 25 | Performed by: FAMILY MEDICINE

## 2022-02-09 PROCEDURE — 36415 COLL VENOUS BLD VENIPUNCTURE: CPT | Performed by: FAMILY MEDICINE

## 2022-02-09 PROCEDURE — 90715 TDAP VACCINE 7 YRS/> IM: CPT | Performed by: FAMILY MEDICINE

## 2022-02-09 PROCEDURE — 80053 COMPREHEN METABOLIC PANEL: CPT | Performed by: FAMILY MEDICINE

## 2022-02-09 ASSESSMENT — MIFFLIN-ST. JEOR: SCORE: 1482.25

## 2022-02-09 NOTE — PROGRESS NOTES
Prior to immunization administration, verified patients identity using patient s name and date of birth. Please see Immunization Activity for additional information.     Screening Questionnaire for Adult Immunization    Are you sick today?   No   Do you have allergies to medications, food, a vaccine component or latex?   Yes   Have you ever had a serious reaction after receiving a vaccination?   No   Do you have a long-term health problem with heart, lung, kidney, or metabolic disease (e.g., diabetes), asthma, a blood disorder, no spleen, complement component deficiency, a cochlear implant, or a spinal fluid leak?  Are you on long-term aspirin therapy?   No   Do you have cancer, leukemia, HIV/AIDS, or any other immune system problem?   Yes   Do you have a parent, brother, or sister with an immune system problem?   Yes   In the past 3 months, have you taken medications that affect  your immune system, such as prednisone, other steroids, or anticancer drugs; drugs for the treatment of rheumatoid arthritis, Crohn s disease, or psoriasis; or have you had radiation treatments?   Don't Know   Have you had a seizure, or a brain or other nervous system problem?   No   During the past year, have you received a transfusion of blood or blood    products, or been given immune (gamma) globulin or antiviral drug?   No   For women: Are you pregnant or is there a chance you could become       pregnant during the next month?   No   Have you received any vaccinations in the past 4 weeks?   No     Immunization questionnaire was positive for at least one answer.  Notified Dr Zamora.        Per orders of Dr. Zamora, injection of TDAP given by Elle Juan. Patient instructed to remain in clinic for 15 minutes afterwards, and to report any adverse reaction to me immediately.     okay for surgery pending CBC and blood pressure check  Screening performed by Elle Juan on 2/9/2022 at 3:47 PM.

## 2022-02-16 ENCOUNTER — DOCUMENTATION ONLY (OUTPATIENT)
Dept: LAB | Facility: CLINIC | Age: 52
End: 2022-02-16
Payer: COMMERCIAL

## 2022-02-16 DIAGNOSIS — K81.9 CHOLECYSTITIS: Primary | ICD-10-CM

## 2022-02-16 NOTE — PROGRESS NOTES
Please place an order for covid pre-procedure testing.  Patient has a lab  appointment on 2/21/22.  Thank you, [facility] lab.

## 2022-02-18 ENCOUNTER — TRANSFERRED RECORDS (OUTPATIENT)
Dept: HEALTH INFORMATION MANAGEMENT | Facility: CLINIC | Age: 52
End: 2022-02-18
Payer: COMMERCIAL

## 2022-02-21 ENCOUNTER — ALLIED HEALTH/NURSE VISIT (OUTPATIENT)
Dept: FAMILY MEDICINE | Facility: CLINIC | Age: 52
End: 2022-02-21
Payer: COMMERCIAL

## 2022-02-21 ENCOUNTER — LAB (OUTPATIENT)
Dept: LAB | Facility: CLINIC | Age: 52
End: 2022-02-21
Payer: COMMERCIAL

## 2022-02-21 ENCOUNTER — LAB (OUTPATIENT)
Dept: LAB | Facility: CLINIC | Age: 52
End: 2022-02-21

## 2022-02-21 VITALS — SYSTOLIC BLOOD PRESSURE: 130 MMHG | DIASTOLIC BLOOD PRESSURE: 80 MMHG

## 2022-02-21 DIAGNOSIS — K81.9 CHOLECYSTITIS: ICD-10-CM

## 2022-02-21 DIAGNOSIS — D64.9 ANEMIA, UNSPECIFIED TYPE: Primary | ICD-10-CM

## 2022-02-21 DIAGNOSIS — D64.9 ANEMIA, UNSPECIFIED TYPE: ICD-10-CM

## 2022-02-21 LAB
BASOPHILS # BLD AUTO: 0.1 10E3/UL (ref 0–0.2)
BASOPHILS NFR BLD AUTO: 1 %
EOSINOPHIL # BLD AUTO: 0.4 10E3/UL (ref 0–0.7)
EOSINOPHIL NFR BLD AUTO: 7 %
ERYTHROCYTE [DISTWIDTH] IN BLOOD BY AUTOMATED COUNT: 15.1 % (ref 10–15)
HCT VFR BLD AUTO: 43.1 % (ref 35–47)
HGB BLD-MCNC: 13.6 G/DL (ref 11.7–15.7)
LYMPHOCYTES # BLD AUTO: 1.5 10E3/UL (ref 0.8–5.3)
LYMPHOCYTES NFR BLD AUTO: 28 %
MCH RBC QN AUTO: 27.9 PG (ref 26.5–33)
MCHC RBC AUTO-ENTMCNC: 31.6 G/DL (ref 31.5–36.5)
MCV RBC AUTO: 88 FL (ref 78–100)
MONOCYTES # BLD AUTO: 0.5 10E3/UL (ref 0–1.3)
MONOCYTES NFR BLD AUTO: 9 %
NEUTROPHILS # BLD AUTO: 3 10E3/UL (ref 1.6–8.3)
NEUTROPHILS NFR BLD AUTO: 56 %
PLATELET # BLD AUTO: 220 10E3/UL (ref 150–450)
RBC # BLD AUTO: 4.88 10E6/UL (ref 3.8–5.2)
WBC # BLD AUTO: 5.3 10E3/UL (ref 4–11)

## 2022-02-21 PROCEDURE — 85025 COMPLETE CBC W/AUTO DIFF WBC: CPT

## 2022-02-21 PROCEDURE — 99207 PR NO CHARGE NURSE ONLY: CPT

## 2022-02-21 PROCEDURE — U0005 INFEC AGEN DETEC AMPLI PROBE: HCPCS

## 2022-02-21 PROCEDURE — 36415 COLL VENOUS BLD VENIPUNCTURE: CPT

## 2022-02-21 PROCEDURE — U0003 INFECTIOUS AGENT DETECTION BY NUCLEIC ACID (DNA OR RNA); SEVERE ACUTE RESPIRATORY SYNDROME CORONAVIRUS 2 (SARS-COV-2) (CORONAVIRUS DISEASE [COVID-19]), AMPLIFIED PROBE TECHNIQUE, MAKING USE OF HIGH THROUGHPUT TECHNOLOGIES AS DESCRIBED BY CMS-2020-01-R: HCPCS

## 2022-02-22 LAB — SARS-COV-2 RNA RESP QL NAA+PROBE: NEGATIVE

## 2022-02-24 ENCOUNTER — TELEPHONE (OUTPATIENT)
Dept: SURGERY | Facility: CLINIC | Age: 52
End: 2022-02-24
Payer: COMMERCIAL

## 2022-02-25 NOTE — TELEPHONE ENCOUNTER
Patient presents to clinic requesting help filling her pain medication. She had procedure today and pharmacy will not fill prescribed pain medication. Patient is tearful and reports pain is increasing. Writer advised patient to be seen at ER.     Routing message to specialty nurse pool. Please call patient and follow up on pain.     She has appointment scheduled 2/28/22    Patient ph # [phone]    Fang YEUNG RN  [facility]

## 2022-02-25 NOTE — TELEPHONE ENCOUNTER
Called pt to verify what pharmacy-[facility]. Will call once they are open.    Aspen SIFUENTES RN Specialty Triage 2/25/2022 8:30 AM

## 2022-02-25 NOTE — TELEPHONE ENCOUNTER
10 tabs would not surpass her daily MME. Cannot be ran d/t her daily amount. If she wants to pay out of pocket it would be 11.99. returned call to pt and let her know this. She will pay out of pocket for her fill r/t acute pain post op.    Aspen SIFUENTES RN Specialty Triage 2/25/2022 9:30 AM

## 2022-03-14 ENCOUNTER — TRANSFERRED RECORDS (OUTPATIENT)
Dept: HEALTH INFORMATION MANAGEMENT | Facility: CLINIC | Age: 52
End: 2022-03-14

## 2022-04-11 ENCOUNTER — OFFICE VISIT (OUTPATIENT)
Dept: SURGERY | Facility: CLINIC | Age: 52
End: 2022-04-11
Payer: COMMERCIAL

## 2022-04-11 VITALS
WEIGHT: 184 LBS | BODY MASS INDEX: 29.7 KG/M2 | HEART RATE: 75 BPM | DIASTOLIC BLOOD PRESSURE: 96 MMHG | SYSTOLIC BLOOD PRESSURE: 147 MMHG

## 2022-04-11 DIAGNOSIS — T14.8XXA WOUND OF SKIN: ICD-10-CM

## 2022-04-11 DIAGNOSIS — Z90.49 S/P LAPAROSCOPIC CHOLECYSTECTOMY: Primary | ICD-10-CM

## 2022-04-11 PROCEDURE — 99024 POSTOP FOLLOW-UP VISIT: CPT | Performed by: SURGERY

## 2022-04-11 NOTE — PROGRESS NOTES
General Surgery Post Op    Pt returns for follow up visit s/p robotic cholecystectomy on 2/24/2022.    Patient has been doing well, tolerating diet. Bowels moving well. Pain controlled.  She had gotten her shirt wet and then noticed a long crusty wound or scab in the upper abdomen which did include one of the port sites.  Otherwise incisions were healing well.  The epigastric area sometimes has felt hard but this is close to the supraumbilical incision which still has some incisional hardness.  The area of the skin wound has been healing and she currently has no need for dressings, no pain there.    Physical exam: Vitals: BP (!) 147/96   Pulse 75   Wt 83.5 kg (184 lb)   LMP  (LMP Unknown)   BMI 29.70 kg/m    BMI= Body mass index is 29.7 kg/m .    Exam:  Constitutional: healthy, alert and no distress  Gastrointestinal: Abdomen soft, non-tender. BS normal. No masses, organomegaly  There is a large area that appears to be missing epidermis on the upper abdomen starting just to the right of midline extending over to the left upper quadrant.  Somewhat irregular at about 5 cm wide at its widest.  No weeping or drainage, no scabs currently.  Area currently nontender.  This actually appears like a healing burn.  Area includes the upper right most port site which appears to still be intact and healing fine.  The other incisions appear normal.    Path:  Final Diagnosis   Gallbladder, cholecystectomy - Cholelithiasis with mild chronic cholecystitis and cholesterolosis.  No dysplasia or malignancy.         Assessment:     ICD-10-CM    1. S/P laparoscopic cholecystectomy  Z90.49    2. Wound of skin  T14.8XXA      Plan: Patient had some pictures earlier on of this skin wound and did have some crusting somewhat like impetigo but unsure if that is true cause.  In any case it does appear to be healing but may cause area of scarring and discoloration to that abdominal skin  She is otherwise doing well so we will leave further  follow-ups as needed.    Bobby Ceja MD

## 2022-04-11 NOTE — LETTER
4/11/2022         RE: Kalyn Rivero  [address] [facility] [address]        Dear Colleague,    Thank you for referring your patient, Kalyn Rivero, to the [facility]. Please see a copy of my visit note below.    General Surgery Post Op    Pt returns for follow up visit s/p robotic cholecystectomy on 2/24/2022.    Patient has been doing well, tolerating diet. Bowels moving well. Pain controlled.  She had gotten her shirt wet and then noticed a long crusty wound or scab in the upper abdomen which did include one of the port sites.  Otherwise incisions were healing well.  The epigastric area sometimes has felt hard but this is close to the supraumbilical incision which still has some incisional hardness.  The area of the skin wound has been healing and she currently has no need for dressings, no pain there.    Physical exam: Vitals: BP (!) 147/96   Pulse 75   Wt 83.5 kg (184 lb)   LMP  (LMP Unknown)   BMI 29.70 kg/m    BMI= Body mass index is 29.7 kg/m .    Exam:  Constitutional: healthy, alert and no distress  Gastrointestinal: Abdomen soft, non-tender. BS normal. No masses, organomegaly  There is a large area that appears to be missing epidermis on the upper abdomen starting just to the right of midline extending over to the left upper quadrant.  Somewhat irregular at about 5 cm wide at its widest.  No weeping or drainage, no scabs currently.  Area currently nontender.  This actually appears like a healing burn.  Area includes the upper right most port site which appears to still be intact and healing fine.  The other incisions appear normal.    Path:  Final Diagnosis   Gallbladder, cholecystectomy - Cholelithiasis with mild chronic cholecystitis and cholesterolosis.  No dysplasia or malignancy.         Assessment:     ICD-10-CM    1. S/P laparoscopic cholecystectomy  Z90.49    2. Wound of skin  T14.8XXA      Plan: Patient had some pictures earlier on of this skin  wound and did have some crusting somewhat like impetigo but unsure if that is true cause.  In any case it does appear to be healing but may cause area of scarring and discoloration to that abdominal skin  She is otherwise doing well so we will leave further follow-ups as needed.    Bobby Ceja MD          Again, thank you for allowing me to participate in the care of your patient.        Sincerely,        Bobby Ceja MD

## 2022-04-15 ENCOUNTER — TRANSFERRED RECORDS (OUTPATIENT)
Dept: HEALTH INFORMATION MANAGEMENT | Facility: CLINIC | Age: 52
End: 2022-04-15
Payer: COMMERCIAL

## 2022-04-22 DIAGNOSIS — K21.9 GASTROESOPHAGEAL REFLUX DISEASE, UNSPECIFIED WHETHER ESOPHAGITIS PRESENT: ICD-10-CM

## 2022-04-23 RX ORDER — OMEPRAZOLE 40 MG/1
40 CAPSULE, DELAYED RELEASE ORAL
Qty: 90 CAPSULE | Refills: 0 | Status: SHIPPED | OUTPATIENT
Start: 2022-04-23 | End: 2022-05-26

## 2022-04-28 ENCOUNTER — TRANSFERRED RECORDS (OUTPATIENT)
Dept: HEALTH INFORMATION MANAGEMENT | Facility: CLINIC | Age: 52
End: 2022-04-28
Payer: COMMERCIAL

## 2022-04-28 LAB
ALT SERPL-CCNC: 27 IU/L (ref 5–35)
AST SERPL-CCNC: 37 U/L (ref 5–34)
CREATININE (EXTERNAL): 1.11 MG/DL (ref 0.5–1.3)
GFR ESTIMATED (EXTERNAL): 55.3 ML/MIN/1.73M2

## 2022-05-09 DIAGNOSIS — T84.84XA PAINFUL ORTHOPAEDIC HARDWARE (H): Primary | ICD-10-CM

## 2022-05-09 NOTE — TELEPHONE ENCOUNTER
DIAGNOSIS: left big toe was fused 5yrs? ago and now the screw is coming out of skin / [facility] / [facility] - [facility]?   APPOINTMENT DATE: 5.10.22   NOTES STATUS DETAILS   OFFICE NOTE from other specialist Internal    Arthritis and Rheum records in chart 8.9.16 Dr Chris Hendricks, [facility]  7.19.16  5.24.16  7.21.15  4.14.15  6.10.08   OPERATIVE REPORT Care Everywhere/Internal 6.3.16 [facility]  10.30.15 [facility]   4.23.15 Internal   MEDICATION LIST Internal    LABS     CBC/DIFF Internal    XRAYS (IMAGES & REPORTS) Internal 10.20.15 L foot  6.9.15 L foot

## 2022-05-10 ENCOUNTER — OFFICE VISIT (OUTPATIENT)
Dept: ORTHOPEDICS | Facility: CLINIC | Age: 52
End: 2022-05-10
Payer: COMMERCIAL

## 2022-05-10 ENCOUNTER — PRE VISIT (OUTPATIENT)
Dept: ORTHOPEDICS | Facility: CLINIC | Age: 52
End: 2022-05-10

## 2022-05-10 VITALS — HEIGHT: 66 IN | BODY MASS INDEX: 29.57 KG/M2 | WEIGHT: 184 LBS

## 2022-05-10 DIAGNOSIS — M25.572 PAIN IN JOINT, ANKLE AND FOOT, LEFT: Primary | ICD-10-CM

## 2022-05-10 PROCEDURE — 99204 OFFICE O/P NEW MOD 45 MIN: CPT | Performed by: ORTHOPAEDIC SURGERY

## 2022-05-10 RX ORDER — CLONAZEPAM 0.5 MG/1
TABLET ORAL
COMMUNITY
Start: 2022-04-06 | End: 2024-09-03 | Stop reason: ALTCHOICE

## 2022-05-10 RX ORDER — LEFLUNOMIDE 10 MG/1
TABLET ORAL EVERY 24 HOURS
COMMUNITY
End: 2023-07-19

## 2022-05-10 NOTE — PROGRESS NOTES
CHIEF COMPLAINT:  Status post left first MTP joint fusion with pinning of the second, third and fourth toes performed on 06/03/2016.    HISTORY OF PRESENT ILLNESS:  Mrs. Rivero is a 51-year-old female who presents today for evaluation of her left foot.  The patient underwent the surgery mentioned above and reports to have been doing well, although more recently she reports to have some pain and discomfort from the prominent hardware along the dorsal aspect of the first MTP joint.    Reports otherwise to be doing well and to manage her arthritis to the best of her ability.    Presents today in the company of her son and reports also to be fairly busy and she is remodeling the bathroom at her home.    PAST MEDICAL HISTORY:  Quite extensive and it was reviewed today as well as past surgical history.    DRUG ALLERGIES:  Multiple and reviewed.    CURRENT MEDICATIONS:  Reviewed.    PHYSICAL EXAMINATION:  On today's visit, she presents as a pleasant female in no apparent distress.  Reports to have a height of 5 feet 6 with a weight of 184 pounds.  Denies to have any constitutional symptoms.    On today's visit, she presents with excellent alignment of the left great toe with a solid fusion.  There is clearly palpable hardware, which is very sensitive, especially for the most distal screws.  There are no pressure points or skin breakdown.    IMAGING:  Three views of the left foot were reviewed today, which are significant for showing a solid fusion across the first MTP joint with excellent alignment.  Presents with small frag screws and a plate dorsally.    ASSESSMENT:  Left foot painful retained hardware.    PLAN:  I discussed with the patient and her son that at this point I think it is very reasonable to proceed with hardware removal from the left foot.  I discussed with her the most likely postoperative course and complications from undergoing such intervention, which include but are not limited to infection, bleeding,  nerve damage, residual pain and stiffness.    All questions were answered.  Patient was pleased with the discussion.  In the meantime, she has no activity restrictions.  The patient will proceed with the surgery at the best of her convenience, which again will consist of a left foot hardware removal.    TT:  30 minutes.  CT:  20 minutes.

## 2022-05-10 NOTE — NURSING NOTE
"Reason For Visit:   Chief Complaint   Patient presents with     RECHECK     Follow-up to discuss hardware issue, patient  reports that the screw is backing out, started having issues about a year ago, claims her rheumatologist told her to wrap it, screw has popped through the skin per the patient, did not want to remove her dressings, states that sulema her dressings are removed she has tremendous pain          Ht 1.676 m (5' 6\")   Wt 83.5 kg (184 lb)   LMP  (LMP Unknown)   BMI 29.70 kg/m      Pain Assessment  Patient Currently in Pain: Yes  0-10 Pain Scale: 3 (Medial aspect of left foot feels asleep, walking she can feel the hardware moving)    Johnnie Osullivan, EMT    "

## 2022-05-10 NOTE — NURSING NOTE
Teaching Flowsheet   Relevant Diagnosis: R 1st [facility]  Teaching Topic: R 1st [facility]     RN Note: Pt is calm and cooperative, asking questions appropriately. Pt was instructed on pre-surgical packet requirements including H&P, COVID-19 test, NPO, and pre-surgical scrub. Pt verbalized understanding. Pt will schedule H&P c PCP, COVID test 3-4 days before surgery, scrub and packet given to pt. Pt would like 6/8/22 [facility] surgical date.     Person(s) involved in teaching:   Patient and Son     Motivation Level:  Asks Questions: Yes  Eager to Learn: Yes  Cooperative: Yes  Receptive (willing/able to accept information): Yes  Any cultural factors/Taoism beliefs that may influence understanding or compliance? No     Patient demonstrates understanding of the following:  Reason for the appointment, diagnosis and treatment plan: Yes  Knowledge of proper use of medications and conditions for which they are ordered (with special attention to potential side effects or drug interactions): Yes  Which situations necessitate calling provider and whom to contact: Yes    Proper use and care of (medical equip, care aids, etc.): Yes  Nutritional needs and diet plan: Yes  Pain management techniques: Yes  Wound Care: Yes  How and/when to access community resources: Yes    Pal George, RNCC

## 2022-05-10 NOTE — LETTER
5/10/2022         RE: Kalyn Rivero  [address] [facility] [address]        Dear Colleague,    Thank you for referring your patient, Kalyn Rivero, to the [facility] [place]. Please see a copy of my visit note below.    CHIEF COMPLAINT:  Status post left first MTP joint fusion with pinning of the second, third and fourth toes performed on 06/03/2016.    HISTORY OF PRESENT ILLNESS:  Mrs. Rivero is a 51-year-old female who presents today for evaluation of her left foot.  The patient underwent the surgery mentioned above and reports to have been doing well, although more recently she reports to have some pain and discomfort from the prominent hardware along the dorsal aspect of the first MTP joint.    Reports otherwise to be doing well and to manage her arthritis to the best of her ability.    Presents today in the company of her son and reports also to be fairly busy and she is remodeling the bathroom at her home.    PAST MEDICAL HISTORY:  Quite extensive and it was reviewed today as well as past surgical history.    DRUG ALLERGIES:  Multiple and reviewed.    CURRENT MEDICATIONS:  Reviewed.    PHYSICAL EXAMINATION:  On today's visit, she presents as a pleasant female in no apparent distress.  Reports to have a height of 5 feet 6 with a weight of 184 pounds.  Denies to have any constitutional symptoms.    On today's visit, she presents with excellent alignment of the left great toe with a solid fusion.  There is clearly palpable hardware, which is very sensitive, especially for the most distal screws.  There are no pressure points or skin breakdown.    IMAGING:  Three views of the left foot were reviewed today, which are significant for showing a solid fusion across the first MTP joint with excellent alignment.  Presents with small frag screws and a plate dorsally.    ASSESSMENT:  Left foot painful retained hardware.    PLAN:  I discussed with the patient and her son  that at this point I think it is very reasonable to proceed with hardware removal from the left foot.  I discussed with her the most likely postoperative course and complications from undergoing such intervention, which include but are not limited to infection, bleeding, nerve damage, residual pain and stiffness.    All questions were answered.  Patient was pleased with the discussion.  In the meantime, she has no activity restrictions.  The patient will proceed with the surgery at the best of her convenience, which again will consist of a left foot hardware removal.    TT:  30 minutes.  CT:  20 minutes.        Chris Hendricks MD

## 2022-05-20 ENCOUNTER — TRANSFERRED RECORDS (OUTPATIENT)
Dept: HEALTH INFORMATION MANAGEMENT | Facility: CLINIC | Age: 52
End: 2022-05-20
Payer: COMMERCIAL

## 2022-05-24 ENCOUNTER — TELEPHONE (OUTPATIENT)
Dept: ORTHOPEDICS | Facility: CLINIC | Age: 52
End: 2022-05-24
Payer: COMMERCIAL

## 2022-05-25 DIAGNOSIS — K21.9 GASTROESOPHAGEAL REFLUX DISEASE, UNSPECIFIED WHETHER ESOPHAGITIS PRESENT: ICD-10-CM

## 2022-05-25 DIAGNOSIS — M97.41XD: ICD-10-CM

## 2022-05-26 ENCOUNTER — OFFICE VISIT (OUTPATIENT)
Dept: FAMILY MEDICINE | Facility: CLINIC | Age: 52
End: 2022-05-26
Payer: COMMERCIAL

## 2022-05-26 VITALS
DIASTOLIC BLOOD PRESSURE: 90 MMHG | HEART RATE: 74 BPM | SYSTOLIC BLOOD PRESSURE: 136 MMHG | BODY MASS INDEX: 29.54 KG/M2 | TEMPERATURE: 97.4 F | WEIGHT: 183 LBS | OXYGEN SATURATION: 98 %

## 2022-05-26 DIAGNOSIS — R30.0 DYSURIA: Primary | ICD-10-CM

## 2022-05-26 DIAGNOSIS — Z01.818 PRE-OP EXAM: ICD-10-CM

## 2022-05-26 DIAGNOSIS — N18.1 CHRONIC KIDNEY DISEASE, STAGE 1: ICD-10-CM

## 2022-05-26 DIAGNOSIS — H10.32 ACUTE BACTERIAL CONJUNCTIVITIS OF LEFT EYE: ICD-10-CM

## 2022-05-26 LAB
ALBUMIN UR-MCNC: NEGATIVE MG/DL
ANION GAP SERPL CALCULATED.3IONS-SCNC: 5 MMOL/L (ref 3–14)
APPEARANCE UR: CLEAR
BACTERIA #/AREA URNS HPF: ABNORMAL /HPF
BASOPHILS # BLD AUTO: 0 10E3/UL (ref 0–0.2)
BASOPHILS NFR BLD AUTO: 1 %
BILIRUB UR QL STRIP: NEGATIVE
BUN SERPL-MCNC: 24 MG/DL (ref 7–30)
CALCIUM SERPL-MCNC: 9 MG/DL (ref 8.5–10.1)
CHLORIDE BLD-SCNC: 111 MMOL/L (ref 94–109)
CO2 SERPL-SCNC: 25 MMOL/L (ref 20–32)
COLOR UR AUTO: YELLOW
CREAT SERPL-MCNC: 1.16 MG/DL (ref 0.52–1.04)
EOSINOPHIL # BLD AUTO: 0.4 10E3/UL (ref 0–0.7)
EOSINOPHIL NFR BLD AUTO: 7 %
ERYTHROCYTE [DISTWIDTH] IN BLOOD BY AUTOMATED COUNT: 16.8 % (ref 10–15)
GFR SERPL CREATININE-BSD FRML MDRD: 57 ML/MIN/1.73M2
GLUCOSE BLD-MCNC: 132 MG/DL (ref 70–99)
GLUCOSE UR STRIP-MCNC: NEGATIVE MG/DL
HCT VFR BLD AUTO: 40.8 % (ref 35–47)
HGB BLD-MCNC: 12.9 G/DL (ref 11.7–15.7)
HGB UR QL STRIP: NEGATIVE
KETONES UR STRIP-MCNC: NEGATIVE MG/DL
LEUKOCYTE ESTERASE UR QL STRIP: ABNORMAL
LYMPHOCYTES # BLD AUTO: 1.6 10E3/UL (ref 0.8–5.3)
LYMPHOCYTES NFR BLD AUTO: 28 %
MCH RBC QN AUTO: 28.2 PG (ref 26.5–33)
MCHC RBC AUTO-ENTMCNC: 31.6 G/DL (ref 31.5–36.5)
MCV RBC AUTO: 89 FL (ref 78–100)
MONOCYTES # BLD AUTO: 0.6 10E3/UL (ref 0–1.3)
MONOCYTES NFR BLD AUTO: 10 %
MUCOUS THREADS #/AREA URNS LPF: PRESENT /LPF
NEUTROPHILS # BLD AUTO: 3 10E3/UL (ref 1.6–8.3)
NEUTROPHILS NFR BLD AUTO: 54 %
NITRATE UR QL: NEGATIVE
PH UR STRIP: 5.5 [PH] (ref 5–7)
PLATELET # BLD AUTO: 200 10E3/UL (ref 150–450)
POTASSIUM BLD-SCNC: 4.2 MMOL/L (ref 3.4–5.3)
RBC # BLD AUTO: 4.57 10E6/UL (ref 3.8–5.2)
RBC #/AREA URNS AUTO: ABNORMAL /HPF
SODIUM SERPL-SCNC: 141 MMOL/L (ref 133–144)
SP GR UR STRIP: 1.02 (ref 1–1.03)
SQUAMOUS #/AREA URNS AUTO: ABNORMAL /LPF
UROBILINOGEN UR STRIP-ACNC: 0.2 E.U./DL
WBC # BLD AUTO: 5.7 10E3/UL (ref 4–11)
WBC #/AREA URNS AUTO: ABNORMAL /HPF

## 2022-05-26 PROCEDURE — 85025 COMPLETE CBC W/AUTO DIFF WBC: CPT | Performed by: INTERNAL MEDICINE

## 2022-05-26 PROCEDURE — 80048 BASIC METABOLIC PNL TOTAL CA: CPT | Performed by: INTERNAL MEDICINE

## 2022-05-26 PROCEDURE — 93000 ELECTROCARDIOGRAM COMPLETE: CPT | Performed by: INTERNAL MEDICINE

## 2022-05-26 PROCEDURE — 81001 URINALYSIS AUTO W/SCOPE: CPT | Performed by: INTERNAL MEDICINE

## 2022-05-26 PROCEDURE — 99214 OFFICE O/P EST MOD 30 MIN: CPT | Performed by: INTERNAL MEDICINE

## 2022-05-26 PROCEDURE — 36415 COLL VENOUS BLD VENIPUNCTURE: CPT | Performed by: INTERNAL MEDICINE

## 2022-05-26 RX ORDER — POLYMYXIN B SULFATE AND TRIMETHOPRIM 1; 10000 MG/ML; [USP'U]/ML
1-2 SOLUTION OPHTHALMIC EVERY 4 HOURS
Qty: 10 ML | Refills: 4 | Status: SHIPPED | OUTPATIENT
Start: 2022-05-26 | End: 2023-07-19

## 2022-05-26 RX ORDER — OMEPRAZOLE 40 MG/1
40 CAPSULE, DELAYED RELEASE ORAL
Qty: 90 CAPSULE | Refills: 0 | Status: SHIPPED | OUTPATIENT
Start: 2022-05-26 | End: 2022-08-29

## 2022-05-26 RX ORDER — DOCUSATE SODIUM 50MG AND SENNOSIDES 8.6MG 8.6; 5 MG/1; MG/1
TABLET, FILM COATED ORAL
Qty: 100 TABLET | Refills: 1 | Status: SHIPPED | OUTPATIENT
Start: 2022-05-26

## 2022-05-26 NOTE — H&P (VIEW-ONLY)
[facility]  [address]  [place] [address]  Phone: [phone]  Primary Provider: Mika Zamora  Pre-op Performing Provider: SONIA CHRISTIANSON      PREOPERATIVE EVALUATION:  Today's date: 5/26/2022    Kalyn Rivero is a 51 year old female who presents for a preoperative evaluation.    Surgical Information:  Surgery/Procedure: Foot  Surgery Location: [facility]  Surgeon: Dr. Hendricks  Surgery Date: 6/8/22  Time of Surgery:   Where patient plans to recover: At home with family  Fax number for surgical facility: Note does not need to be faxed, will be available electronically in Epic.    Type of Anesthesia Anticipated: General    Assessment & Plan     The proposed surgical procedure is considered INTERMEDIATE risk.    Problem List Items Addressed This Visit     Chronic kidney disease, stage 1      Other Visit Diagnoses     Dysuria    -  Primary    Relevant Orders    UA Macro with Reflex to Micro and Culture - lab collect    Pre-op exam        Relevant Orders    EKG 12-lead complete w/read - Clinics (Completed)    Basic metabolic panel  (Ca, Cl, CO2, Creat, Gluc, K, Na, BUN)    CBC with platelets and differential    Asymptomatic COVID-19 Virus (Coronavirus) by PCR                   Medication Instructions:  Patient is to take all scheduled medications on the day of surgery EXCEPT for modifications listed below:    RECOMMENDATION:  APPROVAL GIVEN to proceed with proposed procedure, without further diagnostic evaluation.  Ok to hold celebrex, arava, and enbrel per surgery recommendation of 2 weeks.                            Subjective     HPI related to upcoming procedure: right foot fusion with loose screws   Had anesthesia complications with asystole in the past   Right conjunctivitis.  Feels symptoms of bladder infection  Had a previous stroke from celebrex ( 29 years ago)    Preop Questions 5/26/2022   1. Have you ever had a heart attack or stroke? YES - stroke  related p   2. Have you ever had surgery on your heart or blood vessels, such as a stent placement, a coronary artery bypass, or surgery on an artery in your head, neck, heart, or legs? No   3. Do you have chest pain with activity? No   4. Do you have a history of  heart failure? UNKNOWN - ef = normal   5. Do you currently have a cold, bronchitis or symptoms of other infection? UNKNOWN -    6. Do you have a cough, shortness of breath, or wheezing? No   7. Do you or anyone in your family have previous history of blood clots? No   8. Do you or does anyone in your family have a serious bleeding problem such as prolonged bleeding following surgeries or cuts? No   9. Have you ever had problems with anemia or been told to take iron pills? YES -    10. Have you had any abnormal blood loss such as black, tarry or bloody stools, or abnormal vaginal bleeding? No   11. Have you ever had a blood transfusion? YES - last transfusion more than 5 years ago   11a. Have you ever had a transfusion reaction? No   12. Are you willing to have a blood transfusion if it is medically needed before, during, or after your surgery? Yes   13. Have you or any of your relatives ever had problems with anesthesia? YES - history of cardiac failue    14. Do you have sleep apnea, excessive snoring or daytime drowsiness? No   15. Do you have any artifical heart valves or other implanted medical devices like a pacemaker, defibrillator, or continuous glucose monitor? No   16. Do you have artificial joints? YES - knees, bilateral right elbow, shoulder and wrist , heal   17. Are you allergic to latex? No   18. Is there any chance that you may be pregnant? No       Health Care Directive:  Patient does not have a Health Care Directive or Living Will: Discussed advance care planning with patient; information given to patient to review.    Preoperative Review of :   reviewed - no record of controlled substances prescribed.      Status of Chronic  Conditions:      Review of Systems  CONSTITUTIONAL: NEGATIVE for fever, chills, change in weight  ENT/MOUTH: NEGATIVE for ear, mouth and throat problems  RESP: NEGATIVE for significant cough or SOB  CV: NEGATIVE for chest pain, palpitations or peripheral edema    Patient Active Problem List    Diagnosis Date Noted     Health Care Home 05/27/2011     Priority: High     Not in Active Care Coordination                                                                          DX V65.8 REPLACED WITH 12[facility]ALTH CARE HOME (04/08/2013)         Pain in joint, ankle and foot, left 05/10/2022     Priority: Medium     Added automatically from request for surgery [id number]       Status post surgery 11/27/2018     Priority: Medium     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) 04/16/2018     Priority: Medium     Insomnia 04/04/2018     Priority: Medium     Periprosthetic fracture around internal prosthetic right elbow joint 08/28/2017     Priority: Medium     Last Assessment & Plan:   47yoF with periprosthetic fracture of the humeral shaft, loosening of total elbow arthroplasty, loss of fixation of plate and screw construct.    - Discussed surgical intervention of right trans humeral amputation. At this time, pt is not ready to proceed with amputation. Also discussed possible ORIF with longer stem. Plan to discuss case with her shoulder replacement surgeon.   - Continue Gandhi brace and sling  - NWB RUE  - Pain management per pain provider  - Follow up 3 weeks       Chronic tension-type headache 08/21/2017     Priority: Medium     Migraine 08/21/2017     Priority: Medium     Myofascial pain 07/10/2017     Priority: Medium     Cervical high risk HPV (human papillomavirus) test positive 02/22/2017     Priority: Medium     2/22/17: NIL Pap, + HR HPV 18 & other HR HPV. Plan colp  3/28/17 [facility] - negative for dysplasia. Plan cotest in 1 year.   5/3/18 [facility] - Negative. Plan cotest in 1 year.    6/6/19 Lost to follow-up for pap tracking  8/22/19 NIL Pap, Neg HPV, Endometrial cells present. Plan cotest in 1 year, unless abnormal bleeding then sooner.   1/5/21 Lost to follow-up for pap tracking  7/14/2021 NIL pap, +HR HPV (not 16/18). Plan cotest in 1 year         Other drug-induced neutropenia (H) 04/14/2016     Priority: Medium     Rheumatoid arthritis with positive rheumatoid factor, involving unspecified site (H) 04/14/2016     Priority: Medium     Fibromyositis 07/27/2015     Priority: Medium     Rheumatoid arthritis of foot (H) 04/13/2015     Priority: Medium     Encounter for long-term opiate analgesic use 07/15/2014     Priority: Medium     Arthralgia of temporomandibular joint 07/10/2014     Priority: Medium     Intractable chronic migraine without aura 07/10/2014     Priority: Medium     Problem list name updated by automated process. Provider to review       Lesion of ulnar nerve 06/03/2014     Priority: Medium     Right arm numbness 05/21/2014     Priority: Medium     Right upper extremity numbness 04/02/2014     Priority: Medium     Esophageal reflux 03/25/2014     Priority: Medium     Postoperative pain 11/20/2013     Priority: Medium     S/P cervical spinal fusion 11/20/2013     Priority: Medium     Drug-seeking behavior 11/20/2013     Priority: Medium     Opioid type dependence (H) 11/20/2013     Priority: Medium     Problem list name updated by automated process. Provider to review       Severe frontal headaches 11/20/2013     Priority: Medium     Abdominal pain, unspecified abdominal location 11/20/2013     Priority: Medium     Problem list name updated by automated process. Provider to review       Atlantoaxial instability 11/18/2013     Priority: Medium     Trochanteric bursitis - left 10/04/2012     Priority: Medium     Knee joint replacement - left 01/26/2012     Priority: Medium     Patient is followed by [facility] for ongoing prescription of narcotic pain medicine.  Med:  oxycodone 5 mg.  Maximum use per month: 120  Expected duration: 8 weeks postoperative.  Narcotic agreement on file: YES  Clinic visit recommended: Q 4 weeks    Patient is followed by [facility] for ongoing prescription of narcotic pain medicine.  Med: oxycontin 20 mg.   Maximum use per month: 60  Expected duration: 1 month  Narcotic agreement on file: YES  Clinic visit recommended: Q 4 weeks       TOTAL KNEE ARTHROPLASTY - bilateral 12/12/2011     Priority: Medium     Liver failure, acute 04/22/2011     Priority: Medium     CARDIOVASCULAR SCREENING; LDL GOAL LESS THAN 160 04/22/2011     Priority: Medium     Renal stone      Priority: Medium     Suicide risk 04/17/2011     Priority: Medium     Grief reaction 04/17/2011     Priority: Medium     3       Acetaminophen overdose 03/24/2011     Priority: Medium     Hepatic failure (H) 03/24/2011     Priority: Medium     Pancreatitis 03/24/2011     Priority: Medium     Anemia 03/24/2011     Priority: Medium     Chronic pain 03/24/2011     Priority: Medium     Pneumonia 03/24/2011     Priority: Medium     Hypothyroidism      Priority: Medium      Past Medical History:   Diagnosis Date     Acetaminophen overdose 3/24/2011     Anemia      Anesthesia complication     pt states has a history of heart stopping during anesthesia,     Arrhythmia      Cerebral infarction (H)      Cervical high risk HPV (human papillomavirus) test positive 02/22/2017    type 18 & other HR HPV     Chronic infection     MRSA     Chronic pain 3/24/2011     Degenerative joint disease      Endometriosis      Fibromyalgia      Gastro-oesophageal reflux disease      Heart murmur      History of blood transfusion      Hypothyroidism      Immune disorder (H)      Learning disability      Malignant neoplasm (H)      Migraine      MRSA (methicillin resistant staph aureus) culture positive      Neck injuries      Opioid type dependence (H)      Other chronic pain      PONV (postoperative nausea and  "vomiting)     states \"flatlines\"during anesthesia     RA (rheumatoid arthritis) (H)      Renal stone      Scoliosis      Stomach ulcer     history     Past Surgical History:   Procedure Laterality Date     ARTHRODESIS FOOT  5/23/2012    Procedure:ARTHRODESIS FOOT; Right Subtalar andTaloNavicular  Fusion  ; Surgeon:BRIGHT HENDRICKS; [facility]     ARTHRODESIS FOOT Left 4/22/2015    Procedure: ARTHRODESIS FOOT;  Surgeon: Birght Hendricks MD;  [facility]     ARTHROPLASTY ELBOW Right 9/30/2015    Procedure: ARTHROPLASTY ELBOW;  Surgeon: Carrol Gaspar MD;  [facility]     ARTHROPLASTY KNEE Right      ARTHROPLASTY REVISION ELBOW Right 11/27/2018    Procedure: 1 - aspiration of Right elbow 2- Explantation of failed hardware Right humeral periprosthetic fracture non-union 3- Right distal humerus excision 4- Right distal humerus replacement 5 - Revision Right total elbow arthroplasty;  Surgeon: Aakash Zimmer MD;  Location:  OR     ARTHROPLASTY WRIST      x2 Lt wrist replacement.     ARTHROTOMY ELBOW Right 6/18/2015    Procedure: ARTHROTOMY ELBOW;  Surgeon: Carrol Gaspar MD;  Location:  OR      SHLDR ARTHROSCOP,DIAGNOSTIC  12/17/2008    left total shoulder arthroplasty by Dr. Law     CYSTOSCOPY  02/06/2009    1.cystoscopy.2.bilateral ureteroscopy.3.basketing of stone fragments from the right kidney.4.bilateral double-J ureteral stent placement.5.ann catheter placement.6.fluoroscopy and intraoperative interpretation of images.     CYSTOSCOPY  01/08/2007    1. cystoscopy and left stent removal.2.left ureteroscopy     DECOMPRESSION CUBITAL TUNNEL  6/6/2014    Procedure: DECOMPRESSION CUBITAL TUNNEL;  Surgeon: Janelle Coates MD;  [facility]     ENDOSCOPY  03/31/2005    upper GI endoscopy-[facility]     ESOPHAGOSCOPY, GASTROSCOPY, DUODENOSCOPY (EGD), COMBINED  3/17/2014    Procedure: COMBINED ESOPHAGOSCOPY, GASTROSCOPY, DUODENOSCOPY (EGD), " BIOPSY SINGLE OR MULTIPLE;;  Surgeon: Duane, William Charles, MD;  Location: [facility] OR     FUSION CERVICAL POSTERIOR ONE LEVEL  11/18/2013    Procedure: FUSION CERVICAL POSTERIOR ONE LEVEL;  Cervical 1-2 Posterior Cervical Fusion (Harms Procedure);  Surgeon: Carrol Gunn MD;  [facility]     GYN SURGERY       INJECT MAJOR JOINT / BURSA Left 1/5/2017    Procedure: INJECT MAJOR JOINT / BURSA;  Surgeon: Fredy Patel DO;  [facility]     INJECT STEROID (LOCATION) Left 6/18/2015    Procedure: INJECT STEROID (LOCATION);  Surgeon: Carrol Gaspar MD;  [facility]     NECK SURGERY       REMOVE FOREIGN BODY UPPER EXTREMITY Right 3/2/2017    Procedure: REMOVE FOREIGN BODY UPPER EXTREMITY;  Surgeon: Carrol Gaspar MD;  [facility]     RESECT BONE UPPER EXTREMITY Left 4/27/2017    Procedure: RESECT BONE UPPER EXTREMITY;  Left Radial Head Resection;  Surgeon: Carrol Gaspar MD;  [facility]     ROTATOR CUFF REPAIR RT/LT  10/19/2005    1.left distal clavicle excision.2.acromioplasty.3.coracoacromial ligament resection.4.rotator cuff exploration     [facility] ANESTH,DX ARTHROSCOPIC PROC KNEE JOINT  10/24/2007    right total knee arthroplasty     [facility] SHOULDER SURG PROC UNLISTED       [facility] TOTAL KNEE ARTHROPLASTY  1/18/12    Left     Current Outpatient Medications   Medication Sig Dispense Refill     celecoxib (CELEBREX) 200 MG capsule Take 200 mg by mouth 3 times daily       ClonazePAM (KLONOPIN PO) Take 0.5 mg by mouth 3 times daily        clonazePAM (KLONOPIN) 0.5 MG tablet TAKE 1 TABLET BY MOUTH THREE TIMES A DAY       COLLAGEN PO Take 1,000 mg by mouth daily       ENBREL SURECLICK 50 MG/ML autoinjector 50 mg twice a week        leflunomide (ARAVA) 10 MG tablet Take by mouth every 24 hours       leflunomide (ARAVA) 20 MG tablet Take 20 mg by mouth every morning Reported on 3/28/2017       Multiple Vitamins-Minerals (HAIR SKIN AND NAILS FORMULA PO)        omeprazole  (PRILOSEC) 40 MG DR capsule TAKE 1 CAPSULE (40 MG) BY MOUTH 2 TIMES DAILY (BEFORE MEALS) 90 capsule 0     OnabotulinumtoxinA (BOTOX IJ) Inject 200 Units as directed Total dose 200 units   Administered 190 units   Unavoidable waste 30 units   Lot # /C3 with Expiration Date:  12/2020   NDC [address]-56 (Patient not taking: No sig reported)       ondansetron (ZOFRAN-ODT) 4 MG ODT tab Take 1 tablet (4 mg) by mouth every 8 hours as needed for nausea 20 tablet 3     oxyCODONE (ROXICODONE) 5 MG tablet Patient states she takes 4 tablets daily. Morning, noon, supper, bedtime       SENEXON-S 8.6-50 MG tablet TAKE 1-2 TABLETS BY MOUTH 2 TIMES DAILY FOR CONSTIPATION. 100 tablet 1       Allergies   Allergen Reactions     Morphine Swelling     Codeine      Gabapentin Other (See Comments)     Pins,needles, stabbing aching at once  Pins,needles, stabbing aching at once  Numbness and Tingling     Ibuprofen      Doesn't take due to gastric ulcer     Sulfa Drugs Nausea and Vomiting     unknown     Tylenol [Acetaminophen]      Pt. States that she has Liver problems  Tylenol 3     Erythromycin Nausea     Vomiting      Penicillins Rash     Prednisone Palpitations     Heart racing        Social History     Tobacco Use     Smoking status: Former Smoker     Packs/day: 0.10     Years: 10.00     Pack years: 1.00     Types: Cigarettes     Start date: 8/6/1983     Smokeless tobacco: Never Used     Tobacco comment: Quit 6 weeks ago   Substance Use Topics     Alcohol use: No       History   Drug Use No         Objective     LMP  (LMP Unknown)     Physical Exam  GENERAL APPEARANCE: healthy, alert and no distress  HENT: ear canals and TM's normal and nose and mouth without ulcers or lesions  RESP: lungs clear to auscultation - no rales, rhonchi or wheezes  CV: regular rate and rhythm, normal S1 S2, no S3 or S4 and no murmur, click or rub   ABDOMEN: soft, nontender, no HSM or masses and bowel sounds normal  NEURO: Normal strength and tone,  sensory exam grossly normal, mentation intact and speech normal    Recent Labs   Lab Test 02/21/22  1436 02/09/22  1519 12/15/21  1631 11/11/21  0133   HGB 13.6  --   --  11.7     --   --  177   NA  --  137 139 138   POTASSIUM  --  4.7 4.2 4.1   CR  --  0.96 0.99 1.20*        Diagnostics:  Labs pending at this time.  Results will be reviewed when available.   EKG: appears normal, NSR, normal axis, normal intervals, no acute ST/T changes c/w ischemia, no LVH by voltage criteria, unchanged from previous tracings    Revised Cardiac Risk Index (RCRI):  The patient has the following serious cardiovascular risks for perioperative complications:   - No serious cardiac risks = 0 points     RCRI Interpretation: 0 points: Class I (very low risk - 0.4% complication rate)           Signed Electronically by: Johnnie Barreto MD  Copy of this evaluation report is provided to requesting physician.

## 2022-05-26 NOTE — LETTER
June 1, 2022      Kalyn Rivero  [address] [facility] [address]        Dear ,    We are writing to inform you of your test results.  These tests are ok for surgery .  Will need follow up on the kidney function after the procedure.  Resulted Orders   UA Macro with Reflex to Micro and Culture - lab collect   Result Value Ref Range    Color Urine Yellow Colorless, Straw, Light Yellow, Yellow    Appearance Urine Clear Clear    Glucose Urine Negative Negative mg/dL    Bilirubin Urine Negative Negative    Ketones Urine Negative Negative mg/dL    Specific Gravity Urine 1.025 1.003 - 1.035    Blood Urine Negative Negative    pH Urine 5.5 5.0 - 7.0    Protein Albumin Urine Negative Negative mg/dL    Urobilinogen Urine 0.2 0.2, 1.0 E.U./dL    Nitrite Urine Negative Negative    Leukocyte Esterase Urine Trace (A) Negative   Urine Microscopic   Result Value Ref Range    Bacteria Urine Moderate (A) None Seen /HPF    RBC Urine 0-2 0-2 /HPF /HPF    WBC Urine 0-5 0-5 /HPF /HPF    Squamous Epithelials Urine Many (A) None Seen /LPF    Mucus Urine Present (A) None Seen /LPF    Narrative    Urine Culture not indicated   Basic metabolic panel  (Ca, Cl, CO2, Creat, Gluc, K, Na, BUN)   Result Value Ref Range    Sodium 141 133 - 144 mmol/L    Potassium 4.2 3.4 - 5.3 mmol/L    Chloride 111 (H) 94 - 109 mmol/L    Carbon Dioxide (CO2) 25 20 - 32 mmol/L    Anion Gap 5 3 - 14 mmol/L    Urea Nitrogen 24 7 - 30 mg/dL    Creatinine 1.16 (H) 0.52 - 1.04 mg/dL    Calcium 9.0 8.5 - 10.1 mg/dL    Glucose 132 (H) 70 - 99 mg/dL    GFR Estimate 57 (L) >60 mL/min/1.73m2      Comment:      Effective December 21, 2021 eGFRcr in adults is calculated using the 2021 CKD-EPI creatinine equation which includes age and gender (Jennifer et al., NEJ, DOI: 10.1056/PUOUvd4270635)   CBC with platelets and differential   Result Value Ref Range    WBC Count 5.7 4.0 - 11.0 10e3/uL    RBC Count 4.57 3.80 - 5.20 10e6/uL    Hemoglobin 12.9 11.7 - 15.7  g/dL    Hematocrit 40.8 35.0 - 47.0 %    MCV 89 78 - 100 fL    MCH 28.2 26.5 - 33.0 pg    MCHC 31.6 31.5 - 36.5 g/dL    RDW 16.8 (H) 10.0 - 15.0 %    Platelet Count 200 150 - 450 10e3/uL    % Neutrophils 54 %    % Lymphocytes 28 %    % Monocytes 10 %    % Eosinophils 7 %    % Basophils 1 %    Absolute Neutrophils 3.0 1.6 - 8.3 10e3/uL    Absolute Lymphocytes 1.6 0.8 - 5.3 10e3/uL    Absolute Monocytes 0.6 0.0 - 1.3 10e3/uL    Absolute Eosinophils 0.4 0.0 - 0.7 10e3/uL    Absolute Basophils 0.0 0.0 - 0.2 10e3/uL       If you have any questions or concerns, please call the clinic at the number listed above.       Sincerely,      Johnnie Barreto MD/codie

## 2022-05-26 NOTE — PROGRESS NOTES
[facility]  [address]  [place] [address]  Phone: [phone]  Primary Provider: Mika Zamora  Pre-op Performing Provider: SONIA CHRISTIANSON      PREOPERATIVE EVALUATION:  Today's date: 5/26/2022    Kalyn Rivero is a 51 year old female who presents for a preoperative evaluation.    Surgical Information:  Surgery/Procedure: Foot  Surgery Location: [facility]  Surgeon: Dr. Hendricks  Surgery Date: 6/8/22  Time of Surgery:   Where patient plans to recover: At home with family  Fax number for surgical facility: Note does not need to be faxed, will be available electronically in Epic.    Type of Anesthesia Anticipated: General    Assessment & Plan     The proposed surgical procedure is considered INTERMEDIATE risk.    Problem List Items Addressed This Visit     Chronic kidney disease, stage 1      Other Visit Diagnoses     Dysuria    -  Primary    Relevant Orders    UA Macro with Reflex to Micro and Culture - lab collect    Pre-op exam        Relevant Orders    EKG 12-lead complete w/read - Clinics (Completed)    Basic metabolic panel  (Ca, Cl, CO2, Creat, Gluc, K, Na, BUN)    CBC with platelets and differential    Asymptomatic COVID-19 Virus (Coronavirus) by PCR                   Medication Instructions:  Patient is to take all scheduled medications on the day of surgery EXCEPT for modifications listed below:    RECOMMENDATION:  APPROVAL GIVEN to proceed with proposed procedure, without further diagnostic evaluation.  Ok to hold celebrex, arava, and enbrel per surgery recommendation of 2 weeks.                            Subjective     HPI related to upcoming procedure: right foot fusion with loose screws   Had anesthesia complications with asystole in the past   Right conjunctivitis.  Feels symptoms of bladder infection  Had a previous stroke from celebrex ( 29 years ago)    Preop Questions 5/26/2022   1. Have you ever had a heart attack or stroke? YES - stroke  related p   2. Have you ever had surgery on your heart or blood vessels, such as a stent placement, a coronary artery bypass, or surgery on an artery in your head, neck, heart, or legs? No   3. Do you have chest pain with activity? No   4. Do you have a history of  heart failure? UNKNOWN - ef = normal   5. Do you currently have a cold, bronchitis or symptoms of other infection? UNKNOWN -    6. Do you have a cough, shortness of breath, or wheezing? No   7. Do you or anyone in your family have previous history of blood clots? No   8. Do you or does anyone in your family have a serious bleeding problem such as prolonged bleeding following surgeries or cuts? No   9. Have you ever had problems with anemia or been told to take iron pills? YES -    10. Have you had any abnormal blood loss such as black, tarry or bloody stools, or abnormal vaginal bleeding? No   11. Have you ever had a blood transfusion? YES - last transfusion more than 5 years ago   11a. Have you ever had a transfusion reaction? No   12. Are you willing to have a blood transfusion if it is medically needed before, during, or after your surgery? Yes   13. Have you or any of your relatives ever had problems with anesthesia? YES - history of cardiac failue    14. Do you have sleep apnea, excessive snoring or daytime drowsiness? No   15. Do you have any artifical heart valves or other implanted medical devices like a pacemaker, defibrillator, or continuous glucose monitor? No   16. Do you have artificial joints? YES - knees, bilateral right elbow, shoulder and wrist , heal   17. Are you allergic to latex? No   18. Is there any chance that you may be pregnant? No       Health Care Directive:  Patient does not have a Health Care Directive or Living Will: Discussed advance care planning with patient; information given to patient to review.    Preoperative Review of :   reviewed - no record of controlled substances prescribed.      Status of Chronic  Conditions:      Review of Systems  CONSTITUTIONAL: NEGATIVE for fever, chills, change in weight  ENT/MOUTH: NEGATIVE for ear, mouth and throat problems  RESP: NEGATIVE for significant cough or SOB  CV: NEGATIVE for chest pain, palpitations or peripheral edema    Patient Active Problem List    Diagnosis Date Noted     Health Care Home 05/27/2011     Priority: High     Not in Active Care Coordination                                                                          DX V65.8 REPLACED WITH [facility]ALTH CARE HOME (04/08/2013)         Pain in joint, ankle and foot, left 05/10/2022     Priority: Medium     Added automatically from request for surgery [id number]       Status post surgery 11/27/2018     Priority: Medium     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) 04/16/2018     Priority: Medium     Insomnia 04/04/2018     Priority: Medium     Periprosthetic fracture around internal prosthetic right elbow joint 08/28/2017     Priority: Medium     Last Assessment & Plan:   47yoF with periprosthetic fracture of the humeral shaft, loosening of total elbow arthroplasty, loss of fixation of plate and screw construct.    - Discussed surgical intervention of right trans humeral amputation. At this time, pt is not ready to proceed with amputation. Also discussed possible ORIF with longer stem. Plan to discuss case with her shoulder replacement surgeon.   - Continue Gandhi brace and sling  - NWB RUE  - Pain management per pain provider  - Follow up 3 weeks       Chronic tension-type headache 08/21/2017     Priority: Medium     Migraine 08/21/2017     Priority: Medium     Myofascial pain 07/10/2017     Priority: Medium     Cervical high risk HPV (human papillomavirus) test positive 02/22/2017     Priority: Medium     2/22/17: NIL Pap, + HR HPV 18 & other HR HPV. Plan colp  3/28/17 [facility] - negative for dysplasia. Plan cotest in 1 year.   5/3/18 [facility] - Negative. Plan cotest in 1 year.    6/6/19 Lost to follow-up for pap tracking  8/22/19 NIL Pap, Neg HPV, Endometrial cells present. Plan cotest in 1 year, unless abnormal bleeding then sooner.   1/5/21 Lost to follow-up for pap tracking  7/14/2021 NIL pap, +HR HPV (not 16/18). Plan cotest in 1 year         Other drug-induced neutropenia (H) 04/14/2016     Priority: Medium     Rheumatoid arthritis with positive rheumatoid factor, involving unspecified site (H) 04/14/2016     Priority: Medium     Fibromyositis 07/27/2015     Priority: Medium     Rheumatoid arthritis of foot (H) 04/13/2015     Priority: Medium     Encounter for long-term opiate analgesic use 07/15/2014     Priority: Medium     Arthralgia of temporomandibular joint 07/10/2014     Priority: Medium     Intractable chronic migraine without aura 07/10/2014     Priority: Medium     Problem list name updated by automated process. Provider to review       Lesion of ulnar nerve 06/03/2014     Priority: Medium     Right arm numbness 05/21/2014     Priority: Medium     Right upper extremity numbness 04/02/2014     Priority: Medium     Esophageal reflux 03/25/2014     Priority: Medium     Postoperative pain 11/20/2013     Priority: Medium     S/P cervical spinal fusion 11/20/2013     Priority: Medium     Drug-seeking behavior 11/20/2013     Priority: Medium     Opioid type dependence (H) 11/20/2013     Priority: Medium     Problem list name updated by automated process. Provider to review       Severe frontal headaches 11/20/2013     Priority: Medium     Abdominal pain, unspecified abdominal location 11/20/2013     Priority: Medium     Problem list name updated by automated process. Provider to review       Atlantoaxial instability 11/18/2013     Priority: Medium     Trochanteric bursitis - left 10/04/2012     Priority: Medium     Knee joint replacement - left 01/26/2012     Priority: Medium     Patient is followed by [facility] for ongoing prescription of narcotic pain medicine.  Med:  oxycodone 5 mg.  Maximum use per month: 120  Expected duration: 8 weeks postoperative.  Narcotic agreement on file: YES  Clinic visit recommended: Q 4 weeks    Patient is followed by [facility] for ongoing prescription of narcotic pain medicine.  Med: oxycontin 20 mg.   Maximum use per month: 60  Expected duration: 1 month  Narcotic agreement on file: YES  Clinic visit recommended: Q 4 weeks       TOTAL KNEE ARTHROPLASTY - bilateral 12/12/2011     Priority: Medium     Liver failure, acute 04/22/2011     Priority: Medium     CARDIOVASCULAR SCREENING; LDL GOAL LESS THAN 160 04/22/2011     Priority: Medium     Renal stone      Priority: Medium     Suicide risk 04/17/2011     Priority: Medium     Grief reaction 04/17/2011     Priority: Medium     3       Acetaminophen overdose 03/24/2011     Priority: Medium     Hepatic failure (H) 03/24/2011     Priority: Medium     Pancreatitis 03/24/2011     Priority: Medium     Anemia 03/24/2011     Priority: Medium     Chronic pain 03/24/2011     Priority: Medium     Pneumonia 03/24/2011     Priority: Medium     Hypothyroidism      Priority: Medium      Past Medical History:   Diagnosis Date     Acetaminophen overdose 3/24/2011     Anemia      Anesthesia complication     pt states has a history of heart stopping during anesthesia,     Arrhythmia      Cerebral infarction (H)      Cervical high risk HPV (human papillomavirus) test positive 02/22/2017    type 18 & other HR HPV     Chronic infection     MRSA     Chronic pain 3/24/2011     Degenerative joint disease      Endometriosis      Fibromyalgia      Gastro-oesophageal reflux disease      Heart murmur      History of blood transfusion      Hypothyroidism      Immune disorder (H)      Learning disability      Malignant neoplasm (H)      Migraine      MRSA (methicillin resistant staph aureus) culture positive      Neck injuries      Opioid type dependence (H)      Other chronic pain      PONV (postoperative nausea and  "vomiting)     states \"flatlines\"during anesthesia     RA (rheumatoid arthritis) (H)      Renal stone      Scoliosis      Stomach ulcer     history     Past Surgical History:   Procedure Laterality Date     ARTHRODESIS FOOT  5/23/2012    Procedure:ARTHRODESIS FOOT; Right Subtalar andTaloNavicular  Fusion  ; Surgeon:BRIGHT HENDRICKS; [facility]     ARTHRODESIS FOOT Left 4/22/2015    Procedure: ARTHRODESIS FOOT;  Surgeon: Bright Hendricks MD;  [facility]     ARTHROPLASTY ELBOW Right 9/30/2015    Procedure: ARTHROPLASTY ELBOW;  Surgeon: Carrol Gaspar MD;  [facility]     ARTHROPLASTY KNEE Right      ARTHROPLASTY REVISION ELBOW Right 11/27/2018    Procedure: 1 - aspiration of Right elbow 2- Explantation of failed hardware Right humeral periprosthetic fracture non-union 3- Right distal humerus excision 4- Right distal humerus replacement 5 - Revision Right total elbow arthroplasty;  Surgeon: Aakash Zimmer MD;  Location:  OR     ARTHROPLASTY WRIST      x2 Lt wrist replacement.     ARTHROTOMY ELBOW Right 6/18/2015    Procedure: ARTHROTOMY ELBOW;  Surgeon: Carrol Gaspar MD;  Location:  OR      SHLDR ARTHROSCOP,DIAGNOSTIC  12/17/2008    left total shoulder arthroplasty by Dr. Law     CYSTOSCOPY  02/06/2009    1.cystoscopy.2.bilateral ureteroscopy.3.basketing of stone fragments from the right kidney.4.bilateral double-J ureteral stent placement.5.ann catheter placement.6.fluoroscopy and intraoperative interpretation of images.     CYSTOSCOPY  01/08/2007    1. cystoscopy and left stent removal.2.left ureteroscopy     DECOMPRESSION CUBITAL TUNNEL  6/6/2014    Procedure: DECOMPRESSION CUBITAL TUNNEL;  Surgeon: Janelle Coates MD;  [facility]     ENDOSCOPY  03/31/2005    [facility] endoscopy[facility]     ESOPHAGOSCOPY, GASTROSCOPY, DUODENOSCOPY (EGD), COMBINED  3/17/2014    Procedure: COMBINED ESOPHAGOSCOPY, GASTROSCOPY, DUODENOSCOPY (EGD), " BIOPSY SINGLE OR MULTIPLE;;  Surgeon: Duane, William Charles, MD;  Location: [facility] OR     FUSION CERVICAL POSTERIOR ONE LEVEL  11/18/2013    Procedure: FUSION CERVICAL POSTERIOR ONE LEVEL;  Cervical 1-2 Posterior Cervical Fusion (Harms Procedure);  Surgeon: Carrol Gunn MD;  [facility]     GYN SURGERY       INJECT MAJOR JOINT / BURSA Left 1/5/2017    Procedure: INJECT MAJOR JOINT / BURSA;  Surgeon: Fredy Patel DO;  [facility]     INJECT STEROID (LOCATION) Left 6/18/2015    Procedure: INJECT STEROID (LOCATION);  Surgeon: Carrol Gaspar MD;  [facility]     NECK SURGERY       REMOVE FOREIGN BODY UPPER EXTREMITY Right 3/2/2017    Procedure: REMOVE FOREIGN BODY UPPER EXTREMITY;  Surgeon: Carrol Gaspar MD;  [facility]     RESECT BONE UPPER EXTREMITY Left 4/27/2017    Procedure: RESECT BONE UPPER EXTREMITY;  Left Radial Head Resection;  Surgeon: Carrol Gaspar MD;  [facility]     ROTATOR CUFF REPAIR RT/LT  10/19/2005    1.left distal clavicle excision.2.acromioplasty.3.coracoacromial ligament resection.4.rotator cuff exploration     [facility] ANESTH,DX ARTHROSCOPIC PROC KNEE JOINT  10/24/2007    right total knee arthroplasty     [facility] SHOULDER SURG PROC UNLISTED       [facility] TOTAL KNEE ARTHROPLASTY  1/18/12    Left     Current Outpatient Medications   Medication Sig Dispense Refill     celecoxib (CELEBREX) 200 MG capsule Take 200 mg by mouth 3 times daily       ClonazePAM (KLONOPIN PO) Take 0.5 mg by mouth 3 times daily        clonazePAM (KLONOPIN) 0.5 MG tablet TAKE 1 TABLET BY MOUTH THREE TIMES A DAY       COLLAGEN PO Take 1,000 mg by mouth daily       ENBREL SURECLICK 50 MG/ML autoinjector 50 mg twice a week        leflunomide (ARAVA) 10 MG tablet Take by mouth every 24 hours       leflunomide (ARAVA) 20 MG tablet Take 20 mg by mouth every morning Reported on 3/28/2017       Multiple Vitamins-Minerals (HAIR SKIN AND NAILS FORMULA PO)        omeprazole  (PRILOSEC) 40 MG DR capsule TAKE 1 CAPSULE (40 MG) BY MOUTH 2 TIMES DAILY (BEFORE MEALS) 90 capsule 0     OnabotulinumtoxinA (BOTOX IJ) Inject 200 Units as directed Total dose 200 units   Administered 190 units   Unavoidable waste 30 units   Lot # /C3 with Expiration Date:  12/2020   NDC [address]-36 (Patient not taking: No sig reported)       ondansetron (ZOFRAN-ODT) 4 MG ODT tab Take 1 tablet (4 mg) by mouth every 8 hours as needed for nausea 20 tablet 3     oxyCODONE (ROXICODONE) 5 MG tablet Patient states she takes 4 tablets daily. Morning, noon, supper, bedtime       SENEXON-S 8.6-50 MG tablet TAKE 1-2 TABLETS BY MOUTH 2 TIMES DAILY FOR CONSTIPATION. 100 tablet 1       Allergies   Allergen Reactions     Morphine Swelling     Codeine      Gabapentin Other (See Comments)     Pins,needles, stabbing aching at once  Pins,needles, stabbing aching at once  Numbness and Tingling     Ibuprofen      Doesn't take due to gastric ulcer     Sulfa Drugs Nausea and Vomiting     unknown     Tylenol [Acetaminophen]      Pt. States that she has Liver problems  Tylenol 3     Erythromycin Nausea     Vomiting      Penicillins Rash     Prednisone Palpitations     Heart racing        Social History     Tobacco Use     Smoking status: Former Smoker     Packs/day: 0.10     Years: 10.00     Pack years: 1.00     Types: Cigarettes     Start date: 8/6/1983     Smokeless tobacco: Never Used     Tobacco comment: Quit 6 weeks ago   Substance Use Topics     Alcohol use: No       History   Drug Use No         Objective     LMP  (LMP Unknown)     Physical Exam  GENERAL APPEARANCE: healthy, alert and no distress  HENT: ear canals and TM's normal and nose and mouth without ulcers or lesions  RESP: lungs clear to auscultation - no rales, rhonchi or wheezes  CV: regular rate and rhythm, normal S1 S2, no S3 or S4 and no murmur, click or rub   ABDOMEN: soft, nontender, no HSM or masses and bowel sounds normal  NEURO: Normal strength and tone,  sensory exam grossly normal, mentation intact and speech normal    Recent Labs   Lab Test 02/21/22  1436 02/09/22  1519 12/15/21  1631 11/11/21  0133   HGB 13.6  --   --  11.7     --   --  177   NA  --  137 139 138   POTASSIUM  --  4.7 4.2 4.1   CR  --  0.96 0.99 1.20*        Diagnostics:  Labs pending at this time.  Results will be reviewed when available.   EKG: appears normal, NSR, normal axis, normal intervals, no acute ST/T changes c/w ischemia, no LVH by voltage criteria, unchanged from previous tracings    Revised Cardiac Risk Index (RCRI):  The patient has the following serious cardiovascular risks for perioperative complications:   - No serious cardiac risks = 0 points     RCRI Interpretation: 0 points: Class I (very low risk - 0.4% complication rate)           Signed Electronically by: Johnnie Barreto MD  Copy of this evaluation report is provided to requesting physician.

## 2022-05-26 NOTE — LETTER
June 1, 2022      Kalyn Rivero  [address] [address]        Dear ,    We are writing to inform you of your test results.    There testare ok for surgery .  Will need follow up on the kidney function after the procedure        Resulted Orders   UA Macro with Reflex to Micro and Culture - lab collect   Result Value Ref Range    Color Urine Yellow Colorless, Straw, Light Yellow, Yellow    Appearance Urine Clear Clear    Glucose Urine Negative Negative mg/dL    Bilirubin Urine Negative Negative    Ketones Urine Negative Negative mg/dL    Specific Gravity Urine 1.025 1.003 - 1.035    Blood Urine Negative Negative    pH Urine 5.5 5.0 - 7.0    Protein Albumin Urine Negative Negative mg/dL    Urobilinogen Urine 0.2 0.2, 1.0 E.U./dL    Nitrite Urine Negative Negative    Leukocyte Esterase Urine Trace (A) Negative   Urine Microscopic   Result Value Ref Range    Bacteria Urine Moderate (A) None Seen /HPF    RBC Urine 0-2 0-2 /HPF /HPF    WBC Urine 0-5 0-5 /HPF /HPF    Squamous Epithelials Urine Many (A) None Seen /LPF    Mucus Urine Present (A) None Seen /LPF    Narrative    Urine Culture not indicated   Basic metabolic panel  (Ca, Cl, CO2, Creat, Gluc, K, Na, BUN)   Result Value Ref Range    Sodium 141 133 - 144 mmol/L    Potassium 4.2 3.4 - 5.3 mmol/L    Chloride 111 (H) 94 - 109 mmol/L    Carbon Dioxide (CO2) 25 20 - 32 mmol/L    Anion Gap 5 3 - 14 mmol/L    Urea Nitrogen 24 7 - 30 mg/dL    Creatinine 1.16 (H) 0.52 - 1.04 mg/dL    Calcium 9.0 8.5 - 10.1 mg/dL    Glucose 132 (H) 70 - 99 mg/dL    GFR Estimate 57 (L) >60 mL/min/1.73m2      Comment:      Effective December 21, 2021 eGFRcr in adults is calculated using the 2021 CKD-EPI creatinine equation which includes age and gender (Jennifer et al., NE, DOI: 10.1056/VHCDya4205973)   CBC with platelets and differential   Result Value Ref Range    WBC Count 5.7 4.0 - 11.0 10e3/uL    RBC Count 4.57 3.80 - 5.20 10e6/uL    Hemoglobin 12.9 11.7 -  15.7 g/dL    Hematocrit 40.8 35.0 - 47.0 %    MCV 89 78 - 100 fL    MCH 28.2 26.5 - 33.0 pg    MCHC 31.6 31.5 - 36.5 g/dL    RDW 16.8 (H) 10.0 - 15.0 %    Platelet Count 200 150 - 450 10e3/uL    % Neutrophils 54 %    % Lymphocytes 28 %    % Monocytes 10 %    % Eosinophils 7 %    % Basophils 1 %    Absolute Neutrophils 3.0 1.6 - 8.3 10e3/uL    Absolute Lymphocytes 1.6 0.8 - 5.3 10e3/uL    Absolute Monocytes 0.6 0.0 - 1.3 10e3/uL    Absolute Eosinophils 0.4 0.0 - 0.7 10e3/uL    Absolute Basophils 0.0 0.0 - 0.2 10e3/uL       If you have any questions or concerns, please call the clinic at the number listed above.       Sincerely,      Johnnie Barreto MD/allgera

## 2022-05-26 NOTE — LETTER
June 1, 2022      Kalyn Rivero  [address] [facility] [address]        Dear ,    We are writing to inform you of your test results.    There testare ok for surgery .  Will need follow up on the kidney function after the procedure          Resulted Orders   UA Macro with Reflex to Micro and Culture - lab collect   Result Value Ref Range    Color Urine Yellow Colorless, Straw, Light Yellow, Yellow    Appearance Urine Clear Clear    Glucose Urine Negative Negative mg/dL    Bilirubin Urine Negative Negative    Ketones Urine Negative Negative mg/dL    Specific Gravity Urine 1.025 1.003 - 1.035    Blood Urine Negative Negative    pH Urine 5.5 5.0 - 7.0    Protein Albumin Urine Negative Negative mg/dL    Urobilinogen Urine 0.2 0.2, 1.0 E.U./dL    Nitrite Urine Negative Negative    Leukocyte Esterase Urine Trace (A) Negative   Urine Microscopic   Result Value Ref Range    Bacteria Urine Moderate (A) None Seen /HPF    RBC Urine 0-2 0-2 /HPF /HPF    WBC Urine 0-5 0-5 /HPF /HPF    Squamous Epithelials Urine Many (A) None Seen /LPF    Mucus Urine Present (A) None Seen /LPF    Narrative    Urine Culture not indicated   Basic metabolic panel  (Ca, Cl, CO2, Creat, Gluc, K, Na, BUN)   Result Value Ref Range    Sodium 141 133 - 144 mmol/L    Potassium 4.2 3.4 - 5.3 mmol/L    Chloride 111 (H) 94 - 109 mmol/L    Carbon Dioxide (CO2) 25 20 - 32 mmol/L    Anion Gap 5 3 - 14 mmol/L    Urea Nitrogen 24 7 - 30 mg/dL    Creatinine 1.16 (H) 0.52 - 1.04 mg/dL    Calcium 9.0 8.5 - 10.1 mg/dL    Glucose 132 (H) 70 - 99 mg/dL    GFR Estimate 57 (L) >60 mL/min/1.73m2      Comment:      Effective December 21, 2021 eGFRcr in adults is calculated using the 2021 CKD-EPI creatinine equation which includes age and gender (Jennifer et al., NE, DOI: 10.1056/PHEYry1850021)   CBC with platelets and differential   Result Value Ref Range    WBC Count 5.7 4.0 - 11.0 10e3/uL    RBC Count 4.57 3.80 - 5.20 10e6/uL    Hemoglobin 12.9 11.7 -  15.7 g/dL    Hematocrit 40.8 35.0 - 47.0 %    MCV 89 78 - 100 fL    MCH 28.2 26.5 - 33.0 pg    MCHC 31.6 31.5 - 36.5 g/dL    RDW 16.8 (H) 10.0 - 15.0 %    Platelet Count 200 150 - 450 10e3/uL    % Neutrophils 54 %    % Lymphocytes 28 %    % Monocytes 10 %    % Eosinophils 7 %    % Basophils 1 %    Absolute Neutrophils 3.0 1.6 - 8.3 10e3/uL    Absolute Lymphocytes 1.6 0.8 - 5.3 10e3/uL    Absolute Monocytes 0.6 0.0 - 1.3 10e3/uL    Absolute Eosinophils 0.4 0.0 - 0.7 10e3/uL    Absolute Basophils 0.0 0.0 - 0.2 10e3/uL       If you have any questions or concerns, please call the clinic at the number listed above.       Sincerely,      Johnnie Barreto MD/allegra

## 2022-06-07 ENCOUNTER — ANESTHESIA EVENT (OUTPATIENT)
Dept: SURGERY | Facility: AMBULATORY SURGERY CENTER | Age: 52
End: 2022-06-07
Payer: COMMERCIAL

## 2022-06-07 ENCOUNTER — LAB (OUTPATIENT)
Dept: LAB | Facility: CLINIC | Age: 52
End: 2022-06-07
Attending: INTERNAL MEDICINE
Payer: COMMERCIAL

## 2022-06-07 DIAGNOSIS — Z01.818 PRE-OP EXAM: ICD-10-CM

## 2022-06-07 LAB — SARS-COV-2 RNA RESP QL NAA+PROBE: NEGATIVE

## 2022-06-07 PROCEDURE — U0005 INFEC AGEN DETEC AMPLI PROBE: HCPCS

## 2022-06-07 PROCEDURE — U0003 INFECTIOUS AGENT DETECTION BY NUCLEIC ACID (DNA OR RNA); SEVERE ACUTE RESPIRATORY SYNDROME CORONAVIRUS 2 (SARS-COV-2) (CORONAVIRUS DISEASE [COVID-19]), AMPLIFIED PROBE TECHNIQUE, MAKING USE OF HIGH THROUGHPUT TECHNOLOGIES AS DESCRIBED BY CMS-2020-01-R: HCPCS

## 2022-06-08 ENCOUNTER — HOSPITAL ENCOUNTER (OUTPATIENT)
Facility: AMBULATORY SURGERY CENTER | Age: 52
Discharge: HOME | End: 2022-06-08
Attending: ORTHOPAEDIC SURGERY
Payer: COMMERCIAL

## 2022-06-08 ENCOUNTER — ANESTHESIA (OUTPATIENT)
Dept: SURGERY | Facility: AMBULATORY SURGERY CENTER | Age: 52
End: 2022-06-08
Payer: COMMERCIAL

## 2022-06-08 VITALS
HEIGHT: 66 IN | DIASTOLIC BLOOD PRESSURE: 84 MMHG | BODY MASS INDEX: 28.93 KG/M2 | RESPIRATION RATE: 18 BRPM | HEART RATE: 76 BPM | SYSTOLIC BLOOD PRESSURE: 130 MMHG | WEIGHT: 180 LBS | OXYGEN SATURATION: 96 % | TEMPERATURE: 98.5 F

## 2022-06-08 DIAGNOSIS — M25.572 PAIN IN JOINT, ANKLE AND FOOT, LEFT: ICD-10-CM

## 2022-06-08 PROCEDURE — 20670 REMOVAL IMPLANT SUPERFICIAL: CPT | Mod: LT | Performed by: ORTHOPAEDIC SURGERY

## 2022-06-08 PROCEDURE — 20670 REMOVAL IMPLANT SUPERFICIAL: CPT | Mod: LT

## 2022-06-08 RX ORDER — HYDROXYZINE HYDROCHLORIDE 25 MG/1
25 TABLET, FILM COATED ORAL EVERY 8 HOURS PRN
Qty: 15 TABLET | Refills: 0 | Status: SHIPPED | OUTPATIENT
Start: 2022-06-08 | End: 2023-07-19

## 2022-06-08 RX ORDER — HYDROXYZINE HYDROCHLORIDE 25 MG/1
25 TABLET, FILM COATED ORAL
Status: COMPLETED | OUTPATIENT
Start: 2022-06-08 | End: 2022-06-08

## 2022-06-08 RX ORDER — CEFAZOLIN SODIUM 2 G/50ML
2 SOLUTION INTRAVENOUS
Status: COMPLETED | OUTPATIENT
Start: 2022-06-08 | End: 2022-06-08

## 2022-06-08 RX ORDER — LIDOCAINE 40 MG/G
CREAM TOPICAL
Status: DISCONTINUED | OUTPATIENT
Start: 2022-06-08 | End: 2022-06-08 | Stop reason: HOSPADM

## 2022-06-08 RX ORDER — FENTANYL CITRATE 50 UG/ML
25 INJECTION, SOLUTION INTRAMUSCULAR; INTRAVENOUS
Status: DISCONTINUED | OUTPATIENT
Start: 2022-06-08 | End: 2022-06-09 | Stop reason: HOSPADM

## 2022-06-08 RX ORDER — ONDANSETRON 4 MG/1
4 TABLET, ORALLY DISINTEGRATING ORAL EVERY 30 MIN PRN
Status: DISCONTINUED | OUTPATIENT
Start: 2022-06-08 | End: 2022-06-09 | Stop reason: HOSPADM

## 2022-06-08 RX ORDER — FENTANYL CITRATE 50 UG/ML
INJECTION, SOLUTION INTRAMUSCULAR; INTRAVENOUS PRN
Status: DISCONTINUED | OUTPATIENT
Start: 2022-06-08 | End: 2022-06-08

## 2022-06-08 RX ORDER — CEFAZOLIN SODIUM 2 G/50ML
2 SOLUTION INTRAVENOUS SEE ADMIN INSTRUCTIONS
Status: DISCONTINUED | OUTPATIENT
Start: 2022-06-08 | End: 2022-06-08 | Stop reason: HOSPADM

## 2022-06-08 RX ORDER — LIDOCAINE HYDROCHLORIDE 20 MG/ML
INJECTION, SOLUTION INFILTRATION; PERINEURAL PRN
Status: DISCONTINUED | OUTPATIENT
Start: 2022-06-08 | End: 2022-06-08

## 2022-06-08 RX ORDER — DIAZEPAM 5 MG
5 TABLET ORAL EVERY 6 HOURS PRN
Status: DISCONTINUED | OUTPATIENT
Start: 2022-06-08 | End: 2022-06-09 | Stop reason: HOSPADM

## 2022-06-08 RX ORDER — PROPOFOL 10 MG/ML
INJECTION, EMULSION INTRAVENOUS CONTINUOUS PRN
Status: DISCONTINUED | OUTPATIENT
Start: 2022-06-08 | End: 2022-06-08

## 2022-06-08 RX ORDER — HYDROCODONE BITARTRATE AND ACETAMINOPHEN 5; 325 MG/1; MG/1
2 TABLET ORAL
Status: DISCONTINUED | OUTPATIENT
Start: 2022-06-08 | End: 2022-06-09 | Stop reason: HOSPADM

## 2022-06-08 RX ORDER — MEPERIDINE HYDROCHLORIDE 25 MG/ML
12.5 INJECTION INTRAMUSCULAR; INTRAVENOUS; SUBCUTANEOUS
Status: COMPLETED | OUTPATIENT
Start: 2022-06-08 | End: 2022-06-08

## 2022-06-08 RX ORDER — DEXAMETHASONE SODIUM PHOSPHATE 4 MG/ML
INJECTION, SOLUTION INTRA-ARTICULAR; INTRALESIONAL; INTRAMUSCULAR; INTRAVENOUS; SOFT TISSUE PRN
Status: DISCONTINUED | OUTPATIENT
Start: 2022-06-08 | End: 2022-06-08

## 2022-06-08 RX ORDER — FENTANYL CITRATE 50 UG/ML
25 INJECTION, SOLUTION INTRAMUSCULAR; INTRAVENOUS EVERY 5 MIN PRN
Status: DISCONTINUED | OUTPATIENT
Start: 2022-06-08 | End: 2022-06-08 | Stop reason: HOSPADM

## 2022-06-08 RX ORDER — HYDROCODONE BITARTRATE AND ACETAMINOPHEN 5; 325 MG/1; MG/1
1-2 TABLET ORAL EVERY 4 HOURS PRN
Qty: 15 TABLET | Refills: 0 | Status: SHIPPED | OUTPATIENT
Start: 2022-06-08 | End: 2023-07-19

## 2022-06-08 RX ORDER — OXYCODONE HYDROCHLORIDE 5 MG/1
5 TABLET ORAL EVERY 4 HOURS PRN
Status: DISCONTINUED | OUTPATIENT
Start: 2022-06-08 | End: 2022-06-09 | Stop reason: HOSPADM

## 2022-06-08 RX ORDER — BUPIVACAINE HYDROCHLORIDE 5 MG/ML
INJECTION, SOLUTION PERINEURAL PRN
Status: DISCONTINUED | OUTPATIENT
Start: 2022-06-08 | End: 2022-06-08 | Stop reason: HOSPADM

## 2022-06-08 RX ORDER — HYDROMORPHONE HYDROCHLORIDE 1 MG/ML
0.2 INJECTION, SOLUTION INTRAMUSCULAR; INTRAVENOUS; SUBCUTANEOUS EVERY 5 MIN PRN
Status: DISCONTINUED | OUTPATIENT
Start: 2022-06-08 | End: 2022-06-08 | Stop reason: HOSPADM

## 2022-06-08 RX ORDER — ONDANSETRON 2 MG/ML
INJECTION INTRAMUSCULAR; INTRAVENOUS PRN
Status: DISCONTINUED | OUTPATIENT
Start: 2022-06-08 | End: 2022-06-08

## 2022-06-08 RX ORDER — CELECOXIB 200 MG/1
200 CAPSULE ORAL 2 TIMES DAILY
Status: DISCONTINUED | OUTPATIENT
Start: 2022-06-08 | End: 2022-06-09 | Stop reason: HOSPADM

## 2022-06-08 RX ORDER — SODIUM CHLORIDE, SODIUM LACTATE, POTASSIUM CHLORIDE, CALCIUM CHLORIDE 600; 310; 30; 20 MG/100ML; MG/100ML; MG/100ML; MG/100ML
INJECTION, SOLUTION INTRAVENOUS CONTINUOUS
Status: DISCONTINUED | OUTPATIENT
Start: 2022-06-08 | End: 2022-06-08 | Stop reason: HOSPADM

## 2022-06-08 RX ORDER — ONDANSETRON 2 MG/ML
4 INJECTION INTRAMUSCULAR; INTRAVENOUS EVERY 30 MIN PRN
Status: DISCONTINUED | OUTPATIENT
Start: 2022-06-08 | End: 2022-06-09 | Stop reason: HOSPADM

## 2022-06-08 RX ORDER — PROPOFOL 10 MG/ML
INJECTION, EMULSION INTRAVENOUS PRN
Status: DISCONTINUED | OUTPATIENT
Start: 2022-06-08 | End: 2022-06-08

## 2022-06-08 RX ORDER — LABETALOL HYDROCHLORIDE 5 MG/ML
10 INJECTION, SOLUTION INTRAVENOUS
Status: DISCONTINUED | OUTPATIENT
Start: 2022-06-08 | End: 2022-06-08 | Stop reason: HOSPADM

## 2022-06-08 RX ORDER — SODIUM CHLORIDE, SODIUM LACTATE, POTASSIUM CHLORIDE, CALCIUM CHLORIDE 600; 310; 30; 20 MG/100ML; MG/100ML; MG/100ML; MG/100ML
INJECTION, SOLUTION INTRAVENOUS CONTINUOUS
Status: DISCONTINUED | OUTPATIENT
Start: 2022-06-08 | End: 2022-06-09 | Stop reason: HOSPADM

## 2022-06-08 RX ADMIN — PROPOFOL 200 MG: 10 INJECTION, EMULSION INTRAVENOUS at 07:40

## 2022-06-08 RX ADMIN — MEPERIDINE HYDROCHLORIDE 12.5 MG: 25 INJECTION INTRAMUSCULAR; INTRAVENOUS; SUBCUTANEOUS at 08:28

## 2022-06-08 RX ADMIN — DIAZEPAM 5 MG: 5 TABLET ORAL at 08:47

## 2022-06-08 RX ADMIN — SODIUM CHLORIDE, SODIUM LACTATE, POTASSIUM CHLORIDE, CALCIUM CHLORIDE: 600; 310; 30; 20 INJECTION, SOLUTION INTRAVENOUS at 06:30

## 2022-06-08 RX ADMIN — FENTANYL CITRATE 50 MCG: 50 INJECTION, SOLUTION INTRAMUSCULAR; INTRAVENOUS at 08:03

## 2022-06-08 RX ADMIN — FENTANYL CITRATE 50 MCG: 50 INJECTION, SOLUTION INTRAMUSCULAR; INTRAVENOUS at 07:52

## 2022-06-08 RX ADMIN — CEFAZOLIN SODIUM 2 G: 2 SOLUTION INTRAVENOUS at 07:35

## 2022-06-08 RX ADMIN — LIDOCAINE HYDROCHLORIDE 80 MG: 20 INJECTION, SOLUTION INFILTRATION; PERINEURAL at 07:40

## 2022-06-08 RX ADMIN — OXYCODONE HYDROCHLORIDE 5 MG: 5 TABLET ORAL at 08:16

## 2022-06-08 RX ADMIN — HYDROXYZINE HYDROCHLORIDE 25 MG: 25 TABLET, FILM COATED ORAL at 08:16

## 2022-06-08 RX ADMIN — PROPOFOL 30 MCG/KG/MIN: 10 INJECTION, EMULSION INTRAVENOUS at 07:40

## 2022-06-08 RX ADMIN — ONDANSETRON 4 MG: 2 INJECTION INTRAMUSCULAR; INTRAVENOUS at 07:53

## 2022-06-08 RX ADMIN — DEXAMETHASONE SODIUM PHOSPHATE 4 MG: 4 INJECTION, SOLUTION INTRA-ARTICULAR; INTRALESIONAL; INTRAMUSCULAR; INTRAVENOUS; SOFT TISSUE at 07:45

## 2022-06-08 RX ADMIN — CELECOXIB 200 MG: 200 CAPSULE ORAL at 09:06

## 2022-06-08 RX ADMIN — MEPERIDINE HYDROCHLORIDE 12.5 MG: 25 INJECTION INTRAMUSCULAR; INTRAVENOUS; SUBCUTANEOUS at 08:15

## 2022-06-08 ASSESSMENT — LIFESTYLE VARIABLES: TOBACCO_USE: 1

## 2022-06-08 NOTE — ANESTHESIA POSTPROCEDURE EVALUATION
Patient: Kalyn Rivero    Procedure: Procedure(s):  REMOVAL, HARDWARE, FOOT LEFT       Anesthesia Type:  General    Note:  Disposition: Outpatient   Postop Pain Control: Challenging   PONV:    Neuro/Psych: Uneventful            Sign Out: Acceptable/Baseline neuro status   Airway/Respiratory: Uneventful            Sign Out: Acceptable/Baseline resp. status   CV/Hemodynamics: Uneventful            Sign Out: Acceptable CV status; No obvious hypovolemia; No obvious fluid overload   Other NRE:    DID A NON-ROUTINE EVENT OCCUR?            Last vitals:  Vitals Value Taken Time   /78 06/08/22 0829   Temp 37.1  C (98.8  F) 06/08/22 0810   Pulse 74 06/08/22 0832   Resp 18 06/08/22 0832   SpO2 97 % 06/08/22 0832   Vitals shown include unvalidated device data.    Electronically Signed By: Francisco Javier Echeverria MD  June 8, 2022  4:16 PM

## 2022-06-08 NOTE — OR NURSING
"Pt. States she has 10/10 pain due to \"being off her home pain medication for the past two weeks.\" Pt states she is also having foot pain 10/10. Instructed Pt. That we will give ordered pain medication here in PACU but that she needs to go home and take her home pain medication regime. RN also educated Pt. That if she came in with pain she will most likely go home with some pain until she restarts her home meds. Pt. Verbalized understanding. See MAR for medication administration.   "

## 2022-06-08 NOTE — ANESTHESIA PREPROCEDURE EVALUATION
"Anesthesia Pre-Procedure Evaluation    Patient: Kalyn Rivero   MRN: [id number] : 1970        Procedure : Procedure(s):  REMOVAL, HARDWARE, FOOT LEFT          Past Medical History:   Diagnosis Date     Acetaminophen overdose 3/24/2011     Anemia      Anesthesia complication     pt states has a history of heart stopping during anesthesia,     Arrhythmia      Cerebral infarction (H)      Cervical high risk HPV (human papillomavirus) test positive 2017    type 18 & other HR HPV     Chronic infection     MRSA     Chronic pain 3/24/2011     Degenerative joint disease      Endometriosis      Fibromyalgia      Gastro-oesophageal reflux disease      Heart murmur      History of blood transfusion      Hypothyroidism      Immune disorder (H)      Learning disability      Malignant neoplasm (H)      Migraine      MRSA (methicillin resistant staph aureus) culture positive      Neck injuries      Opioid type dependence (H)      Other chronic pain      PONV (postoperative nausea and vomiting)     states \"flatlines\"during anesthesia     RA (rheumatoid arthritis) (H)      Renal stone      Scoliosis      Stomach ulcer     history      Past Surgical History:   Procedure Laterality Date     ARTHRODESIS FOOT  2012    Procedure:ARTHRODESIS FOOT; Right Subtalar andTaloNavicular  Fusion  ; Surgeon:CHRIS HENDRICKS; [facility]     ARTHRODESIS FOOT Left 2015    Procedure: ARTHRODESIS FOOT;  Surgeon: Chris Hendricks MD;  [facility]     ARTHROPLASTY ELBOW Right 2015    Procedure: ARTHROPLASTY ELBOW;  Surgeon: Carrol Gaspar MD;  [facility]     ARTHROPLASTY KNEE Right      ARTHROPLASTY REVISION ELBOW Right 2018    Procedure: 1 - aspiration of Right elbow 2- Explantation of failed hardware Right humeral periprosthetic fracture non-union 3- Right distal humerus excision 4- Right distal humerus replacement 5 - Revision Right total elbow arthroplasty;  Surgeon: Goran" Aakash ZARCO MD;  [facility]     ARTHROPLASTY WRIST      x2 Lt wrist replacement.     ARTHROTOMY ELBOW Right 6/18/2015    Procedure: ARTHROTOMY ELBOW;  Surgeon: Carrol Gaspar MD;  Location:  OR     [facility] ARTHROSCOP,DIAGNOSTIC  12/17/2008    left total shoulder arthroplasty by Dr. Law     CYSTOSCOPY  02/06/2009    1.cystoscopy.2.bilateral ureteroscopy.3.basketing of stone fragments from the right kidney.4.bilateral double-J ureteral stent placement.5.ann catheter placement.6.fluoroscopy and intraoperative interpretation of images.     CYSTOSCOPY  01/08/2007    1. cystoscopy and left stent removal.2.left ureteroscopy     DECOMPRESSION CUBITAL TUNNEL  6/6/2014    Procedure: DECOMPRESSION CUBITAL TUNNEL;  Surgeon: Janelle Coates MD;  [facility]     ENDOSCOPY  03/31/2005    upper GI endoscopy-[facility]     ESOPHAGOSCOPY, GASTROSCOPY, DUODENOSCOPY (EGD), COMBINED  3/17/2014    Procedure: COMBINED ESOPHAGOSCOPY, GASTROSCOPY, DUODENOSCOPY (EGD), BIOPSY SINGLE OR MULTIPLE;;  Surgeon: Duane, William Charles, MD;  Location: [facility] OR     FUSION CERVICAL POSTERIOR ONE LEVEL  11/18/2013    Procedure: FUSION CERVICAL POSTERIOR ONE LEVEL;  Cervical 1-2 Posterior Cervical Fusion (Harms Procedure);  Surgeon: Carrol Gunn MD;  [facility]     GYN SURGERY       INJECT MAJOR JOINT / BURSA Left 1/5/2017    Procedure: INJECT MAJOR JOINT / BURSA;  Surgeon: Fredy Patel DO;  [facility]     INJECT STEROID (LOCATION) Left 6/18/2015    Procedure: INJECT STEROID (LOCATION);  Surgeon: Carrol Gaspar MD;  [facility]     NECK SURGERY       REMOVE FOREIGN BODY UPPER EXTREMITY Right 3/2/2017    Procedure: REMOVE FOREIGN BODY UPPER EXTREMITY;  Surgeon: Carrol Gaspar MD;  [facility]     RESECT BONE UPPER EXTREMITY Left 4/27/2017    Procedure: RESECT BONE UPPER EXTREMITY;  Left Radial Head Resection;  Surgeon: Carrol Gaspar MD;   [facility]     ROTATOR CUFF REPAIR RT/LT  10/19/2005    1.left distal clavicle excision.2.acromioplasty.3.coracoacromial ligament resection.4.rotator cuff exploration     [facility] ANESTH,DX ARTHROSCOPIC PROC KNEE JOINT  10/24/2007    right total knee arthroplasty     [facility] SHOULDER SURG PROC UNLISTED       [facility] TOTAL KNEE ARTHROPLASTY  1/18/12    Left      Allergies   Allergen Reactions     Morphine Swelling     Codeine      Gabapentin Other (See Comments)     Pins,needles, stabbing aching at once  Pins,needles, stabbing aching at once  Numbness and Tingling     Ibuprofen      Doesn't take due to gastric ulcer     Sulfa Drugs Nausea and Vomiting     unknown     Tylenol [Acetaminophen]      Pt. States that she has Liver problems  Tylenol 3     Erythromycin Nausea     Vomiting      Penicillins Rash     Prednisone Palpitations     Heart racing      Social History     Tobacco Use     Smoking status: Former Smoker     Packs/day: 0.10     Years: 10.00     Pack years: 1.00     Types: Cigarettes     Start date: 8/6/1983     Smokeless tobacco: Never Used     Tobacco comment: Quit 6 weeks ago   Substance Use Topics     Alcohol use: No      Wt Readings from Last 1 Encounters:   05/26/22 83 kg (183 lb)        Anesthesia Evaluation   Pt has had prior anesthetic. Type: General.    History of anesthetic complications  - PONV.      ROS/MED HX  ENT/Pulmonary:     (+) tobacco use, Past use,     Neurologic:     (+) migraines,     Cardiovascular:       METS/Exercise Tolerance:     Hematologic:     (+) history of blood transfusion,     Musculoskeletal:       GI/Hepatic:     (+) GERD, liver disease (Hx of acetominophen overdose resulting in acute liver failure),     Renal/Genitourinary:     (+) renal disease, type: CRI, Nephrolithiasis ,     Endo:     (+) thyroid problem,     Psychiatric/Substance Use:     (+) anxiety and depression     Infectious Disease:       Malignancy:       Other:      (+) , H/O Chronic Pain (Fibromyalgia),         Physical Exam    Airway        Mallampati: II   TM distance: > 3 FB   Neck ROM: full   Mouth opening: > 3 cm    Respiratory Devices and Support         Dental  no notable dental history         Cardiovascular   cardiovascular exam normal          Pulmonary   pulmonary exam normal            Other findings: 11/27/18; 1142; Mask Ventilation: Easy; Ease of Intubation: Easy; Airway Size: 7;  Cuffed;  Oral;  Blade Type: Melissa;  Blade Size: 3;  Insertion Attempts: 1;  Secured at (cm)to lip: 22 cm;  Breath Sounds: Equal, clear and bilateral;  End Tidal CO2: Present;  Dentition: Unchanged, Intact;  Grade View of Cords: 1    OUTSIDE LABS:  CBC:   Lab Results   Component Value Date    WBC 5.7 05/26/2022    WBC 5.3 02/21/2022    HGB 12.9 05/26/2022    HGB 13.6 02/21/2022    HCT 40.8 05/26/2022    HCT 43.1 02/21/2022     05/26/2022     02/21/2022     BMP:   Lab Results   Component Value Date     05/26/2022     02/09/2022    POTASSIUM 4.2 05/26/2022    POTASSIUM 4.7 02/09/2022    CHLORIDE 111 (H) 05/26/2022    CHLORIDE 108 02/09/2022    CO2 25 05/26/2022    CO2 25 02/09/2022    BUN 24 05/26/2022    BUN 24 02/09/2022    CR 1.16 (H) 05/26/2022    CR 0.96 02/09/2022     (H) 05/26/2022     (H) 02/09/2022     COAGS:   Lab Results   Component Value Date    PTT 28 11/19/2013    INR 1.00 09/08/2014    FIBR 225 03/20/2011     POC:   Lab Results   Component Value Date     (H) 11/27/2018    HCG Negative 03/02/2017    HCGS Negative 11/27/2018     HEPATIC:   Lab Results   Component Value Date    ALBUMIN 3.7 02/09/2022    PROTTOTAL 8.5 02/09/2022    ALT 42 02/09/2022    AST 34 02/09/2022    GGT 84 (H) 03/19/2011    ALKPHOS 122 02/09/2022    BILITOTAL 0.5 02/09/2022    BETSY 32 03/20/2011     OTHER:   Lab Results   Component Value Date    PH 7.46 (H) 03/19/2011    LACT 1.9 03/21/2011    JERRI 9.0 05/26/2022    PHOS 3.2 11/19/2013    MAG 1.9 12/02/2018    LIPASE 146 11/11/2021    TSH 1.61  07/14/2021    T4 0.94 03/03/2017    T3 35 (L) 03/19/2011    CRP <2.9 07/05/2021    SED 38 (H) 07/05/2021       Anesthesia Plan    ASA Status:  3   NPO Status:  NPO Appropriate    Anesthesia Type: General.     - Airway: LMA   Induction: Intravenous, Propofol.   Maintenance: Balanced.        Consents         - Extended Intubation/Ventilatory Support Discussed: No.      - Patient is DNR/DNI Status: No    Use of blood products discussed: No .     Postoperative Care    Pain management: IV analgesics, Oral pain medications, Multi-modal analgesia.   PONV prophylaxis: Dexamethasone or Solumedrol, Ondansetron (or other 5HT-3), Background Propofol Infusion     Comments:                Francisco Javier Echeverria MD

## 2022-06-08 NOTE — BRIEF OP NOTE
[facility]    Brief Operative Note    Pre-operative diagnosis: Pain in joint, ankle and foot, left [M25.572]  Post-operative diagnosis Same as pre-operative diagnosis    Procedure: Procedure(s):  REMOVAL, HARDWARE, FOOT LEFT  Surgeon: Surgeon(s) and Role:     * Chris Hendricks MD - Primary     * Giuliana Lizama PA-C - Assisting  Anesthesia: Choice   Estimated Blood Loss: Minimal    Drains: None  Specimens: * No specimens in log *  Findings:   None.  Complications: None.  Implants:   Implant Name Type Inv. Item Serial No.  Lot No. LRB No. Used Action   IMP SCR ZIM CANC HEX [id number] PT  IMP SCR ZIM CANC HEX [id number] PT  [employer]  Left 2 Explanted   One plate and two additional screws      Left 2 Explanted       Plan:  Same Day surgery discharge to home once criteria met.  CAM boot to remain on left  lower extremity and WBAT.  Norco for pain to take in addition to her chronic pain medication.  No dressing change on own.  Leave dressing on until 2 weeks follow up appointment.  F/U in clinic in 2 weeks    I was asked by Dr. Hendricks to assist with surgery. I positioned and prepped the patient. I retracted soft tissue.   I suctioned fluids when needed. I provided traction for dissection. I helped to ligate blood vessels. I helped Dr. Hendricks identify and protect important structures. The procedure was medically necessary for an assistant because Dr. Hendricks needed the operative exposure and assistance that I provided. This allowed him to safely and efficiently operate. It was also important that I help ligate blood vessels to maintain hemostasis and reduce the bleeding risk. I helped with the closure of the operative incisions as well as helping with the boot/cast/splint.  The assistance that I provided reduced operative time which meant less general anesthetic for the patient. No qualified residents were available to assist.    Giuliana WILLS  LELO Lizama

## 2022-06-08 NOTE — DISCHARGE INSTRUCTIONS
[facility]  Home Care Following Anesthesia  For 24 hours after surgery:  Get plenty of rest.  A responsible adult must stay with you for at least 24 hours after you leave the surgery center.  Do not drive or use heavy equipment.  If you have weakness or tingling, don't drive or use heavy equipment until this feeling goes away.   Do not drink alcohol.   Avoid strenuous or risky activities.  Ask for help when climbing stairs.  You may feel lightheaded.  IF so, sit for a few minutes before standing.  Have someone help you get up.   If you have nausea (feel sick to your stomach): Drink only clear liquids such as apple juice, ginger ale, broth or 7-Up.  Rest may also help.  Be sure to drink enough fluids.  Move to a regular diet as you feel able.   You may have a slight fever.  Call the doctor if your fever is over 100 F (37.7 C) (taken under the tongue) or lasts longer than 24 hours.  You may have a dry mouth, a sore throat, muscle aches or trouble sleeping. These should go away after 24 hours.  Do not make important or legal decisions.   It is recommended to avoid smoking.               Tips for taking pain medications  To get the best pain relief possible, remember these points:  Take pain medications as directed, before pain becomes severe.  Pain medication can upset your stomach: taking it with food may help.  Constipation is a common side effect of pain medication. Drink plenty of  fluids.  Eat foods high in fiber. Take a stool softener if recommended by your doctor or pharmacist.  Do not drink alcohol, drive or operate machinery while taking pain medications.  Ask about other ways to control pain, such as with heat, ice or relaxation.    Tylenol/Acetaminophen Consumption  To help encourage the safe use of acetaminophen, the makers of TYLENOL  have lowered the maximum daily dose for single-ingredient Extra Strength TYLENOL  (acetaminophen) products sold in the [place] from 8 pills  per day (4,000 mg) to 6 pills per day (3,000 mg). The dosing interval has also changed from 2 pills every 4-6 hours to 2 pills every 6 hours.  If you feel your pain relief is insufficient, you may take Tylenol/Acetaminophen in addition to your narcotic pain medication.   Be careful not to exceed 3,000 mg of Tylenol/Acetaminophen in a 24 hour period from all sources.  If you are taking extra strength Tylenol/acetaminophen (500 mg), the maximum dose is 6 tablets in 24 hours.  If you are taking regular strength acetaminophen (325 mg), the maximum dose is 9 tablets in 24 hours.    Call a doctor for any of the following:  Signs of infection (fever, growing tenderness at the surgery site, a large amount of drainage or bleeding, severe pain, foul-smelling drainage, redness, swelling).  It has been over 8 to 10 hours since surgery and you are still not able to urinate (pass water).  Headache for over 24 hours.  Numbness, tingling or weakness the day after surgery (if you had spinal anesthesia).  Signs of Covid-19 infection (temperature over 100 degrees, shortness of breath, cough, loss of taste/smell, generalized body aches, persistent headache, chills, sore throat, nausea/vomiting/diarrhea)  Your doctor is:       Dr. Chris Hendricks, Orthopaedics: [phone]               Or dial [phone] and ask for the resident on call for:  Orthopaedics  For emergency care, call the:  [facility] Emergency Department: [phone] (TTY for hearing impaired: [phone])

## 2022-06-08 NOTE — INTERVAL H&P NOTE
"I have reviewed the surgical (or preoperative) H&P that is linked to this encounter, and examined the patient. There are no significant changes    Clinical Conditions Present on Arrival:  Clinically Significant Risk Factors Present on Admission                   # Overweight: Estimated body mass index is 29.05 kg/m  as calculated from the following:    Height as of this encounter: 1.676 m (5' 6\").    Weight as of this encounter: 81.6 kg (180 lb).       "

## 2022-06-08 NOTE — ANESTHESIA CARE TRANSFER NOTE
Patient: Kalyn Rivero    Procedure: Procedure(s):  REMOVAL, HARDWARE, FOOT LEFT       Diagnosis: Pain in joint, ankle and foot, left [M25.572]  Diagnosis Additional Information: No value filed.    Anesthesia Type:   General     Note:    Oropharynx: oropharynx clear of all foreign objects and spontaneously breathing  Level of Consciousness: awake  Oxygen Supplementation: face mask  Level of Supplemental Oxygen (L/min / FiO2): 6  Independent Airway: airway patency satisfactory and stable  Dentition: dentition unchanged  Vital Signs Stable: post-procedure vital signs reviewed and stable  Report to RN Given: handoff report given  Patient transferred to: PACU    Handoff Report: Identifed the Patient, Identified the Reponsible Provider, Reviewed the pertinent medical history, Discussed the surgical course, Reviewed Intra-OP anesthesia mangement and issues during anesthesia, Set expectations for post-procedure period and Allowed opportunity for questions and acknowledgement of understanding      Vitals:  Vitals Value Taken Time   /91 06/08/22 0806   Temp     Pulse 86 06/08/22 0807   Resp 68 06/08/22 0807   SpO2 100 % 06/08/22 0807   Vitals shown include unvalidated device data.    Electronically Signed By: JULIETTE Cali CRNA  June 8, 2022  8:09 AM

## 2022-06-09 ENCOUNTER — TELEPHONE (OUTPATIENT)
Dept: ORTHOPEDICS | Facility: CLINIC | Age: 52
End: 2022-06-09
Payer: COMMERCIAL

## 2022-06-09 ENCOUNTER — NURSE TRIAGE (OUTPATIENT)
Dept: NURSING | Facility: CLINIC | Age: 52
End: 2022-06-09
Payer: COMMERCIAL

## 2022-06-09 DIAGNOSIS — M79.672 LEFT FOOT PAIN: Primary | ICD-10-CM

## 2022-06-09 NOTE — TELEPHONE ENCOUNTER
-Patient POD #1 asking for alternate pain management .  -Dr Hendricks: 6/8/22: REMOVAL, HARDWARE, FOOT LEFT  -Post op plan:  Same Day surgery discharge to home once criteria met.  CAM boot to remain on left  lower extremity and WBAT.  Norco for pain to take in addition to her chronic pain medication.  No dressing change on own.  Leave dressing on until 2 weeks follow up appointment.  F/U in clinic in 2 weeks    States has allergy to Tylenol in Norco.  Took earlier today and felt queasy and odd.   Receives maintenance from Pain clinic:   oxyCODONE (ROXICODONE) 5 MG tablet    Post op meds   prescribed:  HYDROcodone-acetaminophen (NORCO) 5-325 MG tablet 15 tablet 0 6/8/2022     hydrOXYzine (ATARAX) 25 MG tablet 15 tablet 0 6/8/2022       Reason for Disposition    Request for URGENT new prescription or refill of 'essential' medication (i.e., likelihood of harm to patient if not taken) and triager unable to fill per department policy    Additional Information    Negative: Drug overdose and triager unable to answer question    Negative: Caller requesting information unrelated to medicine    Negative: Caller requesting a prescription for Strep throat and has a positive culture result    Negative: Rash while taking a medication or within 3 days of stopping it    Negative: Immunization reaction suspected    Negative: Asthma and having symptoms of asthma (cough, wheezing, etc.)    Negative: MORE THAN A DOUBLE DOSE of a prescription or over-the-counter (OTC) drug    Protocols used: MEDICATION QUESTION CALL-A-OH

## 2022-06-09 NOTE — TELEPHONE ENCOUNTER
The best thing for the swelling and throbbing is elevation along with ice.  I will provide 10 tabs of oxycodone.  After that the patient should discuss with her pain provider further medications.    Oxycodone sent to the patient's pharmacy on file.     Giuliana Lizama PA-C

## 2022-06-09 NOTE — TELEPHONE ENCOUNTER
Patient was called back.  She stated that she cannot take tylenol as she is allergic to it and it is documented in her chart. She is requesting a different pain medication with out tylenol.  She stated that she cannot take more oxycodone as she is already on a dosage wit her pain clinic.  Her pharmacy is [facility] in [facility]. She will call back tomorrow if there is an issue.

## 2022-06-09 NOTE — TELEPHONE ENCOUNTER
[facility] Call Center    Phone Message    May a detailed message be left on voicemail: yes     Reason for Call: Other: Big toe throbbing and swelling. Allergies to tylenol in Norco. Would like an answer before the end of the day.    Action Taken: Message routed to:  [facility] Orthopedics    Travel Screening: Not Applicable

## 2022-06-10 RX ORDER — OXYCODONE HYDROCHLORIDE 5 MG/1
5 TABLET ORAL EVERY 6 HOURS PRN
Qty: 10 TABLET | Refills: 0 | Status: SHIPPED | OUTPATIENT
Start: 2022-06-10 | End: 2022-06-13

## 2022-06-10 NOTE — TELEPHONE ENCOUNTER
Spoke with the patient and explained that vicodin has tylenol in it. We discussed that oxycodone was sent today and she can use this to take increased dose 2 tabs q4-6 hours prn for the next few days. Called her pharmacy and confirmed the patient just had surgery and will need short term increase in pain management. Patient will need to pay out of pocket, but pharmacist okayed releasing the medication to her. I let the patient know.     Mary Tavares PA-C     Hemigard Postcare Instructions: The HEMIGARD strips are to remain completely dry for at least 5-7 days.

## 2022-06-10 NOTE — TELEPHONE ENCOUNTER
[facility] Call Center    Phone Message    May a detailed message be left on voicemail: yes     Reason for Call: Other: Patient is still in pain. Can not take medication with tylenol. Requesting something with vicodin.    Action Taken: Message routed to:  [facility] Orthopedics    Travel Screening: Not Applicable

## 2022-06-10 NOTE — OP NOTE
Procedure Date: 06/08/2022    PREOPERATIVE DIAGNOSIS:  Left foot painful retained hardware.    POSTOPERATIVE DIAGNOSIS:  Left foot painful retained hardware.    PROCEDURE:  Left foot hardware removal.    SURGEON:  Chris Hendricks MD.    ASSISTANT:  Giuliana Lizama PA-C.  Ms. Lizama's assistance was required in order to provide assistance with positioning, the surgery itself, holding retractors, closure of the wound and application of immobilization devices.  At the time of the surgery, there was no available help from an orthopedic trainee.    COMPLICATIONS:  None.    DRAINS:  None.    ESTIMATED BLOOD LOSS:  Less than 20 mL.    ANESTHESIA:  General endotracheal.    INDICATIONS FOR PROCEDURE:  Please refer to clinic note for further details discussed and indications of Mrs. Rivero's case.    DESCRIPTION OF PROCEDURE:  On 06/08/2022, the patient was taken to surgery.  Preoperative antibiotics were administered to the patient prior to arrival to the OR.      After successful induction of general endotracheal anesthesia, she was placed supine on the operating table.  The left lower extremity was prepped and draped in sterile fashion.  After exsanguination by gravity, tourniquet cuff was inflated to 300 mmHg on the proximal third of the left thigh.    The pause for the cause was performed according to institution's policy, which confirmed laterality of the procedure.    An incision was made on the previous surgical incision along the dorsal aspect of the first MTP joint.  Subcutaneous tissues were dissected.  We proceeded until indication of the hardware, which included 4 screws and 1 plate, which was extracted in 1 piece without difficulties.    We confirmed the absence of any remaining prominences along the dorsal aspect of the first MTP joint.  Tourniquet cuff was deflated.  Satisfactory hemostasis was accomplished.  Wound was closed in layers and sterile dressings were applied.  The patient was placed in a short Cam  walker and transferred in stable condition to PACU.    PLAN:  The patient will remain weightbearing as tolerated with use of the Cam Walker at all times except for hygiene, resting, sitting and sleeping activities.  The patient will return to clinic in 2 weeks from surgery, and at that time, sutures will be removed if indicated.  She will proceed with activities without any restrictions.  The Cam Walker will be utilized for comfort purposes.    The patient is not expected to require any physical therapy; however, if that is her desire, that will be performed without any restrictions.    The patient will be granted the possibility of skipping the 6-week appointment, assuming that the foot is working up to her expectations.  In the eventuality of her returning to the 6-week appointment, no x-rays will be obtained.    Chris Hendricks MD        D: 06/10/2022   T: 06/10/2022   MT: [id number]    Name:     SANDEE ALLEN  MRN:      [id number]        Account:        [id number]   :      1970           Procedure Date: 2022     Document: [id number]

## 2022-06-14 ENCOUNTER — TRANSFERRED RECORDS (OUTPATIENT)
Dept: HEALTH INFORMATION MANAGEMENT | Facility: CLINIC | Age: 52
End: 2022-06-14

## 2022-06-23 ENCOUNTER — PATIENT OUTREACH (OUTPATIENT)
Dept: FAMILY MEDICINE | Facility: CLINIC | Age: 52
End: 2022-06-23

## 2022-06-23 DIAGNOSIS — R87.810 CERVICAL HIGH RISK HPV (HUMAN PAPILLOMAVIRUS) TEST POSITIVE: ICD-10-CM

## 2022-06-23 NOTE — LETTER
June 23, 2022      Kalynlinn Lucas Mat  [address] [place] [id number]        Dear ,    This letter is to remind you that you are due for your follow-up Pap smear and Human Papillomavirus (HPV) test.    Please call [phone] to schedule your appointment at your earliest convenience.    If you have completed the appointment outside of the [facility] system, please have the records forwarded to our office. We will update your chart for your provider to review before your next annual wellness visit.     Thank you for choosing [facility]!      Sincerely,    Your [facility] Care Team

## 2022-06-24 ENCOUNTER — OFFICE VISIT (OUTPATIENT)
Dept: ORTHOPEDICS | Facility: CLINIC | Age: 52
End: 2022-06-24
Payer: COMMERCIAL

## 2022-06-24 DIAGNOSIS — Z98.890 STATUS POST SURGERY: Primary | ICD-10-CM

## 2022-06-24 PROCEDURE — 99024 POSTOP FOLLOW-UP VISIT: CPT

## 2022-06-24 NOTE — PROGRESS NOTES
Reason for visit:    Kalyn Rivero came in to the clinic for a two week post op check.    Her surgery was done 6/8/22 by Dr Hendricks.  She had a left foot hardware removal.     Assessment:    Kalyn came into the clinic in a short CAM walker WBAT.    The Surgical wounds were exposed and found to be well-healed; so the sutures were removed. Skin was c/d/i. Kalyn reports little to no pain.     Plan:     She was placed back into her CAM walker.  She was told to remain WBAT and she may transition to a normal shoe.     She has an appointment to see Dr. Hendricks at the 6 week post op kaleb but has to option to cancel this visit if she is doing well.    She has our phone number and will call with questions or problems.    Lacie Edmondson ATC

## 2022-06-28 ENCOUNTER — DOCUMENTATION ONLY (OUTPATIENT)
Dept: ORTHOPEDICS | Facility: CLINIC | Age: 52
End: 2022-06-28

## 2022-06-28 DIAGNOSIS — Z98.890 S/P FOOT SURGERY: ICD-10-CM

## 2022-06-28 DIAGNOSIS — M19.079: Primary | ICD-10-CM

## 2022-06-28 DIAGNOSIS — M79.672 LEFT FOOT PAIN: ICD-10-CM

## 2022-06-29 ENCOUNTER — OFFICE VISIT (OUTPATIENT)
Dept: FAMILY MEDICINE | Facility: CLINIC | Age: 52
End: 2022-06-29
Payer: COMMERCIAL

## 2022-06-29 VITALS
DIASTOLIC BLOOD PRESSURE: 85 MMHG | RESPIRATION RATE: 18 BRPM | HEIGHT: 66 IN | WEIGHT: 178 LBS | TEMPERATURE: 97.5 F | HEART RATE: 72 BPM | BODY MASS INDEX: 28.61 KG/M2 | OXYGEN SATURATION: 97 % | SYSTOLIC BLOOD PRESSURE: 143 MMHG

## 2022-06-29 DIAGNOSIS — M05.9 RHEUMATOID ARTHRITIS WITH POSITIVE RHEUMATOID FACTOR, INVOLVING UNSPECIFIED SITE (H): ICD-10-CM

## 2022-06-29 DIAGNOSIS — M25.572 PAIN IN JOINT, ANKLE AND FOOT, LEFT: Primary | ICD-10-CM

## 2022-06-29 DIAGNOSIS — Z98.1 S/P CERVICAL SPINAL FUSION: ICD-10-CM

## 2022-06-29 DIAGNOSIS — M97.41XD: ICD-10-CM

## 2022-06-29 DIAGNOSIS — R42 DIZZY SPELLS: ICD-10-CM

## 2022-06-29 DIAGNOSIS — G43.719 INTRACTABLE CHRONIC MIGRAINE WITHOUT AURA AND WITHOUT STATUS MIGRAINOSUS: ICD-10-CM

## 2022-06-29 DIAGNOSIS — M53.2X1 ATLANTOAXIAL INSTABILITY: ICD-10-CM

## 2022-06-29 DIAGNOSIS — R41.3 MEMORY LOSS: ICD-10-CM

## 2022-06-29 DIAGNOSIS — K21.9 GASTROESOPHAGEAL REFLUX DISEASE WITHOUT ESOPHAGITIS: ICD-10-CM

## 2022-06-29 PROCEDURE — 99214 OFFICE O/P EST MOD 30 MIN: CPT | Performed by: FAMILY MEDICINE

## 2022-06-29 ASSESSMENT — PAIN SCALES - GENERAL: PAINLEVEL: EXTREME PAIN (8)

## 2022-06-29 NOTE — PROGRESS NOTES
Patient Active Problem List   Diagnosis     Hypothyroidism     Acetaminophen overdose     Hepatic failure (H)     Pancreatitis     Anemia     Chronic pain     Pneumonia     Suicide risk     Grief reaction     Liver failure, acute     CARDIOVASCULAR SCREENING; LDL GOAL LESS THAN 160     Renal stone     Health Care Home     TOTAL KNEE ARTHROPLASTY - bilateral     Knee joint replacement - left     Trochanteric bursitis - left     Atlantoaxial instability     Postoperative pain     S/P cervical spinal fusion     Drug-seeking behavior     Opioid type dependence (H)     Severe frontal headaches     Abdominal pain, unspecified abdominal location     Esophageal reflux     Right upper extremity numbness     Right arm numbness     Lesion of ulnar nerve     Arthralgia of temporomandibular joint     Intractable chronic migraine without aura     Encounter for long-term opiate analgesic use     Rheumatoid arthritis of foot (H)     Other drug-induced neutropenia (H)     Rheumatoid arthritis with positive rheumatoid factor, involving unspecified site (H)     Cervical high risk HPV (human papillomavirus) test positive     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)     Chronic tension-type headache     Fibromyositis     Insomnia     Migraine     Myofascial pain     Periprosthetic fracture around internal prosthetic right elbow joint     Status post surgery     Pain in joint, ankle and foot, left     Chronic kidney disease, stage 1     Dizzy spells       Current Outpatient Medications   Medication     celecoxib (CELEBREX) 200 MG capsule     ClonazePAM (KLONOPIN PO)     clonazePAM (KLONOPIN) 0.5 MG tablet     COLLAGEN PO     ENBREL SURECLICK 50 MG/ML autoinjector     hydrOXYzine (ATARAX) 25 MG tablet     leflunomide (ARAVA) 10 MG tablet     leflunomide (ARAVA) 20 MG tablet     Multiple Vitamins-Minerals (HAIR SKIN AND NAILS FORMULA PO)     omeprazole (PRILOSEC) 40 MG DR capsule     OnabotulinumtoxinA (BOTOX  IJ)     ondansetron (ZOFRAN-ODT) 4 MG ODT tab     SENEXON-S 8.6-50 MG tablet     trimethoprim-polymyxin b (POLYTRIM) 07510-8.1 UNIT/ML-% ophthalmic solution     HYDROcodone-acetaminophen (NORCO) 5-325 MG tablet     oxyCODONE (ROXICODONE) 5 MG tablet     Current Facility-Administered Medications   Medication     botulinum toxin type A (BOTOX) 100 units injection 200 Units     botulinum toxin type A (BOTOX) 100 units injection 200 Units        Allergies   Allergen Reactions     Morphine Swelling     Codeine      Gabapentin Other (See Comments)     Pins,needles, stabbing aching at once  Pins,needles, stabbing aching at once  Numbness and Tingling     Ibuprofen      Doesn't take due to gastric ulcer     Sulfa Drugs Nausea and Vomiting     unknown     Tylenol [Acetaminophen]      Pt. States that she has Liver problems  Tylenol 3     Erythromycin Nausea     Vomiting      Penicillins Rash     Prednisone Palpitations     Heart racing     Review Of Systems  Skin: negative  Eyes: negative  Ears/Nose/Throat: hard to swallow and hoarse  Respiratory: Shortness of breath- because she is in bed most of time and Cough- from inactivity  Cardiovascular: negative  Gastrointestinal: negative  Genitourinary: GERD  Musculoskeletal: non union of right shoulder  Neurologic: negative  Psychiatric: anger issues   Hematologic/Lymphatic/Immunologic: negative  Endocrine: negative     ICD-10-CM    1. Pain in joint, ankle and foot, left  M25.572    2. Periprosthetic fracture around internal prosthetic right elbow joint, subsequent encounter  M97.41XD    3. Rheumatoid arthritis with positive rheumatoid factor, involving unspecified site (H)  M05.9    4. S/P cervical spinal fusion  Z98.1    5. Atlantoaxial instability  M53.2X1    6. Gastroesophageal reflux disease without esophagitis  K21.9    7. Intractable chronic migraine without aura and without status migrainosus  G43.719    8. Dizzy spells  R42    9. Memory loss  R41.3     PLAN recommend that  she has continues with  PCA 10 hours per day  Over  half of theTotal time 2 minutes spent in cordination care. Discussed diagnoses, prognoses and treatment.

## 2022-07-13 ENCOUNTER — DOCUMENTATION ONLY (OUTPATIENT)
Dept: ORTHOPEDICS | Facility: CLINIC | Age: 52
End: 2022-07-13

## 2022-07-13 DIAGNOSIS — M19.079 PRIMARY OSTEOARTHRITIS OF ANKLE, UNSPECIFIED LATERALITY: Primary | ICD-10-CM

## 2022-08-23 NOTE — TELEPHONE ENCOUNTER
FYI to provider - Patient is lost to pap tracking follow-up. Attempts to contact pt have been made per reminder process and there has been no reply and/or no appt scheduled. Contact hx listed below.     2/22/17 NIL Pap, + HR HPV 18 & other HR HPV. Plan colp  3/28/17 [facility] E[facility] negative for dysplasia. Plan cotest in 1 year.   5/3/18 [facility] Bx & ECC Negative. Plan cotest in 1 year.   6/6/19 Lost to follow-up for pap tracking  8/22/19 NIL Pap, Neg HPV, Endometrial cells present. Plan cotest in 1 year, unless abnormal bleeding then sooner.   1/5/21 Lost to follow-up for pap tracking  7/14/21 NIL pap, +HR HPV (not 16/18). Plan cotest in 1 year  6/23/22 Reminder letter  7/22/22 Reminder call -- Pt notified  8/23/22 Lost to follow-up for pap tracking     Tahira Casey RN BSN, Pap Tracking

## 2022-08-28 DIAGNOSIS — K21.9 GASTROESOPHAGEAL REFLUX DISEASE, UNSPECIFIED WHETHER ESOPHAGITIS PRESENT: ICD-10-CM

## 2022-08-29 RX ORDER — OMEPRAZOLE 40 MG/1
CAPSULE, DELAYED RELEASE ORAL
Qty: 90 CAPSULE | Refills: 0 | Status: SHIPPED | OUTPATIENT
Start: 2022-08-29 | End: 2022-12-05

## 2022-11-17 NOTE — PLAN OF CARE
Problem: Surgery Nonspecified (Adult)  Goal: Signs and Symptoms of Listed Potential Problems Will be Absent, Minimized or Managed (Surgery Nonspecified)  Signs and symptoms of listed potential problems will be absent, minimized or managed by discharge/transition of care (reference Surgery Nonspecified (Adult) CPG).   Pt. discharged at 1810 to home via Taxi, and left with personal belongings. PIV removed before discharge. Pt. received complete discharge paperwork and all medications as filled by discharge pharmacy. Pt. was given times of last dose for all discharge medications in writing on discharge medication sheets. Discharge teaching included all medication, pain management, activity restrictions, dressing changes, and signs and symptoms of infection. Dressing supplies sent home. Pt. to follow up with Dr. Zimmer in 2 weeks. Pt. had no further questions at the time of discharge and no unmet needs were identified.       If you are a smoker, it is important for your health to stop smoking. Please be aware that second hand smoke is also harmful.

## 2022-11-28 ENCOUNTER — TELEPHONE (OUTPATIENT)
Dept: FAMILY MEDICINE | Facility: CLINIC | Age: 52
End: 2022-11-28

## 2022-11-28 NOTE — TELEPHONE ENCOUNTER
Pt needs a form for nutrition form done.pt will drop off form and  upon completion .  Please call pt when ready for .  Second time provider filled out it was filled out correctly.   Dayna Gomez,

## 2022-12-05 NOTE — PROGRESS NOTES
"             Objective    /86   Pulse 100   Temp 98.4  F (36.9  C) (Tympanic)   Resp 16   Ht 1.676 m (5' 6\")   Wt 69 kg (152 lb 3.2 oz)   LMP  (LMP Unknown)   SpO2 97%   BMI 24.57 kg/m    Body mass index is 24.57 kg/m .  Physical Exam   SUBJECTIVE:  52 year old.The patient has a history of ear feeling plugged.  Cerumen seen in ear canal  right ear was flushed and tm seen    ICD-10-CM    1. Visit for screening mammogram  Z12.31       2. Impacted cerumen of right ear  H61.21       3. Rheumatoid arthritis with positive rheumatoid factor, involving unspecified site (H)  M05.9        PLAN:discussed low sa/ t diet and highprotein diet   Needs pap in the next month  Answers for HPI/ROS submitted by the patient on 12/7/2022  Headache Symptoms are: worsened  How often are you getting headaches or migraines? : every day all day  Are you able to do normal daily activities when you have a migraine?: No  Migraine Rescue/Relief Medications:: other  Effectiveness of rescue/relief medications:: I get only a small amount of relief  Migraine Preventative Medications:: other  ER or UC Visits:: 0 times  What is the reason for your visit today? : ears  How many servings of fruits and vegetables do you eat daily?: 2-3  On average, how many sweetened beverages do you drink each day (Examples: soda, juice, sweet tea, etc.  Do NOT count diet or artificially sweetened beverages)?: 3  How many minutes a day do you exercise enough to make your heart beat faster?: 9 or less  How many days a week do you exercise enough to make your heart beat faster?: 3 or less  How many days per week do you miss taking your medication?: 7      "

## 2022-12-07 ENCOUNTER — OFFICE VISIT (OUTPATIENT)
Dept: FAMILY MEDICINE | Facility: CLINIC | Age: 52
End: 2022-12-07
Payer: COMMERCIAL

## 2022-12-07 VITALS
RESPIRATION RATE: 16 BRPM | BODY MASS INDEX: 24.46 KG/M2 | HEIGHT: 66 IN | HEART RATE: 100 BPM | OXYGEN SATURATION: 97 % | DIASTOLIC BLOOD PRESSURE: 86 MMHG | TEMPERATURE: 98.4 F | SYSTOLIC BLOOD PRESSURE: 126 MMHG | WEIGHT: 152.2 LBS

## 2022-12-07 DIAGNOSIS — M05.9 RHEUMATOID ARTHRITIS WITH POSITIVE RHEUMATOID FACTOR, INVOLVING UNSPECIFIED SITE (H): ICD-10-CM

## 2022-12-07 DIAGNOSIS — H61.21 IMPACTED CERUMEN OF RIGHT EAR: ICD-10-CM

## 2022-12-07 DIAGNOSIS — Z12.31 VISIT FOR SCREENING MAMMOGRAM: Primary | ICD-10-CM

## 2022-12-07 PROCEDURE — 99213 OFFICE O/P EST LOW 20 MIN: CPT | Performed by: FAMILY MEDICINE

## 2022-12-07 ASSESSMENT — PAIN SCALES - GENERAL: PAINLEVEL: NO PAIN (0)

## 2022-12-15 ENCOUNTER — TELEPHONE (OUTPATIENT)
Dept: FAMILY MEDICINE | Facility: CLINIC | Age: 52
End: 2022-12-15

## 2022-12-15 DIAGNOSIS — G43.811 OTHER MIGRAINE WITH STATUS MIGRAINOSUS, INTRACTABLE: Primary | ICD-10-CM

## 2022-12-15 NOTE — TELEPHONE ENCOUNTER
Order/Referral Request    Who is requesting: Patient    Orders being requested: Referral for [facility] for Botox injections-patient states they were going to fax you and order to sign? I do not see anything in your office.    Reason service is needed/diagnosis:     When are orders needed by: ASAP    Has this been discussed with Provider: Yes    Does patient have a preference on a Group/Provider/Facility? [facility]    Does patient have an appointment scheduled?: No    Where to send orders: N/A    Okay to leave a detailed message?: Yes at Cell number on file:    Telephone Information:   Mobile [phone]     Sharmin Gomez MA/MARIANNE

## 2022-12-16 ENCOUNTER — TELEPHONE (OUTPATIENT)
Dept: FAMILY MEDICINE | Facility: CLINIC | Age: 52
End: 2022-12-16

## 2022-12-16 NOTE — TELEPHONE ENCOUNTER
"Patient had \"Nutrition form\" filled out by Dr. Zamora, but states this form was filled out incomplete and is resulting in her only receiving $23 in food stamps per month. Patient is requesting this be corrected to state she's on an all protein diet, no sodium. States they're very technical about this. Needs form by end of the year, patient is very scared/nervous. Form placed in basket.      Hipolito Gomez      "

## 2022-12-16 NOTE — TELEPHONE ENCOUNTER
"Patient states on the form the box for high protein needs to be checked and next to it write in the diagnosis write  \"all protein diet - 120 grams per day\" On the next box the \"non overlapping\" needs to be checked. In the comments it needs to state that the reason for the all protein diet is due to rheumatoid arthritis, osteo arthritis and fibromyalgia. Also do not add the sodium to the form.     Rachel Collins, Patient Representative - [facility]       "

## 2022-12-20 NOTE — TELEPHONE ENCOUNTER
Reason for Call:  Form, our goal is to have forms completed with 72 hours, however, some forms may require a visit or additional information.    Type of letter, form or note:  Special diet, Nutrition Form      Who is the form from?: Patient    Where did the form come from: Patient or family brought in       What clinic location was the form placed at?: [place]    Where the form was placed: Given to MA/RN    What number is listed as a contact on the form?: [phone]       Additional comments: Pt wanted to remind Sera to fill out the eligibility for PCA services spot as well as making sure the Protein part was filled out correctly.     This form is due by the end of the month, needs to be refilled out by      Call taken on 12/20/2022 at 1:57 PM by Fang Dukes

## 2022-12-21 ENCOUNTER — MEDICAL CORRESPONDENCE (OUTPATIENT)
Dept: HEALTH INFORMATION MANAGEMENT | Facility: CLINIC | Age: 52
End: 2022-12-21

## 2023-01-05 ENCOUNTER — TRANSFERRED RECORDS (OUTPATIENT)
Dept: HEALTH INFORMATION MANAGEMENT | Facility: CLINIC | Age: 53
End: 2023-01-05

## 2023-01-05 LAB
ALT SERPL-CCNC: 43 IU/L (ref 5–35)
AST SERPL-CCNC: 97 U/L (ref 5–34)
CREATININE (EXTERNAL): 0.94 MG/DL (ref 0.5–1.3)

## 2023-01-06 ENCOUNTER — TELEPHONE (OUTPATIENT)
Dept: FAMILY MEDICINE | Facility: CLINIC | Age: 53
End: 2023-01-06

## 2023-01-06 NOTE — TELEPHONE ENCOUNTER
Patient Quality Outreach    Patient is due for the following:   Colon Cancer Screening  Breast Cancer Screening - Mammogram  Cervical Cancer Screening - PAP Needed  Physical Preventive Adult Physical      Topic Date Due     COVID-19 Vaccine (1) Never done     Zoster (Shingles) Vaccine (1 of 2) Never done     Hepatitis B Vaccine (2 of 3 - 19+ 3-dose series) 04/22/2011     Pneumococcal Vaccine (3 - PCV) 01/20/2013     Flu Vaccine (1) 09/01/2022       Next Steps:   Schedule a Adult Preventative    Type of outreach:    Sent letter.      Questions for provider review:    None     NATO HURST MA

## 2023-01-06 NOTE — LETTER
January 6, 2023    To  Kalyn Lucas Mat  [address] [place] [id number]    Your team at [facility] cares about your health. We have reviewed your chart and based on our findings; we are making the following recommendations to better manage your health.     You are in particular need of attention regarding the following:     Schedule Annual MAMMOGRAPHY. [facility] scheduling number is [phone] or schedule in xzoopshart (self referral).  Schedule a primary care office visit with your provider for a Pap Smear to screen for Cervical Cancer.  Call or MyChart message your clinic to schedule a colonoscopy, schedule/ a FIT Test, or order a Cologuard test. If you are unsure what type of test you need, please call your clinic and speak to clinic staff.   Colon cancer is now the second leading cause of cancer-related deaths in the [place] for both men and women and there are over 130,000 new cases and 50,000 deaths per year from colon cancer. Colonoscopies can prevent 90-95% of these deaths. Problem lesions can be removed before they ever become cancer. This test is not only looking for cancer, but also getting rid of precancerous lesions.   If you are under/uninsured, we recommend you contact the [employer] Program.[employer] is a free colorectal cancer screening program that provides colonoscopies for eligible under/uninsured [place] men and women. If you are interested in receiving a free colonoscopy, please call [employer] at t [phone] (mention code [employer]) to see if you're eligible. Please have them send us the results.   Please schedule a Nurse Only Appointment with your primary care clinic to update your immunizations that are due.  PREVENTATIVE VISIT: Physical    If you have already completed these items, please contact the clinic via phone or   xzoopshart so your care team can review and update your records. Thank you for   choosing [facility] for  your healthcare needs. For any questions,   concerns, or to schedule an appointment please contact our clinic.    Healthy Regards,      Your [facility] Team

## 2023-01-10 ENCOUNTER — TELEPHONE (OUTPATIENT)
Dept: FAMILY MEDICINE | Facility: CLINIC | Age: 53
End: 2023-01-10

## 2023-01-11 ENCOUNTER — OFFICE VISIT (OUTPATIENT)
Dept: FAMILY MEDICINE | Facility: CLINIC | Age: 53
End: 2023-01-11
Payer: COMMERCIAL

## 2023-01-11 ENCOUNTER — ANCILLARY PROCEDURE (OUTPATIENT)
Dept: GENERAL RADIOLOGY | Facility: CLINIC | Age: 53
End: 2023-01-11
Attending: FAMILY MEDICINE
Payer: COMMERCIAL

## 2023-01-11 VITALS
SYSTOLIC BLOOD PRESSURE: 120 MMHG | HEART RATE: 78 BPM | WEIGHT: 152 LBS | RESPIRATION RATE: 30 BRPM | OXYGEN SATURATION: 99 % | DIASTOLIC BLOOD PRESSURE: 80 MMHG | TEMPERATURE: 98.8 F | BODY MASS INDEX: 24.53 KG/M2

## 2023-01-11 DIAGNOSIS — K08.89 TOOTH PAIN: ICD-10-CM

## 2023-01-11 DIAGNOSIS — M25.532 LEFT WRIST PAIN: Primary | ICD-10-CM

## 2023-01-11 DIAGNOSIS — M25.532 LEFT WRIST PAIN: ICD-10-CM

## 2023-01-11 PROCEDURE — 99214 OFFICE O/P EST MOD 30 MIN: CPT | Performed by: FAMILY MEDICINE

## 2023-01-11 PROCEDURE — 73110 X-RAY EXAM OF WRIST: CPT | Mod: TC | Performed by: RADIOLOGY

## 2023-01-11 RX ORDER — CEPHALEXIN 500 MG/1
500 CAPSULE ORAL 2 TIMES DAILY
Qty: 20 CAPSULE | Refills: 0 | Status: SHIPPED | OUTPATIENT
Start: 2023-01-11 | End: 2023-07-19

## 2023-01-11 RX ORDER — FLUCONAZOLE 150 MG/1
150 TABLET ORAL
Qty: 3 TABLET | Refills: 0 | Status: SHIPPED | OUTPATIENT
Start: 2023-01-11 | End: 2023-02-06

## 2023-01-11 ASSESSMENT — PAIN SCALES - GENERAL: PAINLEVEL: NO PAIN (0)

## 2023-01-11 NOTE — PROGRESS NOTES
Dental Pain (Right side) and Wrist Pain (left)      Dental Pain           Objective    /80   Pulse 78   Temp 98.8  F (37.1  C) (Tympanic)   Resp 30   Wt 68.9 kg (152 lb)   LMP  (LMP Unknown)   SpO2 99%   BMI 24.53 kg/m    Body mass index is 24.53 kg/m .  SUBJECTIVE:  52 year old.The patient has a complaint of tooth pain.  This started 5 days ago. Location lower right incisor quality sharp Associated symptoms are headache.  Brought on by unknown. ROS no fever    Has not been able to get in to DDS.    Also has painful left wrist and mass  Reviewed health maintenance  Patient Active Problem List   Diagnosis     Hypothyroidism     Acetaminophen overdose     Hepatic failure (H)     Pancreatitis     Anemia     Chronic pain     Pneumonia     Suicide risk     Grief reaction     Liver failure, acute     CARDIOVASCULAR SCREENING; LDL GOAL LESS THAN 160     Renal stone     Health Care Home     TOTAL KNEE ARTHROPLASTY - bilateral     Knee joint replacement - left     Trochanteric bursitis - left     Atlantoaxial instability     Postoperative pain     S/P cervical spinal fusion     Drug-seeking behavior     Opioid type dependence (H)     Severe frontal headaches     Abdominal pain, unspecified abdominal location     Esophageal reflux     Right upper extremity numbness     Right arm numbness     Lesion of ulnar nerve     Arthralgia of temporomandibular joint     Intractable chronic migraine without aura     Encounter for long-term opiate analgesic use     Rheumatoid arthritis of foot (H)     Other drug-induced neutropenia (H)     Rheumatoid arthritis with positive rheumatoid factor, involving unspecified site (H)     Cervical high risk HPV (human papillomavirus) test positive     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)     Chronic tension-type headache     Fibromyositis     Insomnia     Migraine     Myofascial pain     Periprosthetic fracture around internal prosthetic right elbow  "joint     Status post surgery     Pain in joint, ankle and foot, left     Chronic kidney disease, stage 1     Dizzy spells     Past Medical History:   Diagnosis Date     Acetaminophen overdose 3/24/2011     Anemia      Anesthesia complication     pt states has a history of heart stopping during anesthesia,     Arrhythmia      Cerebral infarction (H)      Cervical high risk HPV (human papillomavirus) test positive 02/22/2017    type 18 & other HR HPV     Chronic infection     MRSA     Chronic pain 3/24/2011     Degenerative joint disease      Endometriosis      Fibromyalgia      Gastro-oesophageal reflux disease      Heart murmur      History of blood transfusion      Hypothyroidism      Immune disorder (H)      Learning disability      Malignant neoplasm (H)      Migraine      MRSA (methicillin resistant staph aureus) culture positive      Neck injuries      Opioid type dependence (H)      Other chronic pain      PONV (postoperative nausea and vomiting)     states \"flatlines\"during anesthesia     RA (rheumatoid arthritis) (H)      Renal stone      Scoliosis      Stomach ulcer     history       OBJECTIVE:  no apparent distress  /80   Pulse 78   Temp 98.8  F (37.1  C) (Tympanic)   Resp 30   Wt 68.9 kg (152 lb)   LMP  (LMP Unknown)   SpO2 99%   BMI 24.53 kg/m    Normal looking teeth  Scar over  Left wrist from previous surgery with dorsum laterally has hard process over distal ulnar   Xray shows the mass is probably surgical hardware    ICD-10-CM    1. Left wrist pain  M25.532 XR Wrist Left G/E 3 Views      2. Tooth pain  K08.89        PLAN: Follow up in 3 months       "

## 2023-02-06 DIAGNOSIS — K08.89 TOOTH PAIN: ICD-10-CM

## 2023-02-06 RX ORDER — FLUCONAZOLE 150 MG/1
150 TABLET ORAL
Qty: 3 TABLET | Refills: 0 | Status: SHIPPED | OUTPATIENT
Start: 2023-02-06 | End: 2023-02-07

## 2023-02-06 NOTE — TELEPHONE ENCOUNTER
Pharmacy requesting refill for Fluconazole 150mg tablet, Sig: take 1 tablet by  Mouth every 3 days for 3 doses, Qty: 3.0.    Rx not found on patient's medication list.

## 2023-02-07 DIAGNOSIS — K21.9 GASTROESOPHAGEAL REFLUX DISEASE, UNSPECIFIED WHETHER ESOPHAGITIS PRESENT: ICD-10-CM

## 2023-02-07 DIAGNOSIS — K08.89 TOOTH PAIN: ICD-10-CM

## 2023-02-07 RX ORDER — FLUCONAZOLE 150 MG/1
150 TABLET ORAL
Qty: 3 TABLET | Refills: 0 | Status: SHIPPED | OUTPATIENT
Start: 2023-02-07 | End: 2023-07-19

## 2023-02-07 RX ORDER — OMEPRAZOLE 40 MG/1
CAPSULE, DELAYED RELEASE ORAL
Qty: 90 CAPSULE | Refills: 3 | Status: SHIPPED | OUTPATIENT
Start: 2023-02-07 | End: 2024-02-19

## 2023-06-01 NOTE — MR AVS SNAPSHOT
After Visit Summary   6/1/2017    Kalyn Rivero    MRN: [id number]           Patient Information     Date Of Birth          1970        Visit Information        Provider Department      6/1/2017 12:00 PM Carrol Gaspar MD [facility]        Today's Diagnoses     Surgical follow-up care    -  1       Follow-ups after your visit        Additional Services     HAND THERAPY       Hand Therapy Referral                  Follow-up notes from your care team     Return if symptoms worsen or fail to improve.      Your next 10 appointments already scheduled     Jun 07, 2017  8:00 AM CDT   (Arrive by 7:45 AM)   Return Visit with Fredy Patel DO   [facility] ([facility])    [address] [address]   [phone]            Jun 12, 2017 12:00 PM CDT   (Arrive by 11:45 AM)   PAC EVALUATION with  Pac Santiago [address] [place] [address] Preoperative Assessment Center ([facility])    [address] [place] [address]   [phone]            Jun 12, 2017  1:00 PM CDT   (Arrive by 12:45 PM)   PAC RN ASSESSMENT with  Pac Rn   [facility] ([facility])    [address] [address]   [phone]            Jun 12, 2017  1:30 PM CDT   (Arrive by 1:15 PM)   PAC Anesthesia Consult with  Pac Anesthesiologist   [facility] ([facility])    [address] [place] [address]   [phone]            Jun 12, 2017  1:50 PM CDT   (Arrive by 1:35 PM)   RETURN SHOULDER with River Law MD   [facility] ([facility])    [address] [id number] [phone]            Jun 29, 2017   Procedure with River  Sent new patient packet. roro   Jonathon Law MD   [facility] [place], Same Day Surgery (--)    [address]  [place] [address]   [phone]            2017 12:30 PM CDT   (Arrive by 12:15 PM)   RETURN SHOULDER with River Law MD   [facility] ([facility] [place])    [address] [facility]  [address] [facility] [id number] [phone]            Aug 14, 2017 10:10 AM CDT   (Arrive by 9:55 AM)   RETURN SHOULDER with River Law MD   [facility] ([facility])    [address] [facility]  [address] [facility] [address] [phone]              Who to contact     Please call your clinic at [phone] to:    Ask questions about your health    Make or cancel appointments    Discuss your medicines    Learn about your test results    Speak to your doctor   If you have compliments or concerns about an experience at your clinic, or if you wish to file a complaint, please contact [facility] Patient Relations at [phone] or email us at [email or web address]         Additional Information About Your Visit        [employer] Information     PreAppst is an electronic gateway that provides easy, online access to your medical records. With WiNetworks, you can request a clinic appointment, read your test results, renew a prescription or communicate with your care team.     To sign up for PreAppst visit the website at [email or web address]   You will be asked to enter the access code listed below, as well as some personal information. Please follow the directions to create your username and password.     Your access code is: [email or web address]  Expires: 2017  6:31 AM     Your access code will  in 90 days. If you need help or a new code, please contact your [facility] Clinic or call [phone] for assistance.        Care EveryWhere ID     This is your Care EveryWhere ID.  This could be used by other organizations to access your [place] medical records  [id number]         Blood Pressure from Last 3 Encounters:   05/17/17 126/86   04/28/17 (!) 135/94   04/27/17 125/78    Weight from Last 3 Encounters:   05/17/17 72.2 kg (159 lb 3.2 oz)   04/28/17 73.5 kg (162 lb)   04/27/17 74.8 kg (165 lb)              We Performed the Following     HAND THERAPY        Primary Care Provider Office Phone # Fax #    Mika Zamora [phone]       [facility] [address] [facility] [address]        Thank you!     Thank you for choosing [facility]  for your care. Our goal is always to provide you with excellent care. Hearing back from our patients is one way we can continue to improve our services. Please take a few minutes to complete the written survey that you may receive in the mail after your visit with us. Thank you!             Your Updated Medication List - Protect others around you: Learn how to safely use, store and throw away your medicines at www.disposemymeds.org.          This list is accurate as of: 6/1/17 11:59 PM.  Always use your most recent med list.                   Brand Name Dispense Instructions for use    Botulinum Toxin Type A 200 UNITS Solr    BOTOX    200 Units    Inject 200 Units as directed every 3 months       CELEBREX PO      Take 200 mg by mouth 3 times daily       ENBREL SURECLICK 50 MG/ML autoinjector   Generic drug:  Etanercept      Reported on 4/18/2017       HYDROmorphone 2 MG tablet    DILAUDID    40 tablet    Take 1 tablet (2 mg) by mouth every 3 hours as needed for moderate to severe pain (May take up to 2 tablets at once if needed to control pain)       hydrOXYzine 25 MG capsule    VISTARIL    30 capsule    Take 1 capsule (25 mg) by mouth 3 times daily as needed for itching (adjuvant pain control)       leflunomide 20 MG tablet    ARAVA     Take 20 mg by mouth every morning Reported on 3/28/2017        medroxyPROGESTERone 150 MG/ML injection    DEPO-PROVERA    1 mL    Inject 1 mL (150 mg) into the muscle every 3 months       omeprazole 40 MG capsule    priLOSEC    90 capsule    Take 1 capsule (40 mg) by mouth daily Take 30-60 minutes before a meal.       oxyCODONE 5 MG IR tablet    ROXICODONE    60 tablet    Take 1 tablet (5 mg) by mouth every 4 hours as needed for breakthrough pain or severe pain (Moderate to Severe (MAX OF 2 PER DAY))       sennosides 8.6 MG tablet    SENOKOT    120 tablet    Take 2 tablets by mouth 2 times daily       VITAMIN B-12 PO      Take 1 tablet by mouth daily

## 2023-06-11 ENCOUNTER — APPOINTMENT (OUTPATIENT)
Dept: CT IMAGING | Facility: CLINIC | Age: 53
End: 2023-06-11
Attending: STUDENT IN AN ORGANIZED HEALTH CARE EDUCATION/TRAINING PROGRAM
Payer: COMMERCIAL

## 2023-06-11 ENCOUNTER — APPOINTMENT (OUTPATIENT)
Dept: MRI IMAGING | Facility: CLINIC | Age: 53
End: 2023-06-11
Payer: COMMERCIAL

## 2023-06-11 ENCOUNTER — APPOINTMENT (OUTPATIENT)
Dept: CT IMAGING | Facility: CLINIC | Age: 53
End: 2023-06-11
Attending: EMERGENCY MEDICINE
Payer: COMMERCIAL

## 2023-06-11 ENCOUNTER — HOSPITAL ENCOUNTER (EMERGENCY)
Facility: CLINIC | Age: 53
Discharge: HOME | End: 2023-06-11
Attending: STUDENT IN AN ORGANIZED HEALTH CARE EDUCATION/TRAINING PROGRAM | Admitting: STUDENT IN AN ORGANIZED HEALTH CARE EDUCATION/TRAINING PROGRAM
Payer: COMMERCIAL

## 2023-06-11 VITALS
BODY MASS INDEX: 22.5 KG/M2 | WEIGHT: 140 LBS | OXYGEN SATURATION: 99 % | TEMPERATURE: 97.7 F | SYSTOLIC BLOOD PRESSURE: 110 MMHG | DIASTOLIC BLOOD PRESSURE: 78 MMHG | RESPIRATION RATE: 20 BRPM | HEART RATE: 73 BPM | HEIGHT: 66 IN

## 2023-06-11 DIAGNOSIS — G43.009 ATYPICAL MIGRAINE: ICD-10-CM

## 2023-06-11 LAB
ANION GAP SERPL CALCULATED.3IONS-SCNC: 11 MMOL/L (ref 7–15)
APTT PPP: 27 SECONDS (ref 22–38)
ATRIAL RATE - MUSE: 67 BPM
BASOPHILS # BLD AUTO: 0 10E3/UL (ref 0–0.2)
BASOPHILS NFR BLD AUTO: 1 %
BUN SERPL-MCNC: 22 MG/DL (ref 6–20)
CALCIUM SERPL-MCNC: 9.4 MG/DL (ref 8.6–10)
CHLORIDE SERPL-SCNC: 108 MMOL/L (ref 98–107)
CREAT SERPL-MCNC: 0.87 MG/DL (ref 0.51–0.95)
DEPRECATED HCO3 PLAS-SCNC: 23 MMOL/L (ref 22–29)
DIASTOLIC BLOOD PRESSURE - MUSE: NORMAL MMHG
EOSINOPHIL # BLD AUTO: 0.4 10E3/UL (ref 0–0.7)
EOSINOPHIL NFR BLD AUTO: 5 %
ERYTHROCYTE [DISTWIDTH] IN BLOOD BY AUTOMATED COUNT: 13.4 % (ref 10–15)
GFR SERPL CREATININE-BSD FRML MDRD: 80 ML/MIN/1.73M2
GLUCOSE SERPL-MCNC: 102 MG/DL (ref 70–99)
HCT VFR BLD AUTO: 37.7 % (ref 35–47)
HGB BLD-MCNC: 12.3 G/DL (ref 11.7–15.7)
IMM GRANULOCYTES # BLD: 0 10E3/UL
IMM GRANULOCYTES NFR BLD: 0 %
INR PPP: 1.14 (ref 0.85–1.15)
INTERPRETATION ECG - MUSE: NORMAL
LYMPHOCYTES # BLD AUTO: 1.6 10E3/UL (ref 0.8–5.3)
LYMPHOCYTES NFR BLD AUTO: 23 %
MCH RBC QN AUTO: 28.9 PG (ref 26.5–33)
MCHC RBC AUTO-ENTMCNC: 32.6 G/DL (ref 31.5–36.5)
MCV RBC AUTO: 89 FL (ref 78–100)
MONOCYTES # BLD AUTO: 0.4 10E3/UL (ref 0–1.3)
MONOCYTES NFR BLD AUTO: 6 %
NEUTROPHILS # BLD AUTO: 4.6 10E3/UL (ref 1.6–8.3)
NEUTROPHILS NFR BLD AUTO: 65 %
NRBC # BLD AUTO: 0 10E3/UL
NRBC BLD AUTO-RTO: 0 /100
P AXIS - MUSE: 19 DEGREES
PLATELET # BLD AUTO: 165 10E3/UL (ref 150–450)
POTASSIUM SERPL-SCNC: 3.8 MMOL/L (ref 3.4–5.3)
PR INTERVAL - MUSE: 134 MS
QRS DURATION - MUSE: 74 MS
QT - MUSE: 414 MS
QTC - MUSE: 437 MS
R AXIS - MUSE: 73 DEGREES
RBC # BLD AUTO: 4.25 10E6/UL (ref 3.8–5.2)
SODIUM SERPL-SCNC: 142 MMOL/L (ref 136–145)
SYSTOLIC BLOOD PRESSURE - MUSE: NORMAL MMHG
T AXIS - MUSE: 58 DEGREES
TROPONIN T SERPL HS-MCNC: 7 NG/L
VENTRICULAR RATE- MUSE: 67 BPM
WBC # BLD AUTO: 7 10E3/UL (ref 4–11)

## 2023-06-11 PROCEDURE — 96374 THER/PROPH/DIAG INJ IV PUSH: CPT | Mod: 59

## 2023-06-11 PROCEDURE — 258N000003 HC RX IP 258 OP 636: Performed by: STUDENT IN AN ORGANIZED HEALTH CARE EDUCATION/TRAINING PROGRAM

## 2023-06-11 PROCEDURE — 85730 THROMBOPLASTIN TIME PARTIAL: CPT | Performed by: EMERGENCY MEDICINE

## 2023-06-11 PROCEDURE — 85610 PROTHROMBIN TIME: CPT | Performed by: EMERGENCY MEDICINE

## 2023-06-11 PROCEDURE — 82310 ASSAY OF CALCIUM: CPT | Performed by: STUDENT IN AN ORGANIZED HEALTH CARE EDUCATION/TRAINING PROGRAM

## 2023-06-11 PROCEDURE — 36415 COLL VENOUS BLD VENIPUNCTURE: CPT | Performed by: EMERGENCY MEDICINE

## 2023-06-11 PROCEDURE — 250N000011 HC RX IP 250 OP 636: Performed by: STUDENT IN AN ORGANIZED HEALTH CARE EDUCATION/TRAINING PROGRAM

## 2023-06-11 PROCEDURE — 70498 CT ANGIOGRAPHY NECK: CPT

## 2023-06-11 PROCEDURE — 96375 TX/PRO/DX INJ NEW DRUG ADDON: CPT

## 2023-06-11 PROCEDURE — 85025 COMPLETE CBC W/AUTO DIFF WBC: CPT | Performed by: STUDENT IN AN ORGANIZED HEALTH CARE EDUCATION/TRAINING PROGRAM

## 2023-06-11 PROCEDURE — 70450 CT HEAD/BRAIN W/O DYE: CPT | Mod: XU

## 2023-06-11 PROCEDURE — 36415 COLL VENOUS BLD VENIPUNCTURE: CPT | Performed by: STUDENT IN AN ORGANIZED HEALTH CARE EDUCATION/TRAINING PROGRAM

## 2023-06-11 PROCEDURE — 84484 ASSAY OF TROPONIN QUANT: CPT | Performed by: EMERGENCY MEDICINE

## 2023-06-11 PROCEDURE — 96361 HYDRATE IV INFUSION ADD-ON: CPT

## 2023-06-11 PROCEDURE — 70551 MRI BRAIN STEM W/O DYE: CPT

## 2023-06-11 PROCEDURE — 250N000009 HC RX 250: Performed by: STUDENT IN AN ORGANIZED HEALTH CARE EDUCATION/TRAINING PROGRAM

## 2023-06-11 PROCEDURE — 70496 CT ANGIOGRAPHY HEAD: CPT

## 2023-06-11 PROCEDURE — 99285 EMERGENCY DEPT VISIT HI MDM: CPT | Mod: 25

## 2023-06-11 PROCEDURE — 99291 CRITICAL CARE FIRST HOUR: CPT | Mod: GC | Performed by: PSYCHIATRY & NEUROLOGY

## 2023-06-11 PROCEDURE — 93005 ELECTROCARDIOGRAM TRACING: CPT

## 2023-06-11 RX ORDER — WATER 10 ML/10ML
INJECTION INTRAMUSCULAR; INTRAVENOUS; SUBCUTANEOUS
Status: DISCONTINUED
Start: 2023-06-11 | End: 2023-06-11 | Stop reason: HOSPADM

## 2023-06-11 RX ORDER — OLANZAPINE 10 MG/2ML
2.5 INJECTION, POWDER, FOR SOLUTION INTRAMUSCULAR ONCE
Status: DISCONTINUED | OUTPATIENT
Start: 2023-06-11 | End: 2023-06-11

## 2023-06-11 RX ORDER — DIPHENHYDRAMINE HYDROCHLORIDE 50 MG/ML
25 INJECTION INTRAMUSCULAR; INTRAVENOUS ONCE
Status: COMPLETED | OUTPATIENT
Start: 2023-06-11 | End: 2023-06-11

## 2023-06-11 RX ORDER — DEXAMETHASONE SODIUM PHOSPHATE 10 MG/ML
10 INJECTION, SOLUTION INTRAMUSCULAR; INTRAVENOUS ONCE
Status: COMPLETED | OUTPATIENT
Start: 2023-06-11 | End: 2023-06-11

## 2023-06-11 RX ORDER — IOPAMIDOL 755 MG/ML
125 INJECTION, SOLUTION INTRAVASCULAR ONCE
Status: COMPLETED | OUTPATIENT
Start: 2023-06-11 | End: 2023-06-11

## 2023-06-11 RX ORDER — METOCLOPRAMIDE HYDROCHLORIDE 5 MG/ML
5 INJECTION INTRAMUSCULAR; INTRAVENOUS ONCE
Status: COMPLETED | OUTPATIENT
Start: 2023-06-11 | End: 2023-06-11

## 2023-06-11 RX ORDER — KETOROLAC TROMETHAMINE 15 MG/ML
15 INJECTION, SOLUTION INTRAMUSCULAR; INTRAVENOUS ONCE
Status: COMPLETED | OUTPATIENT
Start: 2023-06-11 | End: 2023-06-11

## 2023-06-11 RX ORDER — OLANZAPINE 10 MG/1
2.5 INJECTION, POWDER, LYOPHILIZED, FOR SOLUTION INTRAMUSCULAR ONCE
Status: COMPLETED | OUTPATIENT
Start: 2023-06-11 | End: 2023-06-11

## 2023-06-11 RX ORDER — SODIUM CHLORIDE 9 MG/ML
INJECTION, SOLUTION INTRAVENOUS CONTINUOUS
Status: DISCONTINUED | OUTPATIENT
Start: 2023-06-11 | End: 2023-06-11 | Stop reason: HOSPADM

## 2023-06-11 RX ADMIN — OLANZAPINE 2.5 MG: 10 INJECTION, POWDER, FOR SOLUTION INTRAMUSCULAR at 11:50

## 2023-06-11 RX ADMIN — KETOROLAC TROMETHAMINE 15 MG: 15 INJECTION, SOLUTION INTRAMUSCULAR; INTRAVENOUS at 11:04

## 2023-06-11 RX ADMIN — IOPAMIDOL 75 ML: 755 INJECTION, SOLUTION INTRAVENOUS at 09:27

## 2023-06-11 RX ADMIN — DEXAMETHASONE SODIUM PHOSPHATE 10 MG: 10 INJECTION, SOLUTION INTRAMUSCULAR; INTRAVENOUS at 09:03

## 2023-06-11 RX ADMIN — DIPHENHYDRAMINE HYDROCHLORIDE 25 MG: 50 INJECTION, SOLUTION INTRAMUSCULAR; INTRAVENOUS at 09:03

## 2023-06-11 RX ADMIN — SODIUM CHLORIDE 1000 ML: 9 INJECTION, SOLUTION INTRAVENOUS at 08:56

## 2023-06-11 RX ADMIN — SODIUM CHLORIDE 100 ML: 9 INJECTION, SOLUTION INTRAVENOUS at 09:28

## 2023-06-11 RX ADMIN — METOCLOPRAMIDE 5 MG: 5 INJECTION, SOLUTION INTRAMUSCULAR; INTRAVENOUS at 09:02

## 2023-06-11 ASSESSMENT — ACTIVITIES OF DAILY LIVING (ADL)
ADLS_ACUITY_SCORE: 37
ADLS_ACUITY_SCORE: 35
ADLS_ACUITY_SCORE: 37

## 2023-06-11 NOTE — ED TRIAGE NOTES
Patient here with a headache and blurry vision which started at 5 am today.      Triage Assessment     Row Name 06/11/23 0659       Triage Assessment (Adult)    Airway WDL WDL       Respiratory WDL    Respiratory WDL WDL       Skin Circulation/Temperature WDL    Skin Circulation/Temperature WDL WDL       Cardiac WDL    Cardiac WDL WDL       Peripheral/Neurovascular WDL    Peripheral Neurovascular WDL WDL       Cognitive/Neuro/Behavioral WDL    Cognitive/Neuro/Behavioral WDL WDL               No

## 2023-06-11 NOTE — CONSULTS
"      [facility]    Stroke Consult Note    Reason for Consult: Stroke Code     Chief Complaint: Headache      HPI  Kalyn Rivero is a 53 YO F w/PMHx sig for diffuse pain 2/2 RA on DMT, migraines getting botox tx who presents to ED on 6/11 for wake up severe HA pain.    After initial NCCT, pt developed LUE/LLE paresthesias with weakness and tier 1 stroke code activated.    During exam/HPI pt was mostly interested in discussing HA pain and tx options for that, she was less concerned about new focal neurological deficits.     In ED exam reported as LUE/LLE weakness.    In ED /92. Initial labs unremarkable.     NCCT: No acute intracranial pathology.  CTA H/N: No acute neurovascular pathology.  MRI brain: No acute infarct.    Discussed with pt highest suspicion for stroke mimic (complex migraines) but small stroke possible given focal weakness. Asked pt if she was interested in TNK given higher risk vs benefit due to suspicion for stroke mimic, pt wasn't sure but said maybe. Give such acute onset, opted for hyperacute MR which was unrevealing for acute ischemic process and stroke code was subsequently de-escalated.     Intravenous Thrombolysis  Not given due to:   - stroke mimic: complex migraines    Endovascular Treatment  Not initiated due to absence of proximal vessel occlusion    Impression/Recommendations  # Stroke mimic, complex migraines.   - No further stroke workup/tx recommended  - Symptomatic management of HA pain per ED    Patient Follow-up     - F/u with Outpt Neurologist for migraine management     Thank you for this consult. No further stroke evaluation is recommended, so we will sign off. Please contact us with any additional questions.     The Stroke Staff is Dr. Pagan.    Cruz Moraes MD  Vascular Neurology Fellow    To page me or covering stroke neurology team member, click here: AMCOM  Choose \"On Call\" tab at top, then select \"NEUROLOGY/ALL SITES\" from middle " "drop-down box, press Enter, then look for \"stroke\" or \"telestroke\" for your site.    ______________________________________________________    Clinically Significant Risk Factors Present on Admission                                   Past Medical History   Past Medical History:   Diagnosis Date     Acetaminophen overdose 3/24/2011     Anemia      Anesthesia complication     pt states has a history of heart stopping during anesthesia,     Arrhythmia      Cerebral infarction (H)      Cervical high risk HPV (human papillomavirus) test positive 02/22/2017    type 18 & other HR HPV     Chronic infection     MRSA     Chronic pain 3/24/2011     Degenerative joint disease      Endometriosis      Fibromyalgia      Gastro-oesophageal reflux disease      Heart murmur      History of blood transfusion      Hypothyroidism      Immune disorder (H)      Learning disability      Malignant neoplasm (H)      Migraine      MRSA (methicillin resistant staph aureus) culture positive      Neck injuries      Opioid type dependence (H)      Other chronic pain      PONV (postoperative nausea and vomiting)     states \"flatlines\"during anesthesia     RA (rheumatoid arthritis) (H)      Renal stone      Scoliosis      Stomach ulcer     history     Past Surgical History   Past Surgical History:   Procedure Laterality Date     ARTHRODESIS FOOT  5/23/2012    Procedure:ARTHRODESIS FOOT; Right Subtalar andTaloNavicular  Fusion  ; Surgeon:CHRIS ZHOU; [facility]     ARTHRODESIS FOOT Left 4/22/2015    Procedure: ARTHRODESIS FOOT;  Surgeon: Chris Zhou MD;  [facility]     ARTHROPLASTY ELBOW Right 9/30/2015    Procedure: ARTHROPLASTY ELBOW;  Surgeon: Carrol Gaspar MD;  [facility]     ARTHROPLASTY KNEE Right      ARTHROPLASTY REVISION ELBOW Right 11/27/2018    Procedure: 1 - aspiration of Right elbow 2- Explantation of failed hardware Right humeral periprosthetic fracture non-union 3- Right distal humerus " excision 4- Right distal humerus replacement 5 - Revision Right total elbow arthroplasty;  Surgeon: Aakash Zimmer MD;  [facility]: [facility]     ARTHROPLASTY WRIST      x2 Lt wrist replacement.     ARTHROTOMY ELBOW Right 6/18/2015    Procedure: ARTHROTOMY ELBOW;  Surgeon: Carrol Gaspar MD;  Location:  OR      SHLDR ARTHROSCOP,DIAGNOSTIC  12/17/2008    left total shoulder arthroplasty by Dr. Law     CYSTOSCOPY  02/06/2009    1.cystoscopy.2.bilateral ureteroscopy.3.basketing of stone fragments from the right kidney.4.bilateral double-J ureteral stent placement.5.ann catheter placement.6.fluoroscopy and intraoperative interpretation of images.     CYSTOSCOPY  01/08/2007    1. cystoscopy and left stent removal.2.left ureteroscopy     DECOMPRESSION CUBITAL TUNNEL  6/6/2014    Procedure: DECOMPRESSION CUBITAL TUNNEL;  Surgeon: Janelle Coates MD;  [facility]     ENDOSCOPY  03/31/2005    upper GI endoscopy-[facility]     ESOPHAGOSCOPY, GASTROSCOPY, DUODENOSCOPY (EGD), COMBINED  3/17/2014    Procedure: COMBINED ESOPHAGOSCOPY, GASTROSCOPY, DUODENOSCOPY (EGD), BIOPSY SINGLE OR MULTIPLE;;  Surgeon: Duane, William Charles, MD;  Location: [facility] OR     FUSION CERVICAL POSTERIOR ONE LEVEL  11/18/2013    Procedure: FUSION CERVICAL POSTERIOR ONE LEVEL;  Cervical 1-2 Posterior Cervical Fusion (Harms Procedure);  Surgeon: Carrol Gunn MD;  [facility]     GYN SURGERY       INJECT MAJOR JOINT / BURSA Left 1/5/2017    Procedure: INJECT MAJOR JOINT / BURSA;  Surgeon: Fredy Patel DO;  Location:  OR     INJECT STEROID (LOCATION) Left 6/18/2015    Procedure: INJECT STEROID (LOCATION);  Surgeon: Carrol Gaspar MD;  Location:  OR     NECK SURGERY       REMOVE FOREIGN BODY UPPER EXTREMITY Right 3/2/2017    Procedure: REMOVE FOREIGN BODY UPPER EXTREMITY;  Surgeon: Carrol Gaspar MD;  Location:  OR     REMOVE HARDWARE FOOT Left 6/8/2022    Procedure:  REMOVAL, HARDWARE, FOOT LEFT;  Surgeon: Chris Hendricks MD;  Location: [facility] OR     RESECT BONE UPPER EXTREMITY Left 4/27/2017    Procedure: RESECT BONE UPPER EXTREMITY;  Left Radial Head Resection;  Surgeon: Carrol Gaspar MD;  [facility]     ROTATOR CUFF REPAIR RT/LT  10/19/2005    1.left distal clavicle excision.2.acromioplasty.3.coracoacromial ligament resection.4.rotator cuff exploration     [facility] ANESTH,DX ARTHROSCOPIC PROC KNEE JOINT  10/24/2007    right total knee arthroplasty     [facility] SHOULDER SURG PROC UNLISTED       [facility] TOTAL KNEE ARTHROPLASTY  1/18/12    Left     Medications   Home Meds  Prior to Admission medications    Medication Sig Start Date End Date Taking? Authorizing Provider   celecoxib (CELEBREX) 200 MG capsule Take 200 mg by mouth 3 times daily    Reported, Patient   cephALEXin (KEFLEX) 500 MG capsule Take 1 capsule (500 mg) by mouth 2 times daily 1/11/23   Mika Zamora MD   ClonazePAM (KLONOPIN PO) Take 0.5 mg by mouth 3 times daily   Patient not taking: Reported on 12/7/2022    Reported, Patient   clonazePAM (KLONOPIN) 0.5 MG tablet TAKE 1 TABLET BY MOUTH THREE TIMES A DAY 4/6/22   Reported, Patient   COLLAGEN PO Take 1,000 mg by mouth daily  Patient not taking: Reported on 12/7/2022    Reported, Patient   ENBREL SURECLICK 50 MG/ML autoinjector 50 mg twice a week  3/7/19   Reported, Patient   fluconazole (DIFLUCAN) 150 MG tablet Take 1 tablet (150 mg) by mouth every 3 days 2/7/23   Mika Zamora MD   HYDROcodone-acetaminophen (NORCO) 5-325 MG tablet Take 1-2 tablets by mouth every 4 hours as needed for moderate to severe pain  Patient not taking: Reported on 6/29/2022 6/8/22   Giuliana Lizama PA-C   hydrOXYzine (ATARAX) 25 MG tablet Take 1 tablet (25 mg) by mouth every 8 hours as needed for itching or anxiety (pain)  Patient not taking: Reported on 12/7/2022 6/8/22   Giuliana Lizama PA-C   leflunomide (ARAVA) 10 MG tablet Take by mouth every 24  hours  Patient not taking: Reported on 12/7/2022    Reported, Patient   leflunomide (ARAVA) 20 MG tablet Take 20 mg by mouth every morning Reported on 3/28/2017    Reported, Patient   Multiple Vitamins-Minerals (HAIR SKIN AND NAILS FORMULA PO)     Reported, Patient   omeprazole (PRILOSEC) 40 MG DR capsule TAKE 1 CAPSULE BY MOUTH TWICE A DAY BEFORE MEALS Strength: 40 mg 2/7/23   Mika Zamora MD   OnabotulinumtoxinA (BOTOX IJ) Inject 200 Units as directed Total dose 200 units   Administered 190 units   Unavoidable waste 30 units   Lot # /C3 with Expiration Date:  12/2020   NDC [address]-32    Reported, Patient   ondansetron (ZOFRAN-ODT) 4 MG ODT tab Take 1 tablet (4 mg) by mouth every 8 hours as needed for nausea  Patient not taking: Reported on 12/7/2022 1/21/22   Mika Zamora MD   oxyCODONE (ROXICODONE) 5 MG tablet Patient states she takes 4 tablets daily. Morning, noon, supper, bedtime  Patient not taking: Reported on 6/29/2022 8/24/21   Reported, Patient   SENEXON-S 8.6-50 MG tablet TAKE 1-2 TABLETS BY MOUTH 2 TIMES DAILY FOR CONSTIPATION.  Patient not taking: Reported on 12/7/2022 5/26/22   Mika Zamora MD   trimethoprim-polymyxin b (POLYTRIM) 26510-0.1 UNIT/ML-% ophthalmic solution Place 1-2 drops into the right eye every 4 hours  Patient not taking: Reported on 12/7/2022 5/26/22   Johnnie Barreto MD       Scheduled Meds    botulinum toxin type A  200 Units Intramuscular Q90 Days       Infusion Meds      PRN Meds      Allergies   Allergies   Allergen Reactions     Celecoxib      Other reaction(s): stroke , lasted 24 hours     Morphine Swelling     Codeine      Gabapentin Other (See Comments)     Pins,needles, stabbing aching at once  Pins,needles, stabbing aching at once  Numbness and Tingling     Ibuprofen      Doesn't take due to gastric ulcer     Sulfa Antibiotics Nausea and Vomiting     unknown     Tylenol [Acetaminophen]      Pt. States that she has Liver  problems  Tylenol 3     Erythromycin Nausea     Vomiting      Penicillins Rash     Prednisone Palpitations     Heart racing     Family History   Family History   Problem Relation Age of Onset     Diabetes Mother      Hypertension Mother      Allergies Mother      Alcohol/Drug Mother         LIVER CIRROSIS     Blood Disease Mother         B12 DEF     Neurologic Disorder Mother         Epilepsy     Osteoporosis Mother      Cerebrovascular Disease Maternal Grandmother      Arthritis Maternal Grandmother         RHEUMATOID     Cancer Maternal Grandmother      Thyroid Disease Maternal Grandmother      Osteoporosis Maternal Grandmother      Heart Disease Maternal Grandmother      Depression Maternal Grandmother      Neurologic Disorder Maternal Grandmother         MIGRAINES     Anxiety Disorder Maternal Grandmother      Diabetes Maternal Grandmother      Migraines Maternal Grandmother      Deep Vein Thrombosis Maternal Grandmother      Cerebrovascular Disease Maternal Grandfather      Cancer Maternal Grandfather      Mental Illness Maternal Grandfather      Cerebrovascular Disease Paternal Grandmother      Arthritis Paternal Grandmother         RHEUMATOID     Cancer Paternal Grandmother      Osteoporosis Paternal Grandmother      Heart Disease Paternal Grandmother      Depression Paternal Grandmother      Neurologic Disorder Paternal Grandmother         MIGRAINES     Thyroid Disease Paternal Grandmother      Cerebrovascular Disease Paternal Grandfather      Cancer Paternal Grandfather      Heart Disease Brother         MURMUR     Asthma Son      Endocrine Disease Son      Neurologic Disorder Father         epilepsy     Seizure Disorder Father      Hypertension Father      Skin Cancer Father      Anxiety Disorder Father      Mental Illness Father      Hyperlipidemia Father      Kidney Disease Father      Bladder Cancer Father      Neurologic Disorder Daughter         MIGRAINES     Arthritis Daughter         JR      Hypertension Son      LUNG DISEASE Brother      Cancer Brother         lung     Anxiety Disorder Son      Asthma Son      Hypertension Son      Migraines Son      Spine Problems Son      Mental Illness Brother      Migraines Brother      Cardiac Sudden Death Brother      Spine Problems Brother      Glaucoma No family hx of      Macular Degeneration No family hx of      Unknown/Adopted No family hx of      Anesthesia Reaction No family hx of      Other Cancer No family hx of      Rheumatoid Arthritis No family hx of      Bone Cancer No family hx of      Low Back Problems No family hx of      Social History   Social History     Tobacco Use     Smoking status: Former     Packs/day: 0.10     Years: 10.00     Pack years: 1.00     Types: Cigarettes     Start date: 8/6/1983     Smokeless tobacco: Never     Tobacco comments:     Quit 6 weeks ago   Vaping Use     Vaping status: Never Used   Substance Use Topics     Alcohol use: No     Drug use: No       Review of Systems   The 5 point Review of Systems is negative other than noted in the HPI or here.        PHYSICAL EXAMINATION  Temp:  [97.7  F (36.5  C)] 97.7  F (36.5  C)  Pulse:  [60-79] 73  Resp:  [11-51] 20  BP: (110-137)/(78-98) 110/78  SpO2:  [98 %-100 %] 99 %     Neuro Exam  See below for NIH    Motor: LUE, LLE mild drift  Coordination: LUE clumsy hand     Dysphagia Screen  Per Nursing    Stroke Scales    NIHSS  1a. Level of Consciousness 0-->Alert, keenly responsive   1b. LOC Questions 0-->Answers both questions correctly   1c. LOC Commands 0-->Performs both tasks correctly   2.   Best Gaze 0-->Normal   3.   Visual 0-->No visual loss   4.   Facial Palsy 0-->Normal symmetrical movements   5a. Motor Arm, Left 1-->Drift, limb holds 90 (or 45) degrees, but drifts down before full 10 seconds, does not hit bed or other support   5b. Motor Arm, Right 0-->No drift, limb holds 90 (or 45) degrees for full 10 secs   6a. Motor Leg, Left 1-->Drift, leg falls by the end of the  5-sec period but does not hit bed   6b. Motor Leg, right 0-->No drift, leg holds 30 degree position for full 5 secs   7.   Limb Ataxia 1-->Present in one limb   8.   Sensory 0-->Normal, no sensory loss   9.   Best Language 0-->No aphasia, normal   10. Dysarthria 0-->Normal   11. Extinction and Inattention  0-->No abnormality   Total 3 (06/11/23 1614)       Modified Brunswick Score (Pre-morbid)  0 - No symptoms.    Imaging  I personally reviewed all imaging; relevant findings per HPI.     Lab Results Data   CBC  Recent Labs   Lab 06/11/23  0856   WBC 7.0   RBC 4.25   HGB 12.3   HCT 37.7        Basic Metabolic Panel    Recent Labs   Lab 06/11/23  0856      POTASSIUM 3.8   CHLORIDE 108*   CO2 23   BUN 22.0*   CR 0.87   *   JERRI 9.4     Liver Panel  No results for input(s): PROTTOTAL, ALBUMIN, BILITOTAL, ALKPHOS, AST, ALT, BILIDIRECT in the last 168 hours.  INR    Recent Labs   Lab Test 06/11/23  0937   INR 1.14      Lipid Profile  No lab results found.  A1C  No lab results found.  Troponin    Recent Labs   Lab 06/11/23  0856   CTROPT 7          Stroke Code Data Data   Stroke Code Data  (for stroke code with tele)  Stroke code activated 06/11/23   0910   First stroke provider response 06/11/23   0915   Video start time         Video end time         Last known normal 06/11/23   0840   Time of discovery  (or onset of symptoms)  06/11/23   0850   Head CT read by Stroke Neuro Dr/Provider 06/11/23   0930   Was stroke code de-escalated? Yes 06/11/23 1000           Telestroke Service Details  Type of service telemedicine diagnostic assessment of acute neurological changes   Reason telemedicine is appropriate patient requires assessment with a specialist for diagnosis and treatment of neurological symptoms   Mode of transmission secure interactive audio and video communication per Gt   Originating site (patient location) [facility]    Distant site (provider location) [facility]

## 2023-06-11 NOTE — DISCHARGE INSTRUCTIONS
Follow-up with your primary care provider or with neuro neurology regarding your symptoms today.  If you develop worsening symptoms that cannot be controlled at home, return to ER.    Ensure you are getting plenty of rest and hydration at home.

## 2023-06-11 NOTE — ED PROVIDER NOTES
Emergency Department Attending Supervision Note  6/11/2023  7:03 AM      I evaluated this patient in conjunction with Paz Trejo PA-C       Briefly, the patient presented with acute headache that started this morning when patient woke up about 5am.  She has history of migraine headaches previously but today this felt different.  More intense earlier when the headache first started.        On my exam,   Gen: Resting comfortably   Eyes: Clear conjunctiva, no discharge  Ears: External ears normal without swelling or drainage  Mouth: Mucous membranes moist  CV: regular rate  Resp: speaking in full sentences without any resp distress  Skin: warm dry well perfused  Neuro: Alert, no gross motor or sensory deficits,   Psych: pleasant, affect appropriate        Results:  Labs Ordered and Resulted from Time of ED Arrival to Time of ED Departure   BASIC METABOLIC PANEL - Abnormal       Result Value    Sodium 142      Potassium 3.8      Chloride 108 (*)     Carbon Dioxide (CO2) 23      Anion Gap 11      Urea Nitrogen 22.0 (*)     Creatinine 0.87      Calcium 9.4      Glucose 102 (*)     GFR Estimate 80     INR - Normal    INR 1.14     PARTIAL THROMBOPLASTIN TIME - Normal    aPTT 27     TROPONIN T, HIGH SENSITIVITY - Normal    Troponin T, High Sensitivity 7     CBC WITH PLATELETS AND DIFFERENTIAL    WBC Count 7.0      RBC Count 4.25      Hemoglobin 12.3      Hematocrit 37.7      MCV 89      MCH 28.9      MCHC 32.6      RDW 13.4      Platelet Count 165      % Neutrophils 65      % Lymphocytes 23      % Monocytes 6      % Eosinophils 5      % Basophils 1      % Immature Granulocytes 0      NRBCs per 100 WBC 0      Absolute Neutrophils 4.6      Absolute Lymphocytes 1.6      Absolute Monocytes 0.4      Absolute Eosinophils 0.4      Absolute Basophils 0.0      Absolute Immature Granulocytes 0.0      Absolute NRBCs 0.0     GLUCOSE MONITOR NURSING POCT     MR Brain w/o Contrast   Final Result   IMPRESSION:   1.  No acute infarct,  mass, hemorrhage, or herniation.   2.  A few nonspecific white matter changes most likely due to chronic microvascular ischemic disease.            CTA Head Neck with Contrast   Final Result   IMPRESSION:    HEAD CTA:    1.  Normal CTA [place].      NECK CTA:   1.  Normal neck CTA.      CT Head w/o Contrast   Final Result   IMPRESSION:   1.  No acute intracranial process.                  ED course:    Patient was seen shortly after arrival.  She is complaining about intense headache different than what she typically experienced in the past.  Was fairly sudden onset early this morning so we did end up obtaining a noncontrast CT scan which I think effectively ruled out intracranial hemorrhage.  While waiting to get some medications after some time in the ED patient began complaining of left-sided weakness.  This seems to be predominantly subjective weakness on exam but given her neurological status change we did upgrade her to an acute stroke.  Spoke with neurology who was in agreement with CT CTA of the neck and the brain.  Fortunately this was normal.  Neurology did assess the patient in the ED felt an MRI was appropriate but she is not a good candidate for systemic lytics and felt this was likely complicated migraine syndrome.  Patient was given some medications for her headache and seemed to improve most after small doses of IV Zyprexa.  MRI was negative for acute stroke.  After medications patient felt improved and requested discharge given improvement in headache symptoms.            Diagnosis    ICD-10-CM    1. 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

## 2023-06-11 NOTE — ED PROVIDER NOTES
History     Chief Complaint:  Headache       HPI   Kalyn Rivero is a 52 year old female with history of intractable migraine with aura, chronic tension-type headaches, myofacial pain, chronic kidney disease, hypothyroidism who presents with sudden left-sided headache.  Patient states that she woke up at 5 AM today and experienced sudden severe headache to her left frontal area.  Patient states that pain was initially most severe at time of onset.  Endorses ongoing associated nausea and vomiting with this.  States that she has history of migraines and chronic tension type headaches but this does not feel similar to those.  States that up until today she has been feeling fine.  Took her Celebrex this morning without any relief.  Also endorses some palpitations and dizziness. No chest pain or shortness of breath, diarrhea, constipation, urinary symptoms.       Independent Historian:   None - Patient Only    Review of External Notes:   Reviewed [facility] note from 4/25/23 - received botox injection for migraine at that time      Medications:    celecoxib (CELEBREX) 200 MG capsule  cephALEXin (KEFLEX) 500 MG capsule  ClonazePAM (KLONOPIN PO)  clonazePAM (KLONOPIN) 0.5 MG tablet  COLLAGEN PO  ENBREL SURECLICK 50 MG/ML autoinjector  fluconazole (DIFLUCAN) 150 MG tablet  HYDROcodone-acetaminophen (NORCO) 5-325 MG tablet  hydrOXYzine (ATARAX) 25 MG tablet  leflunomide (ARAVA) 10 MG tablet  leflunomide (ARAVA) 20 MG tablet  Multiple Vitamins-Minerals (HAIR SKIN AND NAILS FORMULA PO)  omeprazole (PRILOSEC) 40 MG DR capsule  OnabotulinumtoxinA (BOTOX IJ)  ondansetron (ZOFRAN-ODT) 4 MG ODT tab  oxyCODONE (ROXICODONE) 5 MG tablet  SENEXON-S 8.6-50 MG tablet  trimethoprim-polymyxin b (POLYTRIM) 53829-9.1 UNIT/ML-% ophthalmic solution        Past Medical History:    Past Medical History:   Diagnosis Date     Acetaminophen overdose 3/24/2011     Anemia      Anesthesia complication      Arrhythmia      Cerebral  infarction (H)      Cervical high risk HPV (human papillomavirus) test positive 02/22/2017     Chronic infection      Chronic pain 3/24/2011     Degenerative joint disease      Endometriosis      Fibromyalgia      Gastro-oesophageal reflux disease      Heart murmur      History of blood transfusion      Hypothyroidism      Immune disorder (H)      Learning disability      Malignant neoplasm (H)      Migraine      MRSA (methicillin resistant staph aureus) culture positive      Neck injuries      Opioid type dependence (H)      Other chronic pain      PONV (postoperative nausea and vomiting)      RA (rheumatoid arthritis) (H)      Renal stone      Scoliosis      Stomach ulcer        Past Surgical History:    Past Surgical History:   Procedure Laterality Date     ARTHRODESIS FOOT  5/23/2012    Procedure:ARTHRODESIS FOOT; Right Subtalar andTaloNavicular  Fusion  ; Surgeon:CHRIS HENDRICKS; [facility]     ARTHRODESIS FOOT Left 4/22/2015    Procedure: ARTHRODESIS FOOT;  Surgeon: Chris Hendricks MD;  [facility]     ARTHROPLASTY ELBOW Right 9/30/2015    Procedure: ARTHROPLASTY ELBOW;  Surgeon: Carrol Gaspar MD;  [facility]     ARTHROPLASTY KNEE Right      ARTHROPLASTY REVISION ELBOW Right 11/27/2018    Procedure: 1 - aspiration of Right elbow 2- Explantation of failed hardware Right humeral periprosthetic fracture non-union 3- Right distal humerus excision 4- Right distal humerus replacement 5 - Revision Right total elbow arthroplasty;  Surgeon: Aakash Zimmer MD;  [facility]     ARTHROPLASTY WRIST      x2 Lt wrist replacement.     ARTHROTOMY ELBOW Right 6/18/2015    Procedure: ARTHROTOMY ELBOW;  Surgeon: Carrol Gaspar MD;  Location:  OR      SHLDR ARTHROSCOP,DIAGNOSTIC  12/17/2008    left total shoulder arthroplasty by Dr. Law     CYSTOSCOPY  02/06/2009    1.cystoscopy.2.bilateral ureteroscopy.3.basketing of stone fragments from the right kidney.4.bilateral  double-J ureteral stent placement.5.ann catheter placement.6.fluoroscopy and intraoperative interpretation of images.     CYSTOSCOPY  01/08/2007    1. cystoscopy and left stent removal.2.left ureteroscopy     DECOMPRESSION CUBITAL TUNNEL  6/6/2014    Procedure: DECOMPRESSION CUBITAL TUNNEL;  Surgeon: Janelle Coates MD;  [facility]     ENDOSCOPY  03/31/2005    [facility]     ESOPHAGOSCOPY, GASTROSCOPY, DUODENOSCOPY (EGD), COMBINED  3/17/2014    Procedure: COMBINED ESOPHAGOSCOPY, GASTROSCOPY, DUODENOSCOPY (EGD), BIOPSY SINGLE OR MULTIPLE;;  Surgeon: Duane, William Charles, MD;  Location: [facility] OR     FUSION CERVICAL POSTERIOR ONE LEVEL  11/18/2013    Procedure: FUSION CERVICAL POSTERIOR ONE LEVEL;  Cervical 1-2 Posterior Cervical Fusion (Harms Procedure);  Surgeon: Carrol Gunn MD;  [facility] OR     GYN SURGERY       INJECT MAJOR JOINT / BURSA Left 1/5/2017    Procedure: INJECT MAJOR JOINT / BURSA;  Surgeon: Fredy Patel DO;  [facility]     INJECT STEROID (LOCATION) Left 6/18/2015    Procedure: INJECT STEROID (LOCATION);  Surgeon: Carrol Gaspar MD;  [facility]     NECK SURGERY       REMOVE FOREIGN BODY UPPER EXTREMITY Right 3/2/2017    Procedure: REMOVE FOREIGN BODY UPPER EXTREMITY;  Surgeon: Carrol Gaspar MD;  Location:  OR     REMOVE HARDWARE FOOT Left 6/8/2022    Procedure: REMOVAL, HARDWARE, FOOT LEFT;  Surgeon: Chris Hendricks MD;  Location: [facility] OR     RESECT BONE UPPER EXTREMITY Left 4/27/2017    Procedure: RESECT BONE UPPER EXTREMITY;  Left Radial Head Resection;  Surgeon: Carrol Gaspar MD;  [facility]     ROTATOR CUFF REPAIR RT/LT  10/19/2005    1.left distal clavicle excision.2.acromioplasty.3.coracoacromial ligament resection.4.rotator cuff exploration     [facility] ANESTH,DX ARTHROSCOPIC PROC KNEE JOINT  10/24/2007    right total knee arthroplasty     [facility] SHOULDER SURG PROC UNLISTED        "ZZC TOTAL KNEE ARTHROPLASTY  1/18/12    Left        Physical Exam     Patient Vitals for the past 24 hrs:   BP Temp Temp src Pulse Resp SpO2 Height Weight   06/11/23 1145 110/78 -- -- 73 (!) 51 99 % -- --   06/11/23 1053 137/89 -- -- 79 11 98 % -- --   06/11/23 1015 135/81 -- -- 60 16 98 % -- --   06/11/23 0951 125/81 -- -- 69 14 100 % -- --   06/11/23 0936 114/79 -- -- -- -- 100 % -- --   06/11/23 0906 (!) 135/93 -- -- -- -- -- -- --   06/11/23 0806 (!) 126/98 -- -- -- -- 100 % -- --   06/11/23 0658 (!) 137/92 97.7  F (36.5  C) Oral 70 20 100 % 1.676 m (5' 6\") 63.5 kg (140 lb)        Physical Exam  Constitutional: Alert, attentive, rapid speech, rambling at times  HENT:    Nose: Nose normal.    Mouth/Throat: Oropharynx is clear, mucous membranes are moist  Eyes: EOM are normal. Pupils are equal, round, and reactive to light.   CV: Irregular rhythm   Chest: Effort normal and breath sounds normal.   GI: No distension. There is no tenderness  MSK: Normal range of motion.   Neurological:   GCS 15; A/Ox3; Cranial nerves 2-12 intact;   5/5 strength throughout the upper and lower extremities;   sensation intact to light touch throughout the upper and lower extremities;    normal fine motor coordination intact bilaterally;   normal gait   No meningismus  Skin: Skin is warm and dry.        Emergency Department Course     ECG results from 06/11/23   EKG 12-lead, tracing only     Value    Systolic Blood Pressure     Diastolic Blood Pressure     Ventricular Rate 67    Atrial Rate 67    TX Interval 134    QRS Duration 74        QTc 437    P Axis 19    R AXIS 73    T Axis 58    Interpretation ECG      Sinus rhythm  Normal ECG  When compared with ECG of 05-JUL-2021 04:46,  Vent. rate has decreased BY  35 BPM  Confirmed by GENERATED REPORT, COMPUTER ([id number]),  Kalyn Finch ([id number]) on 6/11/2023 8:18:24 AM       *Note: Due to a large number of results and/or encounters for the requested time period, some results have " not been displayed. A complete set of results can be found in Results Review.         Imaging:  MR Brain w/o Contrast   Final Result   IMPRESSION:   1.  No acute infarct, mass, hemorrhage, or herniation.   2.  A few nonspecific white matter changes most likely due to chronic microvascular ischemic disease.            CTA Head Neck with Contrast   Final Result   IMPRESSION:    HEAD CTA:    1.  Normal CTA [place].      NECK CTA:   1.  Normal neck CTA.      CT Head w/o Contrast   Final Result   IMPRESSION:   1.  No acute intracranial process.               Report per radiology    Laboratory:  Labs Ordered and Resulted from Time of ED Arrival to Time of ED Departure   BASIC METABOLIC PANEL - Abnormal       Result Value    Sodium 142      Potassium 3.8      Chloride 108 (*)     Carbon Dioxide (CO2) 23      Anion Gap 11      Urea Nitrogen 22.0 (*)     Creatinine 0.87      Calcium 9.4      Glucose 102 (*)     GFR Estimate 80     INR - Normal    INR 1.14     PARTIAL THROMBOPLASTIN TIME - Normal    aPTT 27     TROPONIN T, HIGH SENSITIVITY - Normal    Troponin T, High Sensitivity 7     CBC WITH PLATELETS AND DIFFERENTIAL    WBC Count 7.0      RBC Count 4.25      Hemoglobin 12.3      Hematocrit 37.7      MCV 89      MCH 28.9      MCHC 32.6      RDW 13.4      Platelet Count 165      % Neutrophils 65      % Lymphocytes 23      % Monocytes 6      % Eosinophils 5      % Basophils 1      % Immature Granulocytes 0      NRBCs per 100 WBC 0      Absolute Neutrophils 4.6      Absolute Lymphocytes 1.6      Absolute Monocytes 0.4      Absolute Eosinophils 0.4      Absolute Basophils 0.0      Absolute Immature Granulocytes 0.0      Absolute NRBCs 0.0     GLUCOSE MONITOR NURSING POCT        Procedures   None    Emergency Department Course & Assessments:    Interventions:  Medications   0.9% sodium chloride BOLUS (0 mLs Intravenous Stopped 6/11/23 1000)     Followed by   sodium chloride 0.9% infusion (has no administration in time  range)   sterile water (preservative free) injection (has no administration in time range)   diphenhydrAMINE (BENADRYL) injection 25 mg (25 mg Intravenous $Given 6/11/23 0903)   metoclopramide (REGLAN) injection 5 mg (5 mg Intravenous $Given 6/11/23 0902)   dexamethasone PF (DECADRON) injection 10 mg (10 mg Intravenous $Given 6/11/23 0903)   iopamidol (ISOVUE-370) solution 125 mL (75 mLs Intravenous $Given 6/11/23 0927)   Saline (100 mLs As instructed $Given 6/11/23 0928)   ketorolac (TORADOL) injection 15 mg (15 mg Intravenous $Given 6/11/23 1104)   OLANZapine (zyPREXA) IV injection 2.5 mg (2.5 mg Intravenous $Given 6/11/23 1150)          Independent Interpretation (X-rays, CTs, rhythm strip):  None    Consultations/Discussion of Management or Tests:  ED Course as of 06/11/23 1209   Sun Jun 11, 2023   0720 Performed initial assessment   0820 Discussed results of CT with patient   0916 Reassessed patient and repeated neurologic exam   0917 Consulted with Dr. Moraes, recommending Tier 1   1001 Consulted with Dr. Moraes with [facility] once more   1055 Reassessed patient and discussed all imaging and lab results   1209 Reassessed patient after Zyprexa       Social Determinants of Health affecting care:   None    Disposition:  The patient was discharged to home.     Impression & Plan      Medical Decision Making:  Kalyn Rivero is a 52 year old female who presents with headache. Symptoms severe in onset with associated nausea and vomiting, unlike any headaches or migraine symptoms for patient historically. Neuro exam completed without any noted deficits. Due to concern for possible subarachnoid hemorrhage, CT head completed. No signs of intracranial pathology. After some time, patient complained of left sided upper extremity numbness. Neuro exam repeated with possible left sided upper and lower extremity weakness, however no other deficits of note. Consulted with Dr. Moraes with Stroke Neurology who recommends  Tier 1 stroke protocols. Dr. Moraes assessed patient and after review of her imaging and assessment, did not feel strongly that stroke was cause of her symptoms today. MR brain completed to definitively rule this out and thankfully was negative for stroke today. Discussed with Dr. Moraes the possibility of this being a stroke mimic with atypical migraine.  Labs are all unremarkable today. Symptoms difficult to manage here with trial of reglan, benadryl, decadron, and toradol with IV fluids. Headache seemed to worsen after toradol, provided 2.5 mg IV zyprexa with immense relief. Reassured patient that her symptoms today are unlikely to be due to emergent condition and that this may be atypical migraine. Recommend she follow up with her PCP and [facility] regarding ongoing management of migraines. Discussed reasons to return to ER. Patient understands and agreeable with plan.      Diagnosis:    ICD-10-CM    1. Atypical migraine  G43.009            Discharge Medications:  New Prescriptions    No medications on file          6/11/2023   Paz Trejo, Paz Akins PA-C  06/13/23 1309

## 2023-06-14 ENCOUNTER — TELEPHONE (OUTPATIENT)
Dept: FAMILY MEDICINE | Facility: CLINIC | Age: 53
End: 2023-06-14
Payer: COMMERCIAL

## 2023-06-14 NOTE — TELEPHONE ENCOUNTER
Reason for Call:  Appointment Request    Patient requesting this type of appt: Procedure: Colposcopy    Requested provider: Mika Zamora    Reason patient unable to be scheduled: Needs to be scheduled by clinic    When does patient want to be seen/preferred time: Any day after 2 pm    Comments: None    Okay to leave a detailed message?: Yes at Cell number on file:    Telephone Information:   Mobile [phone]       Call taken on 6/14/2023 at 3:58 PM by Annamaria Newell

## 2023-06-15 NOTE — TELEPHONE ENCOUNTER
"Called and spoke to patient, to verify that patient needed a colposcopy, as there are no recent notes on this. Per patient, Dr Zamora told her a few years ago she needed this, but she \"dropped the ball\" Please advise if this is something you want us to schedule, or, does she need another pap?Sharmin Gomez MA/MARIANNE    "

## 2023-07-19 ENCOUNTER — OFFICE VISIT (OUTPATIENT)
Dept: FAMILY MEDICINE | Facility: CLINIC | Age: 53
End: 2023-07-19
Payer: COMMERCIAL

## 2023-07-19 ENCOUNTER — TRANSFERRED RECORDS (OUTPATIENT)
Dept: HEALTH INFORMATION MANAGEMENT | Facility: CLINIC | Age: 53
End: 2023-07-19

## 2023-07-19 ENCOUNTER — LAB (OUTPATIENT)
Dept: FAMILY MEDICINE | Facility: CLINIC | Age: 53
End: 2023-07-19

## 2023-07-19 VITALS
OXYGEN SATURATION: 100 % | RESPIRATION RATE: 12 BRPM | TEMPERATURE: 97.3 F | DIASTOLIC BLOOD PRESSURE: 83 MMHG | BODY MASS INDEX: 23.14 KG/M2 | HEART RATE: 83 BPM | HEIGHT: 66 IN | SYSTOLIC BLOOD PRESSURE: 121 MMHG | WEIGHT: 144 LBS

## 2023-07-19 DIAGNOSIS — Z12.4 SCREENING FOR MALIGNANT NEOPLASM OF CERVIX: Primary | ICD-10-CM

## 2023-07-19 DIAGNOSIS — F11.21 OPIOID DEPENDENCE IN REMISSION (H): ICD-10-CM

## 2023-07-19 DIAGNOSIS — M05.9 RHEUMATOID ARTHRITIS WITH POSITIVE RHEUMATOID FACTOR, INVOLVING UNSPECIFIED SITE (H): ICD-10-CM

## 2023-07-19 DIAGNOSIS — D70.2 OTHER DRUG-INDUCED NEUTROPENIA (H): ICD-10-CM

## 2023-07-19 DIAGNOSIS — Z12.11 COLON CANCER SCREENING: ICD-10-CM

## 2023-07-19 DIAGNOSIS — Z12.31 ENCOUNTER FOR SCREENING MAMMOGRAM FOR BREAST CANCER: ICD-10-CM

## 2023-07-19 PROCEDURE — G0124 SCREEN C/V THIN LAYER BY MD: HCPCS | Performed by: PATHOLOGY

## 2023-07-19 PROCEDURE — G0145 SCR C/V CYTO,THINLAYER,RESCR: HCPCS | Performed by: FAMILY MEDICINE

## 2023-07-19 PROCEDURE — 99213 OFFICE O/P EST LOW 20 MIN: CPT | Performed by: FAMILY MEDICINE

## 2023-07-19 PROCEDURE — 87624 HPV HI-RISK TYP POOLED RSLT: CPT | Performed by: FAMILY MEDICINE

## 2023-07-19 RX ORDER — CLINDAMYCIN HCL 150 MG
CAPSULE ORAL
COMMUNITY
Start: 2023-06-15 | End: 2023-12-04

## 2023-07-19 RX ORDER — CHLORHEXIDINE GLUCONATE ORAL RINSE 1.2 MG/ML
SOLUTION DENTAL
COMMUNITY
Start: 2023-05-04 | End: 2023-08-23

## 2023-07-19 RX ORDER — CETIRIZINE HYDROCHLORIDE 10 MG/1
TABLET ORAL
COMMUNITY
Start: 2023-07-14 | End: 2023-08-23

## 2023-07-19 RX ORDER — DIPHENHYDRAMINE HCL 50 MG/1
CAPSULE ORAL
COMMUNITY
Start: 2023-07-14 | End: 2023-08-23

## 2023-07-19 RX ORDER — SODIUM FLUORIDE1.1%, POTASSIUM NITRATE 5% 5.8; 57.5 MG/ML; MG/ML
GEL, DENTIFRICE DENTAL
COMMUNITY
Start: 2023-06-27 | End: 2024-10-02

## 2023-07-19 ASSESSMENT — PAIN SCALES - GENERAL: PAINLEVEL: NO PAIN (0)

## 2023-07-19 NOTE — PROGRESS NOTES
"      Subjective   Kalyn is a 52 year old, presenting for the following health issues:  RECHECK        7/19/2023     8:33 AM   Additional Questions   Roomed by Hortensia QUINTERO   Accompanied by son     History of Present Illness       Reason for visit:  Pap smear and forms to complete    She eats 0-1 servings of fruits and vegetables daily.She consumes 5 sweetened beverage(s) daily.She exercises with enough effort to increase her heart rate 9 or less minutes per day.  She exercises with enough effort to increase her heart rate 3 or less days per week.   She is taking medications regularly.     SUBJECTIVE:  52 year old.The patient has a history of need for pap smear.  Has had a history of high risk HPV in 2021 wit no  Follow up  no apparent distress  /83   Pulse 83   Temp 97.3  F (36.3  C) (Tympanic)   Resp 12   Ht 1.676 m (5' 6\")   Wt 65.3 kg (144 lb)   LMP  (LMP Unknown)   SpO2 100%   BMI 23.24 kg/m     pap preformed    ICD-10-CM    1. Screening for malignant neoplasm of cervix  Z12.4        PLAN:await results   "

## 2023-07-24 LAB
BKR LAB AP GYN ADEQUACY: ABNORMAL
BKR LAB AP GYN INTERPRETATION: ABNORMAL
BKR LAB AP HPV REFLEX: ABNORMAL
BKR LAB AP PREVIOUS ABNL DX: ABNORMAL
BKR LAB AP PREVIOUS ABNORMAL: ABNORMAL
PATH REPORT.COMMENTS IMP SPEC: ABNORMAL
PATH REPORT.COMMENTS IMP SPEC: ABNORMAL
PATH REPORT.RELEVANT HX SPEC: ABNORMAL

## 2023-07-26 LAB
HUMAN PAPILLOMA VIRUS 16 DNA: NEGATIVE
HUMAN PAPILLOMA VIRUS 18 DNA: NEGATIVE
HUMAN PAPILLOMA VIRUS FINAL DIAGNOSIS: ABNORMAL
HUMAN PAPILLOMA VIRUS OTHER HR: POSITIVE

## 2023-07-27 ENCOUNTER — PATIENT OUTREACH (OUTPATIENT)
Dept: FAMILY MEDICINE | Facility: CLINIC | Age: 53
End: 2023-07-27
Payer: COMMERCIAL

## 2023-07-27 ENCOUNTER — TELEPHONE (OUTPATIENT)
Dept: FAMILY MEDICINE | Facility: CLINIC | Age: 53
End: 2023-07-27
Payer: COMMERCIAL

## 2023-07-27 NOTE — TELEPHONE ENCOUNTER
Called and spoke to patient, appointment, 8/23/23, made.Sharmin Gomez [facility]

## 2023-07-27 NOTE — TELEPHONE ENCOUNTER
Reason for Call:  Appointment Request    Patient requesting this type of appt: Procedure: Colposcopy     Requested provider: Mika Zamora    Reason patient unable to be scheduled: Needs to be scheduled by clinic    When does patient want to be seen/preferred time: Within 3 months due to abnormal Pap    Comments: Pt is needing this within 3 months or she will have to have another Pap done.     Okay to leave a detailed message?: Yes at Cell number on file:    Telephone Information:   Mobile [phone]       Call taken on 7/27/2023 at 10:30 AM by Katherine Bates

## 2023-08-10 ENCOUNTER — ANCILLARY PROCEDURE (OUTPATIENT)
Dept: MAMMOGRAPHY | Facility: CLINIC | Age: 53
End: 2023-08-10
Attending: FAMILY MEDICINE
Payer: COMMERCIAL

## 2023-08-10 DIAGNOSIS — Z12.31 ENCOUNTER FOR SCREENING MAMMOGRAM FOR BREAST CANCER: ICD-10-CM

## 2023-08-10 PROCEDURE — 77067 SCR MAMMO BI INCL CAD: CPT | Mod: TC | Performed by: RADIOLOGY

## 2023-08-21 ENCOUNTER — TELEPHONE (OUTPATIENT)
Dept: FAMILY MEDICINE | Facility: CLINIC | Age: 53
End: 2023-08-21
Payer: COMMERCIAL

## 2023-08-21 NOTE — TELEPHONE ENCOUNTER
Patient Quality Outreach    Patient is due for the following:   Diabetes -  Microalbumin  Physical Preventive Adult Physical      Topic Date Due    Zoster (Shingles) Vaccine (1 of 2) Never done    Hepatitis A Vaccine (1 of 2 - Risk 2-dose series) 08[birth year]06/1990    Pneumococcal Vaccine (3 - PCV) 01/20/2013       Next Steps:   Schedule a Adult Preventative    Type of outreach:    Sent letter.      Questions for provider review:    None           NATO HURST MA

## 2023-08-21 NOTE — LETTER
August 21, 2023    To  Kalyn Rivero  [address] [facility] [id number]    Your team at [facility] cares about your health. We have reviewed your chart and based on our findings; we are making the following recommendations to better manage your health.     You are in particular need of attention regarding the following:     Please schedule a Nurse Only Appointment with your primary care clinic to update your immunizations that are due.  PREVENTATIVE VISIT: Physical  OTHER FOLLOW UP: urine microalbumin    If you have already completed these items, please contact the clinic via phone or   [employer] so your care team can review and update your records. Thank you for   choosing [facility] for your healthcare needs. For any questions,   concerns, or to schedule an appointment please contact our clinic.    Healthy Regards,      Your [facility] Care Team

## 2023-08-23 ENCOUNTER — OFFICE VISIT (OUTPATIENT)
Dept: FAMILY MEDICINE | Facility: CLINIC | Age: 53
End: 2023-08-23
Payer: COMMERCIAL

## 2023-08-23 VITALS
DIASTOLIC BLOOD PRESSURE: 70 MMHG | BODY MASS INDEX: 24.36 KG/M2 | WEIGHT: 151.6 LBS | HEART RATE: 98 BPM | RESPIRATION RATE: 20 BRPM | OXYGEN SATURATION: 97 % | HEIGHT: 66 IN | SYSTOLIC BLOOD PRESSURE: 110 MMHG | TEMPERATURE: 98.4 F

## 2023-08-23 DIAGNOSIS — R30.0 DYSURIA: ICD-10-CM

## 2023-08-23 DIAGNOSIS — N30.00 ACUTE CYSTITIS WITHOUT HEMATURIA: ICD-10-CM

## 2023-08-23 DIAGNOSIS — N20.0 RENAL STONE: ICD-10-CM

## 2023-08-23 DIAGNOSIS — N89.8 VAGINAL DISCHARGE: ICD-10-CM

## 2023-08-23 DIAGNOSIS — E03.9 HYPOTHYROIDISM, UNSPECIFIED TYPE: Primary | ICD-10-CM

## 2023-08-23 DIAGNOSIS — R87.610 ASCUS WITH POSITIVE HIGH RISK HPV CERVICAL: ICD-10-CM

## 2023-08-23 DIAGNOSIS — R87.810 ASCUS WITH POSITIVE HIGH RISK HPV CERVICAL: ICD-10-CM

## 2023-08-23 DIAGNOSIS — Z11.3 SCREEN FOR STD (SEXUALLY TRANSMITTED DISEASE): ICD-10-CM

## 2023-08-23 DIAGNOSIS — R30.9 PAINFUL URINATION: ICD-10-CM

## 2023-08-23 LAB
ALBUMIN SERPL BCG-MCNC: 4.1 G/DL (ref 3.5–5.2)
ALBUMIN UR-MCNC: NEGATIVE MG/DL
ALP SERPL-CCNC: 103 U/L (ref 35–104)
ALT SERPL W P-5'-P-CCNC: 22 U/L (ref 0–50)
ANION GAP SERPL CALCULATED.3IONS-SCNC: 13 MMOL/L (ref 7–15)
APPEARANCE UR: CLEAR
AST SERPL W P-5'-P-CCNC: 30 U/L (ref 0–45)
BILIRUB SERPL-MCNC: 0.7 MG/DL
BILIRUB UR QL STRIP: NEGATIVE
BUN SERPL-MCNC: 22.6 MG/DL (ref 6–20)
CALCIUM SERPL-MCNC: 9.6 MG/DL (ref 8.6–10)
CHLORIDE SERPL-SCNC: 110 MMOL/L (ref 98–107)
CLUE CELLS: ABNORMAL
COLOR UR AUTO: YELLOW
CREAT SERPL-MCNC: 0.93 MG/DL (ref 0.51–0.95)
CREAT UR-MCNC: 170 MG/DL
DEPRECATED HCO3 PLAS-SCNC: 22 MMOL/L (ref 22–29)
GFR SERPL CREATININE-BSD FRML MDRD: 74 ML/MIN/1.73M2
GLUCOSE SERPL-MCNC: 77 MG/DL (ref 70–99)
GLUCOSE UR STRIP-MCNC: NEGATIVE MG/DL
HGB UR QL STRIP: NEGATIVE
KETONES UR STRIP-MCNC: ABNORMAL MG/DL
LEUKOCYTE ESTERASE UR QL STRIP: NEGATIVE
MICROALBUMIN UR-MCNC: <12 MG/L
MICROALBUMIN/CREAT UR: NORMAL MG/G{CREAT}
NITRATE UR QL: NEGATIVE
PH UR STRIP: 5.5 [PH] (ref 5–7)
POTASSIUM SERPL-SCNC: 4.7 MMOL/L (ref 3.4–5.3)
PROT SERPL-MCNC: 7.9 G/DL (ref 6.4–8.3)
SODIUM SERPL-SCNC: 145 MMOL/L (ref 136–145)
SP GR UR STRIP: 1.02 (ref 1–1.03)
TRICHOMONAS, WET PREP: ABNORMAL
TSH SERPL DL<=0.005 MIU/L-ACNC: 1.42 UIU/ML (ref 0.3–4.2)
UROBILINOGEN UR STRIP-ACNC: 0.2 E.U./DL
WBC'S/HIGH POWER FIELD, WET PREP: ABNORMAL
YEAST, WET PREP: ABNORMAL

## 2023-08-23 PROCEDURE — 82043 UR ALBUMIN QUANTITATIVE: CPT | Performed by: FAMILY MEDICINE

## 2023-08-23 PROCEDURE — 99213 OFFICE O/P EST LOW 20 MIN: CPT | Mod: 25 | Performed by: FAMILY MEDICINE

## 2023-08-23 PROCEDURE — 87491 CHLMYD TRACH DNA AMP PROBE: CPT | Performed by: FAMILY MEDICINE

## 2023-08-23 PROCEDURE — 84443 ASSAY THYROID STIM HORMONE: CPT | Performed by: FAMILY MEDICINE

## 2023-08-23 PROCEDURE — 36415 COLL VENOUS BLD VENIPUNCTURE: CPT | Performed by: FAMILY MEDICINE

## 2023-08-23 PROCEDURE — 80053 COMPREHEN METABOLIC PANEL: CPT | Performed by: FAMILY MEDICINE

## 2023-08-23 PROCEDURE — 88305 TISSUE EXAM BY PATHOLOGIST: CPT | Performed by: PATHOLOGY

## 2023-08-23 PROCEDURE — 82570 ASSAY OF URINE CREATININE: CPT | Performed by: FAMILY MEDICINE

## 2023-08-23 PROCEDURE — 87210 SMEAR WET MOUNT SALINE/INK: CPT | Performed by: FAMILY MEDICINE

## 2023-08-23 PROCEDURE — 87591 N.GONORRHOEAE DNA AMP PROB: CPT | Performed by: FAMILY MEDICINE

## 2023-08-23 PROCEDURE — 57454 BX/CURETT OF CERVIX W/SCOPE: CPT | Performed by: FAMILY MEDICINE

## 2023-08-23 PROCEDURE — 81003 URINALYSIS AUTO W/O SCOPE: CPT | Performed by: FAMILY MEDICINE

## 2023-08-23 RX ORDER — FLUCONAZOLE 150 MG/1
150 TABLET ORAL
Qty: 3 TABLET | Refills: 0 | Status: SHIPPED | OUTPATIENT
Start: 2023-08-23 | End: 2023-08-30

## 2023-08-23 RX ORDER — GUAIFENESIN 600 MG/1
TABLET, EXTENDED RELEASE ORAL
COMMUNITY
End: 2023-12-21

## 2023-08-23 RX ORDER — POLYMYXIN B SULFATE AND TRIMETHOPRIM 1; 10000 MG/ML; [USP'U]/ML
SOLUTION OPHTHALMIC EVERY 4 HOURS
COMMUNITY
End: 2024-10-02

## 2023-08-23 RX ORDER — UBROGEPANT 50 MG/1
TABLET ORAL
COMMUNITY

## 2023-08-23 ASSESSMENT — PAIN SCALES - GENERAL: PAINLEVEL: NO PAIN (0)

## 2023-08-23 NOTE — PROGRESS NOTES
Subjective   Kalyn is a 53 year old, presenting for the following health issues:  Colposcopy and UTI        8/23/2023    10:28 AM   Additional Questions   Roomed by Hortensia QUINTERO   Accompanied by son       HPI     Genitourinary - Female  Onset/Duration: 2 weeks   Description:   Painful urination (Dysuria): YES           Frequency: YES  Blood in urine (Hematuria): No  Delay in urine (Hesitency): No  Intensity: moderate  Progression of Symptoms:  worsening  Accompanying Signs & Symptoms:  Fever/chills: YES  Flank pain: YES  Nausea and vomiting: No  Vaginal symptoms: discharge, odor, and itching  Abdominal/Pelvic Pain: YES  History:   History of frequent UTI s: No  History of kidney stones: No  Sexually Active: YES  Possibility of pregnancy: No  Precipitating or alleviating factors: None  Therapies tried and outcome:     53 year old female presents for colposcopy,  Pap smear   1 months ago showed: ASCUS. The prior pap showed ASCUS.   No LMP recorded (lmp unknown). Patient is postmenopausal.  Allergies: Celecoxib, Leflunomide, Morphine, Amitriptyline, Codeine, Erythromycin base, Gabapentin, Ibuprofen, Sulfa antibiotics, Tylenol [acetaminophen], Dexamethasone, Erythromycin, Penicillins, and Prednisone  Reviewed health maintenance   Patient Active Problem List   Diagnosis    Hypothyroidism    Acetaminophen overdose    Pancreatitis    Anemia    Chronic pain    Pneumonia    Suicide risk    Grief reaction    Liver failure, acute    CARDIOVASCULAR SCREENING; LDL GOAL LESS THAN 160    Renal stone    Health Care Home    TOTAL KNEE ARTHROPLASTY - bilateral    Knee joint replacement - left    Trochanteric bursitis - left    Atlantoaxial instability    Postoperative pain    S/P cervical spinal fusion    Drug-seeking behavior    Opioid type dependence (H)    Severe frontal headaches    Abdominal pain, unspecified abdominal location    Esophageal reflux    Right upper extremity numbness    Right arm numbness    Lesion of ulnar  nerve    Arthralgia of temporomandibular joint    Intractable chronic migraine without aura    Encounter for long-term opiate analgesic use    Rheumatoid arthritis of foot (H)    Other drug-induced neutropenia (H)    Rheumatoid arthritis with positive rheumatoid factor, involving unspecified site (H)    Cervical high risk HPV (human papillomavirus) test positive    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)    Chronic tension-type headache    Fibromyositis    Insomnia    Migraine    Myofascial pain    Periprosthetic fracture around internal prosthetic right elbow joint    Status post surgery    Pain in joint, ankle and foot, left    Chronic kidney disease, stage 1    Dizzy spells         Prior cervical/vaginal disease: Normal exam without visible pathology.  Prior cervical treatment: no treatment.    Procedure for colposcopy and biopsy has been explained to the   patient and consent obtained.    PROCEDURE:  Speculum placed in vagina and excellent visualization of cervix   acheived, cervix swabbed x 3 with acetic acid solution.    FINDINGS:  Cervix: no visible lesions; SCJ visualized 360 degrees without lesions and endocervical curettage performed.                          Vaginal inspection: normal without visible lesions.  UA RESULTS:  Recent Labs   Lab Test 08/23/23  1039 05/26/22  1513   COLOR Yellow Yellow   APPEARANCE Clear Clear   URINEGLC Negative Negative   URINEBILI Negative Negative   URINEKETONE Trace* Negative   SG 1.025 1.025   UBLD Negative Negative   URINEPH 5.5 5.5   PROTEIN Negative Negative   UROBILINOGEN 0.2 0.2   NITRITE Negative Negative   LEUKEST Negative Trace*   RBCU  --  0-2   WBCU  --  0-5        ICD-10-CM    1. Hypothyroidism, unspecified type  E03.9 TSH WITH FREE T4 REFLEX     COMPREHENSIVE METABOLIC PANEL     TSH WITH FREE T4 REFLEX     COMPREHENSIVE METABOLIC PANEL      2. Renal stone  N20.0 Albumin Random Urine Quantitative with Creat Ratio     COMPREHENSIVE  METABOLIC PANEL     Albumin Random Urine Quantitative with Creat Ratio     COMPREHENSIVE METABOLIC PANEL      3. Painful urination  R30.9 Wet prep - lab collect     UA Macroscopic with reflex to Microscopic and Culture - Lab Collect     Wet prep - lab collect     UA Macroscopic with reflex to Microscopic and Culture - Lab Collect      4. Vaginal discharge  N89.8 Wet prep - lab collect     Wet prep - lab collect      5. Dysuria  R30.0       6. Screen for STD (sexually transmitted disease)  Z11.3 CHLAMYDIA TRACHOMATIS PCR     NEISSERIA GONORRHOEA PCR      7. ASCUS with positive high risk HPV cervical  R87.610 Surgical Pathology Exam    R87.810 COLP CERVIX/UPPER VAGINA W BX CERVIX/ENDOCERV CURETT      8. Acute cystitis without hematuria  N30.00 amoxicillin-clavulanate (AUGMENTIN) 875-125 MG tablet       PLAN:await results

## 2023-08-24 LAB
C TRACH DNA SPEC QL NAA+PROBE: NEGATIVE
N GONORRHOEA DNA SPEC QL NAA+PROBE: NEGATIVE

## 2023-08-26 LAB
PATH REPORT.COMMENTS IMP SPEC: NORMAL
PATH REPORT.COMMENTS IMP SPEC: NORMAL
PATH REPORT.FINAL DX SPEC: NORMAL
PATH REPORT.GROSS SPEC: NORMAL
PATH REPORT.MICROSCOPIC SPEC OTHER STN: NORMAL
PATH REPORT.RELEVANT HX SPEC: NORMAL
PHOTO IMAGE: NORMAL

## 2023-09-12 ENCOUNTER — TELEPHONE (OUTPATIENT)
Dept: FAMILY MEDICINE | Facility: CLINIC | Age: 53
End: 2023-09-12
Payer: COMMERCIAL

## 2023-09-12 NOTE — TELEPHONE ENCOUNTER
Patient is calling. She got a text from [employer] saying she has chronic kidney disease and is wondering if she would like to be in a support program. She does not know why she got this text. (Told her to call [facility]) but she wants to know if she does have chronic kidney disease and if she needs to bew doing something.Sharmin Gomez [facility]

## 2023-09-13 ENCOUNTER — PATIENT OUTREACH (OUTPATIENT)
Dept: FAMILY MEDICINE | Facility: CLINIC | Age: 53
End: 2023-09-13
Payer: COMMERCIAL

## 2023-09-13 DIAGNOSIS — R87.810 CERVICAL HIGH RISK HPV (HUMAN PAPILLOMAVIRUS) TEST POSITIVE: Primary | ICD-10-CM

## 2023-09-13 PROBLEM — N18.1 CHRONIC KIDNEY DISEASE, STAGE 1: Status: RESOLVED | Noted: 2022-05-26 | Resolved: 2023-09-13

## 2023-09-13 NOTE — TELEPHONE ENCOUNTER
Pap RN had called the patient regarding her recent colposcopy and she had asked about this again. Notified her of the below note from her provider stating she does NOT have chronic kidney disease.

## 2023-09-13 NOTE — TELEPHONE ENCOUNTER
"Copied note from provider:    \"Mika Zamora MD Erlandson, Laura C RN  Caller: Unspecified (Today, 11:59 AM)  Yes in one year we could do co testing\"  "

## 2023-09-13 NOTE — TELEPHONE ENCOUNTER
Hi Dr Zamora,    This patient had a colposcopy with you on 8/23. I don't yet see any notes on the pathology for follow up recommendations or that the patient was notified of the results.     Endocervix, curettings:   - A small amount of minute fragments of normal endocervical tissue.   - A scant amount of mature squamous epithelium with focal reactive change.     Would you like repeat cotesting in 1 year? Thanks!    Tahira Casey RN BSN, Pap Tracking

## 2023-09-13 NOTE — TELEPHONE ENCOUNTER
Provider:  Would you like to remove the diagnosis listed below before we call the patient?  Thank you. Jaida Roman R.N.

## 2023-10-21 ENCOUNTER — OFFICE VISIT (OUTPATIENT)
Dept: URGENT CARE | Facility: URGENT CARE | Age: 53
End: 2023-10-21
Payer: COMMERCIAL

## 2023-10-21 VITALS
TEMPERATURE: 97.3 F | BODY MASS INDEX: 24.86 KG/M2 | WEIGHT: 154 LBS | HEART RATE: 72 BPM | OXYGEN SATURATION: 98 % | RESPIRATION RATE: 18 BRPM | SYSTOLIC BLOOD PRESSURE: 126 MMHG | DIASTOLIC BLOOD PRESSURE: 85 MMHG

## 2023-10-21 DIAGNOSIS — R30.0 DYSURIA: Primary | ICD-10-CM

## 2023-10-21 DIAGNOSIS — L30.9 PERIORBITAL DERMATITIS: ICD-10-CM

## 2023-10-21 LAB
ALBUMIN UR-MCNC: NEGATIVE MG/DL
APPEARANCE UR: CLEAR
BACTERIA #/AREA URNS HPF: ABNORMAL /HPF
BILIRUB UR QL STRIP: NEGATIVE
COLOR UR AUTO: YELLOW
GLUCOSE UR STRIP-MCNC: NEGATIVE MG/DL
HGB UR QL STRIP: NEGATIVE
KETONES UR STRIP-MCNC: NEGATIVE MG/DL
LEUKOCYTE ESTERASE UR QL STRIP: ABNORMAL
NITRATE UR QL: NEGATIVE
PH UR STRIP: 5.5 [PH] (ref 5–7)
RBC #/AREA URNS AUTO: ABNORMAL /HPF
SP GR UR STRIP: <=1.005 (ref 1–1.03)
SQUAMOUS #/AREA URNS AUTO: ABNORMAL /LPF
UROBILINOGEN UR STRIP-ACNC: 0.2 E.U./DL
WBC #/AREA URNS AUTO: ABNORMAL /HPF

## 2023-10-21 PROCEDURE — 99214 OFFICE O/P EST MOD 30 MIN: CPT | Performed by: FAMILY MEDICINE

## 2023-10-21 PROCEDURE — 81001 URINALYSIS AUTO W/SCOPE: CPT | Performed by: FAMILY MEDICINE

## 2023-10-21 PROCEDURE — 87491 CHLMYD TRACH DNA AMP PROBE: CPT | Performed by: FAMILY MEDICINE

## 2023-10-21 PROCEDURE — 87591 N.GONORRHOEAE DNA AMP PROB: CPT | Performed by: FAMILY MEDICINE

## 2023-10-21 RX ORDER — BENZOCAINE/MENTHOL 6 MG-10 MG
LOZENGE MUCOUS MEMBRANE 2 TIMES DAILY
Qty: 45 G | Refills: 0 | Status: SHIPPED | OUTPATIENT
Start: 2023-10-21 | End: 2023-10-31

## 2023-10-21 NOTE — PROGRESS NOTES
Assessment & Plan     Dysuria  UA findings unremarkable.  Chlamydia and gonorrhea screening test ordered.  Recommended well hydration and to follow-up with PCP for further evaluation  - UA Macroscopic with reflex to Microscopic and Culture; Future  - UA Macroscopic with reflex to Microscopic and Culture  - UA Microscopic with Reflex to Culture  - Chlamydia trachomatis PCR; Future  - Neisseria gonorrhoeae PCR; Future    Periorbital dermatitis  Physical examination consistent with periorbital dermatitis.  Had facial Botox earlier this month.  Hydrocortisone prescribed and recommended to follow-up with her cosmetic surgeon next week.  - hydrocortisone (CORTAID) 1 % external cream; Apply topically 2 times daily for 10 days      Colt Hurtado MD  [facility] [place]    Subjective   Kalyn is a 53 year old, presenting for the following health issues:    Urgent Care, UTI: Patient believes she has a bladder infection wants to rule it out and Eye Problem:    Patient states she had botox on 10/5 states yesterday she washed her face with dial soap as she had ran out of facial soap some of it got inside her eyes and burned did states she did put aloe today.    Also having urinary frequency and dysuria for 2 weeks. H/o UTI's.      Review of Systems   Constitutional, HEENT, cardiovascular, pulmonary, gi and gu systems are negative, except as otherwise noted.      Objective    /85   Pulse 72   Temp 97.3  F (36.3  C) (Tympanic)   Resp 18   Wt 69.9 kg (154 lb)   LMP  (LMP Unknown)   SpO2 98%   BMI 24.86 kg/m    Body mass index is 24.86 kg/m .  Physical Exam   GENERAL: alert and no distress  EYES: light erythematous skin around both eyes with some eyelids edema, no warmth, conjunctival injection or discharge noted  CVS: Normal S1 and S2  RESP: no wheezes  ABDOMEN: soft, nontender  NEURO: Normal strength and tone, mentation intact and speech normal  PSYCH: mentation appears normal, affect  normal/bright    Results for orders placed or performed in visit on 10/21/23   UA Macroscopic with reflex to Microscopic and Culture     Status: Abnormal    Specimen: Urine, Clean Catch   Result Value Ref Range    Color Urine Yellow Colorless, Straw, Light Yellow, Yellow    Appearance Urine Clear Clear    Glucose Urine Negative Negative mg/dL    Bilirubin Urine Negative Negative    Ketones Urine Negative Negative mg/dL    Specific Gravity Urine <=1.005 1.003 - 1.035    Blood Urine Negative Negative    pH Urine 5.5 5.0 - 7.0    Protein Albumin Urine Negative Negative mg/dL    Urobilinogen Urine 0.2 0.2, 1.0 E.U./dL    Nitrite Urine Negative Negative    Leukocyte Esterase Urine Trace (A) Negative   UA Microscopic with Reflex to Culture     Status: Abnormal   Result Value Ref Range    Bacteria Urine None Seen None Seen /HPF    RBC Urine 0-2 0-2 /HPF /HPF    WBC Urine 0-5 0-5 /HPF /HPF    Squamous Epithelials Urine Few (A) None Seen /LPF    Narrative    Urine Culture not indicated

## 2023-10-22 LAB
C TRACH DNA SPEC QL NAA+PROBE: NEGATIVE
N GONORRHOEA DNA SPEC QL NAA+PROBE: NEGATIVE

## 2023-10-31 ENCOUNTER — TRANSFERRED RECORDS (OUTPATIENT)
Dept: HEALTH INFORMATION MANAGEMENT | Facility: CLINIC | Age: 53
End: 2023-10-31
Payer: COMMERCIAL

## 2023-11-16 ENCOUNTER — HOSPITAL ENCOUNTER (OUTPATIENT)
Facility: CLINIC | Age: 53
End: 2023-11-16
Attending: PODIATRIST | Admitting: PODIATRIST
Payer: COMMERCIAL

## 2023-11-22 ENCOUNTER — OFFICE VISIT (OUTPATIENT)
Dept: FAMILY MEDICINE | Facility: CLINIC | Age: 53
End: 2023-11-22
Payer: COMMERCIAL

## 2023-11-22 VITALS
OXYGEN SATURATION: 97 % | WEIGHT: 162 LBS | HEIGHT: 66 IN | SYSTOLIC BLOOD PRESSURE: 125 MMHG | RESPIRATION RATE: 16 BRPM | HEART RATE: 86 BPM | TEMPERATURE: 96.4 F | BODY MASS INDEX: 26.03 KG/M2 | DIASTOLIC BLOOD PRESSURE: 83 MMHG

## 2023-11-22 DIAGNOSIS — R19.7 DIARRHEA, UNSPECIFIED TYPE: ICD-10-CM

## 2023-11-22 DIAGNOSIS — Z86.2 HISTORY OF ANEMIA: Primary | ICD-10-CM

## 2023-11-22 LAB
BASOPHILS # BLD AUTO: 0 10E3/UL (ref 0–0.2)
BASOPHILS NFR BLD AUTO: 0 %
EOSINOPHIL # BLD AUTO: 0.4 10E3/UL (ref 0–0.7)
EOSINOPHIL NFR BLD AUTO: 7 %
ERYTHROCYTE [DISTWIDTH] IN BLOOD BY AUTOMATED COUNT: 14 % (ref 10–15)
HCT VFR BLD AUTO: 40.9 % (ref 35–47)
HGB BLD-MCNC: 13.1 G/DL (ref 11.7–15.7)
IMM GRANULOCYTES # BLD: 0 10E3/UL
IMM GRANULOCYTES NFR BLD: 0 %
LYMPHOCYTES # BLD AUTO: 1.9 10E3/UL (ref 0.8–5.3)
LYMPHOCYTES NFR BLD AUTO: 28 %
MCH RBC QN AUTO: 29.2 PG (ref 26.5–33)
MCHC RBC AUTO-ENTMCNC: 32 G/DL (ref 31.5–36.5)
MCV RBC AUTO: 91 FL (ref 78–100)
MONOCYTES # BLD AUTO: 0.6 10E3/UL (ref 0–1.3)
MONOCYTES NFR BLD AUTO: 9 %
NEUTROPHILS # BLD AUTO: 3.8 10E3/UL (ref 1.6–8.3)
NEUTROPHILS NFR BLD AUTO: 57 %
PLATELET # BLD AUTO: 216 10E3/UL (ref 150–450)
RBC # BLD AUTO: 4.49 10E6/UL (ref 3.8–5.2)
WBC # BLD AUTO: 6.7 10E3/UL (ref 4–11)

## 2023-11-22 PROCEDURE — 99214 OFFICE O/P EST MOD 30 MIN: CPT | Performed by: FAMILY MEDICINE

## 2023-11-22 PROCEDURE — 36415 COLL VENOUS BLD VENIPUNCTURE: CPT | Performed by: FAMILY MEDICINE

## 2023-11-22 PROCEDURE — 85025 COMPLETE CBC W/AUTO DIFF WBC: CPT | Performed by: FAMILY MEDICINE

## 2023-11-22 RX ORDER — LOPERAMIDE HYDROCHLORIDE 2 MG/1
2 TABLET ORAL 4 TIMES DAILY PRN
Qty: 24 TABLET | Refills: 1 | Status: SHIPPED | OUTPATIENT
Start: 2023-11-22

## 2023-11-22 ASSESSMENT — PAIN SCALES - GENERAL: PAINLEVEL: WORST PAIN (10)

## 2023-11-22 NOTE — PROGRESS NOTES
[facility]  [address] [facility] [address]  Phone: [phone]  Primary Provider: Kizzy Zamora  Pre-op Performing Provider: KIZZY ZAMORA        PREOPERATIVE EVALUATION:  Today's date: 11/22/2023    Kalyn is a 53 year old, presenting for the following:  Pre-Op Exam      Surgical Information:  Surgery/Procedure: Lf foot   Surgery Location: [facility]   Surgeon:   Surgery Date: 12/07/2023  Time of Surgery:    Where patient plans to recover: At home with family  Fax number for surgical facility: [phone]         Subjective       HPI related to upcoming procedure: hammer toe and thomas plantar hallux          11/22/2023     1:35 PM   Preop Questions   1. Have you ever had a heart attack or stroke? YES - over 25 years and fully recovered   2. Have you ever had surgery on your heart or blood vessels, such as a stent placement, a coronary artery bypass, or surgery on an artery in your head, neck, heart, or legs? No   3. Do you have chest pain with activity? No   4. Do you have a history of  heart failure? No   5. Do you currently have a cold, bronchitis or symptoms of other infection? No   6. Do you have a cough, shortness of breath, or wheezing? No   7. Do you or anyone in your family have previous history of blood clots? No   8. Do you or does anyone in your family have a serious bleeding problem such as prolonged bleeding following surgeries or cuts? No   9. Have you ever had problems with anemia or been told to take iron pills? No   10. Have you had any abnormal blood loss such as black, tarry or bloody stools, or abnormal vaginal bleeding? No   11. Have you ever had a blood transfusion? YES - when had shoulder surgery   11a. Have you ever had a transfusion reaction? No   12. Are you willing to have a blood transfusion if it is medically needed before, during, or after your surgery? Yes   13. Have you or any of your relatives ever had problems with  anesthesia? YES -  patient had knee replacement  and had heart arrythmia. She has had 58 other surgeries without complication   14. Do you have sleep apnea, excessive snoring or daytime drowsiness? No   15. Do you have any artifical heart valves or other implanted medical devices like a pacemaker, defibrillator, or continuous glucose monitor? No   16. Do you have artificial joints? YES - knee   17. Are you allergic to latex? No   18. Is there any chance that you may be pregnant? No     Health Care Directive:  Patient does not have a Health Care Directive or Living Will: Patient states has Advance Directive and will bring in a copy to clinic.    Preoperative Review of :   reviewed - controlled substances reflected in medication list.       Status of Chronic Conditions:       Review of Systems  Constitutional, neuro, ENT, endocrine, pulmonary, cardiac, gastrointestinal, genitourinary, musculoskeletal, integument and psychiatric systems are negative, except as otherwise noted.    Patient Active Problem List    Diagnosis Date Noted    Health Care Home 05/27/2011     Priority: High     Not in Active Care Coordination                                                                          DX V65.8 REPLACED WITH 99403 HEALTH CARE HOME (04/08/2013)        Dizzy spells 06/29/2022     Priority: Medium    Pain in joint, ankle and foot, left 05/10/2022     Priority: Medium     Added automatically from request for surgery [id number]      Status post surgery 11/27/2018     Priority: Medium    Other mechanical complication of internal elbow joint prosthesis  04/16/2018     Priority: Medium    Insomnia 04/04/2018     Priority: Medium    Periprosthetic fracture around internal prosthetic right elbow joint 08/28/2017     Priority: Medium     Last Assessment & Plan:   47yoF with periprosthetic fracture of the humeral shaft, loosening of total elbow arthroplasty, loss of fixation of plate and screw construct.    - Discussed  surgical intervention of right trans humeral amputation. At this time, pt is not ready to proceed with amputation. Also discussed possible ORIF with longer stem. Plan to discuss case with her shoulder replacement surgeon.   - Continue Gandhi brace and sling  - NWB RUE  - Pain management per pain provider  - Follow up 3 weeks      Chronic tension-type headache 08/21/2017     Priority: Medium    Migraine 08/21/2017     Priority: Medium    Myofascial pain 07/10/2017     Priority: Medium    Cervical high risk HPV (human papillomavirus) test positive 02/22/2017     Priority: Medium     2/22/17 NIL Pap, + HR HPV 18 & other HR HPV. Plan colp  3/28/17 [facility] ECC negative for dysplasia. Plan cotest in 1 year.   5/3/18 [facility] Bx & ECC Negative. Plan cotest in 1 year.   6/6/19 Lost to follow-up for pap tracking  8/22/19 NIL Pap, Neg HPV, Endometrial cells present. Plan cotest in 1 year, unless abnormal bleeding then sooner.   1/5/21 Lost to follow-up for pap tracking  7/14/21 NIL pap, +HR HPV (not 16/18). Plan cotest in 1 year  8/23/22 Lost to follow-up for pap tracking   7/19/23 ASCUS, +HR HPV (not 16/18). Plan: colposcopy due before 10/19/23  8/23/23 Colpo ECC neg. Plan: cotest in 1 year      Other drug-induced neutropenia (H24) 04/14/2016     Priority: Medium    Rheumatoid arthritis with positive rheumatoid factor, involving unspecified site (H) 04/14/2016     Priority: Medium    Fibromyositis 07/27/2015     Priority: Medium    Rheumatoid arthritis of foot (H) 04/13/2015     Priority: Medium    Encounter for long-term opiate analgesic use 07/15/2014     Priority: Medium    Arthralgia of temporomandibular joint 07/10/2014     Priority: Medium    Intractable chronic migraine without aura 07/10/2014     Priority: Medium     Problem list name updated by automated process. Provider to review      Lesion of ulnar nerve 06/03/2014     Priority: Medium    Right arm numbness 05/21/2014     Priority: Medium    Right upper extremity  numbness 04/02/2014     Priority: Medium    Esophageal reflux 03/25/2014     Priority: Medium    Postoperative pain 11/20/2013     Priority: Medium    S/P cervical spinal fusion 11/20/2013     Priority: Medium    Drug-seeking behavior 11/20/2013     Priority: Medium    Opioid type dependence (H) 11/20/2013     Priority: Medium     Problem list name updated by automated process. Provider to review      Severe frontal headaches 11/20/2013     Priority: Medium    Abdominal pain, unspecified abdominal location 11/20/2013     Priority: Medium     Problem list name updated by automated process. Provider to review      Atlantoaxial instability 11/18/2013     Priority: Medium    Trochanteric bursitis - left 10/04/2012     Priority: Medium    Knee joint replacement - left 01/26/2012     Priority: Medium     Patient is followed by KIZZY LUA for ongoing prescription of narcotic pain medicine.  Med: oxycodone 5 mg.  Maximum use per month: 120  Expected duration: 8 weeks postoperative.  Narcotic agreement on file: YES  Clinic visit recommended: Q 4 weeks    Patient is followed by KIZZY LUA for ongoing prescription of narcotic pain medicine.  Med: oxycontin 20 mg.   Maximum use per month: 60  Expected duration: 1 month  Narcotic agreement on file: YES  Clinic visit recommended: Q 4 weeks      TOTAL KNEE ARTHROPLASTY - bilateral 12/12/2011     Priority: Medium    Liver failure, acute 04/22/2011     Priority: Medium    CARDIOVASCULAR SCREENING; LDL GOAL LESS THAN 160 04/22/2011     Priority: Medium    Renal stone      Priority: Medium    Suicide risk 04/17/2011     Priority: Medium    Grief reaction 04/17/2011     Priority: Medium     3      Acetaminophen overdose 03/24/2011     Priority: Medium    Pancreatitis 03/24/2011     Priority: Medium    Anemia 03/24/2011     Priority: Medium    Chronic pain 03/24/2011     Priority: Medium    Pneumonia 03/24/2011     Priority: Medium    Hypothyroidism      Priority:  "Medium      Past Medical History:   Diagnosis Date    Acetaminophen overdose 3/24/2011    Anemia     Anesthesia complication     pt states has a history of heart stopping during anesthesia,    Arrhythmia     Cerebral infarction (H)     Cervical high risk HPV (human papillomavirus) test positive 02/22/2017    type 18 & other HR HPV    Chronic infection     MRSA    Chronic pain 3/24/2011    Degenerative joint disease     Endometriosis     Fibromyalgia     Gastro-oesophageal reflux disease     Heart murmur     History of blood transfusion     Hypothyroidism     Immune disorder (H24)     Learning disability     Malignant neoplasm (H)     Migraine     MRSA (methicillin resistant staph aureus) culture positive     Neck injuries     Opioid type dependence (H)     Other chronic pain     PONV (postoperative nausea and vomiting)     states \"flatlines\"during anesthesia    RA (rheumatoid arthritis) (H)     Renal stone     Scoliosis     Stomach ulcer     history     Past Surgical History:   Procedure Laterality Date    ARTHRODESIS FOOT  5/23/2012    Procedure:ARTHRODESIS FOOT; Right Subtalar andTaloNavicular  Fusion  ; Surgeon:CHRIS ZHOU; [facility]    ARTHRODESIS FOOT Left 4/22/2015    Procedure: ARTHRODESIS FOOT;  Surgeon: Chris Zhou MD;  [facility]    ARTHROPLASTY ELBOW Right 9/30/2015    Procedure: ARTHROPLASTY ELBOW;  Surgeon: Carrol Gaspar MD;  [facility]    ARTHROPLASTY KNEE Right     ARTHROPLASTY REVISION ELBOW Right 11/27/2018    Procedure: 1 - aspiration of Right elbow 2- Explantation of failed hardware Right humeral periprosthetic fracture non-union 3- Right distal humerus excision 4- Right distal humerus replacement 5 - Revision Right total elbow arthroplasty;  Surgeon: Aakash Zimmer MD;  [facility]    ARTHROPLASTY WRIST      x2 Lt wrist replacement.    ARTHROTOMY ELBOW Right 6/18/2015    Procedure: ARTHROTOMY ELBOW;  Surgeon: Carrol Gaspra MD;  " [facility]    C SHLDR ARTHROSCOP,DIAGNOSTIC  12/17/2008    left total shoulder arthroplasty by Dr. Law    CYSTOSCOPY  02/06/2009    1.cystoscopy.2.bilateral ureteroscopy.3.basketing of stone fragments from the right kidney.4.bilateral double-J ureteral stent placement.5.ann catheter placement.6.fluoroscopy and intraoperative interpretation of images.    CYSTOSCOPY  01/08/2007    1. cystoscopy and left stent removal.2.left ureteroscopy    DECOMPRESSION CUBITAL TUNNEL  6/6/2014    Procedure: DECOMPRESSION CUBITAL TUNNEL;  Surgeon: Janelle Coates MD;  [facility]    ENDOSCOPY  03/31/2005    [facility]oscopy-[facility]    ESOPHAGOSCOPY, GASTROSCOPY, DUODENOSCOPY (EGD), COMBINED  3/17/2014    Procedure: COMBINED ESOPHAGOSCOPY, GASTROSCOPY, DUODENOSCOPY (EGD), BIOPSY SINGLE OR MULTIPLE;;  Surgeon: Duane, William Charles, MD;  Location: [facility] OR    FUSION CERVICAL POSTERIOR ONE LEVEL  11/18/2013    Procedure: FUSION CERVICAL POSTERIOR ONE LEVEL;  Cervical 1-2 Posterior Cervical Fusion (Harms Procedure);  Surgeon: Carrol Gunn MD;  [facility]    GYN SURGERY      INJECT MAJOR JOINT / BURSA Left 1/5/2017    Procedure: INJECT MAJOR JOINT / BURSA;  Surgeon: Fredy Patel DO;  [facility]    INJECT STEROID (LOCATION) Left 6/18/2015    Procedure: INJECT STEROID (LOCATION);  Surgeon: Carrol Gaspar MD;  [facility]    NECK SURGERY      REMOVE FOREIGN BODY UPPER EXTREMITY Right 3/2/2017    Procedure: REMOVE FOREIGN BODY UPPER EXTREMITY;  Surgeon: Carrol Gaspar MD;  [facility]    REMOVE HARDWARE FOOT Left 6/8/2022    Procedure: REMOVAL, HARDWARE, FOOT LEFT;  Surgeon: Chris Hendricks MD;  Location: [facility] [place]    RESECT BONE UPPER EXTREMITY Left 4/27/2017    Procedure: RESECT BONE UPPER EXTREMITY;  Left Radial Head Resection;  Surgeon: Carrol Gaspar MD;  [facility]    ROTATOR CUFF REPAIR RT/LT  10/19/2005    1.left distal  clavicle excision.2.acromioplasty.3.coracoacromial ligament resection.4.rotator cuff exploration    [facility] ANESTH,DX ARTHROSCOPIC PROC KNEE JOINT  10/24/2007    right total knee arthroplasty    [facility] SHOULDER SURG PROC UNLISTED      [facility] TOTAL KNEE ARTHROPLASTY  1/18/12    Left     Current Outpatient Medications   Medication Sig Dispense Refill    ACE/ARB/ARNI NOT PRESCRIBED (INTENTIONAL) Please choose reason not prescribed from choices below. (Patient not taking: Reported on 10/21/2023)      amoxicillin-clavulanate (AUGMENTIN) 875-125 MG tablet Take 1 tablet by mouth 2 times daily (Patient not taking: Reported on 10/21/2023) 20 tablet 0    celecoxib (CELEBREX) 200 MG capsule Take 200 mg by mouth 3 times daily      clindamycin (CLEOCIN) 150 MG capsule TAKE 4 CAPSULES BY MOUTH 1 HOUR BEFORE APPT      clonazePAM (KLONOPIN) 0.5 MG tablet TAKE 1 TABLET BY MOUTH THREE TIMES A DAY      ENBREL SURECLICK 50 MG/ML autoinjector 50 mg twice a week       guaiFENesin (MUCINEX) 600 MG 12 hr tablet TAKE 1 TABLET BY MOUTH TWICE A DAY AS NEEDED FOR CONGESTION      omeprazole (PRILOSEC) 40 MG DR capsule TAKE 1 CAPSULE BY MOUTH TWICE A DAY BEFORE MEALS Strength: 40 mg 90 capsule 3    OnabotulinumtoxinA (BOTOX IJ) Inject 200 Units as directed Total dose 200 units   Administered 190 units   Unavoidable waste 30 units   Lot # /[id number] with Expiration Date:  12/2020   NDC [address]-18      ondansetron (ZOFRAN-ODT) 4 MG ODT tab Take 1 tablet (4 mg) by mouth every 8 hours as needed for nausea 20 tablet 3    SENEXON-S 8.6-50 MG tablet TAKE 1-2 TABLETS BY MOUTH 2 TIMES DAILY FOR CONSTIPATION. 100 tablet 1    SODIUM FLUORIDE 5000 SENSITIVE 1.1-5 % GEL USE 2X/DAY FOR 2-3 MINUTES. SPIT AND DO NOT EAT DRINK OR RINSE FOR 30 MINUTES      trimethoprim-polymyxin b (POLYTRIM) 44308-3.1 UNIT/ML-% ophthalmic solution Place into the right eye every 4 hours      UBRELVY 50 MG tablet TAKE 1 TABLET BY MOUTH AS NEEDED FOR PAIN AT ONSET OF MIGRAINE, MAY REPEAT  AFTER TWO HOURS IF NEEDED       Presently not on narcotics.  Allergies   Allergen Reactions    Celecoxib      Other reaction(s): stroke , lasted 24 hours    Leflunomide Anaphylaxis    Morphine Swelling    Amitriptyline Other (See Comments)     Sleepwalking     Codeine     Erythromycin Base     Gabapentin Other (See Comments)     Pins,needles, stabbing aching at once  Pins,needles, stabbing aching at once  Numbness and Tingling    Ibuprofen      Doesn't take due to gastric ulcer    Sulfa Antibiotics Nausea and Vomiting     unknown    Tylenol [Acetaminophen]      Pt. States that she has Liver problems  Tylenol 3    Dexamethasone Palpitations     Heart racing, sweating     Erythromycin Nausea     Vomiting     Penicillins Rash    Prednisone Palpitations     Heart racing        Social History     Tobacco Use    Smoking status: Former     Packs/day: 0.10     Years: 10.00     Additional pack years: 0.00     Total pack years: 1.00     Types: Cigarettes     Start date: 8/6/1983    Smokeless tobacco: Never    Tobacco comments:     Quit 6 weeks ago   Substance Use Topics    Alcohol use: No     Family History   Problem Relation Age of Onset    Diabetes Mother     Hypertension Mother     Allergies Mother     Alcohol/Drug Mother         LIVER CIRROSIS    Blood Disease Mother         B12 DEF    Neurologic Disorder Mother         Epilepsy    Osteoporosis Mother     Cerebrovascular Disease Maternal Grandmother     Arthritis Maternal Grandmother         RHEUMATOID    Cancer Maternal Grandmother     Thyroid Disease Maternal Grandmother     Osteoporosis Maternal Grandmother     Heart Disease Maternal Grandmother     Depression Maternal Grandmother     Neurologic Disorder Maternal Grandmother         MIGRAINES    Anxiety Disorder Maternal Grandmother     Diabetes Maternal Grandmother     Migraines Maternal Grandmother     Deep Vein Thrombosis Maternal Grandmother     Cerebrovascular Disease Maternal Grandfather     Cancer Maternal  Grandfather     Mental Illness Maternal Grandfather     Cerebrovascular Disease Paternal Grandmother     Arthritis Paternal Grandmother         RHEUMATOID    Cancer Paternal Grandmother     Osteoporosis Paternal Grandmother     Heart Disease Paternal Grandmother     Depression Paternal Grandmother     Neurologic Disorder Paternal Grandmother         MIGRAINES    Thyroid Disease Paternal Grandmother     Cerebrovascular Disease Paternal Grandfather     Cancer Paternal Grandfather     Heart Disease Brother         MURMUR    Asthma Son     Endocrine Disease Son     Neurologic Disorder Father         epilepsy    Seizure Disorder Father     Hypertension Father     Skin Cancer Father     Anxiety Disorder Father     Mental Illness Father     Hyperlipidemia Father     Kidney Disease Father     Bladder Cancer Father     Neurologic Disorder Daughter         MIGRAINES    Arthritis Daughter         JR    Hypertension Son     LUNG DISEASE Brother     Cancer Brother         lung    Anxiety Disorder Son     Asthma Son     Hypertension Son     Migraines Son     Spine Problems Son     Mental Illness Brother     Migraines Brother     Cardiac Sudden Death Brother     Spine Problems Brother     Glaucoma No family hx of     Macular Degeneration No family hx of     Unknown/Adopted No family hx of     Anesthesia Reaction No family hx of     Other Cancer No family hx of     Rheumatoid Arthritis No family hx of     Bone Cancer No family hx of     Low Back Problems No family hx of      History   Drug Use No         Objective     LMP  (LMP Unknown)     Physical Exam    GENERAL APPEARANCE: healthy, alert and no distress     EYES: EOMI, PERRL     HENT: ear canals and TM's normal and nose and mouth without ulcers or lesions     NECK: no adenopathy, no asymmetry, masses, or scars and thyroid normal to palpation     RESP: lungs clear to auscultation - no rales, rhonchi or wheezes     CV: regular rates and rhythm, normal S1 S2, no S3 or S4 and no  murmur, click or rub     ABDOMEN:  soft, nontender, no HSM or masses and bowel sounds normal     MS: extremities normal- no gross deformities noted, no evidence of inflammation in joints, FROM in all extremities.     SKIN: no suspicious lesions or rashes     NEURO: Normal strength and tone, sensory exam grossly normal, mentation intact and speech normal     PSYCH: mentation appears normal. and affect normal/bright     LYMPHATICS: No cervical adenopathy    Recent Labs   Lab Test 08/23/23  1039 06/11/23  0937 06/11/23  0856 05/26/22  1533   HGB  --   --  12.3 12.9   PLT  --   --  165 200   INR  --  1.14  --   --      --  142 141   POTASSIUM 4.7  --  3.8 4.2   CR 0.93  --  0.87 1.16*        Diagnostics:  Labs pending at this time.  Results will be reviewed when available.   No EKG required for low risk surgery (cataract, skin procedure, breast biopsy, etc).    Revised Cardiac Risk Index (RCRI):  The patient has the following serious cardiovascular risks for perioperative complications:   - No serious cardiac risks = 0 points     RCRI Interpretation: 0 points: Class I (very low risk - 0.4% complication rate)         Signed Electronically by: Mika Zamora MD  Copy of this evaluation report is provided to requesting physician.

## 2023-11-22 NOTE — COMMUNITY RESOURCES LIST (ENGLISH)
11/22/2023   [facility] [employer]  N/A  For questions about this resource list or additional care needs, please contact your primary care clinic or care manager.  Phone: [phone]   Email: N/A   Address: [facility] [place] [address]   Hours: N/A        Transportation       Free or low-cost transportation  1  [facility] Distance: 5.18 miles      In-Person   [address] [place] [address]  Language: English  Hours: Mon - Wed 9:30 AM - 12:00 PM , Mon - Wed 1:00 PM - 2:00 PM Appt. Only  Fees: Free   Phone: (286) 738-9262 Email: info@[employer].org Website: http://www.[employer].org/     2  [facility] [employer] - Free or low-cost transportation Distance: 5.39 miles      In-Person   [address] [place] [address]  Language: English  Hours: Tue 9:30 AM - 11:30 AM , Thu 9:30 AM - 11:30 AM  Fees: Free   Phone: (672) 392-3743 Email: info@[employer] Website: http://www.[employer]/     Transportation to medical appointments  3  [facility]   Family Wellness ([facility]IF[facility]) Distance: 2.77 miles      In-Person   [address] [place] [address]  Language: English, Serbian, English, Gujarati, Patricio, Turkish, Japanese, Macedonian, Welsh, Hungarian  Hours: Mon - Wed 9:00 AM - 5:00 PM , Thu 12:00 PM - 6:00 PM , Fri 9:00 AM - 5:00 PM , Sun 10:30 AM - 2:00 PM Appt. Only  Fees: Free   Phone: (862) 141-5680 Email: info@[employer].org Website: https://www.sewa-aifw.org/     4  [facility] [place] and [facility] Distance: 5.81 miles      In-Person   [address] [place] [address]  Language: English  Hours: Mon - Sun Open 24 Hours  Fees: Insurance, Self Pay   Phone: (814) 192-7822 Email: sae@[employer] Website: https://www.[employer]/midmetro/          Important Numbers & Websites       Emergency Services   911  [facility] Services   [facility]  Poison Control   (198) 231-6176  Suicide Prevention Lifeline   (419) 156-6171  (TALK)  Child Abuse Hotline   (471) 863-2622 (4-A-Child)  Sexual Assault Hotline   (354) 995-7603 (HOPE)  National Runaway Safeline   (788) 851-4877 (RUNAWAY)  All-Options Talkline   (124) 887-6546  Substance Abuse Referral   (149) 156-5368 (HELP)

## 2023-11-27 ENCOUNTER — TELEPHONE (OUTPATIENT)
Dept: FAMILY MEDICINE | Facility: CLINIC | Age: 53
End: 2023-11-27
Payer: COMMERCIAL

## 2023-11-27 DIAGNOSIS — Z01.818 PRE-OP EXAM: Primary | ICD-10-CM

## 2023-11-27 NOTE — TELEPHONE ENCOUNTER
Please, inform patient that like I discussed with her at the pre op, I do not know of a pain management that will see her and write narcotics since she recently was dismissed from her past pain management.  I can not manage her narcotics because I only work one day per week.  I will put in a referral to our pain management clinic but I doubt they will see her that quickly if at all.

## 2023-11-27 NOTE — TELEPHONE ENCOUNTER
Order/Referral Request    Who is requesting: patient/surgeon    Orders being requested: pain clinic referral    Reason service is needed/diagnosis:     When are orders needed by: ASAP-     Has this been discussed with Provider: No- had pre op appointment, however surgeon informed her that she needed to have an appointment schedule with pain clinic before Wednesday or her surgery is cancelled    Does patient have a preference on a Group/Provider/Facility? Within [facility]     Does patient have an appointment scheduled?: No    Where to send orders: Fax [phone]    Okay to leave a detailed message?: Yes at Cell number on file:    Telephone Information:   Mobile [phone]

## 2023-12-04 ENCOUNTER — TRANSFERRED RECORDS (OUTPATIENT)
Dept: HEALTH INFORMATION MANAGEMENT | Facility: CLINIC | Age: 53
End: 2023-12-04
Payer: COMMERCIAL

## 2023-12-04 RX ORDER — HYDROXYCHLOROQUINE SULFATE 200 MG/1
200 TABLET, FILM COATED ORAL 2 TIMES DAILY
COMMUNITY
End: 2023-12-04 | Stop reason: HOSPADM

## 2023-12-21 DIAGNOSIS — R05.9 COUGH, UNSPECIFIED TYPE: Primary | ICD-10-CM

## 2023-12-21 RX ORDER — GUAIFENESIN 600 MG/1
TABLET, EXTENDED RELEASE ORAL
Qty: 60 TABLET | Refills: 3 | Status: SHIPPED | OUTPATIENT
Start: 2023-12-21 | End: 2024-09-03 | Stop reason: ALTCHOICE

## 2023-12-21 NOTE — TELEPHONE ENCOUNTER
Medication Question or Refill        What medication are you calling about (include dose and sig)?: guaiFENesin (MUCINEX) 600 MG 12 hr tablet     Preferred Pharmacy:   [facility]/pharmacy #[id number]- Closed - [place] - [address]  [place] [address]  Phone: [phone] Fax: [phone]    [facility] [id number] [facility] [employer] - [place] - [address]  [address]  [place] [address]  Phone: [phone] Fax: [phone]      Controlled Substance Agreement on file:   CSA -- Patient Level:    CSA: None found at the patient level.       Who prescribed the medication?: Dr. Zamora     Do you need a refill? Yes    *Patient states Dr. Zamora has filled this before when she was sick and is now sick again and wondering if it can be refilled. I did offer UC or an appt. Patient declined.      Okay to leave a detailed message?: Yes at Home number on file [phone] (home) or Work number on file:  There is no work phone number on file.

## 2023-12-22 ENCOUNTER — HOSPITAL ENCOUNTER (EMERGENCY)
Facility: CLINIC | Age: 53
Discharge: HOME | End: 2023-12-22
Attending: EMERGENCY MEDICINE | Admitting: EMERGENCY MEDICINE
Payer: COMMERCIAL

## 2023-12-22 ENCOUNTER — APPOINTMENT (OUTPATIENT)
Dept: GENERAL RADIOLOGY | Facility: CLINIC | Age: 53
End: 2023-12-22
Attending: EMERGENCY MEDICINE
Payer: COMMERCIAL

## 2023-12-22 VITALS
BODY MASS INDEX: 24.91 KG/M2 | SYSTOLIC BLOOD PRESSURE: 167 MMHG | HEART RATE: 85 BPM | WEIGHT: 155 LBS | OXYGEN SATURATION: 96 % | RESPIRATION RATE: 17 BRPM | HEIGHT: 66 IN | TEMPERATURE: 98.8 F | DIASTOLIC BLOOD PRESSURE: 72 MMHG

## 2023-12-22 DIAGNOSIS — R11.0 NAUSEA: ICD-10-CM

## 2023-12-22 DIAGNOSIS — R05.9 COUGH, UNSPECIFIED TYPE: ICD-10-CM

## 2023-12-22 DIAGNOSIS — J10.1 INFLUENZA B: ICD-10-CM

## 2023-12-22 LAB
FLUAV RNA SPEC QL NAA+PROBE: NEGATIVE
FLUBV RNA RESP QL NAA+PROBE: POSITIVE
GROUP A STREP BY PCR: NOT DETECTED
RSV RNA SPEC NAA+PROBE: NEGATIVE
SARS-COV-2 RNA RESP QL NAA+PROBE: NEGATIVE

## 2023-12-22 PROCEDURE — 250N000011 HC RX IP 250 OP 636: Performed by: EMERGENCY MEDICINE

## 2023-12-22 PROCEDURE — 87651 STREP A DNA AMP PROBE: CPT | Performed by: EMERGENCY MEDICINE

## 2023-12-22 PROCEDURE — 71046 X-RAY EXAM CHEST 2 VIEWS: CPT

## 2023-12-22 PROCEDURE — 87637 SARSCOV2&INF A&B&RSV AMP PRB: CPT | Performed by: EMERGENCY MEDICINE

## 2023-12-22 PROCEDURE — 99284 EMERGENCY DEPT VISIT MOD MDM: CPT | Mod: 25

## 2023-12-22 RX ORDER — ONDANSETRON 4 MG/1
4 TABLET, ORALLY DISINTEGRATING ORAL ONCE
Status: COMPLETED | OUTPATIENT
Start: 2023-12-22 | End: 2023-12-22

## 2023-12-22 RX ORDER — ONDANSETRON 4 MG/1
4 TABLET, ORALLY DISINTEGRATING ORAL EVERY 8 HOURS PRN
Qty: 10 TABLET | Refills: 0 | Status: SHIPPED | OUTPATIENT
Start: 2023-12-22

## 2023-12-22 RX ADMIN — ONDANSETRON 4 MG: 4 TABLET, ORALLY DISINTEGRATING ORAL at 02:58

## 2023-12-22 ASSESSMENT — ACTIVITIES OF DAILY LIVING (ADL): ADLS_ACUITY_SCORE: 37

## 2023-12-22 NOTE — ED TRIAGE NOTES
Patient here  with a cough, sore throat and a fever since saturday     Triage Assessment (Adult)       Row Name 12/22/23 0158          Triage Assessment    Airway WDL WDL        Respiratory WDL    Respiratory WDL WDL        Skin Circulation/Temperature WDL    Skin Circulation/Temperature WDL WDL        Cardiac WDL    Cardiac WDL WDL        Peripheral/Neurovascular WDL    Peripheral Neurovascular WDL WDL

## 2023-12-22 NOTE — ED PROVIDER NOTES
History     Chief Complaint:  Cough and Fever       The history is provided by the patient.      Kalyn Rivero is a 53 year old female with history of malignant neoplasm, hypothyroidism, and pneumonia who presents to the ED with cough. Patient reports symptom of coughing started on Sunday. She notes that she had been at a recital with a lot of people prior and believed she may have caught something that day. Additional symptoms include back pain, abdominal pain, vomiting, and body aches. She states that her back pain is exacerbated by coughing. Patient takes Celebrex 200 mg for fever.    Independent Historian:   None - Patient Only      Medications:    Hydroxychloroquine  Celebrex  Imodium  Prilosec  Zofran  Inderal  Ubrelvy   Zofran  Cleocin    Past Medical History:    Scoliosis   Renal stone  Rheumatoid arthritis  Opioid type dependence  MRSA  Migraine  Malignant neoplasm  Immune disorder  Heart murmur  Tobacco abuse  Gastroesophageal reflux disease  Fibromyalgia  Endometriosis  Degenerative joint disease  Chronic infection  Chronic pain  Cerebral infarction  Cerebral artery occulusion with cerebral infarction  Arrhythmia  Anemia  Acetaminophen overdose  Hypothyroidism  Pancreatitis  Anemia  Pneumonia  Suicide risk    Past Surgical History:    Total knee arthroplasty, left  Shoulder surgery procedure  Rotator cuff repair, bilateral  Resect bone upper extremity ,left  Removal hardware foot, left  Remove foreign body upper extremity, right  Neck surgery   Inject steroid, left  Inject major joint, left  Fusion cervical posterior one level  Arthrotomy elbow, right  Arthroplasty wrist, left x2  Arthroplasty revision elbow, right  Arthroplasty knee, right  Arthoplasty elbow, right  Arthrodesis foot, left  Arthrodesis foot     Physical Exam   Patient Vitals for the past 24 hrs:   BP Temp Temp src Pulse Resp SpO2 Height Weight   12/22/23 0323 -- -- -- -- 17 -- -- --   12/22/23 0155 (!) 167/72 98.8  F (37.1  C) Oral 85  "20 96 % 1.676 m (5' 6\") 70.3 kg (155 lb)        Physical Exam  General: Alert, appears well-developed and well-nourished. Cooperative.     In mild distress  HEENT:  Head:  Atraumatic  Ears:  External ears are normal  Mouth/Throat:  Oropharynx is without erythema or exudate and mucous membranes are moist.   Eyes:   Conjunctivae normal and EOM are normal. No scleral icterus.  CV:  Normal rate, regular rhythm, normal heart sounds and radial pulses are 2+ and symmetric.  No murmur.  Resp:  Breath sounds are clear bilaterally, dry cough.     Non-labored, no retractions or accessory muscle use  GI:  Abdomen is soft, no distension, no tenderness. No rebound or guarding.  No CVA tenderness bilaterally  MS:  Normal range of motion. No edema.    Normal strength in all 4 extremities.     Back atraumatic.    No midline cervical, thoracic, or lumbar tenderness  Skin:  Warm and dry.  No rash or lesions noted.  Neuro:   Alert. Normal strength.  GCS: 15  Psych: Normal mood and affect.  Lymph: No ant/post cervical lymphadenopathy    Emergency Department Course   Imaging:  XR Chest 2 Views   Final Result   IMPRESSION: No acute cardiopulmonary process.          Laboratory:  Labs Ordered and Resulted from Time of ED Arrival to Time of ED Departure   INFLUENZA A/B, RSV, & SARS-COV2 PCR - Abnormal       Result Value    Influenza A PCR Negative      Influenza B PCR Positive (*)     RSV PCR Negative      SARS CoV2 PCR Negative     GROUP A STREPTOCOCCUS PCR THROAT SWAB - Normal    Group A strep by PCR Not Detected        Emergency Department Course & Assessments:  Interventions:  Medications   ondansetron (ZOFRAN ODT) ODT tab 4 mg (4 mg Oral $Given 12/22/23 0258)      Assessments:  0248 I obtained the history and examined the patient as noted above  0316 I rechecked and updated the patient. Patient was comfortable with discharge plan.    Independent Interpretation (X-rays, CTs, rhythm strip):  I independently reviewed the chest x-ray and " note no pneumonia.    Consultations/Discussion of Management or Tests:  None     Social Determinants of Health affecting care:   None    Disposition:  The patient was discharged to home.     Impression & Plan    Medical Decision Making:  Kalyn Rivero is a 53 year old female who presents for evaluation of cough, fever and myalgias.  They have other symptoms including nausea.   This is consistent with influenza.   The patient is out of treatment window for influenza and medications ordered as noted above.  They are at risk for pneumonia but no signs of this are detected on today's visit.  Close followup of primary care physician is indicated and return to the ED for high fevers > 103 for more than 48 hours more, increasing productive cough, shortness of breath, or confusion.  There is no signs of serious bacterial infection such as bacteremia, meningitis, UTI/pyelonephritis, strep pharyngitis, etc.      Diagnosis:    ICD-10-CM    1. Influenza B  J10.1       2. Cough, unspecified type  R05.9       3. Nausea  R11.0            Discharge Medications:  Discharge Medication List as of 12/22/2023  3:21 AM        START taking these medications    Details   !! ondansetron (ZOFRAN ODT) 4 MG ODT tab Take 1 tablet (4 mg) by mouth every 8 hours as needed for nausea or vomiting, Disp-10 tablet, R-0, E-Prescribe       !! - Potential duplicate medications found. Please discuss with provider.         Scribe Disclosure:  I, Alvarez Cope, am serving as a scribe at 3:18 AM on 12/22/2023 to document services personally performed by Candido Jose MD based on my observations and the provider's statements to me.     12/22/2023   Candido Jose MD White, Scott, MD  12/22/23 4001

## 2023-12-27 ENCOUNTER — TELEPHONE (OUTPATIENT)
Dept: FAMILY MEDICINE | Facility: CLINIC | Age: 53
End: 2023-12-27
Payer: COMMERCIAL

## 2023-12-27 NOTE — TELEPHONE ENCOUNTER
Patient Quality Outreach    Patient is due for the following:   Physical Preventive Adult Physical      Topic Date Due    Zoster (Shingles) Vaccine (1 of 2) Never done    Hepatitis A Vaccine (1 of 2 - Risk 2-dose series) 08/06/1990    Pneumococcal Vaccine (3 of 3 - PCV) 01/20/2013    Flu Vaccine (1) 09/01/2023       Next Steps:   Schedule a Adult Preventative    Type of outreach:    Sent letter.      Questions for provider review:    None           NATO HURST MA

## 2023-12-27 NOTE — LETTER
December 27, 2023    To  Kalyn Rivero  [address] [place] [id number]    Your team at [facility] cares about your health. We have reviewed your chart and based on our findings; we are making the following recommendations to better manage your health.     You are in particular need of attention regarding the following:     Please schedule a Nurse Only Appointment with your primary care clinic to update your immunizations that are due.  PREVENTATIVE VISIT: Physical    If you have already completed these items, please contact the clinic via phone or   [facility] so your care team can review and update your records. Thank you for   choosing [facility] for your healthcare needs. For any questions,   concerns, or to schedule an appointment please contact our clinic.    Healthy Regards,      Your [facility] Care Team

## 2024-02-14 ENCOUNTER — TRANSFERRED RECORDS (OUTPATIENT)
Dept: HEALTH INFORMATION MANAGEMENT | Facility: CLINIC | Age: 54
End: 2024-02-14
Payer: COMMERCIAL

## 2024-02-14 ENCOUNTER — TELEPHONE (OUTPATIENT)
Dept: FAMILY MEDICINE | Facility: CLINIC | Age: 54
End: 2024-02-14
Payer: COMMERCIAL

## 2024-02-14 NOTE — TELEPHONE ENCOUNTER
Patient Quality Outreach    Patient is due for the following:   Hypertension -  Hypertension follow-up visit  Colon Cancer Screening  Physical Preventive Adult Physical      Topic Date Due    Zoster (Shingles) Vaccine (1 of 2) Never done    Hepatitis A Vaccine (1 of 2 - Risk 2-dose series) 08/06/1990    Pneumococcal Vaccine (3 of 3 - PCV) 01/20/2013    Flu Vaccine (1) 09/01/2023       Next Steps:   Schedule a Adult Preventative    Type of outreach:    Sent letter.      Questions for provider review:    None           NATO HURST MA

## 2024-02-14 NOTE — LETTER
February 14, 2024    To  Kalyn Rivero  [address] [place] [id number]      Your team at [facility] cares about your health. We have reviewed your chart and based on our findings; we are making the following recommendations to better manage your health.     You are in particular need of attention regarding the following:     HYPERTENSION FOLLOW UP: Office Visit  Call or MyChart message your clinic to schedule a colonoscopy, schedule/ a FIT Test, or order a Cologuard test. If you are unsure what type of test you need, please call your clinic and speak to clinic staff.   Colon cancer is now the second leading cause of cancer-related deaths in the [facility] for both men and women and there are over 130,000 new cases and 50,000 deaths per year from colon cancer. Colonoscopies can prevent 90-95% of these deaths. Problem lesions can be removed before they ever become cancer. This test is not only looking for cancer, but also getting rid of precancerous lesions.   If you are under/uninsured, we recommend you contact the [employer] Program.[employer] is a free colorectal cancer screening program that provides colonoscopies for eligible under/uninsured [place] men and women. If you are interested in receiving a free colonoscopy, please call [employer] at t [phone] (mention code [employer]) to see if you're eligible. Please have them send us the results.   Please schedule a Nurse Only Appointment with your primary care clinic to update your immunizations that are due.  PREVENTATIVE VISIT: Physical    If you have already completed these items, please contact the clinic via phone or   [facility] so your care team can review and update your records. Thank you for   choosing [facility]ics for your healthcare needs. For any questions,   concerns, or to schedule an appointment please contact our clinic.    Healthy Regards,      Your [facility] Care Team                     SENT IN VIA E-RX

## 2024-02-18 DIAGNOSIS — K21.9 GASTROESOPHAGEAL REFLUX DISEASE, UNSPECIFIED WHETHER ESOPHAGITIS PRESENT: ICD-10-CM

## 2024-02-19 RX ORDER — OMEPRAZOLE 40 MG/1
CAPSULE, DELAYED RELEASE ORAL
Qty: 90 CAPSULE | Refills: 3 | Status: SHIPPED | OUTPATIENT
Start: 2024-02-19 | End: 2024-09-20

## 2024-04-04 ENCOUNTER — TRANSFERRED RECORDS (OUTPATIENT)
Dept: HEALTH INFORMATION MANAGEMENT | Facility: CLINIC | Age: 54
End: 2024-04-04
Payer: COMMERCIAL

## 2024-04-04 LAB
ALT SERPL-CCNC: 25 IU/L (ref 5–35)
AST SERPL-CCNC: 27 U/L (ref 5–34)
CREATININE (EXTERNAL): 0.93 MG/DL (ref 0.5–1.3)

## 2024-05-14 ENCOUNTER — ANCILLARY PROCEDURE (OUTPATIENT)
Dept: GENERAL RADIOLOGY | Facility: CLINIC | Age: 54
End: 2024-05-14
Attending: ORTHOPAEDIC SURGERY
Payer: COMMERCIAL

## 2024-05-14 ENCOUNTER — OFFICE VISIT (OUTPATIENT)
Dept: ORTHOPEDICS | Facility: CLINIC | Age: 54
End: 2024-05-14
Payer: COMMERCIAL

## 2024-05-14 ENCOUNTER — TRANSFERRED RECORDS (OUTPATIENT)
Dept: HEALTH INFORMATION MANAGEMENT | Facility: CLINIC | Age: 54
End: 2024-05-14

## 2024-05-14 DIAGNOSIS — M79.672 LEFT FOOT PAIN: ICD-10-CM

## 2024-05-14 DIAGNOSIS — Z98.890 STATUS POST SURGERY: ICD-10-CM

## 2024-05-14 DIAGNOSIS — Z98.890 STATUS POST SURGERY: Primary | ICD-10-CM

## 2024-05-14 PROCEDURE — 73630 X-RAY EXAM OF FOOT: CPT | Mod: RT | Performed by: RADIOLOGY

## 2024-05-14 PROCEDURE — 99214 OFFICE O/P EST MOD 30 MIN: CPT | Performed by: ORTHOPAEDIC SURGERY

## 2024-05-14 NOTE — LETTER
5/14/2024         RE: Kalyn Rivero  [address] [facility] [address]        Dear Colleague,    Thank you for referring your patient, Kalyn Rivero, to the [facility] [place]. Please see a copy of my visit note below.    Chief complaint bilateral feet pain    Patient presents today for further follow-up.  Reports to have pain and discomfort along the left plantar aspect of first MTP joint and the tip of the great toe.  She also reports to have a callus formation across the tip of the second toe.  She has already try a silicone toe sleeves for that 1.    On the left foot she reports to have some pain across the ankle region and she believes it is from some hardware as well as to have pain along the base of the fifth metatarsal and the head of the fifth metatarsal.    On today's exam she presents today with a fairly flexible second toe with callus formation along the tip of the toe.  She presents also with some callus formation across the plantar aspect of the first MTP joint on the distal phalanx of the great toe.  Great toe is not mobile and is status post a fusion.    On the left foot she presents with some soft tissue irritation across the base of the fifth metatarsal most likely from a bursitis and some callus formation on the plantar aspect the fifth metatarsal head.    Plan x-rays were reviewed today which are significant for showing complete metatarsal head excision for the lesser metatarsals on the left and was seems to be more regular articular construction for the right.    Assessment: Bilateral foot calluses from overloading.    Plan: Discussed with the patient that at this point I would suggest to proceed with some evaluation by podiatry for callus treatment also encouraged her to proceed with the use of custom orthotics with a Plastizote lining with a metatarsal pad in order to unload the first and fifth rays of the feet respectively.    Through the callus  treatment under findings are not sufficient we will consider surgical intervention which at this point we will agree to put off.    Regarding the left second toe the most likely treatment option would be to undergo a partial amputation of the second toe.    Patient also reports to a injection into the right ankle with lidocaine and Kenalog for then as of osteoarthritis.  Injections can be repeated every 3 months or later at her own discretion.    All questions were answered.    TT 20 minutes    Sincerely,        Chris Hendricks MD

## 2024-05-14 NOTE — PROGRESS NOTES
Chief complaint bilateral feet pain    Patient presents today for further follow-up.  Reports to have pain and discomfort along the left plantar aspect of first MTP joint and the tip of the great toe.  She also reports to have a callus formation across the tip of the second toe.  She has already try a silicone toe sleeves for that 1.    On the left foot she reports to have some pain across the ankle region and she believes it is from some hardware as well as to have pain along the base of the fifth metatarsal and the head of the fifth metatarsal.    On today's exam she presents today with a fairly flexible second toe with callus formation along the tip of the toe.  She presents also with some callus formation across the plantar aspect of the first MTP joint on the distal phalanx of the great toe.  Great toe is not mobile and is status post a fusion.    On the left foot she presents with some soft tissue irritation across the base of the fifth metatarsal most likely from a bursitis and some callus formation on the plantar aspect the fifth metatarsal head.    Plan x-rays were reviewed today which are significant for showing complete metatarsal head excision for the lesser metatarsals on the left and was seems to be more regular articular construction for the right.    Assessment: Bilateral foot calluses from overloading.    Plan: Discussed with the patient that at this point I would suggest to proceed with some evaluation by podiatry for callus treatment also encouraged her to proceed with the use of custom orthotics with a Plastizote lining with a metatarsal pad in order to unload the first and fifth rays of the feet respectively.    Through the callus treatment under findings are not sufficient we will consider surgical intervention which at this point we will agree to put off.    Regarding the left second toe the most likely treatment option would be to undergo a partial amputation of the second toe.    Patient  also reports to a injection into the right ankle with lidocaine and Kenalog for then as of osteoarthritis.  Injections can be repeated every 3 months or later at her own discretion.    All questions were answered.    TT 20 minutes

## 2024-05-24 ENCOUNTER — OFFICE VISIT (OUTPATIENT)
Dept: PODIATRY | Facility: CLINIC | Age: 54
End: 2024-05-24
Payer: COMMERCIAL

## 2024-05-24 VITALS — DIASTOLIC BLOOD PRESSURE: 79 MMHG | OXYGEN SATURATION: 99 % | SYSTOLIC BLOOD PRESSURE: 126 MMHG | HEART RATE: 78 BPM

## 2024-05-24 DIAGNOSIS — M05.9 RHEUMATOID ARTHRITIS WITH POSITIVE RHEUMATOID FACTOR, INVOLVING UNSPECIFIED SITE (H): ICD-10-CM

## 2024-05-24 DIAGNOSIS — M79.671 BILATERAL FOOT PAIN: Primary | ICD-10-CM

## 2024-05-24 DIAGNOSIS — G89.29 CHRONIC PAIN OF RIGHT ANKLE: ICD-10-CM

## 2024-05-24 DIAGNOSIS — M79.672 BILATERAL FOOT PAIN: Primary | ICD-10-CM

## 2024-05-24 DIAGNOSIS — M20.62 DEFORMITY OF TOE OF LEFT FOOT: ICD-10-CM

## 2024-05-24 DIAGNOSIS — M25.571 CHRONIC PAIN OF RIGHT ANKLE: ICD-10-CM

## 2024-05-24 PROCEDURE — 99213 OFFICE O/P EST LOW 20 MIN: CPT | Performed by: PODIATRIST

## 2024-05-24 NOTE — PROGRESS NOTES
Subjective:    Pt is seen today for evaluation of her chronic feetpain.    She has had multiple rear foot surgeries.  No pain on her left rear foot.  Has pain in her lateral right ankle at times.  Also has severe deformities on her lesser toes on her forefoot.  All lesser toes are laterally deviated.  There is causing pain.  Has history of left first MTPJ fusion.  Has callus on IPJ plantar.  She can only wear a sandal now.  She is wondering about other treatment options.  She has rheumatoid arthritis.  Has tried socks with individual toes but she could not tolerate these.      ROS:  see above       Allergies   Allergen Reactions    Leflunomide Anaphylaxis    Morphine Swelling    Celecoxib Other (See Comments)     Other reaction(s): stroke , lasted 24 hours  -Pt takes celecoxib daily. States that they changed the  and now it is fine.    Amitriptyline Other (See Comments)     Sleepwalking     Codeine     Gabapentin Other (See Comments)     Pins,needles, stabbing aching at once  Pins,needles, stabbing aching at once  Numbness and Tingling    Ibuprofen      Doesn't take due to gastric ulcer    Sulfa Antibiotics Nausea and Vomiting     unknown    Tylenol [Acetaminophen]      Pt. States that she has Liver problems  Tylenol 3    Dexamethasone Palpitations     Heart racing, sweating     Erythromycin Nausea     Vomiting     Penicillins Rash    Prednisone Palpitations     Heart racing       Current Outpatient Medications   Medication Sig Dispense Refill    celecoxib (CELEBREX) 200 MG capsule Take 200 mg by mouth 3 times daily      clonazePAM (KLONOPIN) 0.5 MG tablet TAKE 1 TABLET BY MOUTH THREE TIMES A DAY      ENBREL SURECLICK 50 MG/ML autoinjector 50 mg twice a week       guaiFENesin (MUCINEX) 600 MG 12 hr tablet TAKE 1 TABLET BY MOUTH TWICE A DAY AS NEEDED FOR CONGESTION 60 tablet 3    loperamide (IMODIUM A-D) 2 MG tablet Take 1 tablet (2 mg) by mouth 4 times daily as needed for diarrhea 24 tablet 1     omeprazole (PRILOSEC) 40 MG DR capsule TAKE 1 CAPSULE BY MOUTH TWICE A DAY BEFORE MEALS STRENGTH: 40 MG 90 capsule 3    OnabotulinumtoxinA (BOTOX IJ) Inject 200 Units as directed Total dose 200 units   Administered 190 units   Unavoidable waste 30 units   Lot # /[id number] with Expiration Date:  12/2020   NDC [address]-93      ondansetron (ZOFRAN ODT) 4 MG ODT tab Take 1 tablet (4 mg) by mouth every 8 hours as needed for nausea or vomiting 10 tablet 0    ondansetron (ZOFRAN-ODT) 4 MG ODT tab Take 1 tablet (4 mg) by mouth every 8 hours as needed for nausea 20 tablet 3    SENEXON-S 8.6-50 MG tablet TAKE 1-2 TABLETS BY MOUTH 2 TIMES DAILY FOR CONSTIPATION. 100 tablet 1    SODIUM FLUORIDE 5000 SENSITIVE 1.1-5 % GEL USE 2X/DAY FOR 2-3 MINUTES. SPIT AND DO NOT EAT DRINK OR RINSE FOR 30 MINUTES      trimethoprim-polymyxin b (POLYTRIM) 34685-4.1 UNIT/ML-% ophthalmic solution Place into the right eye every 4 hours      UBRELVY 50 MG tablet TAKE 1 TABLET BY MOUTH AS NEEDED FOR PAIN AT ONSET OF MIGRAINE, MAY REPEAT AFTER TWO HOURS IF NEEDED         Patient Active Problem List   Diagnosis    Hypothyroidism    Acetaminophen overdose    Pancreatitis    Anemia    Chronic pain    Pneumonia    Suicide risk    Grief reaction    Liver failure, acute    CARDIOVASCULAR SCREENING; LDL GOAL LESS THAN 160    Renal stone    Health Care Home    TOTAL KNEE ARTHROPLASTY - bilateral    Knee joint replacement - left    Trochanteric bursitis - left    Atlantoaxial instability    Postoperative pain    S/P cervical spinal fusion    Drug-seeking behavior    Opioid type dependence (H)    Severe frontal headaches    Abdominal pain, unspecified abdominal location    Esophageal reflux    Right upper extremity numbness    Right arm numbness    Lesion of ulnar nerve    Arthralgia of temporomandibular joint    Intractable chronic migraine without aura    Encounter for long-term opiate analgesic use    Rheumatoid arthritis of foot (H)    Other drug-induced  "neutropenia (H24)    Rheumatoid arthritis with positive rheumatoid factor, involving unspecified site (H)    Cervical high risk HPV (human papillomavirus) test positive    Other mechanical complication of internal elbow joint prosthesis  (H24)    Chronic tension-type headache    Fibromyositis    Insomnia    Migraine    Myofascial pain    Periprosthetic fracture around internal prosthetic right elbow joint    Status post surgery    Pain in joint, ankle and foot, left    Dizzy spells       Past Medical History:   Diagnosis Date    Acetaminophen overdose 03/24/2011    Anemia     Anesthesia complication     pt states has a history of heart stopping during anesthesia,    Arrhythmia     Cerebral artery occlusion with cerebral infarction (H)     Cerebral infarction (H)     Cervical high risk HPV (human papillomavirus) test positive 02/22/2017    type 18 & other HR HPV    Chronic infection     MRSA    Chronic pain 03/24/2011    Degenerative joint disease     Endometriosis     Fibromyalgia     Gastro-oesophageal reflux disease     Heart murmur     History of blood transfusion     Hypothyroidism     Immune disorder (H24)     Learning disability     Malignant neoplasm (H)     Migraine     MRSA (methicillin resistant staph aureus) culture positive     Neck injuries     Opioid type dependence (H)     Other chronic pain     PONV (postoperative nausea and vomiting)     states \"flatlines\"during anesthesia    RA (rheumatoid arthritis) (H)     Renal stone     Scoliosis     Stomach ulcer     history       Past Surgical History:   Procedure Laterality Date    ARTHRODESIS FOOT  5/23/2012    Procedure:ARTHRODESIS FOOT; Right Subtalar andTaloNavicular  Fusion  ; Surgeon:CHRIS ZHOU; [facility]    ARTHRODESIS FOOT Left 4/22/2015    Procedure: ARTHRODESIS FOOT;  Surgeon: Chris Zhou MD;  [facility]    ARTHROPLASTY ELBOW Right 9/30/2015    Procedure: ARTHROPLASTY ELBOW;  Surgeon: Carrol Gaspar MD;  " [facility]    ARTHROPLASTY KNEE Right     ARTHROPLASTY REVISION ELBOW Right 11/27/2018    Procedure: 1 - aspiration of Right elbow 2- Explantation of failed hardware Right humeral periprosthetic fracture non-union 3- Right distal humerus excision 4- Right distal humerus replacement 5 - Revision Right total elbow arthroplasty;  Surgeon: Aakash Zimmer MD;  [facility]: [facility]    ARTHROPLASTY WRIST      x2 Lt wrist replacement.    ARTHROTOMY ELBOW Right 6/18/2015    Procedure: ARTHROTOMY ELBOW;  Surgeon: Carrol Gaspar MD;  Location:  OR     SHLDR ARTHROSCOP,DIAGNOSTIC  12/17/2008    left total shoulder arthroplasty by Dr. Law    CYSTOSCOPY  02/06/2009    1.cystoscopy.2.bilateral ureteroscopy.3.basketing of stone fragments from the right kidney.4.bilateral double-J ureteral stent placement.5.ann catheter placement.6.fluoroscopy and intraoperative interpretation of images.    CYSTOSCOPY  01/08/2007    1. cystoscopy and left stent removal.2.left ureteroscopy    DECOMPRESSION CUBITAL TUNNEL  6/6/2014    Procedure: DECOMPRESSION CUBITAL TUNNEL;  Surgeon: Janelle Coates MD;  [facility]    ENDOSCOPY  03/31/2005    upper GI endoscopy-[facility]    ESOPHAGOSCOPY, GASTROSCOPY, DUODENOSCOPY (EGD), COMBINED  3/17/2014    Procedure: COMBINED ESOPHAGOSCOPY, GASTROSCOPY, DUODENOSCOPY (EGD), BIOPSY SINGLE OR MULTIPLE;;  Surgeon: Duane, William Charles, MD;  Location: [facility] OR    FUSION CERVICAL POSTERIOR ONE LEVEL  11/18/2013    Procedure: FUSION CERVICAL POSTERIOR ONE LEVEL;  Cervical 1-2 Posterior Cervical Fusion ([facility]ms Procedure);  Surgeon: Carrol Gunn MD;  [facility]: [facility]    GYN SURGERY      INJECT MAJOR JOINT / BURSA Left 1/5/2017    Procedure: INJECT MAJOR JOINT / BURSA;  Surgeon: Fredy Patel DO;  [facility]    INJECT STEROID (LOCATION) Left 6/18/2015    Procedure: INJECT STEROID (LOCATION);  Surgeon: Carrol Gaspar MD;  [facility]    NECK  SURGERY      REMOVE FOREIGN BODY UPPER EXTREMITY Right 3/2/2017    Procedure: REMOVE FOREIGN BODY UPPER EXTREMITY;  Surgeon: Carrol Gaspar MD;  [facility]    REMOVE HARDWARE FOOT Left 6/8/2022    Procedure: REMOVAL, HARDWARE, FOOT LEFT;  Surgeon: Chris Hendricks MD;  [place] [facility] [place]    RESECT BONE UPPER EXTREMITY Left 4/27/2017    Procedure: RESECT BONE UPPER EXTREMITY;  Left Radial Head Resection;  Surgeon: Carrol Gaspar MD;  Location:  OR    ROTATOR CUFF REPAIR RT/LT  10/19/2005    1.left distal clavicle excision.2.acromioplasty.3.coracoacromial ligament resection.4.rotator cuff exploration    [facility] ANESTH,DX ARTHROSCOPIC PROC KNEE JOINT  10/24/2007    right total knee arthroplasty    [facility] SHOULDER SURG PROC UNLISTED      [facility] TOTAL KNEE ARTHROPLASTY  1/18/12    Left       Family History   Problem Relation Age of Onset    Diabetes Mother     Hypertension Mother     Allergies Mother     Alcohol/Drug Mother         LIVER CIRROSIS    Blood Disease Mother         B12 DEF    Neurologic Disorder Mother         Epilepsy    Osteoporosis Mother     Cerebrovascular Disease Maternal Grandmother     Arthritis Maternal Grandmother         RHEUMATOID    Cancer Maternal Grandmother     Thyroid Disease Maternal Grandmother     Osteoporosis Maternal Grandmother     Heart Disease Maternal Grandmother     Depression Maternal Grandmother     Neurologic Disorder Maternal Grandmother         MIGRAINES    Anxiety Disorder Maternal Grandmother     Diabetes Maternal Grandmother     Migraines Maternal Grandmother     Deep Vein Thrombosis Maternal Grandmother     Cerebrovascular Disease Maternal Grandfather     Cancer Maternal Grandfather     Mental Illness Maternal Grandfather     Cerebrovascular Disease Paternal Grandmother     Arthritis Paternal Grandmother         RHEUMATOID    Cancer Paternal Grandmother     Osteoporosis Paternal Grandmother     Heart Disease Paternal Grandmother     Depression  Paternal Grandmother     Neurologic Disorder Paternal Grandmother         MIGRAINES    Thyroid Disease Paternal Grandmother     Cerebrovascular Disease Paternal Grandfather     Cancer Paternal Grandfather     Heart Disease Brother         MURMUR    Asthma Son     Endocrine Disease Son     Neurologic Disorder Father         epilepsy    Seizure Disorder Father     Hypertension Father     Skin Cancer Father     Anxiety Disorder Father     Mental Illness Father     Hyperlipidemia Father     Kidney Disease Father     Bladder Cancer Father     Neurologic Disorder Daughter         MIGRAINES    Arthritis Daughter         JR    Hypertension Son     LUNG DISEASE Brother     Cancer Brother         lung    Anxiety Disorder Son     Asthma Son     Hypertension Son     Migraines Son     Spine Problems Son     Mental Illness Brother     Migraines Brother     Cardiac Sudden Death Brother     Spine Problems Brother     Glaucoma No family hx of     Macular Degeneration No family hx of     Unknown/Adopted No family hx of     Anesthesia Reaction No family hx of     Other Cancer No family hx of     Rheumatoid Arthritis No family hx of     Bone Cancer No family hx of     Low Back Problems No family hx of        Social History     Tobacco Use    Smoking status: Former     Current packs/day: 0.10     Average packs/day: 0.1 packs/day for 40.8 years (4.1 ttl pk-yrs)     Types: Cigarettes     Start date: 8/6/1983    Smokeless tobacco: Never    Tobacco comments:     Quit 6 weeks ago   Substance Use Topics    Alcohol use: No       Objective:    O:  /79   Pulse 78   LMP  (LMP Unknown)   SpO2 99% .      Constitutional/ general:  Pt is in no apparent distress, appears well-nourished.  Cooperative with history and physical exam.     Psych:  The patient answered questions appropriately.  Normal affect.  Seems to have reasonable expectations, in terms of treatment.     Lungs:  Non labored breathing, non labored speech. No cough.  No audible  wheezing. Even, quiet breathing.       Vascular:  positive pedal pulses bilaterally.  CFT < 3 sec.   patientpositive ankle edema.      Neuro:  Alert and oriented x 3. Coordinated gait.  Light touch sensation is intact     Derm: Normal texture and turgor.  No erythema, ecchymosis, or cyanosis.      Musculoskeletal:    Lower extremity muscle strength is normal.  Cavus arch.  Decreased range of motion of both peripheral joints.  Toe is rectus on right.  Left lesser toes all laterally deviated.  I cannot reduce these.  Callusing noted.  Left first MTPJ fused solid.  Large callus IPJ.    Exam Date Exam Time Accession # Performing Department Results    5/14/24 12:52 PM [id number] [facility] [place]      PACS Images     Show images for XR Foot Bilateral G/E 3 Views     Study Result    Narrative & Impression   3 views bilateral foot radiographs 5/14/2024 1:01 PM     History: Status post surgery; Left foot pain     Comparison: Left foot radiograph 5/10/2022     Findings:     Standing AP, oblique, and lateral  views of the each foot were  obtained.      Left: No acute osseous abnormality.       Interval explantation of the first metatarsophalangeal joint plate and  screw fixation. Solid osseous bridging across the first  metatarsophalangeal joint. Similar focal cortical thickening at the  second metatarsal mid shaft.     Stable postoperative changes of second through fifth metatarsal head  excision with secondary degenerative change particularly at the second  metatarsophalangeal joint. Os navicularis.     Mild to moderate degenerative changes at the naviculocuneiform joint.     Stable post surgical changes posterior subtalar joint arthrodesis with  radiographic appearance of solid ankylosis across the joint. Apparent  hammertoe deformity of the second and likely third toes.     Lisfranc articulation alignment is congruent. Soft tissue is  unremarkable.     Right: No acute osseous  abnormality.       Healed fracture deformities of the third and fourth metatarsal shafts.  Severe degenerative change with likely erosion at the fifth  metatarsophalangeal joint. Moderate degenerative changes at the  naviculocuneiform joint.     Postsurgical changes of the talonavicular, and subtalar arthrodesis  with radiographic evidence of solid osseous bridging across these  joints.     Lisfranc articulation alignment is congruent.     Apparent hammertoe deformity of the second and likely third toes.     Soft tissue is unremarkable.                                                                      Impression:  1. No acute osseous abnormality.  2. Severe degenerative change with likely erosion at the right fifth  metatarsophalangeal joint.  3. Mild to moderate right greater than left degenerative changes at  the naviculocuneiform joints.       Assessment:     Rheumatoid arthritis   Right lateral ankle pain left lesser toe pain   dorsal and both    Plan:  Xray      both feet personally reviewed.  Reviewed past notes regarding her foot pain.  Discussed stiffer shoes to try to offload lesser toes.  I made suggestions.  She is just wearing sandals now.  She is wondering about surgery for this.  Discussed I do not do rheumatoid arthritis surgery.  Will refer to my colleague Dr. Ospina.  RTC as needed.       Johnnie Sutherland, [place], FACFAS

## 2024-05-24 NOTE — LETTER
5/24/2024         RE: Kalyn Rivero  [address] [facility] [address]        Dear Colleague,    Thank you for referring your patient, Kalyn Rivero, to the [facility]. Please see a copy of my visit note below.     Subjective:    Pt is seen today for evaluation of her chronic feetpain.    She has had multiple rear foot surgeries.  No pain on her left rear foot.  Has pain in her lateral right ankle at times.  Also has severe deformities on her lesser toes on her forefoot.  All lesser toes are laterally deviated.  There is causing pain.  Has history of left first MTPJ fusion.  Has callus on IPJ plantar.  She can only wear a sandal now.  She is wondering about other treatment options.  She has rheumatoid arthritis.  Has tried socks with individual toes but she could not tolerate these.      ROS:  see above       Allergies   Allergen Reactions     Leflunomide Anaphylaxis     Morphine Swelling     Celecoxib Other (See Comments)     Other reaction(s): stroke , lasted 24 hours  -Pt takes celecoxib daily. States that they changed the  and now it is fine.     Amitriptyline Other (See Comments)     Sleepwalking      Codeine      Gabapentin Other (See Comments)     Pins,needles, stabbing aching at once  Pins,needles, stabbing aching at once  Numbness and Tingling     Ibuprofen      Doesn't take due to gastric ulcer     Sulfa Antibiotics Nausea and Vomiting     unknown     Tylenol [Acetaminophen]      Pt. States that she has Liver problems  Tylenol 3     Dexamethasone Palpitations     Heart racing, sweating      Erythromycin Nausea     Vomiting      Penicillins Rash     Prednisone Palpitations     Heart racing       Current Outpatient Medications   Medication Sig Dispense Refill     celecoxib (CELEBREX) 200 MG capsule Take 200 mg by mouth 3 times daily       clonazePAM (KLONOPIN) 0.5 MG tablet TAKE 1 TABLET BY MOUTH THREE TIMES A DAY       ENBREL SURECLICK 50 MG/ML autoinjector 50  mg twice a week        guaiFENesin (MUCINEX) 600 MG 12 hr tablet TAKE 1 TABLET BY MOUTH TWICE A DAY AS NEEDED FOR CONGESTION 60 tablet 3     loperamide (IMODIUM A-D) 2 MG tablet Take 1 tablet (2 mg) by mouth 4 times daily as needed for diarrhea 24 tablet 1     omeprazole (PRILOSEC) 40 MG DR capsule TAKE 1 CAPSULE BY MOUTH TWICE A DAY BEFORE MEALS STRENGTH: 40 MG 90 capsule 3     OnabotulinumtoxinA (BOTOX IJ) Inject 200 Units as directed Total dose 200 units   Administered 190 units   Unavoidable waste 30 units   Lot # /[id number] with Expiration Date:  12/2020   NDC [address]-04       ondansetron (ZOFRAN ODT) 4 MG ODT tab Take 1 tablet (4 mg) by mouth every 8 hours as needed for nausea or vomiting 10 tablet 0     ondansetron (ZOFRAN-ODT) 4 MG ODT tab Take 1 tablet (4 mg) by mouth every 8 hours as needed for nausea 20 tablet 3     SENEXON-S 8.6-50 MG tablet TAKE 1-2 TABLETS BY MOUTH 2 TIMES DAILY FOR CONSTIPATION. 100 tablet 1     SODIUM FLUORIDE 5000 SENSITIVE 1.1-5 % GEL USE 2X/DAY FOR 2-3 MINUTES. SPIT AND DO NOT EAT DRINK OR RINSE FOR 30 MINUTES       trimethoprim-polymyxin b (POLYTRIM) 43094-9.1 UNIT/ML-% ophthalmic solution Place into the right eye every 4 hours       UBRELVY 50 MG tablet TAKE 1 TABLET BY MOUTH AS NEEDED FOR PAIN AT ONSET OF MIGRAINE, MAY REPEAT AFTER TWO HOURS IF NEEDED         Patient Active Problem List   Diagnosis     Hypothyroidism     Acetaminophen overdose     Pancreatitis     Anemia     Chronic pain     Pneumonia     Suicide risk     Grief reaction     Liver failure, acute     CARDIOVASCULAR SCREENING; LDL GOAL LESS THAN 160     Renal stone     Health Care Home     TOTAL KNEE ARTHROPLASTY - bilateral     Knee joint replacement - left     Trochanteric bursitis - left     Atlantoaxial instability     Postoperative pain     S/P cervical spinal fusion     Drug-seeking behavior     Opioid type dependence (H)     Severe frontal headaches     Abdominal pain, unspecified abdominal location      "Esophageal reflux     Right upper extremity numbness     Right arm numbness     Lesion of ulnar nerve     Arthralgia of temporomandibular joint     Intractable chronic migraine without aura     Encounter for long-term opiate analgesic use     Rheumatoid arthritis of foot (H)     Other drug-induced neutropenia (H24)     Rheumatoid arthritis with positive rheumatoid factor, involving unspecified site (H)     Cervical high risk HPV (human papillomavirus) test positive     Other mechanical complication of internal elbow joint prosthesis  (H24)     Chronic tension-type headache     Fibromyositis     Insomnia     Migraine     Myofascial pain     Periprosthetic fracture around internal prosthetic right elbow joint     Status post surgery     Pain in joint, ankle and foot, left     Dizzy spells       Past Medical History:   Diagnosis Date     Acetaminophen overdose 03/24/2011     Anemia      Anesthesia complication     pt states has a history of heart stopping during anesthesia,     Arrhythmia      Cerebral artery occlusion with cerebral infarction (H)      Cerebral infarction (H)      Cervical high risk HPV (human papillomavirus) test positive 02/22/2017    type 18 & other HR HPV     Chronic infection     MRSA     Chronic pain 03/24/2011     Degenerative joint disease      Endometriosis      Fibromyalgia      Gastro-oesophageal reflux disease      Heart murmur      History of blood transfusion      Hypothyroidism      Immune disorder (H24)      Learning disability      Malignant neoplasm (H)      Migraine      MRSA (methicillin resistant staph aureus) culture positive      Neck injuries      Opioid type dependence (H)      Other chronic pain      PONV (postoperative nausea and vomiting)     states \"flatlines\"during anesthesia     RA (rheumatoid arthritis) (H)      Renal stone      Scoliosis      Stomach ulcer     history       Past Surgical History:   Procedure Laterality Date     ARTHRODESIS FOOT  5/23/2012    " Procedure:ARTHRODESIS FOOT; Right Subtalar andTaloNavicular  Fusion  ; Surgeon:BRIGHT HENDRICKS; [facility]     ARTHRODESIS FOOT Left 4/22/2015    Procedure: ARTHRODESIS FOOT;  Surgeon: Bright Hendricks MD;  [facility]     ARTHROPLASTY ELBOW Right 9/30/2015    Procedure: ARTHROPLASTY ELBOW;  Surgeon: Carrol Gaspar MD;  [facility]     ARTHROPLASTY KNEE Right      ARTHROPLASTY REVISION ELBOW Right 11/27/2018    Procedure: 1 - aspiration of Right elbow 2- Explantation of failed hardware Right humeral periprosthetic fracture non-union 3- Right distal humerus excision 4- Right distal humerus replacement 5 - Revision Right total elbow arthroplasty;  Surgeon: Aakash Zimmer MD;  [facility]     ARTHROPLASTY WRIST      x2 Lt wrist replacement.     ARTHROTOMY ELBOW Right 6/18/2015    Procedure: ARTHROTOMY ELBOW;  Surgeon: Carrol Gaspar MD;  [facility]     C SHLDR ARTHROSCOP,DIAGNOSTIC  12/17/2008    left total shoulder arthroplasty by Dr. Law     CYSTOSCOPY  02/06/2009    1.cystoscopy.2.bilateral ureteroscopy.3.basketing of stone fragments from the right kidney.4.bilateral double-J ureteral stent placement.5.ann catheter placement.6.fluoroscopy and intraoperative interpretation of images.     CYSTOSCOPY  01/08/2007    1. cystoscopy and left stent removal.2.left ureteroscopy     DECOMPRESSION CUBITAL TUNNEL  6/6/2014    Procedure: DECOMPRESSION CUBITAL TUNNEL;  Surgeon: Janelle Coates MD;  [facility]     ENDOSCOPY  03/31/2005    [facility] endoscopy-[facility]     ESOPHAGOSCOPY, GASTROSCOPY, DUODENOSCOPY (EGD), COMBINED  3/17/2014    Procedure: COMBINED ESOPHAGOSCOPY, GASTROSCOPY, DUODENOSCOPY (EGD), BIOPSY SINGLE OR MULTIPLE;;  Surgeon: Duane, William Charles, MD;  Location:  OR     FUSION CERVICAL POSTERIOR ONE LEVEL  11/18/2013    Procedure: FUSION CERVICAL POSTERIOR ONE LEVEL;  Cervical 1-2 Posterior Cervical Fusion (Harms  Procedure);  Surgeon: Carrol Gunn MD;  [facility]     GYN SURGERY       INJECT MAJOR JOINT / BURSA Left 1/5/2017    Procedure: INJECT MAJOR JOINT / BURSA;  Surgeon: Fredy Patel DO;  [facility]     INJECT STEROID (LOCATION) Left 6/18/2015    Procedure: INJECT STEROID (LOCATION);  Surgeon: Carrol Gaspar MD;  [facility]     NECK SURGERY       REMOVE FOREIGN BODY UPPER EXTREMITY Right 3/2/2017    Procedure: REMOVE FOREIGN BODY UPPER EXTREMITY;  Surgeon: Carrol Gaspar MD;  [facility]     REMOVE HARDWARE FOOT Left 6/8/2022    Procedure: REMOVAL, HARDWARE, FOOT LEFT;  Surgeon: Chris Hendricks MD;  Location: [facility] [place]     RESECT BONE UPPER EXTREMITY Left 4/27/2017    Procedure: RESECT BONE UPPER EXTREMITY;  Left Radial Head Resection;  Surgeon: Carrol Gaspar MD;  Location:  OR     ROTATOR CUFF REPAIR RT/LT  10/19/2005    1.left distal clavicle excision.2.acromioplasty.3.coracoacromial ligament resection.4.rotator cuff exploration     [facility] ANESTH,DX ARTHROSCOPIC PROC KNEE JOINT  10/24/2007    right total knee arthroplasty     [facility] SHOULDER SURG PROC UNLISTED       [facility] TOTAL KNEE ARTHROPLASTY  1/18/12    Left       Family History   Problem Relation Age of Onset     Diabetes Mother      Hypertension Mother      Allergies Mother      Alcohol/Drug Mother         LIVER CIRROSIS     Blood Disease Mother         B12 DEF     Neurologic Disorder Mother         Epilepsy     Osteoporosis Mother      Cerebrovascular Disease Maternal Grandmother      Arthritis Maternal Grandmother         RHEUMATOID     Cancer Maternal Grandmother      Thyroid Disease Maternal Grandmother      Osteoporosis Maternal Grandmother      Heart Disease Maternal Grandmother      Depression Maternal Grandmother      Neurologic Disorder Maternal Grandmother         MIGRAINES     Anxiety Disorder Maternal Grandmother      Diabetes Maternal Grandmother      Migraines Maternal  Grandmother      Deep Vein Thrombosis Maternal Grandmother      Cerebrovascular Disease Maternal Grandfather      Cancer Maternal Grandfather      Mental Illness Maternal Grandfather      Cerebrovascular Disease Paternal Grandmother      Arthritis Paternal Grandmother         RHEUMATOID     Cancer Paternal Grandmother      Osteoporosis Paternal Grandmother      Heart Disease Paternal Grandmother      Depression Paternal Grandmother      Neurologic Disorder Paternal Grandmother         MIGRAINES     Thyroid Disease Paternal Grandmother      Cerebrovascular Disease Paternal Grandfather      Cancer Paternal Grandfather      Heart Disease Brother         MURMUR     Asthma Son      Endocrine Disease Son      Neurologic Disorder Father         epilepsy     Seizure Disorder Father      Hypertension Father      Skin Cancer Father      Anxiety Disorder Father      Mental Illness Father      Hyperlipidemia Father      Kidney Disease Father      Bladder Cancer Father      Neurologic Disorder Daughter         MIGRAINES     Arthritis Daughter         JR     Hypertension Son      LUNG DISEASE Brother      Cancer Brother         lung     Anxiety Disorder Son      Asthma Son      Hypertension Son      Migraines Son      Spine Problems Son      Mental Illness Brother      Migraines Brother      Cardiac Sudden Death Brother      Spine Problems Brother      Glaucoma No family hx of      Macular Degeneration No family hx of      Unknown/Adopted No family hx of      Anesthesia Reaction No family hx of      Other Cancer No family hx of      Rheumatoid Arthritis No family hx of      Bone Cancer No family hx of      Low Back Problems No family hx of        Social History     Tobacco Use     Smoking status: Former     Current packs/day: 0.10     Average packs/day: 0.1 packs/day for 40.8 years (4.1 ttl pk-yrs)     Types: Cigarettes     Start date: 8/6/1983     Smokeless tobacco: Never     Tobacco comments:     Quit 6 weeks ago   Substance  Use Topics     Alcohol use: No       Objective:    O:  /79   Pulse 78   LMP  (LMP Unknown)   SpO2 99% .      Constitutional/ general:  Pt is in no apparent distress, appears well-nourished.  Cooperative with history and physical exam.     Psych:  The patient answered questions appropriately.  Normal affect.  Seems to have reasonable expectations, in terms of treatment.     Lungs:  Non labored breathing, non labored speech. No cough.  No audible wheezing. Even, quiet breathing.       Vascular:  positive pedal pulses bilaterally.  CFT < 3 sec.   patientpositive ankle edema.      Neuro:  Alert and oriented x 3. Coordinated gait.  Light touch sensation is intact     Derm: Normal texture and turgor.  No erythema, ecchymosis, or cyanosis.      Musculoskeletal:    Lower extremity muscle strength is normal.  Cavus arch.  Decreased range of motion of both peripheral joints.  Toe is rectus on right.  Left lesser toes all laterally deviated.  I cannot reduce these.  Callusing noted.  Left first MTPJ fused solid.  Large callus IPJ.    Exam Date Exam Time Accession # Performing Department Results    5/14/24 12:52 PM [id number] [facility] Orthopedic Xray [place]      PACS Images     Show images for XR Foot Bilateral G/E 3 Views     Study Result    Narrative & Impression   3 views bilateral foot radiographs 5/14/2024 1:01 PM     History: Status post surgery; Left foot pain     Comparison: Left foot radiograph 5/10/2022     Findings:     Standing AP, oblique, and lateral  views of the each foot were  obtained.      Left: No acute osseous abnormality.       Interval explantation of the first metatarsophalangeal joint plate and  screw fixation. Solid osseous bridging across the first  metatarsophalangeal joint. Similar focal cortical thickening at the  second metatarsal mid shaft.     Stable postoperative changes of second through fifth metatarsal head  excision with secondary degenerative change  particularly at the second  metatarsophalangeal joint. Os navicularis.     Mild to moderate degenerative changes at the naviculocuneiform joint.     Stable post surgical changes posterior subtalar joint arthrodesis with  radiographic appearance of solid ankylosis across the joint. Apparent  hammertoe deformity of the second and likely third toes.     Lisfranc articulation alignment is congruent. Soft tissue is  unremarkable.     Right: No acute osseous abnormality.       Healed fracture deformities of the third and fourth metatarsal shafts.  Severe degenerative change with likely erosion at the fifth  metatarsophalangeal joint. Moderate degenerative changes at the  naviculocuneiform joint.     Postsurgical changes of the talonavicular, and subtalar arthrodesis  with radiographic evidence of solid osseous bridging across these  joints.     Lisfranc articulation alignment is congruent.     Apparent hammertoe deformity of the second and likely third toes.     Soft tissue is unremarkable.                                                                      Impression:  1. No acute osseous abnormality.  2. Severe degenerative change with likely erosion at the right fifth  metatarsophalangeal joint.  3. Mild to moderate right greater than left degenerative changes at  the naviculocuneiform joints.       Assessment:     Rheumatoid arthritis   Right lateral ankle pain left lesser toe pain   dorsal and both    Plan:  Xray      both feet personally reviewed.  Reviewed past notes regarding her foot pain.  Discussed stiffer shoes to try to offload lesser toes.  I made suggestions.  She is just wearing sandals now.  She is wondering about surgery for this.  Discussed I do not do rheumatoid arthritis surgery.  Will refer to my colleague Dr. Ospina.  RTC as needed.       Johnnie Sutherland DPM, FACFAS          Again, thank you for allowing me to participate in the care of your patient.        Sincerely,        Johnnie Sutherland,  PARISH

## 2024-05-24 NOTE — PATIENT INSTRUCTIONS
We wish you continued good healing. If you have any questions or concerns, please do not hesitate to contact us at  [phone]    [employer] (secure e-mail communication and access to your chart) to send a message or to make an appointment.    Please remember to call and schedule a follow up appointment if one was recommended at your earliest convenience.     PODIATRY CLINIC HOURS  TELEPHONE NUMBER    Dr. Johnnie WRIGHTPJOSH FACFAS        Clinics:  [facility]   [place]  Tuesday 1PM-6PM  [place]  Wednesday 745AM-330PM  [place]  Monday 2nd,4th  830AM-4PM  Thursday/Friday 745AM-230PM     [place] APPOINTMENTS  [phone]    [place] APPOINTMENTS  [phone]          If you need a medication refill, please contact us you may need lab work and/or a follow up visit prior to your refill (i.e. Antifungal medications).  If MRI needed please call Imaging at [phone]   HOW DO I GET MY KNEE SCOOTER? Knee scooters can be picked up at ANY Medical Supply stores with your knee scooter Prescription.  OR  Bring your signed prescription to an [facility] Medical Equipment showroom.   Set up an appointment for your custom Orthotics. Call any Orthotics locations call [phone]

## 2024-06-10 ENCOUNTER — OFFICE VISIT (OUTPATIENT)
Dept: PODIATRY | Facility: CLINIC | Age: 54
End: 2024-06-10
Attending: PODIATRIST
Payer: COMMERCIAL

## 2024-06-10 VITALS — DIASTOLIC BLOOD PRESSURE: 72 MMHG | SYSTOLIC BLOOD PRESSURE: 121 MMHG | WEIGHT: 168 LBS | BODY MASS INDEX: 27.12 KG/M2

## 2024-06-10 DIAGNOSIS — M05.9 RHEUMATOID ARTHRITIS WITH POSITIVE RHEUMATOID FACTOR, INVOLVING UNSPECIFIED SITE (H): ICD-10-CM

## 2024-06-10 DIAGNOSIS — M25.571 CHRONIC PAIN OF RIGHT ANKLE: Primary | ICD-10-CM

## 2024-06-10 DIAGNOSIS — M79.672 BILATERAL FOOT PAIN: ICD-10-CM

## 2024-06-10 DIAGNOSIS — G89.29 CHRONIC PAIN OF RIGHT ANKLE: Primary | ICD-10-CM

## 2024-06-10 DIAGNOSIS — M79.671 BILATERAL FOOT PAIN: ICD-10-CM

## 2024-06-10 PROCEDURE — 99214 OFFICE O/P EST MOD 30 MIN: CPT | Performed by: PODIATRIST

## 2024-06-10 NOTE — PATIENT INSTRUCTIONS
"Thank you for choosing [facility] Podiatry / Foot & Ankle Surgery!    DR. RUTH'S CLINIC LOCATIONS:     [place] [facility] (Friday) TRIAGE LINE: [phone] [address] [facility]  APPOINTMENTS: [phone]   [place] [address] RADIOLOGY: [phone]    PHYSICAL THERAPY: [phone]    SET UP SURGERY: [phone]   [place] (Mon-Tues AM-Thurs) BILLING QUESTIONS: [phone] [phone] FAX: [phone]   [place] [address] [place] [facility]: [phone]        You are seen today for the right ankle pain after hearing a \"pop\", as well as a painful mass at the left lower leg that is increasing in size.  A CT scan of the right ankle and an MRI of the left ankle were ordered.  Call the above radiology number to schedule the imaging studies.  Follow-up afterwards to review the results and the report and to discuss options.    We discussed possible removal of the soft tissue mass near the right fifth metatarsal base as well as at the left great toe.  I think further surgery to address the left second and third toes is unlikely to be successful but is an option.  "

## 2024-06-10 NOTE — PROGRESS NOTES
"Foot & Ankle Surgery  [place] 10, 2024    CC: (L) toes are wings/ (R) I have pins in ankle    I was asked to see Kalyn Rivero regarding the chief complaint by:  Dr. SOFIA Sutherland    HPI:  Pt is a 53 year old female who presents with above complaint.  Patient has a long history of multiple bilateral lower extremity surgeries in the setting of rheumatoid arthritis including right subtalar joint and talonavicular joint fusions, and left subtalar joint fusion with extensive forefoot work including first MPJ fusion with lesser metatarsal head resections.  She states she has had the left forefoot surgery done 3 times and continues to have lateral deviation and pain associated with the second and third toes.  She had x-rays taken by Dr. Hendricks on 5/14/2024 that showed extensive hardware/surgery bilateral.  She states that after the x-rays were taken, she heard and felt a pop in her right ankle and has pain from the midfoot to the lower leg.  No specific injury noted.  She also notices a painful mass at the front of the left ankle that is \"growing\" as well as a mass near the right fifth metatarsal base that is causing her foot to tip in.  She has rheumatoid arthritis and fibromyalgia    ROS:   Pos for CC.  The patient denies current nausea, vomiting, chills, fevers, belly pain, calf pain, chest pain or SOB.  Complete remainder of ROS is otherwise neg.    VITALS:    Vitals:    06/10/24 1408   BP: 121/72   Weight: 76.2 kg (168 lb)       PMH:    Past Medical History:   Diagnosis Date    Acetaminophen overdose 03/24/2011    Anemia     Anesthesia complication     pt states has a history of heart stopping during anesthesia,    Arrhythmia     Cerebral artery occlusion with cerebral infarction (H)     Cerebral infarction (H)     Cervical high risk HPV (human papillomavirus) test positive 02/22/2017    type 18 & other HR HPV    Chronic infection     MRSA    Chronic pain 03/24/2011    Degenerative joint disease     Endometriosis     " "Fibromyalgia     Gastro-oesophageal reflux disease     Heart murmur     History of blood transfusion     Hypothyroidism     Immune disorder (H24)     Learning disability     Malignant neoplasm (H)     Migraine     MRSA (methicillin resistant staph aureus) culture positive     Neck injuries     Opioid type dependence (H)     Other chronic pain     PONV (postoperative nausea and vomiting)     states \"flatlines\"during anesthesia    RA (rheumatoid arthritis) (H)     Renal stone     Scoliosis     Stomach ulcer     history       SXHX:    Past Surgical History:   Procedure Laterality Date    ARTHRODESIS FOOT  5/23/2012    Procedure:ARTHRODESIS FOOT; Right Subtalar andTaloNavicular  Fusion  ; Surgeon:CHRIS HENDRICKS; [facility]    ARTHRODESIS FOOT Left 4/22/2015    Procedure: ARTHRODESIS FOOT;  Surgeon: Chris Hendricks MD;  [facility]    ARTHROPLASTY ELBOW Right 9/30/2015    Procedure: ARTHROPLASTY ELBOW;  Surgeon: Carrol Gaspar MD;  [facility]    ARTHROPLASTY KNEE Right     ARTHROPLASTY REVISION ELBOW Right 11/27/2018    Procedure: 1 - aspiration of Right elbow 2- Explantation of failed hardware Right humeral periprosthetic fracture non-union 3- Right distal humerus excision 4- Right distal humerus replacement 5 - Revision Right total elbow arthroplasty;  Surgeon: Aakash Zimmer MD;  Location:  OR    ARTHROPLASTY WRIST      x2 Lt wrist replacement.    ARTHROTOMY ELBOW Right 6/18/2015    Procedure: ARTHROTOMY ELBOW;  Surgeon: Carrol Gaspar MD;  Location:  OR    C SHLDR ARTHROSCOP,DIAGNOSTIC  12/17/2008    left total shoulder arthroplasty by Dr. Law    CYSTOSCOPY  02/06/2009    1.cystoscopy.2.bilateral ureteroscopy.3.basketing of stone fragments from the right kidney.4.bilateral double-J ureteral stent placement.5.ann catheter placement.6.fluoroscopy and intraoperative interpretation of images.    CYSTOSCOPY  01/08/2007    1. cystoscopy and left stent " removal.2.left ureteroscopy    DECOMPRESSION CUBITAL TUNNEL  6/6/2014    Procedure: DECOMPRESSION CUBITAL TUNNEL;  Surgeon: Janelle Coates MD;  [facility]    ENDOSCOPY  03/31/2005    upper GI endoscopy-[facility]    ESOPHAGOSCOPY, GASTROSCOPY, DUODENOSCOPY (EGD), COMBINED  3/17/2014    Procedure: COMBINED ESOPHAGOSCOPY, GASTROSCOPY, DUODENOSCOPY (EGD), BIOPSY SINGLE OR MULTIPLE;;  Surgeon: Duane, William Charles, MD;  Location: [facility] OR    FUSION CERVICAL POSTERIOR ONE LEVEL  11/18/2013    Procedure: FUSION CERVICAL POSTERIOR ONE LEVEL;  Cervical 1-2 Posterior Cervical Fusion (Harms Procedure);  Surgeon: Carrol Gunn MD;  Location: [facility] OR    GYN SURGERY      INJECT MAJOR JOINT / BURSA Left 1/5/2017    Procedure: INJECT MAJOR JOINT / BURSA;  Surgeon: Fredy Patel DO;  [facility]    INJECT STEROID (LOCATION) Left 6/18/2015    Procedure: INJECT STEROID (LOCATION);  Surgeon: Carrol Gaspar MD;  [facility]    NECK SURGERY      REMOVE FOREIGN BODY UPPER EXTREMITY Right 3/2/2017    Procedure: REMOVE FOREIGN BODY UPPER EXTREMITY;  Surgeon: Carrol Gaspar MD;  [facility]    REMOVE HARDWARE FOOT Left 6/8/2022    Procedure: REMOVAL, HARDWARE, FOOT LEFT;  Surgeon: Chris Hendricks MD;  [place] [facility] [place]    RESECT BONE UPPER EXTREMITY Left 4/27/2017    Procedure: RESECT BONE UPPER EXTREMITY;  Left Radial Head Resection;  Surgeon: Carrol Gaspar MD;  [facility]: [facility]    ROTATOR CUFF REPAIR RT/LT  10/19/2005    1.left distal clavicle excision.2.acromioplasty.3.coracoacromial ligament resection.4.rotator cuff exploration    [facility] ANESTH,DX ARTHROSCOPIC PROC KNEE JOINT  10/24/2007    right total knee arthroplasty    [facility] SHOULDER SURG PROC UNLISTED      [facility] TOTAL KNEE ARTHROPLASTY  1/18/12    Left        MEDS:    Current Outpatient Medications   Medication Sig Dispense Refill    celecoxib (CELEBREX) 200 MG capsule Take 200 mg by mouth 3 times  daily      clonazePAM (KLONOPIN) 0.5 MG tablet TAKE 1 TABLET BY MOUTH THREE TIMES A DAY      ENBREL SURECLICK 50 MG/ML autoinjector 50 mg twice a week       guaiFENesin (MUCINEX) 600 MG 12 hr tablet TAKE 1 TABLET BY MOUTH TWICE A DAY AS NEEDED FOR CONGESTION 60 tablet 3    loperamide (IMODIUM A-D) 2 MG tablet Take 1 tablet (2 mg) by mouth 4 times daily as needed for diarrhea 24 tablet 1    omeprazole (PRILOSEC) 40 MG DR capsule TAKE 1 CAPSULE BY MOUTH TWICE A DAY BEFORE MEALS STRENGTH: 40 MG 90 capsule 3    OnabotulinumtoxinA (BOTOX IJ) Inject 200 Units as directed Total dose 200 units   Administered 190 units   Unavoidable waste 30 units   Lot # /[id number] with Expiration Date:  12/2020   NDC [address]-66      ondansetron (ZOFRAN ODT) 4 MG ODT tab Take 1 tablet (4 mg) by mouth every 8 hours as needed for nausea or vomiting 10 tablet 0    ondansetron (ZOFRAN-ODT) 4 MG ODT tab Take 1 tablet (4 mg) by mouth every 8 hours as needed for nausea 20 tablet 3    SENEXON-S 8.6-50 MG tablet TAKE 1-2 TABLETS BY MOUTH 2 TIMES DAILY FOR CONSTIPATION. 100 tablet 1    SODIUM FLUORIDE 5000 SENSITIVE 1.1-5 % GEL USE 2X/DAY FOR 2-3 MINUTES. SPIT AND DO NOT EAT DRINK OR RINSE FOR 30 MINUTES      trimethoprim-polymyxin b (POLYTRIM) 65608-4.1 UNIT/ML-% ophthalmic solution Place into the right eye every 4 hours      UBRELVY 50 MG tablet TAKE 1 TABLET BY MOUTH AS NEEDED FOR PAIN AT ONSET OF MIGRAINE, MAY REPEAT AFTER TWO HOURS IF NEEDED       Current Facility-Administered Medications   Medication Dose Route Frequency Provider Last Rate Last Admin    botulinum toxin type A (BOTOX) 100 units injection 200 Units  200 Units Intramuscular Q90 Days Rosina Quinones MD           ALL:     Allergies   Allergen Reactions    Leflunomide Anaphylaxis    Morphine Swelling    Celecoxib Other (See Comments)     Other reaction(s): stroke , lasted 24 hours  -Pt takes celecoxib daily. States that they changed the  and now it is fine.     Amitriptyline Other (See Comments)     Sleepwalking     Codeine     Gabapentin Other (See Comments)     Pins,needles, stabbing aching at once  Pins,needles, stabbing aching at once  Numbness and Tingling    Ibuprofen      Doesn't take due to gastric ulcer    Sulfa Antibiotics Nausea and Vomiting     unknown    Tylenol [Acetaminophen]      Pt. States that she has Liver problems  Tylenol 3    Dexamethasone Palpitations     Heart racing, sweating     Erythromycin Nausea     Vomiting     Penicillins Rash    Prednisone Palpitations     Heart racing       FMH:    Family History   Problem Relation Age of Onset    Diabetes Mother     Hypertension Mother     Allergies Mother     Alcohol/Drug Mother         LIVER CIRROSIS    Blood Disease Mother         B12 DEF    Neurologic Disorder Mother         Epilepsy    Osteoporosis Mother     Cerebrovascular Disease Maternal Grandmother     Arthritis Maternal Grandmother         RHEUMATOID    Cancer Maternal Grandmother     Thyroid Disease Maternal Grandmother     Osteoporosis Maternal Grandmother     Heart Disease Maternal Grandmother     Depression Maternal Grandmother     Neurologic Disorder Maternal Grandmother         MIGRAINES    Anxiety Disorder Maternal Grandmother     Diabetes Maternal Grandmother     Migraines Maternal Grandmother     Deep Vein Thrombosis Maternal Grandmother     Cerebrovascular Disease Maternal Grandfather     Cancer Maternal Grandfather     Mental Illness Maternal Grandfather     Cerebrovascular Disease Paternal Grandmother     Arthritis Paternal Grandmother         RHEUMATOID    Cancer Paternal Grandmother     Osteoporosis Paternal Grandmother     Heart Disease Paternal Grandmother     Depression Paternal Grandmother     Neurologic Disorder Paternal Grandmother         MIGRAINES    Thyroid Disease Paternal Grandmother     Cerebrovascular Disease Paternal Grandfather     Cancer Paternal Grandfather     Heart Disease Brother         MURMUR    Asthma Son      Endocrine Disease Son     Neurologic Disorder Father         epilepsy    Seizure Disorder Father     Hypertension Father     Skin Cancer Father     Anxiety Disorder Father     Mental Illness Father     Hyperlipidemia Father     Kidney Disease Father     Bladder Cancer Father     Neurologic Disorder Daughter         MIGRAINES    Arthritis Daughter         JR    Hypertension Son     LUNG DISEASE Brother     Cancer Brother         lung    Anxiety Disorder Son     Asthma Son     Hypertension Son     Migraines Son     Spine Problems Son     Mental Illness Brother     Migraines Brother     Cardiac Sudden Death Brother     Spine Problems Brother     Glaucoma No family hx of     Macular Degeneration No family hx of     Unknown/Adopted No family hx of     Anesthesia Reaction No family hx of     Other Cancer No family hx of     Rheumatoid Arthritis No family hx of     Bone Cancer No family hx of     Low Back Problems No family hx of        SocHx:    Social History     Socioeconomic History    Marital status: Single     Spouse name: Not on file    Number of children: Not on file    Years of education: Not on file    Highest education level: Not on file   Occupational History    Not on file   Tobacco Use    Smoking status: Former     Current packs/day: 0.10     Average packs/day: 0.1 packs/day for 40.8 years (4.1 ttl pk-yrs)     Types: Cigarettes     Start date: 8/6/1983    Smokeless tobacco: Never    Tobacco comments:     Quit 6 weeks ago   Vaping Use    Vaping status: Never Used   Substance and Sexual Activity    Alcohol use: No    Drug use: No    Sexual activity: Not Currently     Partners: Male   Other Topics Concern    Parent/sibling w/ CABG, MI or angioplasty before 65F 55M? Not Asked   Social History Narrative    Not on file     Social Determinants of Health     Financial Resource Strain: Low Risk  (11/22/2023)    Financial Resource Strain     Within the past 12 months, have you or your family members you live with been  unable to get utilities (heat, electricity) when it was really needed?: No   Food Insecurity: Low Risk  (11/22/2023)    Food Insecurity     Within the past 12 months, did you worry that your food would run out before you got money to buy more?: No     Within the past 12 months, did the food you bought just not last and you didn t have money to get more?: Patient refused   Transportation Needs: High Risk (11/22/2023)    Transportation Needs     Within the past 12 months, has lack of transportation kept you from medical appointments, getting your medicines, non-medical meetings or appointments, work, or from getting things that you need?: Yes   Physical Activity: Not on file   Stress: Not on file   Social Connections: Not on file   Interpersonal Safety: Low Risk  (11/22/2023)    Interpersonal Safety     Do you feel physically and emotionally safe where you currently live?: Yes     Within the past 12 months, have you been hit, slapped, kicked or otherwise physically hurt by someone?: No     Within the past 12 months, have you been humiliated or emotionally abused in other ways by your partner or ex-partner?: No   Housing Stability: Low Risk  (11/22/2023)    Housing Stability     Do you have housing? : Yes     Are you worried about losing your housing?: No           EXAMINATION:  Gen:   No apparent distress  Neuro:   A&Ox3, no deficits  Psych:    Answering questions appropriately for age and situation with normal affect  Head:    NCAT  Eye:    Visual scanning without deficit  Ear:    Response to auditory stimuli wnl  Lung:    Non-labored breathing on RA noted  Abd:    NTND per patient report  Lymph:    Neg for pitting/non-pitting edema BLE  Vasc:    Pulses palpable, CFT minimally delayed  Neuro:    Light touch sensation intact to all sensory nerve distributions without paresthesias  Derm:    Neg for nodules, lesions or ulcerations  MSK:    Right lower extremity -she has pain at the medial recess and anterior gutter of  "the ankle joint.  Previous subtalar joint and talonavicular joint fusions.  She has a large soft slightly mobile mass at the fifth metatarsal base.  Left lower extremity -palpable soft tissue mass at the anterior lower leg.  Previous left forefoot surgery including first MPJ fusion and lesser metatarsal head resection with persistent lateral deviation of the second and third toes, not fully reducible at the MPJ  Calf:    Neg for redness, swelling or tenderness      Imaging: X-rays bilateral foot 5/14/2024 - Impression:  1. No acute osseous abnormality.  2. Severe degenerative change with likely erosion at the right fifth  metatarsophalangeal joint.  3. Mild to moderate right greater than left degenerative changes at  the naviculocuneiform joints.      Assessment:  53 year old female with extensive bilateral lower extremity surgical intervention including right subtalar joint and talonavicular joint fusions and left subtalar joint fusion with first MPJ fusion and lesser metatarsal head resection with recurrent lateral deviation of the lesser toes in setting of rheumatoid arthritis and fibromyalgia      Medical Decision Making/Plan:  Discussed etiologies, anatomy and options  1.  Extensive bilateral lower extremity surgical intervention including right subtalar joint and talonavicular joint fusions and left subtalar joint fusion with first MPJ fusion and lesser metatarsal head resection with recurrent lateral deviation of lesser toes in setting of rheumatoid arthritis and fibromyalgia  -I personally reviewed and interpreted the patient's lower extremity history pertinent to today's visit, including imaging/labs, in preparation for initiating a treatment program.  -I personally interpreted and reviewed the 5/14/2024 x-rays  -Based on the increased pain and the \"pop\", a right ankle MRI was ordered.  She will follow-up with me afterwards to review the results and the report  -Based on increasing pain and size of a mass " at the anterior left lower leg, a left ankle MRI was ordered.  She will follow-up with me afterwards to review the results and the report  -We discussed possible removal of the soft tissue mass at the right fifth metatarsal base and possible removal of the soft tissue mass at the plantar left great toe  -With respect to the left forefoot rheumatoid surgery, she indicates she has had 3 separate surgeries and still has persistent lateral deviation of the second and third toes.  It looks like large portions of the second and third metatarsal heads were left intact, consider further resection of the metatarsals with pinning across the MPJs.  We also discussed that if 3 previous surgeries did not adequately address her complaint, a fourth is unlikely to do so.        Follow up: After CT/MRI or sooner with acute issues      Patient's medical history was reviewed today      Duong River DPM FACFAS FACFAOM  Podiatric Foot & Ankle Surgeon  [facility]  [phone]    Disclaimer: This note consists of symbols derived from keyboarding, dictation and/or voice recognition software. As a result, there may be errors in the script that have gone undetected. Please consider this when interpreting information found in this chart.

## 2024-06-10 NOTE — LETTER
"6/10/2024      Kalyn Rivero  [address] [place] [id number]      Dear Colleague,    Thank you for referring your patient, Kalyn Rivero, to the [facility]. Please see a copy of my visit note below.    Foot & Ankle Surgery  [place] 10, 2024    CC: (L) toes are wings/ (R) I have pins in ankle    I was asked to see Kalyn Rivero regarding the chief complaint by:  Dr. SOFIA Sutherland    HPI:  Pt is a 53 year old female who presents with above complaint.  Patient has a long history of multiple bilateral lower extremity surgeries in the setting of rheumatoid arthritis including right subtalar joint and talonavicular joint fusions, and left subtalar joint fusion with extensive forefoot work including first MPJ fusion with lesser metatarsal head resections.  She states she has had the left forefoot surgery done 3 times and continues to have lateral deviation and pain associated with the second and third toes.  She had x-rays taken by Dr. Hendricks on 5/14/2024 that showed extensive hardware/surgery bilateral.  She states that after the x-rays were taken, she heard and felt a pop in her right ankle and has pain from the midfoot to the lower leg.  No specific injury noted.  She also notices a painful mass at the front of the left ankle that is \"growing\" as well as a mass near the right fifth metatarsal base that is causing her foot to tip in.  She has rheumatoid arthritis and fibromyalgia    ROS:   Pos for CC.  The patient denies current nausea, vomiting, chills, fevers, belly pain, calf pain, chest pain or SOB.  Complete remainder of ROS is otherwise neg.    VITALS:    Vitals:    06/10/24 1408   BP: 121/72   Weight: 76.2 kg (168 lb)       PMH:    Past Medical History:   Diagnosis Date     Acetaminophen overdose 03/24/2011     Anemia      Anesthesia complication     pt states has a history of heart stopping during anesthesia,     Arrhythmia      Cerebral artery occlusion with cerebral " "infarction (H)      Cerebral infarction (H)      Cervical high risk HPV (human papillomavirus) test positive 02/22/2017    type 18 & other HR HPV     Chronic infection     MRSA     Chronic pain 03/24/2011     Degenerative joint disease      Endometriosis      Fibromyalgia      Gastro-oesophageal reflux disease      Heart murmur      History of blood transfusion      Hypothyroidism      Immune disorder (H24)      Learning disability      Malignant neoplasm (H)      Migraine      MRSA (methicillin resistant staph aureus) culture positive      Neck injuries      Opioid type dependence (H)      Other chronic pain      PONV (postoperative nausea and vomiting)     states \"flatlines\"during anesthesia     RA (rheumatoid arthritis) (H)      Renal stone      Scoliosis      Stomach ulcer     history       SXHX:    Past Surgical History:   Procedure Laterality Date     ARTHRODESIS FOOT  5/23/2012    Procedure:ARTHRODESIS FOOT; Right Subtalar andTaloNavicular  Fusion  ; Surgeon:BRIGHT HENDRICKS; [facility]     ARTHRODESIS FOOT Left 4/22/2015    Procedure: ARTHRODESIS FOOT;  Surgeon: Bright Hendricks MD;  [facility]     ARTHROPLASTY ELBOW Right 9/30/2015    Procedure: ARTHROPLASTY ELBOW;  Surgeon: Carrol Gaspar MD;  [facility]     ARTHROPLASTY KNEE Right      ARTHROPLASTY REVISION ELBOW Right 11/27/2018    Procedure: 1 - aspiration of Right elbow 2- Explantation of failed hardware Right humeral periprosthetic fracture non-union 3- Right distal humerus excision 4- Right distal humerus replacement 5 - Revision Right total elbow arthroplasty;  Surgeon: Aakash Zimmer MD;  Location:  OR     ARTHROPLASTY WRIST      x2 Lt wrist replacement.     ARTHROTOMY ELBOW Right 6/18/2015    Procedure: ARTHROTOMY ELBOW;  Surgeon: Carrol Gaspar MD;  Location:  OR     C SHYOJANAR ARTHROSCOP,DIAGNOSTIC  12/17/2008    left total shoulder arthroplasty by Dr. Law     CYSTOSCOPY  02/06/2009    " 1.cystoscopy.2.bilateral ureteroscopy.3.basketing of stone fragments from the right kidney.4.bilateral double-J ureteral stent placement.5.ann catheter placement.6.fluoroscopy and intraoperative interpretation of images.     CYSTOSCOPY  01/08/2007    1. cystoscopy and left stent removal.2.left ureteroscopy     DECOMPRESSION CUBITAL TUNNEL  6/6/2014    Procedure: DECOMPRESSION CUBITAL TUNNEL;  Surgeon: Janelle Coates MD;  [facility]     ENDOSCOPY  03/31/2005    [facility] GI endoscopy[facility]     ESOPHAGOSCOPY, GASTROSCOPY, DUODENOSCOPY (EGD), COMBINED  3/17/2014    Procedure: COMBINED ESOPHAGOSCOPY, GASTROSCOPY, DUODENOSCOPY (EGD), BIOPSY SINGLE OR MULTIPLE;;  Surgeon: Duane, William Charles, MD;  Location: [facility] OR     FUSION CERVICAL POSTERIOR ONE LEVEL  11/18/2013    Procedure: FUSION CERVICAL POSTERIOR ONE LEVEL;  Cervical 1-2 Posterior Cervical Fusion (Harms Procedure);  Surgeon: Carrol Gunn MD;  [facility] OR     GYN SURGERY       INJECT MAJOR JOINT / BURSA Left 1/5/2017    Procedure: INJECT MAJOR JOINT / BURSA;  Surgeon: Fredy Patel DO;  [facility]     INJECT STEROID (LOCATION) Left 6/18/2015    Procedure: INJECT STEROID (LOCATION);  Surgeon: Carrol Gaspar MD;  Location:  OR     NECK SURGERY       REMOVE FOREIGN BODY UPPER EXTREMITY Right 3/2/2017    Procedure: REMOVE FOREIGN BODY UPPER EXTREMITY;  Surgeon: Carrol Gaspar MD;  Location:  OR     REMOVE HARDWARE FOOT Left 6/8/2022    Procedure: REMOVAL, HARDWARE, FOOT LEFT;  Surgeon: Chris Hendricks MD;  Location: [facility] OR     RESECT BONE UPPER EXTREMITY Left 4/27/2017    Procedure: RESECT BONE UPPER EXTREMITY;  Left Radial Head Resection;  Surgeon: Carrol Gaspar MD;  [facility]     ROTATOR CUFF REPAIR RT/LT  10/19/2005    1.left distal clavicle excision.2.acromioplasty.3.coracoacromial ligament resection.4.rotator cuff exploration     ZZC ANESTH,DX ARTHROSCOPIC  PROC KNEE JOINT  10/24/2007    right total knee arthroplasty     [facility] SHOULDER SURG PROC UNLISTED       [facility] TOTAL KNEE ARTHROPLASTY  1/18/12    Left        MEDS:    Current Outpatient Medications   Medication Sig Dispense Refill     celecoxib (CELEBREX) 200 MG capsule Take 200 mg by mouth 3 times daily       clonazePAM (KLONOPIN) 0.5 MG tablet TAKE 1 TABLET BY MOUTH THREE TIMES A DAY       ENBREL SURECLICK 50 MG/ML autoinjector 50 mg twice a week        guaiFENesin (MUCINEX) 600 MG 12 hr tablet TAKE 1 TABLET BY MOUTH TWICE A DAY AS NEEDED FOR CONGESTION 60 tablet 3     loperamide (IMODIUM A-D) 2 MG tablet Take 1 tablet (2 mg) by mouth 4 times daily as needed for diarrhea 24 tablet 1     omeprazole (PRILOSEC) 40 MG DR capsule TAKE 1 CAPSULE BY MOUTH TWICE A DAY BEFORE MEALS STRENGTH: 40 MG 90 capsule 3     OnabotulinumtoxinA (BOTOX IJ) Inject 200 Units as directed Total dose 200 units   Administered 190 units   Unavoidable waste 30 units   Lot # /[id number] with Expiration Date:  12/2020   NDC [address]-90       ondansetron (ZOFRAN ODT) 4 MG ODT tab Take 1 tablet (4 mg) by mouth every 8 hours as needed for nausea or vomiting 10 tablet 0     ondansetron (ZOFRAN-ODT) 4 MG ODT tab Take 1 tablet (4 mg) by mouth every 8 hours as needed for nausea 20 tablet 3     SENEXON-S 8.6-50 MG tablet TAKE 1-2 TABLETS BY MOUTH 2 TIMES DAILY FOR CONSTIPATION. 100 tablet 1     SODIUM FLUORIDE 5000 SENSITIVE 1.1-5 % GEL USE 2X/DAY FOR 2-3 MINUTES. SPIT AND DO NOT EAT DRINK OR RINSE FOR 30 MINUTES       trimethoprim-polymyxin b (POLYTRIM) 64842-8.1 UNIT/ML-% ophthalmic solution Place into the right eye every 4 hours       UBRELVY 50 MG tablet TAKE 1 TABLET BY MOUTH AS NEEDED FOR PAIN AT ONSET OF MIGRAINE, MAY REPEAT AFTER TWO HOURS IF NEEDED       Current Facility-Administered Medications   Medication Dose Route Frequency Provider Last Rate Last Admin     botulinum toxin type A (BOTOX) 100 units injection 200 Units  200 Units Intramuscular  Q90 Days Rosina Quinones MD           ALL:     Allergies   Allergen Reactions     Leflunomide Anaphylaxis     Morphine Swelling     Celecoxib Other (See Comments)     Other reaction(s): stroke , lasted 24 hours  -Pt takes celecoxib daily. States that they changed the  and now it is fine.     Amitriptyline Other (See Comments)     Sleepwalking      Codeine      Gabapentin Other (See Comments)     Pins,needles, stabbing aching at once  Pins,needles, stabbing aching at once  Numbness and Tingling     Ibuprofen      Doesn't take due to gastric ulcer     Sulfa Antibiotics Nausea and Vomiting     unknown     Tylenol [Acetaminophen]      Pt. States that she has Liver problems  Tylenol 3     Dexamethasone Palpitations     Heart racing, sweating      Erythromycin Nausea     Vomiting      Penicillins Rash     Prednisone Palpitations     Heart racing       FMH:    Family History   Problem Relation Age of Onset     Diabetes Mother      Hypertension Mother      Allergies Mother      Alcohol/Drug Mother         LIVER CIRROSIS     Blood Disease Mother         B12 DEF     Neurologic Disorder Mother         Epilepsy     Osteoporosis Mother      Cerebrovascular Disease Maternal Grandmother      Arthritis Maternal Grandmother         RHEUMATOID     Cancer Maternal Grandmother      Thyroid Disease Maternal Grandmother      Osteoporosis Maternal Grandmother      Heart Disease Maternal Grandmother      Depression Maternal Grandmother      Neurologic Disorder Maternal Grandmother         MIGRAINES     Anxiety Disorder Maternal Grandmother      Diabetes Maternal Grandmother      Migraines Maternal Grandmother      Deep Vein Thrombosis Maternal Grandmother      Cerebrovascular Disease Maternal Grandfather      Cancer Maternal Grandfather      Mental Illness Maternal Grandfather      Cerebrovascular Disease Paternal Grandmother      Arthritis Paternal Grandmother         RHEUMATOID     Cancer Paternal Grandmother       Osteoporosis Paternal Grandmother      Heart Disease Paternal Grandmother      Depression Paternal Grandmother      Neurologic Disorder Paternal Grandmother         MIGRAINES     Thyroid Disease Paternal Grandmother      Cerebrovascular Disease Paternal Grandfather      Cancer Paternal Grandfather      Heart Disease Brother         MURMUR     Asthma Son      Endocrine Disease Son      Neurologic Disorder Father         epilepsy     Seizure Disorder Father      Hypertension Father      Skin Cancer Father      Anxiety Disorder Father      Mental Illness Father      Hyperlipidemia Father      Kidney Disease Father      Bladder Cancer Father      Neurologic Disorder Daughter         MIGRAINES     Arthritis Daughter         JR     Hypertension Son      LUNG DISEASE Brother      Cancer Brother         lung     Anxiety Disorder Son      Asthma Son      Hypertension Son      Migraines Son      Spine Problems Son      Mental Illness Brother      Migraines Brother      Cardiac Sudden Death Brother      Spine Problems Brother      Glaucoma No family hx of      Macular Degeneration No family hx of      Unknown/Adopted No family hx of      Anesthesia Reaction No family hx of      Other Cancer No family hx of      Rheumatoid Arthritis No family hx of      Bone Cancer No family hx of      Low Back Problems No family hx of        SocHx:    Social History     Socioeconomic History     Marital status: Single     Spouse name: Not on file     Number of children: Not on file     Years of education: Not on file     Highest education level: Not on file   Occupational History     Not on file   Tobacco Use     Smoking status: Former     Current packs/day: 0.10     Average packs/day: 0.1 packs/day for 40.8 years (4.1 ttl pk-yrs)     Types: Cigarettes     Start date: 8/6/1983     Smokeless tobacco: Never     Tobacco comments:     Quit 6 weeks ago   Vaping Use     Vaping status: Never Used   Substance and Sexual Activity     Alcohol use: No      Drug use: No     Sexual activity: Not Currently     Partners: Male   Other Topics Concern     Parent/sibling w/ CABG, MI or angioplasty before 65F 55M? Not Asked   Social History Narrative     Not on file     Social Determinants of Health     Financial Resource Strain: Low Risk  (11/22/2023)    Financial Resource Strain      Within the past 12 months, have you or your family members you live with been unable to get utilities (heat, electricity) when it was really needed?: No   Food Insecurity: Low Risk  (11/22/2023)    Food Insecurity      Within the past 12 months, did you worry that your food would run out before you got money to buy more?: No      Within the past 12 months, did the food you bought just not last and you didn t have money to get more?: Patient refused   Transportation Needs: High Risk (11/22/2023)    Transportation Needs      Within the past 12 months, has lack of transportation kept you from medical appointments, getting your medicines, non-medical meetings or appointments, work, or from getting things that you need?: Yes   Physical Activity: Not on file   Stress: Not on file   Social Connections: Not on file   Interpersonal Safety: Low Risk  (11/22/2023)    Interpersonal Safety      Do you feel physically and emotionally safe where you currently live?: Yes      Within the past 12 months, have you been hit, slapped, kicked or otherwise physically hurt by someone?: No      Within the past 12 months, have you been humiliated or emotionally abused in other ways by your partner or ex-partner?: No   Housing Stability: Low Risk  (11/22/2023)    Housing Stability      Do you have housing? : Yes      Are you worried about losing your housing?: No           EXAMINATION:  Gen:   No apparent distress  Neuro:   A&Ox3, no deficits  Psych:    Answering questions appropriately for age and situation with normal affect  Head:    NCAT  Eye:    Visual scanning without deficit  Ear:    Response to auditory  stimuli wnl  Lung:    Non-labored breathing on RA noted  Abd:    NTND per patient report  Lymph:    Neg for pitting/non-pitting edema BLE  Vasc:    Pulses palpable, CFT minimally delayed  Neuro:    Light touch sensation intact to all sensory nerve distributions without paresthesias  Derm:    Neg for nodules, lesions or ulcerations  MSK:    Right lower extremity -she has pain at the medial recess and anterior gutter of the ankle joint.  Previous subtalar joint and talonavicular joint fusions.  She has a large soft slightly mobile mass at the fifth metatarsal base.  Left lower extremity -palpable soft tissue mass at the anterior lower leg.  Previous left forefoot surgery including first MPJ fusion and lesser metatarsal head resection with persistent lateral deviation of the second and third toes, not fully reducible at the MPJ  Calf:    Neg for redness, swelling or tenderness      Imaging: X-rays bilateral foot 5/14/2024 - Impression:  1. No acute osseous abnormality.  2. Severe degenerative change with likely erosion at the right fifth  metatarsophalangeal joint.  3. Mild to moderate right greater than left degenerative changes at  the naviculocuneiform joints.      Assessment:  53 year old female with extensive bilateral lower extremity surgical intervention including right subtalar joint and talonavicular joint fusions and left subtalar joint fusion with first MPJ fusion and lesser metatarsal head resection with recurrent lateral deviation of the lesser toes in setting of rheumatoid arthritis and fibromyalgia      Medical Decision Making/Plan:  Discussed etiologies, anatomy and options  1.  Extensive bilateral lower extremity surgical intervention including right subtalar joint and talonavicular joint fusions and left subtalar joint fusion with first MPJ fusion and lesser metatarsal head resection with recurrent lateral deviation of lesser toes in setting of rheumatoid arthritis and fibromyalgia  -I personally  "reviewed and interpreted the patient's lower extremity history pertinent to today's visit, including imaging/labs, in preparation for initiating a treatment program.  -I personally interpreted and reviewed the 5/14/2024 x-rays  -Based on the increased pain and the \"pop\", a right ankle MRI was ordered.  She will follow-up with me afterwards to review the results and the report  -Based on increasing pain and size of a mass at the anterior left lower leg, a left ankle MRI was ordered.  She will follow-up with me afterwards to review the results and the report  -We discussed possible removal of the soft tissue mass at the right fifth metatarsal base and possible removal of the soft tissue mass at the plantar left great toe  -With respect to the left forefoot rheumatoid surgery, she indicates she has had 3 separate surgeries and still has persistent lateral deviation of the second and third toes.  It looks like large portions of the second and third metatarsal heads were left intact, consider further resection of the metatarsals with pinning across the MPJs.  We also discussed that if 3 previous surgeries did not adequately address her complaint, a fourth is unlikely to do so.        Follow up: After CT/MRI or sooner with acute issues      Patient's medical history was reviewed today      Duong River DPM FACFAS FACFAOM  Podiatric Foot & Ankle Surgeon  [facility]  [phone]    Disclaimer: This note consists of symbols derived from keyboarding, dictation and/or voice recognition software. As a result, there may be errors in the script that have gone undetected. Please consider this when interpreting information found in this chart.        Again, thank you for allowing me to participate in the care of your patient.        Sincerely,        Duong River DPM, PARISH  "

## 2024-06-27 ENCOUNTER — ANCILLARY PROCEDURE (OUTPATIENT)
Dept: MRI IMAGING | Facility: CLINIC | Age: 54
End: 2024-06-27
Attending: PODIATRIST
Payer: COMMERCIAL

## 2024-06-27 ENCOUNTER — ANCILLARY PROCEDURE (OUTPATIENT)
Dept: CT IMAGING | Facility: CLINIC | Age: 54
End: 2024-06-27
Attending: PODIATRIST
Payer: COMMERCIAL

## 2024-06-27 DIAGNOSIS — M05.9 RHEUMATOID ARTHRITIS WITH POSITIVE RHEUMATOID FACTOR, INVOLVING UNSPECIFIED SITE (H): ICD-10-CM

## 2024-06-27 DIAGNOSIS — M79.672 BILATERAL FOOT PAIN: ICD-10-CM

## 2024-06-27 DIAGNOSIS — M25.571 CHRONIC PAIN OF RIGHT ANKLE: ICD-10-CM

## 2024-06-27 DIAGNOSIS — G89.29 CHRONIC PAIN OF RIGHT ANKLE: ICD-10-CM

## 2024-06-27 DIAGNOSIS — M79.671 BILATERAL FOOT PAIN: ICD-10-CM

## 2024-06-27 PROCEDURE — 73700 CT LOWER EXTREMITY W/O DYE: CPT | Mod: RT | Performed by: RADIOLOGY

## 2024-06-27 PROCEDURE — 73721 MRI JNT OF LWR EXTRE W/O DYE: CPT | Mod: LT | Performed by: RADIOLOGY

## 2024-07-09 ENCOUNTER — TELEPHONE (OUTPATIENT)
Dept: PODIATRY | Facility: CLINIC | Age: 54
End: 2024-07-09
Payer: COMMERCIAL

## 2024-07-09 NOTE — TELEPHONE ENCOUNTER
Per chart review, patient was to follow up in clinic to review results.     Phone call to patient and she was informed of the above. Appointment scheduled for 7/15/24 at 1:15 pm with 1pm arrival in [place] for a 30 minute appointment. Address provided. She verbalized understanding.     SANJANA Cunningham RN

## 2024-07-09 NOTE — TELEPHONE ENCOUNTER
Patient Returning Call    Reason for call:  pt had imaging done and states she has not received result and further instructions and also spoke to provider about getting a possible biopsy on right foot and possibly the left as well. requesting callback     Information relayed to patient:  te sent to clinic     Patient has additional questions:  No      Okay to leave a detailed message?: Yes at Cell number on file:    Telephone Information:   Mobile [phone]

## 2024-07-15 ENCOUNTER — OFFICE VISIT (OUTPATIENT)
Dept: PODIATRY | Facility: CLINIC | Age: 54
End: 2024-07-15
Payer: COMMERCIAL

## 2024-07-15 VITALS — SYSTOLIC BLOOD PRESSURE: 119 MMHG | BODY MASS INDEX: 27.44 KG/M2 | DIASTOLIC BLOOD PRESSURE: 72 MMHG | WEIGHT: 170 LBS

## 2024-07-15 DIAGNOSIS — M67.471 GANGLION CYST OF RIGHT FOOT: Primary | ICD-10-CM

## 2024-07-15 PROCEDURE — 99215 OFFICE O/P EST HI 40 MIN: CPT | Mod: 25 | Performed by: PODIATRIST

## 2024-07-15 PROCEDURE — 20612 ASPIRATE/INJ GANGLION CYST: CPT | Mod: RT | Performed by: PODIATRIST

## 2024-07-15 PROCEDURE — 99417 PROLNG OP E/M EACH 15 MIN: CPT | Performed by: PODIATRIST

## 2024-07-15 NOTE — PATIENT INSTRUCTIONS
Thank you for choosing [facility] Pod[facility] / Foot & Ankle Surgery!    DR. RUTH'S CLINIC LOCATIONS:     [facility] (Friday) TRIAGE LINE: [phone] [address] [facility]  APPOINTMENTS: [phone]   [place] [address] RADI[facility]: [phone]    PHYSICAL THERAPY: [phone]    SET UP SURGERY: [phone]   [place] (Mon-Tues AM-Thurs) BILLING QUESTIONS: [phone]   [address] [address]  [address] FAX: [phone]   [place] [address] [facility]: [phone]     You were seen today for 4 issues:    1.  Recurrent left second and third hammertoe.  Surgically, there is the option for further resection of the second and third metatarsals with pinning of the second and third toes in a more rectus alignment.  He can certainly follow-up with one of my partners, Rod Ayala or Deo, but I would not be surprised if they declined operating.  As I have said in clinic, if 3 surgeries did not fix the issue, a fourth is unlikely to do so.    2.  Painful bump on the bottom of the left great toe.  This is clinically consistent with a rheumatoid nodule.  If adequate padding, comfortable shoes and resting/icing/anti-inflammatories or Tylenol fail to provide sufficient relief, surgical excision would be reasonable.    3.  Medial right arch pain.  You indicate this feels like the screw was about to pop out.  Your x-rays do not show a prominent screw.  So while this screw can be removed, it is possible that the pain in the area may persist.    4.  Bump lateral right foot.  Your CT scan suggested a fluid-filled cyst.  We attempted aspiration but no fluid was expressed.  This would suggest this is more of a solid mass.  If padding, comfortable shoes and resting/icing/anti-inflammatories or Tylenol fail to provide sufficient relief, surgical excision would be an option.

## 2024-07-15 NOTE — PROGRESS NOTES
Foot & Ankle Surgery   July 15, 2024    S:  Pt is seen today for evaluation of multiple issues and to review the left ankle MRI and right ankle CT scan.  Her main complaints involve the recurrent lateral deviation of the left second toe (as well as the left third toe), a painful bump on the bottom of the left great toe, pain along the medial right arch and the mass/cyst at the plantar lateral right midfoot.  She has undergone 3 hammertoe surgeries but the second and third toes continues to deviate laterally.  The bump on the bottom of the left great toe can be sore, especially with pressure.  The right medial arch pain, she states this feels like the screws about to pop out and she can feel the hardware.  The bump at the plantar lateral right midfoot is sore with pressure.    Vitals:    07/15/24 1257   BP: 119/72   Weight: 77.1 kg (170 lb)   '      ROS - Pos for CC.  Patient denies current nausea, vomiting, chills, fevers, belly pain, calf pain, chest pain or SOB.  Complete remainder of ROS it otherwise neg.      PE:  Gen:   No apparent distress  Eye:    Visual scanning without deficit  Ear:    Response to auditory stimuli wnl  Lung:    Non-labored breathing on RA noted  Abd:    NTND per patient report  Lymph:    Neg for pitting/non-pitting edema BLE  Vasc:    Pulses palpable, CFT minimally delayed  Neuro:    Light touch sensation intact to all sensory nerve distributions without paresthesias  Derm:    Neg for nodules, lesions or ulcerations  MSK:   Previous left second and third hammertoe surgery with recurrent lateral deviation of the second and third toes.  This is not fully reducible.  There is a peanut sized bump on the bottom of the left great toe that is firm and not mobile, consistent with a rheumatoid nodule.  She has tenderness along the medial right arch at the talonavicular fusion site.  This is tender to pressure, especially at the distal aspect of the incision.  The hardware does not appear  specifically prominent today.  There is a grape sized fluctuant yet fixed mass plantar lateral right midfoot consistent with lipoma, rheumatoid nodule or ganglion  Calf:    Neg for redness, swelling or tenderness      Imaging: MRI left ankle 6/27/2024 - Impression:     1. No mass or fluid collection proximity to patient's marker over the  anterior medial ankle.     2. Fluid collection or mass like lesion plantar to the proximal  plantar fascia-calcaneal tuberosity measuring up to 3.3 cm. 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 Additional considerations  include a complex ganglion cyst, adventitial bursitis, or hematoma.    CT scan right ankle 6/27/2024 - IMPRESSION:      1. No acute osseous abnormality.     2. Subtalar and talonavicular instrumented arthrodesis without  complication. Solid bony bridging across both joints.     3. Naviculocuneiform degenerative change.     4. Fluid collection plantar lateral to the fifth metatarsal base,  nonspecific by CT but considerations include adventitial bursitis.      Assessment:  53 year old female with painful recurrent left second hammertoe; firm nodule plantar left great toe consistent with rheumatoid nodule; medial right arch pain in setting of previous left talonavicular fusion; mass plantar lateral right midfoot      Medical Decision Making/Plan:  Discussed etiologies, anatomy and options  1.  Painful recurrent left second hammertoe  -All both the left second and third hammertoes are deviating laterally again after 3 previous surgeries, the second toe is the main problematic issue.  She asked about recurrent surgery.  I again stated that if 3 previous surgeries had not addressed the issue, a fourth is unlikely to do so.  However, it would be an option to further resect the second and third metatarsal heads and pin across the MPJ.  As am leaving [place], she was referred to discuss this further with Dr. Ayala or Dr. Desouza.  We discussed that it is likely that  they may refuse surgery as well.    2.  Firm nodule plantar left great toe consistent with rheumatoid nodule  -Conservatively, we discussed accommodative padding, comfortable shoes, minimizing shoeless walking and resting/icing/anti-inflammatories or Tylenol  -We discussed that surgical excision is an option to consider.  We discussed that recurrence can occur even with surgical management    3.  Medial right arch pain at distal incision of previous talonavicular fusion  -The patient indicates that it feels like the hardware is about to burst out of her ankle.  I reviewed recent x-rays with her.  The medial screw is certainly not prominent or sticking out.  While the screw can be removed, it is very possible that symptoms may persist, especially as she indicates entire medial aspect of her heel can become swollen and discolored.    4.  Mass plantar lateral right midfoot  -I personally interpreted and reviewed the CT scan results with her.  This suggest a fluid collection.  I attempted to aspirate the cyst, see procedure note for details.  Other than scant blood, no fluid was expressed.  Specifically, no bursal or ganglion cyst fluid was noted.  Consider surgical excision    Procedure -after obtaining verbal consent, the skin was anesthetized with 1 cc of 1% lidocaine plain.  The area was then prepped with Betadine.  Using an 18-gauge needle on a 5 cc syringe, I attempted to aspirate fluid.  I reposition the needle multiple times but no fluid was expressed.  A Band-Aid was applied.  Risks that were discussed included but not limited to infection, recurrence.    Follow up: As needed or sooner with acute issues      Billing is based on time of 60 minutes, excluding procedure time, more than 50% of which was spent counseling and coordinating care    Duong River DPM FACFAS FACFAOM  Podiatric Foot & Ankle Surgeon  [facility]  [phone]    Disclaimer: This note consists of symbols derived from  keyboarding, dictation and/or voice recognition software. As a result, there may be errors in the script that have gone undetected. Please consider this when interpreting information found in this chart.

## 2024-07-15 NOTE — LETTER
7/15/2024      Kalyn Rivero  [address] [place] [id number]      Dear Colleague,    Thank you for referring your patient, Kalyn Rivero, to the [facility]. Please see a copy of my visit note below.    Foot & Ankle Surgery   July 15, 2024    S:  Pt is seen today for evaluation of multiple issues and to review the left ankle MRI and right ankle CT scan.  Her main complaints involve the recurrent lateral deviation of the left second toe (as well as the left third toe), a painful bump on the bottom of the left great toe, pain along the medial right arch and the mass/cyst at the plantar lateral right midfoot.  She has undergone 3 hammertoe surgeries but the second and third toes continues to deviate laterally.  The bump on the bottom of the left great toe can be sore, especially with pressure.  The right medial arch pain, she states this feels like the screws about to pop out and she can feel the hardware.  The bump at the plantar lateral right midfoot is sore with pressure.    Vitals:    07/15/24 1257   BP: 119/72   Weight: 77.1 kg (170 lb)   '      ROS - Pos for CC.  Patient denies current nausea, vomiting, chills, fevers, belly pain, calf pain, chest pain or SOB.  Complete remainder of ROS it otherwise neg.      PE:  Gen:   No apparent distress  Eye:    Visual scanning without deficit  Ear:    Response to auditory stimuli wnl  Lung:    Non-labored breathing on RA noted  Abd:    NTND per patient report  Lymph:    Neg for pitting/non-pitting edema BLE  Vasc:    Pulses palpable, CFT minimally delayed  Neuro:    Light touch sensation intact to all sensory nerve distributions without paresthesias  Derm:    Neg for nodules, lesions or ulcerations  MSK:   Previous left second and third hammertoe surgery with recurrent lateral deviation of the second and third toes.  This is not fully reducible.  There is a peanut sized bump on the bottom of the left great toe that is firm and not  mobile, consistent with a rheumatoid nodule.  She has tenderness along the medial right arch at the talonavicular fusion site.  This is tender to pressure, especially at the distal aspect of the incision.  The hardware does not appear specifically prominent today.  There is a grape sized fluctuant yet fixed mass plantar lateral right midfoot consistent with lipoma, rheumatoid nodule or ganglion  Calf:    Neg for redness, swelling or tenderness      Imaging: MRI left ankle 6/27/2024 - Impression:     1. No mass or fluid collection proximity to patient's marker over the  anterior medial ankle.     2. Fluid collection or mass like lesion plantar to the proximal  plantar fascia-calcaneal tuberosity measuring up to 3.3 cm. 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 Additional considerations  include a complex ganglion cyst, adventitial bursitis, or hematoma.    CT scan right ankle 6/27/2024 - IMPRESSION:      1. No acute osseous abnormality.     2. Subtalar and talonavicular instrumented arthrodesis without  complication. Solid bony bridging across both joints.     3. Naviculocuneiform degenerative change.     4. Fluid collection plantar lateral to the fifth metatarsal base,  nonspecific by CT but considerations include adventitial bursitis.      Assessment:  53 year old female with painful recurrent left second hammertoe; firm nodule plantar left great toe consistent with rheumatoid nodule; medial right arch pain in setting of previous left talonavicular fusion; mass plantar lateral right midfoot      Medical Decision Making/Plan:  Discussed etiologies, anatomy and options  1.  Painful recurrent left second hammertoe  -All both the left second and third hammertoes are deviating laterally again after 3 previous surgeries, the second toe is the main problematic issue.  She asked about recurrent surgery.  I again stated that if 3 previous surgeries had not addressed the issue, a fourth is unlikely to do so.   However, it would be an option to further resect the second and third metatarsal heads and pin across the MPJ.  As am leaving [place], she was referred to discuss this further with Dr. Ayala or Dr. Desouza.  We discussed that it is likely that they may refuse surgery as well.    2.  Firm nodule plantar left great toe consistent with rheumatoid nodule  -Conservatively, we discussed accommodative padding, comfortable shoes, minimizing shoeless walking and resting/icing/anti-inflammatories or Tylenol  -We discussed that surgical excision is an option to consider.  We discussed that recurrence can occur even with surgical management    3.  Medial right arch pain at distal incision of previous talonavicular fusion  -The patient indicates that it feels like the hardware is about to burst out of her ankle.  I reviewed recent x-rays with her.  The medial screw is certainly not prominent or sticking out.  While the screw can be removed, it is very possible that symptoms may persist, especially as she indicates entire medial aspect of her heel can become swollen and discolored.    4.  Mass plantar lateral right midfoot  -I personally interpreted and reviewed the CT scan results with her.  This suggest a fluid collection.  I attempted to aspirate the cyst, see procedure note for details.  Other than scant blood, no fluid was expressed.  Specifically, no bursal or ganglion cyst fluid was noted.  Consider surgical excision    Procedure -after obtaining verbal consent, the skin was anesthetized with 1 cc of 1% lidocaine plain.  The area was then prepped with Betadine.  Using an 18-gauge needle on a 5 cc syringe, I attempted to aspirate fluid.  I reposition the needle multiple times but no fluid was expressed.  A Band-Aid was applied.  Risks that were discussed included but not limited to infection, recurrence.    Follow up: As needed or sooner with acute issues      Billing is based on time of 60 minutes, excluding procedure time,  more than 50% of which was spent counseling and coordinating care    Duong River DPM [facility] FACFAOM  Podiatric Foot & Ankle Surgeon  [facility]  [phone]    Disclaimer: This note consists of symbols derived from keyboarding, dictation and/or voice recognition software. As a result, there may be errors in the script that have gone undetected. Please consider this when interpreting information found in this chart.        Again, thank you for allowing me to participate in the care of your patient.        Sincerely,        Duong River DPM, [place]

## 2024-07-31 ENCOUNTER — OFFICE VISIT (OUTPATIENT)
Dept: PODIATRY | Facility: CLINIC | Age: 54
End: 2024-07-31
Payer: COMMERCIAL

## 2024-07-31 VITALS — BODY MASS INDEX: 27.44 KG/M2 | SYSTOLIC BLOOD PRESSURE: 118 MMHG | WEIGHT: 170 LBS | DIASTOLIC BLOOD PRESSURE: 78 MMHG

## 2024-07-31 DIAGNOSIS — M20.42 HAMMERTOE, BILATERAL: ICD-10-CM

## 2024-07-31 DIAGNOSIS — M05.7A RHEUMATOID ARTHRITIS OF OTHER SITE WITH POSITIVE RHEUMATOID FACTOR (H): ICD-10-CM

## 2024-07-31 DIAGNOSIS — M19.072 ARTHRITIS OF BOTH FEET: ICD-10-CM

## 2024-07-31 DIAGNOSIS — M79.89 MASS OF SOFT TISSUE OF FOOT: ICD-10-CM

## 2024-07-31 DIAGNOSIS — M79.672 FOOT PAIN, BILATERAL: Primary | ICD-10-CM

## 2024-07-31 DIAGNOSIS — M19.071 ARTHRITIS OF BOTH FEET: ICD-10-CM

## 2024-07-31 DIAGNOSIS — M79.671 FOOT PAIN, BILATERAL: Primary | ICD-10-CM

## 2024-07-31 DIAGNOSIS — M20.41 HAMMERTOE, BILATERAL: ICD-10-CM

## 2024-07-31 PROCEDURE — 99214 OFFICE O/P EST MOD 30 MIN: CPT | Performed by: PODIATRIST

## 2024-07-31 RX ORDER — CLINDAMYCIN HCL 150 MG
150 CAPSULE ORAL 4 TIMES DAILY
COMMUNITY
End: 2024-10-02

## 2024-07-31 NOTE — PROGRESS NOTES
Podiatry / Foot and Ankle Surgery Progress Note    July 31, 2024    Subject: Patient was seen for multiple issues.  Notes that she has a bump on the side of the right foot and the bottom of the left great toe that she would like surgically removed.  Her left second toe she has had multiple surgeries on to try to straighten but they continue to deviate.  She has pain to the ball of the foot especially on the right side under the fifth toe.  Here with family member.  Wondering what can be done for these issues.    Vitals: /78   Wt 77.1 kg (170 lb)   LMP  (LMP Unknown)   BMI 27.44 kg/m      General:  Patient is alert and orientated.  NAD.    Vascular:  DP and PT pulses are palpable.  No edema or varicosities noted.  CFT's < 3secs.  Skin temp is normal.    Neuro:  Light and gross touch sensation intact to digits, dorsum, and plantar aspects of the feet.    Derm:  Skin is supple.  No rashes, lesions, or ulcerations noted.    Musculoskeletal: Left foot -increased arch height.  No range of motion of the first metatarsal phalangeal joint due to previous fusion.  Plantarly subluxed IPJ of the left great toe.  Lateral deviation of toes 2 through 4 left foot.    Right foot -increased arch height.  Painful soft tissue mass to the lateral aspect of the fifth metatarsal base.  Semirigid contractures of toes 2 through 5 right foot.    Imaging: bilateral foot xrays -  No acute osseous abnormality.  2. Severe degenerative change with likely erosion at the right fifth  metatarsophalangeal joint.  3. Mild to moderate right greater than left degenerative changes at  the naviculocuneiform joints.    MRI left ankle -  No mass or fluid collection proximity to patient's marker over the  anterior medial ankle.     2. Fluid collection or mass like lesion plantar to the proximal  plantar fascia-calcaneal tuberosity measuring up to 3.3 cm. 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 Additional considerations  include a complex  ganglion cyst, adventitial bursitis, or hematoma.    CT right ankle -  No acute osseous abnormality.     2. Subtalar and talonavicular instrumented arthrodesis without  complication. Solid bony bridging across both joints.     3. Naviculocuneiform degenerative change.     4. Fluid collection plantar lateral to the fifth metatarsal base,  nonspecific by CT but considerations include adventitial bursitis.    Assessment:    Foot pain, bilateral  Mass of soft tissue of foot  Hammertoe, bilateral  Rheumatoid arthritis of other site with positive rheumatoid factor (H)  Arthritis of both feet    Medical Decision Making/Plan: Reviewed and discussed x-rays and CTs and MRI results with patient.  Based on MRI she does have an adventitial bursa on the lateral aspect of the right fifth metatarsal base.  This could be pushing on the sural nerve and we talked about going in to remove this.  Discussed that given her rheumatoid arthritis and the nature of these they are highly recurrent.  Her left great toe however it is not a soft tissue mass it is the subluxation of the proximal phalanx at the IPJ joint.  This would likely need an arthroplasty or fusion.  She is not interested in that at this time.  Recommend trying a toe cover to give this area cushion along with an insert with a metatarsal pad to try to help take pressure off of the this area.    She would like to proceed with the soft tissue mass removal on the right foot however.  Will place an order for my surgery scheduler to contact her.  Talked about risks including infection, numbness, continued pain,  recurrence, need for further surgery, blood loss, blood clotting. You will scar.  She is currently on medication for her RA and would have to be off of this a month prior to her surgery.  We discussed that this medication can sometimes inhibit or slow healing which she does understand.  Discussed she would be in a boot for 2 to 4 weeks depending on how long it took the skin  to heal and she would not be able to drive during that time because the boots on her right foot.      Reviewed and discussed causes of hammertoes with patient.  Explained that this can be caused by an overpowering of muscles or by the way we walk.  Discussed conservative treatments such as orthotics, pads, shoe gear.  Explained that sometimes the flexor tendons can be cut to try and straighten the toe and reduce rubbing. This is normally done in office and patient is weight bearing in postop she for 1-2 weeks.  We also discussed surgical intervention to remove the joint and possibly fuse the toe.  Normally patient has a pin sticking out of the toe for about 6 weeks and can not get the foot wet. Patient would have to be minimal weight bearing in cam boot.      Discussed that the pain to the ball of the right foot under the fifth toe is from the hammering of the toe.  We talked about inserts with a metatarsal pad to try to help take pressure off of this area as there is not a good surgical correction for this.    All questions were answered to patient's satisfaction she will call further questions or concerns.      Patient Risk Factor:  Patient is a medium risk factor for infection.     Vashti Ayala DPM, Podiatry/Foot and Ankle Surgery

## 2024-07-31 NOTE — LETTER
7/31/2024      Kalyn Rivero  [address] [place] [id number]      Dear Colleague,    Thank you for referring your patient, Kalyn Rivero, to the [facility] PODI[facility]. Please see a copy of my visit note below.    Podiatry / Foot and Ankle Surgery Progress Note    July 31, 2024    Subject: Patient was seen for multiple issues.  Notes that she has a bump on the side of the right foot and the bottom of the left great toe that she would like surgically removed.  Her left second toe she has had multiple surgeries on to try to straighten but they continue to deviate.  She has pain to the ball of the foot especially on the right side under the fifth toe.  Here with family member.  Wondering what can be done for these issues.    Vitals: /78   Wt 77.1 kg (170 lb)   LMP  (LMP Unknown)   BMI 27.44 kg/m      General:  Patient is alert and orientated.  NAD.    Vascular:  DP and PT pulses are palpable.  No edema or varicosities noted.  CFT's < 3secs.  Skin temp is normal.    Neuro:  Light and gross touch sensation intact to digits, dorsum, and plantar aspects of the feet.    Derm:  Skin is supple.  No rashes, lesions, or ulcerations noted.    Musculoskeletal: Left foot -increased arch height.  No range of motion of the first metatarsal phalangeal joint due to previous fusion.  Plantarly subluxed IPJ of the left great toe.  Lateral deviation of toes 2 through 4 left foot.    Right foot -increased arch height.  Painful soft tissue mass to the lateral aspect of the fifth metatarsal base.  Semirigid contractures of toes 2 through 5 right foot.    Imaging: bilateral foot xrays -  No acute osseous abnormality.  2. Severe degenerative change with likely erosion at the right fifth  metatarsophalangeal joint.  3. Mild to moderate right greater than left degenerative changes at  the naviculocuneiform joints.    MRI left ankle -  No mass or fluid collection proximity to patient's marker over  the  anterior medial ankle.     2. Fluid collection or mass like lesion plantar to the proximal  plantar fascia-calcaneal tuberosity measuring up to 3.3 cm. 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 Additional considerations  include a complex ganglion cyst, adventitial bursitis, or hematoma.    CT right ankle -  No acute osseous abnormality.     2. Subtalar and talonavicular instrumented arthrodesis without  complication. Solid bony bridging across both joints.     3. Naviculocuneiform degenerative change.     4. Fluid collection plantar lateral to the fifth metatarsal base,  nonspecific by CT but considerations include adventitial bursitis.    Assessment:    Foot pain, bilateral  Mass of soft tissue of foot  Hammertoe, bilateral  Rheumatoid arthritis of other site with positive rheumatoid factor (H)  Arthritis of both feet    Medical Decision Making/Plan: Reviewed and discussed x-rays and CTs and MRI results with patient.  Based on MRI she does have an adventitial bursa on the lateral aspect of the right fifth metatarsal base.  This could be pushing on the sural nerve and we talked about going in to remove this.  Discussed that given her rheumatoid arthritis and the nature of these they are highly recurrent.  Her left great toe however it is not a soft tissue mass it is the subluxation of the proximal phalanx at the IPJ joint.  This would likely need an arthroplasty or fusion.  She is not interested in that at this time.  Recommend trying a toe cover to give this area cushion along with an insert with a metatarsal pad to try to help take pressure off of the this area.    She would like to proceed with the soft tissue mass removal on the right foot however.  Will place an order for my surgery scheduler to contact her.  Talked about risks including infection, numbness, continued pain,  recurrence, need for further surgery, blood loss, blood clotting. You will scar.  She is currently on medication for her  RA and would have to be off of this a month prior to her surgery.  We discussed that this medication can sometimes inhibit or slow healing which she does understand.  Discussed she would be in a boot for 2 to 4 weeks depending on how long it took the skin to heal and she would not be able to drive during that time because the boots on her right foot.      Reviewed and discussed causes of hammertoes with patient.  Explained that this can be caused by an overpowering of muscles or by the way we walk.  Discussed conservative treatments such as orthotics, pads, shoe gear.  Explained that sometimes the flexor tendons can be cut to try and straighten the toe and reduce rubbing. This is normally done in office and patient is weight bearing in postop she for 1-2 weeks.  We also discussed surgical intervention to remove the joint and possibly fuse the toe.  Normally patient has a pin sticking out of the toe for about 6 weeks and can not get the foot wet. Patient would have to be minimal weight bearing in cam boot.      Discussed that the pain to the ball of the right foot under the fifth toe is from the hammering of the toe.  We talked about inserts with a metatarsal pad to try to help take pressure off of this area as there is not a good surgical correction for this.    All questions were answered to patient's satisfaction she will call further questions or concerns.      Patient Risk Factor:  Patient is a medium risk factor for infection.     Vashti Ayala DPM, Podiatry/Foot and Ankle Surgery      Again, thank you for allowing me to participate in the care of your patient.        Sincerely,        Vashti Ayala DPM, Podiatry/Foot and Ankle Surgery

## 2024-07-31 NOTE — PATIENT INSTRUCTIONS
Thank you for choosing [facility] / [facility]!    DR DAVIDSON'S CLINIC:  [facility]   [address]   [place] [address]      TRIAGE LINE: [phone]  APPOINTMENTS: [phone]  RADIOLOGY: [phone]  SET UP SURGERY: [phone]  PHYSICAL THERAPY: [phone]   FAX NUMBER: [phone]  BILLING QUESTIONS: [phone]     OVER THE COUNTER INSERTS WITH METATARSAL PAD- POWER STEP OR NEW BALANCE    [email or web address] or call [phone]  TOE [email or web address]/CAPS               Follow up: Surgery      GETTING READY FOR YOUR SURGERY  ONE-THREE WEEKS BEFORE  1. See your Family Doctor or Primary Care Doctor for a History and Physical. If you do not, we may need to change the date of your surgery.  2. Please see pre-surgical medications below to which medications need to be stopped before surgery and when.    TEN OR MORE DAYS BEFORE    1. [place] with the hospital. (For [facility])      By Phone: [phone].      By Internet: [email or web address]. Choose [facility].      If you do not register by phone or online, we will call to help you register.    SAME DAY SURGERY PATIENTS  1. You will need a family member of friend to drive you home. If you do not have one the surgery will be cancelled or rescheduled.  2. You will need a responsible adult to stay with you that night after the surgery.       We will ask this person to listen to some instructions before you leave the hospital.  * If your child is having surgery, and you would like a tour of the hospital, please call: [phone].      DAY BEFORE SURGERY  1. DO NOT EAT OR DRINK ANYTHING AFTER MIDNIGHT THE NIGHT BEFORE YOUR SURGERY!   2. DO NOT DRINK ALCOHOL.  3. Do not take over the counter drugs.  4. Some people need to have blood tests at the hospital. If you need blood tests, we will tell you in advance.  5. Take medications as directed by your doctor. You may take these with a small amount of  water.  6. Do not chew gum, chew tobacco, or suck on hard candy the day of surgery.  7. Bring your insurance cards, a list of your medicines and co-pays you might need. Leave jewelry and other valuables at home.  8. If you received papers at your doctor's office, bring these with you to the surgery.    If you have questions about these instructions, please call: [phone]  Ask to speak with a pre-admitting nurse.    PRESURGICAL MEDICATIONS:  Certain prescription, over-the-counter, and herbal medications interfere with healing after an operation. The main concern relates to medications that increase bleeding at the surgical site. Excess blood under the incision results in poor wound healing, excess pain, increased scarring, and a higher risk of infection.    Some medications slow the healing process of bone. Medications can also interfere with the anesthesia drugs that keep you asleep during the operation. It is important to ensure that these medications are out of your system prior to the operation. The list below details a number of medications that are of concern. Pay special attention to how long you should avoid these medications before your operation. Please note that this list is not complete. You should ask your surgeon or pharmacist if you are uncertain about other medications. Any herbal supplement not listed should be discontinued at least one week prior to surgery.    Aspirin: Hold for one week prior to surgery and restart the day after surgery. This over the counter medication promotes bleeding.    Motrin / Ibuprofen / Aleve / Advil / NSAIDS:  Stop one week prior to surgery. These medications affect bleeding and may cause delay in bone healing. Avoid taking these medications for six weeks after bone surgeries. Other procedures may allow you to restart sooner than 6 weeks after surgery.    Coumadin / Plavix: Your primary care provider will manage Coumadin in relation to surgery. Coumadin may result in  excessive bleeding and may be adjusted before and after surgery.    Enbriel: Stop two weeks prior to surgery and restart two weeks after surgery. This medication can effect soft tissue healing and increases the risk of infection.    Remicade: Stop 8-12 weeks before surgery and restart two weeks after surgery. This medication can affect soft tissue healing and increases the risk of infection.    Humira: Stop 4 weeks before surgery and restart two weeks after surgery. This medication can affect soft tissue healing and increases the risk of infection.    Methotrexate: Stop one dose prior to surgery. This medication will be restarted when the wound appears to be healing well. Please ask your surgeon about restarting this medication when you are being seen in the office for wound checks.    Kava: Stop at least one day prior to surgery and may restart one day after surgery. Kava may increase the sedative effect of anesthetics that are given during the operation. Kava can also increase bleeding at the surgical site.    Ephedra (ma bustos): Stop at least one day prior to surgery and may restart one day after surgery.  Ephedra may increase the risk of heart attack and stroke. This medication can also increase bleeding at the surgical site.    Barb's Wort: Stop at least five days before surgery and may restart one day after surgery. Barb's wort may diminish the effects of several drugs that are given during surgery.    Ginseng: Stop at least one week prior to surgery and may restart one day after surgery.  Ginseng lowers blood sugar and may increase bleeding at the surgical site.    Ginkgo: Stop 36 hours before surgery and may restart one day after surgery. Ginkgo may increase bleeding at the surgical site.    Garlic: Stop at least one week prior to surgery and may restart one day after. Garlic may increase bleeding at the surgery site.    Valerian: Do a slow steady decrease in your daily dose over a period of 2-3  weeks before surgery to decrease the chance of withdrawal symptoms. Valerian may increase the sedative effect of anesthetics given during the operation.    Echinacea: There is no data on stopping echinacea prior to surgery. This medication though can be associated with allergic reactions and suppression of your immune system.    Vitamin E, Omega-3, Flax, Fish Oil, Glucosamine and Chondroitin: Stop 2 weeks prior to surgery and may restart one day after. These herbal medications can increase risk of bleeding at surgical site.      POST OPERATIVE HOME CARE INSTRUCTIONS  Activities: You should rest today. Stay off your feet as much as possible and keep your foot elevated above the level of your heart (about two pillow height). Wear your surgical shoe at all times when up. Limit walking to 5 to 10 minutes per hour over the next few days if your doctor has previously told you that you can put some weight on the foot after surgery, although limit the weight to your heel. If you are supposed to be non-weight bearing, that means NO WEIGHT AT ALL ON THE FOOT. Use an ice pack on the ankle while awake 20 minutes per hour to help decrease pain and swelling.     Discomfort: If a prescription for pain was given, take as directed. If no pain medication was ordered, you may take a non-prescription, non-aspirin pain medication. If the pain is not relieved by pain medication, call the clinic.     Incision and Dressing: Your surgical dressing is a sterile dressing and should be left in place until removed by your physician. Keep the dressing dry by covering it with a plastic bag for showers, taking baths with the surgical foot out of the tub, or sponge bathing. Some bleeding on the dressing should be expected. If however, you notice active or excessive bleeding or a temperature over 100 degrees by mouth, call the clinic. Do not change dressing by yourself.  If the dressing becomes wet or dirty, please call the clinic as it may need a  new sterile dressing applied. You may start getting the foot wet after the stitches are removed.     Do not wear regular shoes with a surgical bandage and/or external pins in your foot. Wear loose fitting clothing that easily will slip over the bandage and/or pins. Do not cover your surgical foot with blankets as they may damage the dressing/pins. Also, remember that dogs are not aware of your surgery. Please keep them away from the bandage/pins.   If your surgeon places external pins in your foot, you must keep the foot dry until the pins are removed at 6-8 weeks after the surgery. Pins should be covered with a dressing for protection. You should examine the pins and your skin often. Check for any spreading redness or yellow drainage from the pin areas. Do not apply ointment around the pins. Do not push a loose pin back into your foot. Please call the clinic if the pin is spinning or moving in and out. If the pins are bumped or loosened they may need to be removed early. This may affect your surgical outcome.   Please call the clinic if you feel there is a problem with your pins and/or surgical bandage.    TIPS FOR SUCCESSFUL HEALING  How you care for the surgical site is critically important to achieve a successful result after surgery. Avoidance of injury, infection, excess swelling, scar tissue and stiffness are highly dependent on the care you provide over the next six weeks. Please do not hesitate to call if you have questions or concerns.   Your foot requires significant rest and elevation. Sitting for long hours with your foot elevated, however, will create its own problems. Expect muscle aches, back pain, cramps, etc. Optimal posture, lumbar support, back exercises, ice and heat may all help with your new aches and pains. Do not apply a heating pad to your foot or leg as this can cause increase swelling and pain. Rather use ice in those areas.   Pain medications cause drowsiness. You may frequently sleep  during the day and then have trouble sleeping at night. Over the counter sleep aids might be more effective than narcotic pain medication to achieve a reasonable night's sleep.    Narcotic pain medications and inactivity lead to constipation. Limiting use of narcotics will help minimize this problem. The pain medications will not completely alleviate your pain. The purpose of pain pills is to take the edge off and help you get through the first few days. You can substitute Extra Strength Tylenol if pain is mild. Please note that narcotic pain pills usually contain acetaminophen (the active ingredient in Tylenol) so be careful to avoid the maximum dose of acetaminophen. You should take measures to avoid constipation by drinking plenty of water, eating lots of fruit and vegetables and taking the recommended dose of Metamucil or a similar fiber supplement. These measures should be continued for as long as you require narcotic pain medications and are inactive.     Showering is a major challenge. Your incision requires about three days to become sealed from water. Your bandage should not get wet and should not be removed. Do not attempt showering for the first three days. A sponge bath is preferred. You may attempt to shower on the fourth day after the operation. Your foot should be covered with a bag, tape and rubber bands. Double bagging is preferred. Standing in the shower with a bag on your foot is quite hazardous. A portable shower stool would be ideal. The bandage will need to be changed in the office if it becomes moistened. A moist bandage will not dry on its own. A moist dressing may lead to infection.   Stiffness will develop after any operation due to scarring. The scar tissue begins to form immediately after the surgery. Inactivity can cause excess stiffness and may lead to blood clots in your legs. Frequent range of motion exercises will help decrease stiffness, blood clots, scar tissue and adhesions.  Please call if you are unsure about these recommendations.   Good luck and best wishes on a prompt recovery. Healing is slow but an important step in your recovery. You are in control of the final result. Please use this time wisely. Please do not hesitate to call if you have questions, concerns or comments.    * If you have any post-operative questions or concerns regarding your procedure, call our triage team at the [facility] at [phone].     GETTING READY FOR YOUR SURGERY  ONE-THREE WEEKS BEFORE  1. See your Family Doctor or Primary Care Doctor for a History and Physical. If you do not, we may need to change the date of your surgery.  2. Please see pre-surgical medications below to which medications need to be stopped before surgery and when.    TEN OR MORE DAYS BEFORE    1. [place] with the hospital. (For [facility])      By Phone: [phone].      By Internet: [email or web address]. Choose [facility].      If you do not register by phone or online, we will call to help you register.    SAME DAY SURGERY PATIENTS  1. You will need a family member of friend to drive you home. If you do not have one the surgery will be cancelled or rescheduled.  2. You will need a responsible adult to stay with you that night after the surgery.       We will ask this person to listen to some instructions before you leave the hospital.  * If your child is having surgery, and you would like a tour of the hospital, please call: [phone].      DAY BEFORE SURGERY  1. DO NOT EAT OR DRINK ANYTHING AFTER MIDNIGHT THE NIGHT BEFORE YOUR SURGERY!   2. DO NOT DRINK ALCOHOL.  3. Do not take over the counter drugs.  4. Some people need to have blood tests at the hospital. If you need blood tests, we will tell you in advance.  5. Take medications as directed by your doctor. You may take these with a small amount of water.  6. Do not chew gum, chew tobacco, or suck on hard candy the day of  surgery.  7. Bring your insurance cards, a list of your medicines and co-pays you might need. Leave jewelry and other valuables at home.  8. If you received papers at your doctor's office, bring these with you to the surgery.    If you have questions about these instructions, please call: [phone]  Ask to speak with a pre-admitting nurse.    PRESURGICAL MEDICATIONS:  Certain prescription, over-the-counter, and herbal medications interfere with healing after an operation. The main concern relates to medications that increase bleeding at the surgical site. Excess blood under the incision results in poor wound healing, excess pain, increased scarring, and a higher risk of infection.    Some medications slow the healing process of bone. Medications can also interfere with the anesthesia drugs that keep you asleep during the operation. It is important to ensure that these medications are out of your system prior to the operation. The list below details a number of medications that are of concern. Pay special attention to how long you should avoid these medications before your operation. Please note that this list is not complete. You should ask your surgeon or pharmacist if you are uncertain about other medications. Any herbal supplement not listed should be discontinued at least one week prior to surgery.    Aspirin: Hold for one week prior to surgery and restart the day after surgery. This over the counter medication promotes bleeding.    Motrin / Ibuprofen / Aleve / Advil / NSAIDS:  Stop one week prior to surgery. These medications affect bleeding and may cause delay in bone healing. Avoid taking these medications for six weeks after bone surgeries. Other procedures may allow you to restart sooner than 6 weeks after surgery.    Coumadin / Plavix: Your primary care provider will manage Coumadin in relation to surgery. Coumadin may result in excessive bleeding and may be adjusted before and after  surgery.    Enbriel: Stop two weeks prior to surgery and restart two weeks after surgery. This medication can effect soft tissue healing and increases the risk of infection.    Remicade: Stop 8-12 weeks before surgery and restart two weeks after surgery. This medication can affect soft tissue healing and increases the risk of infection.    Humira: Stop 4 weeks before surgery and restart two weeks after surgery. This medication can affect soft tissue healing and increases the risk of infection.    Methotrexate: Stop one dose prior to surgery. This medication will be restarted when the wound appears to be healing well. Please ask your surgeon about restarting this medication when you are being seen in the office for wound checks.    Kava: Stop at least one day prior to surgery and may restart one day after surgery. Kava may increase the sedative effect of anesthetics that are given during the operation. Kava can also increase bleeding at the surgical site.    Ephedra (ma bustos): Stop at least one day prior to surgery and may restart one day after surgery.  Ephedra may increase the risk of heart attack and stroke. This medication can also increase bleeding at the surgical site.    [place]'s Wort: Stop at least five days before surgery and may restart one day after surgery. Barb's wort may diminish the effects of several drugs that are given during surgery.    Ginseng: Stop at least one week prior to surgery and may restart one day after surgery.  Ginseng lowers blood sugar and may increase bleeding at the surgical site.    Ginkgo: Stop 36 hours before surgery and may restart one day after surgery. Ginkgo may increase bleeding at the surgical site.    Garlic: Stop at least one week prior to surgery and may restart one day after. Garlic may increase bleeding at the surgery site.    Valerian: Do a slow steady decrease in your daily dose over a period of 2-3 weeks before surgery to decrease the chance of withdrawal  symptoms. Valerian may increase the sedative effect of anesthetics given during the operation.    Echinacea: There is no data on stopping echinacea prior to surgery. This medication though can be associated with allergic reactions and suppression of your immune system.    Vitamin E, Omega-3, Flax, Fish Oil, Glucosamine and Chondroitin: Stop 2 weeks prior to surgery and may restart one day after. These herbal medications can increase risk of bleeding at surgical site.     POTENTIAL COMPLICATIONS OF FOOT & ANKLE SURGERY  Undergoing a surgical procedure involves a certain amount of risk. Risks of complications vary depending on the complexity of the surgery and how you take care of the surgical area during the healing process. Complications can range from minor infection to death. Some complications are temporary while others will be permanent.  Your surgeon weighs the risk of complications vs the potential benefit of undergoing surgery. You need to consider your tolerance for unexpected problems as you decide whether to undergo surgery.    Foot and Ankle surgery involves cutting skin, bone, ligaments, blood vessels and joints.  These structures heal well but not without consequence. Any break to the skin can lead to infection. A deep infection involves bones or joints which can be devastating. Deep infection can lead to amputation or could spread to other parts of your body. Most infections are minor and easily treated with oral antibiotics. Infections are often times from bacteria already present on your skin. Proper care of the surgical site is an essential component of avoiding infection. Do not get the bandage wet and take proper care of external pins to avoid these problems.     Joint stiffness is inherent to any foot or ankle surgery. Joint surgery is a major component of reconstructive foot and ankle procedures. The ligaments and tissues around the joint are cut, and later repaired. Scare tissue limits joint  mobility. This can be permanent but generally improves over the course of one year.    Surgery involves dissection around nerves. Visible nerves are moved out of the way while very small nerves are cut. Scar tissue develops around nerves and can lead to nerve pain, numbness, or neuromas. Nerve symptoms can be permanent. This can lead to numbness or sometimes hypersensitivity to touch and problems wearing shoes.    Bones do not always heal after surgery. Poor healing after a bone cut or joint fusion can lead to an extended period of casting or repeat surgery. Electronic bone stimulators are sometimes used to stimulate poor healing of bone. Nonunion is when joint fusion does not take.  This can occur as often as 10% of the time. Smoking doubles your risk of poor bone healing to 20%.    Bone grafting is sometimes necessary during the original or subsequent surgery. Bone is sometimes taken from other parts of your body or freeze dried bone from a bone bank from a bone bank or synthetic bone material might be used.    A scar is always present after foot and ankle surgery. The scar will be visible and could be sensitive. Some people develop excessive scarring, which cannot be controlled by the surgeon. Scars can be unsightly and can restrict joint mobility.    Blood clots can develop in the calf after surgery. Foot and ankle surgery is a predisposing factor for blood clots. The blood clot could break and travel to your lung.  This condition can lead to death. Early warning signs could include calf swelling and pain, chest pain or shortness of breath. This is an emergency that requires immediate attention by a medical doctor!    Surgery will not necessarily create a pain-free foot. Even normal feet hurt. Crooked toes, bunions, neuromas, flat feet and arthritis should all be considered permanent conditions.  Ankle pain commonly requires multiple surgeries over a lifetime. Do not assume that having surgery will permanently  fix your condition. You may need permanent alteration in shoes and activities to accommodate your foot and ankle problem.    Careful attention to post-operative recommendations will dramatically reduce your risk of complications. Proper dressing, wound care, elevation and rest will be essential to get the wound healed and minimize scarring. Strict attention to activity restrictions, such as non-weight bearing, or partial weight bearing is essential. Internal fixation devices may not resist the stress of walking. Some select surgeries allow the patient to walk, however this should be very minimal.    Despite these concerns, foot and ankle surgery leads to a high level of patient satisfaction. Your surgeon would not recommend surgery if he/she did not expect your foot to improve. Talk to your surgeon about any of the above issues.    POST OPERATIVE HOME CARE INSTRUCTIONS  Activities: You should rest today. Stay off your feet as much as possible and keep your foot elevated above the level of your heart (about two pillow height). Wear your surgical shoe at all times when up. Limit walking to 5 to 10 minutes per hour over the next few days if your doctor has previously told you that you can put some weight on the foot after surgery, although limit the weight to your heel. If you are supposed to be non-weight bearing, that means NO WEIGHT AT ALL ON THE FOOT. Use an ice pack on the ankle while awake 20 minutes per hour to help decrease pain and swelling.     Discomfort: If a prescription for pain was given, take as directed. If no pain medication was ordered, you may take a non-prescription, non-aspirin pain medication. If the pain is not relieved by pain medication, call the clinic.     Incision and Dressing: Your surgical dressing is a sterile dressing and should be left in place until removed by your physician. Keep the dressing dry by covering it with a plastic bag for showers, taking baths with the surgical foot out  of the tub, or sponge bathing. Some bleeding on the dressing should be expected. If however, you notice active or excessive bleeding or a temperature over 100 degrees by mouth, call the clinic. Do not change dressing by yourself.  If the dressing becomes wet or dirty, please call the clinic as it may need a new sterile dressing applied. You may start getting the foot wet after the stitches are removed.     Do not wear regular shoes with a surgical bandage and/or external pins in your foot. Wear loose fitting clothing that easily will slip over the bandage and/or pins. Do not cover your surgical foot with blankets as they may damage the dressing/pins. Also, remember that dogs are not aware of your surgery. Please keep them away from the bandage/pins.   If your surgeon places external pins in your foot, you must keep the foot dry until the pins are removed at 6-8 weeks after the surgery. Pins should be covered with a dressing for protection. You should examine the pins and your skin often. Check for any spreading redness or yellow drainage from the pin areas. Do not apply ointment around the pins. Do not push a loose pin back into your foot. Please call the clinic if the pin is spinning or moving in and out. If the pins are bumped or loosened they may need to be removed early. This may affect your surgical outcome.   Please call the clinic if you feel there is a problem with your pins and/or surgical bandage.    TIPS FOR SUCCESSFUL HEALING  How you care for the surgical site is critically important to achieve a successful result after surgery. Avoidance of injury, infection, excess swelling, scar tissue and stiffness are highly dependent on the care you provide over the next six weeks. Please do not hesitate to call if you have questions or concerns.   Your foot requires significant rest and elevation. Sitting for long hours with your foot elevated, however, will create its own problems. Expect muscle aches, back  pain, cramps, etc. Optimal posture, lumbar support, back exercises, ice and heat may all help with your new aches and pains. Do not apply a heating pad to your foot or leg as this can cause increase swelling and pain. Rather use ice in those areas.   Pain medications cause drowsiness. You may frequently sleep during the day and then have trouble sleeping at night. Over the counter sleep aids might be more effective than narcotic pain medication to achieve a reasonable night's sleep.    Narcotic pain medications and inactivity lead to constipation. Limiting use of narcotics will help minimize this problem. The pain medications will not completely alleviate your pain. The purpose of pain pills is to take the edge off and help you get through the first few days. You can substitute Extra Strength Tylenol if pain is mild. Please note that narcotic pain pills usually contain acetaminophen (the active ingredient in Tylenol) so be careful to avoid the maximum dose of acetaminophen. You should take measures to avoid constipation by drinking plenty of water, eating lots of fruit and vegetables and taking the recommended dose of Metamucil or a similar fiber supplement. These measures should be continued for as long as you require narcotic pain medications and are inactive.     Showering is a major challenge. Your incision requires about three days to become sealed from water. Your bandage should not get wet and should not be removed. Do not attempt showering for the first three days. A sponge bath is preferred. You may attempt to shower on the fourth day after the operation. Your foot should be covered with a bag, tape and rubber bands. Double bagging is preferred. Standing in the shower with a bag on your foot is quite hazardous. A portable shower stool would be ideal. The bandage will need to be changed in the office if it becomes moistened. A moist bandage will not dry on its own. A moist dressing may lead to infection.    Stiffness will develop after any operation due to scarring. The scar tissue begins to form immediately after the surgery. Inactivity can cause excess stiffness and may lead to blood clots in your legs. Frequent range of motion exercises will help decrease stiffness, blood clots, scar tissue and adhesions. Please call if you are unsure about these recommendations.   Good luck and best wishes on a prompt recovery. Healing is slow but an important step in your recovery. You are in control of the final result. Please use this time wisely. Please do not hesitate to call if you have questions, concerns or comments.    * If you have any post-operative questions or concerns regarding your procedure, call our triage team at the [facility] at [phone] (option 4).

## 2024-08-02 ENCOUNTER — PATIENT OUTREACH (OUTPATIENT)
Dept: FAMILY MEDICINE | Facility: CLINIC | Age: 54
End: 2024-08-02
Payer: COMMERCIAL

## 2024-08-02 NOTE — LETTER
August 2, 2024      Kalyn Rivero  [address] [place] [id number]        Dear ,    This letter is to remind you that you are due for your follow-up Pap smear and Human Papillomavirus (HPV) test.    Please call [phone] to schedule your appointment at your earliest convenience.    If you have completed the appointment outside of the [facility] system, please have the records forwarded to our office. We will update your chart for your provider to review before your next annual wellness visit.     Thank you for choosing [facility]!      Sincerely,    Your [facility] Care Team

## 2024-08-07 ENCOUNTER — TELEPHONE (OUTPATIENT)
Dept: PODIATRY | Facility: CLINIC | Age: 54
End: 2024-08-07

## 2024-08-07 NOTE — TELEPHONE ENCOUNTER
Patient has been scheduled for surgery. Details are below.    Date of Surgery: 09/30/24    Approximate Arrival Time: SURGERY CENTER WILL CALL 3/4 DAYS PRIOR TO CONFIRM A TIME   Surgeon:MADELINE HERRERA PROVIDERS: Dr. Vashti Ayala    Procedure: EXCISION MASS RIGHT FOOT  [place] [facility] [place] locations: [facility],  [address] [place] [address]  Surgery Consult: NA  PreOp Physical: 09/04/24  PostOp: 10/08 & 10/16/24  Packet Mailed/MyChart Sent: YES  Added to [place]: YES    Spoke to: SANDEE

## 2024-08-08 ENCOUNTER — TRANSFERRED RECORDS (OUTPATIENT)
Dept: HEALTH INFORMATION MANAGEMENT | Facility: CLINIC | Age: 54
End: 2024-08-08
Payer: COMMERCIAL

## 2024-08-30 ENCOUNTER — ANCILLARY PROCEDURE (OUTPATIENT)
Dept: MAMMOGRAPHY | Facility: CLINIC | Age: 54
End: 2024-08-30
Payer: COMMERCIAL

## 2024-08-30 DIAGNOSIS — Z12.31 VISIT FOR SCREENING MAMMOGRAM: ICD-10-CM

## 2024-08-30 PROCEDURE — 77067 SCR MAMMO BI INCL CAD: CPT | Mod: TC | Performed by: RADIOLOGY

## 2024-08-30 PROCEDURE — 77063 BREAST TOMOSYNTHESIS BI: CPT | Mod: TC | Performed by: RADIOLOGY

## 2024-09-03 RX ORDER — CLONAZEPAM 1 MG/1
1 TABLET ORAL DAILY
COMMUNITY
Start: 2024-08-06

## 2024-09-03 NOTE — PATIENT INSTRUCTIONS

## 2024-09-04 ENCOUNTER — OFFICE VISIT (OUTPATIENT)
Dept: FAMILY MEDICINE | Facility: CLINIC | Age: 54
End: 2024-09-04
Payer: COMMERCIAL

## 2024-09-04 VITALS
SYSTOLIC BLOOD PRESSURE: 138 MMHG | HEART RATE: 86 BPM | DIASTOLIC BLOOD PRESSURE: 82 MMHG | TEMPERATURE: 98.2 F | OXYGEN SATURATION: 99 % | HEIGHT: 66 IN | WEIGHT: 172.3 LBS | RESPIRATION RATE: 19 BRPM | BODY MASS INDEX: 27.69 KG/M2

## 2024-09-04 DIAGNOSIS — M25.871 MASS OF JOINT OF RIGHT FOOT: ICD-10-CM

## 2024-09-04 DIAGNOSIS — Z01.818 PREOP GENERAL PHYSICAL EXAM: Primary | ICD-10-CM

## 2024-09-04 DIAGNOSIS — Z86.2 HISTORY OF ANEMIA: ICD-10-CM

## 2024-09-04 DIAGNOSIS — K21.9 GASTROESOPHAGEAL REFLUX DISEASE, UNSPECIFIED WHETHER ESOPHAGITIS PRESENT: ICD-10-CM

## 2024-09-04 LAB
BASOPHILS # BLD AUTO: 0.1 10E3/UL (ref 0–0.2)
BASOPHILS NFR BLD AUTO: 1 %
EOSINOPHIL # BLD AUTO: 0.3 10E3/UL (ref 0–0.7)
EOSINOPHIL NFR BLD AUTO: 5 %
ERYTHROCYTE [DISTWIDTH] IN BLOOD BY AUTOMATED COUNT: 13.6 % (ref 10–15)
HCT VFR BLD AUTO: 39.6 % (ref 35–47)
HGB BLD-MCNC: 12.6 G/DL (ref 11.7–15.7)
IMM GRANULOCYTES # BLD: 0 10E3/UL
IMM GRANULOCYTES NFR BLD: 0 %
LYMPHOCYTES # BLD AUTO: 2 10E3/UL (ref 0.8–5.3)
LYMPHOCYTES NFR BLD AUTO: 28 %
MCH RBC QN AUTO: 29.6 PG (ref 26.5–33)
MCHC RBC AUTO-ENTMCNC: 31.8 G/DL (ref 31.5–36.5)
MCV RBC AUTO: 93 FL (ref 78–100)
MONOCYTES # BLD AUTO: 0.6 10E3/UL (ref 0–1.3)
MONOCYTES NFR BLD AUTO: 9 %
NEUTROPHILS # BLD AUTO: 4.1 10E3/UL (ref 1.6–8.3)
NEUTROPHILS NFR BLD AUTO: 58 %
PLATELET # BLD AUTO: 218 10E3/UL (ref 150–450)
RBC # BLD AUTO: 4.25 10E6/UL (ref 3.8–5.2)
WBC # BLD AUTO: 7.1 10E3/UL (ref 4–11)

## 2024-09-04 PROCEDURE — 36415 COLL VENOUS BLD VENIPUNCTURE: CPT | Performed by: FAMILY MEDICINE

## 2024-09-04 PROCEDURE — 99214 OFFICE O/P EST MOD 30 MIN: CPT | Performed by: FAMILY MEDICINE

## 2024-09-04 PROCEDURE — 85025 COMPLETE CBC W/AUTO DIFF WBC: CPT | Performed by: FAMILY MEDICINE

## 2024-09-04 RX ORDER — OMEPRAZOLE 40 MG/1
CAPSULE, DELAYED RELEASE ORAL
Qty: 90 CAPSULE | Refills: 3 | OUTPATIENT
Start: 2024-09-04

## 2024-09-04 RX ORDER — ZOLPIDEM TARTRATE 10 MG/1
10 TABLET ORAL AT BEDTIME
COMMUNITY
Start: 2024-08-08 | End: 2024-10-02

## 2024-09-04 NOTE — PROGRESS NOTES
Preoperative Evaluation  [facility]  [address] [facility] [address]  Phone: [phone]  Primary Provider: Mika Zamora MD  Pre-op Performing Provider: Mika Zamora MD  Sep 4, 2024              9/4/2024   Surgical Information   What procedure is being done? right foot   Facility or Hospital where procedure/surgery will be performed: [facility]    Who is doing the procedure / surgery? Vashti Ayala, DPM    Date of surgery / procedure: sept 30 2024   Time of surgery / procedure:  12:30 PM    Where do you plan to recover after surgery? at home with family        Fax number for surgical facility: Note does not need to be faxed, will be available electronically in Epic.      ICD-10-CM    1. Preop general physical exam  Z01.818       2. Mass of joint of right foot  M25.871        PLAN:okay for surgery  Okay for surgery  Subjective   Kalyn is a 54 year old, presenting for the following:  Pre-Op Exam (DOS: 9/30/24)          9/4/2024    10:54 AM   Additional Questions   Roomed by Kevin SIFUENTES LPN   Accompanied by Son, Mother         9/4/2024    10:54 AM   Patient Reported Additional Medications   Patient reports taking the following new medications None     HPI related to upcoming procedure: mass lateral foot for 9 months        9/4/2024   Pre-Op Questionnaire   Have you ever had a heart attack or stroke? (!) YES cva  more than 20 years ago   Have you ever had surgery on your heart or blood vessels, such as a stent placement, a coronary artery bypass, or surgery on an artery in your head, neck, heart, or legs? No   Do you have chest pain with activity? No   Do you have a history of heart failure? No   Do you currently have a cold, bronchitis or symptoms of other infection? No   Do you have a cough, shortness of breath, or wheezing? No   Do you or anyone in your family have previous history of blood clots? (!) YES  arm had phlebitis from IV   Do you or does anyone in  your family have a serious bleeding problem such as prolonged bleeding following surgeries or cuts? No   Have you ever had problems with anemia or been told to take iron pills? (!) YES post surgery   Have you had any abnormal blood loss such as black, tarry or bloody stools, or abnormal vaginal bleeding? No   Have you ever had a blood transfusion? (!) YES   Have you ever had a transfusion reaction? No   Are you willing to have a blood transfusion if it is medically needed before, during, or after your surgery? Yes   Have you or any of your relatives ever had problems with anesthesia? (!) YES heart rate owers   Do you have sleep apnea, excessive snoring or daytime drowsiness? No   Do you have any artifical heart valves or other implanted medical devices like a pacemaker, defibrillator, or continuous glucose monitor? No   Do you have artificial joints? (!) YES   Are you allergic to latex? No        Health Care Directive  Patient does not have a Health Care Directive or Living Will: Discussed advance care planning with patient; information given to patient to review.    Preoperative Review of    reviewed - controlled substances reflected in medication list.         Patient Active Problem List    Diagnosis Date Noted    Dizzy spells 06/29/2022     Priority: Medium    Pain in joint, ankle and foot, left 05/10/2022     Priority: Medium     Added automatically from request for surgery [id number]      Status post surgery 11/27/2018     Priority: Medium    Other mechanical complication of internal elbow joint prosthesis  (H24) 04/16/2018     Priority: Medium    Insomnia 04/04/2018     Priority: Medium    Periprosthetic fracture around internal prosthetic right elbow joint 08/28/2017     Priority: Medium     Last Assessment & Plan:   47yoF with periprosthetic fracture of the humeral shaft, loosening of total elbow arthroplasty, loss of fixation of plate and screw construct.    - Discussed surgical intervention of right  trans humeral amputation. At this time, pt is not ready to proceed with amputation. Also discussed possible ORIF with longer stem. Plan to discuss case with her shoulder replacement surgeon.   - Continue Gandhi brace and sling  - NWB RUE  - Pain management per pain provider  - Follow up 3 weeks      Chronic tension-type headache 08/21/2017     Priority: Medium    Migraine 08/21/2017     Priority: Medium    Myofascial pain 07/10/2017     Priority: Medium    Cervical high risk HPV (human papillomavirus) test positive 02/22/2017     Priority: Medium     2/22/17 NIL Pap, + HR HPV 18 & other HR HPV. Plan colp  3/28/17 [facility] ECC negative for dysplasia. Plan cotest in 1 year.   5/3/18 [facility] Bx & ECC Negative. Plan cotest in 1 year.   6/6/19 Lost to follow-up for pap tracking  8/22/19 NIL Pap, Neg HPV, Endometrial cells present. Plan cotest in 1 year, unless abnormal bleeding then sooner.   1/5/21 Lost to follow-up for pap tracking  7/14/21 NIL pap, +HR HPV (not 16/18). Plan cotest in 1 year  8/23/22 Lost to follow-up for pap tracking   7/19/23 ASCUS, +HR HPV (not 16/18). Plan: colposcopy due before 10/19/23  8/23/23 Colpo ECC neg. Plan: cotest in 1 year  8/2/24 Reminder letter      Other drug-induced neutropenia (H24) 04/14/2016     Priority: Medium    Rheumatoid arthritis with positive rheumatoid factor, involving unspecified site (H) 04/14/2016     Priority: Medium    Fibromyositis 07/27/2015     Priority: Medium    Rheumatoid arthritis of foot (H) 04/13/2015     Priority: Medium    Encounter for long-term opiate analgesic use 07/15/2014     Priority: Medium    Arthralgia of temporomandibular joint 07/10/2014     Priority: Medium    Intractable chronic migraine without aura 07/10/2014     Priority: Medium     Problem list name updated by automated process. Provider to review      Lesion of ulnar nerve 06/03/2014     Priority: Medium    Right arm numbness 05/21/2014     Priority: Medium    Right upper extremity numbness  04/02/2014     Priority: Medium    Esophageal reflux 03/25/2014     Priority: Medium    Postoperative pain 11/20/2013     Priority: Medium    S/P cervical spinal fusion 11/20/2013     Priority: Medium    Drug-seeking behavior 11/20/2013     Priority: Medium    Opioid type dependence (H) 11/20/2013     Priority: Medium     Problem list name updated by automated process. Provider to review      Severe frontal headaches 11/20/2013     Priority: Medium    Abdominal pain, unspecified abdominal location 11/20/2013     Priority: Medium     Problem list name updated by automated process. Provider to review      Atlantoaxial instability 11/18/2013     Priority: Medium    Trochanteric bursitis - left 10/04/2012     Priority: Medium    Knee joint replacement - left 01/26/2012     Priority: Medium     Patient is followed by KIZZY LUA for ongoing prescription of narcotic pain medicine.  Med: oxycodone 5 mg.  Maximum use per month: 120  Expected duration: 8 weeks postoperative.  Narcotic agreement on file: YES  Clinic visit recommended: Q 4 weeks    Patient is followed by KIZZY LUA for ongoing prescription of narcotic pain medicine.  Med: oxycontin 20 mg.   Maximum use per month: 60  Expected duration: 1 month  Narcotic agreement on file: YES  Clinic visit recommended: Q 4 weeks      TOTAL KNEE ARTHROPLASTY - bilateral 12/12/2011     Priority: Medium    Liver failure, acute 04/22/2011     Priority: Medium    CARDIOVASCULAR SCREENING; LDL GOAL LESS THAN 160 04/22/2011     Priority: Medium    Renal stone      Priority: Medium    Suicide risk 04/17/2011     Priority: Medium    Grief reaction 04/17/2011     Priority: Medium     3      Acetaminophen overdose 03/24/2011     Priority: Medium    Pancreatitis 03/24/2011     Priority: Medium    Anemia 03/24/2011     Priority: Medium    Chronic pain 03/24/2011     Priority: Medium    Pneumonia 03/24/2011     Priority: Medium    Hypothyroidism      Priority: Medium     "  Past Medical History:   Diagnosis Date    Acetaminophen overdose 03/24/2011    Anemia     Anesthesia complication     pt states has a history of heart stopping during anesthesia,    Arrhythmia     Cerebral artery occlusion with cerebral infarction (H)     Cerebral infarction (H)     Cervical high risk HPV (human papillomavirus) test positive 02/22/2017    type 18 & other HR HPV    Chronic infection     MRSA    Chronic pain 03/24/2011    Degenerative joint disease     Endometriosis     Fibromyalgia     Gastro-oesophageal reflux disease     Heart murmur     History of blood transfusion     Hypothyroidism     Immune disorder (H24)     Learning disability     Malignant neoplasm (H)     Migraine     MRSA (methicillin resistant staph aureus) culture positive     Neck injuries     Opioid type dependence (H)     Other chronic pain     PONV (postoperative nausea and vomiting)     states \"flatlines\"during anesthesia    RA (rheumatoid arthritis) (H)     Renal stone     Scoliosis     Stomach ulcer     history     Past Surgical History:   Procedure Laterality Date    ARTHRODESIS FOOT  5/23/2012    Procedure:ARTHRODESIS FOOT; Right Subtalar andTaloNavicular  Fusion  ; Surgeon:CHRIS ZHOU; [facility]    ARTHRODESIS FOOT Left 4/22/2015    Procedure: ARTHRODESIS FOOT;  Surgeon: Chris Zhou MD;  [facility]    ARTHROPLASTY ELBOW Right 9/30/2015    Procedure: ARTHROPLASTY ELBOW;  Surgeon: Carrol Gaspar MD;  [facility]    ARTHROPLASTY KNEE Right     ARTHROPLASTY REVISION ELBOW Right 11/27/2018    Procedure: 1 - aspiration of Right elbow 2- Explantation of failed hardware Right humeral periprosthetic fracture non-union 3- Right distal humerus excision 4- Right distal humerus replacement 5 - Revision Right total elbow arthroplasty;  Surgeon: Aakash Zimmer MD;  [facility]    ARTHROPLASTY WRIST      x2 Lt wrist replacement.    ARTHROTOMY ELBOW Right 6/18/2015    Procedure: " ARTHROTOMY ELBOW;  Surgeon: Carrol Gaspar MD;  Location:  OR     SHLDR ARTHROSCOP,DIAGNOSTIC  12/17/2008    left total shoulder arthroplasty by Dr. Law    CYSTOSCOPY  02/06/2009    1.cystoscopy.2.bilateral ureteroscopy.3.basketing of stone fragments from the right kidney.4.bilateral double-J ureteral stent placement.5.ann catheter placement.6.fluoroscopy and intraoperative interpretation of images.    CYSTOSCOPY  01/08/2007    1. cystoscopy and left stent removal.2.left ureteroscopy    DECOMPRESSION CUBITAL TUNNEL  6/6/2014    Procedure: DECOMPRESSION CUBITAL TUNNEL;  Surgeon: Janelle Coates MD;  [facility]    ENDOSCOPY  03/31/2005    [facility] GI endoscopy-[facility]    ESOPHAGOSCOPY, GASTROSCOPY, DUODENOSCOPY (EGD), COMBINED  3/17/2014    Procedure: COMBINED ESOPHAGOSCOPY, GASTROSCOPY, DUODENOSCOPY (EGD), BIOPSY SINGLE OR MULTIPLE;;  Surgeon: Duane, William Charles, MD;  Location:  OR    FUSION CERVICAL POSTERIOR ONE LEVEL  11/18/2013    Procedure: FUSION CERVICAL POSTERIOR ONE LEVEL;  Cervical 1-2 Posterior Cervical Fusion (Harms Procedure);  Surgeon: Carrol Gunn MD;  [facility]    GYN SURGERY      INJECT MAJOR JOINT / BURSA Left 1/5/2017    Procedure: INJECT MAJOR JOINT / BURSA;  Surgeon: Fredy Patel DO;  [facility]    INJECT STEROID (LOCATION) Left 6/18/2015    Procedure: INJECT STEROID (LOCATION);  Surgeon: Carrol Gaspar MD;  Location:  OR    NECK SURGERY      REMOVE FOREIGN BODY UPPER EXTREMITY Right 3/2/2017    Procedure: REMOVE FOREIGN BODY UPPER EXTREMITY;  Surgeon: Carrol Gaspar MD;  Location:  OR    REMOVE HARDWARE FOOT Left 6/8/2022    Procedure: REMOVAL, HARDWARE, FOOT LEFT;  Surgeon: Chris Hendricks MD;  Location: [facility] OR    RESECT BONE UPPER EXTREMITY Left 4/27/2017    Procedure: RESECT BONE UPPER EXTREMITY;  Left Radial Head Resection;  Surgeon: Carrol Gaspar MD;  Location:  OR     ROTATOR CUFF REPAIR RT/LT  10/19/2005    1.left distal clavicle excision.2.acromioplasty.3.coracoacromial ligament resection.4.rotator cuff exploration    [facility] ANESTH,DX ARTHROSCOPIC PROC KNEE JOINT  10/24/2007    right total knee arthroplasty    [facility] SHOULDER SURG PROC UNLISTED      [facility] TOTAL KNEE ARTHROPLASTY  1/18/12    Left     Current Outpatient Medications   Medication Sig Dispense Refill    celecoxib (CELEBREX) 200 MG capsule Take 200 mg by mouth 3 times daily      clonazePAM (KLONOPIN) 1 MG tablet Take 1 mg by mouth daily.      ENBREL SURECLICK 50 MG/ML autoinjector 50 mg twice a week. Hold before surgery on 9/30/24      loperamide (IMODIUM A-D) 2 MG tablet Take 1 tablet (2 mg) by mouth 4 times daily as needed for diarrhea 24 tablet 1    omeprazole (PRILOSEC) 40 MG DR capsule TAKE 1 CAPSULE BY MOUTH TWICE A DAY BEFORE MEALS STRENGTH: 40 MG 90 capsule 3    OnabotulinumtoxinA (BOTOX IJ) Inject 200 Units as directed Total dose 200 units   Administered 190 units   Unavoidable waste 30 units   Lot # /[id number] with Expiration Date:  12/2020   NDC [address]-30      ondansetron (ZOFRAN ODT) 4 MG ODT tab Take 1 tablet (4 mg) by mouth every 8 hours as needed for nausea or vomiting 10 tablet 0    ondansetron (ZOFRAN-ODT) 4 MG ODT tab Take 1 tablet (4 mg) by mouth every 8 hours as needed for nausea 20 tablet 3    SENEXON-S 8.6-50 MG tablet TAKE 1-2 TABLETS BY MOUTH 2 TIMES DAILY FOR CONSTIPATION. 100 tablet 1    SODIUM FLUORIDE 5000 SENSITIVE 1.1-5 % GEL USE 2X/DAY FOR 2-3 MINUTES. SPIT AND DO NOT EAT DRINK OR RINSE FOR 30 MINUTES      trimethoprim-polymyxin b (POLYTRIM) 06664-3.1 UNIT/ML-% ophthalmic solution Place into the right eye every 4 hours      UBRELVY 50 MG tablet TAKE 1 TABLET BY MOUTH AS NEEDED FOR PAIN AT ONSET OF MIGRAINE, MAY REPEAT AFTER TWO HOURS IF NEEDED      zolpidem (AMBIEN) 10 MG tablet Take 10 mg by mouth at bedtime.      clindamycin (CLEOCIN) 150 MG capsule Take 150 mg by mouth 4 times daily  (Patient not taking: Reported on 9/4/2024)         Allergies   Allergen Reactions    Leflunomide Anaphylaxis    Morphine Swelling    Celecoxib Other (See Comments)     Other reaction(s): stroke , lasted 24 hours  -Pt takes celecoxib daily. States that they changed the  and now it is fine.    Amitriptyline Other (See Comments)     Sleepwalking     Codeine     Gabapentin Other (See Comments)     Pins,needles, stabbing aching at once  Pins,needles, stabbing aching at once  Numbness and Tingling    Ibuprofen      Doesn't take due to gastric ulcer    Sulfa Antibiotics Nausea and Vomiting     unknown    Tylenol [Acetaminophen]      Pt. States that she has Liver problems  Tylenol 3    Dexamethasone Palpitations     Heart racing, sweating     Erythromycin Nausea     Vomiting     Penicillins Rash    Prednisone Palpitations     Heart racing        Social History     Tobacco Use    Smoking status: Former     Current packs/day: 0.10     Average packs/day: 0.1 packs/day for 41.1 years (4.1 ttl pk-yrs)     Types: Cigarettes     Start date: 8/6/1983    Smokeless tobacco: Never    Tobacco comments:     Quit 6 weeks ago   Substance Use Topics    Alcohol use: No     Family History   Problem Relation Age of Onset    Diabetes Mother     Hypertension Mother     Allergies Mother     Alcohol/Drug Mother         LIVER CIRROSIS    Blood Disease Mother         B12 DEF    Neurologic Disorder Mother         Epilepsy    Osteoporosis Mother     Cerebrovascular Disease Maternal Grandmother     Arthritis Maternal Grandmother         RHEUMATOID    Cancer Maternal Grandmother     Thyroid Disease Maternal Grandmother     Osteoporosis Maternal Grandmother     Heart Disease Maternal Grandmother     Depression Maternal Grandmother     Neurologic Disorder Maternal Grandmother         MIGRAINES    Anxiety Disorder Maternal Grandmother     Diabetes Maternal Grandmother     Migraines Maternal Grandmother     Deep Vein Thrombosis Maternal  Grandmother     Cerebrovascular Disease Maternal Grandfather     Cancer Maternal Grandfather     Mental Illness Maternal Grandfather     Cerebrovascular Disease Paternal Grandmother     Arthritis Paternal Grandmother         RHEUMATOID    Cancer Paternal Grandmother     Osteoporosis Paternal Grandmother     Heart Disease Paternal Grandmother     Depression Paternal Grandmother     Neurologic Disorder Paternal Grandmother         MIGRAINES    Thyroid Disease Paternal Grandmother     Cerebrovascular Disease Paternal Grandfather     Cancer Paternal Grandfather     Heart Disease Brother         MURMUR    Asthma Son     Endocrine Disease Son     Neurologic Disorder Father         epilepsy    Seizure Disorder Father     Hypertension Father     Skin Cancer Father     Anxiety Disorder Father     Mental Illness Father     Hyperlipidemia Father     Kidney Disease Father     Bladder Cancer Father     Neurologic Disorder Daughter         MIGRAINES    Arthritis Daughter         JR    Hypertension Son     LUNG DISEASE Brother     Cancer Brother         lung    Anxiety Disorder Son     Asthma Son     Hypertension Son     Migraines Son     Spine Problems Son     Mental Illness Brother     Migraines Brother     Cardiac Sudden Death Brother     Spine Problems Brother     Glaucoma No family hx of     Macular Degeneration No family hx of     Unknown/Adopted No family hx of     Anesthesia Reaction No family hx of     Other Cancer No family hx of     Rheumatoid Arthritis No family hx of     Bone Cancer No family hx of     Low Back Problems No family hx of      History   Drug Use No             Review of Systems  CONSTITUTIONAL: NEGATIVE for fever, chills, change in weight  INTEGUMENTARY/SKIN: NEGATIVE for worrisome rashes, moles or lesions  EYES: NEGATIVE for vision changes or irritation  ENT/MOUTH: NEGATIVE for ear, mouth and throat problems  RESP: NEGATIVE for significant cough or SOB  BREAST: NEGATIVE for masses, tenderness or  "discharge  BREAST: abnormal mammogram and has additional testing next week  CV: NEGATIVE for chest pain, palpitations or peripheral edema  GI: NEGATIVE for nausea, abdominal pain, heartburn, or change in bowel habits  : NEGATIVE for frequency, dysuria, or hematuria  MUSCULOSKELETAL: NEGATIVE for significant arthralgias or myalgia  NEURO: NEGATIVE for weakness, dizziness or paresthesias  ENDOCRINE: NEGATIVE for temperature intolerance, skin/hair changes  HEME: NEGATIVE for bleeding problems  PSYCHIATRIC: NEGATIVE for changes in mood or affect    Objective    /82   Pulse 86   Temp 98.2  F (36.8  C) (Tympanic)   Resp 19   Ht 1.676 m (5' 6\")   Wt 78.2 kg (172 lb 4.8 oz)   LMP  (LMP Unknown)   SpO2 99%   BMI 27.81 kg/m     Estimated body mass index is 27.81 kg/m  as calculated from the following:    Height as of this encounter: 1.676 m (5' 6\").    Weight as of this encounter: 78.2 kg (172 lb 4.8 oz).  Physical Exam  GENERAL: alert and no distress  EYES: Eyes grossly normal to inspection, PERRL and conjunctivae and sclerae normal  HENT: ear canals and TM's normal, nose and mouth without ulcers or lesions  NECK: no adenopathy, no asymmetry, masses, or scars  RESP: lungs clear to auscultation - no rales, rhonchi or wheezes  CV: regular rate and rhythm, normal S1 S2, no S3 or S4, no murmur, click or rub, no peripheral edema  ABDOMEN: soft, nontender, no hepatosplenomegaly, no masses and bowel sounds normal  MS: no gross musculoskeletal defects noted, no edema  SKIN: no suspicious lesions or rashes  NEURO: Normal strength and tone, mentation intact and speech normal  PSYCH: mentation appears normal, affect normal/bright    Recent Labs   Lab Test 11/22/23  1352   HGB 13.1         Lab Results   Component Value Date    WBC 7.1 09/04/2024    WBC 6.8 07/05/2021     Lab Results   Component Value Date    RBC 4.25 09/04/2024    RBC 4.46 07/05/2021     Lab Results   Component Value Date    HGB 12.6 09/04/2024    " "HGB 12.9 07/05/2021     Lab Results   Component Value Date    HCT 39.6 09/04/2024    HCT 40.8 07/05/2021     No components found for: \"MCT\"  Lab Results   Component Value Date    MCV 93 09/04/2024    MCV 92 07/05/2021     Lab Results   Component Value Date    MCH 29.6 09/04/2024    MCH 28.9 07/05/2021     Lab Results   Component Value Date    MCHC 31.8 09/04/2024    MCHC 31.6 07/05/2021     Lab Results   Component Value Date    RDW 13.6 09/04/2024    RDW 15.5 07/05/2021     Lab Results   Component Value Date     09/04/2024     07/05/2021        Diagnostics  No results found for this or any previous visit (from the past 24 hour(s)).   No EKG required, no history of coronary heart disease, significant arrhythmia, peripheral arterial disease or other structural heart disease.    Revised Cardiac Risk Index (RCRI)  The patient has the following serious cardiovascular risks for perioperative complications:   - No serious cardiac risks = 0 points     RCRI Interpretation: 0 points: Class I (very low risk - 0.4% complication rate)         Signed Electronically by: Mika Zamora MD  A copy of this evaluation report is provided to the requesting physician.           "

## 2024-09-12 ENCOUNTER — ANCILLARY PROCEDURE (OUTPATIENT)
Dept: MAMMOGRAPHY | Facility: CLINIC | Age: 54
End: 2024-09-12
Attending: FAMILY MEDICINE
Payer: COMMERCIAL

## 2024-09-12 ENCOUNTER — ANCILLARY PROCEDURE (OUTPATIENT)
Dept: ULTRASOUND IMAGING | Facility: CLINIC | Age: 54
End: 2024-09-12
Attending: FAMILY MEDICINE
Payer: COMMERCIAL

## 2024-09-12 DIAGNOSIS — R92.8 ABNORMAL MAMMOGRAM: ICD-10-CM

## 2024-09-12 PROCEDURE — 76642 ULTRASOUND BREAST LIMITED: CPT | Mod: RT | Performed by: STUDENT IN AN ORGANIZED HEALTH CARE EDUCATION/TRAINING PROGRAM

## 2024-09-12 PROCEDURE — 77065 DX MAMMO INCL CAD UNI: CPT | Mod: RT | Performed by: STUDENT IN AN ORGANIZED HEALTH CARE EDUCATION/TRAINING PROGRAM

## 2024-09-12 PROCEDURE — G0279 TOMOSYNTHESIS, MAMMO: HCPCS | Performed by: STUDENT IN AN ORGANIZED HEALTH CARE EDUCATION/TRAINING PROGRAM

## 2024-09-20 ENCOUNTER — TELEPHONE (OUTPATIENT)
Dept: PODIATRY | Facility: CLINIC | Age: 54
End: 2024-09-20
Payer: COMMERCIAL

## 2024-09-20 DIAGNOSIS — K21.9 GASTROESOPHAGEAL REFLUX DISEASE, UNSPECIFIED WHETHER ESOPHAGITIS PRESENT: ICD-10-CM

## 2024-09-20 RX ORDER — OMEPRAZOLE 40 MG/1
CAPSULE, DELAYED RELEASE ORAL
Qty: 90 CAPSULE | Refills: 3 | Status: SHIPPED | OUTPATIENT
Start: 2024-09-20

## 2024-09-20 NOTE — TELEPHONE ENCOUNTER
Kalyn acosta, is wondering if she is going to be weight bearing after surgery, she can not use crutches if not.    Please advise.

## 2024-09-20 NOTE — TELEPHONE ENCOUNTER
Patient able to walk in a postop boot yes.  Sometimes we will prescribe crutches more for balance.  Please let patient know.      Thanks,     Vashti Ayala DPM

## 2024-09-23 NOTE — TELEPHONE ENCOUNTER
Phone call to patient. She was informed of recommendations below. She states she has a broken shoulder and will not be able to use crutches. She does have a cane for support if needed. She had no further questions.     SANJANA Cunningham RN

## 2024-09-30 ENCOUNTER — ANESTHESIA (OUTPATIENT)
Dept: SURGERY | Facility: CLINIC | Age: 54
End: 2024-09-30
Payer: COMMERCIAL

## 2024-09-30 ENCOUNTER — ANESTHESIA EVENT (OUTPATIENT)
Dept: SURGERY | Facility: CLINIC | Age: 54
End: 2024-09-30
Payer: COMMERCIAL

## 2024-09-30 ENCOUNTER — HOSPITAL ENCOUNTER (OUTPATIENT)
Facility: CLINIC | Age: 54
Discharge: HOME | End: 2024-09-30
Attending: PODIATRIST | Admitting: PODIATRIST
Payer: COMMERCIAL

## 2024-09-30 VITALS
RESPIRATION RATE: 30 BRPM | DIASTOLIC BLOOD PRESSURE: 73 MMHG | HEART RATE: 93 BPM | HEIGHT: 66 IN | OXYGEN SATURATION: 95 % | WEIGHT: 172.2 LBS | TEMPERATURE: 98.4 F | BODY MASS INDEX: 27.68 KG/M2 | SYSTOLIC BLOOD PRESSURE: 133 MMHG

## 2024-09-30 DIAGNOSIS — Z98.890 POST-OPERATIVE STATE: Primary | ICD-10-CM

## 2024-09-30 PROCEDURE — 88342 IMHCHEM/IMCYTCHM 1ST ANTB: CPT | Mod: 26 | Performed by: PATHOLOGY

## 2024-09-30 PROCEDURE — 250N000011 HC RX IP 250 OP 636: Performed by: PODIATRIST

## 2024-09-30 PROCEDURE — 250N000009 HC RX 250: Performed by: PODIATRIST

## 2024-09-30 PROCEDURE — 250N000011 HC RX IP 250 OP 636: Performed by: NURSE ANESTHETIST, CERTIFIED REGISTERED

## 2024-09-30 PROCEDURE — 250N000013 HC RX MED GY IP 250 OP 250 PS 637: Performed by: PODIATRIST

## 2024-09-30 PROCEDURE — 250N000009 HC RX 250: Performed by: NURSE ANESTHETIST, CERTIFIED REGISTERED

## 2024-09-30 PROCEDURE — 258N000003 HC RX IP 258 OP 636: Performed by: ANESTHESIOLOGY

## 2024-09-30 PROCEDURE — 28041 EXC FOOT/TOE TUM DEP 1.5CM/>: CPT | Mod: RT | Performed by: PODIATRIST

## 2024-09-30 PROCEDURE — 710N000010 HC RECOVERY PHASE 1, LEVEL 2, PER MIN: Performed by: PODIATRIST

## 2024-09-30 PROCEDURE — 250N000011 HC RX IP 250 OP 636: Performed by: ANESTHESIOLOGY

## 2024-09-30 PROCEDURE — 370N000017 HC ANESTHESIA TECHNICAL FEE, PER MIN: Performed by: PODIATRIST

## 2024-09-30 PROCEDURE — 88305 TISSUE EXAM BY PATHOLOGIST: CPT | Mod: TC | Performed by: PODIATRIST

## 2024-09-30 PROCEDURE — 999N000141 HC STATISTIC PRE-PROCEDURE NURSING ASSESSMENT: Performed by: PODIATRIST

## 2024-09-30 PROCEDURE — 272N000001 HC OR GENERAL SUPPLY STERILE: Performed by: PODIATRIST

## 2024-09-30 PROCEDURE — 88305 TISSUE EXAM BY PATHOLOGIST: CPT | Mod: 26 | Performed by: PATHOLOGY

## 2024-09-30 PROCEDURE — 360N000075 HC SURGERY LEVEL 2, PER MIN: Performed by: PODIATRIST

## 2024-09-30 PROCEDURE — 710N000012 HC RECOVERY PHASE 2, PER MINUTE: Performed by: PODIATRIST

## 2024-09-30 PROCEDURE — 250N000025 HC SEVOFLURANE, PER MIN: Performed by: PODIATRIST

## 2024-09-30 PROCEDURE — 250N000009 HC RX 250: Performed by: ANESTHESIOLOGY

## 2024-09-30 RX ORDER — ONDANSETRON 4 MG/1
4 TABLET, ORALLY DISINTEGRATING ORAL EVERY 30 MIN PRN
Status: DISCONTINUED | OUTPATIENT
Start: 2024-09-30 | End: 2024-09-30 | Stop reason: HOSPADM

## 2024-09-30 RX ORDER — FENTANYL CITRATE 50 UG/ML
25 INJECTION, SOLUTION INTRAMUSCULAR; INTRAVENOUS EVERY 5 MIN PRN
Status: DISCONTINUED | OUTPATIENT
Start: 2024-09-30 | End: 2024-09-30 | Stop reason: HOSPADM

## 2024-09-30 RX ORDER — HYDROMORPHONE HCL IN WATER/PF 6 MG/30 ML
0.2 PATIENT CONTROLLED ANALGESIA SYRINGE INTRAVENOUS EVERY 5 MIN PRN
Status: DISCONTINUED | OUTPATIENT
Start: 2024-09-30 | End: 2024-09-30 | Stop reason: HOSPADM

## 2024-09-30 RX ORDER — OXYCODONE HYDROCHLORIDE 5 MG/1
5 TABLET ORAL ONCE
Status: COMPLETED | OUTPATIENT
Start: 2024-09-30 | End: 2024-09-30

## 2024-09-30 RX ORDER — NALOXONE HYDROCHLORIDE 0.4 MG/ML
0.1 INJECTION, SOLUTION INTRAMUSCULAR; INTRAVENOUS; SUBCUTANEOUS
Status: DISCONTINUED | OUTPATIENT
Start: 2024-09-30 | End: 2024-09-30 | Stop reason: HOSPADM

## 2024-09-30 RX ORDER — OXYCODONE HYDROCHLORIDE 5 MG/1
5-10 TABLET ORAL EVERY 4 HOURS PRN
Qty: 20 TABLET | Refills: 0 | Status: SHIPPED | OUTPATIENT
Start: 2024-09-30

## 2024-09-30 RX ORDER — FENTANYL CITRATE 50 UG/ML
INJECTION, SOLUTION INTRAMUSCULAR; INTRAVENOUS PRN
Status: DISCONTINUED | OUTPATIENT
Start: 2024-09-30 | End: 2024-09-30

## 2024-09-30 RX ORDER — ONDANSETRON 4 MG/1
4 TABLET, ORALLY DISINTEGRATING ORAL EVERY 8 HOURS PRN
Qty: 4 TABLET | Refills: 0 | Status: SHIPPED | OUTPATIENT
Start: 2024-09-30

## 2024-09-30 RX ORDER — ONDANSETRON 2 MG/ML
INJECTION INTRAMUSCULAR; INTRAVENOUS PRN
Status: DISCONTINUED | OUTPATIENT
Start: 2024-09-30 | End: 2024-09-30

## 2024-09-30 RX ORDER — SODIUM CHLORIDE, SODIUM LACTATE, POTASSIUM CHLORIDE, CALCIUM CHLORIDE 600; 310; 30; 20 MG/100ML; MG/100ML; MG/100ML; MG/100ML
INJECTION, SOLUTION INTRAVENOUS CONTINUOUS
Status: DISCONTINUED | OUTPATIENT
Start: 2024-09-30 | End: 2024-09-30 | Stop reason: HOSPADM

## 2024-09-30 RX ORDER — HYDRALAZINE HYDROCHLORIDE 20 MG/ML
2.5-5 INJECTION INTRAMUSCULAR; INTRAVENOUS EVERY 10 MIN PRN
Status: DISCONTINUED | OUTPATIENT
Start: 2024-09-30 | End: 2024-09-30 | Stop reason: HOSPADM

## 2024-09-30 RX ORDER — FENTANYL CITRATE 50 UG/ML
50 INJECTION, SOLUTION INTRAMUSCULAR; INTRAVENOUS EVERY 5 MIN PRN
Status: DISCONTINUED | OUTPATIENT
Start: 2024-09-30 | End: 2024-09-30 | Stop reason: HOSPADM

## 2024-09-30 RX ORDER — CLINDAMYCIN PHOSPHATE 900 MG/50ML
900 INJECTION, SOLUTION INTRAVENOUS SEE ADMIN INSTRUCTIONS
Status: DISCONTINUED | OUTPATIENT
Start: 2024-09-30 | End: 2024-09-30

## 2024-09-30 RX ORDER — CEFAZOLIN SODIUM/WATER 2 G/20 ML
2 SYRINGE (ML) INTRAVENOUS SEE ADMIN INSTRUCTIONS
Status: DISCONTINUED | OUTPATIENT
Start: 2024-09-30 | End: 2024-09-30 | Stop reason: HOSPADM

## 2024-09-30 RX ORDER — OXYCODONE HYDROCHLORIDE 5 MG/1
5 TABLET ORAL
Status: COMPLETED | OUTPATIENT
Start: 2024-09-30 | End: 2024-09-30

## 2024-09-30 RX ORDER — ONDANSETRON 2 MG/ML
4 INJECTION INTRAMUSCULAR; INTRAVENOUS EVERY 30 MIN PRN
Status: DISCONTINUED | OUTPATIENT
Start: 2024-09-30 | End: 2024-09-30 | Stop reason: HOSPADM

## 2024-09-30 RX ORDER — PROPOFOL 10 MG/ML
INJECTION, EMULSION INTRAVENOUS PRN
Status: DISCONTINUED | OUTPATIENT
Start: 2024-09-30 | End: 2024-09-30

## 2024-09-30 RX ORDER — HYDROMORPHONE HCL IN WATER/PF 6 MG/30 ML
0.4 PATIENT CONTROLLED ANALGESIA SYRINGE INTRAVENOUS EVERY 5 MIN PRN
Status: DISCONTINUED | OUTPATIENT
Start: 2024-09-30 | End: 2024-09-30 | Stop reason: HOSPADM

## 2024-09-30 RX ORDER — LIDOCAINE 40 MG/G
CREAM TOPICAL
Status: DISCONTINUED | OUTPATIENT
Start: 2024-09-30 | End: 2024-09-30 | Stop reason: HOSPADM

## 2024-09-30 RX ORDER — CEFAZOLIN SODIUM/WATER 2 G/20 ML
2 SYRINGE (ML) INTRAVENOUS
Status: COMPLETED | OUTPATIENT
Start: 2024-09-30 | End: 2024-09-30

## 2024-09-30 RX ORDER — AMOXICILLIN 250 MG
1-2 CAPSULE ORAL 2 TIMES DAILY
Qty: 30 TABLET | Refills: 0 | Status: SHIPPED | OUTPATIENT
Start: 2024-09-30

## 2024-09-30 RX ORDER — KETOROLAC TROMETHAMINE 30 MG/ML
INJECTION, SOLUTION INTRAMUSCULAR; INTRAVENOUS PRN
Status: DISCONTINUED | OUTPATIENT
Start: 2024-09-30 | End: 2024-09-30

## 2024-09-30 RX ORDER — BUPIVACAINE HYDROCHLORIDE 5 MG/ML
INJECTION, SOLUTION EPIDURAL; INTRACAUDAL PRN
Status: DISCONTINUED | OUTPATIENT
Start: 2024-09-30 | End: 2024-09-30 | Stop reason: HOSPADM

## 2024-09-30 RX ORDER — METOPROLOL TARTRATE 1 MG/ML
1-2 INJECTION, SOLUTION INTRAVENOUS EVERY 5 MIN PRN
Status: DISCONTINUED | OUTPATIENT
Start: 2024-09-30 | End: 2024-09-30 | Stop reason: HOSPADM

## 2024-09-30 RX ORDER — GLYCOPYRROLATE 0.2 MG/ML
INJECTION, SOLUTION INTRAMUSCULAR; INTRAVENOUS PRN
Status: DISCONTINUED | OUTPATIENT
Start: 2024-09-30 | End: 2024-09-30

## 2024-09-30 RX ORDER — CLINDAMYCIN PHOSPHATE 900 MG/50ML
900 INJECTION, SOLUTION INTRAVENOUS
Status: DISCONTINUED | OUTPATIENT
Start: 2024-09-30 | End: 2024-09-30

## 2024-09-30 RX ORDER — SCOLOPAMINE TRANSDERMAL SYSTEM 1 MG/1
1 PATCH, EXTENDED RELEASE TRANSDERMAL ONCE
Status: DISCONTINUED | OUTPATIENT
Start: 2024-09-30 | End: 2024-09-30 | Stop reason: HOSPADM

## 2024-09-30 RX ORDER — MAGNESIUM HYDROXIDE 1200 MG/15ML
LIQUID ORAL PRN
Status: DISCONTINUED | OUTPATIENT
Start: 2024-09-30 | End: 2024-09-30 | Stop reason: HOSPADM

## 2024-09-30 RX ORDER — LIDOCAINE HYDROCHLORIDE 20 MG/ML
INJECTION, SOLUTION INFILTRATION; PERINEURAL PRN
Status: DISCONTINUED | OUTPATIENT
Start: 2024-09-30 | End: 2024-09-30

## 2024-09-30 RX ADMIN — OXYCODONE HYDROCHLORIDE 5 MG: 5 TABLET ORAL at 13:46

## 2024-09-30 RX ADMIN — LIDOCAINE HYDROCHLORIDE 40 MG: 20 INJECTION, SOLUTION INFILTRATION; PERINEURAL at 12:22

## 2024-09-30 RX ADMIN — MIDAZOLAM 2 MG: 1 INJECTION INTRAMUSCULAR; INTRAVENOUS at 12:16

## 2024-09-30 RX ADMIN — FENTANYL CITRATE 100 MCG: 50 INJECTION INTRAMUSCULAR; INTRAVENOUS at 12:20

## 2024-09-30 RX ADMIN — Medication 2 G: at 12:16

## 2024-09-30 RX ADMIN — ONDANSETRON 4 MG: 2 INJECTION INTRAMUSCULAR; INTRAVENOUS at 12:50

## 2024-09-30 RX ADMIN — FENTANYL CITRATE 50 MCG: 50 INJECTION, SOLUTION INTRAMUSCULAR; INTRAVENOUS at 13:29

## 2024-09-30 RX ADMIN — OXYCODONE HYDROCHLORIDE 5 MG: 5 TABLET ORAL at 14:43

## 2024-09-30 RX ADMIN — FENTANYL CITRATE 25 MCG: 50 INJECTION, SOLUTION INTRAMUSCULAR; INTRAVENOUS at 13:07

## 2024-09-30 RX ADMIN — FENTANYL CITRATE 25 MCG: 50 INJECTION, SOLUTION INTRAMUSCULAR; INTRAVENOUS at 13:17

## 2024-09-30 RX ADMIN — KETOROLAC TROMETHAMINE 30 MG: 30 INJECTION, SOLUTION INTRAMUSCULAR at 12:50

## 2024-09-30 RX ADMIN — SCOPALAMINE 1 PATCH: 1 PATCH, EXTENDED RELEASE TRANSDERMAL at 13:25

## 2024-09-30 RX ADMIN — PROPOFOL 200 MG: 10 INJECTION, EMULSION INTRAVENOUS at 12:25

## 2024-09-30 RX ADMIN — SODIUM CHLORIDE, POTASSIUM CHLORIDE, SODIUM LACTATE AND CALCIUM CHLORIDE: 600; 310; 30; 20 INJECTION, SOLUTION INTRAVENOUS at 12:16

## 2024-09-30 RX ADMIN — GLYCOPYRROLATE 0.2 MG: 0.2 INJECTION, SOLUTION INTRAMUSCULAR; INTRAVENOUS at 12:20

## 2024-09-30 RX ADMIN — ONDANSETRON 4 MG: 2 INJECTION INTRAMUSCULAR; INTRAVENOUS at 13:10

## 2024-09-30 ASSESSMENT — ACTIVITIES OF DAILY LIVING (ADL)
ADLS_ACUITY_SCORE: 32

## 2024-09-30 NOTE — DISCHARGE INSTRUCTIONS
You received Toradol, an IV form of Ibuprofen (Motrin) at 12:50 pm.  Do not take any Ibuprofen products until 6:50 pm    Transderm Scop (Scopolamine) Patch    The Transderm Scop patch placed behind your ear helps to prevent nausea and vomiting associated with the use of anesthesia and certain analgesics used during or after many types surgery.  You will need to remove this patch after 3 days.  However, it may be removed sooner if you are no longer concerned about nausea or vomiting.      The most common side effects:  ?  dryness of the mouth  ?  drowsiness  ?  blurred vision  ?  dilation of pupils  ?  disorientation, memory disturbances and/or confusion  ?  dizziness  ?  restlessness  ?  hallucinations  ?  difficulty urinating  ?  skin rash  ?  red or painful eyes    Avoid drinking alcohol while using Transderm Scop patch.  Be careful about driving or operating any machinery while using this medication since it may make you drowsy.  In the unlikely event that you experience pain in the eye, which may be accompanied by widening of the pupil, eye redness and blurred vision, remove the Transderm Scop Patch immediately and consult your doctor.    Removal of Transderm Scop Patch:  To remove the patch simply peel it off your skin.  Be sure to wash your hands and the area behind your ear thoroughly with soap and water.  The Transderm Scop Patch will continue to have some active ingredient after use.  Therefore, to avoid accidental contact or ingestion by children or pets, fold the used patch in half with the sticky side together and dispose in the trash out of reach of children and pets.  If you wear contact lens, be sure to wash your hands first before handling contact lens.      Removal date: Tomorrow Tuesday 10/1/24      DR. TIFFANIE DAVIDSON DPM    Podiatry Triage Phone Number:  [phone], choose option #1 and then option #1 for orthopedic scheduling. They will be able to connect you to the care team.    Patient Care  Nurses On Call (After Hours):  [phone]

## 2024-09-30 NOTE — ANESTHESIA PROCEDURE NOTES
Airway       Patient location during procedure: OR  Staff -        CRNA: Simone Guan APRN CRNA       Performed By: CRNA  Consent for Airway        Urgency: elective  Indications and Patient Condition       Indications for airway management: mauri-procedural       Induction type:intravenous       Mask difficulty assessment: 1 - vent by mask    Final Airway Details       Final airway type: supraglottic airway    Supraglottic Airway Details        Type: LMA       Brand: I-Gel       LMA size: 4    Post intubation assessment        Placement verified by: capnometry, equal breath sounds and chest rise        Number of attempts at approach: 1       Secured with: commercial tube blanchard       Ease of procedure: easy       Dentition: Intact and Unchanged

## 2024-09-30 NOTE — ANESTHESIA POSTPROCEDURE EVALUATION
Patient: Kalyn Rivero    Procedure: Procedure(s):  Excision mass, right foot       Anesthesia Type:  General    Note:  Disposition: Outpatient   Postop Pain Control: Uneventful            Sign Out: Well controlled pain   PONV: No   Neuro/Psych: Uneventful            Sign Out: Acceptable/Baseline neuro status   Airway/Respiratory: Uneventful            Sign Out: Acceptable/Baseline resp. status   CV/Hemodynamics: Uneventful            Sign Out: Acceptable CV status; No obvious hypovolemia; No obvious fluid overload   Other NRE: NONE   DID A NON-ROUTINE EVENT OCCUR? No           Last vitals:  Vitals Value Taken Time   /81 09/30/24 1315   Temp     Pulse 99 09/30/24 1315   Resp 16 09/30/24 1315   SpO2 97 % 09/30/24 1321   Vitals shown include unfiled device data.    Electronically Signed By: Duane Gleason MD  September 30, 2024  1:22 PM

## 2024-09-30 NOTE — ANESTHESIA CARE TRANSFER NOTE
Patient: Kalyn Rivero    Procedure: Procedure(s):  Excision mass, right foot       Diagnosis: Foot pain, bilateral [M79.671, M79.672]  Mass of soft tissue of foot [M79.89]  Hammertoe, bilateral [M20.41, M20.42]  Rheumatoid arthritis of other site with positive rheumatoid factor (H) [M05.7A]  Arthritis of both feet [M19.071, M19.072]  Diagnosis Additional Information: No value filed.    Anesthesia Type:   General     Note:    Oropharynx: oropharynx clear of all foreign objects  Level of Consciousness: drowsy  Oxygen Supplementation: face mask  Level of Supplemental Oxygen (L/min / FiO2): 6  Independent Airway: airway patency satisfactory and stable  Dentition: dentition unchanged  Vital Signs Stable: post-procedure vital signs reviewed and stable  Report to RN Given: handoff report given  Patient transferred to: PACU    Handoff Report: Identifed the Patient, Identified the Reponsible Provider, Reviewed the pertinent medical history, Discussed the surgical course, Reviewed Intra-OP anesthesia mangement and issues during anesthesia, Set expectations for post-procedure period and Allowed opportunity for questions and acknowledgement of understanding      Vitals:  Vitals Value Taken Time   /80 09/30/24 1257   Temp     Pulse 119 09/30/24 1301   Resp 26 09/30/24 1301   SpO2 99 % 09/30/24 1301   Vitals shown include unfiled device data.    Electronically Signed By: JULIETTE Miller CRNA  September 30, 2024  1:02 PM

## 2024-09-30 NOTE — OP NOTE
[facility] Podiatry Operative Note    Patient Name:                           Kalyn Rivero  Patient YOB: 1970  Date of Surgery:                       9/30/2024  CSN:                                         [id number]    Pre-operative diagnosis: Foot pain, bilateral [M79.671, M79.672]  Mass of soft tissue of foot [M79.89]  Hammertoe, bilateral [M20.41, M20.42]  Rheumatoid arthritis of other site with positive rheumatoid factor (H) [M05.7A]  Arthritis of both feet [M19.071, M19.072]   Post-operative diagnosis: Same   Procedure: 1.  Soft tissue mass removal right foot   Surgeon: Vashti Ayala DPM, Podiatry/Foot and Ankle Surgery   Assistant(s): None   Anesthesia: General With local     Hemostasis:                             Electrocautery    Estimated Blood Loss:              5 mL    Speceimens:                            Soft tissue mass right foot sent for pathology    Materials:                                  None      Indications: Ms. Rivero is a 54-year-old female that presented to clinic with a bump on her right foot.  Notes it has been really sore and will get bigger especially in the winter in close toed shoes.  Hurts when she will walk.  On physical exam patient's pulses are palpable.  She does have a firm mobile mass noted to the base and lateral aspect of her right fifth metatarsal.  CT shows possible bursa.  Discussed going in and surgically removing it and sending it to pathology.  Risk benefits and complications were discussed with patient no guarantees were made.  She wishes to proceed with surgery.      Procecure: Patient is brought to the operating room placed operating table in a supine position.  Anesthesia was administered and local was injected.  The foot was prepped and draped using sterile technique.  Attention was directed to the lateral aspect of the right foot.  The tourniquet was inflated to 250 mmHg.  A linear incision is made over the lateral aspect of  her right fifth metatarsal base approximately 3 cm in length through skin.  Blunt dissection was used down to the level of the deep fascia.  All vessels that were noted were ligated with electrocautery.  Once down to the deep fascia of the soft tissue mass was noted and sharply dissected out with of Metzenbaum scissor.  This was sent to pathology.  The tourniquet was let down and bleeding was controlled with electrocautery.  Subcutaneous tissue was reapproximated using 4-0 Vicryl and skin was reapproximate using 4-0 Prolene.  Patient's foot was placed in a dry sterile dressing.  Patient tolerated procedure and anesthesia well was transferred recovery with vital signs stable vascular status intact.  Patient will be minimal heel weightbearing in a postop boot.    Vashti Ayala DPM

## 2024-09-30 NOTE — BRIEF OP NOTE
[facility]    Brief Operative Note    Pre-operative diagnosis: Foot pain, bilateral [M79.671, M79.672]  Mass of soft tissue of foot [M79.89]  Hammertoe, bilateral [M20.41, M20.42]  Rheumatoid arthritis of other site with positive rheumatoid factor (H) [M05.7A]  Arthritis of both feet [M19.071, M19.072]  Post-operative diagnosis Same as pre-operative diagnosis    Procedure: Excision mass, right foot, Right - Foot    Surgeon: Surgeons and Role:     * Vashti Ayala DPM, Podiatry/Foot and Ankle Surgery - Primary  Anesthesia: General   Estimated Blood Loss: Minimal    Drains: None  Specimens:   ID Type Source Tests Collected by Time Destination   1 : Right foot soft tissue mass Tissue Foot, Right SURGICAL PATHOLOGY EXAM Vashti Ayala DPM, Podiatry/Foot and Ankle Surgery 9/30/2024 12:40 PM      Findings:   None.  Complications: None.  Implants: * No implants in log *

## 2024-09-30 NOTE — ANESTHESIA PREPROCEDURE EVALUATION
"Anesthesia Pre-Procedure Evaluation    Patient: Kalyn Rivero   MRN: [id number] : 1970        Procedure : Procedure(s):  Excision mass, right foot          Past Medical History:   Diagnosis Date    Acetaminophen overdose 2011    Anemia     Anesthesia complication     pt states has a history of heart stopping during anesthesia,    Arrhythmia     Cerebral artery occlusion with cerebral infarction (H)     Cerebral infarction (H)     Cervical high risk HPV (human papillomavirus) test positive 2017    type 18 & other HR HPV    Chronic infection     MRSA    Chronic pain 2011    Degenerative joint disease     Endometriosis     Fibromyalgia     Gastro-oesophageal reflux disease     Heart murmur     History of blood transfusion     Hypothyroidism     Immune disorder (H)     Learning disability     Malignant neoplasm (H)     Migraine     MRSA (methicillin resistant staph aureus) culture positive     Neck injuries     Opioid type dependence (H)     Other chronic pain     PONV (postoperative nausea and vomiting)     states \"flatlines\"during anesthesia    RA (rheumatoid arthritis) (H)     Renal stone     Scoliosis     Stomach ulcer     history      Past Surgical History:   Procedure Laterality Date    ARTHRODESIS FOOT  2012    Procedure:ARTHRODESIS FOOT; Right Subtalar andTaloNavicular  Fusion  ; Surgeon:CHRIS HENDRICKS; [facility]    ARTHRODESIS FOOT Left 2015    Procedure: ARTHRODESIS FOOT;  Surgeon: Chris Hendricks MD;  [facility]    ARTHROPLASTY ELBOW Right 2015    Procedure: ARTHROPLASTY ELBOW;  Surgeon: Carrol Gaspar MD;  [facility]    ARTHROPLASTY KNEE Right     ARTHROPLASTY REVISION ELBOW Right 2018    Procedure: 1 - aspiration of Right elbow 2- Explantation of failed hardware Right humeral periprosthetic fracture non-union 3- Right distal humerus excision 4- Right distal humerus replacement 5 - Revision Right total elbow " arthroplasty;  Surgeon: Aakash Zimmer MD;  Location: [facility] OR    ARTHROPLASTY WRIST      x2 Lt wrist replacement.    ARTHROTOMY ELBOW Right 6/18/2015    Procedure: ARTHROTOMY ELBOW;  Surgeon: Carrol Gaspar MD;  [facility]  [facility]    [facility]    left total shoulder arthroplasty by Dr. Law    CYSTOSCOPY  02/06/2009    1.cystoscopy.2.bilateral ureteroscopy.3.basketing of stone fragments from the right kidney.4.bilateral double-J ureteral stent placement.5.ann catheter placement.6.fluoroscopy and intraoperative interpretation of images.    CYSTOSCOPY  01/08/2007    1. cystoscopy and left stent removal.2.left ureteroscopy    DECOMPRESSION CUBITAL TUNNEL  6/6/2014    Procedure: DECOMPRESSION CUBITAL TUNNEL;  Surgeon: Janelle Coates MD;  [facility]    ENDOSCOPY  03/31/2005    upper GI endoscopy-[facility]    ESOPHAGOSCOPY, GASTROSCOPY, DUODENOSCOPY (EGD), COMBINED  3/17/2014    Procedure: COMBINED ESOPHAGOSCOPY, GASTROSCOPY, DUODENOSCOPY (EGD), BIOPSY SINGLE OR MULTIPLE;;  Surgeon: Duane, William Charles, MD;  Location: [facility] OR    FUSION CERVICAL POSTERIOR ONE LEVEL  11/18/2013    Procedure: FUSION CERVICAL POSTERIOR ONE LEVEL;  Cervical 1-2 Posterior Cervical Fusion (Harms Procedure);  Surgeon: Carrol Gunn MD;  [facility] OR    GYN SURGERY      INJECT MAJOR JOINT / BURSA Left 1/5/2017    Procedure: INJECT MAJOR JOINT / BURSA;  Surgeon: Fredy Patel DO;  [facility]    INJECT STEROID (LOCATION) Left 6/18/2015    Procedure: INJECT STEROID (LOCATION);  Surgeon: Carrol Gaspar MD;  Location:  OR    NECK SURGERY      REMOVE FOREIGN BODY UPPER EXTREMITY Right 3/2/2017    Procedure: REMOVE FOREIGN BODY UPPER EXTREMITY;  Surgeon: Carrol Gaspar MD;  [facility] OR    REMOVE HARDWARE FOOT Left 6/8/2022    Procedure: REMOVAL, HARDWARE, FOOT LEFT;  Surgeon: Chris Hendricks MD;  Location: [facility] OR    RESECT  BONE UPPER EXTREMITY Left 4/27/2017    Procedure: RESECT BONE UPPER EXTREMITY;  Left Radial Head Resection;  Surgeon: Carrol Gaspar MD;  [facility] OR    ROTATOR CUFF REPAIR RT/LT  10/19/2005    1.left distal clavicle excision.2.acromioplasty.3.coracoacromial ligament resection.4.rotator cuff exploration    [facility] ANESTH,DX ARTHROSCOPIC PROC KNEE JOINT  10/24/2007    right total knee arthroplasty    [facility] SHOULDER SURG PROC UNLISTED      [facility] TOTAL KNEE ARTHROPLASTY  1/18/12    Left      Allergies   Allergen Reactions    Leflunomide Anaphylaxis    Morphine Swelling    Celecoxib Other (See Comments)     Other reaction(s): stroke , lasted 24 hours  -Pt takes celecoxib daily. States that they changed the  and now it is fine.    Amitriptyline Other (See Comments)     Sleepwalking     Codeine     Gabapentin Other (See Comments)     Pins,needles, stabbing aching at once  Pins,needles, stabbing aching at once  Numbness and Tingling    Ibuprofen      Doesn't take due to gastric ulcer    Sulfa Antibiotics Nausea and Vomiting     unknown    Tylenol [Acetaminophen]      Pt. States that she has Liver problems  Tylenol 3    Dexamethasone Palpitations     Heart racing, sweating     Erythromycin Nausea     Vomiting     Penicillins Rash    Prednisone Palpitations     Heart racing      Social History     Tobacco Use    Smoking status: Former     Current packs/day: 0.10     Average packs/day: 0.1 packs/day for 41.2 years (4.1 ttl pk-yrs)     Types: Cigarettes     Start date: 8/6/1983    Smokeless tobacco: Never    Tobacco comments:     Quit 6 weeks ago   Substance Use Topics    Alcohol use: No      Wt Readings from Last 1 Encounters:   09/30/24 78.1 kg (172 lb 3.2 oz)        Anesthesia Evaluation        History of anesthetic complications  - PONV.      ROS/MED HX  ENT/Pulmonary:     (+)                              recent URI,          Neurologic:     (+)          CVA,    TIA,                  Cardiovascular:      (+)  - -   -  - -                           valvular problems/murmurs           METS/Exercise Tolerance:     Hematologic: Comments: Lab Test        09/04/24 11/22/23 06/11/23 06/11/23                       1124          1352          0937          0856          WBC          7.1          6.7           --          7.0           HGB          12.6         13.1          --          12.3          MCV          93           91            --          89            PLT          218          216           --          165           INR           --           --          1.14          --            Lab Test        08/23/23 06/11/23 05/26/22                       1039          0856          1533          NA           145          142          141           POTASSIUM    4.7          3.8          4.2           CHLORIDE     110*         108*         111*          CO2          22           23           25            BUN          22.6*        22.0*        24            CR           0.93         0.87         1.16*         ANIONGAP     13           11           5             JERRI          9.6          9.4          9.0           GLC          77           102*         132*                Musculoskeletal: Comment:  Foot pain, bilateral [M79.671, M79.672]       Mass of soft tissue of foot [M79.89]       Hammertoe, bilateral [M20.41, M20.42]       Rheumatoid arthritis of other site with positive rheumatoid factor (H) [M05.7A]       Arthritis of both feet         GI/Hepatic:     (+) GERD,            liver disease,       Renal/Genitourinary:     (+) renal disease,             Endo:     (+)          thyroid problem, hypothyroidism,           Psychiatric/Substance Use:       Infectious Disease:       Malignancy:       Other:      (+)  , H/O Chronic Pain,         Physical Exam    Airway        Mallampati: II   TM distance: > 3 FB   Neck ROM: full   Mouth opening: > 3 cm    Respiratory Devices and Support         Dental        (+) Minor Abnormalities - some fillings, tiny chips      Cardiovascular   cardiovascular exam normal          Pulmonary   pulmonary exam normal                OUTSIDE LABS:  CBC:   Lab Results   Component Value Date    WBC 7.1 09/04/2024    WBC 6.7 11/22/2023    HGB 12.6 09/04/2024    HGB 13.1 11/22/2023    HCT 39.6 09/04/2024    HCT 40.9 11/22/2023     09/04/2024     11/22/2023     BMP:   Lab Results   Component Value Date     08/23/2023     06/11/2023    POTASSIUM 4.7 08/23/2023    POTASSIUM 3.8 06/11/2023    CHLORIDE 110 (H) 08/23/2023    CHLORIDE 108 (H) 06/11/2023    CO2 22 08/23/2023    CO2 23 06/11/2023    BUN 22.6 (H) 08/23/2023    BUN 22.0 (H) 06/11/2023    CR 0.93 08/23/2023    CR 0.87 06/11/2023    GLC 77 08/23/2023     (H) 06/11/2023     COAGS:   Lab Results   Component Value Date    PTT 27 06/11/2023    INR 1.14 06/11/2023    FIBR 225 03/20/2011     POC:   Lab Results   Component Value Date     (H) 11/27/2018    HCG Negative 03/02/2017    HCGS Negative 11/27/2018     HEPATIC:   Lab Results   Component Value Date    ALBUMIN 4.1 08/23/2023    PROTTOTAL 7.9 08/23/2023    ALT 22 08/23/2023    AST 30 08/23/2023    GGT 84 (H) 03/19/2011    ALKPHOS 103 08/23/2023    BILITOTAL 0.7 08/23/2023    BETSY 32 03/20/2011     OTHER:   Lab Results   Component Value Date    PH 7.46 (H) 03/19/2011    LACT 1.9 03/21/2011    JERRI 9.6 08/23/2023    PHOS 3.2 11/19/2013    MAG 1.9 12/02/2018    LIPASE 146 11/11/2021    TSH 1.42 08/23/2023    T4 0.94 03/03/2017    T3 35 (L) 03/19/2011    CRP <2.9 07/05/2021    SED 38 (H) 07/05/2021       Anesthesia Plan    ASA Status:  3    NPO Status:  NPO Appropriate    Anesthesia Type: General.     - Airway: LMA   Induction: Propofol.   Maintenance: TIVA.        Consents    Anesthesia Plan(s) and associated risks, benefits, and realistic alternatives discussed. Questions answered and patient/representative(s) expressed understanding.     -  "Discussed:     - Discussed with:  Patient      - Extended Intubation/Ventilatory Support Discussed: No.      - Patient is DNR/DNI Status: No     Use of blood products discussed: No .     Postoperative Care    Pain management: IV analgesics.   PONV prophylaxis: Dexamethasone or Solumedrol, Ondansetron (or other 5HT-3)     Comments:               Duane Gleason MD    I have reviewed the pertinent notes and labs in the chart from the past 30 days and (re)examined the patient.  Any updates or changes from those notes are reflected in this note.              # Overweight: Estimated body mass index is 27.81 kg/m  as calculated from the following:    Height as of this encounter: 1.676 m (5' 5.98\").    Weight as of this encounter: 78.1 kg (172 lb 3.2 oz).      "

## 2024-10-02 ENCOUNTER — OFFICE VISIT (OUTPATIENT)
Dept: FAMILY MEDICINE | Facility: CLINIC | Age: 54
End: 2024-10-02
Payer: COMMERCIAL

## 2024-10-02 ENCOUNTER — ORDERS ONLY (AUTO-RELEASED) (OUTPATIENT)
Dept: FAMILY MEDICINE | Facility: CLINIC | Age: 54
End: 2024-10-02

## 2024-10-02 VITALS
HEART RATE: 98 BPM | RESPIRATION RATE: 20 BRPM | DIASTOLIC BLOOD PRESSURE: 88 MMHG | SYSTOLIC BLOOD PRESSURE: 135 MMHG | HEIGHT: 66 IN | BODY MASS INDEX: 27.64 KG/M2 | OXYGEN SATURATION: 98 % | WEIGHT: 172 LBS | TEMPERATURE: 97.1 F

## 2024-10-02 DIAGNOSIS — Z12.11 SCREEN FOR COLON CANCER: Primary | ICD-10-CM

## 2024-10-02 DIAGNOSIS — R82.90 ABNORMAL URINE ODOR: ICD-10-CM

## 2024-10-02 DIAGNOSIS — E89.0 HISTORY OF THYROIDECTOMY: ICD-10-CM

## 2024-10-02 DIAGNOSIS — Z12.11 SCREEN FOR COLON CANCER: ICD-10-CM

## 2024-10-02 DIAGNOSIS — Z12.11 COLON CANCER SCREENING: ICD-10-CM

## 2024-10-02 DIAGNOSIS — Z12.4 CERVICAL CANCER SCREENING: ICD-10-CM

## 2024-10-02 LAB
ALBUMIN UR-MCNC: NEGATIVE MG/DL
APPEARANCE UR: CLEAR
BACTERIA #/AREA URNS HPF: ABNORMAL /HPF
BILIRUB UR QL STRIP: NEGATIVE
COLOR UR AUTO: YELLOW
GLUCOSE UR STRIP-MCNC: NEGATIVE MG/DL
HGB UR QL STRIP: NEGATIVE
HYALINE CASTS #/AREA URNS LPF: ABNORMAL /LPF
KETONES UR STRIP-MCNC: NEGATIVE MG/DL
LEUKOCYTE ESTERASE UR QL STRIP: ABNORMAL
NITRATE UR QL: NEGATIVE
PH UR STRIP: 5.5 [PH] (ref 5–7)
RBC #/AREA URNS AUTO: ABNORMAL /HPF
SP GR UR STRIP: 1.02 (ref 1–1.03)
SQUAMOUS #/AREA URNS AUTO: ABNORMAL /LPF
TRANS CELLS #/AREA URNS HPF: ABNORMAL /HPF
UROBILINOGEN UR STRIP-ACNC: 0.2 E.U./DL
WBC #/AREA URNS AUTO: ABNORMAL /HPF

## 2024-10-02 PROCEDURE — 36415 COLL VENOUS BLD VENIPUNCTURE: CPT | Performed by: FAMILY MEDICINE

## 2024-10-02 PROCEDURE — G0124 SCREEN C/V THIN LAYER BY MD: HCPCS | Performed by: PATHOLOGY

## 2024-10-02 PROCEDURE — 99213 OFFICE O/P EST LOW 20 MIN: CPT | Mod: 24 | Performed by: FAMILY MEDICINE

## 2024-10-02 PROCEDURE — 81001 URINALYSIS AUTO W/SCOPE: CPT | Performed by: FAMILY MEDICINE

## 2024-10-02 PROCEDURE — G0145 SCR C/V CYTO,THINLAYER,RESCR: HCPCS | Performed by: FAMILY MEDICINE

## 2024-10-02 PROCEDURE — 87624 HPV HI-RISK TYP POOLED RSLT: CPT | Performed by: FAMILY MEDICINE

## 2024-10-02 PROCEDURE — 84443 ASSAY THYROID STIM HORMONE: CPT | Performed by: FAMILY MEDICINE

## 2024-10-02 RX ORDER — ESZOPICLONE 1 MG/1
TABLET, FILM COATED ORAL
COMMUNITY

## 2024-10-02 RX ORDER — FLUCONAZOLE 150 MG/1
150 TABLET ORAL
Qty: 3 TABLET | Refills: 0 | Status: SHIPPED | OUTPATIENT
Start: 2024-10-02 | End: 2024-10-09

## 2024-10-02 RX ORDER — NITROFURANTOIN MACROCRYSTALS 100 MG/1
100 CAPSULE ORAL 4 TIMES DAILY
Qty: 28 CAPSULE | Refills: 0 | Status: SHIPPED | OUTPATIENT
Start: 2024-10-02 | End: 2024-10-09

## 2024-10-02 ASSESSMENT — PAIN SCALES - GENERAL: PAINLEVEL: NO PAIN (0)

## 2024-10-02 NOTE — PROGRESS NOTES
ICD-10-CM    1. Screen for colon cancer  Z12.11 COLOGUARD([employer])      2. Hypothyroidism  E03.9       3. Cervical cancer screening  Z12.4 HPV and Gynecologic Cytology Panel - Recommended Age 30 - 65 Years      4. Colon cancer screening  Z12.11       5. Abnormal urine odor  R82.90 UA with Microscopic - lab collect     UA with Microscopic - lab collect     Urine Microscopic Exam     nitroFURantoin macrocrystal (MACRODANTIN) 100 MG capsule     fluconazole (DIFLUCAN) 150 MG tablet      6. History of thyroidectomy  E89.0 TSH WITH FREE T4 REFLEX     TSH WITH FREE T4 REFLEX       PLAN:await results     Subjective   Kalyn is a 54 year old, presenting for the following health issues:  Repeat Pap Smear        10/2/2024    11:32 AM   Additional Questions   Roomed by Jacinta   Accompanied by DEB     History of Present Illness       Reason for visit:  Pap and possible bladder infection    She eats 2-3 servings of fruits and vegetables daily.She consumes 2 sweetened beverage(s) daily.She exercises with enough effort to increase her heart rate 9 or less minutes per day.  She exercises with enough effort to increase her heart rate 5 days per week.   She is taking medications regularly.       SUBJECTIVE:  54 year old.The patient has a complaint ofodor to urine.  This started 2 days ago. quality abnormal smell of urine Associated symptoms are none.. ROS no fevero or chills.  No back pain      Reviewed health maintenance  Patient Active Problem List   Diagnosis    Hypothyroidism    Acetaminophen overdose    Pancreatitis    Anemia    Chronic pain    Pneumonia    Suicide risk    Grief reaction    Liver failure, acute    CARDIOVASCULAR SCREENING; LDL GOAL LESS THAN 160    Renal stone    TOTAL KNEE ARTHROPLASTY - bilateral    Knee joint replacement - left    Trochanteric bursitis - left    Atlantoaxial instability    Postoperative pain    S/P cervical spinal fusion    Drug-seeking behavior    Opioid type dependence (H)     "Severe frontal headaches    Abdominal pain, unspecified abdominal location    Esophageal reflux    Right upper extremity numbness    Right arm numbness    Lesion of ulnar nerve    Arthralgia of temporomandibular joint    Intractable chronic migraine without aura    Encounter for long-term opiate analgesic use    Rheumatoid arthritis of foot (H)    Other drug-induced neutropenia (H)    Rheumatoid arthritis with positive rheumatoid factor, involving unspecified site (H)    Cervical high risk HPV (human papillomavirus) test positive    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)    Chronic tension-type headache    Fibromyositis    Insomnia    Migraine    Myofascial pain    Periprosthetic fracture around internal prosthetic right elbow joint    Status post surgery    Pain in joint, ankle and foot, left    Dizzy spells     Past Medical History:   Diagnosis Date    Acetaminophen overdose 03/24/2011    Anemia     Anesthesia complication     pt states has a history of heart stopping during anesthesia,    Arrhythmia     Cerebral artery occlusion with cerebral infarction (H)     Cerebral infarction (H)     Cervical high risk HPV (human papillomavirus) test positive 02/22/2017    type 18 & other HR HPV    Chronic infection     MRSA    Chronic pain 03/24/2011    Degenerative joint disease     Endometriosis     Fibromyalgia     Gastro-oesophageal reflux disease     Heart murmur     History of blood transfusion     Hypothyroidism     Immune disorder (H)     Learning disability     Malignant neoplasm (H)     Migraine     MRSA (methicillin resistant staph aureus) culture positive     Neck injuries     Opioid type dependence (H)     Other chronic pain     PONV (postoperative nausea and vomiting)     states \"flatlines\"during anesthesia    RA (rheumatoid arthritis) (H)     Renal stone     Scoliosis     Stomach ulcer     history       OBJECTIVE:  no apparent distress  /88   Pulse 98   Temp 97.1  F (36.2 " " C) (Tympanic)   Resp 20   Ht 1.676 m (5' 5.98\")   Wt 78 kg (172 lb)   LMP  (LMP Unknown)   SpO2 98%   BMI 27.77 kg/m    Pap preformed  Normal female genitalis.  UA RESULTS:  Recent Labs   Lab Test 10/02/24  1203   COLOR Yellow   APPEARANCE Clear   URINEGLC Negative   URINEBILI Negative   URINEKETONE Negative   SG 1.025   UBLD Negative   URINEPH 5.5   PROTEIN Negative   UROBILINOGEN 0.2   NITRITE Negative   LEUKEST Trace*   RBCU 0-2   WBCU 5-10*          Signed Electronically by: Mika Zamora MD    "

## 2024-10-03 ENCOUNTER — MYC MEDICAL ADVICE (OUTPATIENT)
Dept: PODIATRY | Facility: CLINIC | Age: 54
End: 2024-10-03
Payer: COMMERCIAL

## 2024-10-03 LAB
PATH REPORT.COMMENTS IMP SPEC: NORMAL
PATH REPORT.FINAL DX SPEC: NORMAL
PATH REPORT.GROSS SPEC: NORMAL
PATH REPORT.MICROSCOPIC SPEC OTHER STN: NORMAL
PATH REPORT.RELEVANT HX SPEC: NORMAL
PHOTO IMAGE: NORMAL
TSH SERPL DL<=0.005 MIU/L-ACNC: 3.16 UIU/ML (ref 0.3–4.2)

## 2024-10-03 NOTE — TELEPHONE ENCOUNTER
Relayed message from Dr. Ayala. Patient states this is her first nodule and wonders if Dr. Ayala can provide more specifics on whether or not she thinks this will return.     Please advise on response.     Giselle Carbajal ATC

## 2024-10-03 NOTE — TELEPHONE ENCOUNTER
A rheumatoid nodule is just a benign mass that can form when patients have rheumatoid. They can happen at any joint in the body. If it will come back or if you will get more other places, I can not predict that.     If she has further questions we can address that at her follow up post op visit.     Please let patient know.     THanks,     Vashti Ayala DPM

## 2024-10-03 NOTE — TELEPHONE ENCOUNTER
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 These happen with rheumatoid arthritis which she has.  They are benign but they have a high recurrence rate.     Please let patient know.     Thanks,     Vashti Ayala DPM

## 2024-10-04 ENCOUNTER — TELEPHONE (OUTPATIENT)
Dept: PODIATRY | Facility: CLINIC | Age: 54
End: 2024-10-04
Payer: COMMERCIAL

## 2024-10-04 LAB
HPV HR 12 DNA CVX QL NAA+PROBE: POSITIVE
HPV16 DNA CVX QL NAA+PROBE: NEGATIVE
HPV18 DNA CVX QL NAA+PROBE: NEGATIVE
HUMAN PAPILLOMA VIRUS FINAL DIAGNOSIS: ABNORMAL

## 2024-10-04 NOTE — TELEPHONE ENCOUNTER
There isn't a consent to communicate on file.     Left voicemail asking for a return call.     SANJANA Cunningham RN

## 2024-10-04 NOTE — TELEPHONE ENCOUNTER
Patient was informed of recommendations below. She will follow up at her appointment.     SANJANA Cunningham RN

## 2024-10-04 NOTE — TELEPHONE ENCOUNTER
Other: Kalyn called she is returning a call to Adia. Please call     Could we send this information to you in [facility] [employer] or would you prefer to receive a phone call?:   Patient would prefer a phone call   Okay to leave a detailed message?: Yes at Cell number on file:    Telephone Information:   Mobile [phone]

## 2024-10-07 ENCOUNTER — TELEPHONE (OUTPATIENT)
Dept: PODIATRY | Facility: CLINIC | Age: 54
End: 2024-10-07
Payer: COMMERCIAL

## 2024-10-07 ENCOUNTER — TRANSFERRED RECORDS (OUTPATIENT)
Dept: HEALTH INFORMATION MANAGEMENT | Facility: CLINIC | Age: 54
End: 2024-10-07

## 2024-10-07 NOTE — TELEPHONE ENCOUNTER
Appointment     Reason for Call: Patient just found out she has an PO appt tomorrow and she may not be able to get a ride. She is going to call [facility] this morning to see if she can get a ride otherwise won't be able to come in until Wednesday - Friday    Could we send this information to you in [facility] or would you prefer to receive a phone call?:   Patient would prefer a phone call   Okay to leave a detailed message?: No at Cell number on file:    Telephone Information:   Mobile [phone]

## 2024-10-07 NOTE — TELEPHONE ENCOUNTER
Other: Kalyn called she will be coming to her appointment tomorrow 10/8/24 with Dr. Ayala she was able to get a ride.     Could we send this information to you in [place] or would you prefer to receive a phone call?:   Patient would prefer a phone call   Okay to leave a detailed message?: Yes at Cell number on file:    Telephone Information:   Mobile [phone]

## 2024-10-08 ENCOUNTER — OFFICE VISIT (OUTPATIENT)
Dept: PODIATRY | Facility: CLINIC | Age: 54
End: 2024-10-08
Payer: COMMERCIAL

## 2024-10-08 VITALS — WEIGHT: 172 LBS | BODY MASS INDEX: 27.77 KG/M2 | SYSTOLIC BLOOD PRESSURE: 118 MMHG | DIASTOLIC BLOOD PRESSURE: 72 MMHG

## 2024-10-08 DIAGNOSIS — Z98.890 POST-OPERATIVE STATE: Primary | ICD-10-CM

## 2024-10-08 DIAGNOSIS — M05.9 RHEUMATOID ARTHRITIS WITH POSITIVE RHEUMATOID FACTOR, INVOLVING UNSPECIFIED SITE (H): ICD-10-CM

## 2024-10-08 DIAGNOSIS — M05.771 RHEUMATOID ARTHRITIS INVOLVING RIGHT FOOT WITH POSITIVE RHEUMATOID FACTOR (H): ICD-10-CM

## 2024-10-08 PROCEDURE — 99024 POSTOP FOLLOW-UP VISIT: CPT | Performed by: PODIATRIST

## 2024-10-08 RX ORDER — OXYCODONE HYDROCHLORIDE 5 MG/1
5-10 TABLET ORAL EVERY 4 HOURS PRN
Qty: 20 TABLET | Refills: 0 | Status: SHIPPED | OUTPATIENT
Start: 2024-10-08

## 2024-10-08 NOTE — PATIENT INSTRUCTIONS
Thank you for choosing [facility] Podiatry / Foot & Ankle Surgery!    DR DAVIDSON'S CLINIC:  [facility]   [address]   [place], [place] [address] [phone]    (Tues, Wed, Thur am, Fri pm)     [facility]  [address], [place], [place] [id number] [phone]    (Friday mornings)     TRIAGE LINE: [phone]  APPOINTMENTS: [phone]  RADIOLOGY: [phone]  SET UP SURGERY: [phone]  PHYSICAL THERAPY: [phone]   FAX NUMBER: [phone]  BILLING QUESTIONS: [phone]       Follow up: 1 week

## 2024-10-08 NOTE — PROGRESS NOTES
Podiatry / Foot and Ankle Surgery Progress Note    October 8, 2024    Subject: Patient was seen for 1 week s/p right foot soft tissue mass. Here with her PCA.  Notes that overall she is doing okay but is continuing to have pain.  Would like a refill on her pain medication.  Denies fever, nausea, vomiting.    Objective:  Vitals: /72   Wt 78 kg (172 lb)   LMP  (LMP Unknown)   BMI 27.77 kg/m    BMI= Body mass index is 27.77 kg/m .    General:  Patient is alert and orientated.  NAD.    Vascular:  DP and PT pulses are palpable.  No edema or varicosities noted.  CFT's < 3secs.  Skin temp is normal.    Neuro:  Light and gross touch sensation intact to digits, dorsum, and plantar aspects of the feet.    Derm: Dressing is clean dry and intact.  Sutures are intact.  No redness, dehiscence or signs of acute infection noted.    Musculoskeletal:  No foot deformity noted.      Pathology:    Rheumatoid nodule        Assessment:    Post-operative state  Rheumatoid arthritis with positive rheumatoid factor, involving unspecified site (H)  Rheumatoid arthritis involving right foot with positive rheumatoid factor (H)    Medical Decision Making/Plan: At this time the dressing was changed and she will continue to keep the foot and dressing dry.  We did refill her medication at this time but discussed that usually once the stitches come out we do not refill the pain medication.  He will follow-up in 1 week for suture removal.      All questions were answered to patient satisfaction and she will call with questions or concerns.      Patient Risk Factor:  Patient is a low risk factor for infection.     Vashti Ayala DPM, Podiatry/Foot and Ankle Surgery

## 2024-10-09 ENCOUNTER — PATIENT OUTREACH (OUTPATIENT)
Dept: FAMILY MEDICINE | Facility: CLINIC | Age: 54
End: 2024-10-09
Payer: COMMERCIAL

## 2024-10-09 LAB
BKR AP ASSOCIATED HPV REPORT: ABNORMAL
BKR LAB AP GYN ADEQUACY: ABNORMAL
BKR LAB AP GYN INTERPRETATION: ABNORMAL
BKR LAB AP PREVIOUS ABNL DX: ABNORMAL
BKR LAB AP PREVIOUS ABNORMAL: ABNORMAL
PATH REPORT.COMMENTS IMP SPEC: ABNORMAL
PATH REPORT.COMMENTS IMP SPEC: ABNORMAL
PATH REPORT.RELEVANT HX SPEC: ABNORMAL

## 2024-10-09 NOTE — LETTER
October 9, 2024      Kalyn Rivero  [address] [place] [id number]        Dear ,    This letter is regarding your recent Pap smear and Human Papillomavirus (HPV) test.    Your results showed Low-grade Squamous Intraepithelial Lesion (LSIL) and HPV positive.     About 80 percent of women have been exposed to HPV throughout their lifetime. There is no medication for the treatment of HPV. Typically, your own immune system gets rid of the virus before it does harm. HPV is spread by direct skin-to-skin contact, including sexual intercourse, oral sex, anal sex, or any other contact involving the genital area (example: hand to genital contact). It is not possible to become infected with HPV by touching an object, such as a toilet seat. Most people who are infected with HPV have no signs or symptoms.    Things that you can do to boost your immune system and help your body get rid of HPV: get plenty of rest, eat a well-balanced diet of healthy foods, exercise regularly, decrease stress and if you are a smoker, stop smoking.    It is recommended that you have your next Pap smear and Human Papillomavirus (HPV) test in 1 year.    If you have additional questions regarding this result, please contact our Registered Nurse, Tahira, at [phone].      Sincerely,    Your [facility] Care Team

## 2024-10-16 ENCOUNTER — OFFICE VISIT (OUTPATIENT)
Dept: PODIATRY | Facility: CLINIC | Age: 54
End: 2024-10-16
Payer: COMMERCIAL

## 2024-10-16 VITALS — DIASTOLIC BLOOD PRESSURE: 78 MMHG | BODY MASS INDEX: 27.77 KG/M2 | WEIGHT: 172 LBS | SYSTOLIC BLOOD PRESSURE: 118 MMHG

## 2024-10-16 DIAGNOSIS — Z98.890 POST-OPERATIVE STATE: ICD-10-CM

## 2024-10-16 DIAGNOSIS — M05.771 RHEUMATOID ARTHRITIS INVOLVING BOTH FEET WITH POSITIVE RHEUMATOID FACTOR (H): Primary | ICD-10-CM

## 2024-10-16 DIAGNOSIS — M05.772 RHEUMATOID ARTHRITIS INVOLVING BOTH FEET WITH POSITIVE RHEUMATOID FACTOR (H): Primary | ICD-10-CM

## 2024-10-16 PROCEDURE — 99024 POSTOP FOLLOW-UP VISIT: CPT | Performed by: PODIATRIST

## 2024-10-16 NOTE — PROGRESS NOTES
Podiatry / Foot and Ankle Surgery Progress Note    October 16, 2024    Subject: Patient was seen for weeks status post soft tissue mass removal on the right foot.  Notes some pain to the whole foot.  Denies fever, nausea, vomiting.    Objective:  Vitals: /78   Wt 78 kg (172 lb)   LMP  (LMP Unknown)   BMI 27.77 kg/m    BMI= Body mass index is 27.77 kg/m .    General:  Patient is alert and orientated.  NAD.    Vascular:  DP and PT pulses are palpable.  No edema or varicosities noted.  CFT's < 3secs.  Skin temp is normal.     Neuro:  Light and gross touch sensation intact to digits, dorsum, and plantar aspects of the feet.     Derm: Dressing is clean dry and intact.  Sutures are intact.  No redness, dehiscence or signs of acute infection noted.     Musculoskeletal:  No foot deformity noted.       Pathology:    Rheumatoid nodule          Assessment:    Post-operative state  Rheumatoid arthritis with positive rheumatoid factor, involving unspecified site (H)  Rheumatoid arthritis involving right foot with positive rheumatoid factor (H)     Medical Decision Making/Plan: At this time, the sutures were removed.  She can get the foot wet.  She can lotion the foot.  She can go back to regular socks and shoes.  We will have her follow-up as needed.     All questions were answered to patient satisfaction and she will call with questions or concerns.        Patient Risk Factor:  Patient is a low risk factor for infection.     Vashti Ayala DPM, Podiatry/Foot and Ankle Surgery

## 2024-10-16 NOTE — PATIENT INSTRUCTIONS
Thank you for choosing [facility] Podiatry / Foot & Ankle Surgery!    DR DAVIDSON'S CLINIC:  [facility]   [address]   [place] [address]  [phone]    (Tues, Wed, Thur am, Fri pm)     [facility]  [address], [place] [address]  [phone]  (Every other Friday morning)    [facility]  [address] [place] [id number] [phone]  (Every other Friday)     TRIAGE LINE: [phone]  APPOINTMENTS: [phone]  RADIOLOGY: [phone]  SET UP SURGERY: [phone]  PHYSICAL THERAPY: [phone]   FAX NUMBER: [phone]  BILLING QUESTIONS: [phone]       Follow up: As needed      SCAR CARE PROTOCOL  Scarring is an unfortunate but unavoidable part of surgery.  Every person scars differently and there is no way to predict how an individual's final scar will look.  Now that the sutures have been removed one can begin taking some steps to help minimize the appearance of scarring.    WOUND HEALING  As soon as the skin is incised during surgery, the body is taking steps to prepare for healing. After about 3 days, the body has sent cells to the incision to begin the healing process. These cells, called fibroblasts, make collagen, a protein in the skin that helps provide strength. Once the skin has been sufficiently strengthened, the sutures are removed. Over the next year, the body synthesizes new collagen and breaks down old collagen to help achieve a strong scar that allows the foot/ankle to function appropriately. This is where patients can help the appearance of the scar, as it will change over the next year.    STEPS  1. Do not expose the scar to the sun for 1 year.  2. Any sun exposure may permanently darken the appearance of the scar.  3. Wear shoes/socks or cover your scar with zinc oxide.  4. Massage the scar 2-3 times per day.  -Massage the entire length of the scar with gentle to moderate pressure.  -Pressure  can help flatten the scar.  5. Lotion/Vitamin E helps keep the tissue soft.  6. Try over the counter scar products such as Mederma or Scar Zone.  -These are available at any pharmacy without a prescription.  -Patients must use these for extended periods of time (6-12 months) to see a difference.

## 2024-10-16 NOTE — LETTER
10/16/2024      Kalyn Rivero  [address] [place] [address]      Dear Colleague,    Thank you for referring your patient, Kalyn Rivero, to the [facility] POD[facility]. Please see a copy of my visit note below.    Podiatry / Foot and Ankle Surgery Progress Note    October 16, 2024    Subject: Patient was seen for weeks status post soft tissue mass removal on the right foot.  Notes some pain to the whole foot.  Denies fever, nausea, vomiting.    Objective:  Vitals: /78   Wt 78 kg (172 lb)   LMP  (LMP Unknown)   BMI 27.77 kg/m    BMI= Body mass index is 27.77 kg/m .    General:  Patient is alert and orientated.  NAD.    Vascular:  DP and PT pulses are palpable.  No edema or varicosities noted.  CFT's < 3secs.  Skin temp is normal.     Neuro:  Light and gross touch sensation intact to digits, dorsum, and plantar aspects of the feet.     Derm: Dressing is clean dry and intact.  Sutures are intact.  No redness, dehiscence or signs of acute infection noted.     Musculoskeletal:  No foot deformity noted.       Pathology:    Rheumatoid nodule          Assessment:    Post-operative state  Rheumatoid arthritis with positive rheumatoid factor, involving unspecified site (H)  Rheumatoid arthritis involving right foot with positive rheumatoid factor (H)     Medical Decision Making/Plan: At this time, the sutures were removed.  She can get the foot wet.  She can lotion the foot.  She can go back to regular socks and shoes.  We will have her follow-up as needed.     All questions were answered to patient satisfaction and she will call with questions or concerns.        Patient Risk Factor:  Patient is a low risk factor for infection.     Vashti Ayala DPM, Podiatry/Foot and Ankle Surgery      Again, thank you for allowing me to participate in the care of your patient.        Sincerely,        Vashti Ayala DPM, Podiatry/Foot and Ankle Surgery

## 2024-10-30 ENCOUNTER — TELEPHONE (OUTPATIENT)
Dept: FAMILY MEDICINE | Facility: CLINIC | Age: 54
End: 2024-10-30
Payer: COMMERCIAL

## 2024-10-30 NOTE — TELEPHONE ENCOUNTER
General Call    Contacts       Contact Date/Time Type Contact Phone/Fax    10/30/2024 02:04 PM CDT Phone (Incoming) Kalyn Rivero (Self) [phone] (M)          Reason for Call:  Prior Authorization    What are your questions or concerns:  Patient was told she needs prior authorization to see pain management clinic. I found form on [facility] website that I have completed most of. Please complete the rest and sign it so we can fax.    Date of last appointment with provider: 10/2/2024    Okay to leave a detailed message?: Yes at Cell number on file:    Telephone Information:   Mobile [phone]

## 2024-11-06 ENCOUNTER — TELEPHONE (OUTPATIENT)
Dept: FAMILY MEDICINE | Facility: CLINIC | Age: 54
End: 2024-11-06
Payer: COMMERCIAL

## 2024-11-06 NOTE — TELEPHONE ENCOUNTER
Called and spoke to patient. Appointment set up for 12/4/24 with Dr Zamora.Sharmin Gomez [facility]

## 2024-11-06 NOTE — TELEPHONE ENCOUNTER
Patient calling because she had a pap smear/ HPV test and has not gotten her results.     This RN sees that a letter was sent to patient but she said she did not get it.  RN read the letter to her.     Patient is asking since she was positive again for HPV, does she need another colposcopy this year.  Each time her HPV is positive, she has a colposcopy so she is wondering what to do this time.       Routing to provider to advise.  Tish TIMN, RN

## 2024-11-08 ENCOUNTER — TELEPHONE (OUTPATIENT)
Dept: FAMILY MEDICINE | Facility: CLINIC | Age: 54
End: 2024-11-08
Payer: COMMERCIAL

## 2024-11-08 DIAGNOSIS — Z76.5 DRUG-SEEKING BEHAVIOR: ICD-10-CM

## 2024-11-08 DIAGNOSIS — F11.21 OPIOID DEPENDENCE IN REMISSION (H): Primary | ICD-10-CM

## 2024-11-08 DIAGNOSIS — M06.9 RHEUMATOID ARTHRITIS OF FOOT, UNSPECIFIED LATERALITY, UNSPECIFIED WHETHER RHEUMATOID FACTOR PRESENT (H): ICD-10-CM

## 2024-11-08 DIAGNOSIS — R51.9 SEVERE FRONTAL HEADACHES: ICD-10-CM

## 2024-11-08 DIAGNOSIS — G89.4 CHRONIC PAIN SYNDROME: ICD-10-CM

## 2024-11-08 DIAGNOSIS — M79.7 FIBROMYOSITIS: ICD-10-CM

## 2024-11-08 NOTE — TELEPHONE ENCOUNTER
Order/Referral Request:    Who is requesting: Patient referral     Orders being requested: Pain management referral - wants to see Blanca Jacobo in [place] through [facility]     Reason service is needed/diagnosis: Pain     When are orders needed by: ASAP    Has this been discussed with Provider: Yes    Does patient have a preference on a Group/Provider/Facility? [facility]     Does patient have an appointment scheduled?: No    Where to send orders: Place orders within Epic - there is a pain referral in epic but it expires soon and also states pre op on it. Pain management told her she needs a new referral     Okay to leave a detailed message?: Yes at Home number on file [phone] (home)     TC: please reach out to patient once referral is placed.     Rachel BROWNE,    [facility]

## 2024-11-11 NOTE — TELEPHONE ENCOUNTER
Dr Zamora, I do not see referral. Can you please place. Thank you.Sharmin LOPES [facility]

## 2024-11-15 ENCOUNTER — TELEPHONE (OUTPATIENT)
Dept: FAMILY MEDICINE | Facility: CLINIC | Age: 54
End: 2024-11-15
Payer: COMMERCIAL

## 2024-11-15 NOTE — TELEPHONE ENCOUNTER
"Routed to provider to review and advise regarding both items # 1 and 2 below.    Incoming call from pt explaining that a Pain Management referral has been placed, but she was told it was for non-narcotic management only, and told to call back to Primary Care to change her referral.  Pt states she has already tried all non-opioid pain management recommendations at other pain clinics, and other modalities have not been effective.    RN placed pt on hold, and contacted Pain Management scheduling to clarify pt's request or the issue related to getting pt scheduling.    Laina, from Pain Management scheduling, explained the following:    Pt cannot be scheduled withBlanca at the [place] location, unless Dr. Zamora specifically changes the referral to say he is referring pt to see Blanca Jacobo. Must be typed on referral.    2.   Since provider answered \"No\" to the highlighted item below (Do you agree to these terms? = No), pt can only be seen for \"non-opioid\" management.   Please clarify if your intent is non-opioid management. If intent is the potential to have pt's pain managed with opioids, the referring provider must agree \"Yes\" to the terms on referral.          RICKY Hauser, RN  "

## 2024-11-17 NOTE — TELEPHONE ENCOUNTER
The patient knows I will not write  opioids for her. Inform her that she can be referred to Blanca Jacobo but she will not write for opioids.

## 2024-11-18 NOTE — TELEPHONE ENCOUNTER
Pt returning call to the clinic. Relayed providers message as written. Pt stated that she does not understand if she is referred to a pain clinic why the would be unable to write her a prescription for opioids. Pt verbalized understanding. No additional questions at this time.    Thank you - Cherelle Coleman, MEETAN, RN

## 2024-11-18 NOTE — TELEPHONE ENCOUNTER
Left message on answering machine for patient to call back to [phone].    RN please give patient provider message as written.  Tahira TIMN, RN

## 2024-12-04 ENCOUNTER — OFFICE VISIT (OUTPATIENT)
Dept: FAMILY MEDICINE | Facility: CLINIC | Age: 54
End: 2024-12-04
Payer: COMMERCIAL

## 2024-12-04 VITALS
DIASTOLIC BLOOD PRESSURE: 89 MMHG | SYSTOLIC BLOOD PRESSURE: 139 MMHG | WEIGHT: 175.2 LBS | RESPIRATION RATE: 20 BRPM | BODY MASS INDEX: 28.16 KG/M2 | HEART RATE: 84 BPM | OXYGEN SATURATION: 97 % | TEMPERATURE: 97.2 F | HEIGHT: 66 IN

## 2024-12-04 DIAGNOSIS — J02.0 ACUTE STREPTOCOCCAL PHARYNGITIS: ICD-10-CM

## 2024-12-04 DIAGNOSIS — Z13.9 ENCOUNTER FOR SCREENING INVOLVING SOCIAL DETERMINANTS OF HEALTH (SDOH): Primary | ICD-10-CM

## 2024-12-04 DIAGNOSIS — R30.0 DYSURIA: ICD-10-CM

## 2024-12-04 DIAGNOSIS — B97.7 HIGH RISK HPV INFECTION: ICD-10-CM

## 2024-12-04 LAB
ALBUMIN UR-MCNC: ABNORMAL MG/DL
APPEARANCE UR: ABNORMAL
BACTERIA #/AREA URNS HPF: ABNORMAL /HPF
BILIRUB UR QL STRIP: NEGATIVE
COLOR UR AUTO: YELLOW
GLUCOSE UR STRIP-MCNC: NEGATIVE MG/DL
HGB UR QL STRIP: NEGATIVE
KETONES UR STRIP-MCNC: NEGATIVE MG/DL
LEUKOCYTE ESTERASE UR QL STRIP: ABNORMAL
NITRATE UR QL: POSITIVE
PH UR STRIP: 5.5 [PH] (ref 5–7)
RBC #/AREA URNS AUTO: ABNORMAL /HPF
SP GR UR STRIP: 1.02 (ref 1–1.03)
SQUAMOUS #/AREA URNS AUTO: ABNORMAL /LPF
UROBILINOGEN UR STRIP-ACNC: 0.2 E.U./DL
WBC #/AREA URNS AUTO: ABNORMAL /HPF

## 2024-12-04 PROCEDURE — 81001 URINALYSIS AUTO W/SCOPE: CPT | Performed by: FAMILY MEDICINE

## 2024-12-04 PROCEDURE — 57456 ENDOCERV CURETTAGE W/SCOPE: CPT | Performed by: FAMILY MEDICINE

## 2024-12-04 RX ORDER — AMOXICILLIN 875 MG/1
875 TABLET, COATED ORAL 2 TIMES DAILY
Qty: 20 TABLET | Refills: 0 | Status: SHIPPED | OUTPATIENT
Start: 2024-12-04

## 2024-12-04 ASSESSMENT — PAIN SCALES - GENERAL: PAINLEVEL_OUTOF10: WORST PAIN (10)

## 2024-12-04 NOTE — PROGRESS NOTES
ICD-10-CM    1. Encounter for screening involving social determinants of health (SDoH)  Z13.9       2. Dysuria  R30.0 UA Macroscopic with reflex to Microscopic and Culture - Lab Collect     UA Macroscopic with reflex to Microscopic and Culture - Lab Collect     Urine Microscopic Exam     Urine Culture      3. High risk HPV infection  B97.7 Surgical Pathology Exam     COLP CERVIX/UPPER VAGINA W ENDOCERV CURETT      4. Acute streptococcal pharyngitis  J02.0 amoxicillin (AMOXIL) 875 MG tablet       PLAN:await urine culture and surg path     Subjective   Kalyn is a 54 year old, presenting for the following health issues:  Colposcopy        12/4/2024    11:26 AM   Additional Questions   Roomed by Jacinta   Accompanied by Son     HPI   Sore thorat and dysuria for 5 days and exposed to strep  54 year old female presents for colposcopy,  Pap smear   1 months ago showed: HPV. The prior pap showed Normal.   No LMP recorded (lmp unknown). Patient is postmenopausal.  Allergies: Leflunomide, Morphine, Celecoxib, Amitriptyline, Codeine, Gabapentin, Ibuprofen, Sulfa antibiotics, Tylenol [acetaminophen], Dexamethasone, Erythromycin, Penicillins, and Prednisone  Reviewed health maintenance   Patient Active Problem List   Diagnosis    Hypothyroidism    Acetaminophen overdose    Pancreatitis    Anemia    Chronic pain    Pneumonia    Suicide risk    Grief reaction    Liver failure, acute    CARDIOVASCULAR SCREENING; LDL GOAL LESS THAN 160    Renal stone    TOTAL KNEE ARTHROPLASTY - bilateral    Knee joint replacement - left    Trochanteric bursitis - left    Atlantoaxial instability    Postoperative pain    S/P cervical spinal fusion    Drug-seeking behavior    Opioid type dependence (H)    Severe frontal headaches    Abdominal pain, unspecified abdominal location    Esophageal reflux    Right upper extremity numbness    Right arm numbness    Lesion of ulnar nerve    Arthralgia of temporomandibular joint    Intractable chronic  migraine without aura    Encounter for long-term opiate analgesic use    Rheumatoid arthritis of foot (H)    Other drug-induced neutropenia (H)    Rheumatoid arthritis with positive rheumatoid factor, involving unspecified site (H)    Cervical high risk HPV (human papillomavirus) test positive    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  Prior cervical treatment: no treatment.    Procedure for colposcopy and biopsy has been explained to the   patient and consent obtained.  Color Urine (no units)   Date Value   12/04/2024 Yellow   01/28/2020 Yellow     Appearance Urine (no units)   Date Value   12/04/2024 Slightly Cloudy (A)   01/28/2020 Slightly Cloudy     Glucose Urine (mg/dL)   Date Value   12/04/2024 Negative   01/28/2020 Negative     Bilirubin Urine (no units)   Date Value   12/04/2024 Negative   01/28/2020 Negative     Ketones Urine (mg/dL)   Date Value   12/04/2024 Negative   01/28/2020 Negative     Specific Gravity Urine (no units)   Date Value   12/04/2024 1.025   01/28/2020 1.021     pH Urine   Date Value   12/04/2024 5.5   01/28/2020 6.0 pH     Protein Albumin Urine (mg/dL)   Date Value   12/04/2024 Trace (A)   01/28/2020 10 (A)     Urobilinogen Urine   Date Value   12/04/2024 0.2 E.U./dL   04/30/2019 0.2 EU/dL     Nitrite Urine (no units)   Date Value   12/04/2024 Positive (A)   01/28/2020 Negative     Leukocyte Esterase Urine (no units)   Date Value   12/04/2024 Moderate (A)   01/28/2020 Large (A)       PROCEDURE:  Speculum placed in vagina and excellent visualization of cervix   acheived, cervix swabbed x 3 with acetic acid solution.  Cervical curette  preformed and sent to path.    FINDINGS:  Cervix: no visible lesions; SCJ visualized 360 degrees without lesions.                                Procedure Summary: Patient tolerated procedure well.    Signed Electronically by: Mika Zamora MD

## 2024-12-05 LAB — BACTERIA UR CULT: ABNORMAL

## 2024-12-09 LAB
PATH REPORT.COMMENTS IMP SPEC: NORMAL
PATH REPORT.COMMENTS IMP SPEC: NORMAL
PATH REPORT.FINAL DX SPEC: NORMAL
PATH REPORT.GROSS SPEC: NORMAL
PATH REPORT.MICROSCOPIC SPEC OTHER STN: NORMAL
PATH REPORT.MICROSCOPIC SPEC OTHER STN: NORMAL
PATH REPORT.RELEVANT HX SPEC: NORMAL
PHOTO IMAGE: NORMAL

## 2024-12-24 ENCOUNTER — TRANSFERRED RECORDS (OUTPATIENT)
Dept: HEALTH INFORMATION MANAGEMENT | Facility: CLINIC | Age: 54
End: 2024-12-24
Payer: COMMERCIAL

## 2024-12-30 ENCOUNTER — LAB (OUTPATIENT)
Dept: LAB | Facility: CLINIC | Age: 54
End: 2024-12-30
Payer: COMMERCIAL

## 2024-12-30 ENCOUNTER — TELEPHONE (OUTPATIENT)
Dept: FAMILY MEDICINE | Facility: CLINIC | Age: 54
End: 2024-12-30

## 2024-12-30 DIAGNOSIS — Z77.011 EXPOSURE TO LEAD: Primary | ICD-10-CM

## 2024-12-30 DIAGNOSIS — Z77.011 EXPOSURE TO LEAD: ICD-10-CM

## 2024-12-30 LAB
BASOPHILS # BLD AUTO: 0.1 10E3/UL (ref 0–0.2)
BASOPHILS NFR BLD AUTO: 1 %
CHOLEST SERPL-MCNC: 155 MG/DL
EOSINOPHIL # BLD AUTO: 0.4 10E3/UL (ref 0–0.7)
EOSINOPHIL NFR BLD AUTO: 6 %
ERYTHROCYTE [DISTWIDTH] IN BLOOD BY AUTOMATED COUNT: 14.4 % (ref 10–15)
FASTING STATUS PATIENT QL REPORTED: YES
HCT VFR BLD AUTO: 40.9 % (ref 35–47)
HDLC SERPL-MCNC: 57 MG/DL
HGB BLD-MCNC: 13.6 G/DL (ref 11.7–15.7)
IMM GRANULOCYTES # BLD: 0 10E3/UL
IMM GRANULOCYTES NFR BLD: 0 %
LDLC SERPL CALC-MCNC: 81 MG/DL
LYMPHOCYTES # BLD AUTO: 1.7 10E3/UL (ref 0.8–5.3)
LYMPHOCYTES NFR BLD AUTO: 26 %
MCH RBC QN AUTO: 29.5 PG (ref 26.5–33)
MCHC RBC AUTO-ENTMCNC: 33.3 G/DL (ref 31.5–36.5)
MCV RBC AUTO: 89 FL (ref 78–100)
MONOCYTES # BLD AUTO: 0.6 10E3/UL (ref 0–1.3)
MONOCYTES NFR BLD AUTO: 9 %
NEUTROPHILS # BLD AUTO: 3.8 10E3/UL (ref 1.6–8.3)
NEUTROPHILS NFR BLD AUTO: 58 %
NONHDLC SERPL-MCNC: 98 MG/DL
PLATELET # BLD AUTO: 185 10E3/UL (ref 150–450)
RBC # BLD AUTO: 4.61 10E6/UL (ref 3.8–5.2)
TRIGL SERPL-MCNC: 86 MG/DL
WBC # BLD AUTO: 6.5 10E3/UL (ref 4–11)

## 2024-12-30 PROCEDURE — 85025 COMPLETE CBC W/AUTO DIFF WBC: CPT

## 2024-12-30 PROCEDURE — 99000 SPECIMEN HANDLING OFFICE-LAB: CPT

## 2024-12-30 PROCEDURE — 36415 COLL VENOUS BLD VENIPUNCTURE: CPT

## 2024-12-30 PROCEDURE — 83655 ASSAY OF LEAD: CPT | Mod: 90

## 2024-12-30 PROCEDURE — 80061 LIPID PANEL: CPT

## 2024-12-30 NOTE — TELEPHONE ENCOUNTER
Test Results    Contacts       Contact Date/Time Type Contact Phone/Fax    12/30/2024 08:49 AM CST Phone (Incoming) Kalyn Rivero (Self) [phone] (M)            Who ordered the test:  Patient    Type of test: Lab    Date of test:  12/30/24    Where was the test performed:  [facility]    What are your questions/concerns?:  I Received a letter in the mail stating there is lead in water/my liver levels is high/ I have been experiencing bloody noses/coming in to get lead levels checked at [facility] but still want to speak with PCP care team for follow up information since no sooner appointments are available.    Okay to leave a detailed message?: Yes at Cell number on file:    Telephone Information:   Mobile [phone]

## 2024-12-30 NOTE — TELEPHONE ENCOUNTER
Patient is scheduled at the [facility] in [place], for lab appointment. Not sure if lab orders just need to be placed.Sharmin LOPES [facility]

## 2025-01-01 LAB — LEAD BLDV-MCNC: <2 UG/DL

## 2025-02-11 ENCOUNTER — ANCILLARY PROCEDURE (OUTPATIENT)
Dept: GENERAL RADIOLOGY | Facility: CLINIC | Age: 55
End: 2025-02-11
Attending: INTERNAL MEDICINE
Payer: COMMERCIAL

## 2025-02-11 ENCOUNTER — OFFICE VISIT (OUTPATIENT)
Dept: FAMILY MEDICINE | Facility: CLINIC | Age: 55
End: 2025-02-11
Payer: COMMERCIAL

## 2025-02-11 VITALS
BODY MASS INDEX: 29.25 KG/M2 | TEMPERATURE: 97 F | OXYGEN SATURATION: 98 % | HEIGHT: 66 IN | RESPIRATION RATE: 18 BRPM | SYSTOLIC BLOOD PRESSURE: 146 MMHG | WEIGHT: 182 LBS | HEART RATE: 86 BPM | DIASTOLIC BLOOD PRESSURE: 86 MMHG

## 2025-02-11 DIAGNOSIS — R30.0 DYSURIA: ICD-10-CM

## 2025-02-11 DIAGNOSIS — J18.9 PNEUMONIA OF RIGHT LOWER LOBE DUE TO INFECTIOUS ORGANISM: ICD-10-CM

## 2025-02-11 DIAGNOSIS — J18.9 PNEUMONIA OF RIGHT LOWER LOBE DUE TO INFECTIOUS ORGANISM: Primary | ICD-10-CM

## 2025-02-11 LAB
ALBUMIN SERPL BCG-MCNC: 4.3 G/DL (ref 3.5–5.2)
ALBUMIN UR-MCNC: NEGATIVE MG/DL
ALP SERPL-CCNC: 115 U/L (ref 40–150)
ALT SERPL W P-5'-P-CCNC: 22 U/L (ref 0–50)
ANION GAP SERPL CALCULATED.3IONS-SCNC: 13 MMOL/L (ref 7–15)
APPEARANCE UR: CLEAR
AST SERPL W P-5'-P-CCNC: 36 U/L (ref 0–45)
BACTERIA #/AREA URNS HPF: ABNORMAL /HPF
BASOPHILS # BLD AUTO: 0 10E3/UL (ref 0–0.2)
BASOPHILS NFR BLD AUTO: 1 %
BILIRUB SERPL-MCNC: 0.8 MG/DL
BILIRUB UR QL STRIP: NEGATIVE
BUN SERPL-MCNC: 19.4 MG/DL (ref 6–20)
CALCIUM SERPL-MCNC: 9.8 MG/DL (ref 8.8–10.4)
CHLORIDE SERPL-SCNC: 107 MMOL/L (ref 98–107)
COLOR UR AUTO: YELLOW
CREAT SERPL-MCNC: 0.98 MG/DL (ref 0.51–0.95)
EGFRCR SERPLBLD CKD-EPI 2021: 69 ML/MIN/1.73M2
EOSINOPHIL # BLD AUTO: 0.5 10E3/UL (ref 0–0.7)
EOSINOPHIL NFR BLD AUTO: 9 %
ERYTHROCYTE [DISTWIDTH] IN BLOOD BY AUTOMATED COUNT: 14 % (ref 10–15)
GLUCOSE SERPL-MCNC: 108 MG/DL (ref 70–99)
GLUCOSE UR STRIP-MCNC: NEGATIVE MG/DL
HCO3 SERPL-SCNC: 23 MMOL/L (ref 22–29)
HCT VFR BLD AUTO: 41.6 % (ref 35–47)
HGB BLD-MCNC: 13.1 G/DL (ref 11.7–15.7)
HGB UR QL STRIP: NEGATIVE
IMM GRANULOCYTES # BLD: 0 10E3/UL
IMM GRANULOCYTES NFR BLD: 0 %
KETONES UR STRIP-MCNC: NEGATIVE MG/DL
LEUKOCYTE ESTERASE UR QL STRIP: ABNORMAL
LYMPHOCYTES # BLD AUTO: 1.7 10E3/UL (ref 0.8–5.3)
LYMPHOCYTES NFR BLD AUTO: 32 %
MCH RBC QN AUTO: 28.9 PG (ref 26.5–33)
MCHC RBC AUTO-ENTMCNC: 31.5 G/DL (ref 31.5–36.5)
MCV RBC AUTO: 92 FL (ref 78–100)
MONOCYTES # BLD AUTO: 0.4 10E3/UL (ref 0–1.3)
MONOCYTES NFR BLD AUTO: 8 %
NEUTROPHILS # BLD AUTO: 2.7 10E3/UL (ref 1.6–8.3)
NEUTROPHILS NFR BLD AUTO: 50 %
NITRATE UR QL: NEGATIVE
PH UR STRIP: 6 [PH] (ref 5–7)
PLATELET # BLD AUTO: 214 10E3/UL (ref 150–450)
POTASSIUM SERPL-SCNC: 4.6 MMOL/L (ref 3.4–5.3)
PROT SERPL-MCNC: 8.8 G/DL (ref 6.4–8.3)
RBC # BLD AUTO: 4.53 10E6/UL (ref 3.8–5.2)
RBC #/AREA URNS AUTO: ABNORMAL /HPF
SODIUM SERPL-SCNC: 143 MMOL/L (ref 135–145)
SP GR UR STRIP: 1.02 (ref 1–1.03)
SQUAMOUS #/AREA URNS AUTO: ABNORMAL /LPF
UROBILINOGEN UR STRIP-ACNC: 0.2 E.U./DL
WBC # BLD AUTO: 5.4 10E3/UL (ref 4–11)
WBC #/AREA URNS AUTO: ABNORMAL /HPF

## 2025-02-11 PROCEDURE — 71046 X-RAY EXAM CHEST 2 VIEWS: CPT | Mod: TC | Performed by: RADIOLOGY

## 2025-02-11 PROCEDURE — 99214 OFFICE O/P EST MOD 30 MIN: CPT | Performed by: INTERNAL MEDICINE

## 2025-02-11 PROCEDURE — 85025 COMPLETE CBC W/AUTO DIFF WBC: CPT | Performed by: INTERNAL MEDICINE

## 2025-02-11 PROCEDURE — 80053 COMPREHEN METABOLIC PANEL: CPT | Performed by: INTERNAL MEDICINE

## 2025-02-11 PROCEDURE — 81001 URINALYSIS AUTO W/SCOPE: CPT | Performed by: INTERNAL MEDICINE

## 2025-02-11 PROCEDURE — 36415 COLL VENOUS BLD VENIPUNCTURE: CPT | Performed by: INTERNAL MEDICINE

## 2025-02-11 PROCEDURE — 86481 TB AG RESPONSE T-CELL SUSP: CPT | Performed by: INTERNAL MEDICINE

## 2025-02-11 RX ORDER — BENZONATATE 200 MG/1
200 CAPSULE ORAL 3 TIMES DAILY PRN
Qty: 60 CAPSULE | Refills: 1 | Status: SHIPPED | OUTPATIENT
Start: 2025-02-11

## 2025-02-11 RX ORDER — CEFDINIR 300 MG/1
300 CAPSULE ORAL 2 TIMES DAILY
Qty: 20 CAPSULE | Refills: 0 | Status: SHIPPED | OUTPATIENT
Start: 2025-02-11 | End: 2025-02-21

## 2025-02-11 ASSESSMENT — PATIENT HEALTH QUESTIONNAIRE - PHQ9
10. IF YOU CHECKED OFF ANY PROBLEMS, HOW DIFFICULT HAVE THESE PROBLEMS MADE IT FOR YOU TO DO YOUR WORK, TAKE CARE OF THINGS AT HOME, OR GET ALONG WITH OTHER PEOPLE: EXTREMELY DIFFICULT
SUM OF ALL RESPONSES TO PHQ QUESTIONS 1-9: 5
SUM OF ALL RESPONSES TO PHQ QUESTIONS 1-9: 5

## 2025-02-11 ASSESSMENT — PAIN SCALES - GENERAL: PAINLEVEL_OUTOF10: NO PAIN (0)

## 2025-02-11 NOTE — PROGRESS NOTES
"  Assessment & Plan   Problem List Items Addressed This Visit       Pneumonia - Primary    Relevant Medications    cefdinir (OMNICEF) 300 MG capsule    benzonatate (TESSALON) 200 MG capsule    Other Relevant Orders    COMPREHENSIVE METABOLIC PANEL (Completed)    CBC with platelets and differential (Completed)    Quantiferon TB Gold Plus (Completed)    XR Chest 2 Views (Completed)     Other Visit Diagnoses       Dysuria        Relevant Orders    UA Macroscopic with reflex to Microscopic and Culture - Lab Collect (Completed)    UA Microscopic with Reflex to Culture (Completed)                  BMI  Estimated body mass index is 29.46 kg/m  as calculated from the following:    Height as of this encounter: 1.674 m (5' 5.9\").    Weight as of this encounter: 82.6 kg (182 lb).   Weight management plan: Discussed healthy diet and exercise guidelines    Work on weight loss  Regular exercise      Zoila Mccain is a 54 year old, presenting for the following health issues:  Nose Bleeds (Occurring in the morning and at night)        2/11/2025     9:28 AM   Additional Questions   Roomed by Pallavi   Accompanied by son     History of Present Illness       Reason for visit:  Nose,  throat and ears, cough with blood  Symptom onset:  3-4 weeks ago  Symptoms include:  Bloody nose in morning and night, throat and ears are raw, dizziness and coughing with some blood in phlegm  Symptom intensity:  Moderate  Symptom progression:  Staying the same  Had these symptoms before:  No  Prior treatment description:  Vaseline and humitifier    She eats 0-1 servings of fruits and vegetables daily.She consumes 0 sweetened beverage(s) daily.She exercises with enough effort to increase her heart rate 9 or less minutes per day.  She exercises with enough effort to increase her heart rate 3 or less days per week.   She is taking medications regularly.     Bloody noses   Clooged on both sides  Irrigation    Smell and tastes blood all day long   Throat " "gets sticky as well   Hard to breath out of it   Vapor on new   Pain in the head as well .   Embrel and celebrex -   Cough medication not helpfu l  Raw voice.  No smoking or alcohol no vaping   Cat - thanksgiving   TB                Review of Systems  Constitutional, HEENT, cardiovascular, pulmonary, gi and gu systems are negative, except as otherwise noted.      Objective    BP (!) 146/86 (BP Location: Left arm, Patient Position: Sitting, Cuff Size: Adult Large)   Pulse 86   Temp 97  F (36.1  C) (Temporal)   Resp 18   Ht 1.674 m (5' 5.9\")   Wt 82.6 kg (182 lb)   LMP  (LMP Unknown)   SpO2 98%   BMI 29.46 kg/m    Body mass index is 29.46 kg/m .  Physical Exam   GENERAL: alert and no distress  EYES: Eyes grossly normal to inspection, PERRL and conjunctivae and sclerae normal  HENT: ear canals and TM's normal, nose and mouth without ulcers or lesions  NECK: no adenopathy, no asymmetry, masses, or scars  RESP: lungs clear to auscultation - no rales, rhonchi or wheezes  CV: regular rate and rhythm, normal S1 S2, no S3 or S4, no murmur, click or rub, no peripheral edema  ABDOMEN: soft, nontender, no hepatosplenomegaly, no masses and bowel sounds normal  MS: no gross musculoskeletal defects noted, no edema  SKIN: no suspicious lesions or rashes  NEURO: Normal strength and tone, mentation intact and speech normal  BACK: no CVA tenderness, no paralumbar tenderness  PSYCH: mentation appears normal, affect normal/bright  LYMPH: no cervical, supraclavicular, axillary, or inguinal adenopathy    Results for orders placed or performed in visit on 02/11/25   XR Chest 2 Views     Status: None    Narrative    EXAM: XR CHEST 2 VIEWS  LOCATION: [facility]  DATE: 2/11/2025    INDICATION:  Pneumonia of right lower lobe due to infectious organism. Recent coughing.  COMPARISON: 12/22/2023      Impression    IMPRESSION: Lungs are clear. Heart and mediastinal contours within normal limits. Left shoulder " arthroplasty hardware noted. Overall, no acute abnormality. No focal pneumonia.   Results for orders placed or performed in visit on 02/11/25   COMPREHENSIVE METABOLIC PANEL     Status: Abnormal   Result Value Ref Range    Sodium 143 135 - 145 mmol/L    Potassium 4.6 3.4 - 5.3 mmol/L    Carbon Dioxide (CO2) 23 22 - 29 mmol/L    Anion Gap 13 7 - 15 mmol/L    Urea Nitrogen 19.4 6.0 - 20.0 mg/dL    Creatinine 0.98 (H) 0.51 - 0.95 mg/dL    GFR Estimate 69 >60 mL/min/1.73m2    Calcium 9.8 8.8 - 10.4 mg/dL    Chloride 107 98 - 107 mmol/L    Glucose 108 (H) 70 - 99 mg/dL    Alkaline Phosphatase 115 40 - 150 U/L    AST 36 0 - 45 U/L    ALT 22 0 - 50 U/L    Protein Total 8.8 (H) 6.4 - 8.3 g/dL    Albumin 4.3 3.5 - 5.2 g/dL    Bilirubin Total 0.8 <=1.2 mg/dL   UA Macroscopic with reflex to Microscopic and Culture - Lab Collect     Status: Abnormal    Specimen: Urine, Midstream   Result Value Ref Range    Color Urine Yellow Colorless, Straw, Light Yellow, Yellow    Appearance Urine Clear Clear    Glucose Urine Negative Negative mg/dL    Bilirubin Urine Negative Negative    Ketones Urine Negative Negative mg/dL    Specific Gravity Urine 1.025 1.003 - 1.035    Blood Urine Negative Negative    pH Urine 6.0 5.0 - 7.0    Protein Albumin Urine Negative Negative mg/dL    Urobilinogen Urine 0.2 0.2, 1.0 E.U./dL    Nitrite Urine Negative Negative    Leukocyte Esterase Urine Trace (A) Negative   CBC with platelets and differential     Status: None   Result Value Ref Range    WBC Count 5.4 4.0 - 11.0 10e3/uL    RBC Count 4.53 3.80 - 5.20 10e6/uL    Hemoglobin 13.1 11.7 - 15.7 g/dL    Hematocrit 41.6 35.0 - 47.0 %    MCV 92 78 - 100 fL    MCH 28.9 26.5 - 33.0 pg    MCHC 31.5 31.5 - 36.5 g/dL    RDW 14.0 10.0 - 15.0 %    Platelet Count 214 150 - 450 10e3/uL    % Neutrophils 50 %    % Lymphocytes 32 %    % Monocytes 8 %    % Eosinophils 9 %    % Basophils 1 %    % Immature Granulocytes 0 %    Absolute Neutrophils 2.7 1.6 - 8.3 10e3/uL     Absolute Lymphocytes 1.7 0.8 - 5.3 10e3/uL    Absolute Monocytes 0.4 0.0 - 1.3 10e3/uL    Absolute Eosinophils 0.5 0.0 - 0.7 10e3/uL    Absolute Basophils 0.0 0.0 - 0.2 10e3/uL    Absolute Immature Granulocytes 0.0 <=0.4 10e3/uL   UA Microscopic with Reflex to Culture     Status: Abnormal   Result Value Ref Range    Bacteria Urine Moderate (A) None Seen /HPF    RBC Urine 0-2 0-2 /HPF /HPF    WBC Urine 0-5 0-5 /HPF /HPF    Squamous Epithelials Urine Moderate (A) None Seen /LPF    Narrative    Urine Culture not indicated   Quantiferon TB Gold Plus Grey Tube     Status: None    Specimen: Peripheral Blood   Result Value Ref Range    Quantiferon Nil Tube 0.01 IU/mL   Quantiferon TB Gold Plus Green Tube     Status: None    Specimen: Peripheral Blood   Result Value Ref Range    Quantiferon TB1 Tube 0.01 IU/mL   Quantiferon TB Gold Plus Yellow Tube     Status: None    Specimen: Peripheral Blood   Result Value Ref Range    Quantiferon TB2 Tube 0.01    Quantiferon TB Gold Plus Purple Tube     Status: None    Specimen: Peripheral Blood   Result Value Ref Range    Quantiferon Mitogen 6.94 IU/mL   Quantiferon TB Gold Plus     Status: None    Specimen: Peripheral Blood   Result Value Ref Range    Quantiferon-TB Gold Plus Negative Negative    TB1 Ag minus Nil Value 0.00 IU/mL    TB2 Ag minus Nil Value 0.00 IU/mL    Mitogen minus Nil Result 6.93 IU/mL    Nil Result 0.01 IU/mL   CBC with platelets and differential     Status: None    Narrative    The following orders were created for panel order CBC with platelets and differential.  Procedure                               Abnormality         Status                     ---------                               -----------         ------                     CBC with platelets and d...[[id number]]                      Final result                 Please view results for these tests on the individual orders.   Quantiferon TB Gold Plus     Status: None    Specimen: Peripheral Blood     Narrative    The following orders were created for panel order Quantiferon TB Gold Plus.  Procedure                               Abnormality         Status                     ---------                               -----------         ------                     Quantiferon TB Gold Plus[[id number]]                         Final result               Quantiferon TB Gold Plus...[[id number]]                      Final result               Quantiferon TB Gold Plus...[[id number]]                      Final result               Quantiferon TB Gold Plus...[[id number]]                      Final result               Quantiferon TB Gold Plus...[[id number]]                      Final result                 Please view results for these tests on the individual orders.           Signed Electronically by: Johnnie Barreto MD

## 2025-02-11 NOTE — LETTER
February 12, 2025      Kalyn Rivero  [address] [place] [id number]        Dear Kalyn:    We are writing to inform you of your test results.    Labs look good as well.       Resulted Orders   COMPREHENSIVE METABOLIC PANEL   Result Value Ref Range    Sodium 143 135 - 145 mmol/L    Potassium 4.6 3.4 - 5.3 mmol/L    Carbon Dioxide (CO2) 23 22 - 29 mmol/L    Anion Gap 13 7 - 15 mmol/L    Urea Nitrogen 19.4 6.0 - 20.0 mg/dL    Creatinine 0.98 (H) 0.51 - 0.95 mg/dL    GFR Estimate 69 >60 mL/min/1.73m2      Comment:      eGFR calculated using 2021 CKD-EPI equation.    Calcium 9.8 8.8 - 10.4 mg/dL    Chloride 107 98 - 107 mmol/L    Glucose 108 (H) 70 - 99 mg/dL    Alkaline Phosphatase 115 40 - 150 U/L    AST 36 0 - 45 U/L    ALT 22 0 - 50 U/L    Protein Total 8.8 (H) 6.4 - 8.3 g/dL    Albumin 4.3 3.5 - 5.2 g/dL    Bilirubin Total 0.8 <=1.2 mg/dL   UA Macroscopic with reflex to Microscopic and Culture - Lab Collect   Result Value Ref Range    Color Urine Yellow Colorless, Straw, Light Yellow, Yellow    Appearance Urine Clear Clear    Glucose Urine Negative Negative mg/dL    Bilirubin Urine Negative Negative    Ketones Urine Negative Negative mg/dL    Specific Gravity Urine 1.025 1.003 - 1.035    Blood Urine Negative Negative    pH Urine 6.0 5.0 - 7.0    Protein Albumin Urine Negative Negative mg/dL    Urobilinogen Urine 0.2 0.2, 1.0 E.U./dL    Nitrite Urine Negative Negative    Leukocyte Esterase Urine Trace (A) Negative   CBC with platelets and differential   Result Value Ref Range    WBC Count 5.4 4.0 - 11.0 10e3/uL    RBC Count 4.53 3.80 - 5.20 10e6/uL    Hemoglobin 13.1 11.7 - 15.7 g/dL    Hematocrit 41.6 35.0 - 47.0 %    MCV 92 78 - 100 fL    MCH 28.9 26.5 - 33.0 pg    MCHC 31.5 31.5 - 36.5 g/dL    RDW 14.0 10.0 - 15.0 %    Platelet Count 214 150 - 450 10e3/uL    % Neutrophils 50 %    % Lymphocytes 32 %    % Monocytes 8 %    % Eosinophils 9 %    % Basophils 1 %    % Immature Granulocytes 0 %    Absolute  Neutrophils 2.7 1.6 - 8.3 10e3/uL    Absolute Lymphocytes 1.7 0.8 - 5.3 10e3/uL    Absolute Monocytes 0.4 0.0 - 1.3 10e3/uL    Absolute Eosinophils 0.5 0.0 - 0.7 10e3/uL    Absolute Basophils 0.0 0.0 - 0.2 10e3/uL    Absolute Immature Granulocytes 0.0 <=0.4 10e3/uL   Quantiferon TB Gold Plus Grey Tube   Result Value Ref Range    Quantiferon Nil Tube 0.01 IU/mL   Quantiferon TB Gold Plus Green Tube   Result Value Ref Range    Quantiferon TB1 Tube 0.01 IU/mL   Quantiferon TB Gold Plus Yellow Tube   Result Value Ref Range    Quantiferon TB2 Tube 0.01    Quantiferon TB Gold Plus Purple Tube   Result Value Ref Range    Quantiferon Mitogen 6.94 IU/mL   UA Microscopic with Reflex to Culture   Result Value Ref Range    Bacteria Urine Moderate (A) None Seen /HPF    RBC Urine 0-2 0-2 /HPF /HPF    WBC Urine 0-5 0-5 /HPF /HPF    Squamous Epithelials Urine Moderate (A) None Seen /LPF    Narrative    Urine Culture not indicated   Quantiferon TB Gold Plus   Result Value Ref Range    Quantiferon-TB Gold Plus Negative Negative      Comment:      No interferon gamma response to M.tuberculosis antigens was detected. Infection with M.tuberculosis is unlikely, however a single negative result does not exclude infection. In patients at high risk for infection, a second test should be considered in accordance with the 2017 ATS/IDSA/CDC Clinical Pract  ice Guidelines for Diagnosis of Tuberculosis in Adults and Children     TB1 Ag minus Nil Value 0.00 IU/mL    TB2 Ag minus Nil Value 0.00 IU/mL    Mitogen minus Nil Result 6.93 IU/mL    Nil Result 0.01 IU/mL     If you have any questions or concerns, please call the clinic at the number listed above.     Sincerely,      Johnnie Barreto MD/linda    Electronically signed

## 2025-02-12 LAB
GAMMA INTERFERON BACKGROUND BLD IA-ACNC: 0.01 IU/ML
M TB IFN-G BLD-IMP: NEGATIVE
M TB IFN-G CD4+ BCKGRND COR BLD-ACNC: 6.93 IU/ML
MITOGEN IGNF BCKGRD COR BLD-ACNC: 0 IU/ML
MITOGEN IGNF BCKGRD COR BLD-ACNC: 0 IU/ML
QUANTIFERON MITOGEN: 6.94 IU/ML
QUANTIFERON NIL TUBE: 0.01 IU/ML
QUANTIFERON TB1 TUBE: 0.01 IU/ML
QUANTIFERON TB2 TUBE: 0.01

## 2025-02-13 ENCOUNTER — TRANSFERRED RECORDS (OUTPATIENT)
Dept: HEALTH INFORMATION MANAGEMENT | Facility: CLINIC | Age: 55
End: 2025-02-13
Payer: COMMERCIAL

## 2025-02-14 ENCOUNTER — TRANSFERRED RECORDS (OUTPATIENT)
Dept: HEALTH INFORMATION MANAGEMENT | Facility: CLINIC | Age: 55
End: 2025-02-14
Payer: COMMERCIAL

## 2025-02-17 ENCOUNTER — APPOINTMENT (OUTPATIENT)
Dept: CT IMAGING | Facility: CLINIC | Age: 55
End: 2025-02-17
Attending: EMERGENCY MEDICINE
Payer: COMMERCIAL

## 2025-02-17 ENCOUNTER — HOSPITAL ENCOUNTER (INPATIENT)
Facility: CLINIC | Age: 55
End: 2025-02-17
Attending: EMERGENCY MEDICINE | Admitting: STUDENT IN AN ORGANIZED HEALTH CARE EDUCATION/TRAINING PROGRAM
Payer: COMMERCIAL

## 2025-02-17 ENCOUNTER — APPOINTMENT (OUTPATIENT)
Dept: ULTRASOUND IMAGING | Facility: CLINIC | Age: 55
End: 2025-02-17
Attending: STUDENT IN AN ORGANIZED HEALTH CARE EDUCATION/TRAINING PROGRAM
Payer: COMMERCIAL

## 2025-02-17 DIAGNOSIS — E83.42 HYPOMAGNESEMIA: ICD-10-CM

## 2025-02-17 DIAGNOSIS — M79.7 FIBROMYOSITIS: Primary | ICD-10-CM

## 2025-02-17 DIAGNOSIS — K52.9 COLITIS: ICD-10-CM

## 2025-02-17 DIAGNOSIS — R68.83 CHILLS: ICD-10-CM

## 2025-02-17 DIAGNOSIS — J18.9 PNEUMONIA DUE TO INFECTIOUS ORGANISM, UNSPECIFIED LATERALITY, UNSPECIFIED PART OF LUNG: ICD-10-CM

## 2025-02-17 DIAGNOSIS — R10.9 RIGHT SIDED ABDOMINAL PAIN: ICD-10-CM

## 2025-02-17 DIAGNOSIS — Z79.891 ENCOUNTER FOR LONG-TERM OPIATE ANALGESIC USE: ICD-10-CM

## 2025-02-17 DIAGNOSIS — M06.9 RHEUMATOID ARTHRITIS, INVOLVING UNSPECIFIED SITE, UNSPECIFIED WHETHER RHEUMATOID FACTOR PRESENT (H): ICD-10-CM

## 2025-02-17 DIAGNOSIS — R74.8 ELEVATED LIVER ENZYMES: ICD-10-CM

## 2025-02-17 DIAGNOSIS — A04.72 COLITIS DUE TO CLOSTRIDIUM DIFFICILE: ICD-10-CM

## 2025-02-17 LAB
ALBUMIN SERPL BCG-MCNC: 4 G/DL (ref 3.5–5.2)
ALBUMIN UR-MCNC: 10 MG/DL
ALP SERPL-CCNC: 442 U/L (ref 40–150)
ALT SERPL W P-5'-P-CCNC: 653 U/L (ref 0–50)
AMPHETAMINES UR QL SCN: ABNORMAL
ANION GAP SERPL CALCULATED.3IONS-SCNC: 11 MMOL/L (ref 7–15)
APAP SERPL-MCNC: <5 UG/ML (ref 10–30)
APPEARANCE UR: ABNORMAL
APTT PPP: 24 SECONDS (ref 22–38)
AST SERPL W P-5'-P-CCNC: 1048 U/L (ref 0–45)
BACTERIA #/AREA URNS HPF: ABNORMAL /HPF
BARBITURATES UR QL SCN: ABNORMAL
BASOPHILS # BLD AUTO: 0 10E3/UL (ref 0–0.2)
BASOPHILS NFR BLD AUTO: 1 %
BENZODIAZ UR QL SCN: ABNORMAL
BILIRUB SERPL-MCNC: 0.8 MG/DL
BILIRUB UR QL STRIP: NEGATIVE
BUN SERPL-MCNC: 15.6 MG/DL (ref 6–20)
BZE UR QL SCN: ABNORMAL
C DIFF GDH STL QL IA: POSITIVE
C DIFF TOX A+B STL QL IA: POSITIVE
C DIFF TOX B STL QL: POSITIVE
CALCIUM SERPL-MCNC: 9.4 MG/DL (ref 8.8–10.4)
CANNABINOIDS UR QL SCN: ABNORMAL
CHLORIDE SERPL-SCNC: 102 MMOL/L (ref 98–107)
CHOLEST SERPL-MCNC: 120 MG/DL
CK SERPL-CCNC: 65 U/L (ref 26–192)
COLOR UR AUTO: YELLOW
CREAT SERPL-MCNC: 1.19 MG/DL (ref 0.51–0.95)
EGFRCR SERPLBLD CKD-EPI 2021: 54 ML/MIN/1.73M2
EOSINOPHIL # BLD AUTO: 0.1 10E3/UL (ref 0–0.7)
EOSINOPHIL NFR BLD AUTO: 3 %
ERYTHROCYTE [DISTWIDTH] IN BLOOD BY AUTOMATED COUNT: 13.7 % (ref 10–15)
ETHANOL SERPL-MCNC: <0.01 G/DL
FENTANYL UR QL: ABNORMAL
FIBRINOGEN PPP-MCNC: 414 MG/DL (ref 170–510)
FLUAV RNA SPEC QL NAA+PROBE: NEGATIVE
FLUBV RNA RESP QL NAA+PROBE: NEGATIVE
GLUCOSE SERPL-MCNC: 114 MG/DL (ref 70–99)
GLUCOSE UR STRIP-MCNC: NEGATIVE MG/DL
HAV AB SER QL IA: REACTIVE
HAV IGM SERPL QL IA: NONREACTIVE
HBV CORE AB SERPL QL IA: NONREACTIVE
HBV CORE IGM SERPL QL IA: NONREACTIVE
HBV SURFACE AB SERPL IA-ACNC: <3.5 M[IU]/ML
HBV SURFACE AB SERPL IA-ACNC: NONREACTIVE M[IU]/ML
HBV SURFACE AG SERPL QL IA: NONREACTIVE
HBV SURFACE AG SERPL QL IA: NONREACTIVE
HCG UR QL: NEGATIVE
HCO3 SERPL-SCNC: 24 MMOL/L (ref 22–29)
HCT VFR BLD AUTO: 40.5 % (ref 35–47)
HCV AB SERPL QL IA: REACTIVE
HCV AB SERPL QL IA: REACTIVE
HDLC SERPL-MCNC: 47 MG/DL
HGB BLD-MCNC: 13.6 G/DL (ref 11.7–15.7)
HGB UR QL STRIP: NEGATIVE
HIV 1+2 AB+HIV1 P24 AG SERPL QL IA: NONREACTIVE
HOLD SPECIMEN: NORMAL
HYALINE CASTS: 1 /LPF
IMM GRANULOCYTES # BLD: 0 10E3/UL
IMM GRANULOCYTES NFR BLD: 0 %
INR PPP: 1.16 (ref 0.85–1.15)
IRON BINDING CAPACITY (ROCHE): 255 UG/DL (ref 240–430)
IRON SATN MFR SERPL: 7 % (ref 15–46)
IRON SERPL-MCNC: 19 UG/DL (ref 37–145)
KETONES UR STRIP-MCNC: NEGATIVE MG/DL
LABORATORY COMMENT REPORT: ABNORMAL
LACTATE SERPL-SCNC: 0.9 MMOL/L (ref 0.7–2)
LDLC SERPL CALC-MCNC: 62 MG/DL
LEUKOCYTE ESTERASE UR QL STRIP: ABNORMAL
LIPASE SERPL-CCNC: 26 U/L (ref 13–60)
LYMPHOCYTES # BLD AUTO: 1.4 10E3/UL (ref 0.8–5.3)
LYMPHOCYTES NFR BLD AUTO: 45 %
MAGNESIUM SERPL-MCNC: 1.6 MG/DL (ref 1.7–2.3)
MCH RBC QN AUTO: 30.2 PG (ref 26.5–33)
MCHC RBC AUTO-ENTMCNC: 33.6 G/DL (ref 31.5–36.5)
MCV RBC AUTO: 90 FL (ref 78–100)
MONOCYTES # BLD AUTO: 0.5 10E3/UL (ref 0–1.3)
MONOCYTES NFR BLD AUTO: 15 %
MUCOUS THREADS #/AREA URNS LPF: PRESENT /LPF
NEUTROPHILS # BLD AUTO: 1.1 10E3/UL (ref 1.6–8.3)
NEUTROPHILS NFR BLD AUTO: 36 %
NITRATE UR QL: NEGATIVE
NONHDLC SERPL-MCNC: 73 MG/DL
NRBC # BLD AUTO: 0 10E3/UL
NRBC BLD AUTO-RTO: 0 /100
OPIATES UR QL SCN: ABNORMAL
PCP QUAL URINE (ROCHE): ABNORMAL
PH UR STRIP: 6 [PH] (ref 5–7)
PLATELET # BLD AUTO: 164 10E3/UL (ref 150–450)
POTASSIUM SERPL-SCNC: 4.2 MMOL/L (ref 3.4–5.3)
PROT SERPL-MCNC: 8.6 G/DL (ref 6.4–8.3)
RBC # BLD AUTO: 4.5 10E6/UL (ref 3.8–5.2)
RBC URINE: 0 /HPF
RSV RNA SPEC NAA+PROBE: NEGATIVE
SARS-COV-2 RNA RESP QL NAA+PROBE: NEGATIVE
SODIUM SERPL-SCNC: 137 MMOL/L (ref 135–145)
SP GR UR STRIP: 1.03 (ref 1–1.03)
SQUAMOUS EPITHELIAL: 8 /HPF
TRIGL SERPL-MCNC: 57 MG/DL
TROPONIN T SERPL HS-MCNC: 7 NG/L
TROPONIN T SERPL HS-MCNC: 9 NG/L
UROBILINOGEN UR STRIP-MCNC: NORMAL MG/DL
WBC # BLD AUTO: 3.1 10E3/UL (ref 4–11)
WBC URINE: 24 /HPF

## 2025-02-17 PROCEDURE — 80143 DRUG ASSAY ACETAMINOPHEN: CPT | Performed by: STUDENT IN AN ORGANIZED HEALTH CARE EDUCATION/TRAINING PROGRAM

## 2025-02-17 PROCEDURE — 85730 THROMBOPLASTIN TIME PARTIAL: CPT | Performed by: STUDENT IN AN ORGANIZED HEALTH CARE EDUCATION/TRAINING PROGRAM

## 2025-02-17 PROCEDURE — 99285 EMERGENCY DEPT VISIT HI MDM: CPT | Mod: 25 | Performed by: EMERGENCY MEDICINE

## 2025-02-17 PROCEDURE — 85018 HEMOGLOBIN: CPT | Performed by: EMERGENCY MEDICINE

## 2025-02-17 PROCEDURE — 84295 ASSAY OF SERUM SODIUM: CPT | Performed by: INTERNAL MEDICINE

## 2025-02-17 PROCEDURE — 86706 HEP B SURFACE ANTIBODY: CPT | Performed by: STUDENT IN AN ORGANIZED HEALTH CARE EDUCATION/TRAINING PROGRAM

## 2025-02-17 PROCEDURE — 80074 ACUTE HEPATITIS PANEL: CPT | Performed by: STUDENT IN AN ORGANIZED HEALTH CARE EDUCATION/TRAINING PROGRAM

## 2025-02-17 PROCEDURE — 82310 ASSAY OF CALCIUM: CPT | Performed by: INTERNAL MEDICINE

## 2025-02-17 PROCEDURE — 250N000013 HC RX MED GY IP 250 OP 250 PS 637: Performed by: STUDENT IN AN ORGANIZED HEALTH CARE EDUCATION/TRAINING PROGRAM

## 2025-02-17 PROCEDURE — 86704 HEP B CORE ANTIBODY TOTAL: CPT | Performed by: STUDENT IN AN ORGANIZED HEALTH CARE EDUCATION/TRAINING PROGRAM

## 2025-02-17 PROCEDURE — 87637 SARSCOV2&INF A&B&RSV AMP PRB: CPT | Performed by: EMERGENCY MEDICINE

## 2025-02-17 PROCEDURE — 82465 ASSAY BLD/SERUM CHOLESTEROL: CPT | Performed by: STUDENT IN AN ORGANIZED HEALTH CARE EDUCATION/TRAINING PROGRAM

## 2025-02-17 PROCEDURE — 86708 HEPATITIS A ANTIBODY: CPT | Performed by: INTERNAL MEDICINE

## 2025-02-17 PROCEDURE — 80307 DRUG TEST PRSMV CHEM ANLYZR: CPT | Performed by: STUDENT IN AN ORGANIZED HEALTH CARE EDUCATION/TRAINING PROGRAM

## 2025-02-17 PROCEDURE — 36415 COLL VENOUS BLD VENIPUNCTURE: CPT | Performed by: STUDENT IN AN ORGANIZED HEALTH CARE EDUCATION/TRAINING PROGRAM

## 2025-02-17 PROCEDURE — 83540 ASSAY OF IRON: CPT | Performed by: INTERNAL MEDICINE

## 2025-02-17 PROCEDURE — 86705 HEP B CORE ANTIBODY IGM: CPT | Performed by: STUDENT IN AN ORGANIZED HEALTH CARE EDUCATION/TRAINING PROGRAM

## 2025-02-17 PROCEDURE — 96365 THER/PROPH/DIAG IV INF INIT: CPT | Performed by: EMERGENCY MEDICINE

## 2025-02-17 PROCEDURE — 96376 TX/PRO/DX INJ SAME DRUG ADON: CPT | Performed by: EMERGENCY MEDICINE

## 2025-02-17 PROCEDURE — 85025 COMPLETE CBC W/AUTO DIFF WBC: CPT | Performed by: EMERGENCY MEDICINE

## 2025-02-17 PROCEDURE — 74177 CT ABD & PELVIS W/CONTRAST: CPT

## 2025-02-17 PROCEDURE — 85384 FIBRINOGEN ACTIVITY: CPT | Performed by: STUDENT IN AN ORGANIZED HEALTH CARE EDUCATION/TRAINING PROGRAM

## 2025-02-17 PROCEDURE — 81001 URINALYSIS AUTO W/SCOPE: CPT | Performed by: EMERGENCY MEDICINE

## 2025-02-17 PROCEDURE — 250N000011 HC RX IP 250 OP 636: Performed by: STUDENT IN AN ORGANIZED HEALTH CARE EDUCATION/TRAINING PROGRAM

## 2025-02-17 PROCEDURE — 84155 ASSAY OF PROTEIN SERUM: CPT | Performed by: INTERNAL MEDICINE

## 2025-02-17 PROCEDURE — 250N000011 HC RX IP 250 OP 636: Performed by: EMERGENCY MEDICINE

## 2025-02-17 PROCEDURE — 99254 IP/OBS CNSLTJ NEW/EST MOD 60: CPT | Performed by: INTERNAL MEDICINE

## 2025-02-17 PROCEDURE — 83690 ASSAY OF LIPASE: CPT | Performed by: EMERGENCY MEDICINE

## 2025-02-17 PROCEDURE — 83550 IRON BINDING TEST: CPT | Performed by: INTERNAL MEDICINE

## 2025-02-17 PROCEDURE — 87340 HEPATITIS B SURFACE AG IA: CPT | Performed by: STUDENT IN AN ORGANIZED HEALTH CARE EDUCATION/TRAINING PROGRAM

## 2025-02-17 PROCEDURE — 85610 PROTHROMBIN TIME: CPT | Performed by: STUDENT IN AN ORGANIZED HEALTH CARE EDUCATION/TRAINING PROGRAM

## 2025-02-17 PROCEDURE — 99285 EMERGENCY DEPT VISIT HI MDM: CPT | Performed by: EMERGENCY MEDICINE

## 2025-02-17 PROCEDURE — 87493 C DIFF AMPLIFIED PROBE: CPT | Performed by: EMERGENCY MEDICINE

## 2025-02-17 PROCEDURE — 250N000011 HC RX IP 250 OP 636: Mod: JZ | Performed by: EMERGENCY MEDICINE

## 2025-02-17 PROCEDURE — 87324 CLOSTRIDIUM AG IA: CPT | Performed by: EMERGENCY MEDICINE

## 2025-02-17 PROCEDURE — 82310 ASSAY OF CALCIUM: CPT | Performed by: EMERGENCY MEDICINE

## 2025-02-17 PROCEDURE — 87522 HEPATITIS C REVRS TRNSCRPJ: CPT | Performed by: STUDENT IN AN ORGANIZED HEALTH CARE EDUCATION/TRAINING PROGRAM

## 2025-02-17 PROCEDURE — 82550 ASSAY OF CK (CPK): CPT | Performed by: INTERNAL MEDICINE

## 2025-02-17 PROCEDURE — 71260 CT THORAX DX C+: CPT

## 2025-02-17 PROCEDURE — 87086 URINE CULTURE/COLONY COUNT: CPT | Performed by: EMERGENCY MEDICINE

## 2025-02-17 PROCEDURE — 76705 ECHO EXAM OF ABDOMEN: CPT

## 2025-02-17 PROCEDURE — 120N000002 HC R&B MED SURG/OB UMMC

## 2025-02-17 PROCEDURE — 99223 1ST HOSP IP/OBS HIGH 75: CPT | Performed by: STUDENT IN AN ORGANIZED HEALTH CARE EDUCATION/TRAINING PROGRAM

## 2025-02-17 PROCEDURE — 96368 THER/DIAG CONCURRENT INF: CPT | Performed by: EMERGENCY MEDICINE

## 2025-02-17 PROCEDURE — 86038 ANTINUCLEAR ANTIBODIES: CPT | Performed by: STUDENT IN AN ORGANIZED HEALTH CARE EDUCATION/TRAINING PROGRAM

## 2025-02-17 PROCEDURE — 36415 COLL VENOUS BLD VENIPUNCTURE: CPT | Performed by: EMERGENCY MEDICINE

## 2025-02-17 PROCEDURE — 87799 DETECT AGENT NOS DNA QUANT: CPT | Performed by: STUDENT IN AN ORGANIZED HEALTH CARE EDUCATION/TRAINING PROGRAM

## 2025-02-17 PROCEDURE — 258N000003 HC RX IP 258 OP 636: Performed by: EMERGENCY MEDICINE

## 2025-02-17 PROCEDURE — 86015 ACTIN ANTIBODY EACH: CPT | Performed by: STUDENT IN AN ORGANIZED HEALTH CARE EDUCATION/TRAINING PROGRAM

## 2025-02-17 PROCEDURE — 250N000009 HC RX 250: Performed by: EMERGENCY MEDICINE

## 2025-02-17 PROCEDURE — 87389 HIV-1 AG W/HIV-1&-2 AB AG IA: CPT | Performed by: STUDENT IN AN ORGANIZED HEALTH CARE EDUCATION/TRAINING PROGRAM

## 2025-02-17 PROCEDURE — 258N000003 HC RX IP 258 OP 636: Performed by: STUDENT IN AN ORGANIZED HEALTH CARE EDUCATION/TRAINING PROGRAM

## 2025-02-17 PROCEDURE — 87522 HEPATITIS C REVRS TRNSCRPJ: CPT | Performed by: INTERNAL MEDICINE

## 2025-02-17 PROCEDURE — 81025 URINE PREGNANCY TEST: CPT | Performed by: FAMILY MEDICINE

## 2025-02-17 PROCEDURE — 83605 ASSAY OF LACTIC ACID: CPT | Performed by: EMERGENCY MEDICINE

## 2025-02-17 PROCEDURE — 96375 TX/PRO/DX INJ NEW DRUG ADDON: CPT | Performed by: EMERGENCY MEDICINE

## 2025-02-17 PROCEDURE — 96361 HYDRATE IV INFUSION ADD-ON: CPT | Performed by: EMERGENCY MEDICINE

## 2025-02-17 PROCEDURE — 36415 COLL VENOUS BLD VENIPUNCTURE: CPT | Performed by: INTERNAL MEDICINE

## 2025-02-17 PROCEDURE — 83735 ASSAY OF MAGNESIUM: CPT | Performed by: EMERGENCY MEDICINE

## 2025-02-17 PROCEDURE — 80321 ALCOHOLS BIOMARKERS 1OR 2: CPT | Performed by: INTERNAL MEDICINE

## 2025-02-17 PROCEDURE — 82247 BILIRUBIN TOTAL: CPT | Performed by: EMERGENCY MEDICINE

## 2025-02-17 PROCEDURE — 86803 HEPATITIS C AB TEST: CPT | Performed by: STUDENT IN AN ORGANIZED HEALTH CARE EDUCATION/TRAINING PROGRAM

## 2025-02-17 PROCEDURE — 84484 ASSAY OF TROPONIN QUANT: CPT | Performed by: EMERGENCY MEDICINE

## 2025-02-17 PROCEDURE — 82077 ASSAY SPEC XCP UR&BREATH IA: CPT | Performed by: STUDENT IN AN ORGANIZED HEALTH CARE EDUCATION/TRAINING PROGRAM

## 2025-02-17 RX ORDER — CELECOXIB 200 MG/1
200 CAPSULE ORAL 3 TIMES DAILY
Status: DISCONTINUED | OUTPATIENT
Start: 2025-02-17 | End: 2025-02-21 | Stop reason: HOSPADM

## 2025-02-17 RX ORDER — CALCIUM CARBONATE 500 MG/1
1000 TABLET, CHEWABLE ORAL 4 TIMES DAILY PRN
Status: DISCONTINUED | OUTPATIENT
Start: 2025-02-17 | End: 2025-02-21 | Stop reason: HOSPADM

## 2025-02-17 RX ORDER — OMEPRAZOLE 40 MG/1
40 CAPSULE, DELAYED RELEASE ORAL 2 TIMES DAILY PRN
COMMUNITY

## 2025-02-17 RX ORDER — ENOXAPARIN SODIUM 100 MG/ML
40 INJECTION SUBCUTANEOUS EVERY 24 HOURS
Status: DISCONTINUED | OUTPATIENT
Start: 2025-02-17 | End: 2025-02-21 | Stop reason: HOSPADM

## 2025-02-17 RX ORDER — LIDOCAINE 4 G/G
1 PATCH TOPICAL
Status: DISCONTINUED | OUTPATIENT
Start: 2025-02-17 | End: 2025-02-21 | Stop reason: HOSPADM

## 2025-02-17 RX ORDER — BENZONATATE 100 MG/1
200 CAPSULE ORAL 3 TIMES DAILY PRN
Status: DISCONTINUED | OUTPATIENT
Start: 2025-02-17 | End: 2025-02-21 | Stop reason: HOSPADM

## 2025-02-17 RX ORDER — POLYETHYLENE GLYCOL 3350 17 G/17G
17 POWDER, FOR SOLUTION ORAL 2 TIMES DAILY PRN
Status: DISCONTINUED | OUTPATIENT
Start: 2025-02-17 | End: 2025-02-21 | Stop reason: HOSPADM

## 2025-02-17 RX ORDER — ONDANSETRON 2 MG/ML
4 INJECTION INTRAMUSCULAR; INTRAVENOUS ONCE
Status: COMPLETED | OUTPATIENT
Start: 2025-02-17 | End: 2025-02-17

## 2025-02-17 RX ORDER — PROCHLORPERAZINE MALEATE 10 MG
10 TABLET ORAL EVERY 6 HOURS PRN
Status: DISCONTINUED | OUTPATIENT
Start: 2025-02-17 | End: 2025-02-21 | Stop reason: HOSPADM

## 2025-02-17 RX ORDER — ONDANSETRON 2 MG/ML
4 INJECTION INTRAMUSCULAR; INTRAVENOUS EVERY 6 HOURS PRN
Status: DISCONTINUED | OUTPATIENT
Start: 2025-02-17 | End: 2025-02-21 | Stop reason: HOSPADM

## 2025-02-17 RX ORDER — METOCLOPRAMIDE HYDROCHLORIDE 5 MG/ML
10 INJECTION INTRAMUSCULAR; INTRAVENOUS ONCE
Status: COMPLETED | OUTPATIENT
Start: 2025-02-17 | End: 2025-02-17

## 2025-02-17 RX ORDER — MAGNESIUM SULFATE HEPTAHYDRATE 40 MG/ML
2 INJECTION, SOLUTION INTRAVENOUS ONCE
Status: COMPLETED | OUTPATIENT
Start: 2025-02-17 | End: 2025-02-17

## 2025-02-17 RX ORDER — AZITHROMYCIN 250 MG/1
250 TABLET, FILM COATED ORAL DAILY
Status: DISCONTINUED | OUTPATIENT
Start: 2025-02-18 | End: 2025-02-18

## 2025-02-17 RX ORDER — HYDROMORPHONE HYDROCHLORIDE 1 MG/ML
0.5 INJECTION, SOLUTION INTRAMUSCULAR; INTRAVENOUS; SUBCUTANEOUS ONCE
Status: COMPLETED | OUTPATIENT
Start: 2025-02-17 | End: 2025-02-17

## 2025-02-17 RX ORDER — OMEPRAZOLE 20 MG/1
40 CAPSULE, DELAYED RELEASE ORAL
Status: DISCONTINUED | OUTPATIENT
Start: 2025-02-17 | End: 2025-02-17 | Stop reason: ALTCHOICE

## 2025-02-17 RX ORDER — IOPAMIDOL 755 MG/ML
500 INJECTION, SOLUTION INTRAVASCULAR ONCE
Status: COMPLETED | OUTPATIENT
Start: 2025-02-17 | End: 2025-02-17

## 2025-02-17 RX ORDER — PANTOPRAZOLE SODIUM 40 MG/1
40 TABLET, DELAYED RELEASE ORAL
Status: DISCONTINUED | OUTPATIENT
Start: 2025-02-18 | End: 2025-02-21 | Stop reason: HOSPADM

## 2025-02-17 RX ORDER — CLONAZEPAM 1 MG/1
1 TABLET ORAL 3 TIMES DAILY
Status: DISCONTINUED | OUTPATIENT
Start: 2025-02-17 | End: 2025-02-21 | Stop reason: HOSPADM

## 2025-02-17 RX ORDER — PRENATAL VIT/IRON FUM/FOLIC AC 27MG-0.8MG
1 TABLET ORAL EVERY MORNING
Status: ON HOLD | COMMUNITY
End: 2025-02-21

## 2025-02-17 RX ORDER — ACETAMINOPHEN 650 MG/1
650 SUPPOSITORY RECTAL EVERY 4 HOURS PRN
Status: DISCONTINUED | OUTPATIENT
Start: 2025-02-17 | End: 2025-02-18

## 2025-02-17 RX ORDER — AZITHROMYCIN 500 MG/5ML
500 INJECTION, POWDER, LYOPHILIZED, FOR SOLUTION INTRAVENOUS ONCE
Status: COMPLETED | OUTPATIENT
Start: 2025-02-17 | End: 2025-02-17

## 2025-02-17 RX ORDER — SODIUM CHLORIDE 9 MG/ML
INJECTION, SOLUTION INTRAVENOUS CONTINUOUS
Status: DISPENSED | OUTPATIENT
Start: 2025-02-17 | End: 2025-02-19

## 2025-02-17 RX ORDER — LIDOCAINE 40 MG/G
CREAM TOPICAL
Status: DISCONTINUED | OUTPATIENT
Start: 2025-02-17 | End: 2025-02-21 | Stop reason: HOSPADM

## 2025-02-17 RX ORDER — ACETAMINOPHEN 325 MG/1
650 TABLET ORAL EVERY 4 HOURS PRN
Status: DISCONTINUED | OUTPATIENT
Start: 2025-02-17 | End: 2025-02-18

## 2025-02-17 RX ORDER — AMOXICILLIN 250 MG
1 CAPSULE ORAL 2 TIMES DAILY PRN
Status: DISCONTINUED | OUTPATIENT
Start: 2025-02-17 | End: 2025-02-21 | Stop reason: HOSPADM

## 2025-02-17 RX ORDER — AMOXICILLIN 250 MG
2 CAPSULE ORAL 2 TIMES DAILY PRN
Status: DISCONTINUED | OUTPATIENT
Start: 2025-02-17 | End: 2025-02-21 | Stop reason: HOSPADM

## 2025-02-17 RX ORDER — VANCOMYCIN HYDROCHLORIDE 125 MG/1
125 CAPSULE ORAL 4 TIMES DAILY
Status: DISCONTINUED | OUTPATIENT
Start: 2025-02-17 | End: 2025-02-18

## 2025-02-17 RX ORDER — CEFTRIAXONE 1 G/1
1 INJECTION, POWDER, FOR SOLUTION INTRAMUSCULAR; INTRAVENOUS EVERY 24 HOURS
Status: DISCONTINUED | OUTPATIENT
Start: 2025-02-17 | End: 2025-02-17 | Stop reason: ALTCHOICE

## 2025-02-17 RX ADMIN — ONDANSETRON 4 MG: 2 INJECTION INTRAMUSCULAR; INTRAVENOUS at 03:43

## 2025-02-17 RX ADMIN — CELECOXIB 200 MG: 200 CAPSULE ORAL at 15:38

## 2025-02-17 RX ADMIN — HYDROMORPHONE HYDROCHLORIDE 1 MG: 1 INJECTION, SOLUTION INTRAMUSCULAR; INTRAVENOUS; SUBCUTANEOUS at 03:38

## 2025-02-17 RX ADMIN — MAGNESIUM SULFATE HEPTAHYDRATE 2 G: 40 INJECTION, SOLUTION INTRAVENOUS at 04:54

## 2025-02-17 RX ADMIN — IOPAMIDOL 89 ML: 755 INJECTION, SOLUTION INTRAVENOUS at 04:32

## 2025-02-17 RX ADMIN — METOCLOPRAMIDE HYDROCHLORIDE 10 MG: 5 INJECTION INTRAMUSCULAR; INTRAVENOUS at 03:40

## 2025-02-17 RX ADMIN — FIDAXOMICIN 200 MG: 200 TABLET, FILM COATED ORAL at 20:57

## 2025-02-17 RX ADMIN — VANCOMYCIN HYDROCHLORIDE 125 MG: 125 CAPSULE ORAL at 20:57

## 2025-02-17 RX ADMIN — ENOXAPARIN SODIUM 40 MG: 40 INJECTION SUBCUTANEOUS at 13:58

## 2025-02-17 RX ADMIN — SODIUM CHLORIDE 62 ML: 9 INJECTION, SOLUTION INTRAVENOUS at 04:32

## 2025-02-17 RX ADMIN — CLONAZEPAM 1 MG: 1 TABLET ORAL at 20:57

## 2025-02-17 RX ADMIN — SODIUM CHLORIDE: 9 INJECTION, SOLUTION INTRAVENOUS at 11:17

## 2025-02-17 RX ADMIN — CEFTRIAXONE SODIUM 1 G: 1 INJECTION, POWDER, FOR SOLUTION INTRAMUSCULAR; INTRAVENOUS at 11:34

## 2025-02-17 RX ADMIN — CLONAZEPAM 1 MG: 1 TABLET ORAL at 15:38

## 2025-02-17 RX ADMIN — AZITHROMYCIN MONOHYDRATE 500 MG: 500 INJECTION, POWDER, LYOPHILIZED, FOR SOLUTION INTRAVENOUS at 06:35

## 2025-02-17 RX ADMIN — SODIUM CHLORIDE 1000 ML: 9 INJECTION, SOLUTION INTRAVENOUS at 03:37

## 2025-02-17 RX ADMIN — ONDANSETRON 4 MG: 2 INJECTION INTRAMUSCULAR; INTRAVENOUS at 05:49

## 2025-02-17 RX ADMIN — VANCOMYCIN HYDROCHLORIDE 125 MG: 125 CAPSULE ORAL at 17:03

## 2025-02-17 RX ADMIN — SODIUM CHLORIDE: 9 INJECTION, SOLUTION INTRAVENOUS at 18:21

## 2025-02-17 RX ADMIN — HYDROMORPHONE HYDROCHLORIDE 0.5 MG: 1 INJECTION, SOLUTION INTRAMUSCULAR; INTRAVENOUS; SUBCUTANEOUS at 05:45

## 2025-02-17 RX ADMIN — VANCOMYCIN HYDROCHLORIDE 125 MG: 125 CAPSULE ORAL at 13:58

## 2025-02-17 RX ADMIN — CLONAZEPAM 1 MG: 1 TABLET ORAL at 11:16

## 2025-02-17 RX ADMIN — CELECOXIB 200 MG: 200 CAPSULE ORAL at 20:57

## 2025-02-17 RX ADMIN — LIDOCAINE 4% 1 PATCH: 40 PATCH TOPICAL at 20:55

## 2025-02-17 RX ADMIN — CELECOXIB 200 MG: 200 CAPSULE ORAL at 11:34

## 2025-02-17 ASSESSMENT — ACTIVITIES OF DAILY LIVING (ADL)
ADLS_ACUITY_SCORE: 51
ADLS_ACUITY_SCORE: 47
ADLS_ACUITY_SCORE: 47
ADLS_ACUITY_SCORE: 51
ADLS_ACUITY_SCORE: 47
ADLS_ACUITY_SCORE: 51
ADLS_ACUITY_SCORE: 47
ADLS_ACUITY_SCORE: 47
ADLS_ACUITY_SCORE: 51
ADLS_ACUITY_SCORE: 47
ADLS_ACUITY_SCORE: 51
ADLS_ACUITY_SCORE: 51

## 2025-02-17 ASSESSMENT — COLUMBIA-SUICIDE SEVERITY RATING SCALE - C-SSRS
6. HAVE YOU EVER DONE ANYTHING, STARTED TO DO ANYTHING, OR PREPARED TO DO ANYTHING TO END YOUR LIFE?: NO
2. HAVE YOU ACTUALLY HAD ANY THOUGHTS OF KILLING YOURSELF IN THE PAST MONTH?: NO
1. IN THE PAST MONTH, HAVE YOU WISHED YOU WERE DEAD OR WISHED YOU COULD GO TO SLEEP AND NOT WAKE UP?: NO

## 2025-02-17 NOTE — CONSULTS
Consulted to run a test claim for Vancomycin & Dificid.    Patient has pharmacy benefits through [facility]. Per insurance, the following are covered and preferred under the patient's plans:     Dificid tabs - $0  Vancomycin caps - $0       Please feel free to contact me with any other test claims, prior authorizations, or insurance questions regarding outpatient medications.     Thanks!      Rhianna Johnson, [facility]  Discharge Pharmacy Liaison  [facility]  Pronouns: She/Her/Hers    Securely message with [email or web address], Epic Secure Chat, or [employer]  Phone: [phone]  Fax: [phone]  [email or web address]

## 2025-02-17 NOTE — ED NOTES
Lab called to collect labs as ordered, stated that they will come as soon as possible (came when patient was off unit).

## 2025-02-17 NOTE — CONSULTS
Hepatology Consultation  Kalyn Rivero [id number] 54 year old 1970 2/17/2025        Date of Admission: 2/17/2025  Requesting physician: Dr. Daly, Sulaiman CASTILLO MD       Reason for Consultation:   Abnormal Liver Studies         Assessment and Plan:   Kalyn Rivero is a 54 year old female with past medical history of rheumatoid arthritis (on embrel and celebrex), hx of acute liver failure due to tylenol (2011), hx of alcohol overuse, hx of pancreatitis, who presented with right upper quadrant pain, nosebleeding, cough, and recent diagnosis of pneumonia + UTI started on cefdinir.    #. Severe liver injury, hepatocellular predominant   #. Underlying hepatic steatosis  Patient presented with severe acute liver injury with an AST of thousand 48, ALT of 653 and an alkaline phosphatase of 442.  No lactic acidosis.  Patient with clear mentation and INR less than 1.5 thus does not meet criteria for acute liver failure.      Of note the patient presented with acute liver failure in March 2011 secondary to tylenol. Patient with underlying hepatic steatosis in the setting of overweight status    In terms of etiology, unclear at this time. There are several factors that may be contributing including  C. difficile, recent supplement use (memory pill that starts with T) and antibiotic use.  Alcohol level negative.  Hepatitis B surface antigen nonreactive. Prior Hep C Ab positive in 2011 but HCV PCR negative at that time. Utox: + for benzos. US without clot or significant anastomotic abnormalities to     #. C. Diff, initial episode  Patient presented with diarrhea in the setting of cefdinir prescription.  C. Difficile antigen and toxin positive.  CT scan with findings of diffuse colonic thickening.   ATLAS score: 2, cure rate 92.7%    #. Epistaxis  #. Iron deficient         Recommendations:   -- Follow-up acute hepatitis panel, hep C PCR, EMMIE, F-actin, CMV, EBV, CK and PETH   -- Given there is insurance coverage for  fidaxomicin/dificid - would recommend fidaxomicin  200mg BID x 14 days for C. Diff given its association with reduced risk of C. difficile recurrence compared to vancomycin  Limit antibiotics as able in the setting of active C. difficile  -- Hepatic panel and INR daily  -- Continue supportive care for nausea, emesis and GI symptoms.  Do not give medications to alter gut motility, such as Imodium, in the setting of active C. Difficile  -- Avoid any medications or supplements not prescribed by a physician  -- Further workup and management of iron deficiency in the setting of epistaxis per primary team; if no improvement in iron deficiency in the setting of correction of epistaxis would consider further evaluation including upper endoscopy and colonoscopy.  Would not give IV iron at this time in the setting of active infection.    I spent a total of 60 minutes on date of service (as above) reviewing labs, imaging, documenting and in communication with the patient.    Linda Hanna M.D.   of Medicine  [facility]  Advanced/Transplant Hepatology          HPI:   Kalyn Rivero is a 54 year old female with past medical history of rheumatoid arthritis (on embrel and celebrex) who presented with right upper quadrant pain, nosebleeding, cough, and recent diagnosis of pneumonia + UTI started on cefdinir.    Patient reports that last week she was diagnosed with pneumonia and a urinary tract infection and started on cefdinir.  She reports having issues with cough and shortness of breath  as well as increased urinary frequency, dysuria and a change in her urinary odor.  She reports being prescribed cefdinir at that time.    He reports for the last 2 to 3 days that she was been having loose stools such that she has been having difficulty making it to the bathroom and on 1 occasion she had an accident in her bed.  She tried Imodium but that was not helpful.  In addition to the loose stool she was  "having nausea and nonbloody emesis.  She reports that her son also had loose stools but that only lasted 1 day.  She reports feeling warm but never checked her temperature.  Reports positive chills.    With regards to medications and supplements she reports taking vitamin C, vitamin B12, vitamin D, vitamin B, magnesium, collagen, fish oil, prenatal vitamin, a vitamin for hair and nails and recently was started on a \"memory pill\" that starts with a T -to her by a friend/family member.     She has also has been having issues with bloody noses.         Past Medical History:   Reviewed and edited as appropriate  Past Medical History:   Diagnosis Date    Acetaminophen overdose 03/24/2011    Anemia     Anesthesia complication     pt states has a history of heart stopping during anesthesia,    Arrhythmia     Cerebral artery occlusion with cerebral infarction (H)     Cerebral infarction (H)     Cervical high risk HPV (human papillomavirus) test positive 02/22/2017    type 18 & other HR HPV    Chronic infection     MRSA    Chronic pain 03/24/2011    Degenerative joint disease     Endometriosis     Fibromyalgia     Gastro-oesophageal reflux disease     Heart murmur     History of blood transfusion     Hypothyroidism     Immune disorder     Learning disability     Malignant neoplasm (H)     Migraine     MRSA (methicillin resistant staph aureus) culture positive     Neck injuries     Opioid type dependence (H)     Other chronic pain     PONV (postoperative nausea and vomiting)     states \"flatlines\"during anesthesia    RA (rheumatoid arthritis) (H)     Renal stone     Scoliosis     Stomach ulcer     history            Past Surgical History:   Reviewed and edited as appropriate   Past Surgical History:   Procedure Laterality Date    ARTHRODESIS FOOT  5/23/2012    Procedure:ARTHRODESIS FOOT; Right Subtalar andTaloNavicular  Fusion  ; Surgeon:BRIGHT ZHOU; [facility]    ARTHRODESIS FOOT Left 4/22/2015    Procedure: " ARTHRODESIS FOOT;  Surgeon: Chris Hendricks MD;  [facility]    ARTHROPLASTY ELBOW Right 9/30/2015    Procedure: ARTHROPLASTY ELBOW;  Surgeon: Carrol Gaspar MD;  [facility]    ARTHROPLASTY KNEE Right     ARTHROPLASTY REVISION ELBOW Right 11/27/2018    Procedure: 1 - aspiration of Right elbow 2- Explantation of failed hardware Right humeral periprosthetic fracture non-union 3- Right distal humerus excision 4- Right distal humerus replacement 5 - Revision Right total elbow arthroplasty;  Surgeon: Aakash Zimmer MD;  Location: [facility]    ARTHROPLASTY WRIST      x2 Lt wrist replacement.    ARTHROTOMY ELBOW Right 6/18/2015    Procedure: ARTHROTOMY ELBOW;  Surgeon: Carrol Gaspar MD;  [facility]    C SHLDR ARTHROSCOP,DIAGNOSTIC  12/17/2008    left total shoulder arthroplasty by Dr. Law    CYSTOSCOPY  02/06/2009    1.cystoscopy.2.bilateral ureteroscopy.3.basketing of stone fragments from the right kidney.4.bilateral double-J ureteral stent placement.5.ann catheter placement.6.fluoroscopy and intraoperative interpretation of images.    CYSTOSCOPY  01/08/2007    1. cystoscopy and left stent removal.2.left ureteroscopy    DECOMPRESSION CUBITAL TUNNEL  6/6/2014    Procedure: DECOMPRESSION CUBITAL TUNNEL;  Surgeon: Janelle Coates MD;  [facility]    ENDOSCOPY  03/31/2005    [facility]oscopy[facility]    ESOPHAGOSCOPY, GASTROSCOPY, DUODENOSCOPY (EGD), COMBINED  3/17/2014    Procedure: COMBINED ESOPHAGOSCOPY, GASTROSCOPY, DUODENOSCOPY (EGD), BIOPSY SINGLE OR MULTIPLE;;  Surgeon: Duane, William Charles, MD;  Location: [facility] OR    EXCISE MASS FOOT Right 9/30/2024    Procedure: Soft tissue mass removal right foot;  Surgeon: Vashti Ayala DPM, Podiatry/Foot and Ankle Surgery;  Location: [facility] OR    FUSION CERVICAL POSTERIOR ONE LEVEL  11/18/2013    Procedure: FUSION CERVICAL POSTERIOR ONE LEVEL;  Cervical 1-2 Posterior Cervical Fusion (Harms Procedure);  Surgeon:  Carrol Gunn MD;  [facility]: [facility]    GYN SURGERY      INJECT MAJOR JOINT / BURSA Left 1/5/2017    Procedure: INJECT MAJOR JOINT / BURSA;  Surgeon: Fredy Patel DO;  [facility]    INJECT STEROID (LOCATION) Left 6/18/2015    Procedure: INJECT STEROID (LOCATION);  Surgeon: Carrol Gaspar MD;  [facility]    NECK SURGERY      REMOVE FOREIGN BODY UPPER EXTREMITY Right 3/2/2017    Procedure: REMOVE FOREIGN BODY UPPER EXTREMITY;  Surgeon: Carrol Gaspar MD;  [facility]    REMOVE HARDWARE FOOT Left 6/8/2022    Procedure: REMOVAL, HARDWARE, FOOT LEFT;  Surgeon: Chris Hendricks MD;  [facility]    RESECT BONE UPPER EXTREMITY Left 4/27/2017    Procedure: RESECT BONE UPPER EXTREMITY;  Left Radial Head Resection;  Surgeon: Carrol Gaspar MD;  Location:  OR    ROTATOR CUFF REPAIR RT/LT  10/19/2005    1.left distal clavicle excision.2.acromioplasty.3.coracoacromial ligament resection.4.rotator cuff exploration    [facility] ANESTH,DX ARTHROSCOPIC PROC KNEE JOINT  10/24/2007    right total knee arthroplasty    [facility] SHOULDER SURG PROC UNLISTED      [facility] TOTAL KNEE ARTHROPLASTY  1/18/12    Left            Medications:     Current Facility-Administered Medications   Medication Dose Route Frequency Provider Last Rate Last Admin    acetaminophen (TYLENOL) tablet 650 mg  650 mg Oral Q4H PRN Sulaiman Daly MD        Or    acetaminophen (TYLENOL) Suppository 650 mg  650 mg Rectal Q4H PRN Sulaiman Daly MD        [START ON 2/18/2025] azithromycin (ZITHROMAX) tablet 250 mg  250 mg Oral Daily Sulaiman Daly MD        benzonatate (TESSALON) capsule 200 mg  200 mg Oral TID PRN Sulaiman Daly MD        botulinum toxin type A (BOTOX) 100 units injection 200 Units  200 Units Intramuscular Q90 Days Rosina Quinones MD        calcium carbonate (TUMS) chewable tablet 1,000 mg  1,000 mg Oral 4x Daily PRN Sulaiman Daly MD         cefTRIAXone (ROCEPHIN) 1 g vial to attach to  mL bag for ADULTS or NS 50 mL bag for PEDS  1 g Intravenous Q24H Sulaiman Daly MD   Stopped at 02/17/25 1255    celecoxib (celeBREX) capsule 200 mg  200 mg Oral TID Sulaiman Daly MD   200 mg at 02/17/25 1134    clonazePAM (klonoPIN) tablet 1 mg  1 mg Oral TID Sulaiman Daly MD   1 mg at 02/17/25 1116    enoxaparin ANTICOAGULANT (LOVENOX) injection 40 mg  40 mg Subcutaneous Q24H Sulaiman Daly MD   40 mg at 02/17/25 1358    [START ON 2/20/2025] etanercept (ENBREL SURECLICK) 50 mg  50 mg Subcutaneous Once per day on Monday Thursday Sulaiman Daly MD        lidocaine (LMX4) cream   Topical Q1H PRN Sulaiman Daly MD        lidocaine 1 % 0.1-1 mL  0.1-1 mL Other Q1H PRN Sulaiman Daly MD        melatonin tablet 5 mg  5 mg Oral At Bedtime PRN Sulaiman Daly MD        omeprazole (PriLOSEC) CR capsule 40 mg  40 mg Oral BID AC Sulaiman Daly MD        ondansetron (ZOFRAN) injection 4 mg  4 mg Intravenous Q6H PRN Sulaiman Daly MD        polyethylene glycol (MIRALAX) Packet 17 g  17 g Oral BID PRN Sulaiman Daly MD        prochlorperazine (COMPAZINE) injection 10 mg  10 mg Intravenous Q6H PRN Sulaiman Daly MD        Or    prochlorperazine (COMPAZINE) tablet 10 mg  10 mg Oral Q6H PRN Sulaiman Daly MD        senna-docusate (SENOKOT-S/PERICOLACE) 8.6-50 MG per tablet 1 tablet  1 tablet Oral BID PRN Sulaiman Daly MD        Or    senna-docusate (SENOKOT-S/PERICOLACE) 8.6-50 MG per tablet 2 tablet  2 tablet Oral BID PRN Sulaiman Daly MD        sodium chloride (PF) 0.9% PF flush 3 mL  3 mL Intracatheter Q8H Sulaiman Daly MD        sodium chloride (PF) 0.9% PF flush 3 mL  3 mL Intracatheter q1 min prn Sulaiman Daly MD        sodium chloride 0.9 % infusion   Intravenous Continuous Sulaiman Daly  mL/hr at 02/17/25 1415 Rate  Verify at 02/17/25 1415    vancomycin (VANCOCIN) capsule 125 mg  125 mg Oral 4x Daily Hoang Gandara DO   125 mg at 02/17/25 1358     Current Outpatient Medications   Medication Sig Dispense Refill    Ascorbic Acid (VITAMIN C PO) Take 1 tablet by mouth every morning.      benzonatate (TESSALON) 200 MG capsule Take 1 capsule (200 mg) by mouth 3 times daily as needed for cough. 60 capsule 1    cefdinir (OMNICEF) 300 MG capsule Take 1 capsule (300 mg) by mouth 2 times daily for 10 days. 20 capsule 0    celecoxib (CELEBREX) 200 MG capsule Take 200 mg by mouth 3 times daily      Cholecalciferol (VITAMIN D3 PO) Take 1 tablet by mouth every morning.      clonazePAM (KLONOPIN) 1 MG tablet Take 1 mg by mouth 3 times daily.      Collagen-Vitamin C-Biotin (COLLAGEN PO) Take 1 tablet by mouth every morning.      Cyanocobalamin (VITAMIN B-12 PO) Take 1 tablet by mouth every morning.      ENBREL SURECLICK 50 MG/ML autoinjector 50 mg twice a week. Hold before surgery on 9/30/24      MAGNESIUM PO Take 1 tablet by mouth every morning.      Multiple Vitamins-Minerals (HAIR SKIN & NAILS PO) Take 1 tablet by mouth every morning.      Omega-3 Fatty Acids (FISH OIL PO) Take 1 tablet by mouth every morning.      omeprazole (PRILOSEC) 40 MG DR capsule Take 40 mg by mouth 2 times daily as needed.      Prenatal Vit-Fe Fumarate-FA (PRENATAL MULTIVITAMIN W/IRON) 27-0.8 MG tablet Take 1 tablet by mouth every morning.      VITAMIN E PO Take 1 tablet by mouth every morning.      OnabotulinumtoxinA (BOTOX IJ) Inject 200 Units as directed Total dose 200 units   Administered 190 units   Unavoidable waste 30 units   Lot # /[id number] with Expiration Date:  12/2020   NDC [address]-49              Allergies      Allergies   Allergen Reactions    Leflunomide Anaphylaxis    Morphine Swelling    Celecoxib Other (See Comments)     Other reaction(s): stroke , lasted 24 hours  -Pt takes celecoxib daily. States that they changed the  and now it is  fine.    Amitriptyline Other (See Comments)     Sleepwalking     Codeine     Gabapentin Other (See Comments)     Pins,needles, stabbing aching at once  Pins,needles, stabbing aching at once  Numbness and Tingling    Ibuprofen      Doesn't take due to gastric ulcer    Sulfa Antibiotics Nausea and Vomiting     unknown    Tylenol [Acetaminophen]      Pt. States that she has Liver problems  Tylenol 3    Dexamethasone Palpitations     Heart racing, sweating     Erythromycin Nausea     Vomiting     Penicillins Rash    Prednisone Palpitations     Heart racing            Social History:   Reviewed and edited as appropriate  Social History     Socioeconomic History    Marital status: Single     Spouse name: Not on file    Number of children: Not on file    Years of education: Not on file    Highest education level: Not on file   Occupational History    Not on file   Tobacco Use    Smoking status: Former     Current packs/day: 0.10     Average packs/day: 0.1 packs/day for 41.5 years (4.2 ttl pk-yrs)     Types: Cigarettes     Start date: 8/6/1983     Passive exposure: Past    Smokeless tobacco: Never    Tobacco comments:     Quit 6 weeks ago   Vaping Use    Vaping status: Never Used   Substance and Sexual Activity    Alcohol use: No    Drug use: No    Sexual activity: Not Currently     Partners: Male   Other Topics Concern    Parent/sibling w/ CABG, MI or angioplasty before 65F 55M? Not Asked   Social History Narrative    Not on file     Social Drivers of Health     Financial Resource Strain: High Risk (12/4/2024)    Financial Resource Strain     Within the past 12 months, have you or your family members you live with been unable to get utilities (heat, electricity) when it was really needed?: Yes   Food Insecurity: High Risk (12/4/2024)    Food Insecurity     Within the past 12 months, did you worry that your food would run out before you got money to buy more?: Yes     Within the past 12 months, did the food you bought just  not last and you didn t have money to get more?: Yes   Transportation Needs: High Risk (12/4/2024)    Transportation Needs     Within the past 12 months, has lack of transportation kept you from medical appointments, getting your medicines, non-medical meetings or appointments, work, or from getting things that you need?: Yes   Physical Activity: Not on file   Stress: Not on file   Social Connections: Not on file   Interpersonal Safety: Low Risk  (10/2/2024)    Interpersonal Safety     Do you feel physically and emotionally safe where you currently live?: Yes     Within the past 12 months, have you been hit, slapped, kicked or otherwise physically hurt by someone?: No     Within the past 12 months, have you been humiliated or emotionally abused in other ways by your partner or ex-partner?: No   Housing Stability: High Risk (12/4/2024)    Housing Stability     Do you have housing? : Yes     Are you worried about losing your housing?: Yes   - Denies alcohol, tobacco, IV drugs or intranasal drugs at this time   - Hx of alcohol overuse  - Former tobacco use        Family History:   Reviewed and edited as appropriate  Family History   Problem Relation Age of Onset    Diabetes Mother     Hypertension Mother     Allergies Mother     Alcohol/Drug Mother         LIVER CIRROSIS    Blood Disease Mother         B12 DEF    Neurologic Disorder Mother         Epilepsy    Osteoporosis Mother     Cerebrovascular Disease Maternal Grandmother     Arthritis Maternal Grandmother         RHEUMATOID    Cancer Maternal Grandmother     Thyroid Disease Maternal Grandmother     Osteoporosis Maternal Grandmother     Heart Disease Maternal Grandmother     Depression Maternal Grandmother     Neurologic Disorder Maternal Grandmother         MIGRAINES    Anxiety Disorder Maternal Grandmother     Diabetes Maternal Grandmother     Migraines Maternal Grandmother     Deep Vein Thrombosis Maternal Grandmother     Cerebrovascular Disease Maternal  "Grandfather     Cancer Maternal Grandfather     Mental Illness Maternal Grandfather     Cerebrovascular Disease Paternal Grandmother     Arthritis Paternal Grandmother         RHEUMATOID    Cancer Paternal Grandmother     Osteoporosis Paternal Grandmother     Heart Disease Paternal Grandmother     Depression Paternal Grandmother     Neurologic Disorder Paternal Grandmother         MIGRAINES    Thyroid Disease Paternal Grandmother     Cerebrovascular Disease Paternal Grandfather     Cancer Paternal Grandfather     Heart Disease Brother         MURMUR    Asthma Son     Endocrine Disease Son     Neurologic Disorder Father         epilepsy    Seizure Disorder Father     Hypertension Father     Skin Cancer Father     Anxiety Disorder Father     Mental Illness Father     Hyperlipidemia Father     Kidney Disease Father     Bladder Cancer Father     Neurologic Disorder Daughter         MIGRAINES    Arthritis Daughter         JR    Hypertension Son     LUNG DISEASE Brother     Cancer Brother         lung    Anxiety Disorder Son     Asthma Son     Hypertension Son     Migraines Son     Spine Problems Son     Mental Illness Brother     Migraines Brother     Cardiac Sudden Death Brother     Spine Problems Brother     Glaucoma No family hx of     Macular Degeneration No family hx of     Unknown/Adopted No family hx of     Anesthesia Reaction No family hx of     Other Cancer No family hx of     Rheumatoid Arthritis No family hx of     Bone Cancer No family hx of     Low Back Problems No family hx of           Review of Systems:   A complete 10 point review of systems was obtained.  Please see the HPI for pertinent positives and negatives.  All other systems were reviewed and were found to be negative.          Physical Exam:   VS:  BP 97/57   Pulse 78   Temp 98.9  F (37.2  C) (Oral)   Resp 16   Ht 1.702 m (5' 7\")   Wt 81.6 kg (180 lb)   LMP  (LMP Unknown)   SpO2 94%   BMI 28.19 kg/m      Wt:   Wt Readings from Last 2 " Encounters:   02/17/25 81.6 kg (180 lb)   02/11/25 82.6 kg (182 lb)      Patient lying in bed  Mask in place  No jaundice         Laboratory:   BMP  Recent Labs   Lab 02/17/25  0323 02/11/25  1016    143   POTASSIUM 4.2 4.6   CHLORIDE 102 107   JERRI 9.4 9.8   CO2 24 23   BUN 15.6 19.4   CR 1.19* 0.98*   * 108*     CBC  Recent Labs   Lab 02/17/25  0323 02/11/25  1016   WBC 3.1* 5.4   RBC 4.50 4.53   HGB 13.6 13.1   HCT 40.5 41.6   MCV 90 92   MCH 30.2 28.9   MCHC 33.6 31.5   RDW 13.7 14.0    214     INR  Recent Labs   Lab 02/17/25  1234   INR 1.16*     Liver Enzymes  Recent Labs   Lab 02/17/25  0323 02/11/25  1016   ALKPHOS 442* 115   AST 1,048* 36   * 22   BILITOTAL 0.8 0.8   PROTTOTAL 8.6* 8.8*   ALBUMIN 4.0 4.3      PANC  Recent Labs   Lab 02/17/25  0323   LIPASE 26     Imaging/Procedures/Studies:   Abdominal ultrasound limited with Doppler 2/17/2025  1.  Normal post cholecystectomy limited abdominal ultrasound.  2.  Normal abdominal liver duplex. No evidence for portal vein thrombosis    CT chest/abdomen/pelvis with contrast 2/17/2025  1.  Diffuse colonic thickening with intraluminal fluid and liquid stool within the colon, findings suggestive of colitis. Consider C. difficile.  2.  Hepatic steatosis.  3.  Tree-in-bud nodular opacities of the upper posterior aspect of the right lower lobe, suggestive of mild infectious infiltrate. No pleural effusion.  4.  Moderate sized sliding-type hiatal hernia.

## 2025-02-17 NOTE — MEDICATION SCRIBE - ADMISSION MEDICATION HISTORY
Medication Scribe Admission Medication History    Admission medication history is complete. The information provided in this note is only as accurate as the sources available at the time of the update.    Information Source(s): Patient via in-person    Pertinent Information: Patient reports self management of medications.     Patient reports no longer taking: Zofran, Oxycodone, Senna-D, Ubrelvy or recent fill of Cymbalta     Changes made to PTA medication list:  Added: Vitamins: Hail and Nails, Prenatal, C, Fish Oil, B12, D3, E, Magnesium, Collagen   Deleted: Zofran, Oxycodone, Senna-D, Ubrelvy  Changed: Omeprazole to BID PRN (per patient), Clonazepam to TID (per patient and Sure Scripts)     Allergies reviewed with patient and updates made in EHR: yes    Medication History Completed By: Duane Lopez 2/17/2025 8:06 AM    Current Facility-Administered Medications for the 2/17/25 encounter (Hospital Encounter)   Medication    botulinum toxin type A (BOTOX) 100 units injection 200 Units     PTA Med List   Medication Sig Note Last Dose/Taking    Ascorbic Acid (VITAMIN C PO) Take 1 tablet by mouth every morning.  2/16/2025 Morning    benzonatate (TESSALON) 200 MG capsule Take 1 capsule (200 mg) by mouth 3 times daily as needed for cough.  Taking As Needed    cefdinir (OMNICEF) 300 MG capsule Take 1 capsule (300 mg) by mouth 2 times daily for 10 days. 2/17/2025: Patient reports taking 11 out of 20 doses so far.  2/16/2025 Evening    celecoxib (CELEBREX) 200 MG capsule Take 200 mg by mouth 3 times daily  2/16/2025 Evening    Cholecalciferol (VITAMIN D3 PO) Take 1 tablet by mouth every morning.  2/16/2025 Morning    clonazePAM (KLONOPIN) 1 MG tablet Take 1 mg by mouth 3 times daily.  2/16/2025 Evening    Collagen-Vitamin C-Biotin (COLLAGEN PO) Take 1 tablet by mouth every morning.  2/16/2025 Morning    Cyanocobalamin (VITAMIN B-12 PO) Take 1 tablet by mouth every morning.  2/16/2025 Morning    ENBREL SURECLICK 50 MG/ML  autoinjector 50 mg twice a week. Hold before surgery on 9/30/24 2/16/2025 Evening    MAGNESIUM PO Take 1 tablet by mouth every morning.  2/16/2025 Morning    Multiple Vitamins-Minerals (HAIR SKIN & NAILS PO) Take 1 tablet by mouth every morning.  2/16/2025 Morning    Omega-3 Fatty Acids (FISH OIL PO) Take 1 tablet by mouth every morning.  2/16/2025 Morning    omeprazole (PRILOSEC) 40 MG DR capsule Take 40 mg by mouth 2 times daily as needed.  Taking As Needed    Prenatal Vit-Fe Fumarate-FA (PRENATAL MULTIVITAMIN W/IRON) 27-0.8 MG tablet Take 1 tablet by mouth every morning.  2/16/2025 Morning    VITAMIN E PO Take 1 tablet by mouth every morning.  2/16/2025 Morning

## 2025-02-17 NOTE — H&P
[facility]    History and Physical - Hospitalist Service       Date of Admission:  2/17/2025    Assessment & Plan      Kalyn Rivero is a 54 year old female admitted on 2/17/2025. She presents to the ER for abd pain, nausea, emesis and diarrhea in setting of markedly elevated LFTs    #Acute Hepatitis  #RUQ abd Pain with positive Velarde's sign  LFTs markedly elevated with AST: 1000, ALT: 653. Elevated Alk Phos:442. Positive Velarde's sign on exam. ACT Abd pelvis showing steatorhea. Unclear etiology for her hepatitis. Patient denies alcohol use, illicit drug use, marijuana use or tylenol use. Given that patient is immunosuppressed on Enbrel and has underlying RA, she is at risk for autoimmune hepatitis, viral hepatitis, and budd chiari syndrome. Drug ingestion or alcohol ingestion not illicit drug use on history also needs to be ruled out. In addition, would need to rule out fulminant liver failure.  -- INR, PT, Fibrinogen for liver function testing  -- HepB surface antigen, HepB core abx, HepB surface antibody, hep C testing  -- HIV testing, EBV, CMV testing  -- EtOH, UDS, acetaminophen level  -- RUQ US with doppler  -- Hepatology consult placed    #RLL Infiltrate due to CAP  Patient being treated as outpatient for her PNA on cefdinir 300mg BID. Completed 5 days of her course. Patient was mildly neuotropenic on presentation. At this time, she is not needing O2 via NC, not having respiratory distress or sustained tachypnea.  -- MRSA/MSSA swab  -- legionella and strep pneumo antigen ordered  -- sputum culture not ordered since patient has allready been on Abx for 6 days as outpatient  -- Contniue ceftriaxone 1gm q24h, begin azithromycin 500mg on day 1 then 250mg for 4 days    #Acute Diarrhea  #Moderate Dehydration  Patient has been having 3 days of abd pain (RUQ and R flank), nausea, emesis and diarrhea. Started after being on several days of cefdinir.   -- Cdiff  "testing  -- GI PCR ordered (given patient is immunosuppressed)  -- MIVF until euvolemic and able to tolerate adequate po intake (tentatively given for 2 days will need to order beyond this)    #UTI  Last UTI documented in the charts was I 12/24 when she was found to be growing pan susceptible Ecoli. CT CAP done today not showing any findings of pyelonephritis  -- UA with microscopy and reflex to culture-- showed positive LE and elevated WBCs and bacteria in the urine.  -- Ceftriaxone 1g every 24 hours  -- follow up on UCx          Diet:  advance as tolerated  DVT Prophylaxis: Enoxaparin (Lovenox) SQ  Saleh Catheter: Not present  Lines: None     Cardiac Monitoring: None  Code Status:  full code    Clinically Significant Risk Factors Present on Admission             # Hypomagnesemia: Lowest Mg = 1.6 mg/dL in last 2 days, will replace as needed                 # Overweight: Estimated body mass index is 29.14 kg/m  as calculated from the following:    Height as of 2/11/25: 1.674 m (5' 5.9\").    Weight as of this encounter: 81.6 kg (180 lb).              Disposition Plan     Medically Ready for Discharge: Anticipated in 2-4 Days           RUSS YI MD  Hospitalist Service  [facility]  Securely message with [employer] (more info)  Text page via [facility] Paging/Directory     ______________________________________________________________________    Chief Complaint     History is obtained from the patient    History of Present Illness   Kalyn Rivero is a 54 year old female who is 54 year old F with a history of RA (Embrel and celebrex). Patient presented to the ER with RUQ pain and R sided flank pain. Of note, patient was recently diagnosed with a pneumonia and started on cefdinir by PCP. This was associated with nosebleed. Having cough and sputum production with blood clots. These respiratory sx ongoing for 3 days. No fevers. Does have chills. No night sweats. Positive " "for chest tightness. Positive for abd pain at the RUQ and at the R flank. Has smith having dysuria and foul smelling urine. Urinary sx startged on 2/7.     More recently has been having vomiting and nausea for 3 days. Mainly food and water. Non bloody or bilious in nature. Vomiting has been ongoing with each solid or fluid intake. Stools have been loose have smith twice daily. No rashes. In addition to above sx have been having lightheadedness. No vertigo. Mild blurry vision       ER Workup:  On arrival to the ER, patient was sating well on RA, HR:70s, Bps wnl, sating well on RA. Patient had CT abd pelvis which showed diffuse colonic thickening consistent with colitis, steatosis, tree iin bud appearence of the ELL concerning for infiltrate. UA done which showed large LE, Bacteria, elevated WBC    Past Medical History    Past Medical History:   Diagnosis Date    Acetaminophen overdose 03/24/2011    Anemia     Anesthesia complication     pt states has a history of heart stopping during anesthesia,    Arrhythmia     Cerebral artery occlusion with cerebral infarction (H)     Cerebral infarction (H)     Cervical high risk HPV (human papillomavirus) test positive 02/22/2017    type 18 & other HR HPV    Chronic infection     MRSA    Chronic pain 03/24/2011    Degenerative joint disease     Endometriosis     Fibromyalgia     Gastro-oesophageal reflux disease     Heart murmur     History of blood transfusion     Hypothyroidism     Immune disorder     Learning disability     Malignant neoplasm (H)     Migraine     MRSA (methicillin resistant staph aureus) culture positive     Neck injuries     Opioid type dependence (H)     Other chronic pain     PONV (postoperative nausea and vomiting)     states \"flatlines\"during anesthesia    RA (rheumatoid arthritis) (H)     Renal stone     Scoliosis     Stomach ulcer     history       Past Surgical History   Past Surgical History:   Procedure Laterality Date    ARTHRODESIS FOOT  5/23/2012    " Procedure:ARTHRODESIS FOOT; Right Subtalar andTaloNavicular  Fusion  ; Surgeon:BRIGHT HENDRICKS; [facility]    ARTHRODESIS FOOT Left 4/22/2015    Procedure: ARTHRODESIS FOOT;  Surgeon: Bright Hendricks MD;  [facility]    ARTHROPLASTY ELBOW Right 9/30/2015    Procedure: ARTHROPLASTY ELBOW;  Surgeon: Carrol Gaspar MD;  [facility]    ARTHROPLASTY KNEE Right     ARTHROPLASTY REVISION ELBOW Right 11/27/2018    Procedure: 1 - aspiration of Right elbow 2- Explantation of failed hardware Right humeral periprosthetic fracture non-union 3- Right distal humerus excision 4- Right distal humerus replacement 5 - Revision Right total elbow arthroplasty;  Surgeon: Aakash Zimmer MD;  Location:  OR    ARTHROPLASTY WRIST      x2 Lt wrist replacement.    ARTHROTOMY ELBOW Right 6/18/2015    Procedure: ARTHROTOMY ELBOW;  Surgeon: Carrol Gaspar MD;  Location:  OR    C SHLDR ARTHROSCOP,DIAGNOSTIC  12/17/2008    left total shoulder arthroplasty by Dr. Law    CYSTOSCOPY  02/06/2009    1.cystoscopy.2.bilateral ureteroscopy.3.basketing of stone fragments from the right kidney.4.bilateral double-J ureteral stent placement.5.ann catheter placement.6.fluoroscopy and intraoperative interpretation of images.    CYSTOSCOPY  01/08/2007    1. cystoscopy and left stent removal.2.left ureteroscopy    DECOMPRESSION CUBITAL TUNNEL  6/6/2014    Procedure: DECOMPRESSION CUBITAL TUNNEL;  Surgeon: Janelle Coates MD;  Location:  OR    ENDOSCOPY  03/31/2005    upper GI endoscopy[facility]    ESOPHAGOSCOPY, GASTROSCOPY, DUODENOSCOPY (EGD), COMBINED  3/17/2014    Procedure: COMBINED ESOPHAGOSCOPY, GASTROSCOPY, DUODENOSCOPY (EGD), BIOPSY SINGLE OR MULTIPLE;;  Surgeon: Duane, William Charles, MD;  Location:  OR    EXCISE MASS FOOT Right 9/30/2024    Procedure: Soft tissue mass removal right foot;  Surgeon: Vashti Ayala DPM, Podiatry/Foot and Ankle Surgery;  Location:  OR     FUSION CERVICAL POSTERIOR ONE LEVEL  2013    Procedure: FUSION CERVICAL POSTERIOR ONE LEVEL;  Cervical 1-2 Posterior Cervical Fusion (Harms Procedure);  Surgeon: Carrol Gunn MD;  [facility]    GYN SURGERY      INJECT MAJOR JOINT / BURSA Left 2017    Procedure: INJECT MAJOR JOINT / BURSA;  Surgeon: Fredy Patel DO;  [facility]    INJECT STEROID (LOCATION) Left 2015    Procedure: INJECT STEROID (LOCATION);  Surgeon: Carrol Gaspar MD;  [facility]    NECK SURGERY      REMOVE FOREIGN BODY UPPER EXTREMITY Right 3/2/2017    Procedure: REMOVE FOREIGN BODY UPPER EXTREMITY;  Surgeon: Carrol Gaspar MD;  [facility]    REMOVE HARDWARE FOOT Left 2022    Procedure: REMOVAL, HARDWARE, FOOT LEFT;  Surgeon: Chris Hendricks MD;  [facility]    RESECT BONE UPPER EXTREMITY Left 2017    Procedure: RESECT BONE UPPER EXTREMITY;  Left Radial Head Resection;  Surgeon: Carrol Gaspar MD;  [facility]    ROTATOR CUFF REPAIR RT/LT  10/19/2005    1.left distal clavicle excision.2.acromioplasty.3.coracoacromial ligament resection.4.rotator cuff exploration    [facility] ANESTH,DX ARTHROSCOPIC PROC KNEE JOINT  10/24/2007    right total knee arthroplasty    [facility] SHOULDER SURG PROC UNLISTED      [facility] TOTAL KNEE ARTHROPLASTY  12    Left       Prior to Admission Medications   Prior to Admission Medications   Prescriptions Last Dose Informant Patient Reported? Taking?   ENBREL SURECLICK 50 MG/ML autoinjector 2025 Evening  Yes Yes   Si mg twice a week. Hold before surgery on 24   OnabotulinumtoxinA (BOTOX IJ)   Yes No   Sig: Inject 200 Units as directed Total dose 200 units   Administered 190 units   Unavoidable waste 30 units   Lot # /C3 with Expiration Date:  2020   NDC [address]-55   SENEXON-S 8.6-50 MG tablet   No No   Sig: TAKE 1-2 TABLETS BY MOUTH 2 TIMES DAILY FOR CONSTIPATION.   UBRELVY 50 MG tablet   Yes No    benzonatate (TESSALON) 200 MG capsule   No Yes   Sig: Take 1 capsule (200 mg) by mouth 3 times daily as needed for cough.   cefdinir (OMNICEF) 300 MG capsule   No Yes   Sig: Take 1 capsule (300 mg) by mouth 2 times daily for 10 days.   celecoxib (CELEBREX) 200 MG capsule   Yes Yes   Sig: Take 200 mg by mouth 3 times daily   clonazePAM (KLONOPIN) 1 MG tablet   Yes Yes   Sig: Take 1 mg by mouth daily.   omeprazole (PRILOSEC) 40 MG DR capsule   No Yes   Sig: TAKE 1 CAPSULE BY MOUTH TWICE A DAY BEFORE MEALS STRENGTH: 40 MG   ondansetron (ZOFRAN ODT) 4 MG ODT tab   No No   Sig: Take 1 tablet (4 mg) by mouth every 8 hours as needed for nausea or vomiting   ondansetron (ZOFRAN ODT) 4 MG ODT tab   No No   Sig: Take 1 tablet (4 mg) by mouth every 8 hours as needed for nausea.   ondansetron (ZOFRAN-ODT) 4 MG ODT tab   No No   Sig: Take 1 tablet (4 mg) by mouth every 8 hours as needed for nausea   oxyCODONE (ROXICODONE) 5 MG tablet   No No   Sig: Take 1-2 tablets (5-10 mg) by mouth every 4 hours as needed for moderate to severe pain.   senna-docusate (SENOKOT-S/PERICOLACE) 8.6-50 MG tablet   No No   Sig: Take 1-2 tablets by mouth 2 times daily.      Facility-Administered Medications Last Administration Doses Remaining   botulinum toxin type A (BOTOX) 100 units injection 200 Units None recorded            Review of Systems    The 10 point Review of Systems is negative other than noted in the HPI or here.    Social History   I have reviewed this patient's social history and updated it with pertinent information if needed.  Social History     Tobacco Use    Smoking status: Former     Current packs/day: 0.10     Average packs/day: 0.1 packs/day for 41.5 years (4.2 ttl pk-yrs)     Types: Cigarettes     Start date: 8/6/1983     Passive exposure: Past    Smokeless tobacco: Never    Tobacco comments:     Quit 6 weeks ago   Vaping Use    Vaping status: Never Used   Substance Use Topics    Alcohol use: No    Drug use: No          Family History   I have reviewed this patient's family history and updated it with pertinent information if needed.  Family History   Problem Relation Age of Onset    Diabetes Mother     Hypertension Mother     Allergies Mother     Alcohol/Drug Mother         LIVER CIRROSIS    Blood Disease Mother         B12 DEF    Neurologic Disorder Mother         Epilepsy    Osteoporosis Mother     Cerebrovascular Disease Maternal Grandmother     Arthritis Maternal Grandmother         RHEUMATOID    Cancer Maternal Grandmother     Thyroid Disease Maternal Grandmother     Osteoporosis Maternal Grandmother     Heart Disease Maternal Grandmother     Depression Maternal Grandmother     Neurologic Disorder Maternal Grandmother         MIGRAINES    Anxiety Disorder Maternal Grandmother     Diabetes Maternal Grandmother     Migraines Maternal Grandmother     Deep Vein Thrombosis Maternal Grandmother     Cerebrovascular Disease Maternal Grandfather     Cancer Maternal Grandfather     Mental Illness Maternal Grandfather     Cerebrovascular Disease Paternal Grandmother     Arthritis Paternal Grandmother         RHEUMATOID    Cancer Paternal Grandmother     Osteoporosis Paternal Grandmother     Heart Disease Paternal Grandmother     Depression Paternal Grandmother     Neurologic Disorder Paternal Grandmother         MIGRAINES    Thyroid Disease Paternal Grandmother     Cerebrovascular Disease Paternal Grandfather     Cancer Paternal Grandfather     Heart Disease Brother         MURMUR    Asthma Son     Endocrine Disease Son     Neurologic Disorder Father         epilepsy    Seizure Disorder Father     Hypertension Father     Skin Cancer Father     Anxiety Disorder Father     Mental Illness Father     Hyperlipidemia Father     Kidney Disease Father     Bladder Cancer Father     Neurologic Disorder Daughter         MIGRAINES    Arthritis Daughter         JR    Hypertension Son     LUNG DISEASE Brother     Cancer Brother         lung     Anxiety Disorder Son     Asthma Son     Hypertension Son     Migraines Son     Spine Problems Son     Mental Illness Brother     Migraines Brother     Cardiac Sudden Death Brother     Spine Problems Brother     Glaucoma No family hx of     Macular Degeneration No family hx of     Unknown/Adopted No family hx of     Anesthesia Reaction No family hx of     Other Cancer No family hx of     Rheumatoid Arthritis No family hx of     Bone Cancer No family hx of     Low Back Problems No family hx of          Allergies   Allergies   Allergen Reactions    Leflunomide Anaphylaxis    Morphine Swelling    Celecoxib Other (See Comments)     Other reaction(s): stroke , lasted 24 hours  -Pt takes celecoxib daily. States that they changed the  and now it is fine.    Amitriptyline Other (See Comments)     Sleepwalking     Codeine     Gabapentin Other (See Comments)     Pins,needles, stabbing aching at once  Pins,needles, stabbing aching at once  Numbness and Tingling    Ibuprofen      Doesn't take due to gastric ulcer    Sulfa Antibiotics Nausea and Vomiting     unknown    Tylenol [Acetaminophen]      Pt. States that she has Liver problems  Tylenol 3    Dexamethasone Palpitations     Heart racing, sweating     Erythromycin Nausea     Vomiting     Penicillins Rash    Prednisone Palpitations     Heart racing        Physical Exam   Vital Signs: Temp: 99.4  F (37.4  C) Temp src: Oral BP: 114/80 Pulse: 74   Resp: 16 SpO2: 99 % O2 Device: None (Room air)    Weight: 180 lbs 0 oz    General Appearance: Awake, Alert, rapid and tangential speech. A&Ox3  Respiratory: Inspiratory rales at the RLL bases. No wheezing, rhonchi.  Cardiovascular: Normal S1, S2.  GI: Abd soft, NBS, Positive RUQ pain positive almanza's sign, no rebound. No rigidity  Skin: No jaundice, scleral icterus, no rashes, no bruising  Other: Motor and sensory intact     Medical Decision Making       85 MINUTES SPENT BY ME on the date of service doing chart review,  history, exam, documentation & further activities per the note.      Data     I have personally reviewed the following data over the past 24 hrs:    3.1 (L)  \   13.6   / 164     137 102 15.6 /  114 (H)   4.2 24 1.19 (H) \     ALT: 653 (HH) AST: 1,048 (HH) AP: 442 (H) TBILI: 0.8   ALB: 4.0 TOT PROTEIN: 8.6 (H) LIPASE: 26     Trop: 7 BNP: N/A     Procal: N/A CRP: N/A Lactic Acid: 0.9         Imaging results reviewed over the past 24 hrs:   Recent Results (from the past 24 hours)   CT Chest/Abdomen/Pelvis w Contrast    Narrative    EXAM: CT CHEST/ABDOMEN/PELVIS W CONTRAST  LOCATION: [facility]  DATE: 2/17/2025    INDICATION: known pneumonia x 2 days.  R abdo pain tender w diarrhea  COMPARISON: Two-view chest radiograph 02/11/2025.  TECHNIQUE: CT scan of the chest, abdomen, and pelvis was performed following injection of IV contrast. Multiplanar reformats were obtained. Dose reduction techniques were used.   CONTRAST: 89 mLs Isovue 370    FINDINGS:   LUNGS AND PLEURA: Tree-in-bud nodular opacities of the upper posterior aspect of the right lower lobe, suggestive of mild infectious infiltrate. No pleural effusion.    MEDIASTINUM/AXILLAE: No lymphadenopathy or pericardial effusion. Moderate-sized sliding-type hiatal hernia.    CORONARY ARTERY CALCIFICATION: None.    HEPATOBILIARY: Hepatic steatosis. Cholecystectomy.    PANCREAS: Normal.    SPLEEN: Normal.    ADRENAL GLANDS: Normal.    KIDNEYS/BLADDER: Normal.    BOWEL: Diffuse colonic thickening with intraluminal fluid and liquid stool within the colon. No bowel obstruction. Normal appendix.    LYMPH NODES: Mildly prominent retroperitoneal lymph nodes, nonspecific but may be reactive.    VASCULATURE: Normal.    PELVIC ORGANS: Normal.    MUSCULOSKELETAL: Small to moderate-sized fat-containing umbilical hernia. Left shoulder arthroplasty. Severe degenerative changes of the right shoulder. Multilevel degenerative changes of the  cervicothoracic and lumbosacral spine. No acute osseous   abnormality.      Impression    IMPRESSION:  1.  Diffuse colonic thickening with intraluminal fluid and liquid stool within the colon, findings suggestive of colitis. Consider C. difficile.  2.  Hepatic steatosis.  3.  Tree-in-bud nodular opacities of the upper posterior aspect of the right lower lobe, suggestive of mild infectious infiltrate. No pleural effusion.  4.  Moderate sized sliding-type hiatal hernia.

## 2025-02-17 NOTE — ED PROVIDER NOTES
ED Provider Note  [facility]      History     Chief Complaint   Patient presents with    Nausea, Vomiting, & Diarrhea     Since yesterday.    Abdominal Pain     Right upper abdominal pain and right flank pain.     HPI  Kalyn Rivero is a 54 year old female who has history of rheumatoid arthritis, who presents with right upper abdominal pain/flank pain and watery diarrhea after being treated with cefdinir for pneumonia 2 to 3 days ago.  Patient reports fever chills and hot cold sensation as well as vomiting  Distant history of cholecystectomy.  History of kidney stones          Physical Exam   BP: 114/80  Pulse: 74  Temp: 99.4  F (37.4  C)  Resp: 16  Weight: 81.6 kg (180 lb)  SpO2: 99 %  Physical Exam  Breathing comfortably   Coughing occasionally.  Appears in discomfort  Lungs clear without wheezes.    Abdomen tender in right upper quadrant      ED Course, Procedures, & Data      Procedures           IV Antibiotics given and/or elevated Lactate of 0.9 and no sepsis note found - Delete this reminder and enter the sepsis note or '.edcms' before signing chart.>>>     Results for orders placed or performed during the hospital encounter of 02/17/25   CT Chest/Abdomen/Pelvis w Contrast     Status: None    Narrative    EXAM: CT CHEST/ABDOMEN/PELVIS W CONTRAST  LOCATION: [facility]  DATE: 2/17/2025    INDICATION: known pneumonia x 2 days.  R abdo pain tender w diarrhea  COMPARISON: Two-view chest radiograph 02/11/2025.  TECHNIQUE: CT scan of the chest, abdomen, and pelvis was performed following injection of IV contrast. Multiplanar reformats were obtained. Dose reduction techniques were used.   CONTRAST: 89 mLs Isovue 370    FINDINGS:   LUNGS AND PLEURA: Tree-in-bud nodular opacities of the upper posterior aspect of the right lower lobe, suggestive of mild infectious infiltrate. No pleural effusion.    MEDIASTINUM/AXILLAE: No lymphadenopathy or  pericardial effusion. Moderate-sized sliding-type hiatal hernia.    CORONARY ARTERY CALCIFICATION: None.    HEPATOBILIARY: Hepatic steatosis. Cholecystectomy.    PANCREAS: Normal.    SPLEEN: Normal.    ADRENAL GLANDS: Normal.    KIDNEYS/BLADDER: Normal.    BOWEL: Diffuse colonic thickening with intraluminal fluid and liquid stool within the colon. No bowel obstruction. Normal appendix.    LYMPH NODES: Mildly prominent retroperitoneal lymph nodes, nonspecific but may be reactive.    VASCULATURE: Normal.    PELVIC ORGANS: Normal.    MUSCULOSKELETAL: Small to moderate-sized fat-containing umbilical hernia. Left shoulder arthroplasty. Severe degenerative changes of the right shoulder. Multilevel degenerative changes of the cervicothoracic and lumbosacral spine. No acute osseous   abnormality.      Impression    IMPRESSION:  1.  Diffuse colonic thickening with intraluminal fluid and liquid stool within the colon, findings suggestive of colitis. Consider C. difficile.  2.  Hepatic steatosis.  3.  Tree-in-bud nodular opacities of the upper posterior aspect of the right lower lobe, suggestive of mild infectious infiltrate. No pleural effusion.  4.  Moderate sized sliding-type hiatal hernia.   Comprehensive metabolic panel     Status: Abnormal   Result Value Ref Range    Sodium 137 135 - 145 mmol/L    Potassium 4.2 3.4 - 5.3 mmol/L    Carbon Dioxide (CO2) 24 22 - 29 mmol/L    Anion Gap 11 7 - 15 mmol/L    Urea Nitrogen 15.6 6.0 - 20.0 mg/dL    Creatinine 1.19 (H) 0.51 - 0.95 mg/dL    GFR Estimate 54 (L) >60 mL/min/1.73m2    Calcium 9.4 8.8 - 10.4 mg/dL    Chloride 102 98 - 107 mmol/L    Glucose 114 (H) 70 - 99 mg/dL    Alkaline Phosphatase 442 (H) 40 - 150 U/L    AST 1,048 (HH) 0 - 45 U/L     (HH) 0 - 50 U/L    Protein Total 8.6 (H) 6.4 - 8.3 g/dL    Albumin 4.0 3.5 - 5.2 g/dL    Bilirubin Total 0.8 <=1.2 mg/dL   Lipase     Status: Normal   Result Value Ref Range    Lipase 26 13 - 60 U/L   Lactic acid whole blood with  1x repeat in 2 hr when >2     Status: Normal   Result Value Ref Range    Lactic Acid, Initial 0.9 0.7 - 2.0 mmol/L   Troponin T, High Sensitivity     Status: Normal   Result Value Ref Range    Troponin T, High Sensitivity 9 <=14 ng/L   Magnesium     Status: Abnormal   Result Value Ref Range    Magnesium 1.6 (L) 1.7 - 2.3 mg/dL   UA with Microscopic reflex to Culture     Status: Abnormal    Specimen: Urine, Midstream   Result Value Ref Range    Color Urine Yellow Colorless, Straw, Light Yellow, Yellow    Appearance Urine Slightly Cloudy (A) Clear    Glucose Urine Negative Negative mg/dL    Bilirubin Urine Negative Negative    Ketones Urine Negative Negative mg/dL    Specific Gravity Urine 1.030 1.003 - 1.035    Blood Urine Negative Negative    pH Urine 6.0 5.0 - 7.0    Protein Albumin Urine 10 (A) Negative mg/dL    Urobilinogen Urine Normal Normal, 2.0 mg/dL    Nitrite Urine Negative Negative    Leukocyte Esterase Urine Large (A) Negative    Bacteria Urine Few (A) None Seen /HPF    Mucus Urine Present (A) None Seen /LPF    RBC Urine 0 <=2 /HPF    WBC Urine 24 (H) <=5 /HPF    Squamous Epithelials Urine 8 (H) <=1 /HPF    Hyaline Casts Urine 1 <=2 /LPF    Narrative    Urine Culture ordered based on laboratory criteria   Influenza A/B, RSV and SARS-CoV2 PCR (COVID-19) Nasopharyngeal     Status: Normal    Specimen: Nasopharyngeal; Swab   Result Value Ref Range    Influenza A PCR Negative Negative    Influenza B PCR Negative Negative    RSV PCR Negative Negative    SARS CoV2 PCR Negative Negative    Narrative    Testing was performed using the Xpert Xpress CoV2/Flu/RSV Assay on the [employer] GeneXpert Instrument. This test should be ordered for the detection of SARS-CoV2, influenza, and RSV viruses in individuals with signs and symptoms of respiratory tract infection. This test is for in vitro diagnostic use under the [place] FDA for laboratories certified under CLIA to perform high or moderate complexity testing. This test has  been [place] FDA cleared. A negative result does not rule out the presence of PCR inhibitors in the specimen or target RNA in concentration below the limit of detection for the assay. If only one viral target is positive but coinfection with multiple targets is suspected, the sample should be re-tested with another FDA cleared, approved, or authorized test, if coninfection would change clinical management. This test was validated by the [facility]. These laboratories are certified under the Clinical Laboratory Improvement Amendments of 1988 (CLIA-88) as qualified to perfom high complexity laboratory testing.   CBC with platelets and differential     Status: Abnormal   Result Value Ref Range    WBC Count 3.1 (L) 4.0 - 11.0 10e3/uL    RBC Count 4.50 3.80 - 5.20 10e6/uL    Hemoglobin 13.6 11.7 - 15.7 g/dL    Hematocrit 40.5 35.0 - 47.0 %    MCV 90 78 - 100 fL    MCH 30.2 26.5 - 33.0 pg    MCHC 33.6 31.5 - 36.5 g/dL    RDW 13.7 10.0 - 15.0 %    Platelet Count 164 150 - 450 10e3/uL    % Neutrophils 36 %    % Lymphocytes 45 %    % Monocytes 15 %    % Eosinophils 3 %    % Basophils 1 %    % Immature Granulocytes 0 %    NRBCs per 100 WBC 0 <1 /100    Absolute Neutrophils 1.1 (L) 1.6 - 8.3 10e3/uL    Absolute Lymphocytes 1.4 0.8 - 5.3 10e3/uL    Absolute Monocytes 0.5 0.0 - 1.3 10e3/uL    Absolute Eosinophils 0.1 0.0 - 0.7 10e3/uL    Absolute Basophils 0.0 0.0 - 0.2 10e3/uL    Absolute Immature Granulocytes 0.0 <=0.4 10e3/uL    Absolute NRBCs 0.0 10e3/uL   CBC with platelets differential     Status: Abnormal    Narrative    The following orders were created for panel order CBC with platelets differential.  Procedure                               Abnormality         Status                     ---------                               -----------         ------                     CBC with platelets and d...[[id number]]  Abnormal            Final result                 Please view results for these tests on the  individual orders.     Medications   magnesium sulfate 2 g in 50 mL sterile water intermittent infusion (2 g Intravenous $New Bag 2/17/25 7033)   ondansetron (ZOFRAN) injection 4 mg (has no administration in time range)   HYDROmorphone (PF) (DILAUDID) injection 0.5 mg (has no administration in time range)   azithromycin (ZITHROMAX) 500 mg in  mL intermittent infusion (has no administration in time range)   sodium chloride 0.9% BOLUS 1,000 mL (1,000 mLs Intravenous $New Bag 2/17/25 0337)   HYDROmorphone (DILAUDID) injection 1 mg (1 mg Intravenous $Given 2/17/25 0338)   metoclopramide (REGLAN) injection 10 mg (10 mg Intravenous $Given 2/17/25 0340)   ondansetron (ZOFRAN) injection 4 mg (4 mg Intravenous $Given 2/17/25 0343)   iopamidol (ISOVUE-370) solution 500 mL (89 mLs Intravenous $Given 2/17/25 0432)   Sodium Chloride 0.9 % bag 100mL for CT scan (62 mLs Intravenous $Given 2/17/25 0432)     Labs Ordered and Resulted from Time of ED Arrival to Time of ED Departure   COMPREHENSIVE METABOLIC PANEL - Abnormal       Result Value    Sodium 137      Potassium 4.2      Carbon Dioxide (CO2) 24      Anion Gap 11      Urea Nitrogen 15.6      Creatinine 1.19 (*)     GFR Estimate 54 (*)     Calcium 9.4      Chloride 102      Glucose 114 (*)     Alkaline Phosphatase 442 (*)     AST 1,048 (*)      (*)     Protein Total 8.6 (*)     Albumin 4.0      Bilirubin Total 0.8     MAGNESIUM - Abnormal    Magnesium 1.6 (*)    ROUTINE UA WITH MICROSCOPIC REFLEX TO CULTURE - Abnormal    Color Urine Yellow      Appearance Urine Slightly Cloudy (*)     Glucose Urine Negative      Bilirubin Urine Negative      Ketones Urine Negative      Specific Gravity Urine 1.030      Blood Urine Negative      pH Urine 6.0      Protein Albumin Urine 10 (*)     Urobilinogen Urine Normal      Nitrite Urine Negative      Leukocyte Esterase Urine Large (*)     Bacteria Urine Few (*)     Mucus Urine Present (*)     RBC Urine 0      WBC Urine 24 (*)      Squamous Epithelials Urine 8 (*)     Hyaline Casts Urine 1     CBC WITH PLATELETS AND DIFFERENTIAL - Abnormal    WBC Count 3.1 (*)     RBC Count 4.50      Hemoglobin 13.6      Hematocrit 40.5      MCV 90      MCH 30.2      MCHC 33.6      RDW 13.7      Platelet Count 164      % Neutrophils 36      % Lymphocytes 45      % Monocytes 15      % Eosinophils 3      % Basophils 1      % Immature Granulocytes 0      NRBCs per 100 WBC 0      Absolute Neutrophils 1.1 (*)     Absolute Lymphocytes 1.4      Absolute Monocytes 0.5      Absolute Eosinophils 0.1      Absolute Basophils 0.0      Absolute Immature Granulocytes 0.0      Absolute NRBCs 0.0     LIPASE - Normal    Lipase 26     LACTIC ACID WHOLE BLOOD WITH 1X REPEAT IN 2 HR WHEN >2 - Normal    Lactic Acid, Initial 0.9     TROPONIN T, HIGH SENSITIVITY - Normal    Troponin T, High Sensitivity 9     INFLUENZA A/B, RSV AND SARS-COV2 PCR - Normal    Influenza A PCR Negative      Influenza B PCR Negative      RSV PCR Negative      SARS CoV2 PCR Negative     TROPONIN T, HIGH SENSITIVITY   URINE CULTURE   C. DIFFICILE TOXIN B PCR WITH REFLEX TO C. DIFFICILE EIA   ENTERIC BACTERIA AND VIRUS PANEL BY PCR     CT Chest/Abdomen/Pelvis w Contrast   Final Result   IMPRESSION:   1.  Diffuse colonic thickening with intraluminal fluid and liquid stool within the colon, findings suggestive of colitis. Consider C. difficile.   2.  Hepatic steatosis.   3.  Tree-in-bud nodular opacities of the upper posterior aspect of the right lower lobe, suggestive of mild infectious infiltrate. No pleural effusion.   4.  Moderate sized sliding-type hiatal hernia.             Critical care was not performed.     Medical Decision Making  The patient's presentation was of high complexity (an acute health issue posing potential threat to life or bodily function).    The patient's evaluation involved:  review of 1 test result(s) ordered prior to this encounter (cxr)  ordering and/or review of 2 test(s) in  this encounter (see separate area of note for details)    The patient's management necessitated high risk (a parenteral controlled substance).    Assessment & Plan    Right sided abdominal pain in the setting of antibiotics.    Nausea vomiting diarrhea.   Differential diagnoses include: Worsening pneumonia or abdominal infection such as appendicitis/colitis.  Rule out bowel obstruction, organ failure, electrolyte abnormality, kidney stone/renal colic.    -IV Dilaudid, Reglan, morphine, IV fluids, CT chest abdomen pelvis, urinalysis, reevaluate after initial workup.  If all negative will consider opiate use disorder as a driving cause or antibiotic related symptoms    530a -CT abdomen pelvis confirm cholecystectomy and show concerning colitis which may be C. Difficile, in the setting of timing after oral antibiotics.  Also confirm pneumonia.  Viral swab negative, so will treat with azithromycin IV, given that patient has been on cefdinir.  Will pursue diarrheal testing for C. difficile and enteric bacteria.  Mild hypomagnesemia repleted IV and will admit to medicine    I have reviewed the nursing notes. I have reviewed the findings, diagnosis, plan and need for follow up with the patient.    New Prescriptions    No medications on file       Final diagnoses:   Rheumatoid arthritis, involving unspecified site, unspecified whether rheumatoid factor present (H)   Right sided abdominal pain   Pneumonia due to infectious organism, unspecified laterality, unspecified part of lung   Chills   Colitis   Elevated liver enzymes       Yunior Quinn MD  [facility] EMERGENCY DEPARTMENT  2/17/2025     Yunior Quinn MD  02/17/25 5509       Yunior Quinn MD  02/17/25 [id number]

## 2025-02-18 LAB
ALBUMIN SERPL BCG-MCNC: 3 G/DL (ref 3.5–5.2)
ALBUMIN SERPL BCG-MCNC: 3 G/DL (ref 3.5–5.2)
ALP SERPL-CCNC: 240 U/L (ref 40–150)
ALP SERPL-CCNC: 288 U/L (ref 40–150)
ALT SERPL W P-5'-P-CCNC: 296 U/L (ref 0–50)
ALT SERPL W P-5'-P-CCNC: 437 U/L (ref 0–50)
ANA SER QL IF: NEGATIVE
ANION GAP SERPL CALCULATED.3IONS-SCNC: 11 MMOL/L (ref 7–15)
ANION GAP SERPL CALCULATED.3IONS-SCNC: 9 MMOL/L (ref 7–15)
AST SERPL W P-5'-P-CCNC: 182 U/L (ref 0–45)
AST SERPL W P-5'-P-CCNC: 378 U/L (ref 0–45)
BACTERIA UR CULT: NORMAL
BASOPHILS # BLD AUTO: 0.1 10E3/UL (ref 0–0.2)
BASOPHILS NFR BLD AUTO: 1 %
BILIRUB DIRECT SERPL-MCNC: <0.2 MG/DL (ref 0–0.3)
BILIRUB SERPL-MCNC: 0.2 MG/DL
BILIRUB SERPL-MCNC: 0.2 MG/DL
BUN SERPL-MCNC: 12.1 MG/DL (ref 6–20)
BUN SERPL-MCNC: 12.6 MG/DL (ref 6–20)
CALCIUM SERPL-MCNC: 8.2 MG/DL (ref 8.8–10.4)
CALCIUM SERPL-MCNC: 8.3 MG/DL (ref 8.8–10.4)
CHLORIDE SERPL-SCNC: 110 MMOL/L (ref 98–107)
CHLORIDE SERPL-SCNC: 115 MMOL/L (ref 98–107)
CMV DNA SPEC NAA+PROBE-ACNC: NOT DETECTED IU/ML
CREAT SERPL-MCNC: 1.04 MG/DL (ref 0.51–0.95)
CREAT SERPL-MCNC: 1.06 MG/DL (ref 0.51–0.95)
EBV DNA SERPL NAA+PROBE-ACNC: NOT DETECTED IU/ML
EGFRCR SERPLBLD CKD-EPI 2021: 63 ML/MIN/1.73M2
EGFRCR SERPLBLD CKD-EPI 2021: 64 ML/MIN/1.73M2
EOSINOPHIL # BLD AUTO: 0.5 10E3/UL (ref 0–0.7)
EOSINOPHIL NFR BLD AUTO: 8 %
ERYTHROCYTE [DISTWIDTH] IN BLOOD BY AUTOMATED COUNT: 14.1 % (ref 10–15)
GLUCOSE SERPL-MCNC: 114 MG/DL (ref 70–99)
GLUCOSE SERPL-MCNC: 97 MG/DL (ref 70–99)
HCO3 SERPL-SCNC: 19 MMOL/L (ref 22–29)
HCO3 SERPL-SCNC: 21 MMOL/L (ref 22–29)
HCT VFR BLD AUTO: 34.9 % (ref 35–47)
HCV RNA SERPL NAA+PROBE-ACNC: NOT DETECTED IU/ML
HCV RNA SERPL NAA+PROBE-ACNC: NOT DETECTED IU/ML
HGB BLD-MCNC: 11.3 G/DL (ref 11.7–15.7)
IMM GRANULOCYTES # BLD: 0 10E3/UL
IMM GRANULOCYTES NFR BLD: 0 %
INR PPP: 1.14 (ref 0.85–1.15)
LYMPHOCYTES # BLD AUTO: 1.9 10E3/UL (ref 0.8–5.3)
LYMPHOCYTES NFR BLD AUTO: 34 %
MCH RBC QN AUTO: 29.6 PG (ref 26.5–33)
MCHC RBC AUTO-ENTMCNC: 32.4 G/DL (ref 31.5–36.5)
MCV RBC AUTO: 91 FL (ref 78–100)
MONOCYTES # BLD AUTO: 1 10E3/UL (ref 0–1.3)
MONOCYTES NFR BLD AUTO: 18 %
NEUTROPHILS # BLD AUTO: 2.1 10E3/UL (ref 1.6–8.3)
NEUTROPHILS NFR BLD AUTO: 38 %
NRBC # BLD AUTO: 0 10E3/UL
NRBC BLD AUTO-RTO: 0 /100
PLATELET # BLD AUTO: 167 10E3/UL (ref 150–450)
POTASSIUM SERPL-SCNC: 3.8 MMOL/L (ref 3.4–5.3)
POTASSIUM SERPL-SCNC: 4.2 MMOL/L (ref 3.4–5.3)
PROT SERPL-MCNC: 6.4 G/DL (ref 6.4–8.3)
PROT SERPL-MCNC: 6.6 G/DL (ref 6.4–8.3)
RBC # BLD AUTO: 3.82 10E6/UL (ref 3.8–5.2)
SMA IGG SER-ACNC: 13 UNITS
SODIUM SERPL-SCNC: 140 MMOL/L (ref 135–145)
SODIUM SERPL-SCNC: 145 MMOL/L (ref 135–145)
SPECIMEN TYPE: NORMAL
WBC # BLD AUTO: 5.5 10E3/UL (ref 4–11)

## 2025-02-18 PROCEDURE — 258N000003 HC RX IP 258 OP 636: Performed by: STUDENT IN AN ORGANIZED HEALTH CARE EDUCATION/TRAINING PROGRAM

## 2025-02-18 PROCEDURE — 250N000011 HC RX IP 250 OP 636: Performed by: STUDENT IN AN ORGANIZED HEALTH CARE EDUCATION/TRAINING PROGRAM

## 2025-02-18 PROCEDURE — 85014 HEMATOCRIT: CPT | Performed by: STUDENT IN AN ORGANIZED HEALTH CARE EDUCATION/TRAINING PROGRAM

## 2025-02-18 PROCEDURE — 85610 PROTHROMBIN TIME: CPT | Performed by: INTERNAL MEDICINE

## 2025-02-18 PROCEDURE — G0545 PR INHRENT VISIT TO INPT/OBS W CNFRM/SUSPCT INFCT DIS BY INFCT DIS SPCIALST: HCPCS | Performed by: INTERNAL MEDICINE

## 2025-02-18 PROCEDURE — 99207 PR SC NO CHARGE VISIT/PATIENT NOT SEEN: CPT | Performed by: NURSE PRACTITIONER

## 2025-02-18 PROCEDURE — 250N000013 HC RX MED GY IP 250 OP 250 PS 637: Performed by: STUDENT IN AN ORGANIZED HEALTH CARE EDUCATION/TRAINING PROGRAM

## 2025-02-18 PROCEDURE — 120N000002 HC R&B MED SURG/OB UMMC

## 2025-02-18 PROCEDURE — 99233 SBSQ HOSP IP/OBS HIGH 50: CPT | Performed by: INTERNAL MEDICINE

## 2025-02-18 PROCEDURE — 85004 AUTOMATED DIFF WBC COUNT: CPT | Performed by: STUDENT IN AN ORGANIZED HEALTH CARE EDUCATION/TRAINING PROGRAM

## 2025-02-18 PROCEDURE — 82248 BILIRUBIN DIRECT: CPT | Performed by: STUDENT IN AN ORGANIZED HEALTH CARE EDUCATION/TRAINING PROGRAM

## 2025-02-18 PROCEDURE — 36415 COLL VENOUS BLD VENIPUNCTURE: CPT | Performed by: STUDENT IN AN ORGANIZED HEALTH CARE EDUCATION/TRAINING PROGRAM

## 2025-02-18 PROCEDURE — 82040 ASSAY OF SERUM ALBUMIN: CPT | Performed by: STUDENT IN AN ORGANIZED HEALTH CARE EDUCATION/TRAINING PROGRAM

## 2025-02-18 PROCEDURE — 99254 IP/OBS CNSLTJ NEW/EST MOD 60: CPT | Performed by: INTERNAL MEDICINE

## 2025-02-18 PROCEDURE — 82565 ASSAY OF CREATININE: CPT | Performed by: STUDENT IN AN ORGANIZED HEALTH CARE EDUCATION/TRAINING PROGRAM

## 2025-02-18 RX ADMIN — ENOXAPARIN SODIUM 40 MG: 40 INJECTION SUBCUTANEOUS at 13:19

## 2025-02-18 RX ADMIN — Medication 2.5 MG: at 19:40

## 2025-02-18 RX ADMIN — SODIUM CHLORIDE: 9 INJECTION, SOLUTION INTRAVENOUS at 03:24

## 2025-02-18 RX ADMIN — Medication 2.5 MG: at 09:00

## 2025-02-18 RX ADMIN — LIDOCAINE 4% 1 PATCH: 40 PATCH TOPICAL at 19:41

## 2025-02-18 RX ADMIN — PANTOPRAZOLE SODIUM 40 MG: 40 TABLET, DELAYED RELEASE ORAL at 09:00

## 2025-02-18 RX ADMIN — AZITHROMYCIN DIHYDRATE 250 MG: 250 TABLET ORAL at 09:01

## 2025-02-18 RX ADMIN — CELECOXIB 200 MG: 200 CAPSULE ORAL at 14:51

## 2025-02-18 RX ADMIN — SODIUM CHLORIDE: 9 INJECTION, SOLUTION INTRAVENOUS at 11:13

## 2025-02-18 RX ADMIN — CLONAZEPAM 1 MG: 1 TABLET ORAL at 19:40

## 2025-02-18 RX ADMIN — Medication 2.5 MG: at 13:19

## 2025-02-18 RX ADMIN — FIDAXOMICIN 200 MG: 200 TABLET, FILM COATED ORAL at 19:40

## 2025-02-18 RX ADMIN — FIDAXOMICIN 200 MG: 200 TABLET, FILM COATED ORAL at 09:00

## 2025-02-18 RX ADMIN — ONDANSETRON 4 MG: 2 INJECTION INTRAMUSCULAR; INTRAVENOUS at 03:10

## 2025-02-18 RX ADMIN — Medication 2.5 MG: at 03:24

## 2025-02-18 RX ADMIN — VANCOMYCIN HYDROCHLORIDE 125 MG: 125 CAPSULE ORAL at 13:19

## 2025-02-18 RX ADMIN — CLONAZEPAM 1 MG: 1 TABLET ORAL at 13:19

## 2025-02-18 RX ADMIN — CELECOXIB 200 MG: 200 CAPSULE ORAL at 09:01

## 2025-02-18 RX ADMIN — CELECOXIB 200 MG: 200 CAPSULE ORAL at 19:40

## 2025-02-18 RX ADMIN — VANCOMYCIN HYDROCHLORIDE 125 MG: 125 CAPSULE ORAL at 09:00

## 2025-02-18 RX ADMIN — SODIUM CHLORIDE: 9 INJECTION, SOLUTION INTRAVENOUS at 18:58

## 2025-02-18 RX ADMIN — CLONAZEPAM 1 MG: 1 TABLET ORAL at 09:01

## 2025-02-18 ASSESSMENT — ACTIVITIES OF DAILY LIVING (ADL)
ADLS_ACUITY_SCORE: 49
ADLS_ACUITY_SCORE: 51
ADLS_ACUITY_SCORE: 49
ADLS_ACUITY_SCORE: 51
ADLS_ACUITY_SCORE: 49
ADLS_ACUITY_SCORE: 49
ADLS_ACUITY_SCORE: 51
ADLS_ACUITY_SCORE: 49
ADLS_ACUITY_SCORE: 51
ADLS_ACUITY_SCORE: 49
ADLS_ACUITY_SCORE: 51
ADLS_ACUITY_SCORE: 51
ADLS_ACUITY_SCORE: 49

## 2025-02-18 NOTE — CONSULTS
[facility] ID SERVICE: NEW CONSULTATION  Patient:  Kalyn Rivero, Date of birth 1970, Medical record number [id number]  Date of Admission: 2/17/2025  Date of Visit:  02/18/2025   Requesting Provider: Sulaiman Daly         Assessment and Recommendations:   Problem List:  C diff colitis   Positive testing and CT findings consistent with C diff colitis  Currently on fidaxomicin  Flank pain  Normal kidneys on CT. UCx with <10K mixed marni  Transaminitis - improving. Workup for etiology ongoing.   Recent cough and mild tree-in-bud opacity on CT. S/p 5 days cefdinir + 1 dose ceftriaxone and 2 doses azithromycin  S/p cholecystecomy    Recommendations:  Continue C diff treatment as recommended by hepatology  No current symptoms of pneumonia and completed 5-6 day course cefdinir/ceftriaxone. Stop ceftriaxone and azithromycin and monitor for any clinical evidence of pneumonia  No evidence of pyelonephritis on CT and urine culture not consistent with UTI. No treatment at this time. Monitor flank-area pain which may be from different etiology  Overall goal will be to limit antibiotics other than fidaxomicin to prevent worsening C diff  Continue to monitor hepatitis, now improving    Recommendations discussed with primary team.    Thank you for this consult. ID will continue to follow this patient. Please feel free to call with any questions.     Yuridia Campuzano MD  Infectious Diseases  Contact: Vocera or call [phone] and enter ID# [id number]    I spent 75 minutes as part of a visit on the date of the encounter doing chart review, history and exam, documentation, and care coordination. This encounter qualifies for code .         History of Present Illness:   Kalyn Rivero is a 55 yo woman with history of RA who presented to ED on 2/17 with abdominal pain and diarrhea x 3 days in the setting of recent course of cefdinir. She reports that she was seen 2/11 due to a nose bleed running down the back of her throat  "and causing hemoptysis, as well as due to right sided flank pain. UA showed 0-5 WBC and CXR was clear, but it seems she was given cefdinir just in case she had pneumonia or UTI. She  then developed abdominal pain and diarrhea and presented for further evaluation. LFTs had been normal on 2/11 but were markedly elevated 2/17. C diff toxin and antigen also positive. She is being evaluated by hepatology who also started her on fidaxomicin for C diff. She reports ongoing right sided abdominal pain and also right sided flank tenderness. Admission UA with 24 WBC, urine culture with <10K mixed urogenital marni.        Past Medical History:     Past Medical History:   Diagnosis Date    Acetaminophen overdose 03/24/2011    Anemia     Anesthesia complication     pt states has a history of heart stopping during anesthesia,    Arrhythmia     Cerebral artery occlusion with cerebral infarction (H)     Cerebral infarction (H)     Cervical high risk HPV (human papillomavirus) test positive 02/22/2017    type 18 & other HR HPV    Chronic infection     MRSA    Chronic pain 03/24/2011    Degenerative joint disease     Endometriosis     Fibromyalgia     Gastro-oesophageal reflux disease     Heart murmur     History of blood transfusion     Hypothyroidism     Immune disorder     Learning disability     Malignant neoplasm (H)     Migraine     MRSA (methicillin resistant staph aureus) culture positive     Neck injuries     Opioid type dependence (H)     Other chronic pain     PONV (postoperative nausea and vomiting)     states \"flatlines\"during anesthesia    RA (rheumatoid arthritis) (H)     Renal stone     Scoliosis     Stomach ulcer     history         Allergies:      Allergies   Allergen Reactions    Leflunomide Anaphylaxis    Morphine Swelling    Celecoxib Other (See Comments)     Other reaction(s): stroke , lasted 24 hours  -Pt takes celecoxib daily. States that they changed the  and now it is fine.    Amitriptyline Other " "(See Comments)     Sleepwalking     Codeine     Gabapentin Other (See Comments)     Pins,needles, stabbing aching at once  Pins,needles, stabbing aching at once  Numbness and Tingling    Ibuprofen      Doesn't take due to gastric ulcer    Sulfa Antibiotics Nausea and Vomiting     unknown    Tylenol [Acetaminophen]      Pt. States that she has Liver problems  Tylenol 3    Dexamethasone Palpitations     Heart racing, sweating     Erythromycin Nausea     Vomiting     Penicillins Rash    Prednisone Palpitations     Heart racing          Physical Exam:   /59   Pulse 89   Temp 98  F (36.7  C) (Oral)   Resp 18   Ht 1.702 m (5' 7\")   Wt 81.6 kg (180 lb)   LMP  (LMP Unknown)   SpO2 96%   BMI 28.19 kg/m     Exam:  GENERAL:  Well-developed, well-nourished, sitting in bed in no acute distress.   ENT:  Head is normocephalic, atraumatic. Oropharynx is moist without exudates or ulcers.  EYES:  Eyes have anicteric sclerae.    NECK:  Supple.  ABDOMEN:  Soft, mild tenderness at midline and right side  BACK:  Right sided flank pain with palpation  EXT: Extremities warm and without edema.  SKIN:  No acute rashes.  Line is in place without any surrounding erythema.  NEUROLOGIC:  Grossly nonfocal.         Laboratory Data:   AST 36 -->1048-->378  ALT 22-->653-->437  T bili normal    Creatinine   Date Value Ref Range Status   02/18/2025 1.06 (H) 0.51 - 0.95 mg/dL Final   02/17/2025 1.04 (H) 0.51 - 0.95 mg/dL Final   02/17/2025 1.19 (H) 0.51 - 0.95 mg/dL Final   02/11/2025 0.98 (H) 0.51 - 0.95 mg/dL Final   08/23/2023 0.93 0.51 - 0.95 mg/dL Final   07/05/2021 0.88 0.52 - 1.04 mg/dL Final   08/26/2020 0.810 0.500 - 1.300 mg/dL Final   03/11/2020 0.930 0.500 - 1.300 mg/dL Final   02/01/2020 0.85 0.52 - 1.04 mg/dL Final   01/28/2020 0.79 0.52 - 1.04 mg/dL Final     WBC   Date Value Ref Range Status   07/05/2021 6.8 4.0 - 11.0 10e9/L Final   02/01/2020 5.5 4.0 - 11.0 10e9/L Final   01/28/2020 6.3 4.0 - 11.0 10e9/L Final "   12/02/2018 4.6 4.0 - 11.0 10e9/L Final   11/29/2018 7.4 4.0 - 11.0 10e9/L Final     WBC Count   Date Value Ref Range Status   02/18/2025 5.5 4.0 - 11.0 10e3/uL Final   02/17/2025 3.1 (L) 4.0 - 11.0 10e3/uL Final   02/11/2025 5.4 4.0 - 11.0 10e3/uL Final   12/30/2024 6.5 4.0 - 11.0 10e3/uL Final   09/04/2024 7.1 4.0 - 11.0 10e3/uL Final     Hemoglobin   Date Value Ref Range Status   02/18/2025 11.3 (L) 11.7 - 15.7 g/dL Final   07/05/2021 12.9 11.7 - 15.7 g/dL Final     Platelet Count   Date Value Ref Range Status   02/18/2025 167 150 - 450 10e3/uL Final   07/05/2021 201 150 - 450 10e9/L Final     Lab Results   Component Value Date     02/18/2025    BUN 12.1 02/18/2025    CO2 21 (L) 02/18/2025     CRP Inflammation   Date Value Ref Range Status   07/05/2021 <2.9 0.0 - 8.0 mg/L Final   06/29/2017 3.1 0.0 - 8.0 mg/L Final   09/08/2014 8.1 (H) 0.0 - 8.0 mg/L Final   03/28/2014 69.9 (H) 0.0 - 8.0 mg/L Final   12/15/2013 70.2 (H) 0.0 - 8.0 mg/L Final           Pertinent Recent Microbiology Data:   Reviewed  HIV negative         Imaging:     Recent Results (from the past 48 hours)   CT Chest/Abdomen/Pelvis w Contrast    Narrative    EXAM: CT CHEST/ABDOMEN/PELVIS W CONTRAST  LOCATION: [facility]  DATE: 2/17/2025    INDICATION: known pneumonia x 2 days.  R abdo pain tender w diarrhea  COMPARISON: Two-view chest radiograph 02/11/2025.  TECHNIQUE: CT scan of the chest, abdomen, and pelvis was performed following injection of IV contrast. Multiplanar reformats were obtained. Dose reduction techniques were used.   CONTRAST: 89 mLs Isovue 370    FINDINGS:   LUNGS AND PLEURA: Tree-in-bud nodular opacities of the upper posterior aspect of the right lower lobe, suggestive of mild infectious infiltrate. No pleural effusion.    MEDIASTINUM/AXILLAE: No lymphadenopathy or pericardial effusion. Moderate-sized sliding-type hiatal hernia.    CORONARY ARTERY CALCIFICATION:  None.    HEPATOBILIARY: Hepatic steatosis. Cholecystectomy.    PANCREAS: Normal.    SPLEEN: Normal.    ADRENAL GLANDS: Normal.    KIDNEYS/BLADDER: Normal.    BOWEL: Diffuse colonic thickening with intraluminal fluid and liquid stool within the colon. No bowel obstruction. Normal appendix.    LYMPH NODES: Mildly prominent retroperitoneal lymph nodes, nonspecific but may be reactive.    VASCULATURE: Normal.    PELVIC ORGANS: Normal.    MUSCULOSKELETAL: Small to moderate-sized fat-containing umbilical hernia. Left shoulder arthroplasty. Severe degenerative changes of the right shoulder. Multilevel degenerative changes of the cervicothoracic and lumbosacral spine. No acute osseous   abnormality.      Impression    IMPRESSION:  1.  Diffuse colonic thickening with intraluminal fluid and liquid stool within the colon, findings suggestive of colitis. Consider C. difficile.  2.  Hepatic steatosis.  3.  Tree-in-bud nodular opacities of the upper posterior aspect of the right lower lobe, suggestive of mild infectious infiltrate. No pleural effusion.  4.  Moderate sized sliding-type hiatal hernia.   US Abdomen Limited w Abdomen Doppler Limited    Narrative    EXAM: US ABDOMEN LIMITED W ABDOMEN DOPPLER LIMITED  LOCATION: [facility]  DATE: 2/17/2025    INDICATION: RUQ abd pain, concern for portal vein thrombosis  COMPARISON: CT 2/17/2025  TECHNIQUE: Limited abdominal ultrasound. Color flow with spectral Doppler and waveform analysis performed.     FINDINGS:    GALLBLADDER: Surgically absent.     BILE DUCTS: No biliary dilatation. The common duct measures 4 mm.    LIVER: Normal parenchyma with smooth contour. No focal mass.     RIGHT KIDNEY: No hydronephrosis.     PANCREAS: The visualized portions are normal.    No ascites.    ABDOMINAL DUPLEX: The hepatic artery, hepatic veins, IVC, portal veins, and splenic vein are patent with flow in the normal direction.       Impression     IMPRESSION:  1.  Normal post cholecystectomy limited abdominal ultrasound.  2.  Normal abdominal liver duplex. No evidence for portal vein thrombosis.

## 2025-02-18 NOTE — PROGRESS NOTES
Hepatology Progress note  - review of chart, patient not seen.       Reviewed labs.  Has had an abrupt decrease in transaminases with AST 1048 -->182 and -->296.  Her total bilirubin has remained within normal limits.  Her alkaline phosphatase has also decreased by one half.  Her creatinine also peaked at 1.19 and is now downtrending    She does report taking supplements at home.  With her current decrease in transaminases and recent C. difficile, likely she had hypoperfusion to her liver with dehydration causing elevation in her transaminases.  She does have a history of MASLD.    She remains on treatment for her C. difficile.  Continue to trend her liver tests.  Continue to hold taking any over-the-counter supplements at this time.  Encouraged her to stay hydrated and follow-up with her primary care provider to ensure these have normalized. INR wnl. Can discuss appropriate timing of iron supplement given deficiency.     PETH, EMMIE, factin pending. HCV RNA negative. CMV, EBV PCR negative    Fidaxomicin covered by insurance. Please change c-diff treatment as this has been shown to be more effective in preventing relapses of c-diff.     Will sign off.    JULIETTE Hernandez, CNP  Hepatology [facility]

## 2025-02-18 NOTE — PROGRESS NOTES
"[facility]    Medicine Progress Note - Hospitalist Service, GOLD TEAM 20    Date of Admission:  2/17/2025    Assessment & Plan     54 year old female admitted on 2/17/2025. She presents to the ER for abdominal pain, nausea, emesis and diarrhea in setting of markedly elevated LFT's.     Today's updates   -No acute events overnight.   -LFT's trending down.   -Patient continues with abdominal pain and right flank pain. Patient concern about possible \"Kidney infection\".   -No fever, chills, diaphoresis, nausea, vomit, chest pain, palpitations or shortness of breath.   -ID consult due to concern for ongoing CAP, UTI and C. Diff infection.     #Acute Hepatitis  #RUQ abd Pain with positive Velarde's sign  LFTs markedly elevated with AST: 1000, ALT: 653. Elevated Alk Phos:442. Positive Velarde's sign on exam. ACT Abd pelvis showing steatorhea. Unclear etiology for her hepatitis. Patient denies alcohol use, illicit drug use, marijuana use or tylenol use. Given that patient is immunosuppressed on Enbrel and has underlying RA, she is at risk for autoimmune hepatitis, viral hepatitis, and budd chiari syndrome. Drug ingestion or alcohol ingestion not illicit drug use on history also needs to be ruled out. In addition, would need to rule out fulminant liver failure.  -- Continue trending LFT's and INR   -- HepB surface antigen, HepB core abx, HepB surface antibody, hep C testing  -- HIV testing, EBV, CMV testing  -- EtOH, UDS, acetaminophen level  -- RUQ US with doppler    -- Hepatology evaluated the patient and gave the following recommendations:     -- Follow-up acute hepatitis panel, hep C PCR, EMMIE, F-actin, CMV, EBV, CK and PETH   -- Given there is insurance coverage for fidaxomicin/dificid - would recommend fidaxomicin  200mg BID x 14 days for C. Diff given its association with reduced risk of C. difficile recurrence compared to vancomycin  Limit antibiotics as able in the setting " of active C. difficile  -- Hepatic panel and INR daily  -- Continue supportive care for nausea, emesis and GI symptoms.  Do not give medications to alter gut motility, such as Imodium, in the setting of active C. Difficile  -- Avoid any medications or supplements not prescribed by a physician  -- Further workup and management of iron deficiency in the setting of epistaxis per primary team; if no improvement in iron deficiency in the setting of correction of epistaxis would consider further evaluation including upper endoscopy and colonoscopy.  Would not give IV iron at this time in the setting of active infection.       #RLL Infiltrate due to CAP  Patient being treated as outpatient for her PNA on cefdinir 300mg BID. Completed 5 days of her course. Patient was mildly neuotropenic on presentation. At this time, she is not needing O2 via NC, not having respiratory distress or sustained tachypnea.  -- MRSA/MSSA swab  -- legionella and strep pneumo antigen ordered  -- sputum culture not ordered since patient has allready been on Abx for 6 days as outpatient  -- ID consult      #Acute Diarrhea  #Moderate Dehydration  Patient has been having 3 days of abd pain (RUQ and R flank), nausea, emesis and diarrhea. Started after being on several days of cefdinir.   -- Cdiff testing  -- GI PCR ordered (given patient is immunosuppressed)  -- MIVF until euvolemic and able to tolerate adequate po intake (tentatively given for 2 days will need to order beyond this)     #UTI  Last UTI documented in the charts was I 12/24 when she was found to be growing pan susceptible Ecoli. CT CAP done today not showing any findings of pyelonephritis  -- UA with microscopy and reflex to culture-- showed positive LE and elevated WBCs and bacteria in the urine.  -- follow up on Ucx    #MUNA   -- Mild Cr elevation. Most likely pre renal.   -- Continue on gentle hydration. Continue trending.           Diet: Regular Diet Adult    DVT Prophylaxis:  "Enoxaparin (Lovenox) SQ  Saleh Catheter: Not present  Lines: None     Cardiac Monitoring: None  Code Status: Full Code      Clinically Significant Risk Factors          # Hyperchloremia: Highest Cl = 115 mmol/L in last 2 days, will monitor as appropriate        # Hypomagnesemia: Lowest Mg = 1.6 mg/dL in last 2 days, will replace as needed   # Hypoalbuminemia: Lowest albumin = 3 g/dL at 2/18/2025  6:38 AM, will monitor as appropriate                # Overweight: Estimated body mass index is 28.19 kg/m  as calculated from the following:    Height as of this encounter: 1.702 m (5' 7\").    Weight as of this encounter: 81.6 kg (180 lb)., PRESENT ON ADMISSION            Social Drivers of Health    Food Insecurity: High Risk (12/4/2024)    Food Insecurity     Within the past 12 months, did you worry that your food would run out before you got money to buy more?: Yes     Within the past 12 months, did the food you bought just not last and you didn t have money to get more?: Yes   Depression: At risk (2/11/2025)    PHQ-2     PHQ-2 Score: 3   Housing Stability: High Risk (12/4/2024)    Housing Stability     Do you have housing? : Yes     Are you worried about losing your housing?: Yes   Tobacco Use: Medium Risk (2/17/2025)    Patient History     Smoking Tobacco Use: Former     Smokeless Tobacco Use: Never     Passive Exposure: Past   Financial Resource Strain: High Risk (12/4/2024)    Financial Resource Strain     Within the past 12 months, have you or your family members you live with been unable to get utilities (heat, electricity) when it was really needed?: Yes   Transportation Needs: High Risk (12/4/2024)    Transportation Needs     Within the past 12 months, has lack of transportation kept you from medical appointments, getting your medicines, non-medical meetings or appointments, work, or from getting things that you need?: Yes          Disposition Plan     Medically Ready for Discharge: Anticipated in 2-4 Days       "       Alek Sorto MD  Hospitalist Service, GOLD TEAM 20  [facility]  Securely message with [employer] (more info)  Text page via [employer] Paging/Directory   See signed in provider for up to date coverage information  ______________________________________________________________________    Interval History     Acute events as above.   Family at bedside.       Physical Exam   Vital Signs: Temp: 98  F (36.7  C) Temp src: Oral BP: (!) 132/111 Pulse: 87   Resp: 16 SpO2: 98 % O2 Device: None (Room air)    Weight: 180 lbs 0 oz    General Appearance:  Awake, Alert, rapid and tangential speech. A&Ox3  Respiratory: Inspiratory rales at the RLL bases. No wheezing, rhonchi.  Cardiovascular: Normal S1, S2.  GI: Abd soft, NBS, Positive RUQ pain positive almanza's sign, no rebound. No rigidity  Skin: No jaundice, scleral icterus, no rashes, no bruising  Other:  Motor and sensory intact     Medical Decision Making       55 MINUTES SPENT BY ME on the date of service doing chart review, history, exam, documentation & further activities per the note.      Data     I have personally reviewed the following data over the past 24 hrs:    5.5  \   11.3 (L)   / 167     145 115 (H) 12.1 /  97   3.8 21 (L) 1.06 (H) \     ALT: 296 (H) AST: 182 (H) AP: 240 (H) TBILI: 0.2   ALB: 3.0 (L) TOT PROTEIN: 6.4 LIPASE: N/A     INR:  1.14 PTT:  24   D-dimer:  N/A Fibrinogen:  414       Imaging results reviewed over the past 24 hrs:   No results found for this or any previous visit (from the past 24 hours).

## 2025-02-18 NOTE — PROGRESS NOTES
Brief Note:    Kalyn is a 54-year-old female with history of rheumatoid arthritis on Enbrel, prior acute liver failure secondary to Tylenol, alcohol overuse, opioid overuse, pancreatitis who presented to the emergency department with right upper quadrant pain and is found to have hepatitis of hepatocellular pattern in the setting of known hepatic steatosis.  See history and physical from earlier today for more details.    Hepatitis of unclear etiology.  Possibly medication induced.  No portal vein thrombosis on imaging.  To note patient is status postcholecystectomy.      Will follow-up hepatitis workup that is pending.  Will obtain hepatic panel and INR daily.  Holding cefdinir.  Given timing of hepatitis presentation and recent start of cefdinir, will discuss with ID pharmacist in the morning whether or not to be concerned with ceftriaxone having similar effect (CTX placed on hold for now; dose is not due until 11:30AM).    C. difficile testing positive.  Given test claim shows insurance coverage, will start fidaxomicin.      Ordered topical lidocaine patch to help with pain.  Her prior to admission Celebrex has been ordered.  If she has breakthrough pain, we will consider oxycodone prn for acute pain.  Patient cannot receive Tylenol giving history of acute liver failure from it.    Will monitor for repeat epistaxis.  Iron studies consistent with iron deficiency but hemoglobin normal.    Hoang Gandara DO, MPH  Internal Medicine-Pediatrics Hospitalist

## 2025-02-19 LAB
ALBUMIN SERPL BCG-MCNC: 3.2 G/DL (ref 3.5–5.2)
ALP SERPL-CCNC: 197 U/L (ref 40–150)
ALT SERPL W P-5'-P-CCNC: 193 U/L (ref 0–50)
ANION GAP SERPL CALCULATED.3IONS-SCNC: 10 MMOL/L (ref 7–15)
AST SERPL W P-5'-P-CCNC: 72 U/L (ref 0–45)
BILIRUB DIRECT SERPL-MCNC: 0.09 MG/DL (ref 0–0.3)
BILIRUB SERPL-MCNC: 0.2 MG/DL
BUN SERPL-MCNC: 10.7 MG/DL (ref 6–20)
C PNEUM DNA SPEC QL NAA+PROBE: NOT DETECTED
CALCIUM SERPL-MCNC: 8.6 MG/DL (ref 8.8–10.4)
CHLORIDE SERPL-SCNC: 111 MMOL/L (ref 98–107)
CREAT SERPL-MCNC: 1.04 MG/DL (ref 0.51–0.95)
EGFRCR SERPLBLD CKD-EPI 2021: 64 ML/MIN/1.73M2
ERYTHROCYTE [DISTWIDTH] IN BLOOD BY AUTOMATED COUNT: 14.1 % (ref 10–15)
FLUAV H1 2009 PAND RNA SPEC QL NAA+PROBE: NOT DETECTED
FLUAV H1 RNA SPEC QL NAA+PROBE: NOT DETECTED
FLUAV H3 RNA SPEC QL NAA+PROBE: NOT DETECTED
FLUAV RNA SPEC QL NAA+PROBE: NOT DETECTED
FLUBV RNA SPEC QL NAA+PROBE: NOT DETECTED
GLUCOSE SERPL-MCNC: 84 MG/DL (ref 70–99)
HADV DNA SPEC QL NAA+PROBE: NOT DETECTED
HCO3 SERPL-SCNC: 19 MMOL/L (ref 22–29)
HCOV PNL SPEC NAA+PROBE: NOT DETECTED
HCT VFR BLD AUTO: 32.4 % (ref 35–47)
HGB BLD-MCNC: 10.4 G/DL (ref 11.7–15.7)
HMPV RNA SPEC QL NAA+PROBE: NOT DETECTED
HPIV1 RNA SPEC QL NAA+PROBE: NOT DETECTED
HPIV2 RNA SPEC QL NAA+PROBE: NOT DETECTED
HPIV3 RNA SPEC QL NAA+PROBE: NOT DETECTED
HPIV4 RNA SPEC QL NAA+PROBE: NOT DETECTED
INR PPP: 1.04 (ref 0.85–1.15)
LABORATORY REPORT: NORMAL
M PNEUMO DNA SPEC QL NAA+PROBE: NOT DETECTED
MCH RBC QN AUTO: 29.4 PG (ref 26.5–33)
MCHC RBC AUTO-ENTMCNC: 32.1 G/DL (ref 31.5–36.5)
MCV RBC AUTO: 92 FL (ref 78–100)
PETH INTERPRETATION: NORMAL
PLATELET # BLD AUTO: 171 10E3/UL (ref 150–450)
PLPETH BLD-MCNC: <10 NG/ML
POPETH BLD-MCNC: <10 NG/ML
POTASSIUM SERPL-SCNC: 4.3 MMOL/L (ref 3.4–5.3)
PROT SERPL-MCNC: 6.8 G/DL (ref 6.4–8.3)
RBC # BLD AUTO: 3.54 10E6/UL (ref 3.8–5.2)
RSV RNA SPEC QL NAA+PROBE: NOT DETECTED
RSV RNA SPEC QL NAA+PROBE: NOT DETECTED
RV+EV RNA SPEC QL NAA+PROBE: NOT DETECTED
S PYO DNA THROAT QL NAA+PROBE: NOT DETECTED
SODIUM SERPL-SCNC: 140 MMOL/L (ref 135–145)
WBC # BLD AUTO: 7.6 10E3/UL (ref 4–11)

## 2025-02-19 PROCEDURE — 258N000003 HC RX IP 258 OP 636: Performed by: INTERNAL MEDICINE

## 2025-02-19 PROCEDURE — 999N000111 HC STATISTIC OT IP EVAL DEFER

## 2025-02-19 PROCEDURE — 258N000003 HC RX IP 258 OP 636: Performed by: STUDENT IN AN ORGANIZED HEALTH CARE EDUCATION/TRAINING PROGRAM

## 2025-02-19 PROCEDURE — 99232 SBSQ HOSP IP/OBS MODERATE 35: CPT | Performed by: INTERNAL MEDICINE

## 2025-02-19 PROCEDURE — 82374 ASSAY BLOOD CARBON DIOXIDE: CPT | Performed by: STUDENT IN AN ORGANIZED HEALTH CARE EDUCATION/TRAINING PROGRAM

## 2025-02-19 PROCEDURE — 36415 COLL VENOUS BLD VENIPUNCTURE: CPT | Performed by: STUDENT IN AN ORGANIZED HEALTH CARE EDUCATION/TRAINING PROGRAM

## 2025-02-19 PROCEDURE — 120N000002 HC R&B MED SURG/OB UMMC

## 2025-02-19 PROCEDURE — 87651 STREP A DNA AMP PROBE: CPT | Performed by: INTERNAL MEDICINE

## 2025-02-19 PROCEDURE — G0545 PR INHRENT VISIT TO INPT/OBS W CNFRM/SUSPCT INFCT DIS BY INFCT DIS SPCIALST: HCPCS | Performed by: INTERNAL MEDICINE

## 2025-02-19 PROCEDURE — 87581 M.PNEUMON DNA AMP PROBE: CPT | Performed by: INTERNAL MEDICINE

## 2025-02-19 PROCEDURE — 999N000147 HC STATISTIC PT IP EVAL DEFER

## 2025-02-19 PROCEDURE — 85027 COMPLETE CBC AUTOMATED: CPT | Performed by: STUDENT IN AN ORGANIZED HEALTH CARE EDUCATION/TRAINING PROGRAM

## 2025-02-19 PROCEDURE — 250N000013 HC RX MED GY IP 250 OP 250 PS 637: Performed by: INTERNAL MEDICINE

## 2025-02-19 PROCEDURE — 82310 ASSAY OF CALCIUM: CPT | Performed by: STUDENT IN AN ORGANIZED HEALTH CARE EDUCATION/TRAINING PROGRAM

## 2025-02-19 PROCEDURE — 82248 BILIRUBIN DIRECT: CPT | Performed by: STUDENT IN AN ORGANIZED HEALTH CARE EDUCATION/TRAINING PROGRAM

## 2025-02-19 PROCEDURE — 85610 PROTHROMBIN TIME: CPT | Performed by: STUDENT IN AN ORGANIZED HEALTH CARE EDUCATION/TRAINING PROGRAM

## 2025-02-19 PROCEDURE — 87486 CHLMYD PNEUM DNA AMP PROBE: CPT | Performed by: INTERNAL MEDICINE

## 2025-02-19 PROCEDURE — 80076 HEPATIC FUNCTION PANEL: CPT | Performed by: STUDENT IN AN ORGANIZED HEALTH CARE EDUCATION/TRAINING PROGRAM

## 2025-02-19 PROCEDURE — 87507 IADNA-DNA/RNA PROBE TQ 12-25: CPT | Performed by: STUDENT IN AN ORGANIZED HEALTH CARE EDUCATION/TRAINING PROGRAM

## 2025-02-19 PROCEDURE — 250N000013 HC RX MED GY IP 250 OP 250 PS 637: Performed by: STUDENT IN AN ORGANIZED HEALTH CARE EDUCATION/TRAINING PROGRAM

## 2025-02-19 PROCEDURE — 250N000011 HC RX IP 250 OP 636: Performed by: STUDENT IN AN ORGANIZED HEALTH CARE EDUCATION/TRAINING PROGRAM

## 2025-02-19 RX ORDER — SODIUM CHLORIDE 9 MG/ML
INJECTION, SOLUTION INTRAVENOUS CONTINUOUS
Status: DISPENSED | OUTPATIENT
Start: 2025-02-19 | End: 2025-02-19

## 2025-02-19 RX ADMIN — CLONAZEPAM 1 MG: 1 TABLET ORAL at 14:14

## 2025-02-19 RX ADMIN — PANTOPRAZOLE SODIUM 40 MG: 40 TABLET, DELAYED RELEASE ORAL at 09:05

## 2025-02-19 RX ADMIN — ENOXAPARIN SODIUM 40 MG: 40 INJECTION SUBCUTANEOUS at 14:14

## 2025-02-19 RX ADMIN — FIDAXOMICIN 200 MG: 200 TABLET, FILM COATED ORAL at 20:35

## 2025-02-19 RX ADMIN — Medication 2.5 MG: at 20:35

## 2025-02-19 RX ADMIN — SODIUM CHLORIDE: 9 INJECTION, SOLUTION INTRAVENOUS at 03:19

## 2025-02-19 RX ADMIN — Medication 2.5 MG: at 09:17

## 2025-02-19 RX ADMIN — Medication 2.5 MG: at 03:37

## 2025-02-19 RX ADMIN — LIDOCAINE 4% 1 PATCH: 40 PATCH TOPICAL at 20:35

## 2025-02-19 RX ADMIN — CLONAZEPAM 1 MG: 1 TABLET ORAL at 20:35

## 2025-02-19 RX ADMIN — Medication 1 LOZENGE: at 14:14

## 2025-02-19 RX ADMIN — CLONAZEPAM 1 MG: 1 TABLET ORAL at 09:05

## 2025-02-19 RX ADMIN — CELECOXIB 200 MG: 200 CAPSULE ORAL at 09:05

## 2025-02-19 RX ADMIN — Medication 2.5 MG: at 14:49

## 2025-02-19 RX ADMIN — SODIUM CHLORIDE: 9 INJECTION, SOLUTION INTRAVENOUS at 14:50

## 2025-02-19 RX ADMIN — FIDAXOMICIN 200 MG: 200 TABLET, FILM COATED ORAL at 09:05

## 2025-02-19 RX ADMIN — SODIUM CHLORIDE: 9 INJECTION, SOLUTION INTRAVENOUS at 09:09

## 2025-02-19 ASSESSMENT — ACTIVITIES OF DAILY LIVING (ADL)
ADLS_ACUITY_SCORE: 53
ADLS_ACUITY_SCORE: 49
ADLS_ACUITY_SCORE: 53
ADLS_ACUITY_SCORE: 49
ADLS_ACUITY_SCORE: 53
ADLS_ACUITY_SCORE: 49
ADLS_ACUITY_SCORE: 53
ADLS_ACUITY_SCORE: 49

## 2025-02-19 NOTE — PROGRESS NOTES
[facility] ID SERVICE: Sign Off Note  Patient:  Kalyn Rivero, Date of birth 1970, Medical record number [id number]  Date of Admission: 2/17/2025  Date of Visit:  02/19/2025   Requesting Provider: Sulaiman Daly         Assessment and Recommendations:   Problem List:  C diff colitis   Positive testing and CT findings consistent with C diff colitis  Currently on fidaxomicin and improving  Enteric panel otherwise negative.   Flank pain  Normal kidneys on CT. UCx with <10K mixed marni  Transaminitis - improved. Possibly due to dehydration or medication related.   Recent cough and mild tree-in-bud opacity on CT. S/p 5 days cefdinir + 1 dose ceftriaxone and 2 doses azithromycin  S/p cholecystecomy    Recommendations:  Continue C diff treatment with fidaxomicin x 10 days as started by hepatology  No current symptoms of pneumonia and completed 5-6 day course cefdinir/ceftriaxone. No further antibiotics for this at this time.   No evidence of pyelonephritis on CT and urine culture not consistent with UTI. No treatment at this time. Monitor flank-area pain which may be from different etiology.   Limit antibiotics other than fidaxomicin as able to prevent worsening C diff    It has been a pleasure to be involved with this patient's care. We will sign off for now, but please feel free to call with additional questions.     Yuridia Campuzano MD  Infectious Diseases  Contact: [place] or call [phone] and enter ID# [id number]    I spent 45 minutes as part of a visit on the date of the encounter doing chart review, history and exam, documentation, and care coordination. This encounter qualifies for code .            Interval History:    Diarrhea improved today. Still not very hungry. LFTs continue to improve. Enteric panel ordered on admission was collected and negative.        History of Present Illness:   Kalyn Rivero is a 53 yo woman with history of RA who presented to ED on 2/17 with abdominal pain and diarrhea  "x 3 days in the setting of recent course of cefdinir. She reports that she was seen 2/11 due to a nose bleed running down the back of her throat and causing hemoptysis, as well as due to right sided flank pain. UA showed 0-5 WBC and CXR was clear, but it seems she was given cefdinir just in case she had pneumonia or UTI. She  then developed abdominal pain and diarrhea and presented for further evaluation. LFTs had been normal on 2/11 but were markedly elevated 2/17. C diff toxin and antigen also positive. She is being evaluated by hepatology who also started her on fidaxomicin for C diff. She reports ongoing right sided abdominal pain and also right sided flank tenderness. Admission UA with 24 WBC, urine culture with <10K mixed urogenital marni.        Past Medical History:     Past Medical History:   Diagnosis Date    Acetaminophen overdose 03/24/2011    Anemia     Anesthesia complication     pt states has a history of heart stopping during anesthesia,    Arrhythmia     Cerebral artery occlusion with cerebral infarction (H)     Cerebral infarction (H)     Cervical high risk HPV (human papillomavirus) test positive 02/22/2017    type 18 & other HR HPV    Chronic infection     MRSA    Chronic pain 03/24/2011    Degenerative joint disease     Endometriosis     Fibromyalgia     Gastro-oesophageal reflux disease     Heart murmur     History of blood transfusion     Hypothyroidism     Immune disorder     Learning disability     Malignant neoplasm (H)     Migraine     MRSA (methicillin resistant staph aureus) culture positive     Neck injuries     Opioid type dependence (H)     Other chronic pain     PONV (postoperative nausea and vomiting)     states \"flatlines\"during anesthesia    RA (rheumatoid arthritis) (H)     Renal stone     Scoliosis     Stomach ulcer     history         Allergies:      Allergies   Allergen Reactions    Leflunomide Anaphylaxis    Morphine Swelling    Celecoxib Other (See Comments)     Other " "reaction(s): stroke , lasted 24 hours  -Pt takes celecoxib daily. States that they changed the  and now it is fine.    Amitriptyline Other (See Comments)     Sleepwalking     Codeine     Gabapentin Other (See Comments)     Pins,needles, stabbing aching at once  Pins,needles, stabbing aching at once  Numbness and Tingling    Ibuprofen      Doesn't take due to gastric ulcer    Sulfa Antibiotics Nausea and Vomiting     unknown    Tylenol [Acetaminophen]      Pt. States that she has Liver problems  Tylenol 3    Dexamethasone Palpitations     Heart racing, sweating     Erythromycin Nausea     Vomiting     Penicillins Rash    Prednisone Palpitations     Heart racing          Physical Exam:   /64   Pulse 76   Temp 98.3  F (36.8  C) (Oral)   Resp 17   Ht 1.702 m (5' 7\")   Wt 81.6 kg (180 lb)   LMP  (LMP Unknown)   SpO2 95%   BMI 28.19 kg/m     Exam:  GENERAL:  Well-developed, well-nourished, sitting in bed in no acute distress.   ENT:  Head is normocephalic, atraumatic. Oropharynx is moist without exudates or ulcers.  EYES:  Eyes have anicteric sclerae.    NECK:  Supple.  EXT: Extremities warm and without edema.  SKIN:  No acute rashes.  Line is in place without any surrounding erythema.  NEUROLOGIC:  Grossly nonfocal.         Laboratory Data:   AST 36 -->1048-->378  ALT 22-->653-->437  T bili normal    Creatinine   Date Value Ref Range Status   02/19/2025 1.04 (H) 0.51 - 0.95 mg/dL Final   02/18/2025 1.06 (H) 0.51 - 0.95 mg/dL Final   02/17/2025 1.04 (H) 0.51 - 0.95 mg/dL Final   02/17/2025 1.19 (H) 0.51 - 0.95 mg/dL Final   02/11/2025 0.98 (H) 0.51 - 0.95 mg/dL Final   07/05/2021 0.88 0.52 - 1.04 mg/dL Final   08/26/2020 0.810 0.500 - 1.300 mg/dL Final   03/11/2020 0.930 0.500 - 1.300 mg/dL Final   02/01/2020 0.85 0.52 - 1.04 mg/dL Final   01/28/2020 0.79 0.52 - 1.04 mg/dL Final     WBC   Date Value Ref Range Status   07/05/2021 6.8 4.0 - 11.0 10e9/L Final   02/01/2020 5.5 4.0 - 11.0 10e9/L Final "   01/28/2020 6.3 4.0 - 11.0 10e9/L Final   12/02/2018 4.6 4.0 - 11.0 10e9/L Final   11/29/2018 7.4 4.0 - 11.0 10e9/L Final     WBC Count   Date Value Ref Range Status   02/19/2025 7.6 4.0 - 11.0 10e3/uL Final   02/18/2025 5.5 4.0 - 11.0 10e3/uL Final   02/17/2025 3.1 (L) 4.0 - 11.0 10e3/uL Final   02/11/2025 5.4 4.0 - 11.0 10e3/uL Final   12/30/2024 6.5 4.0 - 11.0 10e3/uL Final     Hemoglobin   Date Value Ref Range Status   02/19/2025 10.4 (L) 11.7 - 15.7 g/dL Final   07/05/2021 12.9 11.7 - 15.7 g/dL Final     Platelet Count   Date Value Ref Range Status   02/19/2025 171 150 - 450 10e3/uL Final   07/05/2021 201 150 - 450 10e9/L Final     Lab Results   Component Value Date     02/19/2025    BUN 10.7 02/19/2025    CO2 19 (L) 02/19/2025     CRP Inflammation   Date Value Ref Range Status   07/05/2021 <2.9 0.0 - 8.0 mg/L Final   06/29/2017 3.1 0.0 - 8.0 mg/L Final   09/08/2014 8.1 (H) 0.0 - 8.0 mg/L Final   03/28/2014 69.9 (H) 0.0 - 8.0 mg/L Final   12/15/2013 70.2 (H) 0.0 - 8.0 mg/L Final           Pertinent Recent Microbiology Data:   Reviewed  HIV negative         Imaging:     Recent Results (from the past 48 hours)   CT Chest/Abdomen/Pelvis w Contrast    Narrative    EXAM: CT CHEST/ABDOMEN/PELVIS W CONTRAST  LOCATION: [facility]  DATE: 2/17/2025    INDICATION: known pneumonia x 2 days.  R abdo pain tender w diarrhea  COMPARISON: Two-view chest radiograph 02/11/2025.  TECHNIQUE: CT scan of the chest, abdomen, and pelvis was performed following injection of IV contrast. Multiplanar reformats were obtained. Dose reduction techniques were used.   CONTRAST: 89 mLs Isovue 370    FINDINGS:   LUNGS AND PLEURA: Tree-in-bud nodular opacities of the upper posterior aspect of the right lower lobe, suggestive of mild infectious infiltrate. No pleural effusion.    MEDIASTINUM/AXILLAE: No lymphadenopathy or pericardial effusion. Moderate-sized sliding-type hiatal  hernia.    CORONARY ARTERY CALCIFICATION: None.    HEPATOBILIARY: Hepatic steatosis. Cholecystectomy.    PANCREAS: Normal.    SPLEEN: Normal.    ADRENAL GLANDS: Normal.    KIDNEYS/BLADDER: Normal.    BOWEL: Diffuse colonic thickening with intraluminal fluid and liquid stool within the colon. No bowel obstruction. Normal appendix.    LYMPH NODES: Mildly prominent retroperitoneal lymph nodes, nonspecific but may be reactive.    VASCULATURE: Normal.    PELVIC ORGANS: Normal.    MUSCULOSKELETAL: Small to moderate-sized fat-containing umbilical hernia. Left shoulder arthroplasty. Severe degenerative changes of the right shoulder. Multilevel degenerative changes of the cervicothoracic and lumbosacral spine. No acute osseous   abnormality.      Impression    IMPRESSION:  1.  Diffuse colonic thickening with intraluminal fluid and liquid stool within the colon, findings suggestive of colitis. Consider C. difficile.  2.  Hepatic steatosis.  3.  Tree-in-bud nodular opacities of the upper posterior aspect of the right lower lobe, suggestive of mild infectious infiltrate. No pleural effusion.  4.  Moderate sized sliding-type hiatal hernia.   US Abdomen Limited w Abdomen Doppler Limited    Narrative    EXAM: US ABDOMEN LIMITED W ABDOMEN DOPPLER LIMITED  LOCATION: [facility]  DATE: 2/17/2025    INDICATION: RUQ abd pain, concern for portal vein thrombosis  COMPARISON: CT 2/17/2025  TECHNIQUE: Limited abdominal ultrasound. Color flow with spectral Doppler and waveform analysis performed.     FINDINGS:    GALLBLADDER: Surgically absent.     BILE DUCTS: No biliary dilatation. The common duct measures 4 mm.    LIVER: Normal parenchyma with smooth contour. No focal mass.     RIGHT KIDNEY: No hydronephrosis.     PANCREAS: The visualized portions are normal.    No ascites.    ABDOMINAL DUPLEX: The hepatic artery, hepatic veins, IVC, portal veins, and splenic vein are patent with flow in the  normal direction.       Impression    IMPRESSION:  1.  Normal post cholecystectomy limited abdominal ultrasound.  2.  Normal abdominal liver duplex. No evidence for portal vein thrombosis.

## 2025-02-19 NOTE — PLAN OF CARE
Physical Therapy: Orders received. Chart reviewed and discussed with care team.? Physical Therapy not indicated due to pt has been up ind around room, she was able to demonstrate ind bed mob, transfers, and amb laps around room >50ft without use of assistive device. Nursing requests pt stay in room d/t enteric precautions so this was followed. Pt was stable throughout all mobility and declined need for CGA, pt feels she is mobilizing at her baseline and is limited d/t boredom of staying in room. States that she lives w/ her son whom is also her PCA and will be able to provide support on discharge. PT did offer HEP as well and pt declines stating that she continues to have Bms so does not want to aggravate her system. She verbalized understanding of HEP however. No IP PT needs.? Defer discharge recommendations to medical team.? Will complete orders.

## 2025-02-19 NOTE — PLAN OF CARE
Occupational Therapy: Orders received. Chart reviewed and discussed with care team.? Occupational Therapy not indicated due to pt near baseline for ADL. Pt is up SBA in her room, has assist for ADL as needed from her son who is her PCA.? Defer discharge recommendations to medical team.? Will complete orders.

## 2025-02-20 VITALS
BODY MASS INDEX: 30.51 KG/M2 | WEIGHT: 189.82 LBS | HEART RATE: 77 BPM | SYSTOLIC BLOOD PRESSURE: 129 MMHG | OXYGEN SATURATION: 97 % | TEMPERATURE: 98 F | HEIGHT: 66 IN | DIASTOLIC BLOOD PRESSURE: 77 MMHG | RESPIRATION RATE: 16 BRPM

## 2025-02-20 LAB
ADV 40+41 DNA STL QL NAA+NON-PROBE: NEGATIVE
ALBUMIN SERPL BCG-MCNC: 3 G/DL (ref 3.5–5.2)
ALP SERPL-CCNC: 176 U/L (ref 40–150)
ALT SERPL W P-5'-P-CCNC: 130 U/L (ref 0–50)
ANION GAP SERPL CALCULATED.3IONS-SCNC: 9 MMOL/L (ref 7–15)
AST SERPL W P-5'-P-CCNC: 40 U/L (ref 0–45)
ASTRO TYP 1-8 RNA STL QL NAA+NON-PROBE: NEGATIVE
BILIRUB DIRECT SERPL-MCNC: 0.1 MG/DL (ref 0–0.3)
BILIRUB SERPL-MCNC: 0.3 MG/DL
BUN SERPL-MCNC: 13.7 MG/DL (ref 6–20)
C CAYETANENSIS DNA STL QL NAA+NON-PROBE: NEGATIVE
CALCIUM SERPL-MCNC: 8.9 MG/DL (ref 8.8–10.4)
CAMPYLOBACTER DNA SPEC NAA+PROBE: NEGATIVE
CHLORIDE SERPL-SCNC: 110 MMOL/L (ref 98–107)
CREAT SERPL-MCNC: 1.14 MG/DL (ref 0.51–0.95)
CRYPTOSP DNA STL QL NAA+NON-PROBE: NEGATIVE
E COLI O157 DNA STL QL NAA+NON-PROBE: NORMAL
E HISTOLYT DNA STL QL NAA+NON-PROBE: NEGATIVE
EAEC ASTA GENE ISLT QL NAA+PROBE: NEGATIVE
EC STX1+STX2 GENES STL QL NAA+NON-PROBE: NEGATIVE
EGFRCR SERPLBLD CKD-EPI 2021: 57 ML/MIN/1.73M2
EPEC EAE GENE STL QL NAA+NON-PROBE: NEGATIVE
ERYTHROCYTE [DISTWIDTH] IN BLOOD BY AUTOMATED COUNT: 14 % (ref 10–15)
ETEC LTA+ST1A+ST1B TOX ST NAA+NON-PROBE: NEGATIVE
G LAMBLIA DNA STL QL NAA+NON-PROBE: NEGATIVE
GLUCOSE SERPL-MCNC: 89 MG/DL (ref 70–99)
HCO3 SERPL-SCNC: 22 MMOL/L (ref 22–29)
HCT VFR BLD AUTO: 31.5 % (ref 35–47)
HGB BLD-MCNC: 10 G/DL (ref 11.7–15.7)
INR PPP: 1.05 (ref 0.85–1.15)
MCH RBC QN AUTO: 28.7 PG (ref 26.5–33)
MCHC RBC AUTO-ENTMCNC: 31.7 G/DL (ref 31.5–36.5)
MCV RBC AUTO: 91 FL (ref 78–100)
NOROVIRUS GI+II RNA STL QL NAA+NON-PROBE: NEGATIVE
P SHIGELLOIDES DNA STL QL NAA+NON-PROBE: NEGATIVE
PLATELET # BLD AUTO: 191 10E3/UL (ref 150–450)
POTASSIUM SERPL-SCNC: 4.6 MMOL/L (ref 3.4–5.3)
PROT SERPL-MCNC: 6.9 G/DL (ref 6.4–8.3)
RBC # BLD AUTO: 3.48 10E6/UL (ref 3.8–5.2)
RVA RNA STL QL NAA+NON-PROBE: NEGATIVE
SALMONELLA SP RPOD STL QL NAA+PROBE: NEGATIVE
SAPO I+II+IV+V RNA STL QL NAA+NON-PROBE: NEGATIVE
SHIGELLA SP+EIEC IPAH ST NAA+NON-PROBE: NEGATIVE
SODIUM SERPL-SCNC: 141 MMOL/L (ref 135–145)
V CHOLERAE DNA SPEC QL NAA+PROBE: NEGATIVE
VIBRIO DNA SPEC NAA+PROBE: NEGATIVE
WBC # BLD AUTO: 7.4 10E3/UL (ref 4–11)
Y ENTEROCOL DNA STL QL NAA+PROBE: NEGATIVE

## 2025-02-20 PROCEDURE — 250N000013 HC RX MED GY IP 250 OP 250 PS 637: Performed by: STUDENT IN AN ORGANIZED HEALTH CARE EDUCATION/TRAINING PROGRAM

## 2025-02-20 PROCEDURE — 120N000002 HC R&B MED SURG/OB UMMC

## 2025-02-20 PROCEDURE — 250N000011 HC RX IP 250 OP 636: Mod: JW | Performed by: INTERNAL MEDICINE

## 2025-02-20 PROCEDURE — 85610 PROTHROMBIN TIME: CPT | Performed by: STUDENT IN AN ORGANIZED HEALTH CARE EDUCATION/TRAINING PROGRAM

## 2025-02-20 PROCEDURE — 250N000011 HC RX IP 250 OP 636: Performed by: STUDENT IN AN ORGANIZED HEALTH CARE EDUCATION/TRAINING PROGRAM

## 2025-02-20 PROCEDURE — 250N000013 HC RX MED GY IP 250 OP 250 PS 637: Performed by: INTERNAL MEDICINE

## 2025-02-20 PROCEDURE — 84075 ASSAY ALKALINE PHOSPHATASE: CPT | Performed by: STUDENT IN AN ORGANIZED HEALTH CARE EDUCATION/TRAINING PROGRAM

## 2025-02-20 PROCEDURE — 82248 BILIRUBIN DIRECT: CPT | Performed by: STUDENT IN AN ORGANIZED HEALTH CARE EDUCATION/TRAINING PROGRAM

## 2025-02-20 PROCEDURE — 82310 ASSAY OF CALCIUM: CPT | Performed by: STUDENT IN AN ORGANIZED HEALTH CARE EDUCATION/TRAINING PROGRAM

## 2025-02-20 PROCEDURE — 36415 COLL VENOUS BLD VENIPUNCTURE: CPT | Performed by: STUDENT IN AN ORGANIZED HEALTH CARE EDUCATION/TRAINING PROGRAM

## 2025-02-20 PROCEDURE — 80048 BASIC METABOLIC PNL TOTAL CA: CPT | Performed by: STUDENT IN AN ORGANIZED HEALTH CARE EDUCATION/TRAINING PROGRAM

## 2025-02-20 PROCEDURE — 85018 HEMOGLOBIN: CPT | Performed by: STUDENT IN AN ORGANIZED HEALTH CARE EDUCATION/TRAINING PROGRAM

## 2025-02-20 PROCEDURE — 99232 SBSQ HOSP IP/OBS MODERATE 35: CPT | Performed by: INTERNAL MEDICINE

## 2025-02-20 RX ORDER — NALOXONE HYDROCHLORIDE 0.4 MG/ML
0.4 INJECTION, SOLUTION INTRAMUSCULAR; INTRAVENOUS; SUBCUTANEOUS
Status: DISCONTINUED | OUTPATIENT
Start: 2025-02-20 | End: 2025-02-21 | Stop reason: HOSPADM

## 2025-02-20 RX ORDER — NALOXONE HYDROCHLORIDE 0.4 MG/ML
0.2 INJECTION, SOLUTION INTRAMUSCULAR; INTRAVENOUS; SUBCUTANEOUS
Status: DISCONTINUED | OUTPATIENT
Start: 2025-02-20 | End: 2025-02-21 | Stop reason: HOSPADM

## 2025-02-20 RX ORDER — HYDROMORPHONE HYDROCHLORIDE 1 MG/ML
0.3 INJECTION, SOLUTION INTRAMUSCULAR; INTRAVENOUS; SUBCUTANEOUS ONCE
Status: COMPLETED | OUTPATIENT
Start: 2025-02-20 | End: 2025-02-20

## 2025-02-20 RX ADMIN — Medication 2.5 MG: at 16:51

## 2025-02-20 RX ADMIN — ONDANSETRON 4 MG: 2 INJECTION INTRAMUSCULAR; INTRAVENOUS at 08:45

## 2025-02-20 RX ADMIN — CLONAZEPAM 1 MG: 1 TABLET ORAL at 15:27

## 2025-02-20 RX ADMIN — FIDAXOMICIN 200 MG: 200 TABLET, FILM COATED ORAL at 08:45

## 2025-02-20 RX ADMIN — CLONAZEPAM 1 MG: 1 TABLET ORAL at 19:47

## 2025-02-20 RX ADMIN — Medication 1 LOZENGE: at 11:11

## 2025-02-20 RX ADMIN — Medication 2.5 MG: at 08:45

## 2025-02-20 RX ADMIN — CLONAZEPAM 1 MG: 1 TABLET ORAL at 08:45

## 2025-02-20 RX ADMIN — LIDOCAINE 4% 1 PATCH: 40 PATCH TOPICAL at 19:48

## 2025-02-20 RX ADMIN — LIDOCAINE 4% 1 PATCH: 40 PATCH TOPICAL at 08:51

## 2025-02-20 RX ADMIN — CELECOXIB 200 MG: 200 CAPSULE ORAL at 17:30

## 2025-02-20 RX ADMIN — Medication 2.5 MG: at 00:56

## 2025-02-20 RX ADMIN — HYDROMORPHONE HYDROCHLORIDE 0.3 MG: 1 INJECTION, SOLUTION INTRAMUSCULAR; INTRAVENOUS; SUBCUTANEOUS at 11:59

## 2025-02-20 RX ADMIN — CELECOXIB 200 MG: 200 CAPSULE ORAL at 22:52

## 2025-02-20 RX ADMIN — PANTOPRAZOLE SODIUM 40 MG: 40 TABLET, DELAYED RELEASE ORAL at 08:45

## 2025-02-20 RX ADMIN — Medication 2.5 MG: at 20:56

## 2025-02-20 RX ADMIN — FIDAXOMICIN 200 MG: 200 TABLET, FILM COATED ORAL at 20:04

## 2025-02-20 RX ADMIN — CELECOXIB 200 MG: 200 CAPSULE ORAL at 11:11

## 2025-02-20 ASSESSMENT — ACTIVITIES OF DAILY LIVING (ADL)
ADLS_ACUITY_SCORE: 53
ADLS_ACUITY_SCORE: 47
ADLS_ACUITY_SCORE: 42
ADLS_ACUITY_SCORE: 53
ADLS_ACUITY_SCORE: 53
DIFFICULTY_EATING/SWALLOWING: YES
DOING_ERRANDS_INDEPENDENTLY_DIFFICULTY: YES
ADLS_ACUITY_SCORE: 53
ADLS_ACUITY_SCORE: 42
ADLS_ACUITY_SCORE: 47
ADLS_ACUITY_SCORE: 47
CHANGE_IN_FUNCTIONAL_STATUS_SINCE_ONSET_OF_CURRENT_ILLNESS/INJURY: NO
ADLS_ACUITY_SCORE: 47
ADLS_ACUITY_SCORE: 47
ADLS_ACUITY_SCORE: 42
ADLS_ACUITY_SCORE: 53
ADLS_ACUITY_SCORE: 47
ADLS_ACUITY_SCORE: 53
ADLS_ACUITY_SCORE: 42
FALL_HISTORY_WITHIN_LAST_SIX_MONTHS: NO
ADLS_ACUITY_SCORE: 53
EATING/SWALLOWING: OTHER (SEE COMMENTS)
ADLS_ACUITY_SCORE: 47
ADLS_ACUITY_SCORE: 53
ADLS_ACUITY_SCORE: 47
ADLS_ACUITY_SCORE: 42

## 2025-02-20 NOTE — PROGRESS NOTES
6MS ADMISSION    D: Patient admitted/transferred from [facility] ED via wheelchair for c. diff management and elevated LFT's at 1605.     I: Upon arrival to the unit patient was oriented to room, unit, and call light. Patient s height, weight, and vital signs were obtained. Allergies reviewed and allergy band applied. Provider notified of patient s arrival on the unit. Adult AVS completed. Head to toe assessment completed. Education assessment completed. Care plan initiated.    A: Vital signs stable upon admission. Patient rates pain at 8/10. Two RN skin assessment completed with PONCHO Ortiz. Significant findings include scattered bruises.    P: Continue to monitor patient and intervene as needed. Continue with plan of care. Notify provider with any concerns or changes in patient status.

## 2025-02-20 NOTE — PROGRESS NOTES
[facility]    Medicine Progress Note - Hospitalist Service, GOLD TEAM 20    Date of Admission:  2/17/2025    Assessment & Plan     54 year old female admitted on 2/17/2025. She presents to the ER for abdominal pain, nausea, emesis and diarrhea in setting of markedly elevated LFT's. Patient was found with C. Diff colitis, she was started on Fidaxomicin with good tolerance. Patient was evaluated by Hepatology and considered acute hepatitis was multifactorial. LFT's had a significant improvement with supportive management. ID also evaluated the patient.       Today's updates   -No acute events overnight.   -LFT's trending down. , AST 40, Alk phos 176.   -Diarrhea resolved, she had one normal BM this morning.   -No fever. No dysuria. Respiratory status stable.   -Cr plateau despite IVF's and holding PTA Celebrex. Most likely she has CKD and this is her baseline.      #Acute Hepatitis  #RUQ abd Pain with positive Velarde's sign  -- Hepatology evaluated the patient. Hepatitis multifactorial.   -- LFT's as above.   -- RUQ US: Normal post cholecystectomy limited abdominal ultrasound. Normal abdominal liver duplex. No evidence for portal vein thrombosis.    -- Hepatology evaluated the patient and gave the following recommendations:   -- Follow-up acute hepatitis panel, hep C PCR, EMMIE, F-actin, CMV, EBV, CK and PETH   -- Given there is insurance coverage for fidaxomicin/dificid - would recommend fidaxomicin  200mg BID x 14 days for C. Diff given its association with reduced risk of C. difficile recurrence compared to vancomycin  Limit antibiotics as able in the setting of active C. difficile  -- Hepatic panel and INR daily  -- Continue supportive care for nausea, emesis and GI symptoms.  Do not give medications to alter gut motility, such as Imodium, in the setting of active C. Difficile  -- Avoid any medications or supplements not prescribed by a physician  -- Further  "workup and management of iron deficiency in the setting of epistaxis per primary team; if no improvement in iron deficiency in the setting of correction of epistaxis would consider further evaluation including upper endoscopy and colonoscopy.  Would not give IV iron at this time in the setting of active infection.       #RLL Infiltrate due to CAP - Resolved.      #C. Diff colitis   #Acute Diarrhea  #Moderate Dehydration - Resolved.   #C. Diff colitis   --  Continue on Fidaxomicin.   --  ID following the patient.     ID recommendations   Recommendations:  Continue C diff treatment with fidaxomicin x 10 days as started by hepatology  No current symptoms of pneumonia and completed 5-6 day course cefdinir/ceftriaxone. No further antibiotics for this at this time.   No evidence of pyelonephritis on CT and urine culture not consistent with UTI. No treatment at this time. Monitor flank-area pain which may be from different etiology.   Limit antibiotics other than fidaxomicin as able to prevent worsening C dif     #UTI - Resolved.     #MUNA vs CKD  -- I will favor CKD at this time.   -- Continue monitoring renal function and lytes.     #RA   -- I recommended close follow-up with Rheumatology as outpatient.   -- Continue on PTA Enbrel and Celebrex          Diet: Regular Diet Adult    DVT Prophylaxis: Enoxaparin (Lovenox) SQ  Saleh Catheter: Not present  Lines: None     Cardiac Monitoring: None  Code Status: Full Code      Clinically Significant Risk Factors          # Hyperchloremia: Highest Cl = 111 mmol/L in last 2 days, will monitor as appropriate          # Hypoalbuminemia: Lowest albumin = 3 g/dL at 2/20/2025  8:18 AM, will monitor as appropriate                # Obesity: Estimated body mass index is 30.62 kg/m  as calculated from the following:    Height as of this encounter: 1.676 m (5' 6\").    Weight as of this encounter: 86 kg (189 lb 11.2 oz)., PRESENT ON ADMISSION            Social Drivers of Health    Food " Insecurity: High Risk (12/4/2024)    Food Insecurity     Within the past 12 months, did you worry that your food would run out before you got money to buy more?: Yes     Within the past 12 months, did the food you bought just not last and you didn t have money to get more?: Yes   Depression: At risk (2/11/2025)    PHQ-2     PHQ-2 Score: 3   Housing Stability: High Risk (12/4/2024)    Housing Stability     Do you have housing? : Yes     Are you worried about losing your housing?: Yes   Tobacco Use: Medium Risk (2/17/2025)    Patient History     Smoking Tobacco Use: Former     Smokeless Tobacco Use: Never     Passive Exposure: Past   Financial Resource Strain: High Risk (12/4/2024)    Financial Resource Strain     Within the past 12 months, have you or your family members you live with been unable to get utilities (heat, electricity) when it was really needed?: Yes   Transportation Needs: High Risk (12/4/2024)    Transportation Needs     Within the past 12 months, has lack of transportation kept you from medical appointments, getting your medicines, non-medical meetings or appointments, work, or from getting things that you need?: Yes          Disposition Plan     Medically Ready for Discharge: Anticipated Tomorrow             Alek Sorto MD  Hospitalist Service, GOLD TEAM 20  [facility]  Securely message with [employer] (more info)  Text page via [facility] Paging/Directory   See signed in provider for up to date coverage information  ______________________________________________________________________    Interval History     Acute events as above.   Mild R lower abdominal pain  No cough or shortness of breath.   No fever, chills or diaphoresis.       Physical Exam   Vital Signs: Temp: 98.2  F (36.8  C) Temp src: Oral BP: (S) (!) 142/89 Pulse: 97   Resp: 16 SpO2: 94 % O2 Device: None (Room air)    Weight: 189 lbs 11.2 oz    General Appearance: Alert, room air, no acute  distress  Respiratory: Normal respiratory effort. No crackles, wheezing, rhonchi.  Cardiovascular: Normal S1, S2.  GI: Obese, soft, + BS, diffuse tenderness to palpation, no rebound or guarding   Skin: No jaundice, scleral icterus, no rashes, no bruising  Other:  Motor and sensory intact     Medical Decision Making       55 MINUTES SPENT BY ME on the date of service doing chart review, history, exam, documentation & further activities per the note.      Data     I have personally reviewed the following data over the past 24 hrs:    7.4  \   10.0 (L)   / 191     141 110 (H) 13.7 /  89   4.6 22 1.14 (H) \     ALT: 130 (H) AST: 40 AP: 176 (H) TBILI: 0.3   ALB: 3.0 (L) TOT PROTEIN: 6.9 LIPASE: N/A     INR:  1.05 PTT:  N/A   D-dimer:  N/A Fibrinogen:  N/A       Imaging results reviewed over the past 24 hrs:   No results found for this or any previous visit (from the past 24 hours).

## 2025-02-21 VITALS
TEMPERATURE: 98.8 F | BODY MASS INDEX: 30.29 KG/M2 | SYSTOLIC BLOOD PRESSURE: 122 MMHG | DIASTOLIC BLOOD PRESSURE: 92 MMHG | WEIGHT: 188.49 LBS | RESPIRATION RATE: 16 BRPM | OXYGEN SATURATION: 98 % | HEIGHT: 66 IN | HEART RATE: 84 BPM

## 2025-02-21 LAB
ALBUMIN SERPL BCG-MCNC: 3.3 G/DL (ref 3.5–5.2)
ALP SERPL-CCNC: 170 U/L (ref 40–150)
ALT SERPL W P-5'-P-CCNC: 110 U/L (ref 0–50)
ANION GAP SERPL CALCULATED.3IONS-SCNC: 11 MMOL/L (ref 7–15)
AST SERPL W P-5'-P-CCNC: 40 U/L (ref 0–45)
BILIRUB DIRECT SERPL-MCNC: <0.08 MG/DL (ref 0–0.3)
BILIRUB SERPL-MCNC: 0.3 MG/DL
BUN SERPL-MCNC: 17 MG/DL (ref 6–20)
CALCIUM SERPL-MCNC: 9.2 MG/DL (ref 8.8–10.4)
CHLORIDE SERPL-SCNC: 109 MMOL/L (ref 98–107)
CREAT SERPL-MCNC: 1.08 MG/DL (ref 0.51–0.95)
EGFRCR SERPLBLD CKD-EPI 2021: 61 ML/MIN/1.73M2
ERYTHROCYTE [DISTWIDTH] IN BLOOD BY AUTOMATED COUNT: 14.1 % (ref 10–15)
GLUCOSE SERPL-MCNC: 99 MG/DL (ref 70–99)
HCO3 SERPL-SCNC: 24 MMOL/L (ref 22–29)
HCT VFR BLD AUTO: 33.4 % (ref 35–47)
HGB BLD-MCNC: 10.9 G/DL (ref 11.7–15.7)
INR PPP: 1 (ref 0.85–1.15)
MCH RBC QN AUTO: 29.4 PG (ref 26.5–33)
MCHC RBC AUTO-ENTMCNC: 32.6 G/DL (ref 31.5–36.5)
MCV RBC AUTO: 90 FL (ref 78–100)
PLATELET # BLD AUTO: 188 10E3/UL (ref 150–450)
POTASSIUM SERPL-SCNC: 4.4 MMOL/L (ref 3.4–5.3)
PROT SERPL-MCNC: 7.2 G/DL (ref 6.4–8.3)
RBC # BLD AUTO: 3.71 10E6/UL (ref 3.8–5.2)
SODIUM SERPL-SCNC: 144 MMOL/L (ref 135–145)
WBC # BLD AUTO: 5.5 10E3/UL (ref 4–11)

## 2025-02-21 PROCEDURE — 250N000013 HC RX MED GY IP 250 OP 250 PS 637: Performed by: STUDENT IN AN ORGANIZED HEALTH CARE EDUCATION/TRAINING PROGRAM

## 2025-02-21 PROCEDURE — 85610 PROTHROMBIN TIME: CPT | Performed by: STUDENT IN AN ORGANIZED HEALTH CARE EDUCATION/TRAINING PROGRAM

## 2025-02-21 PROCEDURE — 82248 BILIRUBIN DIRECT: CPT | Performed by: STUDENT IN AN ORGANIZED HEALTH CARE EDUCATION/TRAINING PROGRAM

## 2025-02-21 PROCEDURE — 36415 COLL VENOUS BLD VENIPUNCTURE: CPT | Performed by: STUDENT IN AN ORGANIZED HEALTH CARE EDUCATION/TRAINING PROGRAM

## 2025-02-21 PROCEDURE — 85041 AUTOMATED RBC COUNT: CPT | Performed by: STUDENT IN AN ORGANIZED HEALTH CARE EDUCATION/TRAINING PROGRAM

## 2025-02-21 PROCEDURE — 250N000011 HC RX IP 250 OP 636: Performed by: STUDENT IN AN ORGANIZED HEALTH CARE EDUCATION/TRAINING PROGRAM

## 2025-02-21 PROCEDURE — 82435 ASSAY OF BLOOD CHLORIDE: CPT | Performed by: INTERNAL MEDICINE

## 2025-02-21 PROCEDURE — 85014 HEMATOCRIT: CPT | Performed by: STUDENT IN AN ORGANIZED HEALTH CARE EDUCATION/TRAINING PROGRAM

## 2025-02-21 PROCEDURE — 82040 ASSAY OF SERUM ALBUMIN: CPT | Performed by: INTERNAL MEDICINE

## 2025-02-21 PROCEDURE — 99239 HOSP IP/OBS DSCHRG MGMT >30: CPT | Performed by: INTERNAL MEDICINE

## 2025-02-21 PROCEDURE — 250N000013 HC RX MED GY IP 250 OP 250 PS 637: Performed by: INTERNAL MEDICINE

## 2025-02-21 RX ADMIN — Medication 2.5 MG: at 13:26

## 2025-02-21 RX ADMIN — CLONAZEPAM 1 MG: 1 TABLET ORAL at 13:26

## 2025-02-21 RX ADMIN — Medication 2.5 MG: at 09:13

## 2025-02-21 RX ADMIN — Medication 2.5 MG: at 05:09

## 2025-02-21 RX ADMIN — CLONAZEPAM 1 MG: 1 TABLET ORAL at 08:21

## 2025-02-21 RX ADMIN — CELECOXIB 200 MG: 200 CAPSULE ORAL at 11:03

## 2025-02-21 RX ADMIN — Medication 2.5 MG: at 00:59

## 2025-02-21 RX ADMIN — PANTOPRAZOLE SODIUM 40 MG: 40 TABLET, DELAYED RELEASE ORAL at 08:21

## 2025-02-21 RX ADMIN — ENOXAPARIN SODIUM 40 MG: 40 INJECTION SUBCUTANEOUS at 13:26

## 2025-02-21 RX ADMIN — FIDAXOMICIN 200 MG: 200 TABLET, FILM COATED ORAL at 09:13

## 2025-02-21 ASSESSMENT — ACTIVITIES OF DAILY LIVING (ADL)
ADLS_ACUITY_SCORE: 42

## 2025-02-21 NOTE — PLAN OF CARE
[id number]    Goal Outcome Evaluation:      Plan of Care Reviewed With: patient    Overall Patient Progress: no changeOverall Patient Progress: no change    Pt is alert and oriented  Regular diet  Continent of bowel and bladder  Last BM 2/20/25  SBA  Pain managed by PRN Oxycodone  Right PIV Saline locked  VSS on room air  Denies chest pain or shortness of breath  Call light within reach  Continue with POC

## 2025-02-21 NOTE — DISCHARGE SUMMARY
"[facility]  Hospitalist Discharge Summary      Date of Admission:  2/17/2025  Date of Discharge:  2/21/2025  Discharging Provider: Dayan Laws MD  Discharge Service: Hospitalist Service, GOLD TEAM 20  Date of first encounter 2/21/24  Discharge Diagnoses   See below    Clinically Significant Risk Factors     # Obesity: Estimated body mass index is 30.42 kg/m  as calculated from the following:    Height as of this encounter: 1.676 m (5' 6\").    Weight as of this encounter: 85.5 kg (188 lb 7.9 oz).       Follow-ups Needed After Discharge   Follow-up Appointments       Hospital Follow-up with Existing Primary Care Provider (PCP)      Please see details below . CMP repeat In one week   Labs pending at are [facility] and [facility]    Schedule Primary Care visit within: 30 Days               Unresulted Labs Ordered in the Past 30 Days of this Admission       No orders found from 1/18/2025 to 2/18/2025.        These results will be followed up by PCP    Discharge Disposition   Discharged to home  Condition at discharge: Stable    Hospital Course    54 year old female admitted on 2/17/2025. She presents to the ER for abdominal pain, nausea, emesis and diarrhea in setting of markedly elevated LFT's. Patient was found with C. Diff colitis, she was started on Fidaxomicin with good tolerance. Patient was evaluated by Hepatology and considered acute hepatitis was multifactorial. LFT's had a significant improvement with supportive management. ID also evaluated the patient.           # C diff colitis   #Acute Hepatitis. LFTs improving ,  With her current decrease in transaminases and recent C. difficile, likely she had hypoperfusion to her liver with dehydration causing elevation in her transaminases.  She does have a history of MASLD.     #RUQ abd Pain with positive Velarde's sign ; resolved  -- Hepatology evaluated the patient. Hepatitis multifactorial.   -- LFT's as above.   -- RUQ US: " Normal post cholecystectomy limited abdominal ultrasound. Normal abdominal liver duplex. No evidence for portal vein thrombosis.     -- Hepatology evaluated the patient and gave the following recommendations:     PETH, EMMIE, factin pending. HCV RNA negative. CMV, EBV PCR negative         - iron deficiency in the setting of epistaxis ; repeat iron studies as out patient and consider egd.colonoscopy          #RLL Infiltrate due to CAP - Resolved.      #C. Diff colitis   #Acute Diarrhea  #Moderate Dehydration - Resolved.   #C. Diff colitis   --  Continue on Fidaxomicin. 14 days            #UTI - Resolved.      #MUNA vs CKD  --chana bernardo PCP         #RA      follow-up with Rheumatology as outpatient.   -- Continue on PTA Enbrel and Celebrex    Consultations This Hospital Stay   HEMATOLOGY ADULT IP CONSULT  PHYSICAL THERAPY ADULT IP CONSULT  OCCUPATIONAL THERAPY ADULT IP CONSULT  GI HEPATOLOGY ADULT IP CONSULT  PHARMACY LIAISON FOR MEDICATION COVERAGE CONSULT  INFECTIOUS DISEASE [facility] ADULT IP CONSULT    Code Status   Full Code    Time Spent on this Encounter   I, Dayan Laws MD, personally saw the patient today and spent greater than 30 minutes discharging this patient.   I spoke with sophie Hernandez her PCA over phone , discussed with bedside RN and care management team and read GI and ID notes . I discussed C diff hand washing and bleaching toilet after use and also follow up with rheum and Gi and pending results        Dayan Laws MD  [facility] MED SURG  [address] [place] [id number]  Phone: [phone]  Fax: [phone]  ______________________________________________________________________    Physical Exam   Vital Signs: Temp: 97.8  F (36.6  C) Temp src: Oral BP: 125/75 Pulse: 82   Resp: 16 SpO2: 95 % O2 Device: None (Room air)    Weight: 188 lbs 7.89 oz       Alert and oriented . In no acute distress .  Chest ; Breath sounds are equal BL. No respiratory distress noted  .  Cardiovascular Exam ; S1 & S2 .  GI ; Abdomen is soft and non tender. BS positive .  Skin ; no jaundice noted.  Psych ; Santiago mood & affect.    Primary Care Physician   Mika Zamora    Discharge Orders      Primary Care - Care Coordination Referral      Reason for your hospital stay    You were treated fpr C diff and also had abnormal Liver function tests  . You were seen by Infectious disease and Gastroenterology     Activity    Your activity upon discharge: activity as tolerated     Diet    Follow this diet upon discharge: Current Diet:Orders Placed This Encounter      Regular Diet Adult     Hospital Follow-up with Existing Primary Care Provider (PCP)    Please see details below . CMP repeat In one week   Labs pending at are PETH and Factin       Significant Results and Procedures   Most Recent 3 CBC's:  Recent Labs   Lab Test 02/21/25  0754 02/20/25  0818 02/19/25  0825   WBC 5.5 7.4 7.6   HGB 10.9* 10.0* 10.4*   MCV 90 91 92    191 171     Most Recent 3 BMP's:  Recent Labs   Lab Test 02/21/25  0754 02/20/25  0818 02/19/25  0825    141 140   POTASSIUM 4.4 4.6 4.3   CHLORIDE 109* 110* 111*   CO2 24 22 19*   BUN 17.0 13.7 10.7   CR 1.08* 1.14* 1.04*   ANIONGAP 11 9 10   JERRI 9.2 8.9 8.6*   GLC 99 89 84     Most Recent 2 LFT's:  Recent Labs   Lab Test 02/21/25  0754 02/20/25  0818   AST 40 40   * 130*   ALKPHOS 170* 176*   BILITOTAL 0.3 0.3       Discharge Medications   Current Discharge Medication List        START taking these medications    Details   fidaxomicin (DIFICID) 200 MG tablet Take 1 tablet (200 mg) by mouth 2 times daily for 9 days.  Qty: 18 tablet, Refills: 0    Associated Diagnoses: Colitis due to Clostridium difficile      naloxone (NARCAN) 4 MG/0.1ML nasal spray Spray 1 spray (4 mg) into one nostril alternating nostrils as needed for opioid reversal. every 2-3 minutes until assistance arrives  Qty: 2 each, Refills: 0    Associated Diagnoses: Encounter for long-term opiate  analgesic use           CONTINUE these medications which have NOT CHANGED    Details   celecoxib (CELEBREX) 200 MG capsule Take 200 mg by mouth 3 times daily      Cholecalciferol (VITAMIN D3 PO) Take 1 tablet by mouth every morning.      clonazePAM (KLONOPIN) 1 MG tablet Take 1 mg by mouth 3 times daily.      Collagen-Vitamin C-Biotin (COLLAGEN PO) Take 1 tablet by mouth every morning.      ENBREL SURECLICK 50 MG/ML autoinjector 50 mg twice a week. Hold before surgery on 9/30/24      Omega-3 Fatty Acids (FISH OIL PO) Take 1 tablet by mouth every morning.      omeprazole (PRILOSEC) 40 MG DR capsule Take 40 mg by mouth 2 times daily as needed.           STOP taking these medications       Ascorbic Acid (VITAMIN C PO) Comments:   Reason for Stopping:         benzonatate (TESSALON) 200 MG capsule Comments:   Reason for Stopping:         cefdinir (OMNICEF) 300 MG capsule Comments:   Reason for Stopping:         Cyanocobalamin (VITAMIN B-12 PO) Comments:   Reason for Stopping:         MAGNESIUM PO Comments:   Reason for Stopping:         Multiple Vitamins-Minerals (HAIR SKIN & NAILS PO) Comments:   Reason for Stopping:         OnabotulinumtoxinA (BOTOX IJ) Comments:   Reason for Stopping:         Prenatal Vit-Fe Fumarate-FA (PRENATAL MULTIVITAMIN W/IRON) 27-0.8 MG tablet Comments:   Reason for Stopping:         VITAMIN E PO Comments:   Reason for Stopping:             Allergies   Allergies   Allergen Reactions    Leflunomide Anaphylaxis    Morphine Swelling    Celecoxib Other (See Comments)     Other reaction(s): stroke , lasted 24 hours  -Pt takes celecoxib daily. States that they changed the  and now it is fine.    Amitriptyline Other (See Comments)     Sleepwalking     Codeine     Gabapentin Other (See Comments)     Pins,needles, stabbing aching at once  Pins,needles, stabbing aching at once  Numbness and Tingling    Ibuprofen      Doesn't take due to gastric ulcer    Sulfa Antibiotics Nausea and Vomiting      unknown    Tylenol [Acetaminophen]      Pt. States that she has Liver problems  Tylenol 3    Dexamethasone Palpitations     Heart racing, sweating     Erythromycin Nausea     Vomiting     Penicillins Rash    Prednisone Palpitations     Heart racing

## 2025-02-21 NOTE — PLAN OF CARE
"6MS DISCHARGE    D: Patient discharged to home at 1330. Patient accompanied by pt's son.    I: Discharge prescriptions given to patient. All discharge medications and instructions reviewed with pt. Patient instructed to seek care if experiencing worsening symptoms, such as sob/chest pain. Other phone numbers to call with questions or concerns after discharge reviewed. piv removed. Education completed.    A: pt verbalized understanding of discharge medications and instructions. Prescribed home medications given to patient.  Belongings returned to patient.    P: Patient to follow-up with PCP within 30 days. Refused to take narcan home that was order for discharge. Pt/son express feeling offending and thought it was order because they are native.    Goal Outcome Evaluation:       Patient is alert and oriented x4. Able to make needs known. On RA stable. Denies N/V, numbness/tingling or CP. Pt up independently.  LBM 2/20. Voiding spontaneously with no difficulty. Ate 100% of breakfast and lunch. Skin CDI. Bed in low position.     BP (!) 122/92 (BP Location: Left arm)   Pulse 84   Temp 98.8  F (37.1  C) (Oral)   Resp 16   Ht 1.676 m (5' 6\")   Wt 85.5 kg (188 lb 7.9 oz)   LMP  (LMP Unknown)   SpO2 98%   BMI 30.42 kg/m      "

## 2025-02-24 ENCOUNTER — PATIENT OUTREACH (OUTPATIENT)
Dept: CARE COORDINATION | Facility: CLINIC | Age: 55
End: 2025-02-24
Payer: COMMERCIAL

## 2025-02-24 NOTE — PROGRESS NOTES
Clinic Care Coordination Contact  [facility]/Voicemail    Clinical Data: Care Coordinator Outreach    Outreach Documentation Number of Outreach Attempt   2/24/2025   2:07 PM 1       Left message on patient's voicemail with call back information and requested return call.      Plan: Care Coordinator will try to reach patient again in 1-2 business days.    Betty Hutchinson RN, BSN, PHN Care Coordinator  [place], [place], and [facility]   Phone: [phone]

## 2025-02-25 NOTE — PROGRESS NOTES
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  Elevated liver enzymes - R74.8  Right sided abdominal pain - R10.9  Encounter for long-term opiate analgesic use - Z79.891  Pneumonia due to infectious organism, unspecified laterality, unspecified part of lung - J18.9  Rheumatoid arthritis, involving unspecified site, unspecified whether rheumatoid factor present (H) - M06.9     Most Recent Discharge Date: 2/21/2025   Most Recent Discharge Diagnosis: Rheumatoid arthritis, involving unspecified site, unspecified whether rheumatoid factor present (H) - M06.9  Right sided abdominal pain - R10.9  Pneumonia due to infectious organism, unspecified laterality, unspecified part of lung - J18.9  Chills - R68.83  Colitis - K52.9  Elevated liver enzymes - R74.8  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    Discharge Assessment  How are you doing now that you are home?: \"A little tired\" No longer incontinent of bowel movements. Overall doing better.  How are your symptoms? (Red Flag symptoms escalate to triage hotline per guidelines): Improved  Do you know how to contact your clinic care team if you have future questions or changes to your health status? : Yes  Does the patient have their discharge instructions? : Yes  Does the patient have questions regarding their discharge instructions? : No  Were you started on any new medications or were there changes to any of your previous medications? : Yes  Does the patient have all of their medications?: No (see comment) " "(Did not  narcan she reports she was \"offended\" about this prescription being given to her as she does not smoke, drink or do drugs-per patient)  Do you have questions regarding any of your medications? : No  Do you have all of your needed medical supplies or equipment (DME)?  (i.e. oxygen tank, CPAP, cane, etc.): Yes       Post-op (Clinicians Only)  Did the patient have surgery or a procedure: No  Fever: No  Chills: No  Eating & Drinking: eating and drinking without complaints/concerns  PO Intake: regular diet  Bowel Function: constipation  Date of last BM: 02/23/25 (has senna at home, and will take per lable instructions. she reports she had very large amounts of stool on saturday. not going daily is normal for her body.)  Urinary Status: voiding without complaint/concerns (\"I leak urine\" she will discuss with her PCP. CC RN offered to warm transfer to main scheduling line, pt declined. she will monitor sx, and f/u as needed.)    Per chart review, patient reports she is already working with Adia, her AX[facility] care coordinator with [facility]. Patient getting routine check ins and follow up from Adia. Patient also has [facility] services, boyfriend is her PCA. Therefore, patient has managed care.    Follow up Plan     Discharge Follow-Up  Discharge follow up appointment scheduled in alignment with recommended follow up timeframe or Transitions of Risk Category? (Low = within 30 days; Moderate= within 14 days; High= within 7 days): No  Patient's follow up appointment not scheduled: Patient declined scheduling support. Education on the importance of transitions of care follow up. Provided scheduling phone number. (Patient declined a post hospital follow up appointment with PCP. She has a pending follow up with rheumatology follow up in a few weeks.)    No future appointments.    Outpatient Plan as outlined on AVS reviewed with patient.    For any urgent concerns, please contact our 24 hour nurse triage line: " [phone] ([phone])     Patient is already receiving care management with [place] [facility] Hutchinson RN

## 2025-03-18 ENCOUNTER — HOSPITAL ENCOUNTER (EMERGENCY)
Facility: CLINIC | Age: 55
Discharge: HOME | End: 2025-03-18
Attending: EMERGENCY MEDICINE
Payer: COMMERCIAL

## 2025-03-18 ENCOUNTER — APPOINTMENT (OUTPATIENT)
Dept: CT IMAGING | Facility: CLINIC | Age: 55
End: 2025-03-18
Payer: COMMERCIAL

## 2025-03-18 VITALS
OXYGEN SATURATION: 98 % | HEART RATE: 101 BPM | BODY MASS INDEX: 29.65 KG/M2 | TEMPERATURE: 98 F | DIASTOLIC BLOOD PRESSURE: 107 MMHG | WEIGHT: 183.7 LBS | RESPIRATION RATE: 16 BRPM | SYSTOLIC BLOOD PRESSURE: 144 MMHG

## 2025-03-18 DIAGNOSIS — R33.9 URINARY RETENTION: ICD-10-CM

## 2025-03-18 LAB
ALBUMIN SERPL BCG-MCNC: 4.4 G/DL (ref 3.5–5.2)
ALBUMIN UR-MCNC: NEGATIVE MG/DL
ALBUMIN UR-MCNC: NEGATIVE MG/DL
ALP SERPL-CCNC: 129 U/L (ref 40–150)
ALT SERPL W P-5'-P-CCNC: 28 U/L (ref 0–50)
ANION GAP SERPL CALCULATED.3IONS-SCNC: 12 MMOL/L (ref 7–15)
APPEARANCE UR: CLEAR
APPEARANCE UR: CLEAR
AST SERPL W P-5'-P-CCNC: 26 U/L (ref 0–45)
BASOPHILS # BLD AUTO: 0.1 10E3/UL (ref 0–0.2)
BASOPHILS NFR BLD AUTO: 1 %
BILIRUB SERPL-MCNC: 0.3 MG/DL
BILIRUB UR QL STRIP: NEGATIVE
BILIRUB UR QL STRIP: NEGATIVE
BUN SERPL-MCNC: 22.1 MG/DL (ref 6–20)
CALCIUM SERPL-MCNC: 9.8 MG/DL (ref 8.8–10.4)
CHLORIDE SERPL-SCNC: 104 MMOL/L (ref 98–107)
COLOR UR AUTO: ABNORMAL
COLOR UR AUTO: NORMAL
CREAT SERPL-MCNC: 1 MG/DL (ref 0.51–0.95)
EGFRCR SERPLBLD CKD-EPI 2021: 67 ML/MIN/1.73M2
EOSINOPHIL # BLD AUTO: 0.6 10E3/UL (ref 0–0.7)
EOSINOPHIL NFR BLD AUTO: 12 %
ERYTHROCYTE [DISTWIDTH] IN BLOOD BY AUTOMATED COUNT: 13.9 % (ref 10–15)
FLUAV RNA SPEC QL NAA+PROBE: NEGATIVE
FLUBV RNA RESP QL NAA+PROBE: NEGATIVE
GLUCOSE SERPL-MCNC: 105 MG/DL (ref 70–99)
GLUCOSE UR STRIP-MCNC: NEGATIVE MG/DL
GLUCOSE UR STRIP-MCNC: NEGATIVE MG/DL
HCG UR QL: NEGATIVE
HCO3 SERPL-SCNC: 25 MMOL/L (ref 22–29)
HCT VFR BLD AUTO: 42.6 % (ref 35–47)
HGB BLD-MCNC: 13.6 G/DL (ref 11.7–15.7)
HGB UR QL STRIP: NEGATIVE
HGB UR QL STRIP: NEGATIVE
IMM GRANULOCYTES # BLD: 0 10E3/UL
IMM GRANULOCYTES NFR BLD: 0 %
KETONES UR STRIP-MCNC: NEGATIVE MG/DL
KETONES UR STRIP-MCNC: NEGATIVE MG/DL
LACTATE SERPL-SCNC: 1.2 MMOL/L (ref 0.7–2)
LEUKOCYTE ESTERASE UR QL STRIP: NEGATIVE
LEUKOCYTE ESTERASE UR QL STRIP: NEGATIVE
LIPASE SERPL-CCNC: 35 U/L (ref 13–60)
LYMPHOCYTES # BLD AUTO: 1.8 10E3/UL (ref 0.8–5.3)
LYMPHOCYTES NFR BLD AUTO: 33 %
MCH RBC QN AUTO: 28.9 PG (ref 26.5–33)
MCHC RBC AUTO-ENTMCNC: 31.9 G/DL (ref 31.5–36.5)
MCV RBC AUTO: 90 FL (ref 78–100)
MONOCYTES # BLD AUTO: 0.5 10E3/UL (ref 0–1.3)
MONOCYTES NFR BLD AUTO: 10 %
MUCOUS THREADS #/AREA URNS LPF: PRESENT /LPF
NEUTROPHILS # BLD AUTO: 2.4 10E3/UL (ref 1.6–8.3)
NEUTROPHILS NFR BLD AUTO: 45 %
NITRATE UR QL: NEGATIVE
NITRATE UR QL: NEGATIVE
NRBC # BLD AUTO: 0 10E3/UL
NRBC BLD AUTO-RTO: 0 /100
PH UR STRIP: 5.5 [PH] (ref 5–7)
PH UR STRIP: 5.5 [PH] (ref 5–7)
PLATELET # BLD AUTO: 186 10E3/UL (ref 150–450)
POTASSIUM SERPL-SCNC: 4.4 MMOL/L (ref 3.4–5.3)
PROT SERPL-MCNC: 8.9 G/DL (ref 6.4–8.3)
RBC # BLD AUTO: 4.71 10E6/UL (ref 3.8–5.2)
RBC URINE: 1 /HPF
RBC URINE: <1 /HPF
RSV RNA SPEC NAA+PROBE: NEGATIVE
S PYO DNA THROAT QL NAA+PROBE: NOT DETECTED
SARS-COV-2 RNA RESP QL NAA+PROBE: NEGATIVE
SODIUM SERPL-SCNC: 141 MMOL/L (ref 135–145)
SP GR UR STRIP: 1.02 (ref 1–1.03)
SP GR UR STRIP: 1.03 (ref 1–1.03)
SQUAMOUS EPITHELIAL: 3 /HPF
UROBILINOGEN UR STRIP-MCNC: NORMAL MG/DL
UROBILINOGEN UR STRIP-MCNC: NORMAL MG/DL
WBC # BLD AUTO: 5.4 10E3/UL (ref 4–11)
WBC URINE: 2 /HPF
WBC URINE: <1 /HPF

## 2025-03-18 PROCEDURE — 85004 AUTOMATED DIFF WBC COUNT: CPT

## 2025-03-18 PROCEDURE — 87651 STREP A DNA AMP PROBE: CPT | Performed by: FAMILY MEDICINE

## 2025-03-18 PROCEDURE — 82247 BILIRUBIN TOTAL: CPT

## 2025-03-18 PROCEDURE — 36415 COLL VENOUS BLD VENIPUNCTURE: CPT

## 2025-03-18 PROCEDURE — 250N000013 HC RX MED GY IP 250 OP 250 PS 637

## 2025-03-18 PROCEDURE — 99285 EMERGENCY DEPT VISIT HI MDM: CPT | Mod: 25

## 2025-03-18 PROCEDURE — 250N000011 HC RX IP 250 OP 636

## 2025-03-18 PROCEDURE — 250N000009 HC RX 250

## 2025-03-18 PROCEDURE — 96361 HYDRATE IV INFUSION ADD-ON: CPT

## 2025-03-18 PROCEDURE — 87637 SARSCOV2&INF A&B&RSV AMP PRB: CPT | Performed by: EMERGENCY MEDICINE

## 2025-03-18 PROCEDURE — 84155 ASSAY OF PROTEIN SERUM: CPT

## 2025-03-18 PROCEDURE — 96374 THER/PROPH/DIAG INJ IV PUSH: CPT | Mod: 59

## 2025-03-18 PROCEDURE — 85048 AUTOMATED LEUKOCYTE COUNT: CPT

## 2025-03-18 PROCEDURE — 81025 URINE PREGNANCY TEST: CPT

## 2025-03-18 PROCEDURE — 87651 STREP A DNA AMP PROBE: CPT | Performed by: EMERGENCY MEDICINE

## 2025-03-18 PROCEDURE — 99285 EMERGENCY DEPT VISIT HI MDM: CPT

## 2025-03-18 PROCEDURE — 81003 URINALYSIS AUTO W/O SCOPE: CPT | Performed by: FAMILY MEDICINE

## 2025-03-18 PROCEDURE — 81001 URINALYSIS AUTO W/SCOPE: CPT | Performed by: EMERGENCY MEDICINE

## 2025-03-18 PROCEDURE — 51702 INSERT TEMP BLADDER CATH: CPT

## 2025-03-18 PROCEDURE — 83690 ASSAY OF LIPASE: CPT

## 2025-03-18 PROCEDURE — 74177 CT ABD & PELVIS W/CONTRAST: CPT

## 2025-03-18 PROCEDURE — 83605 ASSAY OF LACTIC ACID: CPT

## 2025-03-18 PROCEDURE — 258N000003 HC RX IP 258 OP 636

## 2025-03-18 PROCEDURE — 85018 HEMOGLOBIN: CPT

## 2025-03-18 PROCEDURE — 87637 SARSCOV2&INF A&B&RSV AMP PRB: CPT | Performed by: FAMILY MEDICINE

## 2025-03-18 PROCEDURE — 84460 ALANINE AMINO (ALT) (SGPT): CPT

## 2025-03-18 RX ORDER — OXYCODONE HYDROCHLORIDE 5 MG/1
5 TABLET ORAL ONCE
Status: COMPLETED | OUTPATIENT
Start: 2025-03-18 | End: 2025-03-18

## 2025-03-18 RX ORDER — IOPAMIDOL 755 MG/ML
100 INJECTION, SOLUTION INTRAVASCULAR ONCE
Status: COMPLETED | OUTPATIENT
Start: 2025-03-18 | End: 2025-03-18

## 2025-03-18 RX ORDER — ONDANSETRON 2 MG/ML
4 INJECTION INTRAMUSCULAR; INTRAVENOUS ONCE
Status: COMPLETED | OUTPATIENT
Start: 2025-03-18 | End: 2025-03-18

## 2025-03-18 RX ADMIN — ONDANSETRON 4 MG: 2 INJECTION INTRAMUSCULAR; INTRAVENOUS at 14:59

## 2025-03-18 RX ADMIN — IOPAMIDOL 90 ML: 755 INJECTION, SOLUTION INTRAVENOUS at 15:44

## 2025-03-18 RX ADMIN — OXYCODONE 5 MG: 5 TABLET ORAL at 13:43

## 2025-03-18 RX ADMIN — SODIUM CHLORIDE 44 ML: 9 INJECTION, SOLUTION INTRAVENOUS at 15:44

## 2025-03-18 RX ADMIN — SODIUM CHLORIDE 1000 ML: 0.9 INJECTION, SOLUTION INTRAVENOUS at 14:59

## 2025-03-18 ASSESSMENT — COLUMBIA-SUICIDE SEVERITY RATING SCALE - C-SSRS
1. IN THE PAST MONTH, HAVE YOU WISHED YOU WERE DEAD OR WISHED YOU COULD GO TO SLEEP AND NOT WAKE UP?: NO
6. HAVE YOU EVER DONE ANYTHING, STARTED TO DO ANYTHING, OR PREPARED TO DO ANYTHING TO END YOUR LIFE?: NO
2. HAVE YOU ACTUALLY HAD ANY THOUGHTS OF KILLING YOURSELF IN THE PAST MONTH?: NO

## 2025-03-18 ASSESSMENT — ACTIVITIES OF DAILY LIVING (ADL)
ADLS_ACUITY_SCORE: 54

## 2025-03-18 NOTE — ED TRIAGE NOTES
Patient has current diagnosis of c-diff.  Patient reports abdominal pain/ bloating  Right flank pain   Patient also reports throat pain     Triage Assessment (Adult)       Row Name 03/18/25 1215          Triage Assessment    Airway WDL WDL        Respiratory WDL    Respiratory WDL WDL        Skin Circulation/Temperature WDL    Skin Circulation/Temperature WDL WDL        Cardiac WDL    Cardiac WDL WDL        Peripheral/Neurovascular WDL    Peripheral Neurovascular WDL WDL        Cognitive/Neuro/Behavioral WDL    Cognitive/Neuro/Behavioral WDL WDL

## 2025-03-18 NOTE — ED PROVIDER NOTES
[facility] EMERGENCY DEPARTMENT ([facility])    3/18/25      ED PROVIDER NOTE    History     Chief Complaint   Patient presents with    Flank Pain     Right sided     The history is provided by the patient and medical records. No  was used.     Kalyn Rivero is a 54 year old female with a history notable for RA, GERD, fibromyalgia, osteoarthritis, renal stone and recent admission for C. difficile colitis and acute hepatitis who presents to the ED with flank pain.  She states that she has not urinated much in the past 24 hours.  She last urinated here in the ED for a UA sample but empty her bladder completely.  She locates her discomfort to her right lower quadrant with radiation into her right back area.  She reports some vaginal discharge that seems abnormal but she attributes this to her recent antibiotic use for C. difficile diagnosis.  She denies abnormal vaginal bleeding.  She states she is not pregnant due to starting menopause at age 41.  She denies any dysuria or hematuria otherwise.  She denies any diarrhea or constipation and reports one stool in the past 24 hours that was yellow in color without any melena or hematochezia.  She denies any upper abdomen symptoms, chest pain, shortness of air.  She does report a sore throat without difficulties or inability to swallow.  She also reports rhinorrhea and mild cough.  She is scheduled to see her PCP tomorrow for reevaluation and recheck of C. difficile after finishing antibiotic course.    Past Medical History  Past Medical History:   Diagnosis Date    Acetaminophen overdose 03/24/2011    Anemia     Anesthesia complication     pt states has a history of heart stopping during anesthesia,    Arrhythmia     Cerebral artery occlusion with cerebral infarction (H)     Cerebral infarction (H)     Cervical high risk HPV (human papillomavirus) test positive 02/22/2017    type 18 & other HR HPV    Chronic infection     MRSA    Chronic pain  "03/24/2011    Degenerative joint disease     Endometriosis     Fibromyalgia     Gastro-oesophageal reflux disease     Heart murmur     History of blood transfusion     Hypothyroidism     Immune disorder     Learning disability     Malignant neoplasm (H)     Migraine     MRSA (methicillin resistant staph aureus) culture positive     Neck injuries     Opioid type dependence (H)     Other chronic pain     PONV (postoperative nausea and vomiting)     states \"flatlines\"during anesthesia    RA (rheumatoid arthritis) (H)     Renal stone     Scoliosis     Stomach ulcer     history     Past Surgical History:   Procedure Laterality Date    ARTHRODESIS FOOT  5/23/2012    Procedure:ARTHRODESIS FOOT; Right Subtalar andTaloNavicular  Fusion  ; Surgeon:CHRIS HENDRICKS; [facility]    ARTHRODESIS FOOT Left 4/22/2015    Procedure: ARTHRODESIS FOOT;  Surgeon: Chris Hendricks MD;  [facility]    ARTHROPLASTY ELBOW Right 9/30/2015    Procedure: ARTHROPLASTY ELBOW;  Surgeon: Carrol Gaspar MD;  [facility]    ARTHROPLASTY KNEE Right     ARTHROPLASTY REVISION ELBOW Right 11/27/2018    Procedure: 1 - aspiration of Right elbow 2- Explantation of failed hardware Right humeral periprosthetic fracture non-union 3- Right distal humerus excision 4- Right distal humerus replacement 5 - Revision Right total elbow arthroplasty;  Surgeon: Aakash Zimmer MD;  Location:  OR    ARTHROPLASTY WRIST      x2 Lt wrist replacement.    ARTHROTOMY ELBOW Right 6/18/2015    Procedure: ARTHROTOMY ELBOW;  Surgeon: Carrol Gaspar MD;  Location:  OR    C SHLDR ARTHROSCOP,DIAGNOSTIC  12/17/2008    left total shoulder arthroplasty by Dr. Law    CYSTOSCOPY  02/06/2009    1.cystoscopy.2.bilateral ureteroscopy.3.basketing of stone fragments from the right kidney.4.bilateral double-J ureteral stent placement.5.ann catheter placement.6.fluoroscopy and intraoperative interpretation of images.    CYSTOSCOPY  " 01/08/2007    1. cystoscopy and left stent removal.2.left ureteroscopy    DECOMPRESSION CUBITAL TUNNEL  6/6/2014    Procedure: DECOMPRESSION CUBITAL TUNNEL;  Surgeon: Janelle Coates MD;  Location: [facility] OR    ENDOSCOPY  03/31/2005    upper GI endoscopy-[facility]    ESOPHAGOSCOPY, GASTROSCOPY, DUODENOSCOPY (EGD), COMBINED  3/17/2014    Procedure: COMBINED ESOPHAGOSCOPY, GASTROSCOPY, DUODENOSCOPY (EGD), BIOPSY SINGLE OR MULTIPLE;;  Surgeon: Duane, William Charles, MD;  Location: [facility] OR    EXCISE MASS FOOT Right 9/30/2024    Procedure: Soft tissue mass removal right foot;  Surgeon: Vashti Ayala DPM, Podiatry/Foot and Ankle Surgery;  Location: [facility] OR    FUSION CERVICAL POSTERIOR ONE LEVEL  11/18/2013    Procedure: FUSION CERVICAL POSTERIOR ONE LEVEL;  Cervical 1-2 Posterior Cervical Fusion (Harms Procedure);  Surgeon: Carrol Gunn MD;  Location: [facility] OR    GYN SURGERY      INJECT MAJOR JOINT / BURSA Left 1/5/2017    Procedure: INJECT MAJOR JOINT / BURSA;  Surgeon: Fredy Patel DO;  Location: [facility] OR    INJECT STEROID (LOCATION) Left 6/18/2015    Procedure: INJECT STEROID (LOCATION);  Surgeon: Carrol Gaspar MD;  Location:  OR    NECK SURGERY      REMOVE FOREIGN BODY UPPER EXTREMITY Right 3/2/2017    Procedure: REMOVE FOREIGN BODY UPPER EXTREMITY;  Surgeon: Carrol Gaspar MD;  [facility] OR    REMOVE HARDWARE FOOT Left 6/8/2022    Procedure: REMOVAL, HARDWARE, FOOT LEFT;  Surgeon: Chris Hendricks MD;  [place] [facility] [place]    RESECT BONE UPPER EXTREMITY Left 4/27/2017    Procedure: RESECT BONE UPPER EXTREMITY;  Left Radial Head Resection;  Surgeon: Carrol Gaspar MD;  Location: [facility] OR    ROTATOR CUFF REPAIR RT/LT  10/19/2005    1.left distal clavicle excision.2.acromioplasty.3.coracoacromial ligament resection.4.rotator cuff exploration    [facility] ANESTH,DX ARTHROSCOPIC PROC KNEE JOINT  10/24/2007    right total knee arthroplasty    [facility] SHOULDER SURG  PROC UNLISTED      [facility] TOTAL KNEE ARTHROPLASTY  1/18/12    Left     celecoxib (CELEBREX) 200 MG capsule  Cholecalciferol (VITAMIN D3 PO)  clonazePAM (KLONOPIN) 1 MG tablet  Collagen-Vitamin C-Biotin (COLLAGEN PO)  ENBREL SURECLICK 50 MG/ML autoinjector  naloxone (NARCAN) 4 MG/0.1ML nasal spray  Omega-3 Fatty Acids (FISH OIL PO)  omeprazole (PRILOSEC) 40 MG DR capsule      Allergies   Allergen Reactions    Leflunomide Anaphylaxis    Morphine Swelling    Celecoxib Other (See Comments)     Other reaction(s): stroke , lasted 24 hours  -Pt takes celecoxib daily. States that they changed the  and now it is fine.    Amitriptyline Other (See Comments)     Sleepwalking     Codeine     Gabapentin Other (See Comments)     Pins,needles, stabbing aching at once  Pins,needles, stabbing aching at once  Numbness and Tingling    Ibuprofen      Doesn't take due to gastric ulcer    Sulfa Antibiotics Nausea and Vomiting     unknown    Tylenol [Acetaminophen]      Pt. States that she has Liver problems  Tylenol 3    Dexamethasone Palpitations     Heart racing, sweating     Erythromycin Nausea     Vomiting     Penicillins Rash    Prednisone Palpitations     Heart racing     Family History  Family History   Problem Relation Age of Onset    Diabetes Mother     Hypertension Mother     Allergies Mother     Alcohol/Drug Mother         LIVER CIRROSIS    Blood Disease Mother         B12 DEF    Neurologic Disorder Mother         Epilepsy    Osteoporosis Mother     Cerebrovascular Disease Maternal Grandmother     Arthritis Maternal Grandmother         RHEUMATOID    Cancer Maternal Grandmother     Thyroid Disease Maternal Grandmother     Osteoporosis Maternal Grandmother     Heart Disease Maternal Grandmother     Depression Maternal Grandmother     Neurologic Disorder Maternal Grandmother         MIGRAINES    Anxiety Disorder Maternal Grandmother     Diabetes Maternal Grandmother     Migraines Maternal Grandmother     Deep Vein  Thrombosis Maternal Grandmother     Cerebrovascular Disease Maternal Grandfather     Cancer Maternal Grandfather     Mental Illness Maternal Grandfather     Cerebrovascular Disease Paternal Grandmother     Arthritis Paternal Grandmother         RHEUMATOID    Cancer Paternal Grandmother     Osteoporosis Paternal Grandmother     Heart Disease Paternal Grandmother     Depression Paternal Grandmother     Neurologic Disorder Paternal Grandmother         MIGRAINES    Thyroid Disease Paternal Grandmother     Cerebrovascular Disease Paternal Grandfather     Cancer Paternal Grandfather     Heart Disease Brother         MURMUR    Asthma Son     Endocrine Disease Son     Neurologic Disorder Father         epilepsy    Seizure Disorder Father     Hypertension Father     Skin Cancer Father     Anxiety Disorder Father     Mental Illness Father     Hyperlipidemia Father     Kidney Disease Father     Bladder Cancer Father     Neurologic Disorder Daughter         MIGRAINES    Arthritis Daughter         JR    Hypertension Son     LUNG DISEASE Brother     Cancer Brother         lung    Anxiety Disorder Son     Asthma Son     Hypertension Son     Migraines Son     Spine Problems Son     Mental Illness Brother     Migraines Brother     Cardiac Sudden Death Brother     Spine Problems Brother     Glaucoma No family hx of     Macular Degeneration No family hx of     Unknown/Adopted No family hx of     Anesthesia Reaction No family hx of     Other Cancer No family hx of     Rheumatoid Arthritis No family hx of     Bone Cancer No family hx of     Low Back Problems No family hx of      Social History   Social History     Tobacco Use    Smoking status: Former     Current packs/day: 0.10     Average packs/day: 0.1 packs/day for 41.6 years (4.2 ttl pk-yrs)     Types: Cigarettes     Start date: 8/6/1983     Passive exposure: Past    Smokeless tobacco: Never    Tobacco comments:     Quit 6 weeks ago   Vaping Use    Vaping status: Never Used    Substance Use Topics    Alcohol use: No    Drug use: No      Past medical history, past surgical history, medications, allergies, family history, and social history were reviewed with the patient. No additional pertinent items.     A medically appropriate review of systems was performed with pertinent positives and negatives noted in the HPI, and all other systems negative.    Physical Exam   BP: (!) 144/107  Pulse: 101  Temp: 98  F (36.7  C)  Resp: 16  Weight: 83.3 kg (183 lb 11.2 oz)  SpO2: 98 %    Physical Exam  Constitutional:       General: She is not in acute distress.     Appearance: Normal appearance. She is normal weight. She is not ill-appearing, toxic-appearing or diaphoretic.   HENT:      Mouth/Throat:      Mouth: Mucous membranes are moist.      Pharynx: Oropharynx is clear.   Cardiovascular:      Rate and Rhythm: Regular rhythm. Tachycardia present.      Pulses: Normal pulses.   Pulmonary:      Effort: Pulmonary effort is normal. No respiratory distress.      Breath sounds: Normal breath sounds.   Abdominal:      General: Bowel sounds are normal. There is no distension.      Palpations: Abdomen is soft.      Tenderness: There is abdominal tenderness. There is right CVA tenderness and guarding. There is no left CVA tenderness or rebound.   Skin:     General: Skin is warm.      Findings: No erythema or rash.   Neurological:      Mental Status: She is alert and oriented to person, place, and time. Mental status is at baseline.   Psychiatric:         Mood and Affect: Mood is anxious.         Behavior: Behavior normal.         ED Course, Procedures, & Data      Procedures                Results for orders placed or performed during the hospital encounter of 03/18/25   CT Abdomen Pelvis w Contrast     Status: None    Narrative    EXAM: CT ABDOMEN PELVIS W CONTRAST  LOCATION: [facility]  DATE: 3/18/2025    INDICATION: right flank pain; N V   COMPARISON: CT CAP  2/17/2025, 11/6/2021, abdominal ultrasound 2/17/2025  TECHNIQUE: CT scan of the abdomen and pelvis was performed following injection of IV contrast. Multiplanar reformats were obtained. Dose reduction techniques were used.  CONTRAST: 90mL Isovue 370    FINDINGS:   LOWER CHEST: Moderate-sized sliding hiatal hernia.    HEPATOBILIARY: Prior cholecystectomy.    PANCREAS: Normal.    SPLEEN: Normal.    ADRENAL GLANDS: Normal.    KIDNEYS/BLADDER: Normal.    BOWEL: No inflammatory changes. Appendix is normal. Redundant colon. Moderate stool burden. Resolution of the previously noted colitis.    LYMPH NODES: Asymmetrically slightly enlarged external iliac nodes, left greater than right  measuring 1.6 cm (axial image 173) are unchanged. Smaller left hypogastric and not pelvic sidewall nodes with the largest measuring 1.5 x 1 cm (axial image 154)   are also unchanged. No new adenopathy.    VASCULATURE: Few perisplenic varices again noted. Splenic vein remains patent. Aorta and branches are unremarkable.    PELVIC ORGANS: No adnexal masses or free fluid.    MUSCULOSKELETAL: Mild to moderate spondylitic changes at L5-S1.      Impression    IMPRESSION:   1.  Interval resolution of the colitis noted on CT done last month.  2.  No new findings to explain her symptoms.   3.  Specifically, no new inflammatory changes, renal calculi or obstruction.  4.  Moderate-sized sliding hiatal hernia.  5.  Mildly enlarged pelvic lymph nodes, left greater than right as noted above are unchanged since the most recent exam and minimally more plump than in 2021. These are presumed to be reactive. A short term pelvic CT ( no contrast) can be done in 3-4   months to ensure they return to baseline.   Influenza A/B, RSV and SARS-CoV2 PCR (COVID-19) Nasopharyngeal     Status: Normal    Specimen: Nasopharyngeal; Swab   Result Value Ref Range    Influenza A PCR Negative Negative    Influenza B PCR Negative Negative    RSV PCR Negative Negative    SARS CoV2  PCR Negative Negative    Narrative    Testing was performed using the Xpert Xpress CoV2/Flu/RSV Assay on the [employer] GeneXpert Instrument. This test should be ordered for the detection of SARS-CoV2, influenza, and RSV viruses in individuals with signs and symptoms of respiratory tract infection. This test is for in vitro diagnostic use under the [place] FDA for laboratories certified under CLIA to perform high or moderate complexity testing. This test has been [place] FDA cleared. A negative result does not rule out the presence of PCR inhibitors in the specimen or target RNA in concentration below the limit of detection for the assay. If only one viral target is positive but coinfection with multiple targets is suspected, the sample should be re-tested with another FDA cleared, approved, or authorized test, if coninfection would change clinical management. This test was validated by the [facility]. These laboratories are certified under the Clinical Laboratory Improvement Amendments of 1988 (CLIA-88) as qualified to perfom high complexity laboratory testing.   UA with Microscopic reflex to Culture     Status: Abnormal    Specimen: Urine, Midstream   Result Value Ref Range    Color Urine Light Yellow Colorless, Straw, Light Yellow, Yellow    Appearance Urine Clear Clear    Glucose Urine Negative Negative mg/dL    Bilirubin Urine Negative Negative    Ketones Urine Negative Negative mg/dL    Specific Gravity Urine 1.024 1.003 - 1.035    Blood Urine Negative Negative    pH Urine 5.5 5.0 - 7.0    Protein Albumin Urine Negative Negative mg/dL    Urobilinogen Urine Normal Normal, 2.0 mg/dL    Nitrite Urine Negative Negative    Leukocyte Esterase Urine Negative Negative    Mucus Urine Present (A) None Seen /LPF    RBC Urine 1 <=2 /HPF    WBC Urine 2 <=5 /HPF    Squamous Epithelials Urine 3 (H) <=1 /HPF    Narrative    Urine Culture not indicated   Comprehensive metabolic panel     Status: Abnormal   Result Value  Ref Range    Sodium 141 135 - 145 mmol/L    Potassium 4.4 3.4 - 5.3 mmol/L    Carbon Dioxide (CO2) 25 22 - 29 mmol/L    Anion Gap 12 7 - 15 mmol/L    Urea Nitrogen 22.1 (H) 6.0 - 20.0 mg/dL    Creatinine 1.00 (H) 0.51 - 0.95 mg/dL    GFR Estimate 67 >60 mL/min/1.73m2    Calcium 9.8 8.8 - 10.4 mg/dL    Chloride 104 98 - 107 mmol/L    Glucose 105 (H) 70 - 99 mg/dL    Alkaline Phosphatase 129 40 - 150 U/L    AST 26 0 - 45 U/L    ALT 28 0 - 50 U/L    Protein Total 8.9 (H) 6.4 - 8.3 g/dL    Albumin 4.4 3.5 - 5.2 g/dL    Bilirubin Total 0.3 <=1.2 mg/dL   Lipase     Status: Normal   Result Value Ref Range    Lipase 35 13 - 60 U/L   HCG qualitative urine (UPT)     Status: Normal   Result Value Ref Range    hCG Urine Qualitative Negative Negative   Lactic acid whole blood with 1x repeat in 2 hr when >2     Status: Normal   Result Value Ref Range    Lactic Acid, Initial 1.2 0.7 - 2.0 mmol/L   CBC with platelets and differential     Status: None   Result Value Ref Range    WBC Count 5.4 4.0 - 11.0 10e3/uL    RBC Count 4.71 3.80 - 5.20 10e6/uL    Hemoglobin 13.6 11.7 - 15.7 g/dL    Hematocrit 42.6 35.0 - 47.0 %    MCV 90 78 - 100 fL    MCH 28.9 26.5 - 33.0 pg    MCHC 31.9 31.5 - 36.5 g/dL    RDW 13.9 10.0 - 15.0 %    Platelet Count 186 150 - 450 10e3/uL    % Neutrophils 45 %    % Lymphocytes 33 %    % Monocytes 10 %    % Eosinophils 12 %    % Basophils 1 %    % Immature Granulocytes 0 %    NRBCs per 100 WBC 0 <1 /100    Absolute Neutrophils 2.4 1.6 - 8.3 10e3/uL    Absolute Lymphocytes 1.8 0.8 - 5.3 10e3/uL    Absolute Monocytes 0.5 0.0 - 1.3 10e3/uL    Absolute Eosinophils 0.6 0.0 - 0.7 10e3/uL    Absolute Basophils 0.1 0.0 - 0.2 10e3/uL    Absolute Immature Granulocytes 0.0 <=0.4 10e3/uL    Absolute NRBCs 0.0 10e3/uL   UA with Microscopic reflex to Culture     Status: Normal    Specimen: Urine, Clean Catch   Result Value Ref Range    Color Urine Straw Colorless, Straw, Light Yellow, Yellow    Appearance Urine Clear Clear     Glucose Urine Negative Negative mg/dL    Bilirubin Urine Negative Negative    Ketones Urine Negative Negative mg/dL    Specific Gravity Urine 1.027 1.003 - 1.035    Blood Urine Negative Negative    pH Urine 5.5 5.0 - 7.0    Protein Albumin Urine Negative Negative mg/dL    Urobilinogen Urine Normal Normal, 2.0 mg/dL    Nitrite Urine Negative Negative    Leukocyte Esterase Urine Negative Negative    RBC Urine <1 <=2 /HPF    WBC Urine <1 <=5 /HPF    Narrative    Urine Culture not indicated   Group A Streptococcus PCR Throat Swab     Status: Normal    Specimen: Throat; Swab   Result Value Ref Range    Group A strep by PCR Not Detected Not Detected    Narrative    The Xpert Xpress Strep A test, performed on the [employer], is a rapid, qualitative in vitro diagnostic test for the detection of Streptococcus pyogenes (Group A ß-hemolytic Streptococcus, Strep A) in throat swab specimens from patients with signs and symptoms of pharyngitis. The Xpert Xpress Strep A test can be used as an aid in the diagnosis of Group A Streptococcal pharyngitis. The assay is not intended to monitor treatment for Group A Streptococcus infections. The Xpert Xpress Strep A test utilizes an automated real-time polymerase chain reaction (PCR) to detect Streptococcus pyogenes DNA.   CBC with platelets differential     Status: None    Narrative    The following orders were created for panel order CBC with platelets differential.  Procedure                               Abnormality         Status                     ---------                               -----------         ------                     CBC with platelets and ...[[id number]]                      Final result                 Please view results for these tests on the individual orders.     Medications   sodium chloride 0.9% BOLUS 1,000 mL (0 mLs Intravenous Stopped 3/18/25 8725)   ondansetron (ZOFRAN) injection 4 mg (4 mg Intravenous $Given 3/18/25 2742)   oxyCODONE  (ROXICODONE) tablet 5 mg (5 mg Oral $Given 3/18/25 1343)   sodium chloride 0.9 % bag 100 mL for CT (44 mLs Intravenous $Given 3/18/25 1544)   iopamidol (ISOVUE-370) solution 100 mL (90 mLs Intravenous $Given 3/18/25 1544)     Labs Ordered and Resulted from Time of ED Arrival to Time of ED Departure   ROUTINE UA WITH MICROSCOPIC REFLEX TO CULTURE - Abnormal       Result Value    Color Urine Light Yellow      Appearance Urine Clear      Glucose Urine Negative      Bilirubin Urine Negative      Ketones Urine Negative      Specific Gravity Urine 1.024      Blood Urine Negative      pH Urine 5.5      Protein Albumin Urine Negative      Urobilinogen Urine Normal      Nitrite Urine Negative      Leukocyte Esterase Urine Negative      Mucus Urine Present (*)     RBC Urine 1      WBC Urine 2      Squamous Epithelials Urine 3 (*)    COMPREHENSIVE METABOLIC PANEL - Abnormal    Sodium 141      Potassium 4.4      Carbon Dioxide (CO2) 25      Anion Gap 12      Urea Nitrogen 22.1 (*)     Creatinine 1.00 (*)     GFR Estimate 67      Calcium 9.8      Chloride 104      Glucose 105 (*)     Alkaline Phosphatase 129      AST 26      ALT 28      Protein Total 8.9 (*)     Albumin 4.4      Bilirubin Total 0.3     INFLUENZA A/B, RSV AND SARS-COV2 PCR - Normal    Influenza A PCR Negative      Influenza B PCR Negative      RSV PCR Negative      SARS CoV2 PCR Negative     LIPASE - Normal    Lipase 35     HCG QUALITATIVE URINE - Normal    hCG Urine Qualitative Negative     LACTIC ACID WHOLE BLOOD WITH 1X REPEAT IN 2 HR WHEN >2 - Normal    Lactic Acid, Initial 1.2     ROUTINE UA WITH MICROSCOPIC REFLEX TO CULTURE - Normal    Color Urine Straw      Appearance Urine Clear      Glucose Urine Negative      Bilirubin Urine Negative      Ketones Urine Negative      Specific Gravity Urine 1.027      Blood Urine Negative      pH Urine 5.5      Protein Albumin Urine Negative      Urobilinogen Urine Normal      Nitrite Urine Negative      Leukocyte  Esterase Urine Negative      RBC Urine <1      WBC Urine <1     GROUP A STREPTOCOCCUS PCR THROAT SWAB - Normal    Group A strep by PCR Not Detected     CBC WITH PLATELETS AND DIFFERENTIAL    WBC Count 5.4      RBC Count 4.71      Hemoglobin 13.6      Hematocrit 42.6      MCV 90      MCH 28.9      MCHC 31.9      RDW 13.9      Platelet Count 186      % Neutrophils 45      % Lymphocytes 33      % Monocytes 10      % Eosinophils 12      % Basophils 1      % Immature Granulocytes 0      NRBCs per 100 WBC 0      Absolute Neutrophils 2.4      Absolute Lymphocytes 1.8      Absolute Monocytes 0.5      Absolute Eosinophils 0.6      Absolute Basophils 0.1      Absolute Immature Granulocytes 0.0      Absolute NRBCs 0.0       CT Abdomen Pelvis w Contrast   Final Result   IMPRESSION:    1.  Interval resolution of the colitis noted on CT done last month.   2.  No new findings to explain her symptoms.    3.  Specifically, no new inflammatory changes, renal calculi or obstruction.   4.  Moderate-sized sliding hiatal hernia.   5.  Mildly enlarged pelvic lymph nodes, left greater than right as noted above are unchanged since the most recent exam and minimally more plump than in 2021. These are presumed to be reactive. A short term pelvic CT ( no contrast) can be done in 3-4    months to ensure they return to baseline.             Critical care was not performed.     Medical Decision Making  The patient's presentation was of high complexity (an acute health issue posing potential threat to life or bodily function).    The patient's evaluation involved:  review of external note(s) from 1 sources (discharge summary 2/21/2025)  review of 1 test result(s) ordered prior to this encounter (previous CT imaging)  ordering and/or review of 3+ test(s) in this encounter (see separate area of note for details)    The patient's management necessitated moderate risk (prescription drug management including medications given in the ED) and moderate  risk (IV contrast administration).    Assessment & Plan    Kalyn is a 54-year-old female that presented to the ED with complaints of right lower and right flank pain as well as urinary retention.  Mild tachycardia in the 100s but otherwise afebrile.  Tenderness to the right lower quadrant and right flank area without guarding, rebound, rigidity.  No adventitious lung sounds on auscultation.  UA negative for signs of infection.  Stable creatinine at 1.0.  Beta-hCG negative.  Labs otherwise unremarkable within normal limits.  CT abdomen and pelvis notable for interval resolution of colitis but otherwise without any new findings to explain symptoms.  Postvoid residual notable for over 400 mL of urine.  Saleh catheter was placed and resulted in 700 mL of urine out of the bladder.  Patient reported improvement of her symptoms after Saleh catheter was placed and urine was evacuated from her bladder.  IV NS bolus, IV Zofran, p.o. oxycodone otherwise given in the ED for symptoms.  At this time, patient stable for discharge home and will be discharged with an indwelling Saleh catheter with plans for a trial void at her primary care office tomorrow as scheduled.  Strict return precautions given and discussed at length with the patient.  Continue plenty of fluids.  Saleh care was discussed with patient.  If her primary care office is uncomfortable performing a trial void tomorrow, a urology consult was otherwise placed from the ED today for follow-up in the next 1 to 2 days for said void trial.  Uncertain of what is causing patient's urinary retention at this time without any new medications or recent surgeries requiring anesthesia.  Patient was ultimately agreeable to the discharge treatment plan, voiced understanding, and all questions answered prior to discharge.    I have reviewed the nursing notes. I have reviewed the findings, diagnosis, plan and need for follow up with the patient.    Discharge Medication List as of  3/18/2025  7:15 PM          Final diagnoses:   Urinary retention       Katherine Orozco PA-C  [facility] EMERGENCY DEPARTMENT  3/18/2025     Katherine Orozco PA-C  03/18/25 [id number]

## 2025-03-19 ENCOUNTER — OFFICE VISIT (OUTPATIENT)
Dept: FAMILY MEDICINE | Facility: CLINIC | Age: 55
End: 2025-03-19
Payer: COMMERCIAL

## 2025-03-19 ENCOUNTER — PATIENT OUTREACH (OUTPATIENT)
Dept: CARE COORDINATION | Facility: CLINIC | Age: 55
End: 2025-03-19

## 2025-03-19 ENCOUNTER — TELEPHONE (OUTPATIENT)
Dept: UROLOGY | Facility: CLINIC | Age: 55
End: 2025-03-19

## 2025-03-19 VITALS
BODY MASS INDEX: 29.7 KG/M2 | OXYGEN SATURATION: 98 % | DIASTOLIC BLOOD PRESSURE: 88 MMHG | SYSTOLIC BLOOD PRESSURE: 134 MMHG | HEIGHT: 66 IN | TEMPERATURE: 97.5 F | HEART RATE: 83 BPM | WEIGHT: 184.8 LBS | RESPIRATION RATE: 20 BRPM

## 2025-03-19 DIAGNOSIS — K52.9 CHRONIC DIARRHEA: Primary | ICD-10-CM

## 2025-03-19 DIAGNOSIS — R04.0 EPISTAXIS: ICD-10-CM

## 2025-03-19 DIAGNOSIS — R93.5 ABNORMAL CT OF THE ABDOMEN: ICD-10-CM

## 2025-03-19 DIAGNOSIS — R49.0 HOARSENESS OF VOICE: ICD-10-CM

## 2025-03-19 PROCEDURE — 99213 OFFICE O/P EST LOW 20 MIN: CPT | Performed by: FAMILY MEDICINE

## 2025-03-19 PROCEDURE — 3075F SYST BP GE 130 - 139MM HG: CPT | Performed by: FAMILY MEDICINE

## 2025-03-19 PROCEDURE — 3079F DIAST BP 80-89 MM HG: CPT | Performed by: FAMILY MEDICINE

## 2025-03-19 NOTE — CONFIDENTIAL NOTE
No sooner appts with Luz Maria. Routing to other peripheral locations.    Jackie CASTILLO RN   [facility], Urology   3/19/2025 12:51 PM

## 2025-03-19 NOTE — PROGRESS NOTES
"    ICD-10-CM    1. Chronic diarrhea  K52.9       2. Abnormal CT of the abdomen  R93.5       3. Epistaxis  R04.0 Adult ENT  Referral      4. Hoarseness of voice  R49.0 Adult ENT  Referral       PLAN:order for ct in abdomen/pelvis in 3 months has been ordered and patientis aware of this   Patient would like to keep ann in for one week    Subjective   Kalyn is a 54 year old, presenting for the following health issues:  Hospital F/U        3/19/2025    11:02 AM   Additional Questions   Roomed by Jacinta BROWNE CMA   Accompanied by Son     HPI        Objective    /88   Pulse 83   Temp 97.5  F (36.4  C) (Tympanic)   Resp 20   Ht 1.676 m (5' 5.98\")   Wt 83.8 kg (184 lb 12.8 oz)   LMP  (LMP Unknown)   SpO2 98%   BMI 29.84 kg/m    Body mass index is 29.84 kg/m .      SUBJECTIVE:  54 year old.The patient has a complaint of2 hospitalizations.  This started last nightandone month ago. She had C dif one month ago from probable treatment of pneumonia. Last he seen in ED for urinary retention.  Associated symptoms are back pain with urinary retention. .  Better with vancomycin. ROS no diarrhea,    Problem 3 is you bloody noses. Has been going on for 4 months.  She has history of cyst on vocal chords years ago.     Reviewed health maintenance  Patient Active Problem List   Diagnosis    Hypothyroidism    Acetaminophen overdose    Pancreatitis    Anemia    Chronic pain    Pneumonia    Suicide risk    Grief reaction    Liver failure, acute    CARDIOVASCULAR SCREENING; LDL GOAL LESS THAN 160    Renal stone    TOTAL KNEE ARTHROPLASTY - bilateral    Knee joint replacement - left    Trochanteric bursitis - left    Atlantoaxial instability    Postoperative pain    S/P cervical spinal fusion    Drug-seeking behavior    Opioid type dependence (H)    Severe frontal headaches    Abdominal pain, unspecified abdominal location    Esophageal reflux    Right upper extremity numbness    Right arm numbness    Lesion " "of ulnar nerve    Arthralgia of temporomandibular joint    Intractable chronic migraine without aura    Encounter for long-term opiate analgesic use    Rheumatoid arthritis of foot (H)    Other drug-induced neutropenia    Rheumatoid arthritis with positive rheumatoid factor, involving unspecified site (H)    Cervical high risk HPV (human papillomavirus) test positive    Other mechanical complication of internal elbow joint prosthesis    Chronic tension-type headache    Fibromyositis    Insomnia    Migraine    Myofascial pain    Periprosthetic fracture around internal prosthetic right elbow joint    Status post surgery    Pain in joint, ankle and foot, left    Dizzy spells    Chills    Colitis    Elevated liver enzymes    Right sided abdominal pain    Pneumonia due to infectious organism, unspecified laterality, unspecified part of lung    Rheumatoid arthritis, involving unspecified site, unspecified whether rheumatoid factor present (H)     Past Medical History:   Diagnosis Date    Acetaminophen overdose 03/24/2011    Anemia     Anesthesia complication     pt states has a history of heart stopping during anesthesia,    Arrhythmia     Cerebral artery occlusion with cerebral infarction (H)     Cerebral infarction (H)     Cervical high risk HPV (human papillomavirus) test positive 02/22/2017    type 18 & other HR HPV    Chronic infection     MRSA    Chronic pain 03/24/2011    Degenerative joint disease     Endometriosis     Fibromyalgia     Gastro-oesophageal reflux disease     Heart murmur     History of blood transfusion     Hypothyroidism     Immune disorder     Learning disability     Malignant neoplasm (H)     Migraine     MRSA (methicillin resistant staph aureus) culture positive     Neck injuries     Opioid type dependence (H)     Other chronic pain     PONV (postoperative nausea and vomiting)     states \"flatlines\"during anesthesia    RA (rheumatoid arthritis) (H)     Renal stone     Scoliosis     Stomach ulcer " "    history       OBJECTIVE:  no apparent distress  /88   Pulse 83   Temp 97.5  F (36.4  C) (Tympanic)   Resp 20   Ht 1.676 m (5' 5.98\")   Wt 83.8 kg (184 lb 12.8 oz)   LMP  (LMP Unknown)   SpO2 98%   BMI 29.84 kg/m    Hoarse voice  Bilateral medial portion of nose with clots  LUNGS:  CTA B/L, no wheezing or crackles.   Cardiovascular: negative, PMI normal. No lifts, heaves, or thrills. RRR. No murmurs, clicks gallops or rub   Gastrointestinal: Abdomen soft, non-tender. BS normal. No masses, organomegaly          Signed Electronically by: Mika Zamora MD    "

## 2025-03-19 NOTE — TELEPHONE ENCOUNTER
This encounter is being sent to inform the clinic that this patient has a referral from Katherine Orozco PA-C for the diagnoses of TOV / Catheter removal / Retention  and has requested that this patient be seen within 1-2 days and/or with urology.  Based on the availability of our provider(s), we are unable to accommodate this request.    Were all sites offered this patient?  Yes    Does scheduling algorithm request to schedule next available?  Patient has been scheduled for the first available opening with Luz Maria Crystal on 04/10/2025.  We have informed the patient that the clinic will review their referral and reach out if a sooner appointment is medically necessary.

## 2025-03-19 NOTE — PROGRESS NOTES
General acute hospital    Background: Primary Care-Care Coordination initial outreach identified per system criteria and reviewed by [facility] team.    Assessment: Upon chart review, CCR Team member will not proceed with patient outreach related to this episode of Primary Care-Care Coordination program due to reason below:    Patient has an appointment today that is appropriate for ED/Hospital discharge and follow-up. No further outreach needed at this time.    Plan: Primary Care-Care Coordination episode addressed appropriately per reason noted above.      Linda Smith MA  [facility] Care Resource [place], [facility]    *Connected Care Resource Team does NOT follow patient ongoing. Referrals are identified based on internal discharge reports and the outreach is to ensure patient has an understanding of their discharge instructions.

## 2025-03-24 ENCOUNTER — TELEPHONE (OUTPATIENT)
Dept: FAMILY MEDICINE | Facility: CLINIC | Age: 55
End: 2025-03-24
Payer: COMMERCIAL

## 2025-03-24 ENCOUNTER — HOSPITAL ENCOUNTER (EMERGENCY)
Facility: CLINIC | Age: 55
Discharge: HOME | End: 2025-03-24
Attending: FAMILY MEDICINE | Admitting: FAMILY MEDICINE
Payer: COMMERCIAL

## 2025-03-24 VITALS
HEART RATE: 96 BPM | SYSTOLIC BLOOD PRESSURE: 139 MMHG | RESPIRATION RATE: 16 BRPM | DIASTOLIC BLOOD PRESSURE: 91 MMHG | TEMPERATURE: 98.2 F | OXYGEN SATURATION: 97 %

## 2025-03-24 DIAGNOSIS — T83.9XXA PROBLEM WITH FOLEY CATHETER, INITIAL ENCOUNTER: ICD-10-CM

## 2025-03-24 PROCEDURE — 99282 EMERGENCY DEPT VISIT SF MDM: CPT | Performed by: FAMILY MEDICINE

## 2025-03-24 PROCEDURE — 99283 EMERGENCY DEPT VISIT LOW MDM: CPT | Performed by: FAMILY MEDICINE

## 2025-03-24 NOTE — DISCHARGE INSTRUCTIONS
Home.  Continue ann catheter care.  Follow up with Urology as planned on  April 4th.  Return if any concerns.

## 2025-03-24 NOTE — ED PROVIDER NOTES
ED Provider Note  [facility]      History     Chief Complaint   Patient presents with    Catheter Problem     Saleh catheter placed on 3/18.  Presents to ED complaining of catheter leaking and a hole in drainage bag.  Has urology appointment on 4/4.     JESSICA  Kalyn Rivero is a 54 year old female who resents to the emergency department for a catheter pr patient has his catheter placed oblem.  Patient noted catheter was placed with a week ago.  Planning to keep it in until 4 April where she has an appointment with her urologist.  Patient noted today the bag was leaking.  Now presents valuation catheter is working fine otherwise with this.  Basically there is a leak in the back somewhat.  Presents here for evaluation to get this change no fever chills or blood.    Per chart review: The patient was seen in the emergency department on 3/18 for urinary retention and right lower flank pain. Patients labs were unremarkable.  CT abdomen and pelvis notable for interval resolution of colitis but otherwise without any new findings to explain symptoms. Saleh catheter was placed and resulted in 700 mL of urine out of the bladder which helped symptoms. Patient was discharged and advised to follow up with her PCP the next day.     CT ABDOMEN PELVIS W CONTRAST ON 3/18/25:   1.  Interval resolution of the colitis noted on CT done last month.  2.  No new findings to explain her symptoms.   3.  Specifically, no new inflammatory changes, renal calculi or obstruction.  4.  Moderate-sized sliding hiatal hernia.  5.  Mildly enlarged pelvic lymph nodes, left greater than right as noted above are unchanged since the most recent exam and minimally more plump than in 2021. These are presumed to be reactive. A short term pelvic CT ( no contrast) can be done in 3-4   months to ensure they return to baseline.    Past Medical History  Past Medical History:   Diagnosis Date    Acetaminophen overdose 03/24/2011    Anemia  "    Anesthesia complication     pt states has a history of heart stopping during anesthesia,    Arrhythmia     Cerebral artery occlusion with cerebral infarction (H)     Cerebral infarction (H)     Cervical high risk HPV (human papillomavirus) test positive 02/22/2017    type 18 & other HR HPV    Chronic infection     MRSA    Chronic pain 03/24/2011    Degenerative joint disease     Endometriosis     Fibromyalgia     Gastro-oesophageal reflux disease     Heart murmur     History of blood transfusion     Hypothyroidism     Immune disorder     Learning disability     Malignant neoplasm (H)     Migraine     MRSA (methicillin resistant staph aureus) culture positive     Neck injuries     Opioid type dependence (H)     Other chronic pain     PONV (postoperative nausea and vomiting)     states \"flatlines\"during anesthesia    RA (rheumatoid arthritis) (H)     Renal stone     Scoliosis     Stomach ulcer     history     Past Surgical History:   Procedure Laterality Date    ARTHRODESIS FOOT  5/23/2012    Procedure:ARTHRODESIS FOOT; Right Subtalar andTaloNavicular  Fusion  ; Surgeon:CHRIS ZHOU; Location: OR    ARTHRODESIS FOOT Left 4/22/2015    Procedure: ARTHRODESIS FOOT;  Surgeon: Chris Zhou MD;  Location:  OR    ARTHROPLASTY ELBOW Right 9/30/2015    Procedure: ARTHROPLASTY ELBOW;  Surgeon: Carrol Gaspar MD;  Location:  OR    ARTHROPLASTY KNEE Right     ARTHROPLASTY REVISION ELBOW Right 11/27/2018    Procedure: 1 - aspiration of Right elbow 2- Explantation of failed hardware Right humeral periprosthetic fracture non-union 3- Right distal humerus excision 4- Right distal humerus replacement 5 - Revision Right total elbow arthroplasty;  Surgeon: Aakash Zimmer MD;  Location:  OR    ARTHROPLASTY WRIST      x2 Lt wrist replacement.    ARTHROTOMY ELBOW Right 6/18/2015    Procedure: ARTHROTOMY ELBOW;  Surgeon: Carrol Gaspar MD;  Location:  OR    [facility]" ARTHROSCOP,DIAGNOSTIC  12/17/2008    left total shoulder arthroplasty by Dr. Law    CYSTOSCOPY  02/06/2009    1.cystoscopy.2.bilateral ureteroscopy.3.basketing of stone fragments from the right kidney.4.bilateral double-J ureteral stent placement.5.ann catheter placement.6.fluoroscopy and intraoperative interpretation of images.    CYSTOSCOPY  01/08/2007    1. cystoscopy and left stent removal.2.left ureteroscopy    DECOMPRESSION CUBITAL TUNNEL  6/6/2014    Procedure: DECOMPRESSION CUBITAL TUNNEL;  Surgeon: Janelle Coatse MD;  Location: [facility] OR    ENDOSCOPY  03/31/2005    upper GI endoscopy-[facility]    ESOPHAGOSCOPY, GASTROSCOPY, DUODENOSCOPY (EGD), COMBINED  3/17/2014    Procedure: COMBINED ESOPHAGOSCOPY, GASTROSCOPY, DUODENOSCOPY (EGD), BIOPSY SINGLE OR MULTIPLE;;  Surgeon: Duane, William Charles, MD;  Location: [facility] OR    EXCISE MASS FOOT Right 9/30/2024    Procedure: Soft tissue mass removal right foot;  Surgeon: Vashti Ayala DPM, Podiatry/Foot and Ankle Surgery;  Location: [facility] OR    FUSION CERVICAL POSTERIOR ONE LEVEL  11/18/2013    Procedure: FUSION CERVICAL POSTERIOR ONE LEVEL;  Cervical 1-2 Posterior Cervical Fusion (Harms Procedure);  Surgeon: Carrol Gunn MD;  Location: [facility] OR    GYN SURGERY      INJECT MAJOR JOINT / BURSA Left 1/5/2017    Procedure: INJECT MAJOR JOINT / BURSA;  Surgeon: Fredy Patel DO;  Location: [facility] OR    INJECT STEROID (LOCATION) Left 6/18/2015    Procedure: INJECT STEROID (LOCATION);  Surgeon: Carrol Gaspar MD;  Location: [facility] OR    NECK SURGERY      REMOVE FOREIGN BODY UPPER EXTREMITY Right 3/2/2017    Procedure: REMOVE FOREIGN BODY UPPER EXTREMITY;  Surgeon: Carrol Gaspar MD;  Location: [facility] OR    REMOVE HARDWARE FOOT Left 6/8/2022    Procedure: REMOVAL, HARDWARE, FOOT LEFT;  Surgeon: Chris Hendricks MD;  Location: [facility]    RESECT BONE UPPER EXTREMITY Left 4/27/2017    Procedure: RESECT BONE UPPER EXTREMITY;   Left Radial Head Resection;  Surgeon: Carrol Gaspar MD;  Location: [facility] OR    ROTATOR CUFF REPAIR RT/LT  10/19/2005    1.left distal clavicle excision.2.acromioplasty.3.coracoacromial ligament resection.4.rotator cuff exploration    [facility] ANESTH,DX ARTHROSCOPIC PROC KNEE JOINT  10/24/2007    right total knee arthroplasty    [facility] SHOULDER SURG PROC UNLISTED      [facility] TOTAL KNEE ARTHROPLASTY  1/18/12    Left     celecoxib (CELEBREX) 200 MG capsule  Cholecalciferol (VITAMIN D3 PO)  clonazePAM (KLONOPIN) 1 MG tablet  Collagen-Vitamin C-Biotin (COLLAGEN PO)  ENBREL SURECLICK 50 MG/ML autoinjector  naloxone (NARCAN) 4 MG/0.1ML nasal spray  Omega-3 Fatty Acids (FISH OIL PO)  omeprazole (PRILOSEC) 40 MG DR capsule      Allergies   Allergen Reactions    Leflunomide Anaphylaxis    Morphine Swelling    Celecoxib Other (See Comments)     Other reaction(s): stroke , lasted 24 hours  -Pt takes celecoxib daily. States that they changed the  and now it is fine.    Amitriptyline Other (See Comments)     Sleepwalking     Codeine     Gabapentin Other (See Comments)     Pins,needles, stabbing aching at once  Pins,needles, stabbing aching at once  Numbness and Tingling    Ibuprofen      Doesn't take due to gastric ulcer    Sulfa Antibiotics Nausea and Vomiting     unknown    Tylenol [Acetaminophen]      Pt. States that she has Liver problems  Tylenol 3    Dexamethasone Palpitations     Heart racing, sweating     Erythromycin Nausea     Vomiting     Penicillins Rash    Prednisone Palpitations     Heart racing     Family History  Family History   Problem Relation Age of Onset    Diabetes Mother     Hypertension Mother     Allergies Mother     Alcohol/Drug Mother         LIVER CIRROSIS    Blood Disease Mother         B12 DEF    Neurologic Disorder Mother         Epilepsy    Osteoporosis Mother     Cerebrovascular Disease Maternal Grandmother     Arthritis Maternal Grandmother         RHEUMATOID    Cancer Maternal  Grandmother     Thyroid Disease Maternal Grandmother     Osteoporosis Maternal Grandmother     Heart Disease Maternal Grandmother     Depression Maternal Grandmother     Neurologic Disorder Maternal Grandmother         MIGRAINES    Anxiety Disorder Maternal Grandmother     Diabetes Maternal Grandmother     Migraines Maternal Grandmother     Deep Vein Thrombosis Maternal Grandmother     Cerebrovascular Disease Maternal Grandfather     Cancer Maternal Grandfather     Mental Illness Maternal Grandfather     Cerebrovascular Disease Paternal Grandmother     Arthritis Paternal Grandmother         RHEUMATOID    Cancer Paternal Grandmother     Osteoporosis Paternal Grandmother     Heart Disease Paternal Grandmother     Depression Paternal Grandmother     Neurologic Disorder Paternal Grandmother         MIGRAINES    Thyroid Disease Paternal Grandmother     Cerebrovascular Disease Paternal Grandfather     Cancer Paternal Grandfather     Heart Disease Brother         MURMUR    Asthma Son     Endocrine Disease Son     Neurologic Disorder Father         epilepsy    Seizure Disorder Father     Hypertension Father     Skin Cancer Father     Anxiety Disorder Father     Mental Illness Father     Hyperlipidemia Father     Kidney Disease Father     Bladder Cancer Father     Neurologic Disorder Daughter         MIGRAINES    Arthritis Daughter         JR    Hypertension Son     LUNG DISEASE Brother     Cancer Brother         lung    Anxiety Disorder Son     Asthma Son     Hypertension Son     Migraines Son     Spine Problems Son     Mental Illness Brother     Migraines Brother     Cardiac Sudden Death Brother     Spine Problems Brother     Glaucoma No family hx of     Macular Degeneration No family hx of     Unknown/Adopted No family hx of     Anesthesia Reaction No family hx of     Other Cancer No family hx of     Rheumatoid Arthritis No family hx of     Bone Cancer No family hx of     Low Back Problems No family hx of      Social  History   Social History     Tobacco Use    Smoking status: Former     Current packs/day: 0.10     Average packs/day: 0.1 packs/day for 41.6 years (4.2 ttl pk-yrs)     Types: Cigarettes     Start date: 8/6/1983     Passive exposure: Past    Smokeless tobacco: Never    Tobacco comments:     Quit 6 weeks ago   Vaping Use    Vaping status: Never Used   Substance Use Topics    Alcohol use: No    Drug use: No      A medically appropriate review of systems was performed with pertinent positives and negatives noted in the HPI, and all other systems negative.    Physical Exam   BP: (!) 139/91  Pulse: 96  Temp: 98.2  F (36.8  C)  Resp: 16  SpO2: 97 %  Physical Exam  Vitals and nursing note reviewed.   Constitutional:       General: She is not in acute distress.     Appearance: Normal appearance. She is not diaphoretic.      Comments: Patient cooperative here in the ER nontoxic afebrile vital stable   HENT:      Head: Atraumatic.      Mouth/Throat:      Mouth: Mucous membranes are moist.   Eyes:      General: No scleral icterus.     Conjunctiva/sclera: Conjunctivae normal.   Cardiovascular:      Rate and Rhythm: Normal rate.      Heart sounds: Normal heart sounds.   Pulmonary:      Effort: No respiratory distress.      Breath sounds: Normal breath sounds.   Abdominal:      General: Abdomen is flat.      Tenderness: There is no guarding.   Musculoskeletal:      Cervical back: Neck supple.   Skin:     General: Skin is warm.      Capillary Refill: Capillary refill takes less than 2 seconds.      Findings: No rash.   Neurological:      General: No focal deficit present.      Mental Status: She is alert and oriented to person, place, and time.   Psychiatric:      Comments: Appropriate           ED Course, Procedures, & Data        Records reviewed in epic refer to HPI also.  Here in the ER discussed with nursing we will just exchange the Saleh catheter tubing along with the Saleh bag.  At this point we can leave the Saleh catheter  in and just be attached to the new set up.  Ann catheter tubing and bag were replaced but the Ann catheter was left in place.  Patient otherwise does feel fine at this point there is any indications for urinalysis etc.  Patient is not having any major concerns at this point does have follow-up with urology in the next week or so.  Patient return if any concerns at all feels comfortable discharge.  Discharged with family member.      Procedures                 No results found for any visits on 03/24/25.  Medications - No data to display  Labs Ordered and Resulted from Time of ED Arrival to Time of ED Departure - No data to display  No orders to display          Critical care was not performed.     Medical Decision Making  The patient's presentation was of low complexity (an acute and uncomplicated illness or injury).    The patient's evaluation involved:  review of external note(s) from 3+ sources (see separate area of note for details)  review of 3+ test result(s) ordered prior to this encounter (see separate area of note for details)  ordering and/or review of 1 test(s) in this encounter (see separate area of note for details)    The patient's management necessitated only low risk treatment.    Assessment & Plan   54-year-old female with urinary retention had a Ann catheter placed on 18 March.  Patient Ann catheter bag was leaking today.  Presented to ER otherwise vitally stable in the bag and tubing were replaced no complications noted at this point patient feels fine will follow-up with urology as noted on 4 April and return to concerns       I have reviewed the nursing notes. I have reviewed the findings, diagnosis, plan and need for follow up with the patient.    Discharge Medication List as of 3/24/2025  6:44 PM          Final diagnoses:   Problem with Ann catheter, initial encounter - ann cath bag leaking       Sergio Rees MD  [facility] EMERGENCY DEPARTMENT  3/24/2025    This  note was created at least in part by the use of dragon voice dictation system. Inadvertent typographical errors may still exist.  Sergio Rees MD.  Patient evaluated in the emergency department during the COVID-19 pandemic period. Careful attention to patients safety was addressed throughout the evaluation. Evaluation and treatment management was initiated with disposition made efficiently and appropriate as possible to minimize any risk of potential exposure to patient during this evaluation.       Sergio Rees MD  03/24/25 [id number]

## 2025-03-24 NOTE — ED TRIAGE NOTES
Triage Assessment (Adult)       Row Name 03/24/25 1286          Triage Assessment    Airway WDL WDL        Respiratory WDL    Respiratory WDL WDL        Skin Circulation/Temperature WDL    Skin Circulation/Temperature WDL WDL        Cardiac WDL    Cardiac WDL WDL        Peripheral/Neurovascular WDL    Peripheral Neurovascular WDL WDL        Cognitive/Neuro/Behavioral WDL    Cognitive/Neuro/Behavioral WDL WDL

## 2025-03-24 NOTE — TELEPHONE ENCOUNTER
Patient calling stating her indwelling catheter has a hole in the bag. She currently has the bag inside a [employer] plastic bag so urine isn't leaking all over.     Due to the time of day (5:00 pm) and that patient may or may not need the catheter replaced, this RN advised patient to go to the ER to be seen.  ([facility] closed early today at 4:30 pm).  Patient agrees and will go there now.       Tish TIMN, RN

## 2025-04-02 ENCOUNTER — HOSPITAL ENCOUNTER (EMERGENCY)
Facility: CLINIC | Age: 55
Discharge: HOME | End: 2025-04-02
Admitting: PHYSICIAN ASSISTANT
Payer: COMMERCIAL

## 2025-04-02 VITALS
SYSTOLIC BLOOD PRESSURE: 132 MMHG | DIASTOLIC BLOOD PRESSURE: 85 MMHG | WEIGHT: 189 LBS | TEMPERATURE: 97.9 F | RESPIRATION RATE: 16 BRPM | BODY MASS INDEX: 30.37 KG/M2 | HEIGHT: 66 IN | HEART RATE: 105 BPM | OXYGEN SATURATION: 96 %

## 2025-04-02 DIAGNOSIS — T83.9XXA FOLEY CATHETER PROBLEM, INITIAL ENCOUNTER: ICD-10-CM

## 2025-04-02 LAB
ALBUMIN UR-MCNC: 10 MG/DL
APPEARANCE UR: CLEAR
BACTERIA #/AREA URNS HPF: ABNORMAL /HPF
BILIRUB UR QL STRIP: NEGATIVE
COLOR UR AUTO: ABNORMAL
GLUCOSE UR STRIP-MCNC: NEGATIVE MG/DL
HGB UR QL STRIP: NEGATIVE
HYALINE CASTS: 1 /LPF
KETONES UR STRIP-MCNC: NEGATIVE MG/DL
LEUKOCYTE ESTERASE UR QL STRIP: ABNORMAL
MUCOUS THREADS #/AREA URNS LPF: PRESENT /LPF
NITRATE UR QL: NEGATIVE
PH UR STRIP: 5.5 [PH] (ref 5–7)
RBC URINE: 23 /HPF
SP GR UR STRIP: 1.02 (ref 1–1.03)
SQUAMOUS EPITHELIAL: <1 /HPF
UROBILINOGEN UR STRIP-MCNC: NORMAL MG/DL
WBC URINE: 3 /HPF

## 2025-04-02 PROCEDURE — 87086 URINE CULTURE/COLONY COUNT: CPT | Performed by: PHYSICIAN ASSISTANT

## 2025-04-02 PROCEDURE — 51702 INSERT TEMP BLADDER CATH: CPT | Performed by: PHYSICIAN ASSISTANT

## 2025-04-02 PROCEDURE — 250N000013 HC RX MED GY IP 250 OP 250 PS 637: Performed by: PHYSICIAN ASSISTANT

## 2025-04-02 PROCEDURE — 99284 EMERGENCY DEPT VISIT MOD MDM: CPT | Mod: 25 | Performed by: PHYSICIAN ASSISTANT

## 2025-04-02 PROCEDURE — 99284 EMERGENCY DEPT VISIT MOD MDM: CPT | Performed by: PHYSICIAN ASSISTANT

## 2025-04-02 PROCEDURE — 81003 URINALYSIS AUTO W/O SCOPE: CPT | Performed by: PHYSICIAN ASSISTANT

## 2025-04-02 RX ORDER — VITAMIN A, VITAMIN C, VITAMIN D-3, VITAMIN E, VITAMIN B-1, VITAMIN B-2, NIACIN, VITAMIN B-6, CALCIUM, IRON, ZINC, COPPER 4000; 120; 400; 22; 1.84; 3; 20; 10; 1; 12; 200; 27; 25; 2 [IU]/1; MG/1; [IU]/1; MG/1; MG/1; MG/1; MG/1; MG/1; MG/1; UG/1; MG/1; MG/1; MG/1; MG/1
TABLET ORAL DAILY
COMMUNITY

## 2025-04-02 RX ORDER — OXYCODONE HYDROCHLORIDE 5 MG/1
5 TABLET ORAL ONCE
Status: COMPLETED | OUTPATIENT
Start: 2025-04-02 | End: 2025-04-02

## 2025-04-02 RX ORDER — CYANOCOBALAMIN (VITAMIN B-12) 500 MCG
400 LOZENGE ORAL DAILY
COMMUNITY

## 2025-04-02 RX ORDER — NITROFURANTOIN 25; 75 MG/1; MG/1
100 CAPSULE ORAL 2 TIMES DAILY
Qty: 14 CAPSULE | Refills: 0 | Status: SHIPPED | OUTPATIENT
Start: 2025-04-02

## 2025-04-02 RX ADMIN — OXYCODONE 5 MG: 5 TABLET ORAL at 13:06

## 2025-04-02 ASSESSMENT — ACTIVITIES OF DAILY LIVING (ADL)
ADLS_ACUITY_SCORE: 54
ADLS_ACUITY_SCORE: 56

## 2025-04-02 NOTE — ED TRIAGE NOTES
Pt states that it suddenly became painful. She cannot sit     Triage Assessment (Adult)       Row Name 04/02/25 4441          Triage Assessment    Airway WDL WDL        Respiratory WDL    Respiratory WDL WDL        Skin Circulation/Temperature WDL    Skin Circulation/Temperature WDL WDL        Cardiac WDL    Cardiac WDL WDL        Peripheral/Neurovascular WDL    Peripheral Neurovascular WDL WDL        Cognitive/Neuro/Behavioral WDL    Cognitive/Neuro/Behavioral WDL WDL

## 2025-04-02 NOTE — DISCHARGE INSTRUCTIONS
Here in the emergency room we did change out your Saleh catheter without problem.  The nurse noted that the catheter was foul-smelling.  We obtained a urine sample off of the clean catheter which is not clear-cut for infection.  We discussed waiting for the culture to return versus treating for possible developing bladder infection and with shared decision making we will treat with antibiotics.  Macrobid sent to your pharmacy we discussed this is use only for bladder infections.  I recommend pushing fluids.  Is very important that you follow-up with urology with your appointment in 2 days.    If you develop any new or worsening symptoms, is important to return right away to the emergency department for further evaluation and management.     distended

## 2025-04-02 NOTE — ED PROVIDER NOTES
"ED Provider Note  [facility]      History     Chief Complaint   Patient presents with    Catheter Problem     Pain at catheter site     HPI  Kalyn Rivero is a 54 year old female with complex past medical history including history of migraines, RA, urinary retention status post Saleh catheter placed 3/18/2025 changed out 3/24/2025 in the ED who presents to the emergency room with concerns for catheter problem.    Per chart review patient did call the nurse line yesterday with concerns for her catheter not draining as well as associated \"kidney pain\" and lower abdominal pain.  She was encouraged to follow-up with urology with her appointment scheduled for 4/4/2025 and made aware of return precautions.  She again called the nurse line today with concerns for \"catheter pain\" with associated vaginal burning.    Patient presents here today with her son, who is her PCA.  She notes this morning she started experiencing rather abrupt onset discomfort in her urethral area in the vicinity of the insertion of her catheter.  She has noticed some scant drainage from her Saleh over the last few days as well.  She notes she had some darker urine yesterday which has lightened up today with her drinking fluids.  She feels like she has not had as much urine output as she had been having previously starting today.  No fevers dyspnea chest pain new from baseline.  She does report some mild right lower quadrant discomfort and urethral discomfort.  No vaginal discharge.  She notes she has not take anything for pain and is requesting some pain meds.  No hematuria in her bag.  No flank pain.  She notes her last bowel movement was yesterday.  No vomiting.  No nausea.  She is scheduled to see urology in 2 days.    Past Medical History  Past Medical History:   Diagnosis Date    Acetaminophen overdose 03/24/2011    Anemia     Anesthesia complication     pt states has a history of heart stopping during anesthesia, " "   Arrhythmia     Cerebral artery occlusion with cerebral infarction (H)     Cerebral infarction (H)     Cervical high risk HPV (human papillomavirus) test positive 02/22/2017    type 18 & other HR HPV    Chronic infection     MRSA    Chronic pain 03/24/2011    Degenerative joint disease     Endometriosis     Fibromyalgia     Gastro-oesophageal reflux disease     Heart murmur     History of blood transfusion     Hypothyroidism     Immune disorder     Learning disability     Malignant neoplasm (H)     Migraine     MRSA (methicillin resistant staph aureus) culture positive     Neck injuries     Opioid type dependence (H)     Other chronic pain     PONV (postoperative nausea and vomiting)     states \"flatlines\"during anesthesia    RA (rheumatoid arthritis) (H)     Renal stone     Scoliosis     Stomach ulcer     history     Past Surgical History:   Procedure Laterality Date    ARTHRODESIS FOOT  5/23/2012    Procedure:ARTHRODESIS FOOT; Right Subtalar andTaloNavicular  Fusion  ; Surgeon:BRIGHT HENDRICKS; [facility]    ARTHRODESIS FOOT Left 4/22/2015    Procedure: ARTHRODESIS FOOT;  Surgeon: Bright Hendricks MD;  [facility]    ARTHROPLASTY ELBOW Right 9/30/2015    Procedure: ARTHROPLASTY ELBOW;  Surgeon: Carrol Gaspar MD;  [facility]    ARTHROPLASTY KNEE Right     ARTHROPLASTY REVISION ELBOW Right 11/27/2018    Procedure: 1 - aspiration of Right elbow 2- Explantation of failed hardware Right humeral periprosthetic fracture non-union 3- Right distal humerus excision 4- Right distal humerus replacement 5 - Revision Right total elbow arthroplasty;  Surgeon: Aakash Zimmer MD;  Location:  OR    ARTHROPLASTY WRIST      x2 Lt wrist replacement.    ARTHROTOMY ELBOW Right 6/18/2015    Procedure: ARTHROTOMY ELBOW;  Surgeon: Carrol Gaspar MD;  [facility]  OR    [facility]    left total shoulder arthroplasty by Dr. Law    CYSTOSCOPY  02/06/2009 "    1.cystoscopy.2.bilateral ureteroscopy.3.basketing of stone fragments from the right kidney.4.bilateral double-J ureteral stent placement.5.ann catheter placement.6.fluoroscopy and intraoperative interpretation of images.    CYSTOSCOPY  01/08/2007    1. cystoscopy and left stent removal.2.left ureteroscopy    DECOMPRESSION CUBITAL TUNNEL  6/6/2014    Procedure: DECOMPRESSION CUBITAL TUNNEL;  Surgeon: Janelle Coates MD;  [facility]    ENDOSCOPY  03/31/2005    upper GI endoscopy-[facility]    ESOPHAGOSCOPY, GASTROSCOPY, DUODENOSCOPY (EGD), COMBINED  3/17/2014    Procedure: COMBINED ESOPHAGOSCOPY, GASTROSCOPY, DUODENOSCOPY (EGD), BIOPSY SINGLE OR MULTIPLE;;  Surgeon: Duane, William Charles, MD;  Location: [facility] OR    EXCISE MASS FOOT Right 9/30/2024    Procedure: Soft tissue mass removal right foot;  Surgeon: Vashti Ayala DPM, Podiatry/Foot and Ankle Surgery;  Location: [facility] OR    FUSION CERVICAL POSTERIOR ONE LEVEL  11/18/2013    Procedure: FUSION CERVICAL POSTERIOR ONE LEVEL;  Cervical 1-2 Posterior Cervical Fusion (Harms Procedure);  Surgeon: Carrol Gunn MD;  [facility] OR    GYN SURGERY      INJECT MAJOR JOINT / BURSA Left 1/5/2017    Procedure: INJECT MAJOR JOINT / BURSA;  Surgeon: Fredy Patel DO;  [facility]    INJECT STEROID (LOCATION) Left 6/18/2015    Procedure: INJECT STEROID (LOCATION);  Surgeon: Carrol Gaspar MD;  [facility]    NECK SURGERY      REMOVE FOREIGN BODY UPPER EXTREMITY Right 3/2/2017    Procedure: REMOVE FOREIGN BODY UPPER EXTREMITY;  Surgeon: Carrol Gaspar MD;  [facility]    REMOVE HARDWARE FOOT Left 6/8/2022    Procedure: REMOVAL, HARDWARE, FOOT LEFT;  Surgeon: Chris Hendricks MD;  [place] [facility] [place]    RESECT BONE UPPER EXTREMITY Left 4/27/2017    Procedure: RESECT BONE UPPER EXTREMITY;  Left Radial Head Resection;  Surgeon: Carrol Gaspar MD;  [facility]    ROTATOR CUFF REPAIR  RT/LT  10/19/2005    1.left distal clavicle excision.2.acromioplasty.3.coracoacromial ligament resection.4.rotator cuff exploration    [facility] ANESTH,DX ARTHROSCOPIC PROC KNEE JOINT  10/24/2007    right total knee arthroplasty    [facility] SHOULDER SURG PROC UNLISTED      [facility] TOTAL KNEE ARTHROPLASTY  1/18/12    Left     celecoxib (CELEBREX) 200 MG capsule  Cholecalciferol (VITAMIN D3 PO)  clonazePAM (KLONOPIN) 1 MG tablet  Collagen-Vitamin C-Biotin (COLLAGEN PO)  ENBREL SURECLICK 50 MG/ML autoinjector  nitroFURantoin macrocrystal-monohydrate (MACROBID) 100 MG capsule  Omega-3 Fatty Acids (FISH OIL PO)  omeprazole (PRILOSEC) 40 MG DR capsule  Prenatal Vit-Fe Fumarate-FA (PRENATAL MULTIVITAMIN  PLUS IRON) 27-1 MG TABS  vitamin E 400 units TABS  naloxone (NARCAN) 4 MG/0.1ML nasal spray      Allergies   Allergen Reactions    Leflunomide Anaphylaxis    Morphine Swelling    Celecoxib Other (See Comments)     Other reaction(s): stroke , lasted 24 hours  -Pt takes celecoxib daily. States that they changed the  and now it is fine.    Amitriptyline Other (See Comments)     Sleepwalking     Codeine     Gabapentin Other (See Comments)     Pins,needles, stabbing aching at once  Pins,needles, stabbing aching at once  Numbness and Tingling    Ibuprofen      Doesn't take due to gastric ulcer    Sulfa Antibiotics Nausea and Vomiting     unknown    Tylenol [Acetaminophen]      Pt. States that she has Liver problems  Tylenol 3    Dexamethasone Palpitations     Heart racing, sweating     Erythromycin Nausea     Vomiting     Penicillins Rash    Prednisone Palpitations     Heart racing     Family History  Family History   Problem Relation Age of Onset    Diabetes Mother     Hypertension Mother     Allergies Mother     Alcohol/Drug Mother         LIVER CIRROSIS    Blood Disease Mother         B12 DEF    Neurologic Disorder Mother         Epilepsy    Osteoporosis Mother     Cerebrovascular Disease Maternal Grandmother     Arthritis  Maternal Grandmother         RHEUMATOID    Cancer Maternal Grandmother     Thyroid Disease Maternal Grandmother     Osteoporosis Maternal Grandmother     Heart Disease Maternal Grandmother     Depression Maternal Grandmother     Neurologic Disorder Maternal Grandmother         MIGRAINES    Anxiety Disorder Maternal Grandmother     Diabetes Maternal Grandmother     Migraines Maternal Grandmother     Deep Vein Thrombosis Maternal Grandmother     Cerebrovascular Disease Maternal Grandfather     Cancer Maternal Grandfather     Mental Illness Maternal Grandfather     Cerebrovascular Disease Paternal Grandmother     Arthritis Paternal Grandmother         RHEUMATOID    Cancer Paternal Grandmother     Osteoporosis Paternal Grandmother     Heart Disease Paternal Grandmother     Depression Paternal Grandmother     Neurologic Disorder Paternal Grandmother         MIGRAINES    Thyroid Disease Paternal Grandmother     Cerebrovascular Disease Paternal Grandfather     Cancer Paternal Grandfather     Heart Disease Brother         MURMUR    Asthma Son     Endocrine Disease Son     Neurologic Disorder Father         epilepsy    Seizure Disorder Father     Hypertension Father     Skin Cancer Father     Anxiety Disorder Father     Mental Illness Father     Hyperlipidemia Father     Kidney Disease Father     Bladder Cancer Father     Neurologic Disorder Daughter         MIGRAINES    Arthritis Daughter         JR    Hypertension Son     LUNG DISEASE Brother     Cancer Brother         lung    Anxiety Disorder Son     Asthma Son     Hypertension Son     Migraines Son     Spine Problems Son     Mental Illness Brother     Migraines Brother     Cardiac Sudden Death Brother     Spine Problems Brother     Glaucoma No family hx of     Macular Degeneration No family hx of     Unknown/Adopted No family hx of     Anesthesia Reaction No family hx of     Other Cancer No family hx of     Rheumatoid Arthritis No family hx of     Bone Cancer No family  "hx of     Low Back Problems No family hx of      Social History   Social History     Tobacco Use    Smoking status: Former     Current packs/day: 0.10     Average packs/day: 0.1 packs/day for 41.7 years (4.2 ttl pk-yrs)     Types: Cigarettes     Start date: 8/6/1983     Passive exposure: Past    Smokeless tobacco: Never    Tobacco comments:     Quit 6 weeks ago   Vaping Use    Vaping status: Never Used   Substance Use Topics    Alcohol use: No    Drug use: No      A medically appropriate review of systems was performed with pertinent positives and negatives noted in the HPI, and all other systems negative.    Physical Exam   BP: 121/88  Pulse: 117  Temp: (!) 96.4  F (35.8  C)  Resp: 16  Height: 167.6 cm (5' 6\")  Weight: 85.7 kg (189 lb)  SpO2: 99 %  Physical Exam      GENERAL APPEARANCE: The patient is well developed, well appearing, and in no acute distress.  HEAD:  Normocephalic.  EENT: Voice normal.  NECK: Trachea is midline.  Uvula midline without exudates  LUNGS: Breath sounds are equal and clear bilaterally. No wheezes, rhonchi, or rales.  HEART: Regular rate and normal rhythm.   ABDOMEN: Soft, flat, and benign. No mass, tenderness, guarding, or rebound.  : No CVA tenderness bilaterally.  Saleh catheter in place with approximately 300 cc yellow urine output.  EXTREMITIES: No cyanosis, clubbing, or edema.  NEUROLOGIC: No focal sensory or motor deficits are noted.  PSYCHIATRIC: The patient is awake, alert.  Appropriate mood and affect.  SKIN: Warm, dry, and well perfused.      ED Course, Procedures, & Data           Results for orders placed or performed during the hospital encounter of 04/02/25   UA with Microscopic reflex to Culture     Status: Abnormal    Specimen: Urine, Saleh Catheter   Result Value Ref Range    Color Urine Light Yellow Colorless, Straw, Light Yellow, Yellow    Appearance Urine Clear Clear    Glucose Urine Negative Negative mg/dL    Bilirubin Urine Negative Negative    Ketones Urine " Negative Negative mg/dL    Specific Gravity Urine 1.023 1.003 - 1.035    Blood Urine Negative Negative    pH Urine 5.5 5.0 - 7.0    Protein Albumin Urine 10 (A) Negative mg/dL    Urobilinogen Urine Normal Normal mg/dL    Nitrite Urine Negative Negative    Leukocyte Esterase Urine Moderate (A) Negative    Bacteria Urine Few (A) None Seen /HPF    Mucus Urine Present (A) None Seen /LPF    RBC Urine 23 (H) <=2 /HPF    WBC Urine 3 <=5 /HPF    Squamous Epithelials Urine <1 <=1 /HPF    Hyaline Casts Urine 1 <=2 /LPF    Narrative    Urine Culture ordered based on laboratory criteria     Medications   oxyCODONE (ROXICODONE) tablet 5 mg (5 mg Oral $Given 4/2/25 2945)     Labs Ordered and Resulted from Time of ED Arrival to Time of ED Departure   ROUTINE UA WITH MICROSCOPIC REFLEX TO CULTURE - Abnormal       Result Value    Color Urine Light Yellow      Appearance Urine Clear      Glucose Urine Negative      Bilirubin Urine Negative      Ketones Urine Negative      Specific Gravity Urine 1.023      Blood Urine Negative      pH Urine 5.5      Protein Albumin Urine 10 (*)     Urobilinogen Urine Normal      Nitrite Urine Negative      Leukocyte Esterase Urine Moderate (*)     Bacteria Urine Few (*)     Mucus Urine Present (*)     RBC Urine 23 (*)     WBC Urine 3      Squamous Epithelials Urine <1      Hyaline Casts Urine 1     URINE CULTURE     No orders to display          Critical care was not performed.     Medical Decision Making  The patient's presentation was of moderate complexity (an undiagnosed new problem with uncertain prognosis).    The patient's evaluation involved:  review of external note(s) from 1 sources (see separate area of note for details)  ordering and/or review of 1 test(s) in this encounter (see separate area of note for details)    The patient's management necessitated moderate risk (prescription drug management including medications given in the ED).    Assessment & Plan    This is a 54-year-old female  with recent Saleh catheter placed for urinary retention from unclear etiology presenting with concerns for catheter pain without other significant symptoms.  On presentation she is afebrile initial temperature 96.4 on recheck*/*, mildly tachycardic to 117 not tachycardic on my exam.  On physical exam she has no abdominal tenderness no CVA tenderness does have 300 cc of yellow urine in her Saleh bag.  Considered broad differential including catheter pain, possible developing acute cystitis.  Less likely acute intra-abdominal process without fevers, and with benign abdomen on exam.  I did attempt to visualize the bladder at bedside with ultrasound do not see significantly distended bladder.  Do not feel indication for intra-abdominal imaging or laboratory studies today based on lack of systemic symptoms clinical picture.  Nursing staff will change out Saleh, send UA, patient was given oxycodone for pain as she reports allergy to Tylenol and has been taking Celebrex as directed.    Saleh catheter was changed out by nursing staff.  Nurse notes that the old Saleh catheter was malodorous.  She noted some urethral irritation as well with Betadine.  Catheter was changed, subsequent urine was sent for UA.  On review UA shows moderate leukocyte esterase 23 RBCs few bacteria without squamous cells, white cells, or nitrites.  This will be sent for culture.  Patient feels improved after Saleh change out.  Discussed with patient indeterminate findings on UA.  With shared decision making, discussed with patient empiric treatment for possible UTI versus waiting for culture to return and she would like to be initiated on antibiotics.  She does have a history of recent C. difficile infection discussed with her Macrobid does not increase C. difficile risk.  She be discharged on course of Macrobid.  We discussed recommendations of very close follow-up with your urologist which she has an appointment in 2 days for.  We discussed strict  return precautions.  Patient has no other questions or concerns at this time.  Red flag signs were addressed, and they were in agreement with the patient care plan provided.        I have reviewed the nursing notes. I have reviewed the findings, diagnosis, plan and need for follow up with the patient.    Discharge Medication List as of 4/2/2025  2:53 PM        START taking these medications    Details   nitroFURantoin macrocrystal-monohydrate (MACROBID) 100 MG capsule Take 1 capsule (100 mg) by mouth 2 times daily., Disp-14 capsule, R-0, E-Prescribe             Final diagnoses:   Saleh catheter problem, initial encounter       VÍCTOR Merchant  [facility] EMERGENCY DEPARTMENT  4/2/2025     Jessica Brian, LELO  04/02/25 1511

## 2025-04-03 ENCOUNTER — PATIENT OUTREACH (OUTPATIENT)
Dept: CARE COORDINATION | Facility: CLINIC | Age: 55
End: 2025-04-03
Payer: COMMERCIAL

## 2025-04-03 LAB — BACTERIA UR CULT: NO GROWTH

## 2025-04-03 NOTE — PROGRESS NOTES
Subjective     REFERRING PROVIDER  Katherine Orozco    REASON FOR VISIT  Trial of Void    HISTORY OF PRESENT ILLNESS  Ms. Rivero is a pleasant 54 year old female with a past medical history significant for hypothyroidism, GERD, RA, fibromyalgia, oseteoarthritis, nephrolithiasis, tobacco use, recent C. difficile colitis and acute hepatitis who presents today for urinary retention and trial of void. Patient presents here today with her son, who is also her PCA     3/18/25 - Patient presents to ED for flank pain and low urine output. No urinary symptoms at this time. UA negative for infection. Stable creatinine at 1.0. CT A/P with contrast showed no inflammatory changes, renal calculi, or obstruction. PVR >400 mL. Saleh catheter placed which resulted in 700 mL urine. She discharged with Saleh catheter and plans for TOV at primary care office.     3/19/25 - Patient presents to PCP for follow-up. TOV not completed. Patient instructed to keep Saleh in for 1 week.     3/24/25 - Patient presents to ED for leaking of catheter drainage bag. Catheter bag and tubing replaced without complications, catheter left in place.    4/2/25 - Patient calls nurse line for catheter pain, later presents to ED. Notes rather abrupt onset of discomfort in urethral area near catheter insertion site. Felt she was having low urine output. Mild RLQ discomfort and urethral discomfort. Saleh catheter replaced. UA notable for moderate LE and 23 RBCs. Discharged on 7 day course of Macrobid. UCx no growth.     Today:  Noticed urinary symptoms starting 3 weeks ago   After starting new supplements (magnesium, D3, fish oil), stressful time of life (father's health), recently increase Klonopin; no other identifiable trigger/recent injury/surgery  Noticed leaking with cough, sneeze, other activities  Started noticed she was urinating less and less. Went ~1.5 days without significant void before going to ED.  This has not happened before   Daily urine habits  "before retention: 4x/day, no nocturia, no leaking  Fluid intake: vitamin water, naked juice, some soda but she is trying to cut back  Good bowel movements at baseline - though recent C.diff history  Reports history of bladder infections, many kidney stones, gross hematuria with infection/kidney stones   Typical symptoms of bladder infection: odorous and cloudy urine, sometimes burning   No frequency or urgency. Maybe some pressure/feeling of incomplete emptying.   Hx of 1 vaginal delivery and 1  section. Menopause at age 41. Says she may have had ovarian cancer and only has 1 ovary. No vaginal symptoms today (odor, discharge, bleeding, bulging, fullness).  Denies numbness/tinging and muscle weakness/spasms. Endorses vision problems (s/t blurry vision), fatigue/exhaustion at baseline ~35 years, chronic pain at baseline. Sometimes right kidney pain.     REVIEW OF SYSTEMS  10 point ROS neg other than the symptoms noted above in the HPI.    Social History:  Tobacco: Per EMR, 0.1 packs/day for 41.7 years (4.2 ttl pk-yrs)   Lots of life stressors right now    Family History:  Patient denies a family history of kidney stones.   Bladder and kidney cancer in father .  Says mom's side is \"highly sensitive down there\"    Objective     PHYSICAL EXAM    BP (!) 151/94   Pulse 56   LMP  (LMP Unknown)   SpO2 98%   Physical Exam  Constitutional:       General: She is not in acute distress.  HENT:      Head: Normocephalic.      Right Ear: External ear normal.      Left Ear: External ear normal.      Nose: Nose normal.   Eyes:      General: No scleral icterus.        Right eye: No discharge.         Left eye: No discharge.      Conjunctiva/sclera: Conjunctivae normal.   Pulmonary:      Effort: Pulmonary effort is normal. No respiratory distress.   Abdominal:      General: Abdomen is flat.   Musculoskeletal:         General: Normal range of motion.   Skin:     General: Skin is warm and dry.   Neurological:      General: No " focal deficit present.      Mental Status: She is alert and oriented to person, place, and time.   Psychiatric:         Mood and Affect: Mood normal.       PELVIC:  Examined in lithotomy position with speculum. Normal external genitalia and introitus. Small open comedones on mons pubis. Periurethral area non-tender without visible or palpable masses. Minor erythema of urethral meatus. No urethral discharge. No leak with CST.  Signs of atrophy in vaginal mucosa. Grade I uterine prolapse. Some tenderness with palpation of right posterior vaginal canal. Rectal exam deferred.     LABORATORY     Latest Reference Range & Units 03/18/25 13:36 03/18/25 18:07 04/02/25 13:51   Color Urine Colorless, Straw, Light Yellow, Yellow  Light Yellow Straw Light Yellow   Appearance Urine Clear  Clear Clear Clear   Glucose Urine Negative mg/dL Negative Negative Negative   Bilirubin Urine Negative  Negative Negative Negative   Ketones Urine Negative mg/dL Negative Negative Negative   Specific Gravity Urine 1.003 - 1.035  1.024 1.027 1.023   pH Urine 5.0 - 7.0  5.5 5.5 5.5   Protein Albumin Urine Negative mg/dL Negative Negative 10 !   Urobilinogen mg/dL Normal mg/dL Normal Normal Normal   Nitrite Urine Negative  Negative Negative Negative   Blood Urine Negative  Negative Negative Negative   Leukocyte Esterase Urine Negative  Negative Negative Moderate !   WBC Urine <=5 /HPF 2 <1 3   RBC Urine <=2 /HPF 1 <1 23 (H)   Bacteria Urine None Seen /HPF   Few !   Squamous Epithelial /HPF Urine <=1 /HPF 3 (H)  <1   Mucus Urine None Seen /LPF Present !  Present !   !: Data is abnormal  (H): Data is abnormally high    IMAGING  I personally reviewed the below imaging study which demonstrated normal-appearing kidneys and bladder.     Narrative & Impression   EXAM: CT ABDOMEN PELVIS W CONTRAST  LOCATION: [facility]  DATE: 3/18/2025     INDICATION: right flank pain; N V   COMPARISON: CT CAP 2/17/2025,  "11/6/2021, abdominal ultrasound 2/17/2025  TECHNIQUE: CT scan of the abdomen and pelvis was performed following injection of IV contrast. Multiplanar reformats were obtained. Dose reduction techniques were used.  CONTRAST: 90mL Isovue 370                      IMPRESSION:   1.  Interval resolution of the colitis noted on CT done last month.  2.  No new findings to explain her symptoms.   3.  Specifically, no new inflammatory changes, renal calculi or obstruction.  4.  Moderate-sized sliding hiatal hernia.  5.  Mildly enlarged pelvic lymph nodes, left greater than right as noted above are unchanged since the most recent exam and minimally more plump than in 2021. These are presumed to be reactive. A short term pelvic CT ( no contrast) can be done in 3-4   months to ensure they return to baseline.       Assessment & Plan   Hx of acute urinary retention requiring indwelling Saleh catheter  Feeling of incomplete emptying  Unclear the cause of her urinary retention. Patient sites several changes to her life during that time frame which could be contributory including starting several new supplements, increasing her dosage of Klonopin to 3 times daily, and life stressor of her father's health declining/re-homing. Otherwise no recent trauma, injury, etc. Patient did pass her trial of void today, urinating >100% of instilled volume. Despite this, she endorses the feeling of incomplete emptying which I suspect is due to recent instrumentation with Saleh catheter. I provided reassurance that her bladder needs some time to \"recover.\" However, if she suspects she is not emptying or is unable to empty her bladder, she should reach out to the clinic for further direction.     Query stress urinary incontinence vs. overflow incontinence  Patient noticed minor leaking with coughing, laughing, and daily activity in the weeks leading up to her ED visit on 3/19. Unclear whether she is still having these symptoms or if they've improved. " Negative CST on pelvic exam. Encouraged her to reach out to our office if leaking returns or if she develops other bothersome urinary symptoms.     History of urinary tract infections   Atrophic vaginal tissues   Patient reports a history of recurrent urinary tract infections. She is currently on a 5 day course of Macrobid for suspected UTI, however culture showed no growth. She would like to continue taking it anyway and plans to do so. Reports typical UTI symptoms of cloudy and foul smelling urine with occasional dysuria and kidney pain. I explained that color and odor are not true indicators of a UTI. She recalls being treated with Amoxicillin for UTIs in the past and feeling like this did not properly clear the infection. I instructed her to reach out to our office if she feels she has a UTI so we can collect a sample and culture it for appropriate treatment. Additionally, she may benefit from vaginal estrogen in the future but will defer for now given that she thinks she had ovarian cancer in the past though I cannot confirm this.    PLAN  Patient passed TOV today.  Follow-up as needed.     Eleonora Reeves PA-C  [facility]    Approximately 60 minutes were spent today on the date of the encounter in reviewing the EMR (4 notes, recent UA/culture, CT A/P), direct patient care, coordination of care, and documentation.

## 2025-04-03 NOTE — PROGRESS NOTES
Clinic Care Coordination Contact  Community Health Worker Initial Outreach    CHW Initial Information Gathering:  Referral Source: ED Follow-Up  Community Resources: PCA, Other (see comment) (CC Through health ins)  No PCP office visit in Past Year: No  CHW Additional Questions  If ED/Hospital discharge, follow-up appointment scheduled as recommended?: Yes  Medication changes made following ED/Hospital discharge?: No  MyChart active?: No  Patient agreeable to assistance with activating MyChart?: No    Patient accepts CC: No, Patient stated she had CC through health insurance. And expressed that she is well supported at this time. Patient will be sent Care Coordination introduction letter for future reference.    Selene Naylor  Community Health Worker    Connected Care Resources   [facility]     *Connected Care Resources Team does NOT   follow patient ongoing. Referrals are identified   based on internal discharge report and the outreach is to ensure   Patient has understanding of their discharge instructions.

## 2025-04-04 ENCOUNTER — OFFICE VISIT (OUTPATIENT)
Dept: UROLOGY | Facility: CLINIC | Age: 55
End: 2025-04-04
Payer: COMMERCIAL

## 2025-04-04 VITALS — OXYGEN SATURATION: 98 % | HEART RATE: 56 BPM | DIASTOLIC BLOOD PRESSURE: 94 MMHG | SYSTOLIC BLOOD PRESSURE: 151 MMHG

## 2025-04-04 DIAGNOSIS — R33.9 URINARY RETENTION: ICD-10-CM

## 2025-04-04 PROCEDURE — 3080F DIAST BP >= 90 MM HG: CPT

## 2025-04-04 PROCEDURE — 99205 OFFICE O/P NEW HI 60 MIN: CPT

## 2025-04-04 PROCEDURE — 3077F SYST BP >= 140 MM HG: CPT

## 2025-04-04 NOTE — PATIENT INSTRUCTIONS
Thank you for meeting with me today! If you have any questions please do not hesitate to reach out to our office.    Eleonora Reeves PA-C  Department of Urology

## 2025-04-04 NOTE — LETTER
4/4/2025       RE: Kalyn Rivero  [address], [place]. \[facility]"" [id number]     Dear Colleague,    Thank you for referring your patient, Kalyn Rivero, to the [facility] at [facility]. Please see a copy of my visit note below.    Subjective    REFERRING PROVIDER  Katherine Orozco    REASON FOR VISIT  Trial of Void    HISTORY OF PRESENT ILLNESS  Ms. Rivero is a pleasant 54 year old female with a past medical history significant for hypothyroidism, GERD, RA, fibromyalgia, oseteoarthritis, nephrolithiasis, tobacco use, recent C. difficile colitis and acute hepatitis who presents today for urinary retention and trial of void. Patient presents here today with her son, who is also her PCA     3/18/25 - Patient presents to ED for flank pain and low urine output. No urinary symptoms at this time. UA negative for infection. Stable creatinine at 1.0. CT A/P with contrast showed no inflammatory changes, renal calculi, or obstruction. PVR >400 mL. Saleh catheter placed which resulted in 700 mL urine. She discharged with Saleh catheter and plans for TOV at primary care office.     3/19/25 - Patient presents to PCP for follow-up. TOV not completed. Patient instructed to keep Saleh in for 1 week.     3/24/25 - Patient presents to ED for leaking of catheter drainage bag. Catheter bag and tubing replaced without complications, catheter left in place.    4/2/25 - Patient calls nurse line for catheter pain, later presents to ED. Notes rather abrupt onset of discomfort in urethral area near catheter insertion site. Felt she was having low urine output. Mild RLQ discomfort and urethral discomfort. Asleh catheter replaced. UA notable for moderate LE and 23 RBCs. Discharged on 7 day course of Macrobid. UCx no growth.     Today:  Noticed urinary symptoms starting 3 weeks ago   After starting new supplements (magnesium, D3, fish oil), stressful time of life (father's  "health), recently increase Klonopin; no other identifiable trigger/recent injury/surgery  Noticed leaking with cough, sneeze, other activities  Started noticed she was urinating less and less. Went ~1.5 days without significant void before going to ED.  This has not happened before   Daily urine habits before retention: 4x/day, no nocturia, no leaking  Fluid intake: vitamin water, naked juice, some soda but she is trying to cut back  Good bowel movements at baseline - though recent C.diff history  Reports history of bladder infections, many kidney stones, gross hematuria with infection/kidney stones   Typical symptoms of bladder infection: odorous and cloudy urine, sometimes burning   No frequency or urgency. Maybe some pressure/feeling of incomplete emptying.   Hx of 1 vaginal delivery and 1  section. Menopause at age 41. Says she may have had ovarian cancer and only has 1 ovary. No vaginal symptoms today (odor, discharge, bleeding, bulging, fullness).  Denies numbness/tinging and muscle weakness/spasms. Endorses vision problems (s/t blurry vision), fatigue/exhaustion at baseline ~35 years, chronic pain at baseline. Sometimes right kidney pain.     REVIEW OF SYSTEMS  10 point ROS neg other than the symptoms noted above in the HPI.    Social History:  Tobacco: Per EMR, 0.1 packs/day for 41.7 years (4.2 ttl pk-yrs)   Lots of life stressors right now    Family History:  Patient denies a family history of kidney stones.   Bladder and kidney cancer in father .  Says mom's side is \"highly sensitive down there\"    Objective    PHYSICAL EXAM    BP (!) 151/94   Pulse 56   LMP  (LMP Unknown)   SpO2 98%   Physical Exam  Constitutional:       General: She is not in acute distress.  HENT:      Head: Normocephalic.      Right Ear: External ear normal.      Left Ear: External ear normal.      Nose: Nose normal.   Eyes:      General: No scleral icterus.        Right eye: No discharge.         Left eye: No discharge.     "  Conjunctiva/sclera: Conjunctivae normal.   Pulmonary:      Effort: Pulmonary effort is normal. No respiratory distress.   Abdominal:      General: Abdomen is flat.   Musculoskeletal:         General: Normal range of motion.   Skin:     General: Skin is warm and dry.   Neurological:      General: No focal deficit present.      Mental Status: She is alert and oriented to person, place, and time.   Psychiatric:         Mood and Affect: Mood normal.       PELVIC:  Examined in lithotomy position with speculum. Normal external genitalia and introitus. Small open comedones on mons pubis. Periurethral area non-tender without visible or palpable masses. Minor erythema of urethral meatus. No urethral discharge. No leak with CST.  Signs of atrophy in vaginal mucosa. Grade I uterine prolapse. Some tenderness with palpation of right posterior vaginal canal. Rectal exam deferred.     LABORATORY     Latest Reference Range & Units 03/18/25 13:36 03/18/25 18:07 04/02/25 13:51   Color Urine Colorless, Straw, Light Yellow, Yellow  Light Yellow Straw Light Yellow   Appearance Urine Clear  Clear Clear Clear   Glucose Urine Negative mg/dL Negative Negative Negative   Bilirubin Urine Negative  Negative Negative Negative   Ketones Urine Negative mg/dL Negative Negative Negative   Specific Gravity Urine 1.003 - 1.035  1.024 1.027 1.023   pH Urine 5.0 - 7.0  5.5 5.5 5.5   Protein Albumin Urine Negative mg/dL Negative Negative 10 !   Urobilinogen mg/dL Normal mg/dL Normal Normal Normal   Nitrite Urine Negative  Negative Negative Negative   Blood Urine Negative  Negative Negative Negative   Leukocyte Esterase Urine Negative  Negative Negative Moderate !   WBC Urine <=5 /HPF 2 <1 3   RBC Urine <=2 /HPF 1 <1 23 (H)   Bacteria Urine None Seen /HPF   Few !   Squamous Epithelial /HPF Urine <=1 /HPF 3 (H)  <1   Mucus Urine None Seen /LPF Present !  Present !   !: Data is abnormal  (H): Data is abnormally high    IMAGING  I personally reviewed the  "below imaging study which demonstrated normal-appearing kidneys and bladder.     Narrative & Impression   EXAM: CT ABDOMEN PELVIS W CONTRAST  LOCATION: [facility]  DATE: 3/18/2025     INDICATION: right flank pain; N V   COMPARISON: CT CAP 2/17/2025, 11/6/2021, abdominal ultrasound 2/17/2025  TECHNIQUE: CT scan of the abdomen and pelvis was performed following injection of IV contrast. Multiplanar reformats were obtained. Dose reduction techniques were used.  CONTRAST: 90mL Isovue 370                      IMPRESSION:   1.  Interval resolution of the colitis noted on CT done last month.  2.  No new findings to explain her symptoms.   3.  Specifically, no new inflammatory changes, renal calculi or obstruction.  4.  Moderate-sized sliding hiatal hernia.  5.  Mildly enlarged pelvic lymph nodes, left greater than right as noted above are unchanged since the most recent exam and minimally more plump than in 2021. These are presumed to be reactive. A short term pelvic CT ( no contrast) can be done in 3-4   months to ensure they return to baseline.       Assessment & Plan  Hx of acute urinary retention requiring indwelling Saleh catheter  Feeling of incomplete emptying  Unclear the cause of her urinary retention. Patient sites several changes to her life during that time frame which could be contributory including starting several new supplements, increasing her dosage of Klonopin to 3 times daily, and life stressor of her father's health declining/re-homing. Otherwise no recent trauma, injury, etc. Patient did pass her trial of void today, urinating >100% of instilled volume. Despite this, she endorses the feeling of incomplete emptying which I suspect is due to recent instrumentation with Saleh catheter. I provided reassurance that her bladder needs some time to \"recover.\" However, if she suspects she is not emptying or is unable to empty her bladder, she should reach out to " the clinic for further direction.     Query stress urinary incontinence vs. overflow incontinence  Patient noticed minor leaking with coughing, laughing, and daily activity in the weeks leading up to her ED visit on 3/19. Unclear whether she is still having these symptoms or if they've improved. Negative CST on pelvic exam. Encouraged her to reach out to our office if leaking returns or if she develops other bothersome urinary symptoms.     History of urinary tract infections   Atrophic vaginal tissues   Patient reports a history of recurrent urinary tract infections. She is currently on a 5 day course of Macrobid for suspected UTI, however culture showed no growth. She would like to continue taking it anyway and plans to do so. Reports typical UTI symptoms of cloudy and foul smelling urine with occasional dysuria and kidney pain. I explained that color and odor are not true indicators of a UTI. She recalls being treated with Amoxicillin for UTIs in the past and feeling like this did not properly clear the infection. I instructed her to reach out to our office if she feels she has a UTI so we can collect a sample and culture it for appropriate treatment. Additionally, she may benefit from vaginal estrogen in the future but will defer for now given that she thinks she had ovarian cancer in the past though I cannot confirm this.    PLAN  Patient passed TOV today.  Follow-up as needed.     Eleonora Reeves PA-C  [facility]    Approximately 60 minutes were spent today on the date of the encounter in reviewing the EMR (4 notes, recent UA/culture, CT A/P), direct patient care, coordination of care, and documentation.      Again, thank you for allowing me to participate in the care of your patient.      Sincerely,    Eleonora Reeves PA-C

## 2025-04-04 NOTE — NURSING NOTE
Approx 200 cc of  Normal Saline  instilled into the bladder via catheter.  Catheter then removed with out difficulty   Patient voided approx 250 cc's of clear  urine.  Patient tolerated procedure well   Teaching done with patient verbally as to where to go or call  if unable to urinate post catheter removal.    Pt states she gets kidney pain when she has a UTI. That is her only sx.    Pt states she was unable to leave a sample at the clinic almost 3 weeks ago and that is why she has a cath.    Pt is on yari.        KYLE Ashby CMA

## 2025-04-22 ENCOUNTER — TRANSFERRED RECORDS (OUTPATIENT)
Dept: HEALTH INFORMATION MANAGEMENT | Facility: CLINIC | Age: 55
End: 2025-04-22
Payer: COMMERCIAL

## 2025-04-22 LAB
ALT SERPL-CCNC: 27 IU/L (ref 6–32)
AST SERPL-CCNC: 28 U/L (ref 10–35)
CREATININE (EXTERNAL): 0.96 MG/DL (ref 0.5–1.05)

## 2025-04-25 ENCOUNTER — TRANSFERRED RECORDS (OUTPATIENT)
Dept: HEALTH INFORMATION MANAGEMENT | Facility: CLINIC | Age: 55
End: 2025-04-25
Payer: COMMERCIAL

## 2025-04-28 ENCOUNTER — APPOINTMENT (OUTPATIENT)
Dept: GENERAL RADIOLOGY | Facility: CLINIC | Age: 55
End: 2025-04-28
Payer: COMMERCIAL

## 2025-04-28 ENCOUNTER — HOSPITAL ENCOUNTER (EMERGENCY)
Facility: CLINIC | Age: 55
Discharge: HOME | End: 2025-04-28
Attending: EMERGENCY MEDICINE
Payer: COMMERCIAL

## 2025-04-28 ENCOUNTER — APPOINTMENT (OUTPATIENT)
Dept: CT IMAGING | Facility: CLINIC | Age: 55
End: 2025-04-28
Payer: COMMERCIAL

## 2025-04-28 VITALS
HEIGHT: 66 IN | DIASTOLIC BLOOD PRESSURE: 81 MMHG | BODY MASS INDEX: 30.22 KG/M2 | WEIGHT: 188 LBS | TEMPERATURE: 98.7 F | HEART RATE: 116 BPM | OXYGEN SATURATION: 93 % | RESPIRATION RATE: 18 BRPM | SYSTOLIC BLOOD PRESSURE: 113 MMHG

## 2025-04-28 DIAGNOSIS — R10.9 ABDOMINAL PAIN: ICD-10-CM

## 2025-04-28 DIAGNOSIS — K44.9 HIATAL HERNIA: ICD-10-CM

## 2025-04-28 DIAGNOSIS — J18.9 CAP (COMMUNITY ACQUIRED PNEUMONIA): ICD-10-CM

## 2025-04-28 DIAGNOSIS — R07.9 CHEST PAIN: ICD-10-CM

## 2025-04-28 LAB
ALBUMIN SERPL BCG-MCNC: 3.9 G/DL (ref 3.5–5.2)
ALBUMIN UR-MCNC: NEGATIVE MG/DL
ALP SERPL-CCNC: 109 U/L (ref 40–150)
ALT SERPL W P-5'-P-CCNC: 21 U/L (ref 0–50)
ANION GAP SERPL CALCULATED.3IONS-SCNC: 14 MMOL/L (ref 7–15)
APPEARANCE UR: CLEAR
AST SERPL W P-5'-P-CCNC: 27 U/L (ref 0–45)
ATRIAL RATE - MUSE: 135 BPM
BASE EXCESS BLDV CALC-SCNC: -1 MMOL/L (ref -3–3)
BASOPHILS # BLD AUTO: 0.1 10E3/UL (ref 0–0.2)
BASOPHILS NFR BLD AUTO: 0 %
BILIRUB SERPL-MCNC: 0.8 MG/DL
BILIRUB UR QL STRIP: NEGATIVE
BUN SERPL-MCNC: 24.5 MG/DL (ref 6–20)
CALCIUM SERPL-MCNC: 9.4 MG/DL (ref 8.8–10.4)
CHLORIDE SERPL-SCNC: 105 MMOL/L (ref 98–107)
COLOR UR AUTO: ABNORMAL
CREAT SERPL-MCNC: 1.04 MG/DL (ref 0.51–0.95)
D DIMER PPP FEU-MCNC: 2.5 UG/ML FEU (ref 0–0.5)
DIASTOLIC BLOOD PRESSURE - MUSE: NORMAL MMHG
EGFRCR SERPLBLD CKD-EPI 2021: 64 ML/MIN/1.73M2
EOSINOPHIL # BLD AUTO: 0.1 10E3/UL (ref 0–0.7)
EOSINOPHIL NFR BLD AUTO: 1 %
ERYTHROCYTE [DISTWIDTH] IN BLOOD BY AUTOMATED COUNT: 13.6 % (ref 10–15)
FLUAV RNA SPEC QL NAA+PROBE: NEGATIVE
FLUBV RNA RESP QL NAA+PROBE: NEGATIVE
GLUCOSE SERPL-MCNC: 122 MG/DL (ref 70–99)
GLUCOSE UR STRIP-MCNC: NEGATIVE MG/DL
HCG UR QL: NEGATIVE
HCO3 BLDV-SCNC: 22 MMOL/L (ref 21–28)
HCO3 SERPL-SCNC: 19 MMOL/L (ref 22–29)
HCT VFR BLD AUTO: 36.3 % (ref 35–47)
HGB BLD-MCNC: 12.1 G/DL (ref 11.7–15.7)
HGB UR QL STRIP: NEGATIVE
IMM GRANULOCYTES # BLD: 0.1 10E3/UL
IMM GRANULOCYTES NFR BLD: 0 %
INTERPRETATION ECG - MUSE: NORMAL
KETONES UR STRIP-MCNC: NEGATIVE MG/DL
LACTATE BLD-SCNC: 1.2 MMOL/L (ref 0.7–2)
LACTATE SERPL-SCNC: 2 MMOL/L (ref 0.7–2)
LEUKOCYTE ESTERASE UR QL STRIP: ABNORMAL
LYMPHOCYTES # BLD AUTO: 0.8 10E3/UL (ref 0.8–5.3)
LYMPHOCYTES NFR BLD AUTO: 5 %
MCH RBC QN AUTO: 29 PG (ref 26.5–33)
MCHC RBC AUTO-ENTMCNC: 33.3 G/DL (ref 31.5–36.5)
MCV RBC AUTO: 87 FL (ref 78–100)
MONOCYTES # BLD AUTO: 1 10E3/UL (ref 0–1.3)
MONOCYTES NFR BLD AUTO: 6 %
MUCOUS THREADS #/AREA URNS LPF: PRESENT /LPF
NEUTROPHILS # BLD AUTO: 13.9 10E3/UL (ref 1.6–8.3)
NEUTROPHILS NFR BLD AUTO: 88 %
NITRATE UR QL: NEGATIVE
NRBC # BLD AUTO: 0 10E3/UL
NRBC BLD AUTO-RTO: 0 /100
P AXIS - MUSE: 38 DEGREES
PCO2 BLDV: 32 MM HG (ref 40–50)
PH BLDV: 7.46 [PH] (ref 7.32–7.43)
PH UR STRIP: 5.5 [PH] (ref 5–7)
PLATELET # BLD AUTO: 188 10E3/UL (ref 150–450)
PO2 BLDV: 42 MM HG (ref 25–47)
POTASSIUM SERPL-SCNC: 4.2 MMOL/L (ref 3.4–5.3)
PR INTERVAL - MUSE: 132 MS
PROCALCITONIN SERPL IA-MCNC: 0.09 NG/ML
PROT SERPL-MCNC: 8.3 G/DL (ref 6.4–8.3)
QRS DURATION - MUSE: 74 MS
QT - MUSE: 286 MS
QTC - MUSE: 429 MS
R AXIS - MUSE: 82 DEGREES
RBC # BLD AUTO: 4.17 10E6/UL (ref 3.8–5.2)
RBC URINE: 3 /HPF
RSV RNA SPEC NAA+PROBE: NEGATIVE
S PYO DNA THROAT QL NAA+PROBE: NOT DETECTED
SAO2 % BLDV: 81 % (ref 70–75)
SARS-COV-2 RNA RESP QL NAA+PROBE: NEGATIVE
SODIUM SERPL-SCNC: 138 MMOL/L (ref 135–145)
SP GR UR STRIP: 1.01 (ref 1–1.03)
SQUAMOUS EPITHELIAL: 2 /HPF
SYSTOLIC BLOOD PRESSURE - MUSE: NORMAL MMHG
T AXIS - MUSE: 24 DEGREES
TRANSITIONAL EPI: <1 /HPF
TROPONIN T SERPL HS-MCNC: 7 NG/L
TROPONIN T SERPL HS-MCNC: 7 NG/L
TSH SERPL DL<=0.005 MIU/L-ACNC: 0.73 UIU/ML (ref 0.3–4.2)
UROBILINOGEN UR STRIP-MCNC: NORMAL MG/DL
VENTRICULAR RATE- MUSE: 135 BPM
WBC # BLD AUTO: 15.9 10E3/UL (ref 4–11)
WBC URINE: 40 /HPF

## 2025-04-28 PROCEDURE — 36415 COLL VENOUS BLD VENIPUNCTURE: CPT

## 2025-04-28 PROCEDURE — 81001 URINALYSIS AUTO W/SCOPE: CPT

## 2025-04-28 PROCEDURE — 87651 STREP A DNA AMP PROBE: CPT | Performed by: EMERGENCY MEDICINE

## 2025-04-28 PROCEDURE — 87637 SARSCOV2&INF A&B&RSV AMP PRB: CPT | Performed by: EMERGENCY MEDICINE

## 2025-04-28 PROCEDURE — 87040 BLOOD CULTURE FOR BACTERIA: CPT

## 2025-04-28 PROCEDURE — 85004 AUTOMATED DIFF WBC COUNT: CPT

## 2025-04-28 PROCEDURE — 96374 THER/PROPH/DIAG INJ IV PUSH: CPT | Mod: 59 | Performed by: EMERGENCY MEDICINE

## 2025-04-28 PROCEDURE — 71046 X-RAY EXAM CHEST 2 VIEWS: CPT

## 2025-04-28 PROCEDURE — 250N000011 HC RX IP 250 OP 636

## 2025-04-28 PROCEDURE — 93010 ELECTROCARDIOGRAM REPORT: CPT

## 2025-04-28 PROCEDURE — 84443 ASSAY THYROID STIM HORMONE: CPT | Performed by: EMERGENCY MEDICINE

## 2025-04-28 PROCEDURE — 81025 URINE PREGNANCY TEST: CPT | Performed by: EMERGENCY MEDICINE

## 2025-04-28 PROCEDURE — 96376 TX/PRO/DX INJ SAME DRUG ADON: CPT | Mod: 59 | Performed by: EMERGENCY MEDICINE

## 2025-04-28 PROCEDURE — 80053 COMPREHEN METABOLIC PANEL: CPT

## 2025-04-28 PROCEDURE — 258N000003 HC RX IP 258 OP 636

## 2025-04-28 PROCEDURE — 82803 BLOOD GASES ANY COMBINATION: CPT

## 2025-04-28 PROCEDURE — 96361 HYDRATE IV INFUSION ADD-ON: CPT | Performed by: EMERGENCY MEDICINE

## 2025-04-28 PROCEDURE — 87086 URINE CULTURE/COLONY COUNT: CPT

## 2025-04-28 PROCEDURE — 93005 ELECTROCARDIOGRAM TRACING: CPT | Performed by: EMERGENCY MEDICINE

## 2025-04-28 PROCEDURE — 96375 TX/PRO/DX INJ NEW DRUG ADDON: CPT | Mod: 59 | Performed by: EMERGENCY MEDICINE

## 2025-04-28 PROCEDURE — 99285 EMERGENCY DEPT VISIT HI MDM: CPT | Mod: 25 | Performed by: EMERGENCY MEDICINE

## 2025-04-28 PROCEDURE — 85379 FIBRIN DEGRADATION QUANT: CPT

## 2025-04-28 PROCEDURE — 83605 ASSAY OF LACTIC ACID: CPT

## 2025-04-28 PROCEDURE — 84484 ASSAY OF TROPONIN QUANT: CPT

## 2025-04-28 PROCEDURE — 99285 EMERGENCY DEPT VISIT HI MDM: CPT | Mod: FS

## 2025-04-28 PROCEDURE — 84145 PROCALCITONIN (PCT): CPT | Performed by: EMERGENCY MEDICINE

## 2025-04-28 PROCEDURE — 250N000009 HC RX 250

## 2025-04-28 PROCEDURE — 71275 CT ANGIOGRAPHY CHEST: CPT

## 2025-04-28 RX ORDER — HYDROMORPHONE HCL IN WATER/PF 6 MG/30 ML
0.2 PATIENT CONTROLLED ANALGESIA SYRINGE INTRAVENOUS
Status: COMPLETED | OUTPATIENT
Start: 2025-04-28 | End: 2025-04-28

## 2025-04-28 RX ORDER — ONDANSETRON 2 MG/ML
4 INJECTION INTRAMUSCULAR; INTRAVENOUS ONCE
Status: COMPLETED | OUTPATIENT
Start: 2025-04-28 | End: 2025-04-28

## 2025-04-28 RX ORDER — HYDROMORPHONE HYDROCHLORIDE 1 MG/ML
0.5 INJECTION, SOLUTION INTRAMUSCULAR; INTRAVENOUS; SUBCUTANEOUS
Status: COMPLETED | OUTPATIENT
Start: 2025-04-28 | End: 2025-04-28

## 2025-04-28 RX ORDER — IOPAMIDOL 755 MG/ML
100 INJECTION, SOLUTION INTRAVASCULAR ONCE
Status: COMPLETED | OUTPATIENT
Start: 2025-04-28 | End: 2025-04-28

## 2025-04-28 RX ORDER — AZITHROMYCIN 250 MG/1
TABLET, FILM COATED ORAL
Qty: 6 TABLET | Refills: 0 | Status: SHIPPED | OUTPATIENT
Start: 2025-04-28 | End: 2025-04-30

## 2025-04-28 RX ADMIN — SODIUM CHLORIDE 84 ML: 9 INJECTION, SOLUTION INTRAVENOUS at 19:34

## 2025-04-28 RX ADMIN — HYDROMORPHONE HYDROCHLORIDE 0.5 MG: 1 INJECTION, SOLUTION INTRAMUSCULAR; INTRAVENOUS; SUBCUTANEOUS at 20:28

## 2025-04-28 RX ADMIN — ONDANSETRON 4 MG: 2 INJECTION INTRAMUSCULAR; INTRAVENOUS at 19:07

## 2025-04-28 RX ADMIN — SODIUM CHLORIDE, SODIUM LACTATE, POTASSIUM CHLORIDE, AND CALCIUM CHLORIDE 1000 ML: .6; .31; .03; .02 INJECTION, SOLUTION INTRAVENOUS at 18:12

## 2025-04-28 RX ADMIN — IOPAMIDOL 64 ML: 755 INJECTION, SOLUTION INTRAVENOUS at 19:34

## 2025-04-28 RX ADMIN — HYDROMORPHONE HYDROCHLORIDE 0.2 MG: 0.2 INJECTION, SOLUTION INTRAMUSCULAR; INTRAVENOUS; SUBCUTANEOUS at 18:12

## 2025-04-28 ASSESSMENT — ACTIVITIES OF DAILY LIVING (ADL)
ADLS_ACUITY_SCORE: 54

## 2025-04-28 ASSESSMENT — COLUMBIA-SUICIDE SEVERITY RATING SCALE - C-SSRS
2. HAVE YOU ACTUALLY HAD ANY THOUGHTS OF KILLING YOURSELF IN THE PAST MONTH?: NO
1. IN THE PAST MONTH, HAVE YOU WISHED YOU WERE DEAD OR WISHED YOU COULD GO TO SLEEP AND NOT WAKE UP?: NO
6. HAVE YOU EVER DONE ANYTHING, STARTED TO DO ANYTHING, OR PREPARED TO DO ANYTHING TO END YOUR LIFE?: NO

## 2025-04-28 ASSESSMENT — ENCOUNTER SYMPTOMS: FEVER: 1

## 2025-04-28 NOTE — ED TRIAGE NOTES
Pt states she woke up with a sore throat and R eye redness and drainage she states she went out in the rain to shop returned home and noticed nausea tremors chills breathing fast felt like something was stuck in her throat and nose and states she spit up phlegm onto the floor. Onset of R mid sternal chest pain as well and states its hard for her to breathe that she doesn't feel right and feels very dizzy and light headed. Admits she has been coughing. Current pain locations are head throat chest and her dizziness.     Also admits she ran into a cabinet with her chest but states her sx began hours before.

## 2025-04-28 NOTE — ED TRIAGE NOTES
Triage Assessment (Adult)       Row Name 04/28/25 1609          Triage Assessment    Airway WDL WDL        Respiratory WDL    Respiratory WDL WDL        Skin Circulation/Temperature WDL    Skin Circulation/Temperature WDL WDL        Cardiac WDL    Cardiac WDL WDL        Cognitive/Neuro/Behavioral WDL    Cognitive/Neuro/Behavioral WDL WDL        [place] Coma Scale    Best Eye Response 4-->(E4) spontaneous     Best Motor Response 6-->(M6) obeys commands     Best Verbal Response 5-->(V5) oriented     [place] Coma Scale Score 15

## 2025-04-29 ENCOUNTER — TELEPHONE (OUTPATIENT)
Dept: FAMILY MEDICINE | Facility: CLINIC | Age: 55
End: 2025-04-29
Payer: COMMERCIAL

## 2025-04-29 ENCOUNTER — APPOINTMENT (OUTPATIENT)
Dept: CT IMAGING | Facility: CLINIC | Age: 55
End: 2025-04-29
Payer: COMMERCIAL

## 2025-04-29 ENCOUNTER — HOSPITAL ENCOUNTER (INPATIENT)
Facility: CLINIC | Age: 55
LOS: 1 days | Discharge: HOME | End: 2025-04-30
Attending: FAMILY MEDICINE | Admitting: FAMILY MEDICINE
Payer: COMMERCIAL

## 2025-04-29 DIAGNOSIS — R11.2 NAUSEA AND VOMITING, UNSPECIFIED VOMITING TYPE: ICD-10-CM

## 2025-04-29 DIAGNOSIS — N39.0 URINARY TRACT INFECTION WITHOUT HEMATURIA, SITE UNSPECIFIED: ICD-10-CM

## 2025-04-29 DIAGNOSIS — J18.9 PNEUMONIA DUE TO INFECTIOUS ORGANISM, UNSPECIFIED LATERALITY, UNSPECIFIED PART OF LUNG: ICD-10-CM

## 2025-04-29 DIAGNOSIS — Z87.898 HISTORY OF EPISTAXIS: Primary | ICD-10-CM

## 2025-04-29 LAB
ALBUMIN SERPL BCG-MCNC: 3.9 G/DL (ref 3.5–5.2)
ALBUMIN UR-MCNC: NEGATIVE MG/DL
ALP SERPL-CCNC: 104 U/L (ref 40–150)
ALT SERPL W P-5'-P-CCNC: 27 U/L (ref 0–50)
AMPHETAMINES UR QL SCN: ABNORMAL
ANION GAP SERPL CALCULATED.3IONS-SCNC: 13 MMOL/L (ref 7–15)
APPEARANCE UR: CLEAR
AST SERPL W P-5'-P-CCNC: 28 U/L (ref 0–45)
BARBITURATES UR QL SCN: ABNORMAL
BASOPHILS # BLD AUTO: 0 10E3/UL (ref 0–0.2)
BASOPHILS NFR BLD AUTO: 0 %
BENZODIAZ UR QL SCN: ABNORMAL
BILIRUB SERPL-MCNC: 0.8 MG/DL
BILIRUB UR QL STRIP: NEGATIVE
BUN SERPL-MCNC: 21.5 MG/DL (ref 6–20)
BZE UR QL SCN: ABNORMAL
CALCIUM SERPL-MCNC: 9.1 MG/DL (ref 8.8–10.4)
CANNABINOIDS UR QL SCN: ABNORMAL
CHLORIDE SERPL-SCNC: 103 MMOL/L (ref 98–107)
COLOR UR AUTO: ABNORMAL
CREAT SERPL-MCNC: 1.11 MG/DL (ref 0.51–0.95)
EGFRCR SERPLBLD CKD-EPI 2021: 59 ML/MIN/1.73M2
EOSINOPHIL # BLD AUTO: 0.1 10E3/UL (ref 0–0.7)
EOSINOPHIL NFR BLD AUTO: 1 %
ERYTHROCYTE [DISTWIDTH] IN BLOOD BY AUTOMATED COUNT: 13.9 % (ref 10–15)
FENTANYL UR QL: ABNORMAL
GLUCOSE SERPL-MCNC: 104 MG/DL (ref 70–99)
GLUCOSE UR STRIP-MCNC: NEGATIVE MG/DL
HCG UR QL: NEGATIVE
HCO3 SERPL-SCNC: 23 MMOL/L (ref 22–29)
HCT VFR BLD AUTO: 37.2 % (ref 35–47)
HGB BLD-MCNC: 12.1 G/DL (ref 11.7–15.7)
HGB UR QL STRIP: NEGATIVE
IMM GRANULOCYTES # BLD: 0.1 10E3/UL
IMM GRANULOCYTES NFR BLD: 1 %
INTERNAL QC OK POCT: NORMAL
KETONES UR STRIP-MCNC: NEGATIVE MG/DL
LEUKOCYTE ESTERASE UR QL STRIP: ABNORMAL
LIPASE SERPL-CCNC: 23 U/L (ref 13–60)
LYMPHOCYTES # BLD AUTO: 1.2 10E3/UL (ref 0.8–5.3)
LYMPHOCYTES NFR BLD AUTO: 11 %
MCH RBC QN AUTO: 28.9 PG (ref 26.5–33)
MCHC RBC AUTO-ENTMCNC: 32.5 G/DL (ref 31.5–36.5)
MCV RBC AUTO: 89 FL (ref 78–100)
MONOCYTES # BLD AUTO: 0.8 10E3/UL (ref 0–1.3)
MONOCYTES NFR BLD AUTO: 8 %
MUCOUS THREADS #/AREA URNS LPF: PRESENT /LPF
NEUTROPHILS # BLD AUTO: 8.8 10E3/UL (ref 1.6–8.3)
NEUTROPHILS NFR BLD AUTO: 80 %
NITRATE UR QL: NEGATIVE
NRBC # BLD AUTO: 0 10E3/UL
NRBC BLD AUTO-RTO: 0 /100
NT-PROBNP SERPL-MCNC: 300 PG/ML (ref 0–900)
OPIATES UR QL SCN: ABNORMAL
PCP QUAL URINE (ROCHE): ABNORMAL
PH UR STRIP: 6 [PH] (ref 5–7)
PLATELET # BLD AUTO: 168 10E3/UL (ref 150–450)
POCT KIT EXPIRATION DATE: NORMAL
POCT KIT LOT NUMBER: NORMAL
POTASSIUM SERPL-SCNC: 4.1 MMOL/L (ref 3.4–5.3)
PROCALCITONIN SERPL IA-MCNC: 0.42 NG/ML
PROT SERPL-MCNC: 8.3 G/DL (ref 6.4–8.3)
RBC # BLD AUTO: 4.18 10E6/UL (ref 3.8–5.2)
RBC URINE: 1 /HPF
SODIUM SERPL-SCNC: 139 MMOL/L (ref 135–145)
SP GR UR STRIP: 1.02 (ref 1–1.03)
SQUAMOUS EPITHELIAL: 1 /HPF
UROBILINOGEN UR STRIP-MCNC: NORMAL MG/DL
WBC # BLD AUTO: 11.1 10E3/UL (ref 4–11)
WBC URINE: 8 /HPF

## 2025-04-29 PROCEDURE — 85004 AUTOMATED DIFF WBC COUNT: CPT

## 2025-04-29 PROCEDURE — 999N000127 HC STATISTIC PERIPHERAL IV START W US GUIDANCE

## 2025-04-29 PROCEDURE — 74177 CT ABD & PELVIS W/CONTRAST: CPT

## 2025-04-29 PROCEDURE — 93005 ELECTROCARDIOGRAM TRACING: CPT | Performed by: FAMILY MEDICINE

## 2025-04-29 PROCEDURE — 83880 ASSAY OF NATRIURETIC PEPTIDE: CPT

## 2025-04-29 PROCEDURE — 96376 TX/PRO/DX INJ SAME DRUG ADON: CPT | Performed by: FAMILY MEDICINE

## 2025-04-29 PROCEDURE — 999N000285 HC STATISTIC VASC ACCESS LAB DRAW WITH PIV START

## 2025-04-29 PROCEDURE — 93010 ELECTROCARDIOGRAM REPORT: CPT | Performed by: FAMILY MEDICINE

## 2025-04-29 PROCEDURE — 96361 HYDRATE IV INFUSION ADD-ON: CPT | Performed by: FAMILY MEDICINE

## 2025-04-29 PROCEDURE — 99285 EMERGENCY DEPT VISIT HI MDM: CPT | Mod: FS | Performed by: FAMILY MEDICINE

## 2025-04-29 PROCEDURE — 80307 DRUG TEST PRSMV CHEM ANLYZR: CPT

## 2025-04-29 PROCEDURE — 120N000002 HC R&B MED SURG/OB UMMC

## 2025-04-29 PROCEDURE — 82947 ASSAY GLUCOSE BLOOD QUANT: CPT

## 2025-04-29 PROCEDURE — 96374 THER/PROPH/DIAG INJ IV PUSH: CPT | Performed by: FAMILY MEDICINE

## 2025-04-29 PROCEDURE — 999N000040 HC STATISTIC CONSULT NO CHARGE VASC ACCESS

## 2025-04-29 PROCEDURE — 250N000011 HC RX IP 250 OP 636

## 2025-04-29 PROCEDURE — 99222 1ST HOSP IP/OBS MODERATE 55: CPT | Mod: GC

## 2025-04-29 PROCEDURE — 83690 ASSAY OF LIPASE: CPT

## 2025-04-29 PROCEDURE — 250N000009 HC RX 250

## 2025-04-29 PROCEDURE — 81025 URINE PREGNANCY TEST: CPT | Performed by: FAMILY MEDICINE

## 2025-04-29 PROCEDURE — 96375 TX/PRO/DX INJ NEW DRUG ADDON: CPT | Performed by: FAMILY MEDICINE

## 2025-04-29 PROCEDURE — 258N000003 HC RX IP 258 OP 636

## 2025-04-29 PROCEDURE — 84145 PROCALCITONIN (PCT): CPT

## 2025-04-29 PROCEDURE — 99285 EMERGENCY DEPT VISIT HI MDM: CPT | Mod: 25 | Performed by: FAMILY MEDICINE

## 2025-04-29 PROCEDURE — 250N000013 HC RX MED GY IP 250 OP 250 PS 637

## 2025-04-29 PROCEDURE — 81003 URINALYSIS AUTO W/O SCOPE: CPT | Performed by: FAMILY MEDICINE

## 2025-04-29 RX ORDER — CELECOXIB 200 MG/1
200 CAPSULE ORAL 3 TIMES DAILY
Status: DISCONTINUED | OUTPATIENT
Start: 2025-04-30 | End: 2025-04-30 | Stop reason: HOSPADM

## 2025-04-29 RX ORDER — CEFTRIAXONE 2 G/1
2 INJECTION, POWDER, FOR SOLUTION INTRAMUSCULAR; INTRAVENOUS EVERY 24 HOURS
Status: DISCONTINUED | OUTPATIENT
Start: 2025-04-30 | End: 2025-04-30

## 2025-04-29 RX ORDER — HYDROMORPHONE HYDROCHLORIDE 1 MG/ML
0.5 INJECTION, SOLUTION INTRAMUSCULAR; INTRAVENOUS; SUBCUTANEOUS
Status: COMPLETED | OUTPATIENT
Start: 2025-04-29 | End: 2025-04-29

## 2025-04-29 RX ORDER — OMEPRAZOLE 20 MG/1
40 CAPSULE, DELAYED RELEASE ORAL 2 TIMES DAILY PRN
Status: DISCONTINUED | OUTPATIENT
Start: 2025-04-29 | End: 2025-04-30

## 2025-04-29 RX ORDER — HYDROMORPHONE HYDROCHLORIDE 1 MG/ML
0.3 INJECTION, SOLUTION INTRAMUSCULAR; INTRAVENOUS; SUBCUTANEOUS
Status: COMPLETED | OUTPATIENT
Start: 2025-04-29 | End: 2025-04-29

## 2025-04-29 RX ORDER — CLONAZEPAM 1 MG/1
1 TABLET ORAL 3 TIMES DAILY
Status: DISCONTINUED | OUTPATIENT
Start: 2025-04-30 | End: 2025-04-29

## 2025-04-29 RX ORDER — AZITHROMYCIN 500 MG/5ML
500 INJECTION, POWDER, LYOPHILIZED, FOR SOLUTION INTRAVENOUS ONCE
Status: COMPLETED | OUTPATIENT
Start: 2025-04-29 | End: 2025-04-29

## 2025-04-29 RX ORDER — AZITHROMYCIN 500 MG/5ML
500 INJECTION, POWDER, LYOPHILIZED, FOR SOLUTION INTRAVENOUS EVERY 24 HOURS
Status: DISCONTINUED | OUTPATIENT
Start: 2025-04-30 | End: 2025-04-30

## 2025-04-29 RX ORDER — AMOXICILLIN 250 MG
2 CAPSULE ORAL 2 TIMES DAILY PRN
Status: DISCONTINUED | OUTPATIENT
Start: 2025-04-29 | End: 2025-04-30 | Stop reason: HOSPADM

## 2025-04-29 RX ORDER — CALCIUM CARBONATE 500 MG/1
1000 TABLET, CHEWABLE ORAL 4 TIMES DAILY PRN
Status: DISCONTINUED | OUTPATIENT
Start: 2025-04-29 | End: 2025-04-30 | Stop reason: HOSPADM

## 2025-04-29 RX ORDER — ONDANSETRON 2 MG/ML
4 INJECTION INTRAMUSCULAR; INTRAVENOUS EVERY 30 MIN PRN
Status: DISCONTINUED | OUTPATIENT
Start: 2025-04-29 | End: 2025-04-30 | Stop reason: HOSPADM

## 2025-04-29 RX ORDER — CLONAZEPAM 1 MG/1
1 TABLET ORAL 3 TIMES DAILY
Status: DISCONTINUED | OUTPATIENT
Start: 2025-04-29 | End: 2025-04-30 | Stop reason: HOSPADM

## 2025-04-29 RX ORDER — LIDOCAINE 40 MG/G
CREAM TOPICAL
Status: DISCONTINUED | OUTPATIENT
Start: 2025-04-29 | End: 2025-04-30 | Stop reason: HOSPADM

## 2025-04-29 RX ORDER — IOPAMIDOL 755 MG/ML
100 INJECTION, SOLUTION INTRAVASCULAR ONCE
Status: COMPLETED | OUTPATIENT
Start: 2025-04-29 | End: 2025-04-29

## 2025-04-29 RX ORDER — ONDANSETRON 4 MG/1
4 TABLET, ORALLY DISINTEGRATING ORAL EVERY 6 HOURS PRN
Status: DISCONTINUED | OUTPATIENT
Start: 2025-04-29 | End: 2025-04-30 | Stop reason: HOSPADM

## 2025-04-29 RX ORDER — ONDANSETRON 2 MG/ML
4 INJECTION INTRAMUSCULAR; INTRAVENOUS EVERY 6 HOURS PRN
Status: DISCONTINUED | OUTPATIENT
Start: 2025-04-29 | End: 2025-04-30 | Stop reason: HOSPADM

## 2025-04-29 RX ORDER — NICOTINE 21 MG/24HR
1 PATCH, TRANSDERMAL 24 HOURS TRANSDERMAL DAILY
Status: DISCONTINUED | OUTPATIENT
Start: 2025-04-29 | End: 2025-04-29

## 2025-04-29 RX ORDER — CEFTRIAXONE 2 G/1
2 INJECTION, POWDER, FOR SOLUTION INTRAMUSCULAR; INTRAVENOUS ONCE
Status: COMPLETED | OUTPATIENT
Start: 2025-04-29 | End: 2025-04-29

## 2025-04-29 RX ORDER — PROCHLORPERAZINE MALEATE 10 MG
10 TABLET ORAL EVERY 6 HOURS PRN
Status: DISCONTINUED | OUTPATIENT
Start: 2025-04-29 | End: 2025-04-30 | Stop reason: HOSPADM

## 2025-04-29 RX ORDER — AMOXICILLIN 250 MG
1 CAPSULE ORAL 2 TIMES DAILY PRN
Status: DISCONTINUED | OUTPATIENT
Start: 2025-04-29 | End: 2025-04-30 | Stop reason: HOSPADM

## 2025-04-29 RX ADMIN — CLONAZEPAM 1 MG: 1 TABLET ORAL at 23:51

## 2025-04-29 RX ADMIN — CEFTRIAXONE SODIUM 2 G: 2 INJECTION, POWDER, FOR SOLUTION INTRAMUSCULAR; INTRAVENOUS at 19:30

## 2025-04-29 RX ADMIN — HYDROMORPHONE HYDROCHLORIDE 0.3 MG: 1 INJECTION, SOLUTION INTRAMUSCULAR; INTRAVENOUS; SUBCUTANEOUS at 16:48

## 2025-04-29 RX ADMIN — HYDROMORPHONE HYDROCHLORIDE 0.5 MG: 1 INJECTION, SOLUTION INTRAMUSCULAR; INTRAVENOUS; SUBCUTANEOUS at 17:58

## 2025-04-29 RX ADMIN — ONDANSETRON 4 MG: 2 INJECTION INTRAMUSCULAR; INTRAVENOUS at 16:50

## 2025-04-29 RX ADMIN — IOPAMIDOL 90 ML: 755 INJECTION, SOLUTION INTRAVENOUS at 17:11

## 2025-04-29 RX ADMIN — SODIUM CHLORIDE 44 ML: 9 INJECTION, SOLUTION INTRAVENOUS at 17:08

## 2025-04-29 RX ADMIN — SODIUM CHLORIDE, SODIUM LACTATE, POTASSIUM CHLORIDE, AND CALCIUM CHLORIDE 1000 ML: .6; .31; .03; .02 INJECTION, SOLUTION INTRAVENOUS at 16:07

## 2025-04-29 RX ADMIN — AZITHROMYCIN MONOHYDRATE 500 MG: 500 INJECTION, POWDER, LYOPHILIZED, FOR SOLUTION INTRAVENOUS at 20:11

## 2025-04-29 ASSESSMENT — ACTIVITIES OF DAILY LIVING (ADL)
ADLS_ACUITY_SCORE: 54

## 2025-04-29 NOTE — CONSULTS
"Consult received for Vascular access care.  See LDA for details. For additional needs place \"Nursing to Consult for Vascular Access\" [id number] order in EPIC.  "

## 2025-04-29 NOTE — TELEPHONE ENCOUNTER
Pt notified of provider message as written.  Pt verbalized good understanding.  Tahira Abbasi BSN, RN

## 2025-04-29 NOTE — ED PROVIDER NOTES
"ED Provider Note  [facility]      History     Chief Complaint   Patient presents with    Chest Pain    Fever    Dizziness       Fever    Kalyn Rivero is a 54 year old female with an extensive past medical history including but not limited to: rheumatoid arthritis, opioid dependence, liver failure, pancreatitis, hypothyroidism who presents with fever and chills, redness of right eye, dizziness, shortness of breath, abdominal pain, chest pain, nausea, myalgias that began today around 11 AM. Patient reports that she got wet after walking in the rain and was unable to warm herself up afterwards. She reports having the shivers, SOB, sub sternal non-radiating chest pain, runny nose, productive cough, and myalgias. She also reports dizziness that felt like the room was spinning around this time. She reports that she has many allergies to medications (13 listed in the chart), and that she takes daily celebrex, omeprazole, and biweekly injections for her RA.       Physical Exam   BP: 129/87  Pulse: (!) 150  Temp: 100.7  F (38.2  C)  Resp: 20  Height: 167.6 cm (5' 6\")  Weight: 85.3 kg (188 lb)  SpO2: 96 %  Physical Exam  Constitutional:       General: She is not in acute distress.     Appearance: Normal appearance. She is not diaphoretic.   HENT:      Head: Atraumatic.      Mouth/Throat:      Mouth: Mucous membranes are moist.   Eyes:      General: No scleral icterus.     Extraocular Movements: Extraocular movements intact.      Right eye: Normal extraocular motion.      Left eye: Normal extraocular motion.      Conjunctiva/sclera:      Right eye: Right conjunctiva is injected.      Comments: Watery discharge and injection present in right eye   Cardiovascular:      Rate and Rhythm: Tachycardia present.      Heart sounds: Normal heart sounds.   Pulmonary:      Effort: No respiratory distress.      Breath sounds: Normal breath sounds. No wheezing or rales.   Chest:      Chest wall: Tenderness " present.   Abdominal:      General: Abdomen is flat.   Musculoskeletal:      Cervical back: Neck supple.   Skin:     General: Skin is warm.      Findings: No rash.   Neurological:      Mental Status: She is alert.           ED Course, Procedures, & Data      Procedures            EKG Interpretation:      Interpreted by Brayan Rosa  Time reviewed: 1615  Symptoms at time of EKG: chest pain   Rhythm: sinus   Rate: tachycardic  Axis: normal  Comparison to prior: Tachycardic in comparison to prior EKG from 6/23    Clinical Impression: normal EKG           Results for orders placed or performed during the hospital encounter of 04/28/25   XR Chest 2 Views     Status: None    Narrative    EXAM: XR CHEST 2 VIEWS  LOCATION: [facility]  DATE: 4/28/2025    INDICATION: Chest pain  COMPARISON: CT chest 04/28/2025      Impression    IMPRESSION: Heart size within normal limits for AP technique. Pulmonary vascularity normal. Low lung volumes. Minimal atelectasis in the bases. No acute infiltrates or effusions. Left shoulder arthroplasty. Internal fixation hardware right humerus.   CT Chest Pulmonary Embolism w Contrast     Status: None    Narrative    EXAM: CT CHEST PULMONARY EMBOLISM W CONTRAST  LOCATION: [facility]  DATE: 4/28/2025    INDICATION: chest pain dyspnea  COMPARISON: Chest x-ray earlier today, CT CAP 2/17/2025  TECHNIQUE: CT chest pulmonary angiogram during arterial phase injection of IV contrast. Multiplanar reformats and MIP reconstructions were performed. Dose reduction techniques were used.   CONTRAST: 64mL Isovue 370    FINDINGS: There is beam hardening artifact from the left shoulder arthroplasty and the complex, right mid humeral hardware visible on the CT Top. Patient scanned with right arm not elevated.    ANGIOGRAM CHEST: Excellent opacification of pulmonary arteries and branches. No pulmonary emboli. Aorta and  branches are normal.      LUNGS AND PLEURA: Mild dependent density. There is a 5 mm nodular opacity in the right posterior costophrenic angle (axial image 166), new. This is superomedial to the punctate calcified granuloma in the right base. No effusions. No pulmonary edema or   signs of pneumonia.    MEDIASTINUM/AXILLAE: There is a large hiatal hernia. No concerning adenopathy.    CORONARY ARTERY CALCIFICATION: None.    UPPER ABDOMEN: Unremarkable.    MUSCULOSKELETAL: No concerning bone lesions. Left shoulder arthroplasty. Advanced degenerative changes in the right shoulder.      Impression    IMPRESSION:  1.  Patient has mild, bibasilar dependent density including a small nodular component in the right posterior costophrenic angle which is likely inflammatory and new since February. Consider a follow-up CT chest in 3-6 months.  2.  There is a large hiatal hernia.  3.  No pulmonary emboli.  4.  No evidence of pneumonia or failure.   Influenza A/B, RSV and SARS-CoV2 PCR (COVID-19) Nasopharyngeal     Status: Normal    Specimen: Nasopharyngeal; Swab   Result Value Ref Range    Influenza A PCR Negative Negative    Influenza B PCR Negative Negative    RSV PCR Negative Negative    SARS CoV2 PCR Negative Negative    Narrative    Testing was performed using the Xpert Xpress CoV2/Flu/RSV Assay on the [employer] GeneXpert Instrument. This test should be ordered for the detection of SARS-CoV2, influenza, and RSV viruses in individuals with signs and symptoms of respiratory tract infection. This test is for in vitro diagnostic use under the [place] FDA for laboratories certified under CLIA to perform high or moderate complexity testing. This test has been [place] FDA cleared. A negative result does not rule out the presence of PCR inhibitors in the specimen or target RNA in concentration below the limit of detection for the assay. If only one viral target is positive but coinfection with multiple targets is suspected, the sample should be  re-tested with another FDA cleared, approved, or authorized test, if coninfection would change clinical management. This test was validated by the [facility] [employer]. These laboratories are certified under the Clinical Laboratory Improvement Amendments of 1988 (CLIA-88) as qualified to perfom high complexity laboratory testing.   Comprehensive metabolic panel     Status: Abnormal   Result Value Ref Range    Sodium 138 135 - 145 mmol/L    Potassium 4.2 3.4 - 5.3 mmol/L    Carbon Dioxide (CO2) 19 (L) 22 - 29 mmol/L    Anion Gap 14 7 - 15 mmol/L    Urea Nitrogen 24.5 (H) 6.0 - 20.0 mg/dL    Creatinine 1.04 (H) 0.51 - 0.95 mg/dL    GFR Estimate 64 >60 mL/min/1.73m2    Calcium 9.4 8.8 - 10.4 mg/dL    Chloride 105 98 - 107 mmol/L    Glucose 122 (H) 70 - 99 mg/dL    Alkaline Phosphatase 109 40 - 150 U/L    AST 27 0 - 45 U/L    ALT 21 0 - 50 U/L    Protein Total 8.3 6.4 - 8.3 g/dL    Albumin 3.9 3.5 - 5.2 g/dL    Bilirubin Total 0.8 <=1.2 mg/dL   UA with Microscopic reflex to Culture     Status: Abnormal    Specimen: Urine, Clean Catch   Result Value Ref Range    Color Urine Light Yellow Colorless, Straw, Light Yellow, Yellow    Appearance Urine Clear Clear    Glucose Urine Negative Negative mg/dL    Bilirubin Urine Negative Negative    Ketones Urine Negative Negative mg/dL    Specific Gravity Urine 1.015 1.003 - 1.035    Blood Urine Negative Negative    pH Urine 5.5 5.0 - 7.0    Protein Albumin Urine Negative Negative mg/dL    Urobilinogen Urine Normal Normal mg/dL    Nitrite Urine Negative Negative    Leukocyte Esterase Urine Large (A) Negative    Mucus Urine Present (A) None Seen /LPF    RBC Urine 3 (H) <=2 /HPF    WBC Urine 40 (H) <=5 /HPF    Squamous Epithelials Urine 2 (H) <=1 /HPF    Transitional Epithelials Urine <1 <=1 /HPF    Narrative    Urine Culture ordered based on laboratory criteria   Troponin T, High Sensitivity     Status: Normal   Result Value Ref Range    Troponin T, High Sensitivity 7 <=14  ng/L   CBC with platelets and differential     Status: Abnormal   Result Value Ref Range    WBC Count 15.9 (H) 4.0 - 11.0 10e3/uL    RBC Count 4.17 3.80 - 5.20 10e6/uL    Hemoglobin 12.1 11.7 - 15.7 g/dL    Hematocrit 36.3 35.0 - 47.0 %    MCV 87 78 - 100 fL    MCH 29.0 26.5 - 33.0 pg    MCHC 33.3 31.5 - 36.5 g/dL    RDW 13.6 10.0 - 15.0 %    Platelet Count 188 150 - 450 10e3/uL    % Neutrophils 88 %    % Lymphocytes 5 %    % Monocytes 6 %    % Eosinophils 1 %    % Basophils 0 %    % Immature Granulocytes 0 %    NRBCs per 100 WBC 0 <1 /100    Absolute Neutrophils 13.9 (H) 1.6 - 8.3 10e3/uL    Absolute Lymphocytes 0.8 0.8 - 5.3 10e3/uL    Absolute Monocytes 1.0 0.0 - 1.3 10e3/uL    Absolute Eosinophils 0.1 0.0 - 0.7 10e3/uL    Absolute Basophils 0.1 0.0 - 0.2 10e3/uL    Absolute Immature Granulocytes 0.1 <=0.4 10e3/uL    Absolute NRBCs 0.0 10e3/uL   TSH with free T4 reflex     Status: Normal   Result Value Ref Range    TSH 0.73 0.30 - 4.20 uIU/mL   D dimer quantitative     Status: Abnormal   Result Value Ref Range    D-Dimer Quantitative 2.50 (H) 0.00 - 0.50 ug/mL FEU    Narrative    This D-dimer assay is intended for use in conjunction with a clinical pretest probability assessment model to exclude pulmonary embolism (PE) and deep venous thrombosis (DVT) in outpatients suspected of PE or DVT. The cut-off value is 0.50 ug/mL FEU.    For patients 50 years of age or older, the application of age-adjusted cut-off values for D-Dimer may increase the specificity without significant effect on sensitivity. The literature suggested calculation age adjusted cut-off in ug/L = age in years x 10 ug/L. The results in this laboratory are reported as ug/mL rather than ug/L. The calculation for age adjusted cut off in ug/mL= age in years x 0.01 ug/mL. For example, the cut off for a 76 year old male is 77 x 0.01 ug/mL = 0.76 ug/mL (760 ug/L).    M Marla et al. Age adjusted D-dimer cut-off levels to rule out pulmonary embolism: The  ADJUST-PE Study. KYLAH 2014;311:7224-4755.; HJ Anu et al. Diagnostic accuracy of conventional or age adjusted D-dimer cutoff values in older patients with suspected venous thromboembolism. Systemic review and meta-analysis. BMJ 2013:346:f2492.   Procalcitonin     Status: Normal   Result Value Ref Range    Procalcitonin 0.09 <0.50 ng/mL   Lactic acid whole blood with 1x repeat in 2 hr when >2     Status: Normal   Result Value Ref Range    Lactic Acid, Initial 2.0 0.7 - 2.0 mmol/L   Troponin T, High Sensitivity     Status: Normal   Result Value Ref Range    Troponin T, High Sensitivity 7 <=14 ng/L   HCG qualitative urine (UPT)     Status: Normal   Result Value Ref Range    hCG Urine Qualitative Negative Negative   iStat Gases (lactate) venous, POCT     Status: Abnormal   Result Value Ref Range    Lactic Acid POCT 1.2 0.7 - 2.0 mmol/L    Bicarbonate Venous POCT 22 21 - 28 mmol/L    O2 Sat, Venous POCT 81 (H) 70 - 75 %    pCO2 Venous POCT 32 (L) 40 - 50 mm Hg    pH Venous POCT 7.46 (H) 7.32 - 7.43    pO2 Venous POCT 42 25 - 47 mm Hg    Base Excess/Deficit (+/-) POCT -1.0 -3.0 - 3.0 mmol/L   EKG 12-lead, tracing only     Status: None   Result Value Ref Range    Systolic Blood Pressure  mmHg    Diastolic Blood Pressure  mmHg    Ventricular Rate 135 BPM    Atrial Rate 135 BPM    TN Interval 132 ms    QRS Duration 74 ms     ms    QTc 429 ms    P Axis 38 degrees    R AXIS 82 degrees    T Axis 24 degrees    Interpretation ECG       Unconfirmed report - interpretation of this ECG is computer generated - see medical record for final interpretation  Sinus tachycardia  Otherwise normal ECG    Confirmed by - EMERGENCY ROOM, PHYSICIAN ([id number]),  Giuliana Gruber ([id number]) on 4/28/2025 6:40:47 PM     Group A Streptococcus PCR Throat Swab     Status: Normal    Specimen: Throat; Swab   Result Value Ref Range    Group A strep by PCR Not Detected Not Detected    Narrative    The Xpert Xpress Strep A test, performed on the  GeneXpert  Instrument Systems, is a rapid, qualitative in vitro diagnostic test for the detection of Streptococcus pyogenes (Group A ß-hemolytic Streptococcus, Strep A) in throat swab specimens from patients with signs and symptoms of pharyngitis. The Xpert Xpress Strep A test can be used as an aid in the diagnosis of Group A Streptococcal pharyngitis. The assay is not intended to monitor treatment for Group A Streptococcus infections. The Xpert Xpress Strep A test utilizes an automated real-time polymerase chain reaction (PCR) to detect Streptococcus pyogenes DNA.   Blood Culture Hand, Left     Status: Normal (Preliminary result)    Specimen: Hand, Left; Blood   Result Value Ref Range    Culture No growth after 2 days    Urine Culture     Status: None    Specimen: Urine, Clean Catch   Result Value Ref Range    Culture <10,000 CFU/mL Mixture of Urogenital Laura    CBC with platelets differential     Status: Abnormal    Narrative    The following orders were created for panel order CBC with platelets differential.  Procedure                               Abnormality         Status                     ---------                               -----------         ------                     CBC with platelets and ...[[id number]]  Abnormal            Final result                 Please view results for these tests on the individual orders.     Medications   lactated ringers BOLUS 1,000 mL (0 mLs Intravenous Stopped 4/28/25 2013)   HYDROmorphone (DILAUDID) injection 0.2 mg (0.2 mg Intravenous $Given 4/28/25 1812)   ondansetron (ZOFRAN) injection 4 mg (4 mg Intravenous $Given 4/28/25 1907)   iopamidol (ISOVUE-370) solution 100 mL (64 mLs Intravenous $Given 4/28/25 1934)   sodium chloride 0.9 % bag for CT scan flush (84 mLs Intravenous $Given 4/28/25 1934)   HYDROmorphone (PF) (DILAUDID) injection 0.5 mg (0.5 mg Intravenous $Given 4/28/25 2028)     Labs Ordered and Resulted from Time of ED Arrival to Time of ED Departure    COMPREHENSIVE METABOLIC PANEL - Abnormal       Result Value    Sodium 138      Potassium 4.2      Carbon Dioxide (CO2) 19 (*)     Anion Gap 14      Urea Nitrogen 24.5 (*)     Creatinine 1.04 (*)     GFR Estimate 64      Calcium 9.4      Chloride 105      Glucose 122 (*)     Alkaline Phosphatase 109      AST 27      ALT 21      Protein Total 8.3      Albumin 3.9      Bilirubin Total 0.8     ROUTINE UA WITH MICROSCOPIC REFLEX TO CULTURE - Abnormal    Color Urine Light Yellow      Appearance Urine Clear      Glucose Urine Negative      Bilirubin Urine Negative      Ketones Urine Negative      Specific Gravity Urine 1.015      Blood Urine Negative      pH Urine 5.5      Protein Albumin Urine Negative      Urobilinogen Urine Normal      Nitrite Urine Negative      Leukocyte Esterase Urine Large (*)     Mucus Urine Present (*)     RBC Urine 3 (*)     WBC Urine 40 (*)     Squamous Epithelials Urine 2 (*)     Transitional Epithelials Urine <1     CBC WITH PLATELETS AND DIFFERENTIAL - Abnormal    WBC Count 15.9 (*)     RBC Count 4.17      Hemoglobin 12.1      Hematocrit 36.3      MCV 87      MCH 29.0      MCHC 33.3      RDW 13.6      Platelet Count 188      % Neutrophils 88      % Lymphocytes 5      % Monocytes 6      % Eosinophils 1      % Basophils 0      % Immature Granulocytes 0      NRBCs per 100 WBC 0      Absolute Neutrophils 13.9 (*)     Absolute Lymphocytes 0.8      Absolute Monocytes 1.0      Absolute Eosinophils 0.1      Absolute Basophils 0.1      Absolute Immature Granulocytes 0.1      Absolute NRBCs 0.0     D DIMER QUANTITATIVE - Abnormal    D-Dimer Quantitative 2.50 (*)    ISTAT GASES LACTATE VENOUS POCT - Abnormal    Lactic Acid POCT 1.2      Bicarbonate Venous POCT 22      O2 Sat, Venous POCT 81 (*)     pCO2 Venous POCT 32 (*)     pH Venous POCT 7.46 (*)     pO2 Venous POCT 42      Base Excess/Deficit (+/-) POCT -1.0     INFLUENZA A/B, RSV AND SARS-COV2 PCR - Normal    Influenza A PCR Negative       Influenza B PCR Negative      RSV PCR Negative      SARS CoV2 PCR Negative     TROPONIN T, HIGH SENSITIVITY - Normal    Troponin T, High Sensitivity 7     TSH WITH FREE T4 REFLEX - Normal    TSH 0.73     PROCALCITONIN - Normal    Procalcitonin 0.09     LACTIC ACID WHOLE BLOOD WITH 1X REPEAT IN 2 HR WHEN >2 - Normal    Lactic Acid, Initial 2.0     TROPONIN T, HIGH SENSITIVITY - Normal    Troponin T, High Sensitivity 7     HCG QUALITATIVE URINE - Normal    hCG Urine Qualitative Negative     GROUP A STREPTOCOCCUS PCR THROAT SWAB - Normal    Group A strep by PCR Not Detected       XR Chest 2 Views   Final Result   IMPRESSION: Heart size within normal limits for AP technique. Pulmonary vascularity normal. Low lung volumes. Minimal atelectasis in the bases. No acute infiltrates or effusions. Left shoulder arthroplasty. Internal fixation hardware right humerus.      CT Chest Pulmonary Embolism w Contrast   Final Result   IMPRESSION:   1.  Patient has mild, bibasilar dependent density including a small nodular component in the right posterior costophrenic angle which is likely inflammatory and new since February. Consider a follow-up CT chest in 3-6 months.   2.  There is a large hiatal hernia.   3.  No pulmonary emboli.   4.  No evidence of pneumonia or failure.             Critical care was not performed.     Medical Decision Making  The patient's presentation was of moderate complexity (an undiagnosed new problem with uncertain prognosis).    The patient's evaluation involved:  ordering and/or review of 3+ test(s) in this encounter (see separate area of note for details)    The patient's management necessitated moderate risk (IV contrast administration), high risk (a parenteral controlled substance), and high risk (a decision regarding hospitalization).    Assessment & Plan    Kalyn Rivero is a 54 year old female with an extensive past medical history including but not limited to: rheumatoid arthritis, opioid  dependence, liver failure, pancreatitis, hypothyroidism who presents with fever and chills, redness of right eye, dizziness, shortness of breath, abdominal pain, chest pain, nausea, myalgias that began today around 11 AM. Vitals in the ED significant for low grade fever and tachycardia. Physical exam reveals lungs CTAB, sternal chest wall tenderness. She has injected right eye with watery drainage. Abdominal exam unremarkable. Initial differential diagnosis includes but not limited to ACS, PE, PNA, conjunctivitis, viral URI, UTI, other. Nursing notes reviewed.    CBC significant for leukocytosis to 15.9 with absolute neutrophils at 13.9.  CMP with mildly elevated creatinine but consistent with previous results.  Carbon dioxide diminished to 19.  Otherwise within normal limits.  D-dimer elevated at 2.5.  This result concerning enough with presentation to order CT PE study.  Troponin reassuring at 7.  Lactic acid 2.  Procalcitonin unremarkable.  TSH normal.  Influenza/RSV/COVID all negative.  Group A strep test negative.  UA looks infectious with 40 WBCs, large leukocyte esterase.    Chest x-ray minimal atelectasis in the lung bases otherwise negative.  CT chest pulmonary embolism with contrast reveals small bibasilar density new from previous imaging, large hiatal hernia, no pulmonary embolism.    In the ED, the patient's symptoms were managed with IV Dilaudid, IV fluids, with improvement in symptoms upon reassessment.     The complete clinical picture is most consistent with CAP. After counseling on the diagnosis, work-up, and treatment plan, the patient was discharged to home. The patient was advised to follow-up with PCP in 5-7 days. The patient was advised to return to the ED if worsening symptoms, new concerning symptoms, or any urgent health concerns. Patient seen and discussed with attending physician Dr. Beyer who agrees with my plan of care.      Final diagnoses:   Chest pain   CAP (community acquired  pneumonia)   Abdominal pain   Hiatal hernia     Discharge Medication List as of 4/28/2025 10:44 PM        START taking these medications    Details   amoxicillin-clavulanate (AUGMENTIN) 875-125 MG tablet Take 1 tablet by mouth 2 times daily for 7 days., Disp-14 tablet, R-0, E-Prescribe      azithromycin (ZITHROMAX) 250 MG tablet Take 2 tablets (500 mg) by mouth daily for 1 day, THEN 1 tablet (250 mg) daily for 4 days., Disp-6 tablet, R-0, E-Prescribe           --  Brayan Rosa PA-C   Emergency Medicine   [facility] EMERGENCY DEPARTMENT  4/28/2025      I have reviewed the nursing notes. I have reviewed the findings, diagnosis, plan and need for follow up with the patient.    Discharge Medication List as of 4/28/2025 10:44 PM        START taking these medications    Details   amoxicillin-clavulanate (AUGMENTIN) 875-125 MG tablet Take 1 tablet by mouth 2 times daily for 7 days., Disp-14 tablet, R-0, E-Prescribe      azithromycin (ZITHROMAX) 250 MG tablet Take 2 tablets (500 mg) by mouth daily for 1 day, THEN 1 tablet (250 mg) daily for 4 days., Disp-6 tablet, R-0, E-Prescribe             Final diagnoses:   Chest pain   CAP (community acquired pneumonia)   Abdominal pain   Hiatal hernia       Elaine Beyer  [facility] EMERGENCY DEPARTMENT  4/28/2025     Brayan Rosa  04/30/25 1206    ED Attending Physician Attestation    I Elaine Beyer MD, cared for this patient with the Advanced Practice Provider ([facility]). I personally provided a substantive portion of the care for this patient, including approving the care plan for the number and complexity of problems addressed and taking responsibility related to the risk of complications and/or morbidity or mortality of patient management. Please see the [facility]'s documentation for full details.         Elaine Beyer MD  05/01/25 [id number]

## 2025-04-29 NOTE — ED TRIAGE NOTES
Triage Assessment (Adult)       Row Name 04/29/25 1400          Triage Assessment    Airway WDL WDL        Respiratory WDL    Respiratory WDL X;cough     Cough Frequency frequent        Skin Circulation/Temperature WDL    Skin Circulation/Temperature WDL WDL        Cardiac WDL    Cardiac WDL WDL        Peripheral/Neurovascular WDL    Peripheral Neurovascular WDL WDL        Cognitive/Neuro/Behavioral WDL    Cognitive/Neuro/Behavioral WDL WDL

## 2025-04-29 NOTE — ED PROVIDER NOTES
[facility] [place]    4/29/25      ED PROVIDER NOTE   ED 4  History     Chief Complaint   Patient presents with    Nausea & Vomiting     Pt states she was here yesterday and has a bladder infection, double pneumonia and was supposed to be admitted but wanted to go sleep in her own bed.  Pt states she has been vomiting all night.       The history is provided by the patient and medical records.     Kalyn Rivero is a 54 year old female with complex past medical history including history of migraines, rheumatoid arthritis, urinary retention status post Saleh catheter placed 3/18/2025 who presents to the emergency department with nausea and vomiting.  She had presented to the emergency department yesterday evening with fevers, chills, right eye redness, shortness of breath, abdominal pain, chest pain, nausea.  She was tachycardic to the 150s with a fever of 100.7  F, respiratory rate of 20 and blood pressure of 129/87, satting 96%.  She underwent workup including labs, EKG, flu/COVID swab, strep swab, UA.  Her D-dimer came back elevated 2.50, CBC notable for white count of 15.9.  Chest CT PE protocol was performed showing mild bibasilar densities which was felt to be inflammatory.  UA was concerning for UTI.  She was discharged to home on azithromycin and Augmentin.  This morning she woke up with a sore throat.  She states she went out in the rain to shop, returned home, and noticed nausea, tremors, chills, rapid breathing.  She continues to have right-sided mid sternal chest pain that makes it hard for her to breathe.  She doesn't feel right and feels very dizzy and lightheaded.  Endorses cough.  Here she reports pain in her head, throat, chest as well as lightheadedness.      Past Medical History  Past Medical History:   Diagnosis Date    Acetaminophen overdose 03/24/2011    Anemia     Anesthesia complication     pt states has a history of heart stopping during anesthesia,     "Arrhythmia     Cerebral artery occlusion with cerebral infarction (H)     Cerebral infarction (H)     Cervical high risk HPV (human papillomavirus) test positive 02/22/2017    type 18 & other HR HPV    Chronic infection     MRSA    Chronic pain 03/24/2011    Degenerative joint disease     Endometriosis     Fibromyalgia     Gastro-oesophageal reflux disease     Heart murmur     History of blood transfusion     Hypothyroidism     Immune disorder     Learning disability     Malignant neoplasm (H)     Migraine     MRSA (methicillin resistant staph aureus) culture positive     Neck injuries     Opioid type dependence (H)     Other chronic pain     PONV (postoperative nausea and vomiting)     states \"flatlines\"during anesthesia    RA (rheumatoid arthritis) (H)     Renal stone     Scoliosis     Stomach ulcer     history     Past Surgical History:   Procedure Laterality Date    ARTHRODESIS FOOT  5/23/2012    Procedure:ARTHRODESIS FOOT; Right Subtalar andTaloNavicular  Fusion  ; Surgeon:BRIGHT HENDRICKS; [facility]    ARTHRODESIS FOOT Left 4/22/2015    Procedure: ARTHRODESIS FOOT;  Surgeon: Bright Hendricks MD;  [facility]    ARTHROPLASTY ELBOW Right 9/30/2015    Procedure: ARTHROPLASTY ELBOW;  Surgeon: Carrol Gaspar MD;  [facility]    ARTHROPLASTY KNEE Right     ARTHROPLASTY REVISION ELBOW Right 11/27/2018    Procedure: 1 - aspiration of Right elbow 2- Explantation of failed hardware Right humeral periprosthetic fracture non-union 3- Right distal humerus excision 4- Right distal humerus replacement 5 - Revision Right total elbow arthroplasty;  Surgeon: Aakash Zimmer MD;  Location:  OR    ARTHROPLASTY WRIST      x2 Lt wrist replacement.    ARTHROTOMY ELBOW Right 6/18/2015    Procedure: ARTHROTOMY ELBOW;  Surgeon: Carrol Gaspar MD;  [facility]  [facility]    [facility]    left total shoulder arthroplasty by Dr. Law    CYSTOSCOPY  02/06/2009    " 1.cystoscopy.2.bilateral ureteroscopy.3.basketing of stone fragments from the right kidney.4.bilateral double-J ureteral stent placement.5.ann catheter placement.6.fluoroscopy and intraoperative interpretation of images.    CYSTOSCOPY  01/08/2007    1. cystoscopy and left stent removal.2.left ureteroscopy    DECOMPRESSION CUBITAL TUNNEL  6/6/2014    Procedure: DECOMPRESSION CUBITAL TUNNEL;  Surgeon: Janelle Coates MD;  Location: [facility] OR    ENDOSCOPY  03/31/2005    [facility] endoscopy-[facility]    ESOPHAGOSCOPY, GASTROSCOPY, DUODENOSCOPY (EGD), COMBINED  3/17/2014    Procedure: COMBINED ESOPHAGOSCOPY, GASTROSCOPY, DUODENOSCOPY (EGD), BIOPSY SINGLE OR MULTIPLE;;  Surgeon: Duane, William Charles, MD;  Location: [facility] OR    EXCISE MASS FOOT Right 9/30/2024    Procedure: Soft tissue mass removal right foot;  Surgeon: Vashti Ayala DPM, Podiatry/Foot and Ankle Surgery;  Location: [facility] OR    FUSION CERVICAL POSTERIOR ONE LEVEL  11/18/2013    Procedure: FUSION CERVICAL POSTERIOR ONE LEVEL;  Cervical 1-2 Posterior Cervical Fusion (Harms Procedure);  Surgeon: Carrol Gunn MD;  [facility]    GYN SURGERY      INJECT MAJOR JOINT / BURSA Left 1/5/2017    Procedure: INJECT MAJOR JOINT / BURSA;  Surgeon: Fredy Patel DO;  [facility]    INJECT STEROID (LOCATION) Left 6/18/2015    Procedure: INJECT STEROID (LOCATION);  Surgeon: Carrol Gaspar MD;  [facility]: [facility]    NECK SURGERY      REMOVE FOREIGN BODY UPPER EXTREMITY Right 3/2/2017    Procedure: REMOVE FOREIGN BODY UPPER EXTREMITY;  Surgeon: Carrol Gaspar MD;  [facility]    REMOVE HARDWARE FOOT Left 6/8/2022    Procedure: REMOVAL, HARDWARE, FOOT LEFT;  Surgeon: Chris Hendricks MD;  [facility]    RESECT BONE UPPER EXTREMITY Left 4/27/2017    Procedure: RESECT BONE UPPER EXTREMITY;  Left Radial Head Resection;  Surgeon: Carrol Gaspar MD;  [facility]    ROTATOR CUFF REPAIR RT/LT   10/19/2005    1.left distal clavicle excision.2.acromioplasty.3.coracoacromial ligament resection.4.rotator cuff exploration    [facility] ANESTH,DX ARTHROSCOPIC PROC KNEE JOINT  10/24/2007    right total knee arthroplasty    [facility] SHOULDER SURG PROC UNLISTED      [facility] TOTAL KNEE ARTHROPLASTY  1/18/12    Left     amoxicillin-clavulanate (AUGMENTIN) 875-125 MG tablet  celecoxib (CELEBREX) 200 MG capsule  clonazePAM (KLONOPIN) 1 MG tablet  omeprazole (PRILOSEC) 40 MG DR capsule  azithromycin (ZITHROMAX) 250 MG tablet  Cholecalciferol (VITAMIN D3 PO)  Collagen-Vitamin C-Biotin (COLLAGEN PO)  ENBREL SURECLICK 50 MG/ML autoinjector  naloxone (NARCAN) 4 MG/0.1ML nasal spray  nitroFURantoin macrocrystal-monohydrate (MACROBID) 100 MG capsule  Omega-3 Fatty Acids (FISH OIL PO)  Prenatal Vit-Fe Fumarate-FA (PRENATAL MULTIVITAMIN  PLUS IRON) 27-1 MG TABS  vitamin E 400 units TABS      Allergies   Allergen Reactions    Leflunomide Anaphylaxis    Morphine Swelling    Celecoxib Other (See Comments)     Other reaction(s): stroke , lasted 24 hours  -Pt takes celecoxib daily. States that they changed the  and now it is fine.    Amitriptyline Other (See Comments)     Sleepwalking     Codeine     Gabapentin Other (See Comments)     Pins,needles, stabbing aching at once  Pins,needles, stabbing aching at once  Numbness and Tingling    Ibuprofen      Doesn't take due to gastric ulcer    Sulfa Antibiotics Nausea and Vomiting     unknown    Tylenol [Acetaminophen]      Pt. States that she has Liver problems  Tylenol 3    Dexamethasone Palpitations     Heart racing, sweating     Erythromycin Nausea     Vomiting     Penicillins Rash    Prednisone Palpitations     Heart racing     Family History  Family History   Problem Relation Age of Onset    Diabetes Mother     Hypertension Mother     Allergies Mother     Alcohol/Drug Mother         LIVER CIRROSIS    Blood Disease Mother         B12 DEF    Neurologic Disorder Mother         Epilepsy     Osteoporosis Mother     Cerebrovascular Disease Maternal Grandmother     Arthritis Maternal Grandmother         RHEUMATOID    Cancer Maternal Grandmother     Thyroid Disease Maternal Grandmother     Osteoporosis Maternal Grandmother     Heart Disease Maternal Grandmother     Depression Maternal Grandmother     Neurologic Disorder Maternal Grandmother         MIGRAINES    Anxiety Disorder Maternal Grandmother     Diabetes Maternal Grandmother     Migraines Maternal Grandmother     Deep Vein Thrombosis Maternal Grandmother     Cerebrovascular Disease Maternal Grandfather     Cancer Maternal Grandfather     Mental Illness Maternal Grandfather     Cerebrovascular Disease Paternal Grandmother     Arthritis Paternal Grandmother         RHEUMATOID    Cancer Paternal Grandmother     Osteoporosis Paternal Grandmother     Heart Disease Paternal Grandmother     Depression Paternal Grandmother     Neurologic Disorder Paternal Grandmother         MIGRAINES    Thyroid Disease Paternal Grandmother     Cerebrovascular Disease Paternal Grandfather     Cancer Paternal Grandfather     Heart Disease Brother         MURMUR    Asthma Son     Endocrine Disease Son     Neurologic Disorder Father         epilepsy    Seizure Disorder Father     Hypertension Father     Skin Cancer Father     Anxiety Disorder Father     Mental Illness Father     Hyperlipidemia Father     Kidney Disease Father     Bladder Cancer Father     Neurologic Disorder Daughter         MIGRAINES    Arthritis Daughter         JR    Hypertension Son     LUNG DISEASE Brother     Cancer Brother         lung    Anxiety Disorder Son     Asthma Son     Hypertension Son     Migraines Son     Spine Problems Son     Mental Illness Brother     Migraines Brother     Cardiac Sudden Death Brother     Spine Problems Brother     Glaucoma No family hx of     Macular Degeneration No family hx of     Unknown/Adopted No family hx of     Anesthesia Reaction No family hx of     Other Cancer  "No family hx of     Rheumatoid Arthritis No family hx of     Bone Cancer No family hx of     Low Back Problems No family hx of      Social History   Social History     Tobacco Use    Smoking status: Former     Current packs/day: 0.10     Average packs/day: 0.1 packs/day for 41.7 years (4.2 ttl pk-yrs)     Types: Cigarettes     Start date: 8/6/1983     Passive exposure: Past    Smokeless tobacco: Never    Tobacco comments:     Quit 6 weeks ago   Vaping Use    Vaping status: Never Used   Substance Use Topics    Alcohol use: No    Drug use: No      A medically appropriate review of systems was performed with pertinent positives and negatives noted in the HPI, and all other systems negative.    Physical Exam   Height: 167.6 cm (5' 6\")  Weight: 83.6 kg (184 lb 3.2 oz)  Physical Exam  Constitutional:       General: She is not in acute distress.     Appearance: Normal appearance. She is not diaphoretic.   HENT:      Head: Atraumatic.      Mouth/Throat:      Mouth: Mucous membranes are moist.   Eyes:      General: No scleral icterus.     Conjunctiva/sclera: Conjunctivae normal.   Cardiovascular:      Rate and Rhythm: Normal rate.      Heart sounds: Normal heart sounds.   Pulmonary:      Effort: No respiratory distress.      Breath sounds: Normal breath sounds.   Abdominal:      General: Abdomen is flat.      Tenderness: There is abdominal tenderness in the right lower quadrant, suprapubic area and left lower quadrant. There is right CVA tenderness.   Musculoskeletal:      Cervical back: Neck supple.   Skin:     General: Skin is warm.      Findings: No rash.   Neurological:      Mental Status: She is alert.           ED Course, Procedures, & Data      Procedures            EKG Interpretation:      Interpreted by Brayan Rosa  Time reviewed: 1500  Symptoms at time of EKG: CP   Rhythm: normal sinus   Rate: normal  Axis: normal  Comparison to prior: Unchanged from 4/28/25  Clinical Impression: normal EKG      IV Antibiotics " given and/or elevated Lactate of 2 and no sepsis note found - Delete this reminder and enter the sepsis note or '.edcms' before signing chart.>>>     No results found for any visits on 04/29/25.  Medications - No data to display  Labs Ordered and Resulted from Time of ED Arrival to Time of ED Departure - No data to display  No orders to display          Critical care was not performed.     Medical Decision Making  The patient's presentation was of moderate complexity (an acute illness with systemic symptoms).    The patient's evaluation involved:  ordering and/or review of 3+ test(s) in this encounter (see separate area of note for details)    The patient's management necessitated high risk (a parenteral controlled substance) and high risk (a decision regarding hospitalization).    Assessment & Plan    Patient presenting with n/v since being discharged from the ED last night. Vitals in the ED normothermic, normotensive, borderline tachycardic. Physical exam significant for abdominal tenderness to palpation in the lower abdomen. Lungs clear to auscultation bilaterally. Initial differential diagnosis includes but not limited to PNA, UTI, pyelonephritis, colitis, other. Nursing notes reviewed.    CBC significant for leukocytosis but improved from yesterday. CMP shows mild bump in creatinine since evaluation yesterday, not indicative of MUNA. UA shows improvement since analysis yesterday. Urine culture attained yesterday still pending. CT abdomen pelvis with contrast did not show evidence of pyelonephritis or hydronephrosis, no other intraabdominal abnormalities that would explain her abdominal pain.  CT did reveal 5 mm inflammatory nodule in the lung base consistent with imaging department for PE yesterday.    In the ED, the patient's symptoms were managed with IV Dilaudid for pain, Zofran for nausea, IV antibiotics for treatment of UTI and pneumonia, with some improvement in symptoms upon reassessment.  Due to the  patient's nausea and vomiting and not tolerating p.o. after leaving the emergency department yesterday, patient will be admitted to medicine for IV antibiotics, and management of nausea.  Patient understands and agrees with this plan.    The complete clinical picture is most consistent with pneumonia, UTI. After counseling on the diagnosis, work-up, and treatment plan, the patient was admitted to medicine.  Patient seen and discussed with attending physician Dr. Gottlieb who agrees with my plan of care.      Final diagnoses:   None     New Prescriptions    No medications on file     --  --    ED Attending Physician Attestation    I Garrison Mansfield MD, cared for this patient with the Advanced Practice Provider ([facility]). I personally provided a substantive portion of the care for this patient, including approving the care plan for the number and complexity of problems addressed and taking responsibility related to the risk of complications and/or morbidity or mortality of patient management. Please see the [facility]'s documentation for full details.          Garrison Mansfield MD  Emergency Medicine              GIANA PadillaC   Emergency Medicine   [facility] EMERGENCY DEPARTMENT  4/29/2025      I have reviewed the nursing notes. I have reviewed the findings, diagnosis, plan and need for follow up with the patient.    New Prescriptions    No medications on file       Final diagnoses:   None         [facility] EMERGENCY DEPARTMENT  4/29/2025     Brayan Rosa  04/29/25 2017

## 2025-04-29 NOTE — TELEPHONE ENCOUNTER
Care coordinator, RN, calling with pt.  Pt was seen in ER last night with cough and chest pain and UTI sx.  She was antibiotics, Augmentin and Zithromax.  One for UTI and one for pneumonia.  Pt just got over C Diff. Recently.    1)  Care coordinator states x ray showed pneumonia but CT did not . She is asking Dr. Zamora to confirm he thinks she needs to be on both antibiotics. ( Her concern is pt getting C Diff again)    2) pt asking if it would help if she took that Augmentin in the am and pm and the azithromycin in the afternoon?    3) pt requested follow up appointment.  Appointment made for May 7.     Ok to call pt directly with Dr. Zamora response.  Tahira TIMN, RN

## 2025-04-29 NOTE — DISCHARGE INSTRUCTIONS
Instructions from your doctor today:  Emergency Department (ED) testing is focused on the potential causes of your symptoms that are the most dangerous possibilities, and cannot cover every possibility. Based on the evaluation, it was deemed sufficiently safe to discharge and continue management through the clinics. Thus, follow-up is very important to assess for improvement/worsening, potential further testing, and potential treatment adjustments. If you were given opioid pain medications or other medications that can make you drowsy while in the ED, you should not drive for at least several hours and not until you feel completely back to normal.     Please make an appointment to follow up with:  - Your Primary Care Provider as soon as possible  - If you do not have a primary care provider, you can be seen in follow-up and establish care by calling any of the clinics below:     - [facility] (phone: [phone])     - Primary Care / [facility] (phone: [phone])   - Have your clinic provider review the results from today's visit with you again, including any potential follow-up or additional testing that may be needed based on the results. Occasionally, incidental findings are found on later review by radiologists that may need follow-up.     Results from your CT as follows:  1.  Patient has mild, bibasilar dependent density including a small nodular component in the right posterior costophrenic angle which is likely inflammatory and new since February. Consider a follow-up CT chest in 3-6 months.  2.  There is a large hiatal hernia.  3.  No pulmonary emboli.  4.  No evidence of pneumonia or failure.    Return to the Emergency Department immediately if you have worsening symptoms, or any other urgent health concerns.

## 2025-04-29 NOTE — TELEPHONE ENCOUNTER
Stay on antibiotics as outlined by ED.  Take probiotics to prevent C dif  No need to take antibiotics at different time  Okay to add on Ma7 7

## 2025-04-30 VITALS
BODY MASS INDEX: 29.6 KG/M2 | RESPIRATION RATE: 16 BRPM | HEIGHT: 66 IN | SYSTOLIC BLOOD PRESSURE: 109 MMHG | OXYGEN SATURATION: 97 % | TEMPERATURE: 97.7 F | HEART RATE: 82 BPM | WEIGHT: 184.2 LBS | DIASTOLIC BLOOD PRESSURE: 68 MMHG

## 2025-04-30 LAB
ALBUMIN SERPL BCG-MCNC: 3.5 G/DL (ref 3.5–5.2)
ALP SERPL-CCNC: 148 U/L (ref 40–150)
ALT SERPL W P-5'-P-CCNC: 145 U/L (ref 0–50)
ANION GAP SERPL CALCULATED.3IONS-SCNC: 11 MMOL/L (ref 7–15)
APTT PPP: 25 SECONDS (ref 22–38)
AST SERPL W P-5'-P-CCNC: 154 U/L (ref 0–45)
ATRIAL RATE - MUSE: 92 BPM
BACTERIA UR CULT: NORMAL
BASOPHILS # BLD AUTO: 0.1 10E3/UL (ref 0–0.2)
BASOPHILS NFR BLD AUTO: 1 %
BILIRUB SERPL-MCNC: 0.4 MG/DL
BUN SERPL-MCNC: 12.9 MG/DL (ref 6–20)
C PNEUM DNA SPEC QL NAA+PROBE: NOT DETECTED
CALCIUM SERPL-MCNC: 8.8 MG/DL (ref 8.8–10.4)
CHLORIDE SERPL-SCNC: 103 MMOL/L (ref 98–107)
CREAT SERPL-MCNC: 1.02 MG/DL (ref 0.51–0.95)
DIASTOLIC BLOOD PRESSURE - MUSE: NORMAL MMHG
EGFRCR SERPLBLD CKD-EPI 2021: 65 ML/MIN/1.73M2
EOSINOPHIL # BLD AUTO: 0.2 10E3/UL (ref 0–0.7)
EOSINOPHIL NFR BLD AUTO: 2 %
ERYTHROCYTE [DISTWIDTH] IN BLOOD BY AUTOMATED COUNT: 13.7 % (ref 10–15)
FIBRINOGEN PPP-MCNC: 525 MG/DL (ref 170–510)
FLUAV H1 2009 PAND RNA SPEC QL NAA+PROBE: NOT DETECTED
FLUAV H1 RNA SPEC QL NAA+PROBE: NOT DETECTED
FLUAV H3 RNA SPEC QL NAA+PROBE: NOT DETECTED
FLUAV RNA SPEC QL NAA+PROBE: NOT DETECTED
FLUBV RNA SPEC QL NAA+PROBE: NOT DETECTED
GLUCOSE SERPL-MCNC: 104 MG/DL (ref 70–99)
HADV DNA SPEC QL NAA+PROBE: NOT DETECTED
HCO3 SERPL-SCNC: 24 MMOL/L (ref 22–29)
HCOV PNL SPEC NAA+PROBE: NOT DETECTED
HCT VFR BLD AUTO: 34.7 % (ref 35–47)
HGB BLD-MCNC: 11.5 G/DL (ref 11.7–15.7)
HMPV RNA SPEC QL NAA+PROBE: NOT DETECTED
HOLD SPECIMEN: NORMAL
HPIV1 RNA SPEC QL NAA+PROBE: NOT DETECTED
HPIV2 RNA SPEC QL NAA+PROBE: NOT DETECTED
HPIV3 RNA SPEC QL NAA+PROBE: NOT DETECTED
HPIV4 RNA SPEC QL NAA+PROBE: NOT DETECTED
IMM GRANULOCYTES # BLD: 0 10E3/UL
IMM GRANULOCYTES NFR BLD: 0 %
INTERPRETATION ECG - MUSE: NORMAL
LYMPHOCYTES # BLD AUTO: 1.3 10E3/UL (ref 0.8–5.3)
LYMPHOCYTES NFR BLD AUTO: 17 %
M PNEUMO DNA SPEC QL NAA+PROBE: NOT DETECTED
MCH RBC QN AUTO: 29.5 PG (ref 26.5–33)
MCHC RBC AUTO-ENTMCNC: 33.1 G/DL (ref 31.5–36.5)
MCV RBC AUTO: 89 FL (ref 78–100)
MONOCYTES # BLD AUTO: 1 10E3/UL (ref 0–1.3)
MONOCYTES NFR BLD AUTO: 12 %
NEUTROPHILS # BLD AUTO: 5.2 10E3/UL (ref 1.6–8.3)
NEUTROPHILS NFR BLD AUTO: 68 %
NRBC # BLD AUTO: 0 10E3/UL
NRBC BLD AUTO-RTO: 0 /100
P AXIS - MUSE: 16 DEGREES
PLATELET # BLD AUTO: 158 10E3/UL (ref 150–450)
POTASSIUM SERPL-SCNC: 4.1 MMOL/L (ref 3.4–5.3)
PR INTERVAL - MUSE: 146 MS
PROT SERPL-MCNC: 7.6 G/DL (ref 6.4–8.3)
QRS DURATION - MUSE: 80 MS
QT - MUSE: 328 MS
QTC - MUSE: 405 MS
R AXIS - MUSE: 54 DEGREES
RBC # BLD AUTO: 3.9 10E6/UL (ref 3.8–5.2)
RSV RNA SPEC QL NAA+PROBE: NOT DETECTED
RSV RNA SPEC QL NAA+PROBE: NOT DETECTED
RV+EV RNA SPEC QL NAA+PROBE: NOT DETECTED
SODIUM SERPL-SCNC: 138 MMOL/L (ref 135–145)
SYSTOLIC BLOOD PRESSURE - MUSE: NORMAL MMHG
T AXIS - MUSE: 29 DEGREES
VENTRICULAR RATE- MUSE: 92 BPM
WBC # BLD AUTO: 7.7 10E3/UL (ref 4–11)

## 2025-04-30 PROCEDURE — 250N000013 HC RX MED GY IP 250 OP 250 PS 637

## 2025-04-30 PROCEDURE — 80053 COMPREHEN METABOLIC PANEL: CPT

## 2025-04-30 PROCEDURE — 36415 COLL VENOUS BLD VENIPUNCTURE: CPT

## 2025-04-30 PROCEDURE — 85730 THROMBOPLASTIN TIME PARTIAL: CPT

## 2025-04-30 PROCEDURE — 87633 RESP VIRUS 12-25 TARGETS: CPT

## 2025-04-30 PROCEDURE — 85384 FIBRINOGEN ACTIVITY: CPT

## 2025-04-30 PROCEDURE — 999N000127 HC STATISTIC PERIPHERAL IV START W US GUIDANCE

## 2025-04-30 PROCEDURE — 99239 HOSP IP/OBS DSCHRG MGMT >30: CPT | Mod: GC

## 2025-04-30 PROCEDURE — 85004 AUTOMATED DIFF WBC COUNT: CPT

## 2025-04-30 RX ORDER — OMEPRAZOLE 20 MG/1
40 CAPSULE, DELAYED RELEASE ORAL 2 TIMES DAILY
Status: DISCONTINUED | OUTPATIENT
Start: 2025-04-30 | End: 2025-04-30 | Stop reason: HOSPADM

## 2025-04-30 RX ADMIN — OMEPRAZOLE 40 MG: 20 CAPSULE, DELAYED RELEASE ORAL at 09:46

## 2025-04-30 RX ADMIN — SALINE NASAL SPRAY 1 SPRAY: 1.5 SOLUTION NASAL at 10:50

## 2025-04-30 RX ADMIN — CELECOXIB 200 MG: 200 CAPSULE ORAL at 09:28

## 2025-04-30 RX ADMIN — CLONAZEPAM 1 MG: 1 TABLET ORAL at 09:28

## 2025-04-30 ASSESSMENT — ACTIVITIES OF DAILY LIVING (ADL)
ADLS_ACUITY_SCORE: 54
ADLS_ACUITY_SCORE: 53
ADLS_ACUITY_SCORE: 54
ADLS_ACUITY_SCORE: 53
ADLS_ACUITY_SCORE: 53
ADLS_ACUITY_SCORE: 54

## 2025-04-30 NOTE — CONSULTS
"Consult received for Vascular access care.  See LDA for details. For additional needs place \"Nursing to Consult for Vascular Access\" XCT502 order in EPIC.  "

## 2025-04-30 NOTE — ED NOTES
Pt is currently awake;  pt's nose is bleeding, making it uncomfortable to fall asleep.  RN sat pt up in bed, RN told patient to pinch the bridge of her nose, and RN gave pt an ice pack to apply to her nose. Pt states she's feeling much better.

## 2025-04-30 NOTE — DISCHARGE SUMMARY
"[facility]  Discharge Summary - Medicine & Pediatrics       Date of Admission:  4/29/2025  Date of Discharge:  4/30/2025  1:45 PM  Discharging Provider: Cecilia Arreguin MD  Discharge Service: [facility]    Discharge Diagnoses   Fever  SOB  ?Infectious pulmonary nodule  Anxiety   CKDIII   Hx of c diff  MASLD  Hx of acute hepatitis  Hx of urinary retention  Hx of UTI  Rheumatoid Arthritis    Clinically Significant Risk Factors     # Overweight: Estimated body mass index is 29.73 kg/m  as calculated from the following:    Height as of this encounter: 1.676 m (5' 6\").    Weight as of this encounter: 83.6 kg (184 lb 3.2 oz).       Follow-ups Needed After Discharge   Follow-up Appointments       Hospital Follow-up with Existing Primary Care Provider (PCP)          Schedule Primary Care visit within: 7 Days   Recommended labs and Imaging (to be ordered by Primary Care Provider): CMP (monitor for improvement of mild LFT elevations.) Facilitate ENT consultation. Consider decreasing celebrex due to recurrent epistaxis.               Unresulted Labs Ordered in the Past 30 Days of this Admission       Date and Time Order Name Status Description    4/28/2025  5:55 PM Blood Culture Hand, Left Preliminary         These results will be followed up by PCP    Discharge Disposition   Discharged to home  Condition at discharge: Stable    Hospital Course   Kalyn Rivero was admitted on 4/29/2025 for nausea, vomiting, malaise concerning for a viral upper respiratory infection with possible viral gastroenteritis.  The following problems were addressed during her hospitalization:    Fever  SOB  ?Infectious pulmonary nodule  Originally presented to ED 4/28 with F/C and SOB, found to be febrile, tachycardic, with CTPE with inflammatory findings in R posterior costophrenic angle and there was concern for acute pneumonia. Patient was discharged home home with azithromycin and " Augmentin for presumptive CAP treatment However, patient developed N/V after returning home and returned to the ED and was admitted for further monitoring/workup.  Reassuringly VSS, RVP negative, no leukocytosis and  procal negative on admit. Ultimately were considered most consistent with viral respiratory illness with possible superimposed viral gastroenteritis (versus nausea/vomiting as a side effect of Augmentin). Antibiotics were discontinued due to lack of evidence of bacterial infection. Patient was monitored without any clinical worsening and was discharged home eating/drinking at baseline with no increased work of breathing.    Nose bleeds  While in the ED, she had a persistent right sided nose bleed that resolved after pinching for some time. Reports ongoing nose bleeds for years and has upcoming ENT appointment. On exam, anterior septum without evidence of bleeding or ulceration. Posterior septum with evidence of old blood.  No masses seen.  Of note, has been on very high Celebrex (200 mg TID) for decades and is very reluctant to dropping the dose, despite it potentially interfering with her bleeding and also may contribute to hepatitis.     Chronic Issues  Anxiety: continued PTA klonapin 1mg TID.  CKDIII: Cr at baseline ~1-1.1. Recommended patient discontinue or reduce Celeberex on discharge. Patient declined this and stated will discuss with rheumatologist.  MASLD; Hx of acute hepatitis: CTAP obtained on admit with resolved colitis and no acute changes.   Hx of urinary retention: 4/28 UA with + LA (Ucx negative for UTI). Patient was discharged home home with azithromycin and Augmentin for presumptive CAP and UTI treatment (prior to urine culture result).  Antibiotics discontinued on discharge given no UTI (UCx negative times two).  Rheumatoid Arthritis: PTA was on Enbrel and Celebrex (200mg TID). Discussed with patient that this dose is very high and could worsen nose bleeds and kidney function. Patient  declined decreased dose will defer to rheumatologist      Consultations This Hospital Stay   NURSING TO CONSULT FOR VASCULAR ACCESS CARE IP CONSULT  CARE MANAGEMENT / SOCIAL WORK IP CONSULT  NURSING TO CONSULT FOR VASCULAR ACCESS CARE IP CONSULT    Code Status   Full Code       The patient was discussed with MD [facility]  [facility] EMERGENCY DEPARTMENT  [address]  [place] [address]  Phone: [phone]  Fax: [phone]  ______________________________________________________________________    Physical Exam   Vital Signs: Temp: 97.7  F (36.5  C) Temp src: Oral BP: 109/68 Pulse: 82   Resp: 16 SpO2: 97 % O2 Device: None (Room air)    Weight: 184 lbs 3.2 oz  Physical Exam  Constitutional:       General: She is not in acute distress.     Appearance: Normal appearance. She is not toxic-appearing.   HENT:      Nose:      Comments: Dried blood present in anterior R nare. No site of bleeding identified in anterior nare.   Cardiovascular:      Rate and Rhythm: Normal rate.   Pulmonary:      Effort: Pulmonary effort is normal. No respiratory distress.      Breath sounds: No wheezing.   Abdominal:      Palpations: Abdomen is soft.   Neurological:      Mental Status: She is alert.   Psychiatric:         Mood and Affect: Mood normal.         Behavior: Behavior normal.         Thought Content: Thought content normal.             Primary Care Physician   Mika Zamora    Discharge Orders      Reason for your hospital stay    Nausea, vomiting, congestion, feeling unwell.     Activity    Your activity upon discharge: activity as tolerated     Discharge Instructions    Consider gentle nasal saline for your congestion. Use a packaged nasal saline spray or a neti-pot with sterilized water (either distilled or boiled then cooled.)     Schedule your ENT appointment for your nosebleeds as soon as you are able. Discuss the nosebleeds with your rheumatologist as  well, as the celebrex dose you are on is quite high and may be contributing.     Diet    Follow this diet upon discharge: Current Diet:Orders Placed This Encounter      Combination Diet Regular Diet Adult     Hospital Follow-up with Existing Primary Care Provider (PCP)            Significant Results and Procedures   Most Recent 3 CBC's:  Recent Labs   Lab Test 04/30/25  0550 04/29/25  1553 04/28/25  1732   WBC 7.7 11.1* 15.9*   HGB 11.5* 12.1 12.1   MCV 89 89 87    168 188     Most Recent 3 BMP's:  Recent Labs   Lab Test 04/30/25  0558 04/29/25  1553 04/28/25  1704    139 138   POTASSIUM 4.1 4.1 4.2   CHLORIDE 103 103 105   CO2 24 23 19*   BUN 12.9 21.5* 24.5*   CR 1.02* 1.11* 1.04*   ANIONGAP 11 13 14   JERRI 8.8 9.1 9.4   * 104* 122*     Most Recent 3 INR's:  Recent Labs   Lab Test 02/21/25  0754 02/20/25  0818 02/19/25  0825   INR 1.00 1.05 1.04     7-Day Micro Results       Collected Updated Procedure Result Status      04/30/2025 0715 04/30/2025 1040 Respiratory Panel PCR [[id number]]    Swab from Nasopharyngeal    Final result Component Value   Adenovirus Not Detected   Coronavirus Not Detected   This test detects Coronavirus 229E, HKU1, NL63 and OC43 but does not distinguish between them. It does not detect MERS ( Respiratory Syndrome), SARS (Severe Acute Respiratory Syndrome) or 2019-nCoV (Novel 2019) Coronavirus.   Human Metapneumovirus Not Detected   Human Rhin/Enterovirus Not Detected   Influenza A Not Detected   Influenza A, H1 Not Detected   Influenza A 2009 H1N1 Not Detected   Influenza A, H3 Not Detected   Influenza B Not Detected   Parainfluenza Virus 1 Not Detected   Parainfluenza Virus 2 Not Detected   Parainfluenza Virus 3 Not Detected   Parainfluenza Virus 4 Not Detected   Respiratory Syncytial Virus A Not Detected   Respiratory Syncytial Virus B Not Detected   Chlamydia Pneumoniae Not Detected   Mycoplasma Pneumoniae Not Detected            04/28/2025 2133  04/30/2025 0604 Urine Culture [[id number]]   Urine, Clean Catch    Final result Component Value   Culture <10,000 CFU/mL Mixture of Urogenital Laura               04/28/2025 1832 04/29/2025 2016 Blood Culture Hand, Left [[id number]]   Blood from Hand, Left    Preliminary result Component Value   Culture No growth after 1 day  [P]                04/28/2025 1642 04/28/2025 1722 Group A Streptococcus PCR Throat Swab [[id number]]    Swab from Throat    Final result Component Value   Group A strep by PCR Not Detected            04/28/2025 1613 04/28/2025 1656 Influenza A/B, RSV and SARS-CoV2 PCR (COVID-19) Nasopharyngeal [[id number]]    Swab from Nasopharyngeal    Final result Component Value   Influenza A PCR Negative   Influenza B PCR Negative   RSV PCR Negative   SARS CoV2 PCR Negative   NEGATIVE: SARS-CoV-2 (COVID-19) RNA not detected, presumed negative.                  Most Recent Urinalysis:  Recent Labs   Lab Test 04/29/25  1509 02/17/25  0456 02/11/25  1030   COLOR Light Yellow   < > Yellow   APPEARANCE Clear   < > Clear   URINEGLC Negative   < > Negative   URINEBILI Negative   < > Negative   URINEKETONE Negative   < > Negative   SG 1.016   < > 1.025   UBLD Negative   < > Negative   URINEPH 6.0   < > 6.0   PROTEIN Negative   < > Negative   UROBILINOGEN  --   --  0.2   NITRITE Negative   < > Negative   LEUKEST Small*   < > Trace*   RBCU 1   < > 0-2   WBCU 8*   < > 0-5    < > = values in this interval not displayed.       Discharge Medications   Discharge Medication List as of 4/30/2025  1:36 PM        CONTINUE these medications which have NOT CHANGED    Details   celecoxib (CELEBREX) 200 MG capsule Take 200 mg by mouth 3 times daily, Historical      clonazePAM (KLONOPIN) 1 MG tablet Take 1 mg by mouth 3 times daily., Historical      omeprazole (PRILOSEC) 40 MG DR capsule Take 40 mg by mouth 2 times daily as needed., Historical      Cholecalciferol (VITAMIN D3 PO) Take 1 tablet by mouth every morning.,  Historical      Collagen-Vitamin C-Biotin (COLLAGEN PO) Take 1 tablet by mouth every morning., Historical      ENBREL SURECLICK 50 MG/ML autoinjector 50 mg twice a week. Hold before surgery on 9/30/24, MONICA, Historical      naloxone (NARCAN) 4 MG/0.1ML nasal spray Spray 1 spray (4 mg) into one nostril alternating nostrils as needed for opioid reversal. every 2-3 minutes until assistance arrives, Disp-2 each, R-0, E-Prescribe      Omega-3 Fatty Acids (FISH OIL PO) Take 1 tablet by mouth every morning., Historical      Prenatal Vit-Fe Fumarate-FA (PRENATAL MULTIVITAMIN  PLUS IRON) 27-1 MG TABS Take by mouth daily., Historical      vitamin E 400 units TABS Take 400 Units by mouth daily., Historical           STOP taking these medications       amoxicillin-clavulanate (AUGMENTIN) 875-125 MG tablet Comments:   Reason for Stopping:         azithromycin (ZITHROMAX) 250 MG tablet Comments:   Reason for Stopping:         nitroFURantoin macrocrystal-monohydrate (MACROBID) 100 MG capsule Comments:   Reason for Stopping:             Allergies   Allergies   Allergen Reactions    Leflunomide Anaphylaxis    Morphine Swelling    Celecoxib Other (See Comments)     Other reaction(s): stroke , lasted 24 hours  -Pt takes celecoxib daily. States that they changed the  and now it is fine.    Amitriptyline Other (See Comments)     Sleepwalking     Codeine     Gabapentin Other (See Comments)     Pins,needles, stabbing aching at once  Pins,needles, stabbing aching at once  Numbness and Tingling    Ibuprofen      Doesn't take due to gastric ulcer    Sulfa Antibiotics Nausea and Vomiting     unknown    Tylenol [Acetaminophen]      Pt. States that she has Liver problems  Tylenol 3    Dexamethasone Palpitations     Heart racing, sweating     Erythromycin Nausea     Vomiting     Penicillins Rash    Prednisone Palpitations     Heart racing

## 2025-04-30 NOTE — H&P
[facility]    History and Physical - [facility]       Date of Admission:  4/29/2025    Assessment & Plan      Kalyn Rivero is a 54 year old female admitted on 4/29/2025. She has a history of RA, hypothyroidism, fibromyalgia, CKDIII, OA, prior tobacco use, c. Difficile colitis, hepatitis, hepatic steatosis, urinary retention, UTIs admitted for possible CAP with likely superimposed URI.    Fever  SOB  ?Infectious pulmonary nodule  Originally presented to ED 4/28 with F/C and SOB, found to be febrile, tachycardic, with CTPE with inflammatory findings in R posterior costophrenic angle and UA c/f UTI. Discharged to home with azithromycin and augmentin. Representing with continued symptoms and some N/V. Reassuringly VSS, on exam patient has significant congestion and pharyngitis (GAS neg). Leukocytosis improved and procal negative. Symptoms c/f bacterial PNA with superimposed URI, vs viral pharyngitis.  - RVP  - procal   - Continue CTX, azithro  - Daily CBC, CMP  - prn zofran and compazine  - Outpatient pulmonary nodule follow up should be ordered at discharge    Anxiety  - PTA klonapin 1mg TID.    CKDIII  Cr at baseline ~1-1.1.    Hx of c diff  Occurred in February 2025 after cefdinir prescription for CAP. No current sxs, colitis resolved on CTAP.    MASLD  Hx of acute hepatitis  Hospitalization in Feb 2025 for CAP c/b c diff colitis, likely leading to hypoperfusion and acute hepatitis, on top of her MASLD. PETH, EMMIE , HCV, CMV, EBV negative at that time.    Hx of urinary retention  Hx of UTI  Most recent urology visit 4/4, did successful trial of void and ann was pulled. Patient reports urinary retention was due to stress. No current urinary sxs.  - Ucx pending from 4/28  - Currently on CTX which should cover most UTIs     RA  Hold Enbrel with active infection.  - Continue PTA celecoxib TID           Diet:  Regular diet  DVT Prophylaxis:  "Pneumatic Compression Devices  Saleh Catheter: Not present  Fluids: S/p 1L LR, now PO  Lines: None     Cardiac Monitoring: None  Code Status:  Full code    Clinically Significant Risk Factors Present on Admission                             # Overweight: Estimated body mass index is 29.73 kg/m  as calculated from the following:    Height as of this encounter: 1.676 m (5' 6\").    Weight as of this encounter: 83.6 kg (184 lb 3.2 oz).              Disposition Plan      Expected Discharge Date: 04/30/2025                The patient's care was discussed with the Attending Physician, Dr. Quiñones .      Thalia Madison MD  [facility]  [facility]  Securely message with [employer] (more info)  Text page via [facility] Paging/Directory   See signed in provider for up to date coverage information  ______________________________________________________________________    Chief Complaint   SOB    History is obtained from the patient    History of Present Illness   Kalyn Rivero is a 54 year old female admitted on 4/29/2025. She has a history of RA, hypothyroidism, fibromyalgia, OA, prior tobacco use, c. Difficile colitis, hepatitis, hepatic steatosis, urinary retention, UTIs admitted for possible CAP with likely superimposed URI.    Patient originally presented to ED yesterday 4/28 for fevers, chills, SOB, abdominal pain, and chest pain. She was febrile and tachycardic with elevated d-dimer, CTPE at that time showed mild bibasilar densities thought to be inflammatory, and UA was c/f UTI. She was discharged home with azithromycin and augmentin. This morning she awoke with a sore throat, and developed nausea, tremors, and SOB throughout the day as well as R sided mild sternal chest pain. Reports several episodes of vomiting. Denies any substance use.     ED Course:  Fluids: LR 1L  Labs: Cr 1.11, WBC 11.1, UA with LE and WBC, lipase neg, d-dimer 2.5, covid/flu/rsv " "neg  Imaging: CTAP with new 5mm RLL pulmonary nodule likely infectious/inflammatory, known iliac and pelvic lymphadenopathy, resolution of previously noted colitis last month, no renal calculi or obstruction  Meds: dilaudid, zofran  Abx: azithro, CTX  Consults: none   Past Medical History    Past Medical History:   Diagnosis Date    Acetaminophen overdose 03/24/2011    Anemia     Anesthesia complication     pt states has a history of heart stopping during anesthesia,    Arrhythmia     Cerebral artery occlusion with cerebral infarction (H)     Cerebral infarction (H)     Cervical high risk HPV (human papillomavirus) test positive 02/22/2017    type 18 & other HR HPV    Chronic infection     MRSA    Chronic pain 03/24/2011    Degenerative joint disease     Endometriosis     Fibromyalgia     Gastro-oesophageal reflux disease     Heart murmur     History of blood transfusion     Hypothyroidism     Immune disorder     Learning disability     Malignant neoplasm (H)     Migraine     MRSA (methicillin resistant staph aureus) culture positive     Neck injuries     Opioid type dependence (H)     Other chronic pain     PONV (postoperative nausea and vomiting)     states \"flatlines\"during anesthesia    RA (rheumatoid arthritis) (H)     Renal stone     Scoliosis     Stomach ulcer     history       Past Surgical History   Past Surgical History:   Procedure Laterality Date    ARTHRODESIS FOOT  5/23/2012    Procedure:ARTHRODESIS FOOT; Right Subtalar andTaloNavicular  Fusion  ; Surgeon:CHRIS ZHOU; [facility]    ARTHRODESIS FOOT Left 4/22/2015    Procedure: ARTHRODESIS FOOT;  Surgeon: Chris Zhou MD;  [facility]    ARTHROPLASTY ELBOW Right 9/30/2015    Procedure: ARTHROPLASTY ELBOW;  Surgeon: Carrol Gaspar MD;  Location: [facility] OR    ARTHROPLASTY KNEE Right     ARTHROPLASTY REVISION ELBOW Right 11/27/2018    Procedure: 1 - aspiration of Right elbow 2- Explantation of failed hardware Right " humeral periprosthetic fracture non-union 3- Right distal humerus excision 4- Right distal humerus replacement 5 - Revision Right total elbow arthroplasty;  Surgeon: Aakash Zimmer MD;  Location: [facility] OR    ARTHROPLASTY WRIST      x2 Lt wrist replacement.    ARTHROTOMY ELBOW Right 6/18/2015    Procedure: ARTHROTOMY ELBOW;  Surgeon: Carrol Gaspar MD;  Location:  OR    [facility] ARTHROSCOP,DIAGNOSTIC  12/17/2008    left total shoulder arthroplasty by Dr. Law    CYSTOSCOPY  02/06/2009    1.cystoscopy.2.bilateral ureteroscopy.3.basketing of stone fragments from the right kidney.4.bilateral double-J ureteral stent placement.5.ann catheter placement.6.fluoroscopy and intraoperative interpretation of images.    CYSTOSCOPY  01/08/2007    1. cystoscopy and left stent removal.2.left ureteroscopy    DECOMPRESSION CUBITAL TUNNEL  6/6/2014    Procedure: DECOMPRESSION CUBITAL TUNNEL;  Surgeon: Janelle Coates MD;  Location: [facility]    ENDOSCOPY  03/31/2005    upper GI endoscopy-[facility]    ESOPHAGOSCOPY, GASTROSCOPY, DUODENOSCOPY (EGD), COMBINED  3/17/2014    Procedure: COMBINED ESOPHAGOSCOPY, GASTROSCOPY, DUODENOSCOPY (EGD), BIOPSY SINGLE OR MULTIPLE;;  Surgeon: Duane, William Charles, MD;  Location: [facility] OR    EXCISE MASS FOOT Right 9/30/2024    Procedure: Soft tissue mass removal right foot;  Surgeon: Vashti Ayala DPM, Podiatry/Foot and Ankle Surgery;  Location: [facility] OR    FUSION CERVICAL POSTERIOR ONE LEVEL  11/18/2013    Procedure: FUSION CERVICAL POSTERIOR ONE LEVEL;  Cervical 1-2 Posterior Cervical Fusion (Harms Procedure);  Surgeon: Carrol Gunn MD;  [facility] OR    GYN SURGERY      INJECT MAJOR JOINT / BURSA Left 1/5/2017    Procedure: INJECT MAJOR JOINT / BURSA;  Surgeon: Fredy Patel DO;  [facility]    INJECT STEROID (LOCATION) Left 6/18/2015    Procedure: INJECT STEROID (LOCATION);  Surgeon: Carrol Gaspar MD;  Location: [facility] OR    NECK SURGERY       REMOVE FOREIGN BODY UPPER EXTREMITY Right 3/2/2017    Procedure: REMOVE FOREIGN BODY UPPER EXTREMITY;  Surgeon: Carrol Gaspar MD;  [facility]    REMOVE HARDWARE FOOT Left 2022    Procedure: REMOVAL, HARDWARE, FOOT LEFT;  Surgeon: Chris Hendricks MD;  Location: [facility] OR    RESECT BONE UPPER EXTREMITY Left 2017    Procedure: RESECT BONE UPPER EXTREMITY;  Left Radial Head Resection;  Surgeon: Carrol Gaspar MD;  Location:  OR    ROTATOR CUFF REPAIR RT/LT  10/19/2005    1.left distal clavicle excision.2.acromioplasty.3.coracoacromial ligament resection.4.rotator cuff exploration    [facility] ANESTH,DX ARTHROSCOPIC PROC KNEE JOINT  10/24/2007    right total knee arthroplasty    [facility] SHOULDER SURG PROC UNLISTED      [facility] TOTAL KNEE ARTHROPLASTY  12    Left       Prior to Admission Medications   Prior to Admission Medications   Prescriptions Last Dose Informant Patient Reported? Taking?   Cholecalciferol (VITAMIN D3 PO)   Yes No   Sig: Take 1 tablet by mouth every morning.   Collagen-Vitamin C-Biotin (COLLAGEN PO)   Yes No   Sig: Take 1 tablet by mouth every morning.   ENBREL SURECLICK 50 MG/ML autoinjector   Yes No   Si mg twice a week. Hold before surgery on 24   Omega-3 Fatty Acids (FISH OIL PO)   Yes No   Sig: Take 1 tablet by mouth every morning.   Prenatal Vit-Fe Fumarate-FA (PRENATAL MULTIVITAMIN  PLUS IRON) 27-1 MG TABS   Yes No   Sig: Take by mouth daily.   amoxicillin-clavulanate (AUGMENTIN) 875-125 MG tablet 2025  No Yes   Sig: Take 1 tablet by mouth 2 times daily for 7 days.   azithromycin (ZITHROMAX) 250 MG tablet   No No   Sig: Take 2 tablets (500 mg) by mouth daily for 1 day, THEN 1 tablet (250 mg) daily for 4 days.   celecoxib (CELEBREX) 200 MG capsule 2025  Yes Yes   Sig: Take 200 mg by mouth 3 times daily   clonazePAM (KLONOPIN) 1 MG tablet 2025  Yes Yes   Sig: Take 1 mg by mouth 3 times daily.   naloxone (NARCAN) 4 MG/0.1ML nasal  spray   No No   Sig: Spray 1 spray (4 mg) into one nostril alternating nostrils as needed for opioid reversal. every 2-3 minutes until assistance arrives   Patient not taking: Reported on 4/4/2025   nitroFURantoin macrocrystal-monohydrate (MACROBID) 100 MG capsule   No No   Sig: Take 1 capsule (100 mg) by mouth 2 times daily.   omeprazole (PRILOSEC) 40 MG DR capsule 4/29/2025  Yes Yes   Sig: Take 40 mg by mouth 2 times daily as needed.   vitamin E 400 units TABS   Yes No   Sig: Take 400 Units by mouth daily.      Facility-Administered Medications: None           Physical Exam   Vital Signs: Temp: 98.5  F (36.9  C) Temp src: Oral BP: 124/86 Pulse: 96   Resp: 12 SpO2: 97 % O2 Device: None (Room air)    Weight: 184 lbs 3.2 oz    Physical Exam  Constitutional:       General: She is not in acute distress.     Appearance: Normal appearance. She is not diaphoretic.   HENT:      Head: Atraumatic.      Nose: Congestion present     Mouth/Throat: Cervical lymphadenopathy present     Mouth: Erythematous pharynx without tonsillar swelling or exudate.   Eyes:      General: No scleral icterus.     Conjunctiva/sclera: Conjunctivae normal.   Cardiovascular:      Rate and Rhythm: Normal rate.      Heart sounds: Normal heart sounds.   Pulmonary:      Effort: No respiratory distress.      Breath sounds: Normal breath sounds.   Abdominal:      General: Abdomen is flat.      Tenderness: There is abdominal tenderness in the right lower quadrant, suprapubic area and left lower quadrant.   Musculoskeletal:      Cervical back: Neck supple.   Skin:     General: Skin is warm.      Findings: No rash.   Neurological:      Mental Status: She is alert.     Medical Decision Making       Please see A&P for additional details of medical decision making.      Data     I have personally reviewed the following data over the past 24 hrs:    11.1 (H)  \   12.1   / 168     139 103 21.5 (H) /  104 (H)   4.1 23 1.11 (H) \     ALT: 27 AST: 28 AP: 104 TBILI:  0.8   ALB: 3.9 TOT PROTEIN: 8.3 LIPASE: 23     Procal: 0.42 CRP: N/A Lactic Acid: N/A         Imaging results reviewed over the past 24 hrs:   Recent Results (from the past 24 hours)   CT Abdomen Pelvis w Contrast    Narrative    EXAM: CT ABDOMEN PELVIS W CONTRAST  LOCATION: [facility]  DATE: 4/29/2025    INDICATION: UTI with n v  COMPARISON: CT abdomen pelvis 3/18/2025.  TECHNIQUE: CT scan of the abdomen and pelvis was performed following injection of IV contrast. Multiplanar reformats were obtained. Dose reduction techniques were used.  CONTRAST: 90mL Isovue 370    FINDINGS:   LOWER CHEST: Unchanged moderate hiatal hernia. A 5 mm right lower lobe nodule new since 3/18/2025 (5/24).    HEPATOBILIARY: Diffuse hepatic steatosis. No focal mass. Cholecystectomy. No biliary dilatation.7    PANCREAS: Normal.    SPLEEN: Normal.    ADRENAL GLANDS: Normal.    KIDNEYS/BLADDER: Normal.    BOWEL: Normal.    LYMPH NODES: Iliac chain and pelvic sidewall lymphadenopathy stable to mildly improved. For example the largest left external iliac lymph node measures 1.4 cm on this exam versus 1.6 cm previously (series 5 image 186).    VASCULATURE: Normal.    PELVIC ORGANS: Normal.    MUSCULOSKELETAL: Normal.      Impression    IMPRESSION:   1.  No finding to account for abdominal pain.  2.  Diffuse hepatic steatosis.  3.  Moderate hiatal hernia.  4.  Iliac chain and pelvic sidewall lymphadenopathy stable to mildly improved since 3/18/2025.  5.  New 5 mm right lower lobe pulmonary nodule is likely infectious/inflammatory.

## 2025-05-01 ENCOUNTER — TELEPHONE (OUTPATIENT)
Dept: FAMILY MEDICINE | Facility: CLINIC | Age: 55
End: 2025-05-01
Payer: COMMERCIAL

## 2025-05-01 ENCOUNTER — PATIENT OUTREACH (OUTPATIENT)
Dept: CARE COORDINATION | Facility: CLINIC | Age: 55
End: 2025-05-01
Payer: COMMERCIAL

## 2025-05-01 LAB — BACTERIA BLD CULT: NORMAL

## 2025-05-01 NOTE — TELEPHONE ENCOUNTER
Red line triage call with care coordinator saying they spoke with the patient earlier who said they were having some SOB, recently discharged from hospital for this concern, and would like PCP clinic to call and follow up on how patient is doing. Has follow-up appt scheduled on 5/7.    Thanks!  Brayan DUNAWAY RN   [facility]

## 2025-05-01 NOTE — PROGRESS NOTES
[facility]: Transitions of Care Outreach  Chief Complaint   Patient presents with    Clinic Care Coordination - Post Hospital       Most Recent Admission Date: 4/29/2025   Most Recent Admission Diagnosis: Urinary tract infection without hematuria, site unspecified - N39.0  Pneumonia due to infectious organism, unspecified laterality, unspecified part of lung - J18.9  Nausea and vomiting, unspecified vomiting type - R11.2     Most Recent Discharge Date: 4/30/2025   Most Recent Discharge Diagnosis: Urinary tract infection without hematuria, site unspecified - N39.0  Pneumonia due to infectious organism, unspecified laterality, unspecified part of lung - J18.9  Nausea and vomiting, unspecified vomiting type - R11.2  History of epistaxis - Z87.898     Transitions of Care Assessment    Discharge Assessment  How are you doing now that you are home?: Writer spoke to patient who is having shortness of breath, patient warmly handed off to nurse triage. She reports that this is a new symptom, on her chartreview when she went to the ED she did reports shortness of breath, she states its just a bit more dificult to catch her breath.  How are your symptoms? (Red Flag symptoms escalate to triage hotline per guidelines): Unchanged  Do you know how to contact your clinic care team if you have future questions or changes to your health status? : Yes  Does the patient have their discharge instructions? : Yes  Does the patient have questions regarding their discharge instructions? : No  Were you started on any new medications or were there changes to any of your previous medications? : Yes  Does the patient have all of their medications?: Yes  Do you have questions regarding any of your medications? : No  Do you have all of your needed medical supplies or equipment (DME)?  (i.e. oxygen tank, CPAP, cane, etc.): Yes              Follow up Plan   Hospital Follow-up with Existing Primary Care Provider (PCP)  Schedule  Primary Care visit within: 7 Days  Recommended labs and Imaging (to be ordered by Primary Care  Provider): CMP (monitor for improvement of mild LFT elevations.)  Facilitate ENT consultation. Consider decreasing celebrex due to  recurrent epistaxis.  Discharge Follow-Up  Discharge follow up appointment scheduled in alignment with recommended follow up timeframe or Transitions of Risk Category? (Low = within 30 days; Moderate= within 14 days; High= within 7 days): Yes  Discharge Follow Up Appointment Date: 05/07/25    Future Appointments   Date Time Provider Department Center   5/7/2025  3:30 PM [facility]   7/16/2025 11:10 AM Carlos Hernandez MD [facility]       Outpatient Plan as outlined on AVS reviewed with patient.    For any urgent concerns, please contact our 24 hour nurse triage line: [phone] ([phone])       GARRETT Rizo

## 2025-05-01 NOTE — TELEPHONE ENCOUNTER
REFERRAL INFORMATION:  Referring By:   Referring Clinic:   Reason for Visit/Diagnosis: Epistaxis, apt per Pt, [place] verified      FUTURE VISIT INFORMATION:  Appointment Date: 7/16/25  Appointment Time: 11:10 AM     NOTES STATUS DETAILS   OFFICE NOTE from other specialist [facility]   3/19/25: Mika Zamora MD    HOSPITAL DISCHARGE / ER notes [facility]  4/29/25: Mami Mccall MD   2/17/25 - 2/21/25: Dayan Laws MD    IMAGES *pertaining images & report*       CT, MRI, PET, NM, US, XRAYS, etc ...    IMAGING  DISC TRACKING   Tracking #:    Epic/ PACS  6/11/23 MR brain  6/11/23 CT head

## 2025-05-01 NOTE — TELEPHONE ENCOUNTER
Per Barbara Reynolds [facility] staff, [place], the provider at [facility] today has declined to see pt and instead recommends that pt be reevaluated at hospital ED today for her sx.    RN returned call to pt and informed her of provider advice, to be seen in ED today. Pt indicates understanding of issues, but states she just realized she has not tried all of the home care advice from the previous hospital visit, such as applying vaseline inside her nose. Pt states she will try the rest of the home care advice that she has not yet implemented, and states she will go back to ED if sx worsen.    Pt states she will f/u with PCP on 5/7/25 as already scheduled.    MEETA HauserN, RN

## 2025-05-01 NOTE — TELEPHONE ENCOUNTER
"Nurse Triage SBAR  Is this a 2nd Level Triage? YES, LICENSED PRACTITIONER REVIEW IS REQUIRED    Situation:   Nosebleeds 5 times daily x a couple months. Pt states most recent nosebleed lasted approximately 15 min with a cold pack and pressure applied externally. States it is unprovoked.    Pt states for the past 3 days she has been snoring, and woke up gasping for air and was able to catch her breath upon wakening, and states she felt disoriented and anxious for awhile afterwards.     Pt states something keeps feeling stuck between her nose and lungs. When asked clarifying questions by triage RN, pt continuously responds she has trouble breathing. When asked to clarify if she is SOB or having trouble with her lungs vs a plugged nose, pt will explain that she has to alternate between breathing through her nose and mouth \"depending on where the mucus is,\" states that in her throat, \"its like there is a blood clot.\"     Background:See recent ED encounter and future ENT visit on 7/16/25. Unable to see ENT prior to 7/16/25.    Assessment: Needs to be seen.    Protocol Recommended Disposition:   No disposition on file.    Recommendation: Please advise if evaluation at [facility] is appropriate for ongoing nosebleeds 5 times daily, and pt states sx worsening and now causing her to snore x 3 days and wake up gasping for air today.      Spoke to [facility] staff.    Does the patient meet one of the following criteria for [facility] visit consideration? 16+ years old, with an [facility] PCP     TIP  Providers, please consider if this condition is appropriate for management at one of our Acute and Diagnostic Services sites.     If patient is a good candidate, please use dotphrase <dot>triageresponse and select Refer to [facility] to document.    RICKY Hauser, RN    "

## 2025-05-03 LAB — BACTERIA BLD CULT: NO GROWTH

## 2025-05-05 ENCOUNTER — PATIENT OUTREACH (OUTPATIENT)
Dept: CARE COORDINATION | Facility: CLINIC | Age: 55
End: 2025-05-05
Payer: COMMERCIAL

## 2025-05-05 ENCOUNTER — APPOINTMENT (OUTPATIENT)
Dept: GENERAL RADIOLOGY | Facility: CLINIC | Age: 55
End: 2025-05-05
Attending: STUDENT IN AN ORGANIZED HEALTH CARE EDUCATION/TRAINING PROGRAM
Payer: COMMERCIAL

## 2025-05-05 ENCOUNTER — HOSPITAL ENCOUNTER (EMERGENCY)
Facility: CLINIC | Age: 55
Discharge: HOME | End: 2025-05-05
Attending: STUDENT IN AN ORGANIZED HEALTH CARE EDUCATION/TRAINING PROGRAM | Admitting: STUDENT IN AN ORGANIZED HEALTH CARE EDUCATION/TRAINING PROGRAM
Payer: COMMERCIAL

## 2025-05-05 VITALS
HEART RATE: 88 BPM | WEIGHT: 182 LBS | TEMPERATURE: 97.6 F | DIASTOLIC BLOOD PRESSURE: 65 MMHG | RESPIRATION RATE: 18 BRPM | BODY MASS INDEX: 29.25 KG/M2 | HEIGHT: 66 IN | OXYGEN SATURATION: 100 % | SYSTOLIC BLOOD PRESSURE: 128 MMHG

## 2025-05-05 DIAGNOSIS — R07.89 CHEST PRESSURE: ICD-10-CM

## 2025-05-05 DIAGNOSIS — R06.02 SHORTNESS OF BREATH: ICD-10-CM

## 2025-05-05 DIAGNOSIS — R10.9 ABDOMINAL PAIN: ICD-10-CM

## 2025-05-05 LAB
ALBUMIN SERPL BCG-MCNC: 3.7 G/DL (ref 3.5–5.2)
ALBUMIN UR-MCNC: NEGATIVE MG/DL
ALP SERPL-CCNC: 104 U/L (ref 40–150)
ALT SERPL W P-5'-P-CCNC: 33 U/L (ref 0–50)
ANION GAP SERPL CALCULATED.3IONS-SCNC: 11 MMOL/L (ref 7–15)
APPEARANCE UR: ABNORMAL
AST SERPL W P-5'-P-CCNC: 25 U/L (ref 0–45)
ATRIAL RATE - MUSE: 80 BPM
BASOPHILS # BLD AUTO: 0.1 10E3/UL (ref 0–0.2)
BASOPHILS NFR BLD AUTO: 1 %
BILIRUB SERPL-MCNC: 0.5 MG/DL
BILIRUB UR QL STRIP: NEGATIVE
BUN SERPL-MCNC: 29.8 MG/DL (ref 6–20)
CALCIUM SERPL-MCNC: 9.1 MG/DL (ref 8.8–10.4)
CHLORIDE SERPL-SCNC: 107 MMOL/L (ref 98–107)
COLOR UR AUTO: YELLOW
CREAT SERPL-MCNC: 1.11 MG/DL (ref 0.51–0.95)
DIASTOLIC BLOOD PRESSURE - MUSE: NORMAL MMHG
EGFRCR SERPLBLD CKD-EPI 2021: 59 ML/MIN/1.73M2
EOSINOPHIL # BLD AUTO: 0.6 10E3/UL (ref 0–0.7)
EOSINOPHIL NFR BLD AUTO: 7 %
ERYTHROCYTE [DISTWIDTH] IN BLOOD BY AUTOMATED COUNT: 13.4 % (ref 10–15)
GLUCOSE SERPL-MCNC: 88 MG/DL (ref 70–99)
GLUCOSE UR STRIP-MCNC: NEGATIVE MG/DL
HCG SERPL QL: NEGATIVE
HCO3 SERPL-SCNC: 23 MMOL/L (ref 22–29)
HCT VFR BLD AUTO: 35.8 % (ref 35–47)
HGB BLD-MCNC: 11.9 G/DL (ref 11.7–15.7)
HGB UR QL STRIP: NEGATIVE
IMM GRANULOCYTES # BLD: 0.1 10E3/UL
IMM GRANULOCYTES NFR BLD: 1 %
INTERPRETATION ECG - MUSE: NORMAL
KETONES UR STRIP-MCNC: NEGATIVE MG/DL
LEUKOCYTE ESTERASE UR QL STRIP: ABNORMAL
LIPASE SERPL-CCNC: 37 U/L (ref 13–60)
LYMPHOCYTES # BLD AUTO: 3.1 10E3/UL (ref 0.8–5.3)
LYMPHOCYTES NFR BLD AUTO: 41 %
MCH RBC QN AUTO: 29.4 PG (ref 26.5–33)
MCHC RBC AUTO-ENTMCNC: 33.2 G/DL (ref 31.5–36.5)
MCV RBC AUTO: 88 FL (ref 78–100)
MONOCYTES # BLD AUTO: 0.6 10E3/UL (ref 0–1.3)
MONOCYTES NFR BLD AUTO: 8 %
MUCOUS THREADS #/AREA URNS LPF: PRESENT /LPF
NEUTROPHILS # BLD AUTO: 3.2 10E3/UL (ref 1.6–8.3)
NEUTROPHILS NFR BLD AUTO: 42 %
NITRATE UR QL: NEGATIVE
NRBC # BLD AUTO: 0 10E3/UL
NRBC BLD AUTO-RTO: 0 /100
P AXIS - MUSE: 31 DEGREES
PH UR STRIP: 5.5 [PH] (ref 5–7)
PLAT MORPH BLD: NORMAL
PLATELET # BLD AUTO: 228 10E3/UL (ref 150–450)
POTASSIUM SERPL-SCNC: 4.3 MMOL/L (ref 3.4–5.3)
PR INTERVAL - MUSE: 158 MS
PROT SERPL-MCNC: 7.9 G/DL (ref 6.4–8.3)
QRS DURATION - MUSE: 80 MS
QT - MUSE: 386 MS
QTC - MUSE: 445 MS
R AXIS - MUSE: 83 DEGREES
RBC # BLD AUTO: 4.05 10E6/UL (ref 3.8–5.2)
RBC MORPH BLD: NORMAL
RBC URINE: 2 /HPF
RENAL TUB EPI: <1 /HPF
SODIUM SERPL-SCNC: 141 MMOL/L (ref 135–145)
SP GR UR STRIP: 1.03 (ref 1–1.03)
SQUAMOUS EPITHELIAL: 20 /HPF
SYSTOLIC BLOOD PRESSURE - MUSE: NORMAL MMHG
T AXIS - MUSE: 52 DEGREES
TRANSITIONAL EPI: <1 /HPF
TROPONIN T SERPL HS-MCNC: 8 NG/L
TROPONIN T SERPL HS-MCNC: 8 NG/L
UROBILINOGEN UR STRIP-MCNC: NORMAL MG/DL
VENTRICULAR RATE- MUSE: 80 BPM
WBC # BLD AUTO: 7.7 10E3/UL (ref 4–11)
WBC URINE: 97 /HPF

## 2025-05-05 PROCEDURE — 71046 X-RAY EXAM CHEST 2 VIEWS: CPT

## 2025-05-05 PROCEDURE — 81003 URINALYSIS AUTO W/O SCOPE: CPT | Performed by: STUDENT IN AN ORGANIZED HEALTH CARE EDUCATION/TRAINING PROGRAM

## 2025-05-05 PROCEDURE — 93010 ELECTROCARDIOGRAM REPORT: CPT | Performed by: STUDENT IN AN ORGANIZED HEALTH CARE EDUCATION/TRAINING PROGRAM

## 2025-05-05 PROCEDURE — 999N000127 HC STATISTIC PERIPHERAL IV START W US GUIDANCE

## 2025-05-05 PROCEDURE — 87086 URINE CULTURE/COLONY COUNT: CPT | Performed by: STUDENT IN AN ORGANIZED HEALTH CARE EDUCATION/TRAINING PROGRAM

## 2025-05-05 PROCEDURE — 250N000011 HC RX IP 250 OP 636: Mod: JW | Performed by: STUDENT IN AN ORGANIZED HEALTH CARE EDUCATION/TRAINING PROGRAM

## 2025-05-05 PROCEDURE — 99285 EMERGENCY DEPT VISIT HI MDM: CPT | Mod: 25 | Performed by: STUDENT IN AN ORGANIZED HEALTH CARE EDUCATION/TRAINING PROGRAM

## 2025-05-05 PROCEDURE — 83690 ASSAY OF LIPASE: CPT | Performed by: STUDENT IN AN ORGANIZED HEALTH CARE EDUCATION/TRAINING PROGRAM

## 2025-05-05 PROCEDURE — 96374 THER/PROPH/DIAG INJ IV PUSH: CPT | Performed by: STUDENT IN AN ORGANIZED HEALTH CARE EDUCATION/TRAINING PROGRAM

## 2025-05-05 PROCEDURE — 93005 ELECTROCARDIOGRAM TRACING: CPT | Performed by: STUDENT IN AN ORGANIZED HEALTH CARE EDUCATION/TRAINING PROGRAM

## 2025-05-05 PROCEDURE — 96376 TX/PRO/DX INJ SAME DRUG ADON: CPT | Performed by: STUDENT IN AN ORGANIZED HEALTH CARE EDUCATION/TRAINING PROGRAM

## 2025-05-05 PROCEDURE — 84703 CHORIONIC GONADOTROPIN ASSAY: CPT | Performed by: STUDENT IN AN ORGANIZED HEALTH CARE EDUCATION/TRAINING PROGRAM

## 2025-05-05 PROCEDURE — 999N000285 HC STATISTIC VASC ACCESS LAB DRAW WITH PIV START

## 2025-05-05 PROCEDURE — 99284 EMERGENCY DEPT VISIT MOD MDM: CPT | Mod: GC | Performed by: STUDENT IN AN ORGANIZED HEALTH CARE EDUCATION/TRAINING PROGRAM

## 2025-05-05 PROCEDURE — 36415 COLL VENOUS BLD VENIPUNCTURE: CPT | Performed by: STUDENT IN AN ORGANIZED HEALTH CARE EDUCATION/TRAINING PROGRAM

## 2025-05-05 PROCEDURE — 82247 BILIRUBIN TOTAL: CPT | Performed by: STUDENT IN AN ORGANIZED HEALTH CARE EDUCATION/TRAINING PROGRAM

## 2025-05-05 PROCEDURE — 85025 COMPLETE CBC W/AUTO DIFF WBC: CPT | Performed by: STUDENT IN AN ORGANIZED HEALTH CARE EDUCATION/TRAINING PROGRAM

## 2025-05-05 PROCEDURE — 84484 ASSAY OF TROPONIN QUANT: CPT | Performed by: STUDENT IN AN ORGANIZED HEALTH CARE EDUCATION/TRAINING PROGRAM

## 2025-05-05 RX ORDER — HYDROMORPHONE HYDROCHLORIDE 1 MG/ML
0.3 INJECTION, SOLUTION INTRAMUSCULAR; INTRAVENOUS; SUBCUTANEOUS ONCE
Status: COMPLETED | OUTPATIENT
Start: 2025-05-05 | End: 2025-05-05

## 2025-05-05 RX ORDER — HYDROMORPHONE HCL IN WATER/PF 6 MG/30 ML
0.2 PATIENT CONTROLLED ANALGESIA SYRINGE INTRAVENOUS ONCE
Status: COMPLETED | OUTPATIENT
Start: 2025-05-05 | End: 2025-05-05

## 2025-05-05 RX ORDER — CETIRIZINE HYDROCHLORIDE 10 MG/1
10 TABLET ORAL DAILY
Qty: 30 TABLET | Refills: 0 | Status: SHIPPED | OUTPATIENT
Start: 2025-05-05

## 2025-05-05 RX ADMIN — HYDROMORPHONE HYDROCHLORIDE 0.3 MG: 1 INJECTION, SOLUTION INTRAMUSCULAR; INTRAVENOUS; SUBCUTANEOUS at 16:39

## 2025-05-05 RX ADMIN — HYDROMORPHONE HYDROCHLORIDE 0.2 MG: 0.2 INJECTION, SOLUTION INTRAMUSCULAR; INTRAVENOUS; SUBCUTANEOUS at 15:56

## 2025-05-05 ASSESSMENT — ACTIVITIES OF DAILY LIVING (ADL)
ADLS_ACUITY_SCORE: 55
ADLS_ACUITY_SCORE: 55
ADLS_ACUITY_SCORE: 53
ADLS_ACUITY_SCORE: 55

## 2025-05-05 ASSESSMENT — COLUMBIA-SUICIDE SEVERITY RATING SCALE - C-SSRS
1. IN THE PAST MONTH, HAVE YOU WISHED YOU WERE DEAD OR WISHED YOU COULD GO TO SLEEP AND NOT WAKE UP?: NO
2. HAVE YOU ACTUALLY HAD ANY THOUGHTS OF KILLING YOURSELF IN THE PAST MONTH?: NO
6. HAVE YOU EVER DONE ANYTHING, STARTED TO DO ANYTHING, OR PREPARED TO DO ANYTHING TO END YOUR LIFE?: NO

## 2025-05-05 NOTE — ED TRIAGE NOTES
Patient reports she has been in the hospital and has not been getting better. Patient reports feeling like she has been getting worse. Patient reports feeling like she is going to pass out. Patient reports fatigue. Patient reports medial abdominal pain, periumbilical. Patient reports seeing a hernia on her discharge papers. Patient endorses nausea. Patient states she is dizzy. Patient here with her PCA / son who states patient has not been eating.      Patient has extensive medical history, including opiate use disorder, fibromyalgia, learning disability, chronic pain, stroke, and arrhythmia.

## 2025-05-05 NOTE — ED PROVIDER NOTES
ED Provider Note  [facility]      History     Chief Complaint   Patient presents with    Abdominal Pain     Patient reports periumbilical abdominal pain, nausea, fatigue, and dizziness      The history is provided by the patient, medical records and a relative (son is present). No  was used.     Kalyn Rivero is a 54 year old female with past medical history significant for rheumatoid arthritis, hypothyroidism, fibromyalgia, CKD III, OA, C diff colitis, hepatic steatosis, who presents to the emergency department for evaluation of chest pain and shortness of breath. She was seen in the emergency department on 4/28/2025 for evaluation of fever, dizziness, shortness of breath, abdominal pain, chest pain, nausea, and myalgias that started after walking in the cold, largely reassuring work-up including EKG, chest X-ray, CT PE study, influenza, COVID, RSV, and strep testing, CBC, CMP, troponin, TSH, lactate, procalcitonin, and UA. She was started on antibiotics for community acquired pneumonia and UTI although returned on 4/29/2025 for nausea, vomiting, sore throat, and ongoing symptoms from the day before. Admitted 4/29-4/30/2025 for treatment of pneumonia and UTI in the setting of inability to handle PO antibiotics, discharged to home after she stabilized, urine culture returned with normal marni and did not feel she truly had a pneumonia, antibiotics discontinued.     She reports since she was discharged, she continues with chest heaviness and shortness of breath, cough, congestion, runny nose. She also has periumbilical abdominal pain and right and left lower quadrant pain that remains unchanged from previous. She reports vomiting that is preventing her from eating but no diarrhea and had a normal bowel movement yesterday. Subjective fevers at home. No urinary symptoms. No sore throat, ear pain, or other concerns at this time.     Past Medical History  Past Medical  "History:   Diagnosis Date    Acetaminophen overdose 03/24/2011    Anemia     Anesthesia complication     pt states has a history of heart stopping during anesthesia,    Arrhythmia     Cerebral artery occlusion with cerebral infarction (H)     Cerebral infarction (H)     Cervical high risk HPV (human papillomavirus) test positive 02/22/2017    type 18 & other HR HPV    Chronic infection     MRSA    Chronic pain 03/24/2011    Degenerative joint disease     Endometriosis     Fibromyalgia     Gastro-oesophageal reflux disease     Heart murmur     History of blood transfusion     Hypothyroidism     Immune disorder     Learning disability     Malignant neoplasm (H)     Migraine     MRSA (methicillin resistant staph aureus) culture positive     Neck injuries     Opioid type dependence (H)     Other chronic pain     PONV (postoperative nausea and vomiting)     states \"flatlines\"during anesthesia    RA (rheumatoid arthritis) (H)     Renal stone     Scoliosis     Stomach ulcer     history     Past Surgical History:   Procedure Laterality Date    ARTHRODESIS FOOT  5/23/2012    Procedure:ARTHRODESIS FOOT; Right Subtalar andTaloNavicular  Fusion  ; Surgeon:CHRIS ZHOU; [facility]    ARTHRODESIS FOOT Left 4/22/2015    Procedure: ARTHRODESIS FOOT;  Surgeon: Chris Zhou MD;  [facility]    ARTHROPLASTY ELBOW Right 9/30/2015    Procedure: ARTHROPLASTY ELBOW;  Surgeon: Carrol Gaspar MD;  [facility]    ARTHROPLASTY KNEE Right     ARTHROPLASTY REVISION ELBOW Right 11/27/2018    Procedure: 1 - aspiration of Right elbow 2- Explantation of failed hardware Right humeral periprosthetic fracture non-union 3- Right distal humerus excision 4- Right distal humerus replacement 5 - Revision Right total elbow arthroplasty;  Surgeon: Aakash Zimmer MD;  Location: [facility]    ARTHROPLASTY WRIST      x2 Lt wrist replacement.    ARTHROTOMY ELBOW Right 6/18/2015    Procedure: ARTHROTOMY ELBOW;  Surgeon: " Carrol Gaspar MD;  Location:  OR     SHLDR ARTHROSCOP,DIAGNOSTIC  12/17/2008    left total shoulder arthroplasty by Dr. Law    CYSTOSCOPY  02/06/2009    1.cystoscopy.2.bilateral ureteroscopy.3.basketing of stone fragments from the right kidney.4.bilateral double-J ureteral stent placement.5.ann catheter placement.6.fluoroscopy and intraoperative interpretation of images.    CYSTOSCOPY  01/08/2007    1. cystoscopy and left stent removal.2.left ureteroscopy    DECOMPRESSION CUBITAL TUNNEL  6/6/2014    Procedure: DECOMPRESSION CUBITAL TUNNEL;  Surgeon: Janelle Coates MD;  [facility]    ENDOSCOPY  03/31/2005    upper GI endoscopy-[facility]    ESOPHAGOSCOPY, GASTROSCOPY, DUODENOSCOPY (EGD), COMBINED  3/17/2014    Procedure: COMBINED ESOPHAGOSCOPY, GASTROSCOPY, DUODENOSCOPY (EGD), BIOPSY SINGLE OR MULTIPLE;;  Surgeon: Duane, William Charles, MD;  Location: [facility] OR    EXCISE MASS FOOT Right 9/30/2024    Procedure: Soft tissue mass removal right foot;  Surgeon: Vashti Ayala DPM, Podiatry/Foot and Ankle Surgery;  Location: [facility] OR    FUSION CERVICAL POSTERIOR ONE LEVEL  11/18/2013    Procedure: FUSION CERVICAL POSTERIOR ONE LEVEL;  Cervical 1-2 Posterior Cervical Fusion (Harms Procedure);  Surgeon: Carrol Gunn MD;  [facility]    GYN SURGERY      INJECT MAJOR JOINT / BURSA Left 1/5/2017    Procedure: INJECT MAJOR JOINT / BURSA;  Surgeon: Fredy Patel DO;  [facility]    INJECT STEROID (LOCATION) Left 6/18/2015    Procedure: INJECT STEROID (LOCATION);  Surgeon: Carrol Gaspar MD;  [facility]    NECK SURGERY      REMOVE FOREIGN BODY UPPER EXTREMITY Right 3/2/2017    Procedure: REMOVE FOREIGN BODY UPPER EXTREMITY;  Surgeon: Carrol Gaspar MD;  [facility]    REMOVE HARDWARE FOOT Left 6/8/2022    Procedure: REMOVAL, HARDWARE, FOOT LEFT;  Surgeon: Chris Hendricks MD;  [facility]    RESECT BONE UPPER EXTREMITY  Left 4/27/2017    Procedure: RESECT BONE UPPER EXTREMITY;  Left Radial Head Resection;  Surgeon: Carrol Gaspar MD;  [facility]    ROTATOR CUFF REPAIR RT/LT  10/19/2005    1.left distal clavicle excision.2.acromioplasty.3.coracoacromial ligament resection.4.rotator cuff exploration    [facility] ANESTH,DX ARTHROSCOPIC PROC KNEE JOINT  10/24/2007    right total knee arthroplasty    [facility] SHOULDER SURG PROC UNLISTED      [facility] TOTAL KNEE ARTHROPLASTY  1/18/12    Left     celecoxib (CELEBREX) 200 MG capsule  cetirizine (ZYRTEC) 10 MG tablet  clonazePAM (KLONOPIN) 1 MG tablet  ENBREL SURECLICK 50 MG/ML autoinjector  Cholecalciferol (VITAMIN D3 PO)  Collagen-Vitamin C-Biotin (COLLAGEN PO)  naloxone (NARCAN) 4 MG/0.1ML nasal spray  Omega-3 Fatty Acids (FISH OIL PO)  omeprazole (PRILOSEC) 40 MG DR capsule  Prenatal Vit-Fe Fumarate-FA (PRENATAL MULTIVITAMIN  PLUS IRON) 27-1 MG TABS  vitamin E 400 units TABS      Allergies   Allergen Reactions    Leflunomide Anaphylaxis    Morphine Swelling    Celecoxib Other (See Comments)     Other reaction(s): stroke , lasted 24 hours  -Pt takes celecoxib daily. States that they changed the  and now it is fine.    Amitriptyline Other (See Comments)     Sleepwalking     Codeine     Gabapentin Other (See Comments)     Pins,needles, stabbing aching at once  Pins,needles, stabbing aching at once  Numbness and Tingling    Ibuprofen      Doesn't take due to gastric ulcer    Sulfa Antibiotics Nausea and Vomiting     unknown    Tylenol [Acetaminophen]      Pt. States that she has Liver problems  Tylenol 3    Dexamethasone Palpitations     Heart racing, sweating     Erythromycin Nausea     Vomiting     Penicillins Rash    Prednisone Palpitations     Heart racing     Family History  Family History   Problem Relation Age of Onset    Diabetes Mother     Hypertension Mother     Allergies Mother     Alcohol/Drug Mother         LIVER CIRROSIS    Blood Disease Mother         B12 DEF     Neurologic Disorder Mother         Epilepsy    Osteoporosis Mother     Cerebrovascular Disease Maternal Grandmother     Arthritis Maternal Grandmother         RHEUMATOID    Cancer Maternal Grandmother     Thyroid Disease Maternal Grandmother     Osteoporosis Maternal Grandmother     Heart Disease Maternal Grandmother     Depression Maternal Grandmother     Neurologic Disorder Maternal Grandmother         MIGRAINES    Anxiety Disorder Maternal Grandmother     Diabetes Maternal Grandmother     Migraines Maternal Grandmother     Deep Vein Thrombosis Maternal Grandmother     Cerebrovascular Disease Maternal Grandfather     Cancer Maternal Grandfather     Mental Illness Maternal Grandfather     Cerebrovascular Disease Paternal Grandmother     Arthritis Paternal Grandmother         RHEUMATOID    Cancer Paternal Grandmother     Osteoporosis Paternal Grandmother     Heart Disease Paternal Grandmother     Depression Paternal Grandmother     Neurologic Disorder Paternal Grandmother         MIGRAINES    Thyroid Disease Paternal Grandmother     Cerebrovascular Disease Paternal Grandfather     Cancer Paternal Grandfather     Heart Disease Brother         MURMUR    Asthma Son     Endocrine Disease Son     Neurologic Disorder Father         epilepsy    Seizure Disorder Father     Hypertension Father     Skin Cancer Father     Anxiety Disorder Father     Mental Illness Father     Hyperlipidemia Father     Kidney Disease Father     Bladder Cancer Father     Neurologic Disorder Daughter         MIGRAINES    Arthritis Daughter         JR    Hypertension Son     LUNG DISEASE Brother     Cancer Brother         lung    Anxiety Disorder Son     Asthma Son     Hypertension Son     Migraines Son     Spine Problems Son     Mental Illness Brother     Migraines Brother     Cardiac Sudden Death Brother     Spine Problems Brother     Glaucoma No family hx of     Macular Degeneration No family hx of     Unknown/Adopted No family hx of      "Anesthesia Reaction No family hx of     Other Cancer No family hx of     Rheumatoid Arthritis No family hx of     Bone Cancer No family hx of     Low Back Problems No family hx of      Social History   Social History     Tobacco Use    Smoking status: Former     Current packs/day: 0.10     Average packs/day: 0.1 packs/day for 41.7 years (4.2 ttl pk-yrs)     Types: Cigarettes     Start date: 8/6/1983     Passive exposure: Past    Smokeless tobacco: Never    Tobacco comments:     Quit 6 weeks ago   Vaping Use    Vaping status: Never Used   Substance Use Topics    Alcohol use: No    Drug use: No      A medically appropriate review of systems was performed with pertinent positives and negatives noted in the HPI, and all other systems negative.    Physical Exam   BP: 124/85  Pulse: 90  Temp: 98  F (36.7  C)  Resp: 18  Height: 167.6 cm (5' 6\")  Weight: 82.6 kg (182 lb)  SpO2: 98 %  Physical Exam  Vitals and nursing note reviewed.   Constitutional:       General: She is not in acute distress.     Appearance: She is well-developed. She is not ill-appearing, toxic-appearing or diaphoretic.      Comments: Patient laying in bed, appears comfortable, awake, alert, answering questions appropriately   HENT:      Head: Normocephalic and atraumatic.      Nose: Congestion and rhinorrhea present.      Mouth/Throat:      Mouth: Mucous membranes are moist.      Pharynx: Oropharynx is clear.   Eyes:      Extraocular Movements: Extraocular movements intact.      Pupils: Pupils are equal, round, and reactive to light.   Cardiovascular:      Rate and Rhythm: Normal rate and regular rhythm.      Heart sounds: Normal heart sounds.   Pulmonary:      Effort: Pulmonary effort is normal. No respiratory distress.      Breath sounds: Normal breath sounds. No stridor. No wheezing, rhonchi or rales.   Chest:      Chest wall: No tenderness.   Abdominal:      General: Abdomen is flat. Bowel sounds are normal.      Palpations: Abdomen is soft.      " Tenderness: There is abdominal tenderness in the right lower quadrant, suprapubic area and left lower quadrant. There is no right CVA tenderness, left CVA tenderness, guarding or rebound. Negative signs include Velarde's sign and McBurney's sign.   Musculoskeletal:      Cervical back: Normal range of motion and neck supple. No rigidity or tenderness.   Lymphadenopathy:      Cervical: No cervical adenopathy.   Skin:     General: Skin is warm and dry.      Capillary Refill: Capillary refill takes less than 2 seconds.   Neurological:      General: No focal deficit present.      Mental Status: She is alert and oriented to person, place, and time.   Psychiatric:         Mood and Affect: Mood normal.         Behavior: Behavior normal.         ED Course, Procedures, & Data      Procedures            EKG Interpretation:      Interpreted by Linda Read PA-C  Time reviewed: 1415  Symptoms at time of EKG: Chest heaviness   Rhythm: normal sinus   Rate: Normal  Axis: Normal  Ectopy: none  Conduction: normal  ST Segments/ T Waves: No ST-T wave changes  Q Waves: none  Comparison to prior: Unchanged from 4/28/2025    Clinical Impression: normal EKG       Results for orders placed or performed during the hospital encounter of 05/05/25   XR Chest 2 Views     Status: None    Narrative    EXAM: XR CHEST 2 VIEWS  LOCATION: [facility]  DATE: 5/5/2025    INDICATION: Cough, fevers x1 week  COMPARISON: 4/28/2025      Impression    IMPRESSION: No acute cardiopulmonary abnormality. Left shoulder arthroplasty and partially imaged hardware in the right humerus.   Comprehensive metabolic panel     Status: Abnormal   Result Value Ref Range    Sodium 141 135 - 145 mmol/L    Potassium 4.3 3.4 - 5.3 mmol/L    Carbon Dioxide (CO2) 23 22 - 29 mmol/L    Anion Gap 11 7 - 15 mmol/L    Urea Nitrogen 29.8 (H) 6.0 - 20.0 mg/dL    Creatinine 1.11 (H) 0.51 - 0.95 mg/dL    GFR Estimate 59 (L) >60  mL/min/1.73m2    Calcium 9.1 8.8 - 10.4 mg/dL    Chloride 107 98 - 107 mmol/L    Glucose 88 70 - 99 mg/dL    Alkaline Phosphatase 104 40 - 150 U/L    AST 25 0 - 45 U/L    ALT 33 0 - 50 U/L    Protein Total 7.9 6.4 - 8.3 g/dL    Albumin 3.7 3.5 - 5.2 g/dL    Bilirubin Total 0.5 <=1.2 mg/dL   Lipase     Status: Normal   Result Value Ref Range    Lipase 37 13 - 60 U/L   Troponin T, High Sensitivity     Status: Normal   Result Value Ref Range    Troponin T, High Sensitivity 8 <=14 ng/L   UA with Microscopic reflex to Culture     Status: Abnormal    Specimen: Urine, Clean Catch   Result Value Ref Range    Color Urine Yellow Colorless, Straw, Light Yellow, Yellow    Appearance Urine Slightly Cloudy (A) Clear    Glucose Urine Negative Negative mg/dL    Bilirubin Urine Negative Negative    Ketones Urine Negative Negative mg/dL    Specific Gravity Urine 1.026 1.003 - 1.035    Blood Urine Negative Negative    pH Urine 5.5 5.0 - 7.0    Protein Albumin Urine Negative Negative mg/dL    Urobilinogen Urine Normal Normal mg/dL    Nitrite Urine Negative Negative    Leukocyte Esterase Urine Large (A) Negative    Mucus Urine Present (A) None Seen /LPF    RBC Urine 2 <=2 /HPF    WBC Urine 97 (H) <=5 /HPF    Squamous Epithelials Urine 20 (H) <=1 /HPF    Transitional Epithelials Urine <1 <=1 /HPF    Renal Tubular Epithelials Urine <1 None Seen /HPF    Narrative    Urine Culture ordered based on laboratory criteria   HCG qualitative Blood     Status: Normal   Result Value Ref Range    hCG Serum Qualitative Negative Negative   CBC with platelets and differential     Status: None   Result Value Ref Range    WBC Count 7.7 4.0 - 11.0 10e3/uL    RBC Count 4.05 3.80 - 5.20 10e6/uL    Hemoglobin 11.9 11.7 - 15.7 g/dL    Hematocrit 35.8 35.0 - 47.0 %    MCV 88 78 - 100 fL    MCH 29.4 26.5 - 33.0 pg    MCHC 33.2 31.5 - 36.5 g/dL    RDW 13.4 10.0 - 15.0 %    Platelet Count 228 150 - 450 10e3/uL    % Neutrophils 42 %    % Lymphocytes 41 %    %  Monocytes 8 %    % Eosinophils 7 %    % Basophils 1 %    % Immature Granulocytes 1 %    NRBCs per 100 WBC 0 <1 /100    Absolute Neutrophils 3.2 1.6 - 8.3 10e3/uL    Absolute Lymphocytes 3.1 0.8 - 5.3 10e3/uL    Absolute Monocytes 0.6 0.0 - 1.3 10e3/uL    Absolute Eosinophils 0.6 0.0 - 0.7 10e3/uL    Absolute Basophils 0.1 0.0 - 0.2 10e3/uL    Absolute Immature Granulocytes 0.1 <=0.4 10e3/uL    Absolute NRBCs 0.0 10e3/uL   RBC and Platelet Morphology     Status: None   Result Value Ref Range    RBC Morphology Confirmed RBC Indices     Platelet Assessment  Automated Count Confirmed. Platelet morphology is normal.     Automated Count Confirmed. Platelet morphology is normal.   Troponin T, High Sensitivity     Status: Normal   Result Value Ref Range    Troponin T, High Sensitivity 8 <=14 ng/L   EKG 12-lead, tracing only     Status: None   Result Value Ref Range    Systolic Blood Pressure  mmHg    Diastolic Blood Pressure  mmHg    Ventricular Rate 80 BPM    Atrial Rate 80 BPM    ME Interval 158 ms    QRS Duration 80 ms     ms    QTc 445 ms    P Axis 31 degrees    R AXIS 83 degrees    T Axis 52 degrees    Interpretation ECG       Sinus rhythm  Normal ECG  Unconfirmed report - interpretation of this ECG is computer generated - see medical record for final interpretation  Confirmed by - EMERGENCY ROOM, PHYSICIAN ([id number]),  ALICIA MARINELLI ([id number]) on 5/5/2025 2:21:29 PM     Urine Culture     Status: None    Specimen: Urine, Clean Catch   Result Value Ref Range    Culture 10,000-50,000 CFU/mL Urogenital marni    CBC with platelets differential     Status: None    Narrative    The following orders were created for panel order CBC with platelets differential.  Procedure                               Abnormality         Status                     ---------                               -----------         ------                     CBC with platelets and ...[[id number]]                      Final result                RBC and Platelet Morpho...[[id number]]                      Final result                 Please view results for these tests on the individual orders.     Medications   HYDROmorphone (DILAUDID) injection 0.2 mg (0.2 mg Intravenous $Given 5/5/25 2806)   HYDROmorphone (PF) (DILAUDID) injection 0.3 mg (0.3 mg Intravenous $Given 5/5/25 1639)     Labs Ordered and Resulted from Time of ED Arrival to Time of ED Departure   COMPREHENSIVE METABOLIC PANEL - Abnormal       Result Value    Sodium 141      Potassium 4.3      Carbon Dioxide (CO2) 23      Anion Gap 11      Urea Nitrogen 29.8 (*)     Creatinine 1.11 (*)     GFR Estimate 59 (*)     Calcium 9.1      Chloride 107      Glucose 88      Alkaline Phosphatase 104      AST 25      ALT 33      Protein Total 7.9      Albumin 3.7      Bilirubin Total 0.5     ROUTINE UA WITH MICROSCOPIC REFLEX TO CULTURE - Abnormal    Color Urine Yellow      Appearance Urine Slightly Cloudy (*)     Glucose Urine Negative      Bilirubin Urine Negative      Ketones Urine Negative      Specific Gravity Urine 1.026      Blood Urine Negative      pH Urine 5.5      Protein Albumin Urine Negative      Urobilinogen Urine Normal      Nitrite Urine Negative      Leukocyte Esterase Urine Large (*)     Mucus Urine Present (*)     RBC Urine 2      WBC Urine 97 (*)     Squamous Epithelials Urine 20 (*)     Transitional Epithelials Urine <1      Renal Tubular Epithelials Urine <1     LIPASE - Normal    Lipase 37     TROPONIN T, HIGH SENSITIVITY - Normal    Troponin T, High Sensitivity 8     HCG QUALITATIVE PREGNANCY - Normal    hCG Serum Qualitative Negative     TROPONIN T, HIGH SENSITIVITY - Normal    Troponin T, High Sensitivity 8     CBC WITH PLATELETS AND DIFFERENTIAL    WBC Count 7.7      RBC Count 4.05      Hemoglobin 11.9      Hematocrit 35.8      MCV 88      MCH 29.4      MCHC 33.2      RDW 13.4      Platelet Count 228      % Neutrophils 42      % Lymphocytes 41      % Monocytes 8      % Eosinophils  7      % Basophils 1      % Immature Granulocytes 1      NRBCs per 100 WBC 0      Absolute Neutrophils 3.2      Absolute Lymphocytes 3.1      Absolute Monocytes 0.6      Absolute Eosinophils 0.6      Absolute Basophils 0.1      Absolute Immature Granulocytes 0.1      Absolute NRBCs 0.0     RBC AND PLATELET MORPHOLOGY    RBC Morphology Confirmed RBC Indices      Platelet Assessment        Value: Automated Count Confirmed. Platelet morphology is normal.     XR Chest 2 Views   Final Result   IMPRESSION: No acute cardiopulmonary abnormality. Left shoulder arthroplasty and partially imaged hardware in the right humerus.             Critical care was not performed.     Medical Decision Making  The patient's presentation was of moderate complexity (an undiagnosed new problem with uncertain prognosis).    The patient's evaluation involved:  an assessment requiring an independent historian (patient's son who is also PCA)  review of external note(s) from 3+ sources (clinical notes including from recent ED visits and hospital stay)  review of 3+ test result(s) ordered prior to this encounter (labs, imaging)  ordering and/or review of 3+ test(s) in this encounter (see separate area of note for details)    The patient's management necessitated moderate risk (prescription drug management including medications given in the ED).    Assessment & Plan    Kalyn Rivero is a 54 year old female with past medical history significant for rheumatoid arthritis, hypothyroidism, fibromyalgia, CKD III, OA, C diff colitis, hepatic steatosis, who presents to the emergency department for evaluation of chest pain and shortness of breath. She was admitted from 4/29-4/30/2025 for treatment of UTI and CAP in the setting of inability to handle PO intake although was discharged after she stabilized and there was no longer concern for infection. Since returning home, she continues with chest heaviness and shortness of breath as well as cough,  congestion, runny nose, and ongoing lower abdominal pain.     On evaluation, vitals are within normal ranges, normotensive, heart rate 90, afebrile, normoxic. On exam she is laying in bed, appears comfortable, answers questions appropriately. She does have some tenderness to palpation across her lower abdomen, she reports this is unchanged from previous. Abdomen is otherwise soft, no rebound, no guarding. She is breathing comfortably on room air, no respiratory distress, no adventitious lung sounds, normal heart rate, regular rhythm, no chest tenderness to palpation. She does have congestion and a runny nose. Exam is otherwise benign. Work-up was largely reassuring. CBC unremarkable including WBC 7.7, hemoglobin 11.9. CMP with BUN elevated to 29.8, creatinine 1.11 at her baseline, otherwise unremarkable. HCG negative. Lipase WNL at 37. Delta troponins stable at 8. UA with large leukocyte esterase and 97 WBCs although with 20 squamous cells, felt sample likely represented contamination vs true infection, culture pending. ECG also obtained which was negative for signs of acute ischemia or dysrhythmia. With symptoms largely unchanged from previous and no other concerning features, elected not to do any abdominal imaging at this time although did pursue chest X-ray due to chest heaviness, shortness of breath, and cough which demonstrated no cardiopulmonary abnormality. Patient did request dilaudid prior to X-ray due to her pain.     With overall reassuring work-up, observation of the patient in the emergency department for some time including ambulating without issue and tolerating PO intake, felt comfortable discharging patient to home. Later in her stay, she mentioned history of palpitations, none recently, so discussed with patient she may review this with her primary care provider who may request a Zio patch if warranted. She also wanted to discuss possible sleep study for sleep apnea and new finding of hiatal  hernia on imaging last week. I added these to her discharge instructions to help remind her to discuss these. She plans to see her PCP in 2 days for hospital follow-up but knows she can return to the emergency department prior to then with any new or worsening symptoms. Also discussed ongoing cough, congestion, and runny nose may be related to seasonal allergies, to trial cetirizine once daily. Plan and instructions discussed with patient and her son/PCA who were in understanding and agreement with plan and had no additional questions. She remained hemodynamically stable while in the ED and was discharged home in stable condition.     I have reviewed the nursing notes. I have reviewed the findings, diagnosis, plan and need for follow up with the patient.    Discharge Medication List as of 5/5/2025  7:52 PM        START taking these medications    Details   cetirizine (ZYRTEC) 10 MG tablet Take 1 tablet (10 mg) by mouth daily., Disp-30 tablet, R-0, E-Prescribe             Final diagnoses:   Chest pressure   Shortness of breath   Abdominal pain       Linda Read PA-C  [facility] EMERGENCY DEPARTMENT  5/5/2025     Linda Read PA-C  05/06/25 1953

## 2025-05-05 NOTE — CONSULTS
"Consult received for Vascular access care.  See LDA for details. For additional needs place \"Nursing to Consult for Vascular Access\" [id number] order in EPIC.  "

## 2025-05-06 LAB — BACTERIA UR CULT: NORMAL

## 2025-05-06 NOTE — DISCHARGE INSTRUCTIONS
TODAY'S VISIT:  You were seen today for chest pressure, shortness of breath, and abdominal pain  - Your labs were largely reassuring including normal blood cell counts, liver function, kidney function, electrolytes, pancreatic function, and heart enzymes  - Your EKG also didn't show any evidence of abnormal heart rhythms -- you may want to ask your primary care provider about a zio patch  - Your urine didn't show obvious signs of infection right now but we will send it for a culture to see if any bacteria grow  - Your chest X-ray didn't show evidence of a pneumonia  - You should discuss all imaging/radiology tests and laboratory tests that were performed during this visit with your usual providers to ensure you continue to improve and do not need any further evaluation, testing or management.   - Please call your Primary Care team to discuss and arrange a follow-up appointment.   Immediately return to the nearest Emergency Department with any new or worsening symptoms or concerns.    FOLLOW-UP:  Please make an appointment to follow up with:  - Your Primary Care Provider   - If you do not have a primary care provider, you can be seen in follow-up and establish care with one of our providers by calling of the the clinics below:  --- [facility] (phone: [phone])  --- [facility] (phone: [phone])   - Have your provider review the results from today's visit with you again to make sure no further follow-up or additional testing is needed based on those results.     OTHER INSTRUCTIONS:  - Do your best to stay hydrated.  - Talk to your primary care provider about a zio patch, hiatal hernia, and a sleep study    RETURN TO THE EMERGENCY DEPARTMENT  Return to the Emergency Department immediately for any new or worsening symptoms or any concerns.     Remember that you can always come back to the Emergency Department if you are not able to see your regular doctor in the amount  of time listed above, if you get any new symptoms, or if there is anything that worries you.

## 2025-05-07 ENCOUNTER — OFFICE VISIT (OUTPATIENT)
Dept: FAMILY MEDICINE | Facility: CLINIC | Age: 55
End: 2025-05-07
Payer: COMMERCIAL

## 2025-05-07 VITALS
BODY MASS INDEX: 28.8 KG/M2 | HEART RATE: 92 BPM | SYSTOLIC BLOOD PRESSURE: 122 MMHG | WEIGHT: 179.2 LBS | DIASTOLIC BLOOD PRESSURE: 85 MMHG | HEIGHT: 66 IN | OXYGEN SATURATION: 96 % | RESPIRATION RATE: 20 BRPM | TEMPERATURE: 98.1 F

## 2025-05-07 DIAGNOSIS — K21.9 GASTROESOPHAGEAL REFLUX DISEASE, UNSPECIFIED WHETHER ESOPHAGITIS PRESENT: ICD-10-CM

## 2025-05-07 DIAGNOSIS — R00.2 PALPITATIONS: Primary | ICD-10-CM

## 2025-05-07 PROCEDURE — 93242 EXT ECG>48HR<7D RECORDING: CPT | Performed by: FAMILY MEDICINE

## 2025-05-07 PROCEDURE — 3079F DIAST BP 80-89 MM HG: CPT | Performed by: FAMILY MEDICINE

## 2025-05-07 PROCEDURE — 3074F SYST BP LT 130 MM HG: CPT | Performed by: FAMILY MEDICINE

## 2025-05-07 PROCEDURE — 99213 OFFICE O/P EST LOW 20 MIN: CPT | Performed by: FAMILY MEDICINE

## 2025-05-07 PROCEDURE — G2211 COMPLEX E/M VISIT ADD ON: HCPCS | Performed by: FAMILY MEDICINE

## 2025-05-07 PROCEDURE — 1125F AMNT PAIN NOTED PAIN PRSNT: CPT | Performed by: FAMILY MEDICINE

## 2025-05-07 RX ORDER — CELECOXIB 200 MG/1
200 CAPSULE ORAL 2 TIMES DAILY
COMMUNITY
Start: 2025-05-07

## 2025-05-07 RX ORDER — SUCRALFATE 1 G/1
1 TABLET ORAL 4 TIMES DAILY
Qty: 120 TABLET | Refills: 1 | Status: SHIPPED | OUTPATIENT
Start: 2025-05-07

## 2025-05-07 ASSESSMENT — PAIN SCALES - GENERAL: PAINLEVEL_OUTOF10: SEVERE PAIN (9)

## 2025-05-07 NOTE — PROGRESS NOTES
Assessment & Plan       ICD-10-CM    1. Palpitations  R00.2 ZIO PATCH 3-7 DAYS (additional cost to patient)     ZIO PATCH 3-7 DAYS APPLICATION     Echocardiogram Complete      2. Gastroesophageal reflux disease, unspecified whether esophagitis present  K21.9 Adult GI  Referral - Procedure Only     sucralfate (CARAFATE) 1 GM tablet       PLAN:Recheck one month          MED REC REQUIRED    Post Medication Reconciliation Status:       Follow-up       Subjective   Kalyn is a 54 year old, presenting for the following health issues:  ER F/U        5/7/2025     3:04 PM   Additional Questions   Roomed by Jacinta BROWNE CMA   Accompanied by Son     HPI          5/1/2025   Post Discharge Outreach   How are you doing now that you are home? Writer spoke to patient who is having shortness of breath, patient warmly handed off to nurse triage. She reports that this is a new symptom, on her chartreview when she went to the ED she did reports shortness of breath, she states its just a bit more dificult to catch her breath.   How are your symptoms? (Red Flag symptoms escalate to triage hotline per guidelines) Unchanged   Does the patient have their discharge instructions?  Yes   Does the patient have questions regarding their discharge instructions?  No   Were you started on any new medications or were there changes to any of your previous medications?  Yes   Does the patient have all of their medications? Yes   Do you have questions regarding any of your medications?  No   Do you have all of your needed medical supplies or equipment (DME)?  (i.e. oxygen tank, CPAP, cane, etc.) Yes   Discharge Follow Up Appointment Date 5/7/2025       Hospital Follow-up Visit:    Hospital/Nursing Home/IP Rehab Facility: [facility]  Most Recent Admission Date: 5/5/2025   Most Recent Admission Diagnosis:      Most Recent Discharge Date: 5/5/2025   Most Recent Discharge Diagnosis: Chest pressure -  "R07.89  Shortness of breath - R06.02  Abdominal pain - R10.9   Was the patient in the ICU or did the patient experience delirium during hospitalization?  No  Do you have any other stressors you would like to discuss with your provider? Health Concerns    Problems taking medications regularly:  None  Medication changes since discharge: None  Problems adhering to non-medication therapy:  None    Summary of hospitalization:  [facility] discharge summary reviewed  Diagnostic Tests/Treatments reviewed.  Follow up needed: zio patch  Other Healthcare Providers Involved in Patient s Care:         None  Update since discharge: stable.         Plan of care communicated with patient and family         Objective    /85   Pulse 92   Temp 98.1  F (36.7  C) (Tympanic)   Resp 20   Ht 1.676 m (5' 5.98\")   Wt 81.3 kg (179 lb 3.2 oz)   LMP  (LMP Unknown)   SpO2 96%   BMI 28.94 kg/m    Body mass index is 28.94 kg/m .  Physical Exam   GENERAL: alert and no distress  NECK: no adenopathy, no asymmetry, masses, or scars  RESP: lungs clear to auscultation - no rales, rhonchi or wheezes  CV: regular rate and rhythm, normal S1 S2, no S3 or S4, no murmur, click or rub, no peripheral edema  ABDOMEN: soft, nontender, no hepatosplenomegaly, no masses and bowel sounds normal  MS: no gross musculoskeletal defects noted, no edema  SKIN: no suspicious lesions or rashes  NEURO: Normal strength and tone, mentation intact and speech normal  PSYCH: mentation appears normal, affect normal/bright             Signed Electronically by: Mika Zamora MD    "

## 2025-05-08 ENCOUNTER — TELEPHONE (OUTPATIENT)
Dept: FAMILY MEDICINE | Facility: CLINIC | Age: 55
End: 2025-05-08
Payer: COMMERCIAL

## 2025-05-08 NOTE — TELEPHONE ENCOUNTER
Patient calling back and read providers note. Patient understands message and verbalizes good understanding of plan of care.    Reviewed hospital follow up. See hospital follow up encounter.    Nellie Moreno RN       Statement Selected

## 2025-05-08 NOTE — TELEPHONE ENCOUNTER
Test Results    Contacts       Contact Date/Time Type Contact Phone/Fax    05/08/2025 12:51 PM CDT Phone (Incoming) Kalyn Rivero (Self) [phone] (M)            Who ordered the test:  [facility]    Type of test: Lab and CT had in ER shows a lung nodule    Date of test:  4/28/25    Where was the test performed:  [place]    What are your questions/concerns?:  Patient is concerned about lung nodule from CT    Okay to leave a detailed message?: Yes at Cell number on file:    Telephone Information:   Mobile [phone]     Sharmin LOPES [facility]

## 2025-05-08 NOTE — TELEPHONE ENCOUNTER
Lung nodules are not uncommon.  This one is too small to work up at this time.  In 3-6 months it should be looked at again with a ct scan.

## 2025-05-12 ENCOUNTER — APPOINTMENT (OUTPATIENT)
Dept: CT IMAGING | Facility: CLINIC | Age: 55
End: 2025-05-12
Attending: EMERGENCY MEDICINE
Payer: COMMERCIAL

## 2025-05-12 ENCOUNTER — HOSPITAL ENCOUNTER (EMERGENCY)
Facility: CLINIC | Age: 55
Discharge: HOME | End: 2025-05-13
Attending: EMERGENCY MEDICINE | Admitting: EMERGENCY MEDICINE
Payer: COMMERCIAL

## 2025-05-12 DIAGNOSIS — R91.1 PULMONARY NODULE: ICD-10-CM

## 2025-05-12 DIAGNOSIS — K44.9 HIATAL HERNIA: ICD-10-CM

## 2025-05-12 LAB
ALBUMIN SERPL BCG-MCNC: 4.2 G/DL (ref 3.5–5.2)
ALP SERPL-CCNC: 108 U/L (ref 40–150)
ALT SERPL W P-5'-P-CCNC: 23 U/L (ref 0–50)
ANION GAP SERPL CALCULATED.3IONS-SCNC: 12 MMOL/L (ref 7–15)
AST SERPL W P-5'-P-CCNC: 26 U/L (ref 0–45)
BASOPHILS # BLD AUTO: 0.1 10E3/UL (ref 0–0.2)
BASOPHILS NFR BLD AUTO: 1 %
BILIRUB SERPL-MCNC: 0.5 MG/DL
BUN SERPL-MCNC: 26.3 MG/DL (ref 6–20)
CALCIUM SERPL-MCNC: 9 MG/DL (ref 8.8–10.4)
CHLORIDE SERPL-SCNC: 104 MMOL/L (ref 98–107)
CREAT SERPL-MCNC: 1.01 MG/DL (ref 0.51–0.95)
EGFRCR SERPLBLD CKD-EPI 2021: 66 ML/MIN/1.73M2
EOSINOPHIL # BLD AUTO: 0.4 10E3/UL (ref 0–0.7)
EOSINOPHIL NFR BLD AUTO: 7 %
ERYTHROCYTE [DISTWIDTH] IN BLOOD BY AUTOMATED COUNT: 13.8 % (ref 10–15)
GLUCOSE SERPL-MCNC: 103 MG/DL (ref 70–99)
HCO3 SERPL-SCNC: 21 MMOL/L (ref 22–29)
HCT VFR BLD AUTO: 37.5 % (ref 35–47)
HGB BLD-MCNC: 12.2 G/DL (ref 11.7–15.7)
IMM GRANULOCYTES # BLD: 0 10E3/UL
IMM GRANULOCYTES NFR BLD: 0 %
LIPASE SERPL-CCNC: 48 U/L (ref 13–60)
LYMPHOCYTES # BLD AUTO: 2.5 10E3/UL (ref 0.8–5.3)
LYMPHOCYTES NFR BLD AUTO: 40 %
MCH RBC QN AUTO: 28.7 PG (ref 26.5–33)
MCHC RBC AUTO-ENTMCNC: 32.5 G/DL (ref 31.5–36.5)
MCV RBC AUTO: 88 FL (ref 78–100)
MONOCYTES # BLD AUTO: 0.6 10E3/UL (ref 0–1.3)
MONOCYTES NFR BLD AUTO: 9 %
NEUTROPHILS # BLD AUTO: 2.7 10E3/UL (ref 1.6–8.3)
NEUTROPHILS NFR BLD AUTO: 43 %
NRBC # BLD AUTO: 0 10E3/UL
NRBC BLD AUTO-RTO: 0 /100
PLATELET # BLD AUTO: 225 10E3/UL (ref 150–450)
POTASSIUM SERPL-SCNC: 3.8 MMOL/L (ref 3.4–5.3)
PROT SERPL-MCNC: 8.7 G/DL (ref 6.4–8.3)
RBC # BLD AUTO: 4.25 10E6/UL (ref 3.8–5.2)
SODIUM SERPL-SCNC: 137 MMOL/L (ref 135–145)
WBC # BLD AUTO: 6.4 10E3/UL (ref 4–11)

## 2025-05-12 PROCEDURE — 99284 EMERGENCY DEPT VISIT MOD MDM: CPT | Performed by: EMERGENCY MEDICINE

## 2025-05-12 PROCEDURE — 258N000003 HC RX IP 258 OP 636: Performed by: EMERGENCY MEDICINE

## 2025-05-12 PROCEDURE — 99285 EMERGENCY DEPT VISIT HI MDM: CPT | Mod: 25 | Performed by: EMERGENCY MEDICINE

## 2025-05-12 PROCEDURE — 36415 COLL VENOUS BLD VENIPUNCTURE: CPT | Performed by: EMERGENCY MEDICINE

## 2025-05-12 PROCEDURE — 83690 ASSAY OF LIPASE: CPT | Performed by: EMERGENCY MEDICINE

## 2025-05-12 PROCEDURE — 85025 COMPLETE CBC W/AUTO DIFF WBC: CPT | Performed by: EMERGENCY MEDICINE

## 2025-05-12 PROCEDURE — 250N000011 HC RX IP 250 OP 636: Performed by: EMERGENCY MEDICINE

## 2025-05-12 PROCEDURE — 250N000009 HC RX 250: Performed by: EMERGENCY MEDICINE

## 2025-05-12 PROCEDURE — 96375 TX/PRO/DX INJ NEW DRUG ADDON: CPT | Performed by: EMERGENCY MEDICINE

## 2025-05-12 PROCEDURE — 96361 HYDRATE IV INFUSION ADD-ON: CPT | Performed by: EMERGENCY MEDICINE

## 2025-05-12 PROCEDURE — 96374 THER/PROPH/DIAG INJ IV PUSH: CPT | Mod: 59 | Performed by: EMERGENCY MEDICINE

## 2025-05-12 PROCEDURE — 80053 COMPREHEN METABOLIC PANEL: CPT | Performed by: EMERGENCY MEDICINE

## 2025-05-12 PROCEDURE — 74177 CT ABD & PELVIS W/CONTRAST: CPT

## 2025-05-12 RX ORDER — IOPAMIDOL 755 MG/ML
500 INJECTION, SOLUTION INTRAVASCULAR ONCE
Status: COMPLETED | OUTPATIENT
Start: 2025-05-12 | End: 2025-05-12

## 2025-05-12 RX ORDER — HYDROMORPHONE HYDROCHLORIDE 1 MG/ML
0.5 INJECTION, SOLUTION INTRAMUSCULAR; INTRAVENOUS; SUBCUTANEOUS ONCE
Refills: 0 | Status: COMPLETED | OUTPATIENT
Start: 2025-05-12 | End: 2025-05-12

## 2025-05-12 RX ORDER — ONDANSETRON 2 MG/ML
4 INJECTION INTRAMUSCULAR; INTRAVENOUS ONCE
Status: COMPLETED | OUTPATIENT
Start: 2025-05-12 | End: 2025-05-12

## 2025-05-12 RX ADMIN — ONDANSETRON 4 MG: 2 INJECTION INTRAMUSCULAR; INTRAVENOUS at 22:58

## 2025-05-12 RX ADMIN — SODIUM CHLORIDE 44 ML: 9 INJECTION, SOLUTION INTRAVENOUS at 23:51

## 2025-05-12 RX ADMIN — HYDROMORPHONE HYDROCHLORIDE 0.5 MG: 1 INJECTION, SOLUTION INTRAMUSCULAR; INTRAVENOUS; SUBCUTANEOUS at 22:59

## 2025-05-12 RX ADMIN — IOPAMIDOL 90 ML: 755 INJECTION, SOLUTION INTRAVENOUS at 23:51

## 2025-05-12 RX ADMIN — SODIUM CHLORIDE 1000 ML: 0.9 INJECTION, SOLUTION INTRAVENOUS at 22:58

## 2025-05-12 ASSESSMENT — ACTIVITIES OF DAILY LIVING (ADL)
ADLS_ACUITY_SCORE: 53
ADLS_ACUITY_SCORE: 53

## 2025-05-13 ENCOUNTER — TELEPHONE (OUTPATIENT)
Dept: FAMILY MEDICINE | Facility: CLINIC | Age: 55
End: 2025-05-13
Payer: COMMERCIAL

## 2025-05-13 VITALS
BODY MASS INDEX: 29.43 KG/M2 | TEMPERATURE: 98 F | HEIGHT: 66 IN | OXYGEN SATURATION: 100 % | RESPIRATION RATE: 18 BRPM | HEART RATE: 95 BPM | WEIGHT: 183.1 LBS | SYSTOLIC BLOOD PRESSURE: 127 MMHG | DIASTOLIC BLOOD PRESSURE: 58 MMHG

## 2025-05-13 DIAGNOSIS — K21.9 GASTRO-ESOPHAGEAL REFLUX DISEASE WITHOUT ESOPHAGITIS: ICD-10-CM

## 2025-05-13 PROCEDURE — 250N000011 HC RX IP 250 OP 636: Mod: JZ | Performed by: EMERGENCY MEDICINE

## 2025-05-13 PROCEDURE — 96374 THER/PROPH/DIAG INJ IV PUSH: CPT | Performed by: EMERGENCY MEDICINE

## 2025-05-13 RX ORDER — OMEPRAZOLE 40 MG/1
CAPSULE, DELAYED RELEASE ORAL
Qty: 90 CAPSULE | Refills: 3 | Status: SHIPPED | OUTPATIENT
Start: 2025-05-13

## 2025-05-13 RX ORDER — CYCLOBENZAPRINE HCL 10 MG
10 TABLET ORAL 3 TIMES DAILY PRN
Qty: 21 TABLET | Refills: 0 | Status: SHIPPED | OUTPATIENT
Start: 2025-05-13 | End: 2025-05-20

## 2025-05-13 RX ORDER — HYDROMORPHONE HYDROCHLORIDE 1 MG/ML
0.5 INJECTION, SOLUTION INTRAMUSCULAR; INTRAVENOUS; SUBCUTANEOUS ONCE
Refills: 0 | Status: COMPLETED | OUTPATIENT
Start: 2025-05-13 | End: 2025-05-13

## 2025-05-13 RX ADMIN — HYDROMORPHONE HYDROCHLORIDE 0.5 MG: 1 INJECTION, SOLUTION INTRAMUSCULAR; INTRAVENOUS; SUBCUTANEOUS at 00:59

## 2025-05-13 ASSESSMENT — ACTIVITIES OF DAILY LIVING (ADL): ADLS_ACUITY_SCORE: 53

## 2025-05-13 NOTE — ED PROVIDER NOTES
ED Provider Note  [facility] EMERGENCY DEPARTMENT ([facility])    5/12/25       ED PROVIDER NOTE     History     Chief Complaint   Patient presents with    Abdominal Pain     Patient c/o pain to upper abdomen. She said pain comes and goes yesterday but it has been constant today. Patient said she has hernia     HPI  Kalyn Rivero is a 54 year old female with a notable history of rheumatoid arthritis, hypothyroidism, fibromyalgia, CKD III, hx of C diff, colitis, GERD, and hepatic steatosis who presents to the ED for evaluation of abdominal pain.  Patient reports midepigastric upper abdominal pain that radiates to her back.  She states she has a history of a hiatal hernia and wonders if this is contributory.  She had severe pain that started last night and has not relented, is able to tolerate p.o. intake but does not have an appetite with the pain.  Denies chest pain.  Denies fevers, chills, vomiting.    Per chart review: Patient was also admitted 4/29-4/30 with nausea and vomiting. An extensive workup was done, and she was treated with antibiotics for pneumonia and a UTI. CT abdomen/pelvis showed a moderate hiatal hernia. Patient was recently seen in the ED 5/05/2025 for chest pain and shortness of breath that had been ongoing since her discharge 4/30. She was deemed stable to discharge, with instructions to follow up with her PCP.     ____________________________________________________________    CT ABDOMEN PELVIS W CONTRAST   Impression:   - No finding to account for abdominal pain.  - Diffuse hepatic steatosis.  - Moderate hiatal hernia.  - Iliac chain and pelvic sidewall lymphadenopathy stable to mildly improved since 3/18/2025.  - New 5 mm right lower lobe pulmonary nodule is likely infectious/inflammatory.      Past Medical History  Past Medical History:   Diagnosis Date    Acetaminophen overdose 03/24/2011    Anemia     Anesthesia complication     pt states  "has a history of heart stopping during anesthesia,    Arrhythmia     Cerebral artery occlusion with cerebral infarction (H)     Cerebral infarction (H)     Cervical high risk HPV (human papillomavirus) test positive 02/22/2017    type 18 & other HR HPV    Chronic infection     MRSA    Chronic pain 03/24/2011    Degenerative joint disease     Endometriosis     Fibromyalgia     Gastro-oesophageal reflux disease     Heart murmur     History of blood transfusion     Hypothyroidism     Immune disorder     Learning disability     Malignant neoplasm (H)     Migraine     MRSA (methicillin resistant staph aureus) culture positive     Neck injuries     Opioid type dependence (H)     Other chronic pain     PONV (postoperative nausea and vomiting)     states \"flatlines\"during anesthesia    RA (rheumatoid arthritis) (H)     Renal stone     Scoliosis     Stomach ulcer     history     Past Surgical History:   Procedure Laterality Date    ARTHRODESIS FOOT  5/23/2012    Procedure:ARTHRODESIS FOOT; Right Subtalar andTaloNavicular  Fusion  ; Surgeon:BRIGHT ZHOU; [facility]    ARTHRODESIS FOOT Left 4/22/2015    Procedure: ARTHRODESIS FOOT;  Surgeon: Bright Zhou MD;  [facility]    ARTHROPLASTY ELBOW Right 9/30/2015    Procedure: ARTHROPLASTY ELBOW;  Surgeon: Carrol Gaspar MD;  [facility]    ARTHROPLASTY KNEE Right     ARTHROPLASTY REVISION ELBOW Right 11/27/2018    Procedure: 1 - aspiration of Right elbow 2- Explantation of failed hardware Right humeral periprosthetic fracture non-union 3- Right distal humerus excision 4- Right distal humerus replacement 5 - Revision Right total elbow arthroplasty;  Surgeon: Aakash Zimmer MD;  Location:  OR    ARTHROPLASTY WRIST      x2 Lt wrist replacement.    ARTHROTOMY ELBOW Right 6/18/2015    Procedure: ARTHROTOMY ELBOW;  Surgeon: Carrol Gaspar MD;  Location:  OR    C MANUEL ARTHROSCOP,DIAGNOSTIC  12/17/2008    left total shoulder " arthroplasty by Dr. Law    CYSTOSCOPY  02/06/2009    1.cystoscopy.2.bilateral ureteroscopy.3.basketing of stone fragments from the right kidney.4.bilateral double-J ureteral stent placement.5.ann catheter placement.6.fluoroscopy and intraoperative interpretation of images.    CYSTOSCOPY  01/08/2007    1. cystoscopy and left stent removal.2.left ureteroscopy    DECOMPRESSION CUBITAL TUNNEL  6/6/2014    Procedure: DECOMPRESSION CUBITAL TUNNEL;  Surgeon: Janelle Coates MD;  Location: [facility] OR    ENDOSCOPY  03/31/2005    [facility] GI endoscopy-[facility]    ESOPHAGOSCOPY, GASTROSCOPY, DUODENOSCOPY (EGD), COMBINED  3/17/2014    Procedure: COMBINED ESOPHAGOSCOPY, GASTROSCOPY, DUODENOSCOPY (EGD), BIOPSY SINGLE OR MULTIPLE;;  Surgeon: Duane, William Charles, MD;  Location: [facility] OR    EXCISE MASS FOOT Right 9/30/2024    Procedure: Soft tissue mass removal right foot;  Surgeon: Vashti Ayala DPM, Podiatry/Foot and Ankle Surgery;  Location: [facility] OR    FUSION CERVICAL POSTERIOR ONE LEVEL  11/18/2013    Procedure: FUSION CERVICAL POSTERIOR ONE LEVEL;  Cervical 1-2 Posterior Cervical Fusion (Harms Procedure);  Surgeon: Carrol Gunn MD;  Location: [facility] OR    GYN SURGERY      INJECT MAJOR JOINT / BURSA Left 1/5/2017    Procedure: INJECT MAJOR JOINT / BURSA;  Surgeon: Fredy Patel DO;  [facility]    INJECT STEROID (LOCATION) Left 6/18/2015    Procedure: INJECT STEROID (LOCATION);  Surgeon: Carrol Gaspar MD;  [facility]    NECK SURGERY      REMOVE FOREIGN BODY UPPER EXTREMITY Right 3/2/2017    Procedure: REMOVE FOREIGN BODY UPPER EXTREMITY;  Surgeon: Carrol Gaspar MD;  [facility]    REMOVE HARDWARE FOOT Left 6/8/2022    Procedure: REMOVAL, HARDWARE, FOOT LEFT;  Surgeon: Chris Hendricks MD;  [facility]    RESECT BONE UPPER EXTREMITY Left 4/27/2017    Procedure: RESECT BONE UPPER EXTREMITY;  Left Radial Head Resection;  Surgeon: Carrol Gaspar  MD Linda;  Location: [facility] OR    ROTATOR CUFF REPAIR RT/LT  10/19/2005    1.left distal clavicle excision.2.acromioplasty.3.coracoacromial ligament resection.4.rotator cuff exploration    [facility] ANESTH,DX ARTHROSCOPIC PROC KNEE JOINT  10/24/2007    right total knee arthroplasty    [facility] SHOULDER SURG PROC UNLISTED      [facility] TOTAL KNEE ARTHROPLASTY  1/18/12    Left     celecoxib (CELEBREX) 200 MG capsule  clonazePAM (KLONOPIN) 1 MG tablet  omeprazole (PRILOSEC) 40 MG DR capsule  cetirizine (ZYRTEC) 10 MG tablet  ENBREL SURECLICK 50 MG/ML autoinjector  naloxone (NARCAN) 4 MG/0.1ML nasal spray  sucralfate (CARAFATE) 1 GM tablet      Allergies   Allergen Reactions    Leflunomide Anaphylaxis    Morphine Swelling    Celecoxib Other (See Comments)     Other reaction(s): stroke , lasted 24 hours  -Pt takes celecoxib daily. States that they changed the  and now it is fine.    Amitriptyline Other (See Comments)     Sleepwalking     Codeine     Gabapentin Other (See Comments)     Pins,needles, stabbing aching at once  Pins,needles, stabbing aching at once  Numbness and Tingling    Ibuprofen      Doesn't take due to gastric ulcer    Sulfa Antibiotics Nausea and Vomiting     unknown    Tylenol [Acetaminophen]      Pt. States that she has Liver problems  Tylenol 3    Dexamethasone Palpitations     Heart racing, sweating     Erythromycin Nausea     Vomiting     Penicillins Rash    Prednisone Palpitations     Heart racing     Family History  Family History   Problem Relation Age of Onset    Diabetes Mother     Hypertension Mother     Allergies Mother     Alcohol/Drug Mother         LIVER CIRROSIS    Blood Disease Mother         B12 DEF    Neurologic Disorder Mother         Epilepsy    Cerebrovascular Disease Maternal Grandmother     Arthritis Maternal Grandmother         RHEUMATOID    Cancer Maternal Grandmother     Heart Disease Maternal Grandmother     Depression Maternal Grandmother     Neurologic Disorder Maternal  Grandmother         MIGRAINES    Anxiety Disorder Maternal Grandmother     Diabetes Maternal Grandmother     Migraines Maternal Grandmother     Deep Vein Thrombosis Maternal Grandmother     Cerebrovascular Disease Maternal Grandfather     Cancer Maternal Grandfather     Mental Illness Maternal Grandfather     Cerebrovascular Disease Paternal Grandmother     Arthritis Paternal Grandmother         RHEUMATOID    Cancer Paternal Grandmother     Heart Disease Paternal Grandmother     Depression Paternal Grandmother     Neurologic Disorder Paternal Grandmother         MIGRAINES    Cerebrovascular Disease Paternal Grandfather     Cancer Paternal Grandfather     Heart Disease Brother         MURMUR    Asthma Son     Endocrine Disease Son     Neurologic Disorder Father         epilepsy    Seizure Disorder Father     Hypertension Father     Skin Cancer Father     Anxiety Disorder Father     Mental Illness Father     Hyperlipidemia Father     Kidney Disease Father     Bladder Cancer Father     Neurologic Disorder Daughter         MIGRAINES    Arthritis Daughter         JR    Hypertension Son     LUNG DISEASE Brother     Cancer Brother         lung    Anxiety Disorder Son     Asthma Son     Hypertension Son     Migraines Son     Spine Problems Son     Mental Illness Brother     Migraines Brother     Cardiac Sudden Death Brother     Spine Problems Brother     Anxiety Disorder Son     Asthma Son     Hypertension Son     Mental Illness Brother     Glaucoma No family hx of     Macular Degeneration No family hx of     Unknown/Adopted No family hx of     Anesthesia Reaction No family hx of     Other Cancer No family hx of     Rheumatoid Arthritis No family hx of     Bone Cancer No family hx of     Low Back Problems No family hx of     Osteoporosis No family hx of     Thyroid Disease No family hx of      Social History   Social History     Tobacco Use    Smoking status: Former     Current packs/day: 0.10     Average packs/day: 0.1  "packs/day for 41.8 years (4.2 ttl pk-yrs)     Types: Cigarettes     Start date: 8/6/1983     Passive exposure: Past    Smokeless tobacco: Never    Tobacco comments:     Quit 6 weeks ago   Vaping Use    Vaping status: Never Used   Substance Use Topics    Alcohol use: No    Drug use: No      A medically appropriate review of systems was performed with pertinent positives and negatives noted in the HPI, and all other systems negative.    Physical Exam   BP: (!) 142/88  Pulse: 94  Temp: 98  F (36.7  C)  Resp: 18  Height: 167.6 cm (5' 6\")  Weight: 83.1 kg (183 lb 1.6 oz)  SpO2: 97 %  Physical Exam  Constitutional:       General: She is not in acute distress.     Appearance: Normal appearance. She is not diaphoretic.   HENT:      Head: Atraumatic.      Mouth/Throat:      Mouth: Mucous membranes are moist.   Eyes:      General: No scleral icterus.     Conjunctiva/sclera: Conjunctivae normal.   Cardiovascular:      Rate and Rhythm: Normal rate.      Heart sounds: Normal heart sounds.   Pulmonary:      Effort: No respiratory distress.      Breath sounds: Normal breath sounds.   Abdominal:      General: Abdomen is flat.      Palpations: Abdomen is soft.      Tenderness: There is abdominal tenderness in the epigastric area. There is no guarding or rebound.   Musculoskeletal:      Cervical back: Neck supple.   Skin:     General: Skin is warm.      Findings: No rash.   Neurological:      General: No focal deficit present.      Mental Status: She is alert and oriented to person, place, and time.   Psychiatric:         Mood and Affect: Mood normal.         Behavior: Behavior normal.           ED Course, Procedures, & Data      Procedures                Results for orders placed or performed during the hospital encounter of 05/12/25   CT Abdomen Pelvis w Contrast     Status: None    Narrative    EXAM: CT ABDOMEN PELVIS W CONTRAST  LOCATION: [facility]  DATE: 5/12/2025    INDICATION: " Abdominal pain; hernia.  COMPARISON: 4/29/2025.  TECHNIQUE: CT scan of the abdomen and pelvis was performed following injection of IV contrast. Multiplanar reformats were obtained. Dose reduction techniques were used.  CONTRAST: 90 mL Isovue-370.    FINDINGS:    LOWER CHEST: Moderate-sized hiatal hernia. Unchanged 5 mm nodule within the right lower lobe.    HEPATOBILIARY: Liver enhances normally and is normal in size.    Cholecystectomy.    No intrahepatic or intrahepatic biliary ductal dilatation.    PANCREAS: Enhances normally. No peripancreatic inflammatory fat stranding.    SPLEEN: Enhances normally. Normal size.    ADRENAL GLANDS: Normal.    KIDNEYS: Both kidneys enhance symmetrically, without hydronephrosis.    No nephroureterolithiasis.    Urinary bladder is unremarkable.    PELVIC ORGANS: No suspicious abnormality.    BOWEL: No evidence of acute gastrointestinal inflammation or obstruction. Mild diffuse colonic stool burden; correlate for constipation. Desiccated enteric content within prominent, though nondilated, loops of proximal small bowel, suggesting a   functional stasis.    No intraperitoneal free fluid or free air. Small fat-containing umbilical hernia.    LYMPH NODES: Unchanged mild pelvic sidewall and bilateral iliac chain lymphadenopathy. For reference, a right external iliac chain lymph node measures 14 mm in short axis, series 5 image 176, unchanged.    VASCULATURE: No abdominal aortic aneurysm. Minimal atheromatous disease.    MUSCULOSKELETAL: No suspicious abnormality.    OTHER: No additionally significant abnormalities.      Impression    IMPRESSION:   1.  No acute CT abnormality of the abdomen/pelvis.  2.  Mild diffuse colonic stool burden; correlate for constipation.  3.  Unchanged mild pelvic lymphadenopathy.  4.  Moderate-sized hiatal hernia.  5.  Unchanged 5 mm nodule within the right lower lobe. Continued follow-up is recommended according Fleischner criteria, as detailed  below.    Fleischner Society Recommendations for Pulmonary Nodules    Nodule size less than 6 mm:     Single or multiple nodules:     Nodules < 6 mm do not require routine follow-up, but certain patients at high risk with suspicious nodule morphology, upper lobe location, or both may warrant 12-month follow-up.   Comprehensive metabolic panel     Status: Abnormal   Result Value Ref Range    Sodium 137 135 - 145 mmol/L    Potassium 3.8 3.4 - 5.3 mmol/L    Carbon Dioxide (CO2) 21 (L) 22 - 29 mmol/L    Anion Gap 12 7 - 15 mmol/L    Urea Nitrogen 26.3 (H) 6.0 - 20.0 mg/dL    Creatinine 1.01 (H) 0.51 - 0.95 mg/dL    GFR Estimate 66 >60 mL/min/1.73m2    Calcium 9.0 8.8 - 10.4 mg/dL    Chloride 104 98 - 107 mmol/L    Glucose 103 (H) 70 - 99 mg/dL    Alkaline Phosphatase 108 40 - 150 U/L    AST 26 0 - 45 U/L    ALT 23 0 - 50 U/L    Protein Total 8.7 (H) 6.4 - 8.3 g/dL    Albumin 4.2 3.5 - 5.2 g/dL    Bilirubin Total 0.5 <=1.2 mg/dL   Lipase     Status: Normal   Result Value Ref Range    Lipase 48 13 - 60 U/L   CBC with platelets and differential     Status: None   Result Value Ref Range    WBC Count 6.4 4.0 - 11.0 10e3/uL    RBC Count 4.25 3.80 - 5.20 10e6/uL    Hemoglobin 12.2 11.7 - 15.7 g/dL    Hematocrit 37.5 35.0 - 47.0 %    MCV 88 78 - 100 fL    MCH 28.7 26.5 - 33.0 pg    MCHC 32.5 31.5 - 36.5 g/dL    RDW 13.8 10.0 - 15.0 %    Platelet Count 225 150 - 450 10e3/uL    % Neutrophils 43 %    % Lymphocytes 40 %    % Monocytes 9 %    % Eosinophils 7 %    % Basophils 1 %    % Immature Granulocytes 0 %    NRBCs per 100 WBC 0 <1 /100    Absolute Neutrophils 2.7 1.6 - 8.3 10e3/uL    Absolute Lymphocytes 2.5 0.8 - 5.3 10e3/uL    Absolute Monocytes 0.6 0.0 - 1.3 10e3/uL    Absolute Eosinophils 0.4 0.0 - 0.7 10e3/uL    Absolute Basophils 0.1 0.0 - 0.2 10e3/uL    Absolute Immature Granulocytes 0.0 <=0.4 10e3/uL    Absolute NRBCs 0.0 10e3/uL   CBC with platelets differential     Status: None    Narrative    The following orders were  created for panel order CBC with platelets differential.  Procedure                               Abnormality         Status                     ---------                               -----------         ------                     CBC with platelets and ...[[id number]]                      Final result                 Please view results for these tests on the individual orders.     Medications   HYDROmorphone (PF) (DILAUDID) injection 0.5 mg (0.5 mg Intravenous $Given 5/12/25 2259)   ondansetron (ZOFRAN) injection 4 mg (4 mg Intravenous $Given 5/12/25 2258)   sodium chloride 0.9% BOLUS 1,000 mL (1,000 mLs Intravenous $New Bag 5/12/25 2258)   iopamidol (ISOVUE-370) solution 500 mL (90 mLs Intravenous $Given 5/12/25 2351)   sodium chloride 0.9 % bag for CT scan flush (44 mLs Intravenous $Given 5/12/25 2351)     Labs Ordered and Resulted from Time of ED Arrival to Time of ED Departure   COMPREHENSIVE METABOLIC PANEL - Abnormal       Result Value    Sodium 137      Potassium 3.8      Carbon Dioxide (CO2) 21 (*)     Anion Gap 12      Urea Nitrogen 26.3 (*)     Creatinine 1.01 (*)     GFR Estimate 66      Calcium 9.0      Chloride 104      Glucose 103 (*)     Alkaline Phosphatase 108      AST 26      ALT 23      Protein Total 8.7 (*)     Albumin 4.2      Bilirubin Total 0.5     LIPASE - Normal    Lipase 48     CBC WITH PLATELETS AND DIFFERENTIAL    WBC Count 6.4      RBC Count 4.25      Hemoglobin 12.2      Hematocrit 37.5      MCV 88      MCH 28.7      MCHC 32.5      RDW 13.8      Platelet Count 225      % Neutrophils 43      % Lymphocytes 40      % Monocytes 9      % Eosinophils 7      % Basophils 1      % Immature Granulocytes 0      NRBCs per 100 WBC 0      Absolute Neutrophils 2.7      Absolute Lymphocytes 2.5      Absolute Monocytes 0.6      Absolute Eosinophils 0.4      Absolute Basophils 0.1      Absolute Immature Granulocytes 0.0      Absolute NRBCs 0.0       CT Abdomen Pelvis w Contrast   Final Result    IMPRESSION:    1.  No acute CT abnormality of the abdomen/pelvis.   2.  Mild diffuse colonic stool burden; correlate for constipation.   3.  Unchanged mild pelvic lymphadenopathy.   4.  Moderate-sized hiatal hernia.   5.  Unchanged 5 mm nodule within the right lower lobe. Continued follow-up is recommended according Fleischner criteria, as detailed below.      Fleischner Society Recommendations for Pulmonary Nodules      Nodule size less than 6 mm:       Single or multiple nodules:       Nodules < 6 mm do not require routine follow-up, but certain patients at high risk with suspicious nodule morphology, upper lobe location, or both may warrant 12-month follow-up.             Critical care was not performed.     Medical Decision Making  The patient's presentation was of moderate complexity (an undiagnosed new problem with uncertain prognosis).    The patient's evaluation involved:  review of external note(s) from 1 sources (see separate area of note for details)  review of 3+ test result(s) ordered prior to this encounter (see separate area of note for details)  ordering and/or review of 3+ test(s) in this encounter (see separate area of note for details)    The patient's management necessitated high risk (a parenteral controlled substance).    Assessment & Plan    Kalyn Rivero is a 54 year old female with a notable history of rheumatoid arthritis, hypothyroidism, fibromyalgia, CKD III, hx of C diff, colitis, GERD, and hepatic steatosis who presents to the ED for evaluation of abdominal pain.  Patient is nontoxic-appearing on exam, has vital signs within normal limits with exception of an elevated blood pressure at 142/88.  She has tenderness in the midepigastric region with no rebound or guarding.  She was worked up with labs as above which were reassuring, as well as CT abdomen pelvis to further assess for concerning intra-abdominal pathology.    I have reviewed the nursing notes. I have reviewed the findings,  diagnosis, plan and need for follow up with the patient.    New Prescriptions    No medications on file       Final diagnoses:   Hiatal hernia   Pulmonary nodule       Tim Vegas MD  [facility] EMERGENCY DEPARTMENT  5/12/2025     Tim Vegas MD  05/13/25 0034

## 2025-05-13 NOTE — DISCHARGE INSTRUCTIONS
You had a reassuring evaluation in the emergency department today. Please follow up with your primary care physician for further routine evaluation. If you develop worsening symptoms or other new emergent concerns, then return to the emergency department for further evaluation.

## 2025-05-13 NOTE — TELEPHONE ENCOUNTER
General Call    Contacts       Contact Date/Time Type Contact Phone/Fax    05/13/2025 10:12 AM CDT Phone (Incoming) Kalyn Rivero (Self) [phone] (M)          Reason for Call:  Patient is calling. 1.She was in the ER and was severely constipated.  2. The medication, sacralsate, did not help at all, and it made her go to the ER. Should thiss medication be changed? Orcut in 1/2?  3. [employer] worker wants to know if she should also have a lower endoscopy, since he already has an upper endoscopy ordered?  4. Heart monitor was done as of last night. She had to take it off at the ER.    What are your questions or concerns:  See above    Date of last appointment with provider: 5/7/25    Okay to leave a detailed message?: Yes at Cell number on file:    Telephone Information:   Mobile [phone]     Sharmin Gomez [facility]

## 2025-05-13 NOTE — ED TRIAGE NOTES
Patient c/o pain to upper abdomen. She said pain comes and goes yesterday but it has been constant today. Patient said she has hernia.     Triage Assessment (Adult)       Row Name 05/12/25 2046          Triage Assessment    Airway WDL WDL        Respiratory WDL    Respiratory WDL WDL        Skin Circulation/Temperature WDL    Skin Circulation/Temperature WDL WDL        Cardiac WDL    Cardiac WDL WDL        Peripheral/Neurovascular WDL    Peripheral Neurovascular WDL WDL        Cognitive/Neuro/Behavioral WDL    Cognitive/Neuro/Behavioral WDL WDL

## 2025-05-13 NOTE — TELEPHONE ENCOUNTER
RN contacted pt and relayed provider's messages below.   RN assisted pt with scheduling appointment with PCP tomorrow  Pt indicates understanding and agrees to take half of her prescribed sucralfate dose. She states she has a pill cutter to split the tablets.    Pt states she has two different directions on her sucralfate dosing information. She states 1 direction says to take on an empty stomach, and the other says to take it with food, and pt inquires which is correct.  This writer advised pt to call the Pharmacist at the [facility] that dispensed the medication to inquire the most correct way to take the sucralfate.  [id number] - [place], [place] - [address]      Pt indicates understanding of issues and agrees with the plan.    MEETA HauserN, RN

## 2025-05-13 NOTE — TELEPHONE ENCOUNTER
Need to see her tomorrow either in house, video or telephone. In the mean time cut the sucralfate in the dose.   Infusion Nursing Note:  Tamra Aponte presents today for IVIG.    Patient seen by provider today: No   present during visit today: Not Applicable.    Note: No concerns to report. But coughing a fair amount after she took liquid oral Benadryl. States cough is not new and has trouble swallowing. Had swallow test while ago. I advised she follow up with MD reg. More current swallow study if this persists. Consider thickening liquids and chin down with swallowing.    Intravenous Access:  Peripheral IV placed.    Treatment Conditions:  Not Applicable.      Post Infusion Assessment:  Patient tolerated infusion without incident.  Blood return noted pre and post infusion.  Site patent and intact, free from redness, edema or discomfort.  No evidence of extravasations.  Access discontinued per protocol.    Discharge Plan:   Discharge instructions reviewed with: Patient.  Patient and/or family verbalized understanding of discharge instructions and all questions answered.  Copy of AVS reviewed with patient and/or family.  Patient will return will make appt on way out  Patient discharged in stable condition accompanied by: .  Departure Mode: Wheelchair.    Magui Law RN

## 2025-05-14 ENCOUNTER — OFFICE VISIT (OUTPATIENT)
Dept: FAMILY MEDICINE | Facility: CLINIC | Age: 55
End: 2025-05-14
Payer: COMMERCIAL

## 2025-05-14 VITALS
RESPIRATION RATE: 20 BRPM | BODY MASS INDEX: 29.05 KG/M2 | WEIGHT: 180 LBS | HEART RATE: 95 BPM | SYSTOLIC BLOOD PRESSURE: 130 MMHG | DIASTOLIC BLOOD PRESSURE: 81 MMHG | OXYGEN SATURATION: 98 % | TEMPERATURE: 98.3 F

## 2025-05-14 DIAGNOSIS — K59.01 SLOW TRANSIT CONSTIPATION: Primary | ICD-10-CM

## 2025-05-14 PROCEDURE — 3079F DIAST BP 80-89 MM HG: CPT | Performed by: FAMILY MEDICINE

## 2025-05-14 PROCEDURE — 1126F AMNT PAIN NOTED NONE PRSNT: CPT | Performed by: FAMILY MEDICINE

## 2025-05-14 PROCEDURE — 99213 OFFICE O/P EST LOW 20 MIN: CPT | Performed by: FAMILY MEDICINE

## 2025-05-14 PROCEDURE — 3075F SYST BP GE 130 - 139MM HG: CPT | Performed by: FAMILY MEDICINE

## 2025-05-14 ASSESSMENT — PAIN SCALES - GENERAL: PAINLEVEL_OUTOF10: NO PAIN (0)

## 2025-05-14 NOTE — PROGRESS NOTES
ICD-10-CM    1. Slow transit constipation  K59.01        PLAN:ritu Mccain is a 54 year old, presenting for the following health issues:  ER F/U        5/14/2025     9:03 AM   Additional Questions   Roomed by Jacinta BROWNE CMA   Accompanied by Son     HPI          5/8/2025   Post Discharge Outreach   How are you doing now that you are home? Patient has a heart monitor on; patient also has a hiatal hernia which causes chest pain; and shortness of breath   How are your symptoms? (Red Flag symptoms escalate to triage hotline per guidelines) Unchanged   Does the patient have their discharge instructions?  Yes   Does the patient have questions regarding their discharge instructions?  No   Were you started on any new medications or were there changes to any of your previous medications?  No   Does the patient have all of their medications? Yes   Do you have questions regarding any of your medications?  No   Do you have all of your needed medical supplies or equipment (DME)?  (i.e. oxygen tank, CPAP, cane, etc.) No - What equipment or supplies are needed?   Discharge Follow Up Appointment Date 6/11/2025   Discharge Follow Up Appointment Scheduled with? Primary Care Provider       Hospital Follow-up Visit:    Hospital/Nursing Home/IP Rehab Facility: [facility]  Most Recent Admission Date: 5/12/2025   Most Recent Admission Diagnosis:      Most Recent Discharge Date: 5/13/2025   Most Recent Discharge Diagnosis: Hiatal hernia - K44.9  Pulmonary nodule - R91.1   Was the patient in the ICU or did the patient experience delirium during hospitalization?  No  Do you have any other stressors you would like to discuss with your provider? No    Problems taking medications regularly:  patient did not have relief of chest pain with Carafate  Medication changes since discharge: None  Problems adhering to non-medication therapy:  None    Summary of hospitalization:  [facility]  [facility] discharge summary reviewed  Diagnostic Tests/Treatments reviewed.  Follow up needed: 1 year with ct lung  Other Healthcare Providers Involved in Patient s Care:         None  Update since discharge: improved.         Plan of care communicated with patient             Objective    /81   Pulse 95   Temp 98.3  F (36.8  C) (Tympanic)   Resp 20   Wt 180 lb (81.6 kg)   LMP  (LMP Unknown)   SpO2 98%   BMI 29.05 kg/m    Body mass index is 29.05 kg/m .  Physical Exam   GENERAL: alert and no distress             Signed Electronically by: Mika Zamora MD

## 2025-05-18 LAB — CV ZIO PRELIM RESULTS: NORMAL

## 2025-05-29 LAB — CV ZIO PRELIM RESULTS: NORMAL

## 2025-06-04 ENCOUNTER — ANCILLARY PROCEDURE (OUTPATIENT)
Dept: CARDIOLOGY | Facility: CLINIC | Age: 55
End: 2025-06-04
Attending: FAMILY MEDICINE
Payer: COMMERCIAL

## 2025-06-04 DIAGNOSIS — R00.2 PALPITATIONS: ICD-10-CM

## 2025-06-04 LAB — LVEF ECHO: NORMAL

## 2025-06-04 PROCEDURE — 93306 TTE W/DOPPLER COMPLETE: CPT | Performed by: INTERNAL MEDICINE

## 2025-06-11 ENCOUNTER — OFFICE VISIT (OUTPATIENT)
Dept: FAMILY MEDICINE | Facility: CLINIC | Age: 55
End: 2025-06-11
Payer: COMMERCIAL

## 2025-06-11 VITALS
HEIGHT: 66 IN | RESPIRATION RATE: 20 BRPM | TEMPERATURE: 97.9 F | DIASTOLIC BLOOD PRESSURE: 86 MMHG | HEART RATE: 90 BPM | SYSTOLIC BLOOD PRESSURE: 126 MMHG | WEIGHT: 187.2 LBS | BODY MASS INDEX: 30.08 KG/M2 | OXYGEN SATURATION: 97 %

## 2025-06-11 DIAGNOSIS — R39.15 URGENCY OF URINATION: Primary | ICD-10-CM

## 2025-06-11 DIAGNOSIS — J32.9 SINUSITIS, UNSPECIFIED CHRONICITY, UNSPECIFIED LOCATION: ICD-10-CM

## 2025-06-11 LAB
ALBUMIN UR-MCNC: NEGATIVE MG/DL
APPEARANCE UR: CLEAR
BILIRUB UR QL STRIP: NEGATIVE
COLOR UR AUTO: YELLOW
GLUCOSE UR STRIP-MCNC: NEGATIVE MG/DL
HGB UR QL STRIP: NEGATIVE
KETONES UR STRIP-MCNC: NEGATIVE MG/DL
LEUKOCYTE ESTERASE UR QL STRIP: ABNORMAL
NITRATE UR QL: NEGATIVE
PH UR STRIP: 5.5 [PH] (ref 5–7)
RBC #/AREA URNS AUTO: ABNORMAL /HPF
SP GR UR STRIP: <=1.005 (ref 1–1.03)
SQUAMOUS #/AREA URNS AUTO: ABNORMAL /LPF
UROBILINOGEN UR STRIP-ACNC: 0.2 E.U./DL
WBC #/AREA URNS AUTO: ABNORMAL /HPF

## 2025-06-11 PROCEDURE — 81001 URINALYSIS AUTO W/SCOPE: CPT | Performed by: FAMILY MEDICINE

## 2025-06-11 PROCEDURE — 3074F SYST BP LT 130 MM HG: CPT | Performed by: FAMILY MEDICINE

## 2025-06-11 PROCEDURE — 1125F AMNT PAIN NOTED PAIN PRSNT: CPT | Performed by: FAMILY MEDICINE

## 2025-06-11 PROCEDURE — 99214 OFFICE O/P EST MOD 30 MIN: CPT | Performed by: FAMILY MEDICINE

## 2025-06-11 PROCEDURE — 3079F DIAST BP 80-89 MM HG: CPT | Performed by: FAMILY MEDICINE

## 2025-06-11 ASSESSMENT — PAIN SCALES - GENERAL: PAINLEVEL_OUTOF10: MODERATE PAIN (5)

## 2025-06-11 NOTE — PROGRESS NOTES
"    ICD-10-CM    1. Urgency of urination  R39.15 UA Macroscopic with reflex to Microscopic and Culture - Lab Collect     UA Macroscopic with reflex to Microscopic and Culture - Lab Collect     amoxicillin-clavulanate (AUGMENTIN) 875-125 MG tablet       PLAN:await the urine          Subjective   Kalyn is a 54 year old, presenting for the following health issues:  Results (Echo)        6/11/2025     2:46 PM   Additional Questions   Roomed by Jacinta BROWNE CMA   Accompanied by Son   SUBJECTIVE:  54 year old.The patient has a history of burning feeling with smell and urgency for 36 hours. Frequency of urin is increased  No hematuria.  No sexual encounters.  Ct scan of chest shows 6 mm nodule.    Cough at night which is worse when laying down. Green sputum sometimes. Has been going on for a week. Feels warm.  Sometime chilled  OBJECTIVE:  no apparent distress  /86   Pulse 90   Temp 97.9  F (36.6  C) (Tympanic)   Resp 20   Ht 1.676 m (5' 5.98\")   Wt 84.9 kg (187 lb 3.2 oz)   LMP  (LMP Unknown)   SpO2 97%   BMI 30.23 kg/m     LUNGS:  CTA B/L, no wheezing or crackles.  Cardiovascular: negative, PMI normal. No lifts, heaves, or thrills. RRR. No murmurs, clicks gallops or rub.   Signed Electronically by: Mika Zamora MD    "

## 2025-06-16 ENCOUNTER — TELEPHONE (OUTPATIENT)
Dept: GASTROENTEROLOGY | Facility: CLINIC | Age: 55
End: 2025-06-16
Payer: COMMERCIAL

## 2025-06-16 NOTE — TELEPHONE ENCOUNTER
Pre Assessment RN Review    Focused Assessments    Patient is currently on antibiotics for pneumonia diagnosed 06.11.2025. Should be schedule after 07.11.2025. No note of malignant hyperthermia in chart but patient does report needing assistance transferring so should be hospital.     Scheduling Status & Recommendations    Sedation: MAC/Deep Sedation - Per order.  Location Type: Hospital - Per RN assessment.

## 2025-06-16 NOTE — TELEPHONE ENCOUNTER
"Endoscopy Scheduling Screen    Caller: patient    Have you had any respiratory illness or flu-like symptoms in the last 10 days?  Yes (Schedule at least 10 days from symptom onset)    What is your communication preference for Instructions and/or Bowel Prep?   Olit    What insurance is in the chart?  Other:  [facility]    Ordering/Referring Provider:   KIZZY CLARK        (If ordering provider performs procedure, schedule with ordering provider unless otherwise instructed. )    BMI: Estimated body mass index is 30.23 kg/m  as calculated from the following:    Height as of 6/11/25: 1.676 m (5' 5.98\").    Weight as of 6/11/25: 84.9 kg (187 lb 3.2 oz).     Sedation Ordered  MAC/deep sedation.   BMI<= 45 45 < BMI <= 48 48 < BMI < = 50  BMI > 50   No Restrictions No [facility]  No [facility]  [facility] with exceptions Hospital Only OR Only       Do you have a history of malignant hyperthermia?  Yes, (Hospital Only. Send [employer] message to RN Pre-assessment pool for anaesthesia eval.)     (Females) Are you currently pregnant?   No     Have you been diagnosed or told you have pulmonary hypertension?   No    Do you have an LVAD?  No    Have you been told you have moderate to severe sleep apnea?  No. - PENDING SLEEP STUDY. SCHEDULE AT HOSPITAL    Have you been told you have COPD, asthma, or any other lung disease?  No    Has your doctor ordered any cardiac tests like echo, angiogram, stress test, ablation, or EKG, that you have not completed yet?  No    Do you  have a history of any heart conditions?  No     Have you ever had or are you waiting for an organ transplant?  No. Continue scheduling, no site restrictions.    Have you had a stroke or transient ischemic attack (TIA aka \"mini stroke\") in the last 2 years?   No.    Have you been diagnosed with or been told you have cirrhosis of the liver?   No.    Are you currently on dialysis?   No    Do you need assistance transferring?   Yes (Hospital Only)    BMI: Estimated " "body mass index is 30.23 kg/m  as calculated from the following:    Height as of 6/11/25: 1.676 m (5' 5.98\").    Weight as of 6/11/25: 84.9 kg (187 lb 3.2 oz).     Is patients BMI > 40 and scheduling location [facility]?  No    Do you take an injectable or oral medication for weight loss or diabetes (excluding insulin)?  No    Do you take the medication Naltrexone?  No    Do you take blood thinners?  No       Prep   Are you currently on dialysis or do you have chronic kidney disease?  No    Do you have a diagnosis of diabetes?  No    Do you have a diagnosis of cystic fibrosis (CF)?  No    On a regular basis do you go 3 -5 days between bowel movements?  No    BMI > 40?  No    Preferred Pharmacy:    [id number] - [address] [facility] [address]  Phone: [phone] Fax: [phone]    Final Scheduling Details     Procedure scheduled  Upper endoscopy (EGD)    Surgeon:  LAN     Date of procedure:  07/15/2025     Pre-OP / PAC:   No - Not required for this site.    Location  [facility] NEXT AVAILABLE MAC    Sedation   MAC/Deep Sedation - Per order.      Patient Reminders:   You will receive a call from a Nurse to review instructions and health history.  This assessment must be completed prior to your procedure.  Failure to complete the Nurse assessment may result in the procedure being cancelled.      On the day of your procedure, please designate an adult(s) who can drive you home stay with you for the next 24 hours. The medicines used in the exam will make you sleepy. You will not be able to drive.      You cannot take public transportation, ride share services, or non-medical taxi service without a responsible caregiver.  Medical transport services are allowed with the requirement that a responsible caregiver will receive you at your destination.  We require that drivers and caregivers are confirmed prior to your procedure.  "

## 2025-06-18 ENCOUNTER — TELEPHONE (OUTPATIENT)
Dept: GASTROENTEROLOGY | Facility: CLINIC | Age: 55
End: 2025-06-18
Payer: COMMERCIAL

## 2025-06-18 NOTE — TELEPHONE ENCOUNTER
Caller: Kalyn Rivero     Reason for Reschedule/Cancellation (please be detailed, any staff messages or encounters to note?):   [facility] is too far for her to go/did offer to do a transfer of care, but would like to reschedule to  and will talk with her ordering provider 1st about other locations to go to.    Did you cancel or rescheduled an EUS procedure? No.    Is screening questionnaire older than 3 months from the reschedule date.   If Yes, please complete screening questionnaire. No    Prior to reschedule please review:  Ordering Provider: Mika Zamora  Sedation Determined: mac  Does patient have any ASC Exclusions, please identify?: y pending sleep study and anesthesia concerns    Notes on Cancelled Procedure:  Procedure: Upper Endoscopy [EGD]   Date: 7/15  Location: [facility]; [address] [facility] [place] [address]  Surgeon: Long    Rescheduled: Yes,   Procedure: Upper Endoscopy [EGD]    Date: 11/5   Location: [facility]; [address] [place] [address]    Surgeon: weesh   Sedation Level Scheduled  mac ,  Reason for Sedation Level ordered   Instructions updated and sent: y     Does patient need PAC or Pre -Op Rescheduled? : y

## 2025-06-23 ENCOUNTER — APPOINTMENT (OUTPATIENT)
Dept: CT IMAGING | Facility: CLINIC | Age: 55
End: 2025-06-23
Attending: EMERGENCY MEDICINE
Payer: COMMERCIAL

## 2025-06-23 ENCOUNTER — HOSPITAL ENCOUNTER (EMERGENCY)
Facility: CLINIC | Age: 55
Discharge: HOME | End: 2025-06-23
Attending: EMERGENCY MEDICINE | Admitting: EMERGENCY MEDICINE
Payer: COMMERCIAL

## 2025-06-23 VITALS
OXYGEN SATURATION: 97 % | SYSTOLIC BLOOD PRESSURE: 152 MMHG | WEIGHT: 183.2 LBS | TEMPERATURE: 97.9 F | RESPIRATION RATE: 16 BRPM | HEIGHT: 66 IN | HEART RATE: 107 BPM | DIASTOLIC BLOOD PRESSURE: 84 MMHG | BODY MASS INDEX: 29.44 KG/M2

## 2025-06-23 DIAGNOSIS — N39.0 ACUTE UTI: ICD-10-CM

## 2025-06-23 DIAGNOSIS — R10.12 LUQ ABDOMINAL PAIN: ICD-10-CM

## 2025-06-23 LAB
ALBUMIN SERPL BCG-MCNC: 4 G/DL (ref 3.5–5.2)
ALBUMIN UR-MCNC: NEGATIVE MG/DL
ALP SERPL-CCNC: 114 U/L (ref 40–150)
ALT SERPL W P-5'-P-CCNC: 32 U/L (ref 0–50)
ANION GAP SERPL CALCULATED.3IONS-SCNC: 13 MMOL/L (ref 7–15)
APPEARANCE UR: CLEAR
AST SERPL W P-5'-P-CCNC: 40 U/L (ref 0–45)
BACTERIA #/AREA URNS HPF: ABNORMAL /HPF
BASOPHILS # BLD AUTO: 0.1 10E3/UL (ref 0–0.2)
BASOPHILS NFR BLD AUTO: 1 %
BILIRUB DIRECT SERPL-MCNC: <0.08 MG/DL (ref 0–0.3)
BILIRUB SERPL-MCNC: 0.3 MG/DL
BILIRUB UR QL STRIP: NEGATIVE
BUN SERPL-MCNC: 25 MG/DL (ref 6–20)
CALCIUM SERPL-MCNC: 8.9 MG/DL (ref 8.8–10.4)
CHLORIDE SERPL-SCNC: 105 MMOL/L (ref 98–107)
COLOR UR AUTO: ABNORMAL
CREAT SERPL-MCNC: 1.16 MG/DL (ref 0.51–0.95)
EGFRCR SERPLBLD CKD-EPI 2021: 56 ML/MIN/1.73M2
EOSINOPHIL # BLD AUTO: 0.7 10E3/UL (ref 0–0.7)
EOSINOPHIL NFR BLD AUTO: 9 %
ERYTHROCYTE [DISTWIDTH] IN BLOOD BY AUTOMATED COUNT: 14 % (ref 10–15)
GLUCOSE SERPL-MCNC: 105 MG/DL (ref 70–99)
GLUCOSE UR STRIP-MCNC: NEGATIVE MG/DL
HCG UR QL: NEGATIVE
HCO3 SERPL-SCNC: 22 MMOL/L (ref 22–29)
HCT VFR BLD AUTO: 37 % (ref 35–47)
HGB BLD-MCNC: 12.1 G/DL (ref 11.7–15.7)
HGB UR QL STRIP: NEGATIVE
IMM GRANULOCYTES # BLD: 0 10E3/UL
IMM GRANULOCYTES NFR BLD: 0 %
INTERNAL QC OK POCT: NORMAL
KETONES UR STRIP-MCNC: NEGATIVE MG/DL
LEUKOCYTE ESTERASE UR QL STRIP: ABNORMAL
LIPASE SERPL-CCNC: 36 U/L (ref 13–60)
LYMPHOCYTES # BLD AUTO: 2 10E3/UL (ref 0.8–5.3)
LYMPHOCYTES NFR BLD AUTO: 24 %
MCH RBC QN AUTO: 28.6 PG (ref 26.5–33)
MCHC RBC AUTO-ENTMCNC: 32.7 G/DL (ref 31.5–36.5)
MCV RBC AUTO: 88 FL (ref 78–100)
MONOCYTES # BLD AUTO: 0.9 10E3/UL (ref 0–1.3)
MONOCYTES NFR BLD AUTO: 11 %
MUCOUS THREADS #/AREA URNS LPF: PRESENT /LPF
NEUTROPHILS # BLD AUTO: 4.5 10E3/UL (ref 1.6–8.3)
NEUTROPHILS NFR BLD AUTO: 56 %
NITRATE UR QL: NEGATIVE
NRBC # BLD AUTO: 0 10E3/UL
NRBC BLD AUTO-RTO: 0 /100
PH UR STRIP: 5.5 [PH] (ref 5–7)
PLATELET # BLD AUTO: 197 10E3/UL (ref 150–450)
POCT KIT EXPIRATION DATE: NORMAL
POCT KIT LOT NUMBER: NORMAL
POTASSIUM SERPL-SCNC: 4.3 MMOL/L (ref 3.4–5.3)
PROT SERPL-MCNC: 8.2 G/DL (ref 6.4–8.3)
RBC # BLD AUTO: 4.23 10E6/UL (ref 3.8–5.2)
RBC URINE: <1 /HPF
SODIUM SERPL-SCNC: 140 MMOL/L (ref 135–145)
SP GR UR STRIP: 1.03 (ref 1–1.03)
SQUAMOUS EPITHELIAL: 3 /HPF
UROBILINOGEN UR STRIP-MCNC: NORMAL MG/DL
WBC # BLD AUTO: 8.1 10E3/UL (ref 4–11)
WBC URINE: 54 /HPF

## 2025-06-23 PROCEDURE — 250N000011 HC RX IP 250 OP 636: Mod: JZ | Performed by: EMERGENCY MEDICINE

## 2025-06-23 PROCEDURE — 250N000009 HC RX 250: Performed by: EMERGENCY MEDICINE

## 2025-06-23 PROCEDURE — 87186 SC STD MICRODIL/AGAR DIL: CPT | Performed by: EMERGENCY MEDICINE

## 2025-06-23 PROCEDURE — 250N000011 HC RX IP 250 OP 636: Performed by: EMERGENCY MEDICINE

## 2025-06-23 PROCEDURE — 74177 CT ABD & PELVIS W/CONTRAST: CPT

## 2025-06-23 PROCEDURE — 81001 URINALYSIS AUTO W/SCOPE: CPT | Performed by: EMERGENCY MEDICINE

## 2025-06-23 PROCEDURE — 36415 COLL VENOUS BLD VENIPUNCTURE: CPT | Performed by: EMERGENCY MEDICINE

## 2025-06-23 PROCEDURE — 96374 THER/PROPH/DIAG INJ IV PUSH: CPT | Performed by: EMERGENCY MEDICINE

## 2025-06-23 PROCEDURE — 99285 EMERGENCY DEPT VISIT HI MDM: CPT | Mod: 25 | Performed by: EMERGENCY MEDICINE

## 2025-06-23 PROCEDURE — 99285 EMERGENCY DEPT VISIT HI MDM: CPT | Performed by: EMERGENCY MEDICINE

## 2025-06-23 PROCEDURE — 82248 BILIRUBIN DIRECT: CPT | Performed by: EMERGENCY MEDICINE

## 2025-06-23 PROCEDURE — 85004 AUTOMATED DIFF WBC COUNT: CPT | Performed by: EMERGENCY MEDICINE

## 2025-06-23 PROCEDURE — 82040 ASSAY OF SERUM ALBUMIN: CPT | Performed by: EMERGENCY MEDICINE

## 2025-06-23 PROCEDURE — 96375 TX/PRO/DX INJ NEW DRUG ADDON: CPT | Mod: 59 | Performed by: EMERGENCY MEDICINE

## 2025-06-23 PROCEDURE — 81025 URINE PREGNANCY TEST: CPT | Performed by: EMERGENCY MEDICINE

## 2025-06-23 PROCEDURE — 83690 ASSAY OF LIPASE: CPT | Performed by: EMERGENCY MEDICINE

## 2025-06-23 RX ORDER — HYDROMORPHONE HYDROCHLORIDE 1 MG/ML
0.5 INJECTION, SOLUTION INTRAMUSCULAR; INTRAVENOUS; SUBCUTANEOUS ONCE
Refills: 0 | Status: COMPLETED | OUTPATIENT
Start: 2025-06-23 | End: 2025-06-23

## 2025-06-23 RX ORDER — FLUCONAZOLE 150 MG/1
TABLET ORAL
Qty: 2 TABLET | Refills: 0 | Status: SHIPPED | OUTPATIENT
Start: 2025-06-23 | End: 2025-06-26

## 2025-06-23 RX ORDER — ONDANSETRON 2 MG/ML
4 INJECTION INTRAMUSCULAR; INTRAVENOUS ONCE
Status: COMPLETED | OUTPATIENT
Start: 2025-06-23 | End: 2025-06-23

## 2025-06-23 RX ORDER — IOPAMIDOL 755 MG/ML
500 INJECTION, SOLUTION INTRAVASCULAR ONCE
Status: COMPLETED | OUTPATIENT
Start: 2025-06-23 | End: 2025-06-23

## 2025-06-23 RX ORDER — CEPHALEXIN 500 MG/1
500 CAPSULE ORAL 4 TIMES DAILY
Qty: 28 CAPSULE | Refills: 0 | Status: SHIPPED | OUTPATIENT
Start: 2025-06-23 | End: 2025-06-30

## 2025-06-23 RX ADMIN — SODIUM CHLORIDE 44 ML: 9 INJECTION, SOLUTION INTRAVENOUS at 21:05

## 2025-06-23 RX ADMIN — ONDANSETRON 4 MG: 2 INJECTION INTRAMUSCULAR; INTRAVENOUS at 22:05

## 2025-06-23 RX ADMIN — IOPAMIDOL 89 ML: 755 INJECTION, SOLUTION INTRAVENOUS at 21:05

## 2025-06-23 RX ADMIN — HYDROMORPHONE HYDROCHLORIDE 0.5 MG: 1 INJECTION, SOLUTION INTRAMUSCULAR; INTRAVENOUS; SUBCUTANEOUS at 21:56

## 2025-06-23 ASSESSMENT — ACTIVITIES OF DAILY LIVING (ADL)
ADLS_ACUITY_SCORE: 53

## 2025-06-23 NOTE — ED TRIAGE NOTES
"Pt here for left sided abdominal pain.    Pt shares she has a hiatal hernia.  She was sitting watching tv and then all of a sudden had a sharp pain to the left side of her abdomen.   Pt describes it as \"sweat on the inside.\"  Pt describes it as a stabbing and sharp pain.    Pain currently is 9/10.  Pt's pain is causing her lightheadedness, sob, feeling anxious.    "

## 2025-06-23 NOTE — ED PROVIDER NOTES
"  History     Chief Complaint   Patient presents with    Abdominal Pain     HPI  Kalyn Rivero is a 54 year old female with a past medical history of hypothyroidism, pancreatitis, anemia, chronic pain, opiate dependence, GERD, arthritis, acetaminophen overdose who presents to the emergency department with a chief complaint of abdominal pain.  Located on the left side.  Patient does have a history of hiatal hernia.  Patient states she was watching TV and then experienced a sudden sharp pain to the left side of her abdomen.  She describes it as like \"sweats on the inside.\"  It it is stabbing and sharp in nature.  It is currently 9 out of 10 in severity.  Associated with lightheadedness, shortness of breath, and anxiety.  She is afebrile on arrival to the emergency department.    Of note, patient has 13 listed medication allergies, including to morphine, celecoxib, codeine, gabapentin, ibuprofen, Tylenol, and prednisone.    I have reviewed the Medications, Allergies, Past Medical and Surgical History, and Social History in the [employer] system.    Past Medical History:   Diagnosis Date    Acetaminophen overdose 03/24/2011    Anemia     Anesthesia complication     pt states has a history of heart stopping during anesthesia,    Arrhythmia     Cerebral artery occlusion with cerebral infarction (H)     Cerebral infarction (H)     Cervical high risk HPV (human papillomavirus) test positive 02/22/2017    type 18 & other HR HPV    Chronic infection     MRSA    Chronic pain 03/24/2011    Degenerative joint disease     Endometriosis     Fibromyalgia     Gastro-oesophageal reflux disease     Heart murmur     History of blood transfusion     Hypothyroidism     Immune disorder     Learning disability     Malignant neoplasm (H)     Migraine     MRSA (methicillin resistant staph aureus) culture positive     Neck injuries     Opioid type dependence (H)     Other chronic pain     PONV (postoperative nausea and vomiting)     states " "\"flatlines\"during anesthesia    RA (rheumatoid arthritis) (H)     Renal stone     Scoliosis     Stomach ulcer     history     Past Surgical History:   Procedure Laterality Date    ARTHRODESIS FOOT  5/23/2012    Procedure:ARTHRODESIS FOOT; Right Subtalar andTaloNavicular  Fusion  ; Surgeon:BRIGHT HENDRICKS; [facility]    ARTHRODESIS FOOT Left 4/22/2015    Procedure: ARTHRODESIS FOOT;  Surgeon: Bright Hendricks MD;  [facility]    ARTHROPLASTY ELBOW Right 9/30/2015    Procedure: ARTHROPLASTY ELBOW;  Surgeon: Carrol Gaspar MD;  Location: [facility]    ARTHROPLASTY KNEE Right     ARTHROPLASTY REVISION ELBOW Right 11/27/2018    Procedure: 1 - aspiration of Right elbow 2- Explantation of failed hardware Right humeral periprosthetic fracture non-union 3- Right distal humerus excision 4- Right distal humerus replacement 5 - Revision Right total elbow arthroplasty;  Surgeon: Aakash Zimmer MD;  [facility]: [facility]    ARTHROPLASTY WRIST      x2 Lt wrist replacement.    ARTHROTOMY ELBOW Right 6/18/2015    Procedure: ARTHROTOMY ELBOW;  Surgeon: Carrol Gaspar MD;  Location:  OR    C SHLDR ARTHROSCOP,DIAGNOSTIC  12/17/2008    left total shoulder arthroplasty by Dr. Law    CYSTOSCOPY  02/06/2009    1.cystoscopy.2.bilateral ureteroscopy.3.basketing of stone fragments from the right kidney.4.bilateral double-J ureteral stent placement.5.ann catheter placement.6.fluoroscopy and intraoperative interpretation of images.    CYSTOSCOPY  01/08/2007    1. cystoscopy and left stent removal.2.left ureteroscopy    DECOMPRESSION CUBITAL TUNNEL  6/6/2014    Procedure: DECOMPRESSION CUBITAL TUNNEL;  Surgeon: Janelle Coates MD;  [facility]    ENDOSCOPY  03/31/2005    [facility] endoscopy[facility]    ESOPHAGOSCOPY, GASTROSCOPY, DUODENOSCOPY (EGD), COMBINED  3/17/2014    Procedure: COMBINED ESOPHAGOSCOPY, GASTROSCOPY, DUODENOSCOPY (EGD), BIOPSY SINGLE OR MULTIPLE;;  Surgeon: " Duane, William Charles, MD;  Location: [facility] OR    EXCISE MASS FOOT Right 9/30/2024    Procedure: Soft tissue mass removal right foot;  Surgeon: Vashti Ayala DPM, Podiatry/Foot and Ankle Surgery;  Location: [facility] OR    FUSION CERVICAL POSTERIOR ONE LEVEL  11/18/2013    Procedure: FUSION CERVICAL POSTERIOR ONE LEVEL;  Cervical 1-2 Posterior Cervical Fusion (Harms Procedure);  Surgeon: Carrol Gunn MD;  [facility] OR    GYN SURGERY      INJECT MAJOR JOINT / BURSA Left 1/5/2017    Procedure: INJECT MAJOR JOINT / BURSA;  Surgeon: Fredy Patel DO;  [facility]    INJECT STEROID (LOCATION) Left 6/18/2015    Procedure: INJECT STEROID (LOCATION);  Surgeon: Carrol Gaspar MD;  [facility]    NECK SURGERY      REMOVE FOREIGN BODY UPPER EXTREMITY Right 3/2/2017    Procedure: REMOVE FOREIGN BODY UPPER EXTREMITY;  Surgeon: Carrol Gaspar MD;  [facility]    REMOVE HARDWARE FOOT Left 6/8/2022    Procedure: REMOVAL, HARDWARE, FOOT LEFT;  Surgeon: Chris Hendricks MD;  [place] [facility] [place]    RESECT BONE UPPER EXTREMITY Left 4/27/2017    Procedure: RESECT BONE UPPER EXTREMITY;  Left Radial Head Resection;  Surgeon: Carrol Gaspar MD;  Location:  OR    ROTATOR CUFF REPAIR RT/LT  10/19/2005    1.left distal clavicle excision.2.acromioplasty.3.coracoacromial ligament resection.4.rotator cuff exploration    [facility] ANESTH,DX ARTHROSCOPIC PROC KNEE JOINT  10/24/2007    right total knee arthroplasty    [facility] SHOULDER SURG PROC UNLISTED      [facility] TOTAL KNEE ARTHROPLASTY  1/18/12    Left     No current facility-administered medications for this encounter.     Current Outpatient Medications   Medication Sig Dispense Refill    amoxicillin-clavulanate (AUGMENTIN) 875-125 MG tablet Take 1 tablet by mouth 2 times daily. 20 tablet 0    celecoxib (CELEBREX) 200 MG capsule Take 1 capsule (200 mg) by mouth 2 times daily.      cetirizine (ZYRTEC) 10 MG tablet Take 1 tablet (10 mg) by  mouth daily. (Patient not taking: Reported on 6/11/2025) 30 tablet 0    clonazePAM (KLONOPIN) 1 MG tablet Take 1 mg by mouth 3 times daily.      ENBREL SURECLICK 50 MG/ML autoinjector 50 mg twice a week. Hold before surgery on 9/30/24      naloxone (NARCAN) 4 MG/0.1ML nasal spray Spray 1 spray (4 mg) into one nostril alternating nostrils as needed for opioid reversal. every 2-3 minutes until assistance arrives 2 each 0    omeprazole (PRILOSEC) 40 MG DR capsule TAKE 1 CAPSULE BY MOUTH TWICE A DAY BEFORE MEALS STRENGTH: 40 MG 90 capsule 3    sucralfate (CARAFATE) 1 GM tablet Take 1 tablet (1 g) by mouth 4 times daily. (Patient not taking: Reported on 6/11/2025) 120 tablet 1     Allergies   Allergen Reactions    Leflunomide Anaphylaxis    Morphine Swelling    Celecoxib Other (See Comments)     Other reaction(s): stroke , lasted 24 hours  -Pt takes celecoxib daily. States that they changed the  and now it is fine.    Amitriptyline Other (See Comments)     Sleepwalking     Codeine     Gabapentin Other (See Comments)     Pins,needles, stabbing aching at once  Pins,needles, stabbing aching at once  Numbness and Tingling    Ibuprofen      Doesn't take due to gastric ulcer    Sulfa Antibiotics Nausea and Vomiting     unknown    Tylenol [Acetaminophen]      Pt. States that she has Liver problems  Tylenol 3    Dexamethasone Palpitations     Heart racing, sweating     Erythromycin Nausea     Vomiting     Penicillins Rash    Prednisone Palpitations     Heart racing     Past medical history, past surgical history, medications, and allergies were reviewed with the patient. Additional pertinent items: None    Social History     Socioeconomic History    Marital status: Single     Spouse name: Not on file    Number of children: Not on file    Years of education: Not on file    Highest education level: Not on file   Occupational History    Not on file   Tobacco Use    Smoking status: Former     Current packs/day: 0.10      Average packs/day: 0.1 packs/day for 41.9 years (4.2 ttl pk-yrs)     Types: Cigarettes     Start date: 8/6/1983     Passive exposure: Past    Smokeless tobacco: Never    Tobacco comments:     Quit 6 weeks ago   Vaping Use    Vaping status: Never Used   Substance and Sexual Activity    Alcohol use: No    Drug use: No    Sexual activity: Not Currently     Partners: Male   Other Topics Concern    Parent/sibling w/ CABG, MI or angioplasty before 65F 55M? Not Asked   Social History Narrative    Not on file     Social Drivers of Health     Financial Resource Strain: High Risk (2/20/2025)    Financial Resource Strain     Within the past 12 months, have you or your family members you live with been unable to get utilities (heat, electricity) when it was really needed?: Yes   Food Insecurity: High Risk (2/20/2025)    Food Insecurity     Within the past 12 months, did you worry that your food would run out before you got money to buy more?: Yes     Within the past 12 months, did the food you bought just not last and you didn t have money to get more?: Yes   Transportation Needs: High Risk (2/20/2025)    Transportation Needs     Within the past 12 months, has lack of transportation kept you from medical appointments, getting your medicines, non-medical meetings or appointments, work, or from getting things that you need?: Yes   Physical Activity: Not on file   Stress: Not on file   Social Connections: Not on file   Interpersonal Safety: Low Risk  (2/20/2025)    Interpersonal Safety     Do you feel physically and emotionally safe where you currently live?: Yes     Within the past 12 months, have you been hit, slapped, kicked or otherwise physically hurt by someone?: No     Within the past 12 months, have you been humiliated or emotionally abused in other ways by your partner or ex-partner?: No   Housing Stability: High Risk (2/20/2025)    Housing Stability     Do you have housing? : Yes     Are you worried about losing your  "housing?: Yes     Social history was reviewed with the patient. Additional pertinent items: None    Review of Systems  A medically appropriate review of systems was performed with pertinent positives and negatives noted in the HPI, and all other systems negative.    Physical Exam   BP: (!) 152/84  Pulse: 107  Temp: 97.9  F (36.6  C)  Resp: 16  Height: 167.6 cm (5' 6\")  Weight: 83.1 kg (183 lb 3.2 oz)  SpO2: 97 %      General: Well nourished, well developed, NAD  HEENT: EOMI, anicteric. NCAT, MMM  Neck: no jugular venous distension, supple, nl ROM  Cardiac: Tachycardic rate, regular rhythm. No murmurs, rubs, or gallops. Normal S1, S2.  Intact peripheral pulses  Pulm: CTAB, no stridor, wheezes, rales, rhonchi  Abd: Soft, tenderness to palpation in the left abdomen without rebound or guarding, nondistended.  No masses palpated.    Skin: Warm and dry to the touch.  No rash  Extremities: No LE edema, no cyanosis, w/w/p  Neuro: A&Ox3, no gross focal deficits    ED Course        Procedures                        Labs Ordered and Resulted from Time of ED Arrival to Time of ED Departure   COMPREHENSIVE METABOLIC PANEL - Abnormal       Result Value    Sodium 140      Potassium 4.3      Carbon Dioxide (CO2) 22      Anion Gap 13      Urea Nitrogen 25.0 (*)     Creatinine 1.16 (*)     GFR Estimate 56 (*)     Calcium 8.9      Chloride 105      Glucose 105 (*)     Alkaline Phosphatase 114      AST 40      ALT 32      Protein Total 8.2      Albumin 4.0      Bilirubin Total 0.3     ROUTINE UA WITH MICROSCOPIC REFLEX TO CULTURE - Abnormal    Color Urine Light Yellow      Appearance Urine Clear      Glucose Urine Negative      Bilirubin Urine Negative      Ketones Urine Negative      Specific Gravity Urine 1.026      Blood Urine Negative      pH Urine 5.5      Protein Albumin Urine Negative      Urobilinogen Urine Normal      Nitrite Urine Negative      Leukocyte Esterase Urine Large (*)     Bacteria Urine Many (*)     Mucus Urine " Present (*)     RBC Urine <1      WBC Urine 54 (*)     Squamous Epithelials Urine 3 (*)    LIPASE - Normal    Lipase 36     BILIRUBIN DIRECT - Normal    Bilirubin Direct <0.08     HCG QUALITATIVE URINE POCT - Normal    HCG Qual Urine Negative      Internal QC Check POCT Valid      POCT Kit Lot Number [id number]      POCT Kit Expiration Date 2026-12-22     CBC WITH PLATELETS AND DIFFERENTIAL    WBC Count 8.1      RBC Count 4.23      Hemoglobin 12.1      Hematocrit 37.0      MCV 88      MCH 28.6      MCHC 32.7      RDW 14.0      Platelet Count 197      % Neutrophils 56      % Lymphocytes 24      % Monocytes 11      % Eosinophils 9      % Basophils 1      % Immature Granulocytes 0      NRBCs per 100 WBC 0      Absolute Neutrophils 4.5      Absolute Lymphocytes 2.0      Absolute Monocytes 0.9      Absolute Eosinophils 0.7      Absolute Basophils 0.1      Absolute Immature Granulocytes 0.0      Absolute NRBCs 0.0     URINE CULTURE            Results for orders placed or performed during the hospital encounter of 06/23/25 (from the past 24 hours)   CBC with platelets differential    Narrative    The following orders were created for panel order CBC with platelets differential.  Procedure                               Abnormality         Status                     ---------                               -----------         ------                     CBC with platelets and ...[[id number]]                      Final result                 Please view results for these tests on the individual orders.   Comprehensive metabolic panel   Result Value Ref Range    Sodium 140 135 - 145 mmol/L    Potassium 4.3 3.4 - 5.3 mmol/L    Carbon Dioxide (CO2) 22 22 - 29 mmol/L    Anion Gap 13 7 - 15 mmol/L    Urea Nitrogen 25.0 (H) 6.0 - 20.0 mg/dL    Creatinine 1.16 (H) 0.51 - 0.95 mg/dL    GFR Estimate 56 (L) >60 mL/min/1.73m2    Calcium 8.9 8.8 - 10.4 mg/dL    Chloride 105 98 - 107 mmol/L    Glucose 105 (H) 70 - 99 mg/dL    Alkaline  Phosphatase 114 40 - 150 U/L    AST 40 0 - 45 U/L    ALT 32 0 - 50 U/L    Protein Total 8.2 6.4 - 8.3 g/dL    Albumin 4.0 3.5 - 5.2 g/dL    Bilirubin Total 0.3 <=1.2 mg/dL   Bilirubin direct   Result Value Ref Range    Bilirubin Direct <0.08 0.00 - 0.30 mg/dL   CBC with platelets and differential   Result Value Ref Range    WBC Count 8.1 4.0 - 11.0 10e3/uL    RBC Count 4.23 3.80 - 5.20 10e6/uL    Hemoglobin 12.1 11.7 - 15.7 g/dL    Hematocrit 37.0 35.0 - 47.0 %    MCV 88 78 - 100 fL    MCH 28.6 26.5 - 33.0 pg    MCHC 32.7 31.5 - 36.5 g/dL    RDW 14.0 10.0 - 15.0 %    Platelet Count 197 150 - 450 10e3/uL    % Neutrophils 56 %    % Lymphocytes 24 %    % Monocytes 11 %    % Eosinophils 9 %    % Basophils 1 %    % Immature Granulocytes 0 %    NRBCs per 100 WBC 0 <1 /100    Absolute Neutrophils 4.5 1.6 - 8.3 10e3/uL    Absolute Lymphocytes 2.0 0.8 - 5.3 10e3/uL    Absolute Monocytes 0.9 0.0 - 1.3 10e3/uL    Absolute Eosinophils 0.7 0.0 - 0.7 10e3/uL    Absolute Basophils 0.1 0.0 - 0.2 10e3/uL    Absolute Immature Granulocytes 0.0 <=0.4 10e3/uL    Absolute NRBCs 0.0 10e3/uL   Lipase   Result Value Ref Range    Lipase 36 13 - 60 U/L   UA with Microscopic reflex to Culture    Specimen: Urine, Clean Catch   Result Value Ref Range    Color Urine Light Yellow Colorless, Straw, Light Yellow, Yellow    Appearance Urine Clear Clear    Glucose Urine Negative Negative mg/dL    Bilirubin Urine Negative Negative    Ketones Urine Negative Negative mg/dL    Specific Gravity Urine 1.026 1.003 - 1.035    Blood Urine Negative Negative    pH Urine 5.5 5.0 - 7.0    Protein Albumin Urine Negative Negative mg/dL    Urobilinogen Urine Normal Normal mg/dL    Nitrite Urine Negative Negative    Leukocyte Esterase Urine Large (A) Negative    Bacteria Urine Many (A) None Seen /HPF    Mucus Urine Present (A) None Seen /LPF    RBC Urine <1 <=2 /HPF    WBC Urine 54 (H) <=5 /HPF    Squamous Epithelials Urine 3 (H) <=1 /HPF    Narrative    Urine Culture  ordered based on laboratory criteria   hCG qual urine POCT   Result Value Ref Range    HCG Qual Urine Negative Negative    Internal QC Check POCT Valid Valid    POCT Kit Lot Number [id number]     POCT Kit Expiration Date 2026-12-22    CT Chest/Abdomen/Pelvis w Contrast    Narrative    EXAM: CT CHEST/ABDOMEN/PELVIS W CONTRAST  LOCATION: [facility]  DATE: 6/23/2025    INDICATION: L sided abdominal pain, history of hiatal hernia  COMPARISON: 5/13/2025  TECHNIQUE: CT scan of the chest, abdomen, and pelvis was performed following injection of IV contrast. Multiplanar reformats were obtained. Dose reduction techniques were used.   CONTRAST: 89 mL Isovue 370    FINDINGS:   LUNGS AND PLEURA: Increasing right basilar atelectasis obscures previously identified 5 mm pulmonary nodule. Please see prior CT reports for follow-up recommendations. No pleural effusions or pneumothorax.    MEDIASTINUM/AXILLAE: Numerous subcentimeter mediastinal and hilar nodes. Prominent bilateral axillary nodes measuring up to 13 mm on the right, image 50 series 2. Normal heart size. Small to moderate hiatal hernia.    CORONARY ARTERY CALCIFICATION: None.    HEPATOBILIARY: Hepatic steatosis. Cholecystectomy. No biliary dilatation.    PANCREAS: Unremarkable    SPLEEN: Unremarkable    ADRENAL GLANDS: Unremarkable    KIDNEYS/BLADDER: No hydronephrosis. Decompressed bladder.    BOWEL: No bowel obstruction. Normal caliber appendix.    LYMPH NODES: Scattered subcentimeter retroperitoneal nodes are unchanged. Borderline enlarged bilateral external iliac chain adenopathy, unchanged.    VASCULATURE: No abdominal aortic aneurysm. Patent portal vein.    PELVIC ORGANS: No free fluid.    MUSCULOSKELETAL: Advanced degenerative changes in the right shoulder. Left shoulder arthroplasty. Small fat-containing umbilical hernia.      Impression    IMPRESSION:  1.  Borderline enlarged axillary lymph nodes. Stable borderline  bilateral external iliac chain adenopathy.  2.  Hepatic steatosis.  3.  Small to moderate hiatal hernia.  4.  No etiology identified to explain patient's left lower quadrant pain.     *Note: Due to a large number of results and/or encounters for the requested time period, some results have not been displayed. A complete set of results can be found in Results Review.       Labs, vital signs, and imaging studies were reviewed by me.    Medications   iopamidol (ISOVUE-370) solution 500 mL (89 mLs Intravenous $Given 6/23/25 2105)   sodium chloride 0.9 % bag for CT scan flush (44 mLs Intravenous $Given 6/23/25 2105)   HYDROmorphone (PF) (DILAUDID) injection 0.5 mg (0.5 mg Intravenous $Given 6/23/25 2156)   ondansetron (ZOFRAN) injection 4 mg (4 mg Intravenous $Given 6/23/25 2205)       Assessments & Plan (with Medical Decision Making)   Kalyn Rivero is a 54 year old female who presents to the emergency department with left upper quadrant abdominal pain.  Differential diagnosis includes hiatal hernia, gastritis, peptic ulcer disease, musculoskeletal pain, pyelonephritis, nephrolithiasis.  Labs, CT, urinalysis ordered to further evaluate the patient to the emergency department.    Laboratory workup is remarkable for normal lipase, normal CBC, CMP showing BUN and creatinine elevated at 25.0/1.16, respectively.  Glucose 105, otherwise within normal limits.  Urinalysis does appear consistent with UTI.  Will plan to cover for pyelonephritis given pain extending up into upper abdomen/flank.    CT shows hepatic steatosis, small to moderate hiatal hernia    Critical care was not performed.     Medical Decision Making  The patient's presentation was of high complexity (a chronic illness severe exacerbation, progression, or side effect of treatment).    The patient's evaluation involved:  ordering and/or review of 3+ test(s) in this encounter (see separate area of note for details)  independent interpretation of testing performed  by another health professional (see separate area of note for details)    The patient's management necessitated high risk (a parenteral controlled substance).    CT images were personally reviewed by me, I agree with the radiology reads.    I have reviewed the nursing notes.    I have reviewed the findings, diagnosis, plan and need for follow up with the patient.    Patient to be discharged home. Advised to follow up with PCP within 1 week. To return to ER immediately with any new/worsening symptoms. Plan of care discussed with patient who expresses understanding and agrees with plan of care.    New Prescriptions    CEPHALEXIN (KEFLEX) 500 MG CAPSULE    Take 1 capsule (500 mg) by mouth 4 times daily for 7 days.       Final diagnoses:   LUQ abdominal pain   Acute UTI       SANDEE TEE MD  6/23/2025   [facility] EMERGENCY DEPARTMENT       Sandee Tee MD  06/23/25 0081

## 2025-06-24 NOTE — CONSULTS
"Consult received for Vascular Access Team.  See LDA for details. For additional needs place \"Consult for Inpatient Vascular Access Care\"  [id number] order in EPIC. This note is to cancel vascular access consult. [facility] RN called to [facility] an IV for this patient. [facility] RN not able to make it to the ED in an sufficient amount of time. ED staff were able to obtain access on pt. [facility] team did not provide services or skills to this patient.   "

## 2025-06-24 NOTE — DISCHARGE INSTRUCTIONS
TODAY'S VISIT:  You were seen today for abdominal pain   -   - If you had any labs or imaging/radiology tests performed today, you should also discuss these tests with your usual provider.     FOLLOW-UP:  Please make an appointment to follow up with:  - Your Primary Care Provider. If you do not have a PCP, please call the Primary Care Center (phone: [phone] for an appointment  - GI Clinic (phone: [phone]) as scheduled    - Have your provider review the results from today's visit with you again to make sure no further follow-up or additional testing is needed based on those results.     RETURN TO THE EMERGENCY DEPARTMENT  Return to the Emergency Department at any time for any new or worsening symptoms or any concerns.

## 2025-06-25 ENCOUNTER — PATIENT OUTREACH (OUTPATIENT)
Dept: CARE COORDINATION | Facility: CLINIC | Age: 55
End: 2025-06-25
Payer: COMMERCIAL

## 2025-06-25 ENCOUNTER — TELEPHONE (OUTPATIENT)
Dept: NURSING | Facility: CLINIC | Age: 55
End: 2025-06-25
Payer: COMMERCIAL

## 2025-06-25 LAB — BACTERIA UR CULT: ABNORMAL

## 2025-06-25 NOTE — TELEPHONE ENCOUNTER
Kalyn returned call and was provided UC results  Patient's current Symptoms:   She reports she is a little bit better but she has not started her antibiotic yet. She was instructed by her PCP to wait two days.    RN Recommendations/Instructions per [facility] ED lab result protocol:   Continue antibiotic/medication as prescribed: O[place]    Patient/care giver notified to contact your PCP clinic or return to the Emergency department if your:  Symptoms return.  Symptoms do not improve after 3 days on antibiotic.  Symptoms do not resolve after completing antibiotic.  Symptoms worsen or other concerning symptoms.    Izzy Stewart, RN

## 2025-06-25 NOTE — TELEPHONE ENCOUNTER
[facility]    Reason for call: Lab Result Notification     Lab Result (including Rx patient on, if applicable).  If culture, copy of lab report at bottom.  Lab Result: final urine culture  ED RX = cephALEXin (KEFLEX) 500 MG capsule by mouth 4 times daily for 7 days.  Susceptible   fluconazole (DIFLUCAN) 150 MG tablet Take one tablet now, and one tablet in three days   Creatinine Level (mg/dl)   Creatinine   Date Value Ref Range Status   06/23/2025 1.16 (H) 0.51 - 0.95 mg/dL Final   07/05/2021 0.88 0.52 - 1.04 mg/dL Final    Creatinine clearance (ml/min), if applicable    Serum creatinine: 1.16 mg/dL (H) 06/23/25 1825  Estimated creatinine clearance: 60.9 mL/min (A)     Patient's current Symptoms:   Unable to assess, left message.  ED symptoms = left abdominal pain  RN Recommendations/Instructions per [facility] ED lab result protocol:   Continue antibiotic/medication as prescribed: based on assessment.        Izzy Stewart, RN

## 2025-06-25 NOTE — LETTER
June 25, 2025        Kalyn Rivero  [address] [place] [id number]          Dear Kalyn Rivero:    You were seen in the [facility] Emergency Department at [facility] on 6/23/2025.  We are unable to reach you by phone, so we are sending you this letter.     It is important that you call [facility] Emergency Department lab result nurse at [phone], as we have information to relay to you.   Best time to call back is between 9AM and 5:30PM, 7 days a week.      Sincerely,     [facility] Emergency Department Lab Result RN  [phone]

## 2025-06-25 NOTE — PROGRESS NOTES
[facility] Contact  [facility]/Voicemail     Clinical Data: Care Coordination ED-sourced Outreach-     Outreach attempted x 2.  Left message on patient's voicemail, providing [facility]'s 24/7 scheduling and nurse triage phone number [phone] ([phone]) for questions/concerns and/or to schedule an appt with an [facility] provider.      [facility] unable to send Care Coordination introduction letter as patient does not have an active MyChart account. Clinic Care Coordination services remain available via referral if needed.    Plan: [facility] will do no further outreaches at this time.       Linda Smith MA  Connected Care Resource [place], [facility]    *Connected Care Resource Team does NOT follow patient ongoing. Referrals are identified based on internal discharge reports and the outreach is to ensure patient has an understanding of their discharge instructions.

## 2025-06-30 ENCOUNTER — HOSPITAL ENCOUNTER (EMERGENCY)
Facility: CLINIC | Age: 55
Discharge: HOME | End: 2025-06-30
Attending: EMERGENCY MEDICINE | Admitting: EMERGENCY MEDICINE
Payer: COMMERCIAL

## 2025-06-30 VITALS
DIASTOLIC BLOOD PRESSURE: 88 MMHG | RESPIRATION RATE: 18 BRPM | OXYGEN SATURATION: 95 % | HEART RATE: 121 BPM | SYSTOLIC BLOOD PRESSURE: 134 MMHG | TEMPERATURE: 98.3 F

## 2025-06-30 DIAGNOSIS — H66.002 LEFT ACUTE SUPPURATIVE OTITIS MEDIA: ICD-10-CM

## 2025-06-30 DIAGNOSIS — M06.9 RHEUMATOID ARTHRITIS FLARE (H): ICD-10-CM

## 2025-06-30 PROCEDURE — 99283 EMERGENCY DEPT VISIT LOW MDM: CPT | Performed by: EMERGENCY MEDICINE

## 2025-06-30 PROCEDURE — 250N000013 HC RX MED GY IP 250 OP 250 PS 637: Performed by: EMERGENCY MEDICINE

## 2025-06-30 PROCEDURE — 99284 EMERGENCY DEPT VISIT MOD MDM: CPT | Performed by: EMERGENCY MEDICINE

## 2025-06-30 RX ORDER — CEFUROXIME AXETIL 500 MG/1
500 TABLET ORAL 2 TIMES DAILY
Qty: 14 TABLET | Refills: 0 | Status: SHIPPED | OUTPATIENT
Start: 2025-06-30 | End: 2025-07-07

## 2025-06-30 RX ORDER — METHOCARBAMOL 750 MG/1
750 TABLET, FILM COATED ORAL ONCE
Status: COMPLETED | OUTPATIENT
Start: 2025-06-30 | End: 2025-06-30

## 2025-06-30 RX ORDER — OXYCODONE HYDROCHLORIDE 10 MG/1
10 TABLET ORAL ONCE
Refills: 0 | Status: COMPLETED | OUTPATIENT
Start: 2025-06-30 | End: 2025-06-30

## 2025-06-30 RX ADMIN — METHOCARBAMOL 750 MG: 750 TABLET ORAL at 05:57

## 2025-06-30 RX ADMIN — OXYCODONE HYDROCHLORIDE 10 MG: 10 TABLET ORAL at 05:57

## 2025-06-30 ASSESSMENT — COLUMBIA-SUICIDE SEVERITY RATING SCALE - C-SSRS
6. HAVE YOU EVER DONE ANYTHING, STARTED TO DO ANYTHING, OR PREPARED TO DO ANYTHING TO END YOUR LIFE?: NO
1. IN THE PAST MONTH, HAVE YOU WISHED YOU WERE DEAD OR WISHED YOU COULD GO TO SLEEP AND NOT WAKE UP?: NO
2. HAVE YOU ACTUALLY HAD ANY THOUGHTS OF KILLING YOURSELF IN THE PAST MONTH?: NO

## 2025-06-30 ASSESSMENT — ACTIVITIES OF DAILY LIVING (ADL)
ADLS_ACUITY_SCORE: 53
ADLS_ACUITY_SCORE: 53

## 2025-06-30 NOTE — ED TRIAGE NOTES
Patient presents with ear pain that started 2 days ago. States she woke up today due to inability to move. States she started to be in severe generalized body pain. She is also stating she has Replacements everywhere but her hips. HX of Rheumatoid arthritis, osteoarthritis and Fibromyalgia. Is on antibiotics currently. Has a PCA with her who is assisting with ambulation.

## 2025-06-30 NOTE — DISCHARGE INSTRUCTIONS
Instructions from your doctor today:  Emergency Department (ED) testing is focused on the potential causes of your symptoms that are the most dangerous possibilities, and cannot cover every possibility. Based on the evaluation, it was deemed sufficiently safe to discharge and continue management through the clinics. Thus, follow-up is very important to assess for improvement/worsening, potential further testing, and potential treatment adjustments. If you were given opioid pain medications or other medications that can make you drowsy while in the ED, you should not drive for at least several hours and not until you feel completely back to normal.     Please follow up with:  - your rheumatologist tomorrow as already scheduled.   - If you do not have a primary care provider, you can be seen in follow-up and establish care by calling any of the clinics below:     - [facility] (phone: [phone])     - [facility] (phone: [phone])   - Have your clinic provider review the results from today's visit with you again, including any potential follow-up or additional testing that may be needed based on the results. Occasionally, incidental findings are found on later review by radiologists that may need follow-up.     Return to the Emergency Department immediately if you have worsening symptoms, or any other urgent health concerns.

## 2025-06-30 NOTE — DISCHARGE INSTRUCTIONS
Present to your primary care office appointment tomorrow as scheduled and have a trial void after the removal of the Saleh to see if you are able to pee on your own after having the Saleh in since your ED visit today  If your clinic is uncomfortable performing a trial void tomorrow, a referral for urology was sent from the ED today for follow-up from their service for a trial void and removal of the Saleh catheter  Continue fluids as well as Tylenol and ibuprofen as needed for pain and discomfort  Empty the Saleh catheter bag at least once a day but empty as needed as it fills  Otherwise, do not hesitate to seek urgent or emergent reevaluation if you have any worsening or concerning signs or symptoms  
toe pain

## 2025-06-30 NOTE — ED PROVIDER NOTES
History     Chief Complaint   Patient presents with    Otalgia    Generalized Body Aches     HPI  Kalyn Rivero is a 54 year old female with PMH notable for rheumatoid arthritis, fibromyalgia, chronic migraines, dystonia who presents to the ED with ear pain and generalized body pain.  Patient reports that she has been taking some antibiotics for recent UTI.  She has not had dysuria or frequency.  She notes ear pain for the past several days on the left.  The patient awoke this morning with diffuse aches primarily in the joints.  She notes that she is on Enbrel and celecoxib for her rheumatoid arthritis, is scheduled with a follow-up with her rheumatologist tomorrow    Physical Exam   BP: 134/88  Pulse: (!) 121  Temp: 98.3  F (36.8  C)  Resp: 18  SpO2: 95 %    Physical Exam  General: no acute distress. Appears stated age.   HENT: MMM, no oropharyngeal lesions.  Right ear canal and TM normal.  Left ear canal normal, TM with mild bulge and white/yellow effusion  Eyes: PERRL, normal sclerae   Cardio: Mildly tachycardic rate. Regular rhythm. Extremities well perfused  Resp: Normal work of breathing, Normal respiratory rate.   Abdomen: Mild LLQ tenderness, non-distended, no rebound, no guarding  MSK: Joints without excessive warmth, without erythema, do have pain with ROM over most extremity joints  Neuro: alert without signs of confusion. CN II-XII grossly intact. Grossly normal strength and sensation in all extremities.   Psych: normal affect, normal behavior      ED Course      Procedures              Labs Ordered and Resulted from Time of ED Arrival to Time of ED Departure - No data to display  No orders to display            Medical Decision Making  The patient's presentation was of high complexity (a chronic illness severe exacerbation, progression, or side effect of treatment).    The patient's evaluation involved:  review of 3+ test result(s) ordered prior to this encounter (see separate area of note for  details)    The patient's management necessitated moderate risk (prescription drug management including medications given in the ED).      Assessments & Plan   Patient presenting with ear pain and bodyaches. Vitals in the ED notable for initial heart rate of 121, otherwise unremarkable. Nursing notes reviewed.     Chart review notable for recent ED visit on 6/23/2025 with patient noting LUQ abdominal pain and dysuria.  CT had shown known hiatal hernia, some borderline enlarged lymph nodes, but no significant acute pathology.  CBC and chemistry panel were unremarkable.  UA was suggestive of UTI, and patient was discharged with cephalexin prescription.  Urine grew Klebsiella pneumoniae which was susceptible to cephalosporins.    Clinical picture is consistent with rheumatoid arthritis flare and early/mild left suppurative otitis media.  Patient is already on fairly high-dose NSAIDs. In the ED, the patient's symptoms were managed with oxycodone and methocarbamol, with improvement in symptoms upon reassessment.     The complete clinical picture is most consistent with rheumatoid arthritis flare and mild left acute suppurative otitis media. After counseling on the diagnosis, work-up, and treatment plan, the patient was discharged to home. The patient was advised to follow-up with rheumatology in 1 day as already scheduled. The patient was advised to return to the ED if worsening symptoms, or any urgent health concerns.     Final diagnoses:   Left acute suppurative otitis media   Rheumatoid arthritis flare (H)     New Prescriptions    CEFUROXIME (CEFTIN) 500 MG TABLET    Take 1 tablet (500 mg) by mouth 2 times daily for 7 days.     --  Aldo Reyes MD   Emergency Medicine   [facility] EMERGENCY DEPARTMENT  6/30/2025       Aldo Reyes MD  06/30/25 0702

## 2025-07-01 ENCOUNTER — TRANSFERRED RECORDS (OUTPATIENT)
Dept: HEALTH INFORMATION MANAGEMENT | Facility: CLINIC | Age: 55
End: 2025-07-01
Payer: COMMERCIAL

## 2025-07-01 LAB
ALT SERPL-CCNC: 103 IU/L (ref 6–32)
AST SERPL-CCNC: 78 U/L (ref 10–35)
CREATININE (EXTERNAL): 0.89 MG/DL (ref 0.5–1.05)

## 2025-07-02 ENCOUNTER — PATIENT OUTREACH (OUTPATIENT)
Dept: CARE COORDINATION | Facility: CLINIC | Age: 55
End: 2025-07-02
Payer: COMMERCIAL

## 2025-07-02 NOTE — PROGRESS NOTES
Clinic Care Coordination Contact  Community Health Worker Initial Outreach    CHW Initial Information Gathering:  Referral Source: ED Follow-Up  Current living arrangement:: Not Assessed  No PCP office visit in Past Year: No  CHW Additional Questions  If ED/Hospital discharge, follow-up appointment scheduled as recommended?: N/A  Medication changes made following ED/Hospital discharge?: No  MyChart active?: Yes  Patient sent Social Drivers of Health questionnaire?: No    Patient expressed that she is in the process of changing her Care Team (PCP and Rheumatology).     Patient accepts CC: No, Patient expressed no additional support is needed at this time. [facility] unable to send Care Coordination introduction letter as patient does not have an active [facility] account. Clinic Care Coordination services remain available via referral if needed.        Selene Naylor  Community Health Worker    Connected Care Resources   [facility]     *Connected Care Resources Team does NOT   follow patient ongoing. Referrals are identified   based on internal discharge report and the outreach is to ensure   Patient has understanding of their discharge instructions.

## 2025-07-07 ENCOUNTER — TELEPHONE (OUTPATIENT)
Dept: FAMILY MEDICINE | Facility: CLINIC | Age: 55
End: 2025-07-07
Payer: COMMERCIAL

## 2025-07-07 ENCOUNTER — TRANSFERRED RECORDS (OUTPATIENT)
Dept: HEALTH INFORMATION MANAGEMENT | Facility: CLINIC | Age: 55
End: 2025-07-07
Payer: COMMERCIAL

## 2025-07-07 NOTE — TELEPHONE ENCOUNTER
Forms/Letter Request    Type of form/letter: Medical opinion      Do we have the form/letter: Yes:     Who is the form from? Patient    Where did/will the form come from? Patient or family brought in       When is form/letter needed by: 7/8/2025    How would you like the form/letter returned:     Patient Notified form requests are processed in 5-7 business days:Yes    Okay to leave a detailed message?: Yes at Home number on file [phone] (home)  Note: PATIENT WANTS BOTH FORMS BACK

## 2025-07-09 NOTE — TELEPHONE ENCOUNTER
Form brought to the  for . Placed a copy to be scanned into the chart and a copy placed in the TC form folder. Called and informed patient that the form is at the  to .Sharmin Gomez [facility]

## 2025-07-09 NOTE — TELEPHONE ENCOUNTER
Forms/Letter     Verify patient name and date of birth.  Was this done?: Yes    Verify Photo ID needed of person  item.  Name of person picking up: pt Kalyn Crystal

## 2025-07-10 ENCOUNTER — TRANSCRIBE ORDERS (OUTPATIENT)
Dept: OTHER | Age: 55
End: 2025-07-10

## 2025-07-10 DIAGNOSIS — M25.532 LEFT WRIST PAIN: Primary | ICD-10-CM

## 2025-07-14 ENCOUNTER — PATIENT OUTREACH (OUTPATIENT)
Dept: CARE COORDINATION | Facility: CLINIC | Age: 55
End: 2025-07-14
Payer: COMMERCIAL

## 2025-07-16 ENCOUNTER — PRE VISIT (OUTPATIENT)
Dept: OTOLARYNGOLOGY | Facility: CLINIC | Age: 55
End: 2025-07-16

## 2025-07-21 ENCOUNTER — TELEPHONE (OUTPATIENT)
Dept: ORTHOPEDICS | Facility: CLINIC | Age: 55
End: 2025-07-21
Payer: COMMERCIAL

## 2025-07-21 NOTE — TELEPHONE ENCOUNTER
Talked with Kalyn and discussed seeing our hand specialist to discuss her MRI results and plan. Will cancel her visit on Thursday and get her rescheduled with hand.

## 2025-07-21 NOTE — TELEPHONE ENCOUNTER
"----- Message from Jonathon Greene sent at 7/21/2025 12:56 PM CDT -----  Fernando Noe:    Yes, she should see hand surgery, not sports medicine.    \"IMPRESSION:    1. 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 Lucency is noted surrounding the screws within the ring finger and middle finger metacarpals, compatible with loosening of the arthroplasty hardware and arthroplasty failure. There is also some suspected lucency surrounding the radial component of the arthroplasty.    2. No acute fracture of the left wrist.\"  ----- Message -----  From: Kusum Montilla, ATC  Sent: 7/18/2025   2:24 PM CDT  To: Jonathon Greene MD    Do you think based on her XR read from Ray that she should be scheduled with our hand surgeons?      Kusum ATC  "

## 2025-08-12 ENCOUNTER — APPOINTMENT (OUTPATIENT)
Dept: GENERAL RADIOLOGY | Facility: CLINIC | Age: 55
End: 2025-08-12
Attending: PHYSICIAN ASSISTANT
Payer: COMMERCIAL

## 2025-08-12 ENCOUNTER — APPOINTMENT (OUTPATIENT)
Dept: CT IMAGING | Facility: CLINIC | Age: 55
End: 2025-08-12
Payer: COMMERCIAL

## 2025-08-12 ENCOUNTER — HOSPITAL ENCOUNTER (EMERGENCY)
Facility: CLINIC | Age: 55
Discharge: HOME | End: 2025-08-12
Attending: EMERGENCY MEDICINE | Admitting: EMERGENCY MEDICINE
Payer: COMMERCIAL

## 2025-08-12 ENCOUNTER — APPOINTMENT (OUTPATIENT)
Dept: ULTRASOUND IMAGING | Facility: CLINIC | Age: 55
End: 2025-08-12
Attending: PHYSICIAN ASSISTANT
Payer: COMMERCIAL

## 2025-08-12 VITALS
HEIGHT: 66 IN | RESPIRATION RATE: 16 BRPM | SYSTOLIC BLOOD PRESSURE: 124 MMHG | HEART RATE: 79 BPM | TEMPERATURE: 98.1 F | OXYGEN SATURATION: 98 % | DIASTOLIC BLOOD PRESSURE: 75 MMHG | BODY MASS INDEX: 29.73 KG/M2 | WEIGHT: 185 LBS

## 2025-08-12 DIAGNOSIS — M25.522 LEFT ELBOW PAIN: Primary | ICD-10-CM

## 2025-08-12 LAB
BASOPHILS # BLD AUTO: 0.05 10E3/UL (ref 0–0.2)
BASOPHILS NFR BLD AUTO: 0.6 %
CRP SERPL-MCNC: 13.85 MG/L
EOSINOPHIL # BLD AUTO: 0.66 10E3/UL (ref 0–0.7)
EOSINOPHIL NFR BLD AUTO: 7.3 %
ERYTHROCYTE [DISTWIDTH] IN BLOOD BY AUTOMATED COUNT: 13.9 % (ref 10–15)
ERYTHROCYTE [SEDIMENTATION RATE] IN BLOOD BY WESTERGREN METHOD: 52 MM/HR (ref 0–30)
HCT VFR BLD AUTO: 38.7 % (ref 35–47)
HGB BLD-MCNC: 12.5 G/DL (ref 11.7–15.7)
IMM GRANULOCYTES # BLD: 0.03 10E3/UL
IMM GRANULOCYTES NFR BLD: 0.3 %
LYMPHOCYTES # BLD AUTO: 1.81 10E3/UL (ref 0.8–5.3)
LYMPHOCYTES NFR BLD AUTO: 20 %
MCH RBC QN AUTO: 28.2 PG (ref 26.5–33)
MCHC RBC AUTO-ENTMCNC: 32.3 G/DL (ref 31.5–36.5)
MCV RBC AUTO: 87.4 FL (ref 78–100)
MONOCYTES # BLD AUTO: 0.69 10E3/UL (ref 0–1.3)
MONOCYTES NFR BLD AUTO: 7.6 %
NEUTROPHILS # BLD AUTO: 5.8 10E3/UL (ref 1.6–8.3)
NEUTROPHILS NFR BLD AUTO: 64.2 %
NRBC # BLD AUTO: 0 10E3/UL
NRBC BLD AUTO-RTO: 0 /100
PLATELET # BLD AUTO: 211 10E3/UL (ref 150–450)
RBC # BLD AUTO: 4.43 10E6/UL (ref 3.8–5.2)
WBC # BLD AUTO: 9.04 10E3/UL (ref 4–11)

## 2025-08-12 PROCEDURE — 250N000011 HC RX IP 250 OP 636

## 2025-08-12 PROCEDURE — 85018 HEMOGLOBIN: CPT | Performed by: PHYSICIAN ASSISTANT

## 2025-08-12 PROCEDURE — 250N000011 HC RX IP 250 OP 636: Performed by: PHYSICIAN ASSISTANT

## 2025-08-12 PROCEDURE — 93971 EXTREMITY STUDY: CPT | Mod: LT

## 2025-08-12 PROCEDURE — 73080 X-RAY EXAM OF ELBOW: CPT | Mod: LT

## 2025-08-12 PROCEDURE — 999N000127 HC STATISTIC PERIPHERAL IV START W US GUIDANCE

## 2025-08-12 PROCEDURE — 96374 THER/PROPH/DIAG INJ IV PUSH: CPT | Mod: 59 | Performed by: EMERGENCY MEDICINE

## 2025-08-12 PROCEDURE — 99285 EMERGENCY DEPT VISIT HI MDM: CPT | Mod: 25 | Performed by: EMERGENCY MEDICINE

## 2025-08-12 PROCEDURE — 250N000013 HC RX MED GY IP 250 OP 250 PS 637: Performed by: PHYSICIAN ASSISTANT

## 2025-08-12 PROCEDURE — 99254 IP/OBS CNSLTJ NEW/EST MOD 60: CPT | Performed by: PHYSICIAN ASSISTANT

## 2025-08-12 PROCEDURE — 36415 COLL VENOUS BLD VENIPUNCTURE: CPT | Performed by: PHYSICIAN ASSISTANT

## 2025-08-12 PROCEDURE — 85652 RBC SED RATE AUTOMATED: CPT | Performed by: PHYSICIAN ASSISTANT

## 2025-08-12 PROCEDURE — 999N000285 HC STATISTIC VASC ACCESS LAB DRAW WITH PIV START

## 2025-08-12 PROCEDURE — 99284 EMERGENCY DEPT VISIT MOD MDM: CPT | Mod: GC | Performed by: EMERGENCY MEDICINE

## 2025-08-12 PROCEDURE — 999N000205 HC STATISTICAL VASC ACCESS NURSE TIME, 46-60 MINUTES

## 2025-08-12 PROCEDURE — 73030 X-RAY EXAM OF SHOULDER: CPT | Mod: LT

## 2025-08-12 PROCEDURE — 73610 X-RAY EXAM OF ANKLE: CPT | Mod: RT

## 2025-08-12 PROCEDURE — 73201 CT UPPER EXTREMITY W/DYE: CPT | Mod: LT

## 2025-08-12 PROCEDURE — 86140 C-REACTIVE PROTEIN: CPT | Performed by: PHYSICIAN ASSISTANT

## 2025-08-12 PROCEDURE — 73110 X-RAY EXAM OF WRIST: CPT | Mod: LT

## 2025-08-12 PROCEDURE — 250N000013 HC RX MED GY IP 250 OP 250 PS 637: Performed by: EMERGENCY MEDICINE

## 2025-08-12 PROCEDURE — 250N000009 HC RX 250

## 2025-08-12 RX ORDER — OXYCODONE HYDROCHLORIDE 5 MG/1
5 TABLET ORAL ONCE
Refills: 0 | Status: COMPLETED | OUTPATIENT
Start: 2025-08-12 | End: 2025-08-12

## 2025-08-12 RX ORDER — IOPAMIDOL 755 MG/ML
100 INJECTION, SOLUTION INTRAVASCULAR ONCE
Status: COMPLETED | OUTPATIENT
Start: 2025-08-12 | End: 2025-08-12

## 2025-08-12 RX ORDER — OXYCODONE HYDROCHLORIDE 5 MG/1
5 TABLET ORAL EVERY 6 HOURS PRN
Qty: 12 TABLET | Refills: 0 | Status: SHIPPED | OUTPATIENT
Start: 2025-08-12 | End: 2025-08-15

## 2025-08-12 RX ORDER — LORAZEPAM 2 MG/ML
1 INJECTION INTRAMUSCULAR ONCE
Status: COMPLETED | OUTPATIENT
Start: 2025-08-12 | End: 2025-08-12

## 2025-08-12 RX ADMIN — OXYCODONE HYDROCHLORIDE 5 MG: 5 TABLET ORAL at 15:36

## 2025-08-12 RX ADMIN — OXYCODONE HYDROCHLORIDE 5 MG: 5 TABLET ORAL at 22:05

## 2025-08-12 RX ADMIN — LORAZEPAM 1 MG: 2 INJECTION INTRAMUSCULAR; INTRAVENOUS at 19:40

## 2025-08-12 RX ADMIN — OXYCODONE HYDROCHLORIDE 5 MG: 5 TABLET ORAL at 10:03

## 2025-08-12 RX ADMIN — OXYCODONE HYDROCHLORIDE 5 MG: 5 TABLET ORAL at 11:25

## 2025-08-12 RX ADMIN — IOPAMIDOL 90 ML: 755 INJECTION, SOLUTION INTRAVENOUS at 21:14

## 2025-08-12 RX ADMIN — SODIUM CHLORIDE 60 ML: 9 INJECTION, SOLUTION INTRAVENOUS at 21:14

## 2025-08-12 ASSESSMENT — ACTIVITIES OF DAILY LIVING (ADL)
ADLS_ACUITY_SCORE: 53

## 2025-08-13 ENCOUNTER — TELEPHONE (OUTPATIENT)
Dept: ORTHOPEDICS | Facility: CLINIC | Age: 55
End: 2025-08-13
Payer: COMMERCIAL

## 2025-08-14 ENCOUNTER — OFFICE VISIT (OUTPATIENT)
Dept: ORTHOPEDICS | Facility: CLINIC | Age: 55
End: 2025-08-14
Payer: COMMERCIAL

## 2025-08-14 ENCOUNTER — ANCILLARY PROCEDURE (OUTPATIENT)
Dept: GENERAL RADIOLOGY | Facility: CLINIC | Age: 55
End: 2025-08-14
Attending: ORTHOPAEDIC SURGERY
Payer: COMMERCIAL

## 2025-08-14 ENCOUNTER — THERAPY VISIT (OUTPATIENT)
Dept: OCCUPATIONAL THERAPY | Facility: CLINIC | Age: 55
End: 2025-08-14
Payer: COMMERCIAL

## 2025-08-14 ENCOUNTER — TELEPHONE (OUTPATIENT)
Dept: ORTHOPEDICS | Facility: CLINIC | Age: 55
End: 2025-08-14

## 2025-08-14 VITALS — WEIGHT: 185 LBS | BODY MASS INDEX: 29.73 KG/M2 | HEIGHT: 66 IN

## 2025-08-14 DIAGNOSIS — M25.522 ELBOW PAIN, LEFT: Primary | ICD-10-CM

## 2025-08-14 DIAGNOSIS — M25.532 WRIST PAIN, LEFT: ICD-10-CM

## 2025-08-14 DIAGNOSIS — M25.522 ELBOW PAIN, CHRONIC, LEFT: Primary | ICD-10-CM

## 2025-08-14 DIAGNOSIS — M25.532 WRIST PAIN, LEFT: Primary | ICD-10-CM

## 2025-08-14 DIAGNOSIS — R52 PAIN: ICD-10-CM

## 2025-08-14 DIAGNOSIS — G89.29 ELBOW PAIN, CHRONIC, LEFT: Primary | ICD-10-CM

## 2025-08-14 PROCEDURE — 99213 OFFICE O/P EST LOW 20 MIN: CPT | Mod: GC | Performed by: ORTHOPAEDIC SURGERY

## 2025-09-29 ENCOUNTER — PRE VISIT (OUTPATIENT)
Dept: ORTHOPEDICS | Facility: CLINIC | Age: 55
End: 2025-09-29

## (undated) DEVICE — NDL 19GA 1.5" FILTER 305200

## (undated) DEVICE — VESSEL LOOPS BLUE MAXI

## (undated) DEVICE — TRAY PREP DRY SKIN SCRUB 067

## (undated) DEVICE — SPONGE LAP 18X18" X8435

## (undated) DEVICE — LINEN ORTHO PACK 5446

## (undated) DEVICE — GLOVE PROTEXIS BLUE W/NEU-THERA 7.0  2D73EB70

## (undated) DEVICE — SYR 03ML LL W/O NDL 309657

## (undated) DEVICE — LINEN TOWEL PACK X5 5464

## (undated) DEVICE — DRAPE IOBAN INCISE 23X17" 6650EZ

## (undated) DEVICE — LINEN FULL SHEET 5511

## (undated) DEVICE — BNDG ELASTIC 4"X5YDS UNSTERILE 6611-40

## (undated) DEVICE — DECANTER TRANSFER DEVICE 2008S

## (undated) DEVICE — LINEN GOWN XLG 5407

## (undated) DEVICE — SU ETHIBOND 0 MO-7 CR 8X18" CX41D

## (undated) DEVICE — SU SILK 3-0 SH 30" K832H

## (undated) DEVICE — ESU HOLSTER PLASTIC DISP E2400

## (undated) DEVICE — DRSG GAUZE 4X4" 2187

## (undated) DEVICE — SU ETHIBOND 1 CT-1 30" X425H

## (undated) DEVICE — SOL ADH LIQUID BENZOIN SWAB 0.6ML C1544

## (undated) DEVICE — SYR 10ML LL W/O NDL

## (undated) DEVICE — LABEL MEDICATION SYSTEM 3303-P

## (undated) DEVICE — SYR 10ML FINGER CONTROL W/O NDL 309695

## (undated) DEVICE — DRSG ADAPTIC 3X8" 6113

## (undated) DEVICE — SU PDS II 3-0 PS-1 18" Z683G

## (undated) DEVICE — ESU ELEC BLADE HEX-LOCKING 2.5" E1450X

## (undated) DEVICE — DRAPE C-ARM OEC MINI VIEW 6800   00-901917-01

## (undated) DEVICE — GLOVE PROTEXIS W/NEU-THERA 8.0  2D73TE80

## (undated) DEVICE — GLOVE PROTEXIS POWDER FREE SMT 6.5  2D72PT65X

## (undated) DEVICE — CLIP HORIZON MED BLUE 002200

## (undated) DEVICE — SPECIMEN CONTAINER 5OZ STERILE 2600SA

## (undated) DEVICE — GLOVE PROTEXIS BLUE W/NEU-THERA 7.5  2D73EB75

## (undated) DEVICE — CAST PADDING 4" COTTON WEBRIL STERILE 9084S

## (undated) DEVICE — SYR 10ML LL W/O NDL 302995

## (undated) DEVICE — DRAPE MAYO STAND 23X54 8337

## (undated) DEVICE — SOL NACL 0.9% IRRIG 1000ML BOTTLE 2F7124

## (undated) DEVICE — SU ETHILON 4-0 PS-2 18" BLACK 1667H

## (undated) DEVICE — PACK SET-UP STD 9102

## (undated) DEVICE — NDL 25GA 1.5" 305127

## (undated) DEVICE — STRAP KNEE/BODY 31143004

## (undated) DEVICE — GLOVE PROTEXIS W/NEU-THERA 7.0  2D73TE70

## (undated) DEVICE — GLOVE BIOGEL PI MICRO SZ 6.5 48565

## (undated) DEVICE — DRSG ABDOMINAL 07 1/2X8" 7197D

## (undated) DEVICE — GLOVE PROTEXIS POWDER FREE 6.5 ORTHOPEDIC 2D73ET65

## (undated) DEVICE — PEN MARKING SKIN W/PAPER RULER 31145785

## (undated) DEVICE — GLOVE PROTEXIS BLUE W/NEU-THERA 8.0  2D73EB80

## (undated) DEVICE — DRSG TEGADERM 4X4 3/4" 1626W

## (undated) DEVICE — BLADE KNIFE SURG 10 371110

## (undated) DEVICE — PREP CHLORAPREP W/ORANGE TINT 10.5ML 260715

## (undated) DEVICE — SOL WATER IRRIG 1000ML BOTTLE 2F7114

## (undated) DEVICE — DRSG KERLIX FLUFFS X5

## (undated) DEVICE — DRSG KERLIX 4 1/2"X4YDS ROLL 6730

## (undated) DEVICE — SYR 05ML LL W/O NDL

## (undated) DEVICE — TUBING IV EXT SET MICRO VOLUME MALE LL ADAPTER 36" 2N3345

## (undated) DEVICE — SU VICRYL 4-0 PS-2 18" UND J496H

## (undated) DEVICE — DRAPE BACK TABLE  44X90" 8377

## (undated) DEVICE — ESU PENCIL W/SMOKE EVAC NEPTUNE STRYKER 0703-046-000

## (undated) DEVICE — SOL NACL 0.9% IRRIG 500ML BOTTLE 2F7123

## (undated) DEVICE — Device

## (undated) DEVICE — SLING ARM MED 79-99155

## (undated) DEVICE — KIT CULTURE TRANSPORT SYS A.C.T. II DUAL ANEROBE R124022

## (undated) DEVICE — TAPE MICROFOAM 4" 1528-4

## (undated) DEVICE — PREP CHLORAPREP 26ML TINTED ORANGE  260815

## (undated) DEVICE — ESU GROUND PAD ADULT W/CORD E7507

## (undated) DEVICE — SU VICRYL 2-0 CT-1 27" UND J259H

## (undated) DEVICE — GOWN XLG DISP 9545

## (undated) DEVICE — CLIP HORIZON SM RED WIDE SLOT 001201

## (undated) DEVICE — IMM LEG ELEVATOR 79-90191

## (undated) DEVICE — DRAIN JACKSON PRATT RESERVOIR 400ML SU130-1000

## (undated) DEVICE — GLOVE ESTEEM POWDER FREE SMT 8.0  2D72PT80

## (undated) DEVICE — SYR BULB IRRIG 50ML LATEX FREE 0035280

## (undated) DEVICE — PACK HAND CUSTOM ASC

## (undated) DEVICE — LINEN HALF SHEET 5512

## (undated) DEVICE — PREP POVIDONE-IODINE 7.5% SCRUB 4OZ BOTTLE MDS093945

## (undated) DEVICE — CATH TRAY FOLEY SURESTEP 16FR WDRAIN BAG STLK LATEX A300316A

## (undated) DEVICE — PACK LOWER EXTREMITY RIDGES

## (undated) DEVICE — NDL 18GAX1.5" 305185

## (undated) DEVICE — NDL COUNTER 20CT 31142493

## (undated) DEVICE — DRSG GAUZE 4X4" 3033

## (undated) DEVICE — BLADE BONE MILL STRK 5.0MM MED 5400-701-000

## (undated) DEVICE — BNDG ELASTIC 4" DBL LENGTH UNSTERILE 6611-14

## (undated) DEVICE — PREP POVIDONE IODINE SCRUB 7.5% 120ML

## (undated) DEVICE — TOURNIQUET CUFF 18" REPRO RED 60-7070-103

## (undated) DEVICE — SU VICRYL 0 CT-1 3X27" J430T

## (undated) DEVICE — CAST STOCKINETTE 3" COTTON 30-7003

## (undated) DEVICE — SU VICRYL 2-0 CT-2 27" UND J269H

## (undated) DEVICE — BLADE SAW SAGITTAL STRK 20.7X85X0.89MM 2108-109-000S13

## (undated) DEVICE — GLOVE PROTEXIS MICRO 6.5  2D73PM65

## (undated) DEVICE — SPONGE RAY-TEC 4X8" 7318

## (undated) DEVICE — SUCTION MANIFOLD NEPTUNE 2 SYS 4 PORT 0702-020-000

## (undated) DEVICE — DRSG STERI STRIP 1/2X4" R1547

## (undated) DEVICE — TUBING SMOKE EVAC 1CMX3M SEA3710

## (undated) DEVICE — SU ETHIBOND 5 V-37 4X30" MB66G

## (undated) DEVICE — GLOVE BIOGEL PI MICRO INDICATOR UNDERGLOVE SZ 6.5 48965

## (undated) DEVICE — DRAPE STERI TOWEL LG 1010

## (undated) DEVICE — TOURNIQUET SGL BLADDER 18"X4" RED 5921-218-135

## (undated) DEVICE — BAG CLEAR TRASH 1.3M 39X33" P4040C

## (undated) DEVICE — DRSG GAUZE 4X8" NON21842

## (undated) DEVICE — WIPES FOLEY CARE SURESTEP PROVON DFC100

## (undated) DEVICE — SU NDL MAYO 1824-2

## (undated) DEVICE — ESU PENCIL W/HOLSTER E2350H

## (undated) DEVICE — SU ETHIBOND 0 CT-1 CR 8X18" CX21D

## (undated) DEVICE — LINEN GOWN OVERSIZE 5408

## (undated) DEVICE — CAST PADDING 6" STERILE 9046S

## (undated) DEVICE — NDL 18GA 1.5" 305196

## (undated) DEVICE — PREP SKIN SCRUB TRAY 4461A

## (undated) DEVICE — DRAIN JACKSON PRATT ROUND W/TROCAR 15FR LF JP-HUR151

## (undated) DEVICE — ESU GROUND PAD UNIVERSAL W/O CORD

## (undated) DEVICE — SU PROLENE 4-0 PS-2 18" 8682G

## (undated) DEVICE — SUCTION MANIFOLD NEPTUNE 2 SYS 1 PORT 702-025-000

## (undated) DEVICE — SU SILK 2-0 SH 30" K833H

## (undated) DEVICE — SUCTION FRAZIER 12FR W/HANDLE K73

## (undated) DEVICE — BLADE SAW OSCILLATING STRYK MED 9.0X25X0.38MM 2296-003-111

## (undated) DEVICE — NDL SPINAL 22GA 3.5" QUINCKE 405181

## (undated) DEVICE — COVER CAMERA IN-LIGHT DISP LT-C02

## (undated) DEVICE — DRSG ADAPTIC 3X3" 6112

## (undated) DEVICE — SOL BENZOIN 0.5OZ

## (undated) DEVICE — SUCTION IRR SYSTEM W/O TIP INTERPULSE HANDPIECE 0210-100-000

## (undated) DEVICE — PACK LOWER EXTREMITY CUSTOM ASC

## (undated) DEVICE — SUCTION MANIFOLD DORNOCH ULTRA CART UL-CL500

## (undated) DEVICE — LINEN TOWEL PACK X10 5473

## (undated) DEVICE — PREP POVIDONE IODINE SOLUTION 10% 4OZ BOTTLE 29906-004

## (undated) DEVICE — DRAIN JACKSON PRATT RESERVOIR 100ML SU130-1305

## (undated) DEVICE — SU SILK 3-0 TIE 12X30" A304H

## (undated) DEVICE — GOWN IMPERVIOUS SPECIALTY XLG/XLONG 32474

## (undated) DEVICE — CAST PADDING 4" STERILE 9044S

## (undated) DEVICE — DRAPE STOCKINETTE 4" 8544

## (undated) DEVICE — GLOVE PROTEXIS POWDER FREE SMT 7.5  2D72PT75X

## (undated) DEVICE — SUCTION TIP YANKAUER STR K87

## (undated) RX ORDER — BUPIVACAINE HYDROCHLORIDE 5 MG/ML
INJECTION, SOLUTION EPIDURAL; INTRACAUDAL
Status: DISPENSED
Start: 2021-01-28

## (undated) RX ORDER — OXYCODONE HYDROCHLORIDE 5 MG/1
TABLET ORAL
Status: DISPENSED
Start: 2024-09-30

## (undated) RX ORDER — ONDANSETRON 2 MG/ML
INJECTION INTRAMUSCULAR; INTRAVENOUS
Status: DISPENSED
Start: 2017-03-02

## (undated) RX ORDER — PROPOFOL 10 MG/ML
INJECTION, EMULSION INTRAVENOUS
Status: DISPENSED
Start: 2022-06-08

## (undated) RX ORDER — ONDANSETRON 2 MG/ML
INJECTION INTRAMUSCULAR; INTRAVENOUS
Status: DISPENSED
Start: 2018-11-27

## (undated) RX ORDER — DIAZEPAM 5 MG
TABLET ORAL
Status: DISPENSED
Start: 2022-06-08

## (undated) RX ORDER — SCOLOPAMINE TRANSDERMAL SYSTEM 1 MG/1
PATCH, EXTENDED RELEASE TRANSDERMAL
Status: DISPENSED
Start: 2024-09-30

## (undated) RX ORDER — DEXAMETHASONE SODIUM PHOSPHATE 4 MG/ML
INJECTION, SOLUTION INTRA-ARTICULAR; INTRALESIONAL; INTRAMUSCULAR; INTRAVENOUS; SOFT TISSUE
Status: DISPENSED
Start: 2017-03-02

## (undated) RX ORDER — CLINDAMYCIN PHOSPHATE 900 MG/50ML
INJECTION, SOLUTION INTRAVENOUS
Status: DISPENSED
Start: 2017-03-02

## (undated) RX ORDER — CLINDAMYCIN PHOSPHATE 900 MG/50ML
INJECTION, SOLUTION INTRAVENOUS
Status: DISPENSED
Start: 2024-09-30

## (undated) RX ORDER — LIDOCAINE HYDROCHLORIDE 10 MG/ML
INJECTION, SOLUTION INFILTRATION; PERINEURAL
Status: DISPENSED
Start: 2017-06-29

## (undated) RX ORDER — BUPIVACAINE HYDROCHLORIDE 5 MG/ML
INJECTION, SOLUTION EPIDURAL; INTRACAUDAL
Status: DISPENSED
Start: 2020-08-13

## (undated) RX ORDER — BUPIVACAINE HYDROCHLORIDE 5 MG/ML
INJECTION, SOLUTION EPIDURAL; INTRACAUDAL
Status: DISPENSED
Start: 2024-09-30

## (undated) RX ORDER — DEXAMETHASONE SODIUM PHOSPHATE 4 MG/ML
INJECTION, SOLUTION INTRA-ARTICULAR; INTRALESIONAL; INTRAMUSCULAR; INTRAVENOUS; SOFT TISSUE
Status: DISPENSED
Start: 2022-06-08

## (undated) RX ORDER — FENTANYL CITRATE 50 UG/ML
INJECTION, SOLUTION INTRAMUSCULAR; INTRAVENOUS
Status: DISPENSED
Start: 2017-04-27

## (undated) RX ORDER — BUPIVACAINE HYDROCHLORIDE 5 MG/ML
INJECTION, SOLUTION EPIDURAL; INTRACAUDAL
Status: DISPENSED
Start: 2022-06-08

## (undated) RX ORDER — PHENYLEPHRINE HCL IN 0.9% NACL 1 MG/10 ML
SYRINGE (ML) INTRAVENOUS
Status: DISPENSED
Start: 2018-11-27

## (undated) RX ORDER — DEXAMETHASONE SODIUM PHOSPHATE 4 MG/ML
INJECTION, SOLUTION INTRA-ARTICULAR; INTRALESIONAL; INTRAMUSCULAR; INTRAVENOUS; SOFT TISSUE
Status: DISPENSED
Start: 2018-11-27

## (undated) RX ORDER — LIDOCAINE HYDROCHLORIDE 20 MG/ML
INJECTION, SOLUTION EPIDURAL; INFILTRATION; INTRACAUDAL; PERINEURAL
Status: DISPENSED
Start: 2022-06-08

## (undated) RX ORDER — ONDANSETRON 2 MG/ML
INJECTION INTRAMUSCULAR; INTRAVENOUS
Status: DISPENSED
Start: 2022-06-08

## (undated) RX ORDER — KETOROLAC TROMETHAMINE 30 MG/ML
INJECTION, SOLUTION INTRAMUSCULAR; INTRAVENOUS
Status: DISPENSED
Start: 2017-03-02

## (undated) RX ORDER — GLYCOPYRROLATE 0.2 MG/ML
INJECTION, SOLUTION INTRAMUSCULAR; INTRAVENOUS
Status: DISPENSED
Start: 2018-11-27

## (undated) RX ORDER — FENTANYL CITRATE 50 UG/ML
INJECTION, SOLUTION INTRAMUSCULAR; INTRAVENOUS
Status: DISPENSED
Start: 2024-09-30

## (undated) RX ORDER — CLINDAMYCIN PHOSPHATE 600 MG/50ML
INJECTION, SOLUTION INTRAVENOUS
Status: DISPENSED
Start: 2018-11-27

## (undated) RX ORDER — FENTANYL CITRATE 50 UG/ML
INJECTION, SOLUTION INTRAMUSCULAR; INTRAVENOUS
Status: DISPENSED
Start: 2022-06-08

## (undated) RX ORDER — KETOROLAC TROMETHAMINE 30 MG/ML
INJECTION, SOLUTION INTRAMUSCULAR; INTRAVENOUS
Status: DISPENSED
Start: 2024-09-30

## (undated) RX ORDER — FENTANYL CITRATE 50 UG/ML
INJECTION, SOLUTION INTRAMUSCULAR; INTRAVENOUS
Status: DISPENSED
Start: 2018-11-27

## (undated) RX ORDER — OXYCODONE HYDROCHLORIDE 10 MG/1
TABLET ORAL
Status: DISPENSED
Start: 2017-04-27

## (undated) RX ORDER — ONDANSETRON 2 MG/ML
INJECTION INTRAMUSCULAR; INTRAVENOUS
Status: DISPENSED
Start: 2024-09-30

## (undated) RX ORDER — BUPIVACAINE HYDROCHLORIDE 5 MG/ML
INJECTION, SOLUTION EPIDURAL; INTRACAUDAL
Status: DISPENSED
Start: 2021-04-08

## (undated) RX ORDER — HYDROMORPHONE HYDROCHLORIDE 1 MG/ML
INJECTION, SOLUTION INTRAMUSCULAR; INTRAVENOUS; SUBCUTANEOUS
Status: DISPENSED
Start: 2018-11-27

## (undated) RX ORDER — CLINDAMYCIN PHOSPHATE 900 MG/50ML
INJECTION, SOLUTION INTRAVENOUS
Status: DISPENSED
Start: 2017-04-27

## (undated) RX ORDER — SCOLOPAMINE TRANSDERMAL SYSTEM 1 MG/1
PATCH, EXTENDED RELEASE TRANSDERMAL
Status: DISPENSED
Start: 2018-11-27

## (undated) RX ORDER — OXYCODONE HYDROCHLORIDE 5 MG/1
TABLET ORAL
Status: DISPENSED
Start: 2022-06-08

## (undated) RX ORDER — LIDOCAINE HYDROCHLORIDE 20 MG/ML
INJECTION, SOLUTION EPIDURAL; INFILTRATION; INTRACAUDAL; PERINEURAL
Status: DISPENSED
Start: 2018-11-27

## (undated) RX ORDER — ACETAMINOPHEN 325 MG/1
TABLET ORAL
Status: DISPENSED
Start: 2017-04-27

## (undated) RX ORDER — HYDROXYZINE HYDROCHLORIDE 25 MG/1
TABLET, FILM COATED ORAL
Status: DISPENSED
Start: 2022-06-08

## (undated) RX ORDER — FENTANYL CITRATE 50 UG/ML
INJECTION, SOLUTION INTRAMUSCULAR; INTRAVENOUS
Status: DISPENSED
Start: 2017-03-02

## (undated) RX ORDER — CEFAZOLIN SODIUM/WATER 2 G/20 ML
SYRINGE (ML) INTRAVENOUS
Status: DISPENSED
Start: 2024-09-30

## (undated) RX ORDER — MEPERIDINE HYDROCHLORIDE 25 MG/ML
INJECTION INTRAMUSCULAR; INTRAVENOUS; SUBCUTANEOUS
Status: DISPENSED
Start: 2022-06-08

## (undated) RX ORDER — SCOLOPAMINE TRANSDERMAL SYSTEM 1 MG/1
PATCH, EXTENDED RELEASE TRANSDERMAL
Status: DISPENSED
Start: 2017-04-27

## (undated) RX ORDER — CELECOXIB 200 MG/1
CAPSULE ORAL
Status: DISPENSED
Start: 2022-06-08

## (undated) RX ORDER — CEFAZOLIN SODIUM 2 G/50ML
SOLUTION INTRAVENOUS
Status: DISPENSED
Start: 2022-06-08

## (undated) RX ORDER — PROPOFOL 10 MG/ML
INJECTION, EMULSION INTRAVENOUS
Status: DISPENSED
Start: 2017-03-02